# Patient Record
Sex: FEMALE | Race: OTHER | HISPANIC OR LATINO
[De-identification: names, ages, dates, MRNs, and addresses within clinical notes are randomized per-mention and may not be internally consistent; named-entity substitution may affect disease eponyms.]

---

## 2021-01-01 ENCOUNTER — APPOINTMENT (OUTPATIENT)
Dept: VASCULAR SURGERY | Facility: CLINIC | Age: 46
End: 2021-01-01

## 2021-01-01 ENCOUNTER — TRANSCRIPTION ENCOUNTER (OUTPATIENT)
Age: 46
End: 2021-01-01

## 2021-01-01 ENCOUNTER — FORM ENCOUNTER (OUTPATIENT)
Age: 46
End: 2021-01-01

## 2021-01-01 ENCOUNTER — APPOINTMENT (OUTPATIENT)
Dept: ULTRASOUND IMAGING | Facility: HOSPITAL | Age: 46
End: 2021-01-01

## 2021-01-01 ENCOUNTER — APPOINTMENT (OUTPATIENT)
Dept: NEUROSURGERY | Facility: CLINIC | Age: 46
End: 2021-01-01

## 2021-01-01 ENCOUNTER — NON-APPOINTMENT (OUTPATIENT)
Age: 46
End: 2021-01-01

## 2021-01-01 ENCOUNTER — INPATIENT (INPATIENT)
Facility: HOSPITAL | Age: 46
LOS: 43 days | Discharge: NURSING, REHAB OR LONG-TERM CARE | DRG: 20 | End: 2021-09-20
Attending: NEUROLOGICAL SURGERY | Admitting: NEUROLOGICAL SURGERY
Payer: COMMERCIAL

## 2021-01-01 VITALS
HEART RATE: 96 BPM | TEMPERATURE: 100 F | OXYGEN SATURATION: 100 % | RESPIRATION RATE: 20 BRPM | SYSTOLIC BLOOD PRESSURE: 166 MMHG | DIASTOLIC BLOOD PRESSURE: 103 MMHG

## 2021-01-01 VITALS
DIASTOLIC BLOOD PRESSURE: 70 MMHG | HEART RATE: 103 BPM | SYSTOLIC BLOOD PRESSURE: 129 MMHG | RESPIRATION RATE: 18 BRPM | OXYGEN SATURATION: 96 %

## 2021-01-01 DIAGNOSIS — R29.726 NIHSS SCORE 26: ICD-10-CM

## 2021-01-01 DIAGNOSIS — D68.52 PROTHROMBIN GENE MUTATION: ICD-10-CM

## 2021-01-01 DIAGNOSIS — R41.0 DISORIENTATION, UNSPECIFIED: ICD-10-CM

## 2021-01-01 DIAGNOSIS — I82.453 ACUTE EMBOLISM AND THROMBOSIS OF PERONEAL VEIN, BILATERAL: ICD-10-CM

## 2021-01-01 DIAGNOSIS — I60.51: ICD-10-CM

## 2021-01-01 DIAGNOSIS — I82.409 ACUTE EMBOLISM AND THROMBOSIS OF UNSPECIFIED DEEP VEINS OF UNSPECIFIED LOWER EXTREMITY: ICD-10-CM

## 2021-01-01 DIAGNOSIS — E16.2 HYPOGLYCEMIA, UNSPECIFIED: ICD-10-CM

## 2021-01-01 DIAGNOSIS — M79.7 FIBROMYALGIA: ICD-10-CM

## 2021-01-01 DIAGNOSIS — R53.2 FUNCTIONAL QUADRIPLEGIA: ICD-10-CM

## 2021-01-01 DIAGNOSIS — I82.462 ACUTE EMBOLISM AND THROMBOSIS OF LEFT CALF MUSCULAR VEIN: ICD-10-CM

## 2021-01-01 DIAGNOSIS — G93.6 CEREBRAL EDEMA: ICD-10-CM

## 2021-01-01 DIAGNOSIS — D62 ACUTE POSTHEMORRHAGIC ANEMIA: ICD-10-CM

## 2021-01-01 DIAGNOSIS — G91.1 OBSTRUCTIVE HYDROCEPHALUS: ICD-10-CM

## 2021-01-01 DIAGNOSIS — I60.9 NONTRAUMATIC SUBARACHNOID HEMORRHAGE, UNSPECIFIED: ICD-10-CM

## 2021-01-01 DIAGNOSIS — J98.11 ATELECTASIS: ICD-10-CM

## 2021-01-01 DIAGNOSIS — E87.1 HYPO-OSMOLALITY AND HYPONATREMIA: ICD-10-CM

## 2021-01-01 DIAGNOSIS — R73.03 PREDIABETES: ICD-10-CM

## 2021-01-01 DIAGNOSIS — D69.6 THROMBOCYTOPENIA, UNSPECIFIED: ICD-10-CM

## 2021-01-01 DIAGNOSIS — I10 ESSENTIAL (PRIMARY) HYPERTENSION: ICD-10-CM

## 2021-01-01 DIAGNOSIS — I26.94 MULTIPLE SUBSEGMENTAL PULMONARY EMBOLI WITHOUT ACUTE COR PULMONALE: ICD-10-CM

## 2021-01-01 DIAGNOSIS — K56.7 ILEUS, UNSPECIFIED: ICD-10-CM

## 2021-01-01 DIAGNOSIS — J04.10 ACUTE TRACHEITIS WITHOUT OBSTRUCTION: ICD-10-CM

## 2021-01-01 DIAGNOSIS — I82.461 ACUTE EMBOLISM AND THROMBOSIS OF RIGHT CALF MUSCULAR VEIN: ICD-10-CM

## 2021-01-01 DIAGNOSIS — R56.9 UNSPECIFIED CONVULSIONS: ICD-10-CM

## 2021-01-01 DIAGNOSIS — F05 DELIRIUM DUE TO KNOWN PHYSIOLOGICAL CONDITION: ICD-10-CM

## 2021-01-01 DIAGNOSIS — F32.9 MAJOR DEPRESSIVE DISORDER, SINGLE EPISODE, UNSPECIFIED: ICD-10-CM

## 2021-01-01 DIAGNOSIS — I72.9 ANEURYSM OF UNSPECIFIED SITE: ICD-10-CM

## 2021-01-01 DIAGNOSIS — F43.10 POST-TRAUMATIC STRESS DISORDER, UNSPECIFIED: ICD-10-CM

## 2021-01-01 DIAGNOSIS — J69.0 PNEUMONITIS DUE TO INHALATION OF FOOD AND VOMIT: ICD-10-CM

## 2021-01-01 DIAGNOSIS — E87.6 HYPOKALEMIA: ICD-10-CM

## 2021-01-01 DIAGNOSIS — I67.848 OTHER CEREBROVASCULAR VASOSPASM AND VASOCONSTRICTION: ICD-10-CM

## 2021-01-01 DIAGNOSIS — G93.5 COMPRESSION OF BRAIN: ICD-10-CM

## 2021-01-01 DIAGNOSIS — E87.0 HYPEROSMOLALITY AND HYPERNATREMIA: ICD-10-CM

## 2021-01-01 DIAGNOSIS — B37.9 CANDIDIASIS, UNSPECIFIED: ICD-10-CM

## 2021-01-01 DIAGNOSIS — R91.8 OTHER NONSPECIFIC ABNORMAL FINDING OF LUNG FIELD: ICD-10-CM

## 2021-01-01 LAB
-  CEFAZOLIN: SIGNIFICANT CHANGE UP
-  CLINDAMYCIN: SIGNIFICANT CHANGE UP
-  ERYTHROMYCIN: SIGNIFICANT CHANGE UP
-  LINEZOLID: SIGNIFICANT CHANGE UP
-  OXACILLIN: SIGNIFICANT CHANGE UP
-  RIFAMPIN: SIGNIFICANT CHANGE UP
-  TRIMETHOPRIM/SULFAMETHOXAZOLE: SIGNIFICANT CHANGE UP
-  VANCOMYCIN: SIGNIFICANT CHANGE UP
24R-OH-CALCIDIOL SERPL-MCNC: 18.3 NG/ML — LOW (ref 30–80)
24R-OH-CALCIDIOL SERPL-MCNC: 22.3 NG/ML — LOW (ref 30–80)
A1C WITH ESTIMATED AVERAGE GLUCOSE RESULT: 5.9 % — HIGH (ref 4–5.6)
A1C WITH ESTIMATED AVERAGE GLUCOSE RESULT: 5.9 % — HIGH (ref 4–5.6)
ALBUMIN SERPL ELPH-MCNC: 3.5 G/DL — SIGNIFICANT CHANGE UP (ref 3.3–5)
ALBUMIN SERPL ELPH-MCNC: 3.7 G/DL — SIGNIFICANT CHANGE UP (ref 3.3–5)
ALBUMIN SERPL ELPH-MCNC: 3.9 G/DL — SIGNIFICANT CHANGE UP (ref 3.3–5)
ALBUMIN SERPL ELPH-MCNC: 4 G/DL — SIGNIFICANT CHANGE UP (ref 3.3–5)
ALBUMIN SERPL ELPH-MCNC: 4.4 G/DL — SIGNIFICANT CHANGE UP (ref 3.3–5)
ALBUMIN SERPL ELPH-MCNC: 4.5 G/DL — SIGNIFICANT CHANGE UP (ref 3.3–5)
ALP SERPL-CCNC: 54 U/L — SIGNIFICANT CHANGE UP (ref 40–120)
ALP SERPL-CCNC: 56 U/L — SIGNIFICANT CHANGE UP (ref 40–120)
ALP SERPL-CCNC: 57 U/L — SIGNIFICANT CHANGE UP (ref 40–120)
ALP SERPL-CCNC: 64 U/L — SIGNIFICANT CHANGE UP (ref 40–120)
ALP SERPL-CCNC: 79 U/L — SIGNIFICANT CHANGE UP (ref 40–120)
ALP SERPL-CCNC: 80 U/L — SIGNIFICANT CHANGE UP (ref 40–120)
ALT FLD-CCNC: 16 U/L — SIGNIFICANT CHANGE UP (ref 10–45)
ALT FLD-CCNC: 18 U/L — SIGNIFICANT CHANGE UP (ref 10–45)
ALT FLD-CCNC: 24 U/L — SIGNIFICANT CHANGE UP (ref 10–45)
ALT FLD-CCNC: 25 U/L — SIGNIFICANT CHANGE UP (ref 10–45)
ALT FLD-CCNC: 25 U/L — SIGNIFICANT CHANGE UP (ref 10–45)
ALT FLD-CCNC: 63 U/L — HIGH (ref 10–45)
ANA TITR SER: NEGATIVE — SIGNIFICANT CHANGE UP
ANION GAP SERPL CALC-SCNC: 10 MMOL/L — SIGNIFICANT CHANGE UP (ref 5–17)
ANION GAP SERPL CALC-SCNC: 11 MMOL/L — SIGNIFICANT CHANGE UP (ref 5–17)
ANION GAP SERPL CALC-SCNC: 12 MMOL/L — SIGNIFICANT CHANGE UP (ref 5–17)
ANION GAP SERPL CALC-SCNC: 13 MMOL/L — SIGNIFICANT CHANGE UP (ref 5–17)
ANION GAP SERPL CALC-SCNC: 14 MMOL/L — SIGNIFICANT CHANGE UP (ref 5–17)
ANION GAP SERPL CALC-SCNC: 15 MMOL/L — SIGNIFICANT CHANGE UP (ref 5–17)
ANION GAP SERPL CALC-SCNC: 7 MMOL/L — SIGNIFICANT CHANGE UP (ref 5–17)
ANION GAP SERPL CALC-SCNC: 8 MMOL/L — SIGNIFICANT CHANGE UP (ref 5–17)
ANION GAP SERPL CALC-SCNC: 9 MMOL/L — SIGNIFICANT CHANGE UP (ref 5–17)
APPEARANCE CSF: CLEAR — SIGNIFICANT CHANGE UP
APPEARANCE CSF: SIGNIFICANT CHANGE UP
APPEARANCE SPUN FLD: ABNORMAL
APPEARANCE SPUN FLD: SIGNIFICANT CHANGE UP
APPEARANCE SPUN FLD: SIGNIFICANT CHANGE UP
APPEARANCE UR: ABNORMAL
APPEARANCE UR: CLEAR — SIGNIFICANT CHANGE UP
APTT BLD: 23.8 SEC — LOW (ref 27.5–35.5)
APTT BLD: 27.2 SEC — LOW (ref 27.5–35.5)
APTT BLD: 28.3 SEC — SIGNIFICANT CHANGE UP (ref 27.5–35.5)
APTT BLD: 30.6 SEC — SIGNIFICANT CHANGE UP (ref 27.5–35.5)
APTT BLD: 32.3 SEC — SIGNIFICANT CHANGE UP (ref 27.5–35.5)
APTT BLD: 33 SEC — SIGNIFICANT CHANGE UP (ref 27.5–35.5)
APTT BLD: 33.9 SEC — SIGNIFICANT CHANGE UP (ref 27.5–35.5)
AST SERPL-CCNC: 11 U/L — SIGNIFICANT CHANGE UP (ref 10–40)
AST SERPL-CCNC: 12 U/L — SIGNIFICANT CHANGE UP (ref 10–40)
AST SERPL-CCNC: 13 U/L — SIGNIFICANT CHANGE UP (ref 10–40)
AST SERPL-CCNC: 28 U/L — SIGNIFICANT CHANGE UP (ref 10–40)
AST SERPL-CCNC: 29 U/L — SIGNIFICANT CHANGE UP (ref 10–40)
AST SERPL-CCNC: 44 U/L — HIGH (ref 10–40)
AT III ACT/NOR PPP CHRO: 111 % — SIGNIFICANT CHANGE UP (ref 85–135)
AUTO DIFF PNL BLD: NEGATIVE — SIGNIFICANT CHANGE UP
BACTERIA # UR AUTO: PRESENT /HPF
BASE EXCESS BLDA CALC-SCNC: -1.5 MMOL/L — SIGNIFICANT CHANGE UP (ref -2–3)
BASE EXCESS BLDA CALC-SCNC: 0.7 MMOL/L — SIGNIFICANT CHANGE UP (ref -2–3)
BASE EXCESS BLDA CALC-SCNC: 1.6 MMOL/L — SIGNIFICANT CHANGE UP (ref -2–3)
BASE EXCESS BLDA CALC-SCNC: 3.8 MMOL/L — HIGH (ref -2–3)
BASE EXCESS BLDA CALC-SCNC: 3.9 MMOL/L — HIGH (ref -2–3)
BASE EXCESS BLDA CALC-SCNC: 4.4 MMOL/L — HIGH (ref -2–3)
BASOPHILS # BLD AUTO: 0.02 K/UL — SIGNIFICANT CHANGE UP (ref 0–0.2)
BASOPHILS # BLD AUTO: 0.03 K/UL — SIGNIFICANT CHANGE UP (ref 0–0.2)
BASOPHILS # BLD AUTO: 0.04 K/UL — SIGNIFICANT CHANGE UP (ref 0–0.2)
BASOPHILS # BLD AUTO: 0.06 K/UL — SIGNIFICANT CHANGE UP (ref 0–0.2)
BASOPHILS NFR BLD AUTO: 0.1 % — SIGNIFICANT CHANGE UP (ref 0–2)
BASOPHILS NFR BLD AUTO: 0.1 % — SIGNIFICANT CHANGE UP (ref 0–2)
BASOPHILS NFR BLD AUTO: 0.2 % — SIGNIFICANT CHANGE UP (ref 0–2)
BASOPHILS NFR BLD AUTO: 0.3 % — SIGNIFICANT CHANGE UP (ref 0–2)
BASOPHILS NFR BLD AUTO: 0.3 % — SIGNIFICANT CHANGE UP (ref 0–2)
BILIRUB DIRECT SERPL-MCNC: 0.2 MG/DL — SIGNIFICANT CHANGE UP (ref 0–0.2)
BILIRUB DIRECT SERPL-MCNC: <0.2 MG/DL — SIGNIFICANT CHANGE UP (ref 0–0.2)
BILIRUB INDIRECT FLD-MCNC: 0.1 MG/DL — LOW (ref 0.2–1)
BILIRUB INDIRECT FLD-MCNC: SIGNIFICANT CHANGE UP MG/DL (ref 0.2–1)
BILIRUB SERPL-MCNC: 0.3 MG/DL — SIGNIFICANT CHANGE UP (ref 0.2–1.2)
BILIRUB SERPL-MCNC: 0.4 MG/DL — SIGNIFICANT CHANGE UP (ref 0.2–1.2)
BILIRUB SERPL-MCNC: 0.5 MG/DL — SIGNIFICANT CHANGE UP (ref 0.2–1.2)
BILIRUB SERPL-MCNC: 0.6 MG/DL — SIGNIFICANT CHANGE UP (ref 0.2–1.2)
BILIRUB UR-MCNC: NEGATIVE — SIGNIFICANT CHANGE UP
BLD GP AB SCN SERPL QL: NEGATIVE — SIGNIFICANT CHANGE UP
BUN SERPL-MCNC: 10 MG/DL — SIGNIFICANT CHANGE UP (ref 7–23)
BUN SERPL-MCNC: 11 MG/DL — SIGNIFICANT CHANGE UP (ref 7–23)
BUN SERPL-MCNC: 12 MG/DL — SIGNIFICANT CHANGE UP (ref 7–23)
BUN SERPL-MCNC: 13 MG/DL — SIGNIFICANT CHANGE UP (ref 7–23)
BUN SERPL-MCNC: 14 MG/DL — SIGNIFICANT CHANGE UP (ref 7–23)
BUN SERPL-MCNC: 15 MG/DL — SIGNIFICANT CHANGE UP (ref 7–23)
BUN SERPL-MCNC: 15 MG/DL — SIGNIFICANT CHANGE UP (ref 7–23)
BUN SERPL-MCNC: 16 MG/DL — SIGNIFICANT CHANGE UP (ref 7–23)
BUN SERPL-MCNC: 16 MG/DL — SIGNIFICANT CHANGE UP (ref 7–23)
BUN SERPL-MCNC: 18 MG/DL — SIGNIFICANT CHANGE UP (ref 7–23)
BUN SERPL-MCNC: 18 MG/DL — SIGNIFICANT CHANGE UP (ref 7–23)
BUN SERPL-MCNC: 20 MG/DL — SIGNIFICANT CHANGE UP (ref 7–23)
BUN SERPL-MCNC: 21 MG/DL — SIGNIFICANT CHANGE UP (ref 7–23)
BUN SERPL-MCNC: 21 MG/DL — SIGNIFICANT CHANGE UP (ref 7–23)
BUN SERPL-MCNC: 25 MG/DL — HIGH (ref 7–23)
BUN SERPL-MCNC: 28 MG/DL — HIGH (ref 7–23)
BUN SERPL-MCNC: 28 MG/DL — HIGH (ref 7–23)
BUN SERPL-MCNC: 5 MG/DL — LOW (ref 7–23)
BUN SERPL-MCNC: 5 MG/DL — LOW (ref 7–23)
BUN SERPL-MCNC: 6 MG/DL — LOW (ref 7–23)
BUN SERPL-MCNC: 6 MG/DL — LOW (ref 7–23)
BUN SERPL-MCNC: 7 MG/DL — SIGNIFICANT CHANGE UP (ref 7–23)
BUN SERPL-MCNC: 7 MG/DL — SIGNIFICANT CHANGE UP (ref 7–23)
BUN SERPL-MCNC: 8 MG/DL — SIGNIFICANT CHANGE UP (ref 7–23)
BUN SERPL-MCNC: 9 MG/DL — SIGNIFICANT CHANGE UP (ref 7–23)
C-ANCA SER-ACNC: NEGATIVE — SIGNIFICANT CHANGE UP
CALCIUM SERPL-MCNC: 10 MG/DL — SIGNIFICANT CHANGE UP (ref 8.4–10.5)
CALCIUM SERPL-MCNC: 10 MG/DL — SIGNIFICANT CHANGE UP (ref 8.4–10.5)
CALCIUM SERPL-MCNC: 10.1 MG/DL — SIGNIFICANT CHANGE UP (ref 8.4–10.5)
CALCIUM SERPL-MCNC: 10.3 MG/DL — SIGNIFICANT CHANGE UP (ref 8.4–10.5)
CALCIUM SERPL-MCNC: 10.4 MG/DL — SIGNIFICANT CHANGE UP (ref 8.4–10.5)
CALCIUM SERPL-MCNC: 8.4 MG/DL — SIGNIFICANT CHANGE UP (ref 8.4–10.5)
CALCIUM SERPL-MCNC: 8.5 MG/DL — SIGNIFICANT CHANGE UP (ref 8.4–10.5)
CALCIUM SERPL-MCNC: 8.6 MG/DL — SIGNIFICANT CHANGE UP (ref 8.4–10.5)
CALCIUM SERPL-MCNC: 8.6 MG/DL — SIGNIFICANT CHANGE UP (ref 8.4–10.5)
CALCIUM SERPL-MCNC: 8.7 MG/DL — SIGNIFICANT CHANGE UP (ref 8.4–10.5)
CALCIUM SERPL-MCNC: 8.8 MG/DL — SIGNIFICANT CHANGE UP (ref 8.4–10.5)
CALCIUM SERPL-MCNC: 8.9 MG/DL — SIGNIFICANT CHANGE UP (ref 8.4–10.5)
CALCIUM SERPL-MCNC: 9 MG/DL — SIGNIFICANT CHANGE UP (ref 8.4–10.5)
CALCIUM SERPL-MCNC: 9.1 MG/DL — SIGNIFICANT CHANGE UP (ref 8.4–10.5)
CALCIUM SERPL-MCNC: 9.2 MG/DL — SIGNIFICANT CHANGE UP (ref 8.4–10.5)
CALCIUM SERPL-MCNC: 9.3 MG/DL — SIGNIFICANT CHANGE UP (ref 8.4–10.5)
CALCIUM SERPL-MCNC: 9.4 MG/DL — SIGNIFICANT CHANGE UP (ref 8.4–10.5)
CALCIUM SERPL-MCNC: 9.5 MG/DL — SIGNIFICANT CHANGE UP (ref 8.4–10.5)
CALCIUM SERPL-MCNC: 9.6 MG/DL — SIGNIFICANT CHANGE UP (ref 8.4–10.5)
CALCIUM SERPL-MCNC: 9.7 MG/DL — SIGNIFICANT CHANGE UP (ref 8.4–10.5)
CALCIUM SERPL-MCNC: 9.8 MG/DL — SIGNIFICANT CHANGE UP (ref 8.4–10.5)
CARDIOLIPIN AB SER-ACNC: NEGATIVE — SIGNIFICANT CHANGE UP
CHLORIDE SERPL-SCNC: 103 MMOL/L — SIGNIFICANT CHANGE UP (ref 96–108)
CHLORIDE SERPL-SCNC: 104 MMOL/L — SIGNIFICANT CHANGE UP (ref 96–108)
CHLORIDE SERPL-SCNC: 105 MMOL/L — SIGNIFICANT CHANGE UP (ref 96–108)
CHLORIDE SERPL-SCNC: 106 MMOL/L — SIGNIFICANT CHANGE UP (ref 96–108)
CHLORIDE SERPL-SCNC: 107 MMOL/L — SIGNIFICANT CHANGE UP (ref 96–108)
CHLORIDE SERPL-SCNC: 108 MMOL/L — SIGNIFICANT CHANGE UP (ref 96–108)
CHLORIDE SERPL-SCNC: 109 MMOL/L — HIGH (ref 96–108)
CHLORIDE SERPL-SCNC: 109 MMOL/L — HIGH (ref 96–108)
CHLORIDE SERPL-SCNC: 111 MMOL/L — HIGH (ref 96–108)
CHLORIDE SERPL-SCNC: 111 MMOL/L — HIGH (ref 96–108)
CHLORIDE SERPL-SCNC: 112 MMOL/L — HIGH (ref 96–108)
CHLORIDE SERPL-SCNC: 113 MMOL/L — HIGH (ref 96–108)
CHLORIDE SERPL-SCNC: 113 MMOL/L — HIGH (ref 96–108)
CHLORIDE SERPL-SCNC: 114 MMOL/L — HIGH (ref 96–108)
CHLORIDE SERPL-SCNC: 114 MMOL/L — HIGH (ref 96–108)
CHLORIDE SERPL-SCNC: 115 MMOL/L — HIGH (ref 96–108)
CHLORIDE SERPL-SCNC: 116 MMOL/L — HIGH (ref 96–108)
CHLORIDE SERPL-SCNC: 117 MMOL/L — HIGH (ref 96–108)
CHLORIDE SERPL-SCNC: 118 MMOL/L — HIGH (ref 96–108)
CHLORIDE SERPL-SCNC: 119 MMOL/L — HIGH (ref 96–108)
CHLORIDE SERPL-SCNC: 120 MMOL/L — HIGH (ref 96–108)
CHLORIDE SERPL-SCNC: 97 MMOL/L — SIGNIFICANT CHANGE UP (ref 96–108)
CHOLEST SERPL-MCNC: 149 MG/DL — SIGNIFICANT CHANGE UP
CK MB CFR SERPL CALC: 3.9 NG/ML — SIGNIFICANT CHANGE UP (ref 0–6.7)
CK MB CFR SERPL CALC: 8.9 NG/ML — HIGH (ref 0–6.7)
CK SERPL-CCNC: 61 U/L — SIGNIFICANT CHANGE UP (ref 25–170)
CK SERPL-CCNC: 96 U/L — SIGNIFICANT CHANGE UP (ref 25–170)
CK SERPL-CCNC: 96 U/L — SIGNIFICANT CHANGE UP (ref 25–170)
CK SERPL-CCNC: 98 U/L — SIGNIFICANT CHANGE UP (ref 25–170)
CO2 BLDA-SCNC: 24 MMOL/L — SIGNIFICANT CHANGE UP (ref 19–24)
CO2 BLDA-SCNC: 26 MMOL/L — HIGH (ref 19–24)
CO2 BLDA-SCNC: 26 MMOL/L — HIGH (ref 19–24)
CO2 BLDA-SCNC: 27 MMOL/L — HIGH (ref 19–24)
CO2 BLDA-SCNC: 29 MMOL/L — HIGH (ref 19–24)
CO2 BLDA-SCNC: 30 MMOL/L — HIGH (ref 19–24)
CO2 SERPL-SCNC: 18 MMOL/L — LOW (ref 22–31)
CO2 SERPL-SCNC: 19 MMOL/L — LOW (ref 22–31)
CO2 SERPL-SCNC: 20 MMOL/L — LOW (ref 22–31)
CO2 SERPL-SCNC: 20 MMOL/L — LOW (ref 22–31)
CO2 SERPL-SCNC: 21 MMOL/L — LOW (ref 22–31)
CO2 SERPL-SCNC: 23 MMOL/L — SIGNIFICANT CHANGE UP (ref 22–31)
CO2 SERPL-SCNC: 24 MMOL/L — SIGNIFICANT CHANGE UP (ref 22–31)
CO2 SERPL-SCNC: 24 MMOL/L — SIGNIFICANT CHANGE UP (ref 22–31)
CO2 SERPL-SCNC: 25 MMOL/L — SIGNIFICANT CHANGE UP (ref 22–31)
CO2 SERPL-SCNC: 26 MMOL/L — SIGNIFICANT CHANGE UP (ref 22–31)
CO2 SERPL-SCNC: 27 MMOL/L — SIGNIFICANT CHANGE UP (ref 22–31)
CO2 SERPL-SCNC: 28 MMOL/L — SIGNIFICANT CHANGE UP (ref 22–31)
CO2 SERPL-SCNC: 29 MMOL/L — SIGNIFICANT CHANGE UP (ref 22–31)
CO2 SERPL-SCNC: 30 MMOL/L — SIGNIFICANT CHANGE UP (ref 22–31)
CO2 SERPL-SCNC: 30 MMOL/L — SIGNIFICANT CHANGE UP (ref 22–31)
CO2 SERPL-SCNC: 31 MMOL/L — SIGNIFICANT CHANGE UP (ref 22–31)
CO2 SERPL-SCNC: 31 MMOL/L — SIGNIFICANT CHANGE UP (ref 22–31)
CO2 SERPL-SCNC: 33 MMOL/L — HIGH (ref 22–31)
COLOR CSF: ABNORMAL
COLOR CSF: YELLOW
COLOR CSF: YELLOW
COLOR SPEC: YELLOW — SIGNIFICANT CHANGE UP
COVID-19 SPIKE DOMAIN AB INTERP: POSITIVE
COVID-19 SPIKE DOMAIN ANTIBODY RESULT: >250 U/ML — HIGH
CREAT SERPL-MCNC: 0.41 MG/DL — LOW (ref 0.5–1.3)
CREAT SERPL-MCNC: 0.42 MG/DL — LOW (ref 0.5–1.3)
CREAT SERPL-MCNC: 0.44 MG/DL — LOW (ref 0.5–1.3)
CREAT SERPL-MCNC: 0.45 MG/DL — LOW (ref 0.5–1.3)
CREAT SERPL-MCNC: 0.46 MG/DL — LOW (ref 0.5–1.3)
CREAT SERPL-MCNC: 0.48 MG/DL — LOW (ref 0.5–1.3)
CREAT SERPL-MCNC: 0.49 MG/DL — LOW (ref 0.5–1.3)
CREAT SERPL-MCNC: 0.5 MG/DL — SIGNIFICANT CHANGE UP (ref 0.5–1.3)
CREAT SERPL-MCNC: 0.51 MG/DL — SIGNIFICANT CHANGE UP (ref 0.5–1.3)
CREAT SERPL-MCNC: 0.52 MG/DL — SIGNIFICANT CHANGE UP (ref 0.5–1.3)
CREAT SERPL-MCNC: 0.53 MG/DL — SIGNIFICANT CHANGE UP (ref 0.5–1.3)
CREAT SERPL-MCNC: 0.54 MG/DL — SIGNIFICANT CHANGE UP (ref 0.5–1.3)
CREAT SERPL-MCNC: 0.55 MG/DL — SIGNIFICANT CHANGE UP (ref 0.5–1.3)
CREAT SERPL-MCNC: 0.56 MG/DL — SIGNIFICANT CHANGE UP (ref 0.5–1.3)
CREAT SERPL-MCNC: 0.57 MG/DL — SIGNIFICANT CHANGE UP (ref 0.5–1.3)
CREAT SERPL-MCNC: 0.58 MG/DL — SIGNIFICANT CHANGE UP (ref 0.5–1.3)
CREAT SERPL-MCNC: 0.59 MG/DL — SIGNIFICANT CHANGE UP (ref 0.5–1.3)
CREAT SERPL-MCNC: 0.6 MG/DL — SIGNIFICANT CHANGE UP (ref 0.5–1.3)
CREAT SERPL-MCNC: 0.6 MG/DL — SIGNIFICANT CHANGE UP (ref 0.5–1.3)
CREAT SERPL-MCNC: 0.61 MG/DL — SIGNIFICANT CHANGE UP (ref 0.5–1.3)
CREAT SERPL-MCNC: 0.62 MG/DL — SIGNIFICANT CHANGE UP (ref 0.5–1.3)
CREAT SERPL-MCNC: 0.63 MG/DL — SIGNIFICANT CHANGE UP (ref 0.5–1.3)
CREAT SERPL-MCNC: 0.64 MG/DL — SIGNIFICANT CHANGE UP (ref 0.5–1.3)
CREAT SERPL-MCNC: 0.64 MG/DL — SIGNIFICANT CHANGE UP (ref 0.5–1.3)
CREAT SERPL-MCNC: 0.65 MG/DL — SIGNIFICANT CHANGE UP (ref 0.5–1.3)
CREAT SERPL-MCNC: 0.67 MG/DL — SIGNIFICANT CHANGE UP (ref 0.5–1.3)
CREAT SERPL-MCNC: 0.67 MG/DL — SIGNIFICANT CHANGE UP (ref 0.5–1.3)
CREAT SERPL-MCNC: 0.69 MG/DL — SIGNIFICANT CHANGE UP (ref 0.5–1.3)
CREAT SERPL-MCNC: 0.72 MG/DL — SIGNIFICANT CHANGE UP (ref 0.5–1.3)
CREAT SERPL-MCNC: 0.74 MG/DL — SIGNIFICANT CHANGE UP (ref 0.5–1.3)
CREAT SERPL-MCNC: 0.74 MG/DL — SIGNIFICANT CHANGE UP (ref 0.5–1.3)
CRP SERPL-MCNC: 89.1 MG/L — HIGH (ref 0–4)
CSF COMMENTS: SIGNIFICANT CHANGE UP
CULTURE RESULTS: NO GROWTH — SIGNIFICANT CHANGE UP
CULTURE RESULTS: SIGNIFICANT CHANGE UP
DIFF PNL FLD: ABNORMAL
DIFF PNL FLD: NEGATIVE — SIGNIFICANT CHANGE UP
DNA PLOIDY SPEC FC-IMP: SIGNIFICANT CHANGE UP
EOSINOPHIL # BLD AUTO: 0 K/UL — SIGNIFICANT CHANGE UP (ref 0–0.5)
EOSINOPHIL # BLD AUTO: 0.01 K/UL — SIGNIFICANT CHANGE UP (ref 0–0.5)
EOSINOPHIL # BLD AUTO: 0.11 K/UL — SIGNIFICANT CHANGE UP (ref 0–0.5)
EOSINOPHIL NFR BLD AUTO: 0 % — SIGNIFICANT CHANGE UP (ref 0–6)
EOSINOPHIL NFR BLD AUTO: 0.1 % — SIGNIFICANT CHANGE UP (ref 0–6)
EOSINOPHIL NFR BLD AUTO: 0.6 % — SIGNIFICANT CHANGE UP (ref 0–6)
EPI CELLS # UR: ABNORMAL /HPF (ref 0–5)
EPI CELLS # UR: ABNORMAL /HPF (ref 0–5)
EPI CELLS # UR: SIGNIFICANT CHANGE UP /HPF (ref 0–5)
EPI CELLS # UR: SIGNIFICANT CHANGE UP /HPF (ref 0–5)
ERYTHROCYTE [SEDIMENTATION RATE] IN BLOOD: 25 MM/HR — HIGH
ESTIMATED AVERAGE GLUCOSE: 123 MG/DL — HIGH (ref 68–114)
ESTIMATED AVERAGE GLUCOSE: 123 MG/DL — HIGH (ref 68–114)
FERRITIN SERPL-MCNC: 58 NG/ML — SIGNIFICANT CHANGE UP (ref 15–150)
GAS PNL BLDA: SIGNIFICANT CHANGE UP
GAS PNL BLDA: SIGNIFICANT CHANGE UP
GLUCOSE CSF-MCNC: 62 MG/DL — SIGNIFICANT CHANGE UP (ref 40–70)
GLUCOSE CSF-MCNC: 63 MG/DL — SIGNIFICANT CHANGE UP (ref 40–70)
GLUCOSE CSF-MCNC: 72 MG/DL — HIGH (ref 40–70)
GLUCOSE CSF-MCNC: 75 MG/DL — HIGH (ref 40–70)
GLUCOSE CSF-MCNC: 76 MG/DL — HIGH (ref 40–70)
GLUCOSE CSF-MCNC: 82 MG/DL — HIGH (ref 40–70)
GLUCOSE SERPL-MCNC: 100 MG/DL — HIGH (ref 70–99)
GLUCOSE SERPL-MCNC: 101 MG/DL — HIGH (ref 70–99)
GLUCOSE SERPL-MCNC: 104 MG/DL — HIGH (ref 70–99)
GLUCOSE SERPL-MCNC: 105 MG/DL — HIGH (ref 70–99)
GLUCOSE SERPL-MCNC: 105 MG/DL — HIGH (ref 70–99)
GLUCOSE SERPL-MCNC: 107 MG/DL — HIGH (ref 70–99)
GLUCOSE SERPL-MCNC: 109 MG/DL — HIGH (ref 70–99)
GLUCOSE SERPL-MCNC: 109 MG/DL — HIGH (ref 70–99)
GLUCOSE SERPL-MCNC: 111 MG/DL — HIGH (ref 70–99)
GLUCOSE SERPL-MCNC: 111 MG/DL — HIGH (ref 70–99)
GLUCOSE SERPL-MCNC: 113 MG/DL — HIGH (ref 70–99)
GLUCOSE SERPL-MCNC: 114 MG/DL — HIGH (ref 70–99)
GLUCOSE SERPL-MCNC: 114 MG/DL — HIGH (ref 70–99)
GLUCOSE SERPL-MCNC: 115 MG/DL — HIGH (ref 70–99)
GLUCOSE SERPL-MCNC: 115 MG/DL — HIGH (ref 70–99)
GLUCOSE SERPL-MCNC: 118 MG/DL — HIGH (ref 70–99)
GLUCOSE SERPL-MCNC: 118 MG/DL — HIGH (ref 70–99)
GLUCOSE SERPL-MCNC: 121 MG/DL — HIGH (ref 70–99)
GLUCOSE SERPL-MCNC: 121 MG/DL — HIGH (ref 70–99)
GLUCOSE SERPL-MCNC: 123 MG/DL — HIGH (ref 70–99)
GLUCOSE SERPL-MCNC: 123 MG/DL — HIGH (ref 70–99)
GLUCOSE SERPL-MCNC: 125 MG/DL — HIGH (ref 70–99)
GLUCOSE SERPL-MCNC: 127 MG/DL — HIGH (ref 70–99)
GLUCOSE SERPL-MCNC: 128 MG/DL — HIGH (ref 70–99)
GLUCOSE SERPL-MCNC: 128 MG/DL — HIGH (ref 70–99)
GLUCOSE SERPL-MCNC: 129 MG/DL — HIGH (ref 70–99)
GLUCOSE SERPL-MCNC: 130 MG/DL — HIGH (ref 70–99)
GLUCOSE SERPL-MCNC: 136 MG/DL — HIGH (ref 70–99)
GLUCOSE SERPL-MCNC: 141 MG/DL — HIGH (ref 70–99)
GLUCOSE SERPL-MCNC: 141 MG/DL — HIGH (ref 70–99)
GLUCOSE SERPL-MCNC: 142 MG/DL — HIGH (ref 70–99)
GLUCOSE SERPL-MCNC: 143 MG/DL — HIGH (ref 70–99)
GLUCOSE SERPL-MCNC: 145 MG/DL — HIGH (ref 70–99)
GLUCOSE SERPL-MCNC: 146 MG/DL — HIGH (ref 70–99)
GLUCOSE SERPL-MCNC: 146 MG/DL — HIGH (ref 70–99)
GLUCOSE SERPL-MCNC: 147 MG/DL — HIGH (ref 70–99)
GLUCOSE SERPL-MCNC: 151 MG/DL — HIGH (ref 70–99)
GLUCOSE SERPL-MCNC: 154 MG/DL — HIGH (ref 70–99)
GLUCOSE SERPL-MCNC: 155 MG/DL — HIGH (ref 70–99)
GLUCOSE SERPL-MCNC: 161 MG/DL — HIGH (ref 70–99)
GLUCOSE SERPL-MCNC: 165 MG/DL — HIGH (ref 70–99)
GLUCOSE SERPL-MCNC: 167 MG/DL — HIGH (ref 70–99)
GLUCOSE SERPL-MCNC: 172 MG/DL — HIGH (ref 70–99)
GLUCOSE SERPL-MCNC: 179 MG/DL — HIGH (ref 70–99)
GLUCOSE SERPL-MCNC: 180 MG/DL — HIGH (ref 70–99)
GLUCOSE SERPL-MCNC: 180 MG/DL — HIGH (ref 70–99)
GLUCOSE SERPL-MCNC: 185 MG/DL — HIGH (ref 70–99)
GLUCOSE SERPL-MCNC: 226 MG/DL — HIGH (ref 70–99)
GLUCOSE SERPL-MCNC: 274 MG/DL — HIGH (ref 70–99)
GLUCOSE SERPL-MCNC: 86 MG/DL — SIGNIFICANT CHANGE UP (ref 70–99)
GLUCOSE SERPL-MCNC: 87 MG/DL — SIGNIFICANT CHANGE UP (ref 70–99)
GLUCOSE SERPL-MCNC: 91 MG/DL — SIGNIFICANT CHANGE UP (ref 70–99)
GLUCOSE SERPL-MCNC: 95 MG/DL — SIGNIFICANT CHANGE UP (ref 70–99)
GLUCOSE SERPL-MCNC: 95 MG/DL — SIGNIFICANT CHANGE UP (ref 70–99)
GLUCOSE UR QL: 100
GLUCOSE UR QL: NEGATIVE — SIGNIFICANT CHANGE UP
GRAM STN FLD: SIGNIFICANT CHANGE UP
HCG SERPL-ACNC: <0 MIU/ML — SIGNIFICANT CHANGE UP
HCG UR QL: NEGATIVE — SIGNIFICANT CHANGE UP
HCO3 BLDA-SCNC: 23 MMOL/L — SIGNIFICANT CHANGE UP (ref 21–28)
HCO3 BLDA-SCNC: 25 MMOL/L — SIGNIFICANT CHANGE UP (ref 21–28)
HCO3 BLDA-SCNC: 28 MMOL/L — SIGNIFICANT CHANGE UP (ref 21–28)
HCO3 BLDA-SCNC: 29 MMOL/L — HIGH (ref 21–28)
HCT VFR BLD CALC: 25.7 % — LOW (ref 34.5–45)
HCT VFR BLD CALC: 26 % — LOW (ref 34.5–45)
HCT VFR BLD CALC: 28.3 % — LOW (ref 34.5–45)
HCT VFR BLD CALC: 28.4 % — LOW (ref 34.5–45)
HCT VFR BLD CALC: 28.8 % — LOW (ref 34.5–45)
HCT VFR BLD CALC: 29 % — LOW (ref 34.5–45)
HCT VFR BLD CALC: 29.1 % — LOW (ref 34.5–45)
HCT VFR BLD CALC: 29.4 % — LOW (ref 34.5–45)
HCT VFR BLD CALC: 30 % — LOW (ref 34.5–45)
HCT VFR BLD CALC: 30.3 % — LOW (ref 34.5–45)
HCT VFR BLD CALC: 30.5 % — LOW (ref 34.5–45)
HCT VFR BLD CALC: 31.4 % — LOW (ref 34.5–45)
HCT VFR BLD CALC: 32.1 % — LOW (ref 34.5–45)
HCT VFR BLD CALC: 32.2 % — LOW (ref 34.5–45)
HCT VFR BLD CALC: 32.3 % — LOW (ref 34.5–45)
HCT VFR BLD CALC: 32.3 % — LOW (ref 34.5–45)
HCT VFR BLD CALC: 32.8 % — LOW (ref 34.5–45)
HCT VFR BLD CALC: 33.1 % — LOW (ref 34.5–45)
HCT VFR BLD CALC: 33.9 % — LOW (ref 34.5–45)
HCT VFR BLD CALC: 34.3 % — LOW (ref 34.5–45)
HCT VFR BLD CALC: 35 % — SIGNIFICANT CHANGE UP (ref 34.5–45)
HCT VFR BLD CALC: 35.1 % — SIGNIFICANT CHANGE UP (ref 34.5–45)
HCT VFR BLD CALC: 35.4 % — SIGNIFICANT CHANGE UP (ref 34.5–45)
HCT VFR BLD CALC: 35.7 % — SIGNIFICANT CHANGE UP (ref 34.5–45)
HCT VFR BLD CALC: 35.9 % — SIGNIFICANT CHANGE UP (ref 34.5–45)
HCT VFR BLD CALC: 35.9 % — SIGNIFICANT CHANGE UP (ref 34.5–45)
HCT VFR BLD CALC: 36 % — SIGNIFICANT CHANGE UP (ref 34.5–45)
HCT VFR BLD CALC: 36.3 % — SIGNIFICANT CHANGE UP (ref 34.5–45)
HCT VFR BLD CALC: 36.4 % — SIGNIFICANT CHANGE UP (ref 34.5–45)
HCT VFR BLD CALC: 36.6 % — SIGNIFICANT CHANGE UP (ref 34.5–45)
HCT VFR BLD CALC: 36.7 % — SIGNIFICANT CHANGE UP (ref 34.5–45)
HCT VFR BLD CALC: 37.2 % — SIGNIFICANT CHANGE UP (ref 34.5–45)
HCT VFR BLD CALC: 37.7 % — SIGNIFICANT CHANGE UP (ref 34.5–45)
HCT VFR BLD CALC: 38 % — SIGNIFICANT CHANGE UP (ref 34.5–45)
HCT VFR BLD CALC: 38.2 % — SIGNIFICANT CHANGE UP (ref 34.5–45)
HCT VFR BLD CALC: 38.5 % — SIGNIFICANT CHANGE UP (ref 34.5–45)
HCT VFR BLD CALC: 38.7 % — SIGNIFICANT CHANGE UP (ref 34.5–45)
HCT VFR BLD CALC: 38.9 % — SIGNIFICANT CHANGE UP (ref 34.5–45)
HCT VFR BLD CALC: 39.4 % — SIGNIFICANT CHANGE UP (ref 34.5–45)
HCT VFR BLD CALC: 39.4 % — SIGNIFICANT CHANGE UP (ref 34.5–45)
HCT VFR BLD CALC: 39.8 % — SIGNIFICANT CHANGE UP (ref 34.5–45)
HCT VFR BLD CALC: 40.1 % — SIGNIFICANT CHANGE UP (ref 34.5–45)
HCT VFR BLD CALC: 40.2 % — SIGNIFICANT CHANGE UP (ref 34.5–45)
HCT VFR BLD CALC: 40.3 % — SIGNIFICANT CHANGE UP (ref 34.5–45)
HCT VFR BLD CALC: 41.2 % — SIGNIFICANT CHANGE UP (ref 34.5–45)
HCT VFR BLD CALC: 41.5 % — SIGNIFICANT CHANGE UP (ref 34.5–45)
HCT VFR BLD CALC: 41.6 % — SIGNIFICANT CHANGE UP (ref 34.5–45)
HDLC SERPL-MCNC: 50 MG/DL — LOW
HGB BLD-MCNC: 10 G/DL — LOW (ref 11.5–15.5)
HGB BLD-MCNC: 10.3 G/DL — LOW (ref 11.5–15.5)
HGB BLD-MCNC: 10.5 G/DL — LOW (ref 11.5–15.5)
HGB BLD-MCNC: 10.5 G/DL — LOW (ref 11.5–15.5)
HGB BLD-MCNC: 10.6 G/DL — LOW (ref 11.5–15.5)
HGB BLD-MCNC: 10.7 G/DL — LOW (ref 11.5–15.5)
HGB BLD-MCNC: 10.8 G/DL — LOW (ref 11.5–15.5)
HGB BLD-MCNC: 11 G/DL — LOW (ref 11.5–15.5)
HGB BLD-MCNC: 11 G/DL — LOW (ref 11.5–15.5)
HGB BLD-MCNC: 11.3 G/DL — LOW (ref 11.5–15.5)
HGB BLD-MCNC: 11.4 G/DL — LOW (ref 11.5–15.5)
HGB BLD-MCNC: 11.4 G/DL — LOW (ref 11.5–15.5)
HGB BLD-MCNC: 11.5 G/DL — SIGNIFICANT CHANGE UP (ref 11.5–15.5)
HGB BLD-MCNC: 11.6 G/DL — SIGNIFICANT CHANGE UP (ref 11.5–15.5)
HGB BLD-MCNC: 11.6 G/DL — SIGNIFICANT CHANGE UP (ref 11.5–15.5)
HGB BLD-MCNC: 11.7 G/DL — SIGNIFICANT CHANGE UP (ref 11.5–15.5)
HGB BLD-MCNC: 11.7 G/DL — SIGNIFICANT CHANGE UP (ref 11.5–15.5)
HGB BLD-MCNC: 11.8 G/DL — SIGNIFICANT CHANGE UP (ref 11.5–15.5)
HGB BLD-MCNC: 11.9 G/DL — SIGNIFICANT CHANGE UP (ref 11.5–15.5)
HGB BLD-MCNC: 12 G/DL — SIGNIFICANT CHANGE UP (ref 11.5–15.5)
HGB BLD-MCNC: 12 G/DL — SIGNIFICANT CHANGE UP (ref 11.5–15.5)
HGB BLD-MCNC: 12.5 G/DL — SIGNIFICANT CHANGE UP (ref 11.5–15.5)
HGB BLD-MCNC: 12.7 G/DL — SIGNIFICANT CHANGE UP (ref 11.5–15.5)
HGB BLD-MCNC: 12.8 G/DL — SIGNIFICANT CHANGE UP (ref 11.5–15.5)
HGB BLD-MCNC: 7.5 G/DL — LOW (ref 11.5–15.5)
HGB BLD-MCNC: 7.7 G/DL — LOW (ref 11.5–15.5)
HGB BLD-MCNC: 8.3 G/DL — LOW (ref 11.5–15.5)
HGB BLD-MCNC: 8.3 G/DL — LOW (ref 11.5–15.5)
HGB BLD-MCNC: 8.4 G/DL — LOW (ref 11.5–15.5)
HGB BLD-MCNC: 8.4 G/DL — LOW (ref 11.5–15.5)
HGB BLD-MCNC: 8.5 G/DL — LOW (ref 11.5–15.5)
HGB BLD-MCNC: 8.7 G/DL — LOW (ref 11.5–15.5)
HGB BLD-MCNC: 8.9 G/DL — LOW (ref 11.5–15.5)
HGB BLD-MCNC: 9.1 G/DL — LOW (ref 11.5–15.5)
HGB BLD-MCNC: 9.1 G/DL — LOW (ref 11.5–15.5)
HGB BLD-MCNC: 9.2 G/DL — LOW (ref 11.5–15.5)
HGB BLD-MCNC: 9.3 G/DL — LOW (ref 11.5–15.5)
HGB BLD-MCNC: 9.3 G/DL — LOW (ref 11.5–15.5)
HGB BLD-MCNC: 9.6 G/DL — LOW (ref 11.5–15.5)
HGB BLD-MCNC: 9.7 G/DL — LOW (ref 11.5–15.5)
HGB BLD-MCNC: 9.8 G/DL — LOW (ref 11.5–15.5)
IMM GRANULOCYTES NFR BLD AUTO: 0.5 % — SIGNIFICANT CHANGE UP (ref 0–1.5)
IMM GRANULOCYTES NFR BLD AUTO: 0.6 % — SIGNIFICANT CHANGE UP (ref 0–1.5)
IMM GRANULOCYTES NFR BLD AUTO: 0.7 % — SIGNIFICANT CHANGE UP (ref 0–1.5)
IMM GRANULOCYTES NFR BLD AUTO: 1.2 % — SIGNIFICANT CHANGE UP (ref 0–1.5)
IMM GRANULOCYTES NFR BLD AUTO: 1.3 % — SIGNIFICANT CHANGE UP (ref 0–1.5)
IMM GRANULOCYTES NFR BLD AUTO: 2.3 % — HIGH (ref 0–1.5)
IMM GRANULOCYTES NFR BLD AUTO: 2.7 % — HIGH (ref 0–1.5)
INR BLD: 1.26 — HIGH (ref 0.88–1.16)
INR BLD: 1.28 — HIGH (ref 0.88–1.16)
INR BLD: 1.39 — HIGH (ref 0.88–1.16)
INR BLD: 1.39 — HIGH (ref 0.88–1.16)
INR BLD: 1.47 — HIGH (ref 0.88–1.16)
INR BLD: 1.62 — HIGH (ref 0.88–1.16)
INR BLD: 1.64 — HIGH (ref 0.88–1.16)
IRON SATN MFR SERPL: 14 % — SIGNIFICANT CHANGE UP (ref 14–50)
IRON SATN MFR SERPL: 161 UG/DL — HIGH (ref 30–160)
IRON SATN MFR SERPL: 39 UG/DL — SIGNIFICANT CHANGE UP (ref 30–160)
KETONES UR-MCNC: 15 MG/DL
KETONES UR-MCNC: 40 MG/DL
KETONES UR-MCNC: NEGATIVE — SIGNIFICANT CHANGE UP
LACTATE CSF-MCNC: 4.1 MMOL/L — HIGH (ref 1.1–2.4)
LACTATE CSF-MCNC: 4.5 MMOL/L — HIGH (ref 1.1–2.4)
LACTATE SERPL-SCNC: 1.5 MMOL/L — SIGNIFICANT CHANGE UP (ref 0.5–2)
LACTATE SERPL-SCNC: 2 MMOL/L — SIGNIFICANT CHANGE UP (ref 0.5–2)
LEUKOCYTE ESTERASE UR-ACNC: NEGATIVE — SIGNIFICANT CHANGE UP
LIPID PNL WITH DIRECT LDL SERPL: 84 MG/DL — SIGNIFICANT CHANGE UP
LMWH PPP CHRO-ACNC: 0.21 IU/ML — LOW (ref 0.5–1.1)
LYMPHOCYTES # BLD AUTO: 0.83 K/UL — LOW (ref 1–3.3)
LYMPHOCYTES # BLD AUTO: 1.02 K/UL — SIGNIFICANT CHANGE UP (ref 1–3.3)
LYMPHOCYTES # BLD AUTO: 1.12 K/UL — SIGNIFICANT CHANGE UP (ref 1–3.3)
LYMPHOCYTES # BLD AUTO: 1.22 K/UL — SIGNIFICANT CHANGE UP (ref 1–3.3)
LYMPHOCYTES # BLD AUTO: 1.33 K/UL — SIGNIFICANT CHANGE UP (ref 1–3.3)
LYMPHOCYTES # BLD AUTO: 1.45 K/UL — SIGNIFICANT CHANGE UP (ref 1–3.3)
LYMPHOCYTES # BLD AUTO: 1.81 K/UL — SIGNIFICANT CHANGE UP (ref 1–3.3)
LYMPHOCYTES # BLD AUTO: 10.2 % — LOW (ref 13–44)
LYMPHOCYTES # BLD AUTO: 4.9 % — LOW (ref 13–44)
LYMPHOCYTES # BLD AUTO: 6.7 % — LOW (ref 13–44)
LYMPHOCYTES # BLD AUTO: 8.3 % — LOW (ref 13–44)
LYMPHOCYTES # BLD AUTO: 8.3 % — LOW (ref 13–44)
LYMPHOCYTES # BLD AUTO: 8.8 % — LOW (ref 13–44)
LYMPHOCYTES # BLD AUTO: 9 % — LOW (ref 13–44)
LYMPHOCYTES # CSF: 1 % — LOW (ref 40–80)
LYMPHOCYTES # CSF: 10 % — LOW (ref 40–80)
LYMPHOCYTES # CSF: 2 % — LOW (ref 40–80)
LYMPHOCYTES # CSF: 37 % — LOW (ref 40–80)
LYMPHOCYTES # CSF: 47 % — SIGNIFICANT CHANGE UP (ref 40–80)
LYMPHOCYTES # CSF: 5 % — LOW (ref 40–80)
MAGNESIUM SERPL-MCNC: 1.6 MG/DL — SIGNIFICANT CHANGE UP (ref 1.6–2.6)
MAGNESIUM SERPL-MCNC: 1.7 MG/DL — SIGNIFICANT CHANGE UP (ref 1.6–2.6)
MAGNESIUM SERPL-MCNC: 1.8 MG/DL — SIGNIFICANT CHANGE UP (ref 1.6–2.6)
MAGNESIUM SERPL-MCNC: 1.9 MG/DL — SIGNIFICANT CHANGE UP (ref 1.6–2.6)
MAGNESIUM SERPL-MCNC: 2 MG/DL — SIGNIFICANT CHANGE UP (ref 1.6–2.6)
MAGNESIUM SERPL-MCNC: 2.1 MG/DL — SIGNIFICANT CHANGE UP (ref 1.6–2.6)
MAGNESIUM SERPL-MCNC: 2.2 MG/DL — SIGNIFICANT CHANGE UP (ref 1.6–2.6)
MAGNESIUM SERPL-MCNC: 2.3 MG/DL — SIGNIFICANT CHANGE UP (ref 1.6–2.6)
MAGNESIUM SERPL-MCNC: 2.4 MG/DL — SIGNIFICANT CHANGE UP (ref 1.6–2.6)
MAGNESIUM SERPL-MCNC: 2.5 MG/DL — SIGNIFICANT CHANGE UP (ref 1.6–2.6)
MAGNESIUM SERPL-MCNC: 2.5 MG/DL — SIGNIFICANT CHANGE UP (ref 1.6–2.6)
MCHC RBC-ENTMCNC: 23.9 PG — LOW (ref 27–34)
MCHC RBC-ENTMCNC: 24 PG — LOW (ref 27–34)
MCHC RBC-ENTMCNC: 24 PG — LOW (ref 27–34)
MCHC RBC-ENTMCNC: 24.2 PG — LOW (ref 27–34)
MCHC RBC-ENTMCNC: 24.3 PG — LOW (ref 27–34)
MCHC RBC-ENTMCNC: 24.4 PG — LOW (ref 27–34)
MCHC RBC-ENTMCNC: 24.4 PG — LOW (ref 27–34)
MCHC RBC-ENTMCNC: 24.5 PG — LOW (ref 27–34)
MCHC RBC-ENTMCNC: 24.5 PG — LOW (ref 27–34)
MCHC RBC-ENTMCNC: 24.6 PG — LOW (ref 27–34)
MCHC RBC-ENTMCNC: 24.7 PG — LOW (ref 27–34)
MCHC RBC-ENTMCNC: 25 PG — LOW (ref 27–34)
MCHC RBC-ENTMCNC: 25.1 PG — LOW (ref 27–34)
MCHC RBC-ENTMCNC: 25.2 PG — LOW (ref 27–34)
MCHC RBC-ENTMCNC: 25.3 PG — LOW (ref 27–34)
MCHC RBC-ENTMCNC: 25.4 PG — LOW (ref 27–34)
MCHC RBC-ENTMCNC: 25.4 PG — LOW (ref 27–34)
MCHC RBC-ENTMCNC: 25.5 PG — LOW (ref 27–34)
MCHC RBC-ENTMCNC: 25.6 PG — LOW (ref 27–34)
MCHC RBC-ENTMCNC: 25.8 PG — LOW (ref 27–34)
MCHC RBC-ENTMCNC: 25.9 PG — LOW (ref 27–34)
MCHC RBC-ENTMCNC: 26 PG — LOW (ref 27–34)
MCHC RBC-ENTMCNC: 26.4 PG — LOW (ref 27–34)
MCHC RBC-ENTMCNC: 26.6 PG — LOW (ref 27–34)
MCHC RBC-ENTMCNC: 26.7 PG — LOW (ref 27–34)
MCHC RBC-ENTMCNC: 27.1 PG — SIGNIFICANT CHANGE UP (ref 27–34)
MCHC RBC-ENTMCNC: 27.3 PG — SIGNIFICANT CHANGE UP (ref 27–34)
MCHC RBC-ENTMCNC: 27.4 PG — SIGNIFICANT CHANGE UP (ref 27–34)
MCHC RBC-ENTMCNC: 27.6 PG — SIGNIFICANT CHANGE UP (ref 27–34)
MCHC RBC-ENTMCNC: 27.7 PG — SIGNIFICANT CHANGE UP (ref 27–34)
MCHC RBC-ENTMCNC: 27.8 PG — SIGNIFICANT CHANGE UP (ref 27–34)
MCHC RBC-ENTMCNC: 27.8 PG — SIGNIFICANT CHANGE UP (ref 27–34)
MCHC RBC-ENTMCNC: 27.9 PG — SIGNIFICANT CHANGE UP (ref 27–34)
MCHC RBC-ENTMCNC: 28.1 PG — SIGNIFICANT CHANGE UP (ref 27–34)
MCHC RBC-ENTMCNC: 28.2 PG — SIGNIFICANT CHANGE UP (ref 27–34)
MCHC RBC-ENTMCNC: 28.2 PG — SIGNIFICANT CHANGE UP (ref 27–34)
MCHC RBC-ENTMCNC: 28.3 PG — SIGNIFICANT CHANGE UP (ref 27–34)
MCHC RBC-ENTMCNC: 28.5 PG — SIGNIFICANT CHANGE UP (ref 27–34)
MCHC RBC-ENTMCNC: 28.6 PG — SIGNIFICANT CHANGE UP (ref 27–34)
MCHC RBC-ENTMCNC: 28.7 GM/DL — LOW (ref 32–36)
MCHC RBC-ENTMCNC: 28.8 GM/DL — LOW (ref 32–36)
MCHC RBC-ENTMCNC: 29 GM/DL — LOW (ref 32–36)
MCHC RBC-ENTMCNC: 29.2 GM/DL — LOW (ref 32–36)
MCHC RBC-ENTMCNC: 29.3 GM/DL — LOW (ref 32–36)
MCHC RBC-ENTMCNC: 29.4 GM/DL — LOW (ref 32–36)
MCHC RBC-ENTMCNC: 29.5 GM/DL — LOW (ref 32–36)
MCHC RBC-ENTMCNC: 29.6 GM/DL — LOW (ref 32–36)
MCHC RBC-ENTMCNC: 29.7 GM/DL — LOW (ref 32–36)
MCHC RBC-ENTMCNC: 29.8 GM/DL — LOW (ref 32–36)
MCHC RBC-ENTMCNC: 29.9 GM/DL — LOW (ref 32–36)
MCHC RBC-ENTMCNC: 29.9 GM/DL — LOW (ref 32–36)
MCHC RBC-ENTMCNC: 30 GM/DL — LOW (ref 32–36)
MCHC RBC-ENTMCNC: 30.2 GM/DL — LOW (ref 32–36)
MCHC RBC-ENTMCNC: 30.3 GM/DL — LOW (ref 32–36)
MCHC RBC-ENTMCNC: 30.3 GM/DL — LOW (ref 32–36)
MCHC RBC-ENTMCNC: 30.4 GM/DL — LOW (ref 32–36)
MCHC RBC-ENTMCNC: 30.5 GM/DL — LOW (ref 32–36)
MCHC RBC-ENTMCNC: 30.6 GM/DL — LOW (ref 32–36)
MCHC RBC-ENTMCNC: 30.6 GM/DL — LOW (ref 32–36)
MCHC RBC-ENTMCNC: 30.7 GM/DL — LOW (ref 32–36)
MCHC RBC-ENTMCNC: 30.8 GM/DL — LOW (ref 32–36)
MCHC RBC-ENTMCNC: 30.9 GM/DL — LOW (ref 32–36)
MCHC RBC-ENTMCNC: 31.1 GM/DL — LOW (ref 32–36)
MCHC RBC-ENTMCNC: 31.4 GM/DL — LOW (ref 32–36)
MCV RBC AUTO: 77.4 FL — LOW (ref 80–100)
MCV RBC AUTO: 77.9 FL — LOW (ref 80–100)
MCV RBC AUTO: 78 FL — LOW (ref 80–100)
MCV RBC AUTO: 78.4 FL — LOW (ref 80–100)
MCV RBC AUTO: 78.5 FL — LOW (ref 80–100)
MCV RBC AUTO: 79.5 FL — LOW (ref 80–100)
MCV RBC AUTO: 79.8 FL — LOW (ref 80–100)
MCV RBC AUTO: 80.1 FL — SIGNIFICANT CHANGE UP (ref 80–100)
MCV RBC AUTO: 80.5 FL — SIGNIFICANT CHANGE UP (ref 80–100)
MCV RBC AUTO: 80.6 FL — SIGNIFICANT CHANGE UP (ref 80–100)
MCV RBC AUTO: 82.2 FL — SIGNIFICANT CHANGE UP (ref 80–100)
MCV RBC AUTO: 82.6 FL — SIGNIFICANT CHANGE UP (ref 80–100)
MCV RBC AUTO: 82.6 FL — SIGNIFICANT CHANGE UP (ref 80–100)
MCV RBC AUTO: 83.1 FL — SIGNIFICANT CHANGE UP (ref 80–100)
MCV RBC AUTO: 83.1 FL — SIGNIFICANT CHANGE UP (ref 80–100)
MCV RBC AUTO: 83.4 FL — SIGNIFICANT CHANGE UP (ref 80–100)
MCV RBC AUTO: 83.7 FL — SIGNIFICANT CHANGE UP (ref 80–100)
MCV RBC AUTO: 84.1 FL — SIGNIFICANT CHANGE UP (ref 80–100)
MCV RBC AUTO: 84.1 FL — SIGNIFICANT CHANGE UP (ref 80–100)
MCV RBC AUTO: 84.5 FL — SIGNIFICANT CHANGE UP (ref 80–100)
MCV RBC AUTO: 84.9 FL — SIGNIFICANT CHANGE UP (ref 80–100)
MCV RBC AUTO: 85 FL — SIGNIFICANT CHANGE UP (ref 80–100)
MCV RBC AUTO: 85.2 FL — SIGNIFICANT CHANGE UP (ref 80–100)
MCV RBC AUTO: 86 FL — SIGNIFICANT CHANGE UP (ref 80–100)
MCV RBC AUTO: 86.3 FL — SIGNIFICANT CHANGE UP (ref 80–100)
MCV RBC AUTO: 86.3 FL — SIGNIFICANT CHANGE UP (ref 80–100)
MCV RBC AUTO: 87.1 FL — SIGNIFICANT CHANGE UP (ref 80–100)
MCV RBC AUTO: 87.2 FL — SIGNIFICANT CHANGE UP (ref 80–100)
MCV RBC AUTO: 87.4 FL — SIGNIFICANT CHANGE UP (ref 80–100)
MCV RBC AUTO: 87.8 FL — SIGNIFICANT CHANGE UP (ref 80–100)
MCV RBC AUTO: 87.8 FL — SIGNIFICANT CHANGE UP (ref 80–100)
MCV RBC AUTO: 88.2 FL — SIGNIFICANT CHANGE UP (ref 80–100)
MCV RBC AUTO: 88.6 FL — SIGNIFICANT CHANGE UP (ref 80–100)
MCV RBC AUTO: 88.6 FL — SIGNIFICANT CHANGE UP (ref 80–100)
MCV RBC AUTO: 90.6 FL — SIGNIFICANT CHANGE UP (ref 80–100)
MCV RBC AUTO: 90.6 FL — SIGNIFICANT CHANGE UP (ref 80–100)
MCV RBC AUTO: 92.2 FL — SIGNIFICANT CHANGE UP (ref 80–100)
MCV RBC AUTO: 92.8 FL — SIGNIFICANT CHANGE UP (ref 80–100)
MCV RBC AUTO: 92.8 FL — SIGNIFICANT CHANGE UP (ref 80–100)
MCV RBC AUTO: 93.1 FL — SIGNIFICANT CHANGE UP (ref 80–100)
MCV RBC AUTO: 93.5 FL — SIGNIFICANT CHANGE UP (ref 80–100)
MCV RBC AUTO: 93.6 FL — SIGNIFICANT CHANGE UP (ref 80–100)
MCV RBC AUTO: 93.9 FL — SIGNIFICANT CHANGE UP (ref 80–100)
MCV RBC AUTO: 93.9 FL — SIGNIFICANT CHANGE UP (ref 80–100)
MCV RBC AUTO: 94.2 FL — SIGNIFICANT CHANGE UP (ref 80–100)
MCV RBC AUTO: 94.8 FL — SIGNIFICANT CHANGE UP (ref 80–100)
MCV RBC AUTO: 95.7 FL — SIGNIFICANT CHANGE UP (ref 80–100)
METHOD TYPE: SIGNIFICANT CHANGE UP
MONOCYTES # BLD AUTO: 0.72 K/UL — SIGNIFICANT CHANGE UP (ref 0–0.9)
MONOCYTES # BLD AUTO: 0.99 K/UL — HIGH (ref 0–0.9)
MONOCYTES # BLD AUTO: 1.04 K/UL — HIGH (ref 0–0.9)
MONOCYTES # BLD AUTO: 1.09 K/UL — HIGH (ref 0–0.9)
MONOCYTES # BLD AUTO: 1.18 K/UL — HIGH (ref 0–0.9)
MONOCYTES # BLD AUTO: 1.2 K/UL — HIGH (ref 0–0.9)
MONOCYTES # BLD AUTO: 1.44 K/UL — HIGH (ref 0–0.9)
MONOCYTES NFR BLD AUTO: 4.3 % — SIGNIFICANT CHANGE UP (ref 2–14)
MONOCYTES NFR BLD AUTO: 5.8 % — SIGNIFICANT CHANGE UP (ref 2–14)
MONOCYTES NFR BLD AUTO: 6 % — SIGNIFICANT CHANGE UP (ref 2–14)
MONOCYTES NFR BLD AUTO: 8 % — SIGNIFICANT CHANGE UP (ref 2–14)
MONOCYTES NFR BLD AUTO: 8.3 % — SIGNIFICANT CHANGE UP (ref 2–14)
MONOCYTES NFR BLD AUTO: 8.3 % — SIGNIFICANT CHANGE UP (ref 2–14)
MONOCYTES NFR BLD AUTO: 9 % — SIGNIFICANT CHANGE UP (ref 2–14)
MONOS+MACROS NFR CSF: 1 % — LOW (ref 15–45)
MONOS+MACROS NFR CSF: 19 % — SIGNIFICANT CHANGE UP (ref 15–45)
MONOS+MACROS NFR CSF: 7 % — LOW (ref 15–45)
MONOS+MACROS NFR CSF: 8 % — LOW (ref 15–45)
MONOS+MACROS NFR CSF: 9 % — LOW (ref 15–45)
MRSA PCR RESULT.: NEGATIVE — SIGNIFICANT CHANGE UP
NEUTROPHILS # BLD AUTO: 10.16 K/UL — HIGH (ref 1.8–7.4)
NEUTROPHILS # BLD AUTO: 10.21 K/UL — HIGH (ref 1.8–7.4)
NEUTROPHILS # BLD AUTO: 11.21 K/UL — HIGH (ref 1.8–7.4)
NEUTROPHILS # BLD AUTO: 13.01 K/UL — HIGH (ref 1.8–7.4)
NEUTROPHILS # BLD AUTO: 15.13 K/UL — HIGH (ref 1.8–7.4)
NEUTROPHILS # BLD AUTO: 15.56 K/UL — HIGH (ref 1.8–7.4)
NEUTROPHILS # BLD AUTO: 17.09 K/UL — HIGH (ref 1.8–7.4)
NEUTROPHILS # CSF: 0 % — SIGNIFICANT CHANGE UP (ref 0–6)
NEUTROPHILS # CSF: 1 % — SIGNIFICANT CHANGE UP (ref 0–6)
NEUTROPHILS # CSF: 1 % — SIGNIFICANT CHANGE UP (ref 0–6)
NEUTROPHILS # CSF: 14 % — HIGH (ref 0–6)
NEUTROPHILS # CSF: 2 % — SIGNIFICANT CHANGE UP (ref 0–6)
NEUTROPHILS # CSF: 44 % — HIGH (ref 0–6)
NEUTROPHILS NFR BLD AUTO: 78.5 % — HIGH (ref 43–77)
NEUTROPHILS NFR BLD AUTO: 81.3 % — HIGH (ref 43–77)
NEUTROPHILS NFR BLD AUTO: 81.3 % — HIGH (ref 43–77)
NEUTROPHILS NFR BLD AUTO: 82.8 % — HIGH (ref 43–77)
NEUTROPHILS NFR BLD AUTO: 83 % — HIGH (ref 43–77)
NEUTROPHILS NFR BLD AUTO: 85.9 % — HIGH (ref 43–77)
NEUTROPHILS NFR BLD AUTO: 89.9 % — HIGH (ref 43–77)
NITRITE UR-MCNC: NEGATIVE — SIGNIFICANT CHANGE UP
NON HDL CHOLESTEROL: 99 MG/DL — SIGNIFICANT CHANGE UP
NRBC # BLD: 0 /100 WBCS — SIGNIFICANT CHANGE UP (ref 0–0)
NRBC # BLD: 1 /100 WBCS — HIGH (ref 0–0)
NRBC NFR CSF: 1 /UL — SIGNIFICANT CHANGE UP (ref 0–5)
NRBC NFR CSF: 14 /UL — HIGH (ref 0–5)
NRBC NFR CSF: 178 /UL — HIGH (ref 0–5)
NRBC NFR CSF: 32 /UL — HIGH (ref 0–5)
NRBC NFR CSF: 4 /UL — SIGNIFICANT CHANGE UP (ref 0–5)
NRBC NFR CSF: 56 /UL — HIGH (ref 0–5)
OB PNL STL: NEGATIVE — SIGNIFICANT CHANGE UP
ORGANISM # SPEC MICROSCOPIC CNT: SIGNIFICANT CHANGE UP
ORGANISM # SPEC MICROSCOPIC CNT: SIGNIFICANT CHANGE UP
P-ANCA SER-ACNC: NEGATIVE — SIGNIFICANT CHANGE UP
PCO2 BLDA: 29 MMHG — LOW (ref 32–35)
PCO2 BLDA: 33 MMHG — SIGNIFICANT CHANGE UP (ref 32–35)
PCO2 BLDA: 37 MMHG — HIGH (ref 32–35)
PCO2 BLDA: 38 MMHG — HIGH (ref 32–35)
PCO2 BLDA: 40 MMHG — HIGH (ref 32–35)
PCO2 BLDA: 43 MMHG — HIGH (ref 32–35)
PH BLDA: 7.4 — SIGNIFICANT CHANGE UP (ref 7.35–7.45)
PH BLDA: 7.41 — SIGNIFICANT CHANGE UP (ref 7.35–7.45)
PH BLDA: 7.44 — SIGNIFICANT CHANGE UP (ref 7.35–7.45)
PH BLDA: 7.47 — HIGH (ref 7.35–7.45)
PH BLDA: 7.48 — HIGH (ref 7.35–7.45)
PH BLDA: 7.55 — HIGH (ref 7.35–7.45)
PH UR: 5.5 — SIGNIFICANT CHANGE UP (ref 5–8)
PH UR: 6.5 — SIGNIFICANT CHANGE UP (ref 5–8)
PH UR: 7 — SIGNIFICANT CHANGE UP (ref 5–8)
PH UR: 7.5 — SIGNIFICANT CHANGE UP (ref 5–8)
PHOSPHATE SERPL-MCNC: 1.9 MG/DL — LOW (ref 2.5–4.5)
PHOSPHATE SERPL-MCNC: 1.9 MG/DL — LOW (ref 2.5–4.5)
PHOSPHATE SERPL-MCNC: 2.1 MG/DL — LOW (ref 2.5–4.5)
PHOSPHATE SERPL-MCNC: 2.2 MG/DL — LOW (ref 2.5–4.5)
PHOSPHATE SERPL-MCNC: 2.3 MG/DL — LOW (ref 2.5–4.5)
PHOSPHATE SERPL-MCNC: 2.3 MG/DL — LOW (ref 2.5–4.5)
PHOSPHATE SERPL-MCNC: 2.4 MG/DL — LOW (ref 2.5–4.5)
PHOSPHATE SERPL-MCNC: 2.5 MG/DL — SIGNIFICANT CHANGE UP (ref 2.5–4.5)
PHOSPHATE SERPL-MCNC: 2.6 MG/DL — SIGNIFICANT CHANGE UP (ref 2.5–4.5)
PHOSPHATE SERPL-MCNC: 2.7 MG/DL — SIGNIFICANT CHANGE UP (ref 2.5–4.5)
PHOSPHATE SERPL-MCNC: 2.7 MG/DL — SIGNIFICANT CHANGE UP (ref 2.5–4.5)
PHOSPHATE SERPL-MCNC: 2.8 MG/DL — SIGNIFICANT CHANGE UP (ref 2.5–4.5)
PHOSPHATE SERPL-MCNC: 2.9 MG/DL — SIGNIFICANT CHANGE UP (ref 2.5–4.5)
PHOSPHATE SERPL-MCNC: 3 MG/DL — SIGNIFICANT CHANGE UP (ref 2.5–4.5)
PHOSPHATE SERPL-MCNC: 3.1 MG/DL — SIGNIFICANT CHANGE UP (ref 2.5–4.5)
PHOSPHATE SERPL-MCNC: 3.2 MG/DL — SIGNIFICANT CHANGE UP (ref 2.5–4.5)
PHOSPHATE SERPL-MCNC: 3.2 MG/DL — SIGNIFICANT CHANGE UP (ref 2.5–4.5)
PHOSPHATE SERPL-MCNC: 3.5 MG/DL — SIGNIFICANT CHANGE UP (ref 2.5–4.5)
PHOSPHATE SERPL-MCNC: 3.5 MG/DL — SIGNIFICANT CHANGE UP (ref 2.5–4.5)
PHOSPHATE SERPL-MCNC: 3.8 MG/DL — SIGNIFICANT CHANGE UP (ref 2.5–4.5)
PHOSPHATE SERPL-MCNC: 3.9 MG/DL — SIGNIFICANT CHANGE UP (ref 2.5–4.5)
PHOSPHATE SERPL-MCNC: 4 MG/DL — SIGNIFICANT CHANGE UP (ref 2.5–4.5)
PHOSPHATE SERPL-MCNC: 4 MG/DL — SIGNIFICANT CHANGE UP (ref 2.5–4.5)
PHOSPHATE SERPL-MCNC: 4.1 MG/DL — SIGNIFICANT CHANGE UP (ref 2.5–4.5)
PHOSPHATE SERPL-MCNC: 4.3 MG/DL — SIGNIFICANT CHANGE UP (ref 2.5–4.5)
PHOSPHATE SERPL-MCNC: 4.4 MG/DL — SIGNIFICANT CHANGE UP (ref 2.5–4.5)
PHOSPHATE SERPL-MCNC: 4.4 MG/DL — SIGNIFICANT CHANGE UP (ref 2.5–4.5)
PHOSPHATE SERPL-MCNC: 4.5 MG/DL — SIGNIFICANT CHANGE UP (ref 2.5–4.5)
PHOSPHATE SERPL-MCNC: 4.5 MG/DL — SIGNIFICANT CHANGE UP (ref 2.5–4.5)
PHOSPHATE SERPL-MCNC: 4.9 MG/DL — HIGH (ref 2.5–4.5)
PLATELET # BLD AUTO: 146 K/UL — LOW (ref 150–400)
PLATELET # BLD AUTO: 149 K/UL — LOW (ref 150–400)
PLATELET # BLD AUTO: 157 K/UL — SIGNIFICANT CHANGE UP (ref 150–400)
PLATELET # BLD AUTO: 160 K/UL — SIGNIFICANT CHANGE UP (ref 150–400)
PLATELET # BLD AUTO: 188 K/UL — SIGNIFICANT CHANGE UP (ref 150–400)
PLATELET # BLD AUTO: 193 K/UL — SIGNIFICANT CHANGE UP (ref 150–400)
PLATELET # BLD AUTO: 196 K/UL — SIGNIFICANT CHANGE UP (ref 150–400)
PLATELET # BLD AUTO: 196 K/UL — SIGNIFICANT CHANGE UP (ref 150–400)
PLATELET # BLD AUTO: 198 K/UL — SIGNIFICANT CHANGE UP (ref 150–400)
PLATELET # BLD AUTO: 200 K/UL — SIGNIFICANT CHANGE UP (ref 150–400)
PLATELET # BLD AUTO: 200 K/UL — SIGNIFICANT CHANGE UP (ref 150–400)
PLATELET # BLD AUTO: 207 K/UL — SIGNIFICANT CHANGE UP (ref 150–400)
PLATELET # BLD AUTO: 214 K/UL — SIGNIFICANT CHANGE UP (ref 150–400)
PLATELET # BLD AUTO: 227 K/UL — SIGNIFICANT CHANGE UP (ref 150–400)
PLATELET # BLD AUTO: 228 K/UL — SIGNIFICANT CHANGE UP (ref 150–400)
PLATELET # BLD AUTO: 231 K/UL — SIGNIFICANT CHANGE UP (ref 150–400)
PLATELET # BLD AUTO: 241 K/UL — SIGNIFICANT CHANGE UP (ref 150–400)
PLATELET # BLD AUTO: 242 K/UL — SIGNIFICANT CHANGE UP (ref 150–400)
PLATELET # BLD AUTO: 242 K/UL — SIGNIFICANT CHANGE UP (ref 150–400)
PLATELET # BLD AUTO: 245 K/UL — SIGNIFICANT CHANGE UP (ref 150–400)
PLATELET # BLD AUTO: 248 K/UL — SIGNIFICANT CHANGE UP (ref 150–400)
PLATELET # BLD AUTO: 252 K/UL — SIGNIFICANT CHANGE UP (ref 150–400)
PLATELET # BLD AUTO: 253 K/UL — SIGNIFICANT CHANGE UP (ref 150–400)
PLATELET # BLD AUTO: 269 K/UL — SIGNIFICANT CHANGE UP (ref 150–400)
PLATELET # BLD AUTO: 269 K/UL — SIGNIFICANT CHANGE UP (ref 150–400)
PLATELET # BLD AUTO: 270 K/UL — SIGNIFICANT CHANGE UP (ref 150–400)
PLATELET # BLD AUTO: 271 K/UL — SIGNIFICANT CHANGE UP (ref 150–400)
PLATELET # BLD AUTO: 281 K/UL — SIGNIFICANT CHANGE UP (ref 150–400)
PLATELET # BLD AUTO: 284 K/UL — SIGNIFICANT CHANGE UP (ref 150–400)
PLATELET # BLD AUTO: 301 K/UL — SIGNIFICANT CHANGE UP (ref 150–400)
PLATELET # BLD AUTO: 302 K/UL — SIGNIFICANT CHANGE UP (ref 150–400)
PLATELET # BLD AUTO: 307 K/UL — SIGNIFICANT CHANGE UP (ref 150–400)
PLATELET # BLD AUTO: 308 K/UL — SIGNIFICANT CHANGE UP (ref 150–400)
PLATELET # BLD AUTO: 310 K/UL — SIGNIFICANT CHANGE UP (ref 150–400)
PLATELET # BLD AUTO: 314 K/UL — SIGNIFICANT CHANGE UP (ref 150–400)
PLATELET # BLD AUTO: 333 K/UL — SIGNIFICANT CHANGE UP (ref 150–400)
PLATELET # BLD AUTO: 334 K/UL — SIGNIFICANT CHANGE UP (ref 150–400)
PLATELET # BLD AUTO: 335 K/UL — SIGNIFICANT CHANGE UP (ref 150–400)
PLATELET # BLD AUTO: 336 K/UL — SIGNIFICANT CHANGE UP (ref 150–400)
PLATELET # BLD AUTO: 347 K/UL — SIGNIFICANT CHANGE UP (ref 150–400)
PLATELET # BLD AUTO: 350 K/UL — SIGNIFICANT CHANGE UP (ref 150–400)
PLATELET # BLD AUTO: 353 K/UL — SIGNIFICANT CHANGE UP (ref 150–400)
PLATELET # BLD AUTO: 354 K/UL — SIGNIFICANT CHANGE UP (ref 150–400)
PLATELET # BLD AUTO: 358 K/UL — SIGNIFICANT CHANGE UP (ref 150–400)
PLATELET # BLD AUTO: 367 K/UL — SIGNIFICANT CHANGE UP (ref 150–400)
PLATELET # BLD AUTO: 374 K/UL — SIGNIFICANT CHANGE UP (ref 150–400)
PLATELET # BLD AUTO: 378 K/UL — SIGNIFICANT CHANGE UP (ref 150–400)
PLATELET # BLD AUTO: 381 K/UL — SIGNIFICANT CHANGE UP (ref 150–400)
PLATELET # BLD AUTO: 381 K/UL — SIGNIFICANT CHANGE UP (ref 150–400)
PO2 BLDA: 102 MMHG — SIGNIFICANT CHANGE UP (ref 83–108)
PO2 BLDA: 104 MMHG — SIGNIFICANT CHANGE UP (ref 83–108)
PO2 BLDA: 131 MMHG — HIGH (ref 83–108)
PO2 BLDA: 82 MMHG — LOW (ref 83–108)
PO2 BLDA: 94 MMHG — SIGNIFICANT CHANGE UP (ref 83–108)
PO2 BLDA: 98 MMHG — SIGNIFICANT CHANGE UP (ref 83–108)
POTASSIUM SERPL-MCNC: 2.8 MMOL/L — CRITICAL LOW (ref 3.5–5.3)
POTASSIUM SERPL-MCNC: 2.9 MMOL/L — CRITICAL LOW (ref 3.5–5.3)
POTASSIUM SERPL-MCNC: 3 MMOL/L — LOW (ref 3.5–5.3)
POTASSIUM SERPL-MCNC: 3 MMOL/L — LOW (ref 3.5–5.3)
POTASSIUM SERPL-MCNC: 3.1 MMOL/L — LOW (ref 3.5–5.3)
POTASSIUM SERPL-MCNC: 3.2 MMOL/L — LOW (ref 3.5–5.3)
POTASSIUM SERPL-MCNC: 3.2 MMOL/L — LOW (ref 3.5–5.3)
POTASSIUM SERPL-MCNC: 3.3 MMOL/L — LOW (ref 3.5–5.3)
POTASSIUM SERPL-MCNC: 3.4 MMOL/L — LOW (ref 3.5–5.3)
POTASSIUM SERPL-MCNC: 3.5 MMOL/L — SIGNIFICANT CHANGE UP (ref 3.5–5.3)
POTASSIUM SERPL-MCNC: 3.6 MMOL/L — SIGNIFICANT CHANGE UP (ref 3.5–5.3)
POTASSIUM SERPL-MCNC: 3.7 MMOL/L — SIGNIFICANT CHANGE UP (ref 3.5–5.3)
POTASSIUM SERPL-MCNC: 3.8 MMOL/L — SIGNIFICANT CHANGE UP (ref 3.5–5.3)
POTASSIUM SERPL-MCNC: 3.9 MMOL/L — SIGNIFICANT CHANGE UP (ref 3.5–5.3)
POTASSIUM SERPL-MCNC: 4 MMOL/L — SIGNIFICANT CHANGE UP (ref 3.5–5.3)
POTASSIUM SERPL-MCNC: 4.1 MMOL/L — SIGNIFICANT CHANGE UP (ref 3.5–5.3)
POTASSIUM SERPL-MCNC: 4.2 MMOL/L — SIGNIFICANT CHANGE UP (ref 3.5–5.3)
POTASSIUM SERPL-MCNC: 4.5 MMOL/L — SIGNIFICANT CHANGE UP (ref 3.5–5.3)
POTASSIUM SERPL-SCNC: 2.8 MMOL/L — CRITICAL LOW (ref 3.5–5.3)
POTASSIUM SERPL-SCNC: 2.9 MMOL/L — CRITICAL LOW (ref 3.5–5.3)
POTASSIUM SERPL-SCNC: 3 MMOL/L — LOW (ref 3.5–5.3)
POTASSIUM SERPL-SCNC: 3 MMOL/L — LOW (ref 3.5–5.3)
POTASSIUM SERPL-SCNC: 3.1 MMOL/L — LOW (ref 3.5–5.3)
POTASSIUM SERPL-SCNC: 3.2 MMOL/L — LOW (ref 3.5–5.3)
POTASSIUM SERPL-SCNC: 3.2 MMOL/L — LOW (ref 3.5–5.3)
POTASSIUM SERPL-SCNC: 3.3 MMOL/L — LOW (ref 3.5–5.3)
POTASSIUM SERPL-SCNC: 3.4 MMOL/L — LOW (ref 3.5–5.3)
POTASSIUM SERPL-SCNC: 3.5 MMOL/L — SIGNIFICANT CHANGE UP (ref 3.5–5.3)
POTASSIUM SERPL-SCNC: 3.6 MMOL/L — SIGNIFICANT CHANGE UP (ref 3.5–5.3)
POTASSIUM SERPL-SCNC: 3.7 MMOL/L — SIGNIFICANT CHANGE UP (ref 3.5–5.3)
POTASSIUM SERPL-SCNC: 3.8 MMOL/L — SIGNIFICANT CHANGE UP (ref 3.5–5.3)
POTASSIUM SERPL-SCNC: 3.9 MMOL/L — SIGNIFICANT CHANGE UP (ref 3.5–5.3)
POTASSIUM SERPL-SCNC: 4 MMOL/L — SIGNIFICANT CHANGE UP (ref 3.5–5.3)
POTASSIUM SERPL-SCNC: 4.1 MMOL/L — SIGNIFICANT CHANGE UP (ref 3.5–5.3)
POTASSIUM SERPL-SCNC: 4.2 MMOL/L — SIGNIFICANT CHANGE UP (ref 3.5–5.3)
POTASSIUM SERPL-SCNC: 4.5 MMOL/L — SIGNIFICANT CHANGE UP (ref 3.5–5.3)
PROCALCITONIN SERPL-MCNC: 0.11 NG/ML — HIGH (ref 0.02–0.1)
PROCALCITONIN SERPL-MCNC: 0.11 NG/ML — HIGH (ref 0.02–0.1)
PROCALCITONIN SERPL-MCNC: 0.12 NG/ML — HIGH (ref 0.02–0.1)
PROCALCITONIN SERPL-MCNC: 0.12 NG/ML — HIGH (ref 0.02–0.1)
PROCALCITONIN SERPL-MCNC: 0.13 NG/ML — HIGH (ref 0.02–0.1)
PROCALCITONIN SERPL-MCNC: 0.14 NG/ML — HIGH (ref 0.02–0.1)
PROT C ACT/NOR PPP: 140 % — SIGNIFICANT CHANGE UP (ref 74–150)
PROT CSF-MCNC: 100 MG/DL — HIGH (ref 15–45)
PROT CSF-MCNC: 101 MG/DL — HIGH (ref 15–45)
PROT CSF-MCNC: 116 MG/DL — HIGH (ref 15–45)
PROT CSF-MCNC: 195 MG/DL — HIGH (ref 15–45)
PROT CSF-MCNC: 55 MG/DL — HIGH (ref 15–45)
PROT CSF-MCNC: 75 MG/DL — HIGH (ref 15–45)
PROT S FREE PPP-ACNC: 79 % — SIGNIFICANT CHANGE UP (ref 63–140)
PROT S FREE PPP-ACNC: 81 % — SIGNIFICANT CHANGE UP (ref 63–140)
PROT SERPL-MCNC: 6 G/DL — SIGNIFICANT CHANGE UP (ref 6–8.3)
PROT SERPL-MCNC: 6.3 G/DL — SIGNIFICANT CHANGE UP (ref 6–8.3)
PROT SERPL-MCNC: 6.4 G/DL — SIGNIFICANT CHANGE UP (ref 6–8.3)
PROT SERPL-MCNC: 6.8 G/DL — SIGNIFICANT CHANGE UP (ref 6–8.3)
PROT SERPL-MCNC: 6.8 G/DL — SIGNIFICANT CHANGE UP (ref 6–8.3)
PROT SERPL-MCNC: 7.5 G/DL — SIGNIFICANT CHANGE UP (ref 6–8.3)
PROT UR-MCNC: ABNORMAL MG/DL
PROT UR-MCNC: ABNORMAL MG/DL
PROT UR-MCNC: NEGATIVE MG/DL — SIGNIFICANT CHANGE UP
PROTHROM AB SERPL-ACNC: 14.9 SEC — HIGH (ref 10.6–13.6)
PROTHROM AB SERPL-ACNC: 15.2 SEC — HIGH (ref 10.6–13.6)
PROTHROM AB SERPL-ACNC: 16.4 SEC — HIGH (ref 10.6–13.6)
PROTHROM AB SERPL-ACNC: 16.4 SEC — HIGH (ref 10.6–13.6)
PROTHROM AB SERPL-ACNC: 17.3 SEC — HIGH (ref 10.6–13.6)
PROTHROM AB SERPL-ACNC: 19 SEC — HIGH (ref 10.6–13.6)
PROTHROM AB SERPL-ACNC: 19.2 SEC — HIGH (ref 10.6–13.6)
PTR INTERPRETATION: SIGNIFICANT CHANGE UP
RBC # BLD: 2.9 M/UL — LOW (ref 3.8–5.2)
RBC # BLD: 2.98 M/UL — LOW (ref 3.8–5.2)
RBC # BLD: 3.22 M/UL — LOW (ref 3.8–5.2)
RBC # BLD: 3.25 M/UL — LOW (ref 3.8–5.2)
RBC # BLD: 3.28 M/UL — LOW (ref 3.8–5.2)
RBC # BLD: 3.33 M/UL — LOW (ref 3.8–5.2)
RBC # BLD: 3.34 M/UL — LOW (ref 3.8–5.2)
RBC # BLD: 3.35 M/UL — LOW (ref 3.8–5.2)
RBC # BLD: 3.35 M/UL — LOW (ref 3.8–5.2)
RBC # BLD: 3.45 M/UL — LOW (ref 3.8–5.2)
RBC # BLD: 3.45 M/UL — LOW (ref 3.8–5.2)
RBC # BLD: 3.49 M/UL — LOW (ref 3.8–5.2)
RBC # BLD: 3.53 M/UL — LOW (ref 3.8–5.2)
RBC # BLD: 3.6 M/UL — LOW (ref 3.8–5.2)
RBC # BLD: 3.68 M/UL — LOW (ref 3.8–5.2)
RBC # BLD: 3.7 M/UL — LOW (ref 3.8–5.2)
RBC # BLD: 3.72 M/UL — LOW (ref 3.8–5.2)
RBC # BLD: 3.84 M/UL — SIGNIFICANT CHANGE UP (ref 3.8–5.2)
RBC # BLD: 3.87 M/UL — SIGNIFICANT CHANGE UP (ref 3.8–5.2)
RBC # BLD: 3.88 M/UL — SIGNIFICANT CHANGE UP (ref 3.8–5.2)
RBC # BLD: 3.92 M/UL — SIGNIFICANT CHANGE UP (ref 3.8–5.2)
RBC # BLD: 3.94 M/UL — SIGNIFICANT CHANGE UP (ref 3.8–5.2)
RBC # BLD: 3.95 M/UL — SIGNIFICANT CHANGE UP (ref 3.8–5.2)
RBC # BLD: 3.96 M/UL — SIGNIFICANT CHANGE UP (ref 3.8–5.2)
RBC # BLD: 4.05 M/UL — SIGNIFICANT CHANGE UP (ref 3.8–5.2)
RBC # BLD: 4.13 M/UL — SIGNIFICANT CHANGE UP (ref 3.8–5.2)
RBC # BLD: 4.14 M/UL — SIGNIFICANT CHANGE UP (ref 3.8–5.2)
RBC # BLD: 4.16 M/UL — SIGNIFICANT CHANGE UP (ref 3.8–5.2)
RBC # BLD: 4.19 M/UL — SIGNIFICANT CHANGE UP (ref 3.8–5.2)
RBC # BLD: 4.21 M/UL — SIGNIFICANT CHANGE UP (ref 3.8–5.2)
RBC # BLD: 4.21 M/UL — SIGNIFICANT CHANGE UP (ref 3.8–5.2)
RBC # BLD: 4.24 M/UL — SIGNIFICANT CHANGE UP (ref 3.8–5.2)
RBC # BLD: 4.24 M/UL — SIGNIFICANT CHANGE UP (ref 3.8–5.2)
RBC # BLD: 4.27 M/UL — SIGNIFICANT CHANGE UP (ref 3.8–5.2)
RBC # BLD: 4.3 M/UL — SIGNIFICANT CHANGE UP (ref 3.8–5.2)
RBC # BLD: 4.37 M/UL — SIGNIFICANT CHANGE UP (ref 3.8–5.2)
RBC # BLD: 4.37 M/UL — SIGNIFICANT CHANGE UP (ref 3.8–5.2)
RBC # BLD: 4.38 M/UL — SIGNIFICANT CHANGE UP (ref 3.8–5.2)
RBC # BLD: 4.44 M/UL — SIGNIFICANT CHANGE UP (ref 3.8–5.2)
RBC # BLD: 4.54 M/UL — SIGNIFICANT CHANGE UP (ref 3.8–5.2)
RBC # BLD: 4.59 M/UL — SIGNIFICANT CHANGE UP (ref 3.8–5.2)
RBC # BLD: 4.66 M/UL — SIGNIFICANT CHANGE UP (ref 3.8–5.2)
RBC # BLD: 4.71 M/UL — SIGNIFICANT CHANGE UP (ref 3.8–5.2)
RBC # BLD: 4.72 M/UL — SIGNIFICANT CHANGE UP (ref 3.8–5.2)
RBC # BLD: 4.73 M/UL — SIGNIFICANT CHANGE UP (ref 3.8–5.2)
RBC # BLD: 4.87 M/UL — SIGNIFICANT CHANGE UP (ref 3.8–5.2)
RBC # BLD: 5 M/UL — SIGNIFICANT CHANGE UP (ref 3.8–5.2)
RBC # BLD: 5.02 M/UL — SIGNIFICANT CHANGE UP (ref 3.8–5.2)
RBC # BLD: 5.33 M/UL — HIGH (ref 3.8–5.2)
RBC # CSF: 2000 /UL — HIGH (ref 0–0)
RBC # CSF: 7700 /UL — HIGH (ref 0–0)
RBC # CSF: 823 /UL — HIGH (ref 0–0)
RBC # CSF: HIGH /UL (ref 0–0)
RBC # FLD: 14.3 % — SIGNIFICANT CHANGE UP (ref 10.3–14.5)
RBC # FLD: 14.5 % — SIGNIFICANT CHANGE UP (ref 10.3–14.5)
RBC # FLD: 14.6 % — HIGH (ref 10.3–14.5)
RBC # FLD: 14.7 % — HIGH (ref 10.3–14.5)
RBC # FLD: 14.8 % — HIGH (ref 10.3–14.5)
RBC # FLD: 14.9 % — HIGH (ref 10.3–14.5)
RBC # FLD: 15 % — HIGH (ref 10.3–14.5)
RBC # FLD: 15.1 % — HIGH (ref 10.3–14.5)
RBC # FLD: 15.3 % — HIGH (ref 10.3–14.5)
RBC # FLD: 16 % — HIGH (ref 10.3–14.5)
RBC # FLD: 16.3 % — HIGH (ref 10.3–14.5)
RBC # FLD: 16.4 % — HIGH (ref 10.3–14.5)
RBC # FLD: 16.5 % — HIGH (ref 10.3–14.5)
RBC # FLD: 16.5 % — HIGH (ref 10.3–14.5)
RBC # FLD: 16.8 % — HIGH (ref 10.3–14.5)
RBC # FLD: 16.8 % — HIGH (ref 10.3–14.5)
RBC # FLD: 17.2 % — HIGH (ref 10.3–14.5)
RBC # FLD: 17.6 % — HIGH (ref 10.3–14.5)
RBC # FLD: 17.6 % — HIGH (ref 10.3–14.5)
RBC # FLD: 17.7 % — HIGH (ref 10.3–14.5)
RBC # FLD: 17.8 % — HIGH (ref 10.3–14.5)
RBC # FLD: 17.9 % — HIGH (ref 10.3–14.5)
RBC # FLD: 18.2 % — HIGH (ref 10.3–14.5)
RBC # FLD: 18.7 % — HIGH (ref 10.3–14.5)
RBC # FLD: 18.8 % — HIGH (ref 10.3–14.5)
RBC # FLD: 19 % — HIGH (ref 10.3–14.5)
RBC # FLD: 19.1 % — HIGH (ref 10.3–14.5)
RBC # FLD: 19.2 % — HIGH (ref 10.3–14.5)
RBC # FLD: 19.4 % — HIGH (ref 10.3–14.5)
RBC # FLD: 19.5 % — HIGH (ref 10.3–14.5)
RBC # FLD: 19.5 % — HIGH (ref 10.3–14.5)
RBC # FLD: 19.8 % — HIGH (ref 10.3–14.5)
RBC # FLD: 19.9 % — HIGH (ref 10.3–14.5)
RBC # FLD: 20 % — HIGH (ref 10.3–14.5)
RBC # FLD: 20 % — HIGH (ref 10.3–14.5)
RBC # FLD: 20.1 % — HIGH (ref 10.3–14.5)
RBC # FLD: 20.3 % — HIGH (ref 10.3–14.5)
RBC # FLD: 20.4 % — HIGH (ref 10.3–14.5)
RBC # FLD: 20.5 % — HIGH (ref 10.3–14.5)
RBC # FLD: 20.7 % — HIGH (ref 10.3–14.5)
RBC # FLD: 20.8 % — HIGH (ref 10.3–14.5)
RBC CASTS # UR COMP ASSIST: < 5 /HPF — SIGNIFICANT CHANGE UP
RBC CASTS # UR COMP ASSIST: < 5 /HPF — SIGNIFICANT CHANGE UP
RBC CASTS # UR COMP ASSIST: > 10 /HPF
RBC CASTS # UR COMP ASSIST: > 10 /HPF
RH IG SCN BLD-IMP: POSITIVE — SIGNIFICANT CHANGE UP
RHEUMATOID FACT SERPL-ACNC: 17 IU/ML — HIGH (ref 0–13)
S AUREUS DNA NOSE QL NAA+PROBE: POSITIVE
SAO2 % BLDA: 97.8 % — SIGNIFICANT CHANGE UP (ref 94–98)
SAO2 % BLDA: 98.7 % — HIGH (ref 94–98)
SAO2 % BLDA: 98.9 % — HIGH (ref 94–98)
SAO2 % BLDA: 99.1 % — HIGH (ref 94–98)
SAO2 % BLDA: 99.2 % — HIGH (ref 94–98)
SAO2 % BLDA: 99.2 % — HIGH (ref 94–98)
SARS-COV-2 IGG+IGM SERPL QL IA: >250 U/ML — HIGH
SARS-COV-2 IGG+IGM SERPL QL IA: POSITIVE
SARS-COV-2 RNA SPEC QL NAA+PROBE: NEGATIVE — SIGNIFICANT CHANGE UP
SARS-COV-2 RNA SPEC QL NAA+PROBE: SIGNIFICANT CHANGE UP
SODIUM SERPL-SCNC: 133 MMOL/L — LOW (ref 135–145)
SODIUM SERPL-SCNC: 136 MMOL/L — SIGNIFICANT CHANGE UP (ref 135–145)
SODIUM SERPL-SCNC: 138 MMOL/L — SIGNIFICANT CHANGE UP (ref 135–145)
SODIUM SERPL-SCNC: 140 MMOL/L — SIGNIFICANT CHANGE UP (ref 135–145)
SODIUM SERPL-SCNC: 141 MMOL/L — SIGNIFICANT CHANGE UP (ref 135–145)
SODIUM SERPL-SCNC: 142 MMOL/L — SIGNIFICANT CHANGE UP (ref 135–145)
SODIUM SERPL-SCNC: 143 MMOL/L — SIGNIFICANT CHANGE UP (ref 135–145)
SODIUM SERPL-SCNC: 144 MMOL/L — SIGNIFICANT CHANGE UP (ref 135–145)
SODIUM SERPL-SCNC: 145 MMOL/L — SIGNIFICANT CHANGE UP (ref 135–145)
SODIUM SERPL-SCNC: 145 MMOL/L — SIGNIFICANT CHANGE UP (ref 135–145)
SODIUM SERPL-SCNC: 146 MMOL/L — HIGH (ref 135–145)
SODIUM SERPL-SCNC: 147 MMOL/L — HIGH (ref 135–145)
SODIUM SERPL-SCNC: 148 MMOL/L — HIGH (ref 135–145)
SODIUM SERPL-SCNC: 149 MMOL/L — HIGH (ref 135–145)
SODIUM SERPL-SCNC: 149 MMOL/L — HIGH (ref 135–145)
SODIUM SERPL-SCNC: 150 MMOL/L — HIGH (ref 135–145)
SODIUM SERPL-SCNC: 151 MMOL/L — HIGH (ref 135–145)
SODIUM SERPL-SCNC: 152 MMOL/L — HIGH (ref 135–145)
SODIUM SERPL-SCNC: 154 MMOL/L — HIGH (ref 135–145)
SODIUM SERPL-SCNC: 155 MMOL/L — HIGH (ref 135–145)
SODIUM SERPL-SCNC: 155 MMOL/L — HIGH (ref 135–145)
SODIUM SERPL-SCNC: 156 MMOL/L — HIGH (ref 135–145)
SODIUM SERPL-SCNC: 156 MMOL/L — HIGH (ref 135–145)
SODIUM SERPL-SCNC: 157 MMOL/L — HIGH (ref 135–145)
SODIUM SERPL-SCNC: 157 MMOL/L — HIGH (ref 135–145)
SODIUM SERPL-SCNC: 158 MMOL/L — HIGH (ref 135–145)
SODIUM SERPL-SCNC: 159 MMOL/L — HIGH (ref 135–145)
SODIUM SERPL-SCNC: 159 MMOL/L — HIGH (ref 135–145)
SP GR SPEC: 1.01 — SIGNIFICANT CHANGE UP (ref 1–1.03)
SP GR SPEC: 1.01 — SIGNIFICANT CHANGE UP (ref 1–1.03)
SP GR SPEC: 1.02 — SIGNIFICANT CHANGE UP (ref 1–1.03)
SPECIMEN SOURCE: SIGNIFICANT CHANGE UP
TIBC SERPL-MCNC: 281 UG/DL — SIGNIFICANT CHANGE UP (ref 220–430)
TRIGL SERPL-MCNC: 74 MG/DL — SIGNIFICANT CHANGE UP
TROPONIN T SERPL-MCNC: 0.01 NG/ML — SIGNIFICANT CHANGE UP (ref 0–0.01)
TROPONIN T SERPL-MCNC: 0.01 NG/ML — SIGNIFICANT CHANGE UP (ref 0–0.01)
TROPONIN T SERPL-MCNC: 0.09 NG/ML — CRITICAL HIGH (ref 0–0.01)
TROPONIN T SERPL-MCNC: 0.1 NG/ML — CRITICAL HIGH (ref 0–0.01)
TROPONIN T SERPL-MCNC: 0.12 NG/ML — CRITICAL HIGH (ref 0–0.01)
TSH SERPL-MCNC: 0.08 UIU/ML — LOW (ref 0.27–4.2)
TUBE TYPE: SIGNIFICANT CHANGE UP
UIBC SERPL-MCNC: 242 UG/DL — SIGNIFICANT CHANGE UP (ref 110–370)
UROBILINOGEN FLD QL: 0.2 E.U./DL — SIGNIFICANT CHANGE UP
UROBILINOGEN FLD QL: 1 E.U./DL — SIGNIFICANT CHANGE UP
UROBILINOGEN FLD QL: 1 E.U./DL — SIGNIFICANT CHANGE UP
VALPROATE FREE SERPL-MCNC: 25.8 UG/ML — HIGH (ref 6–22)
VALPROATE FREE SERPL-MCNC: 60.8 UG/ML — HIGH (ref 6–22)
VALPROATE SERPL-MCNC: 39.5 UG/ML — LOW (ref 50–100)
VALPROATE SERPL-MCNC: 55.1 UG/ML — SIGNIFICANT CHANGE UP (ref 50–100)
VALPROATE SERPL-MCNC: 62 UG/ML — SIGNIFICANT CHANGE UP (ref 50–100)
VALPROATE SERPL-MCNC: 68.7 UG/ML — SIGNIFICANT CHANGE UP (ref 50–100)
VANCOMYCIN TROUGH SERPL-MCNC: 5.7 UG/ML — LOW (ref 10–20)
VANCOMYCIN TROUGH SERPL-MCNC: 6.4 UG/ML — LOW (ref 10–20)
VIT B12 SERPL-MCNC: 943 PG/ML — SIGNIFICANT CHANGE UP (ref 232–1245)
WBC # BLD: 10.12 K/UL — SIGNIFICANT CHANGE UP (ref 3.8–10.5)
WBC # BLD: 10.68 K/UL — HIGH (ref 3.8–10.5)
WBC # BLD: 11.92 K/UL — HIGH (ref 3.8–10.5)
WBC # BLD: 11.94 K/UL — HIGH (ref 3.8–10.5)
WBC # BLD: 12.02 K/UL — HIGH (ref 3.8–10.5)
WBC # BLD: 12.17 K/UL — HIGH (ref 3.8–10.5)
WBC # BLD: 12.3 K/UL — HIGH (ref 3.8–10.5)
WBC # BLD: 12.49 K/UL — HIGH (ref 3.8–10.5)
WBC # BLD: 12.92 K/UL — HIGH (ref 3.8–10.5)
WBC # BLD: 13.15 K/UL — HIGH (ref 3.8–10.5)
WBC # BLD: 13.26 K/UL — HIGH (ref 3.8–10.5)
WBC # BLD: 13.51 K/UL — HIGH (ref 3.8–10.5)
WBC # BLD: 14.03 K/UL — HIGH (ref 3.8–10.5)
WBC # BLD: 14.26 K/UL — HIGH (ref 3.8–10.5)
WBC # BLD: 15.14 K/UL — HIGH (ref 3.8–10.5)
WBC # BLD: 15.45 K/UL — HIGH (ref 3.8–10.5)
WBC # BLD: 16.01 K/UL — HIGH (ref 3.8–10.5)
WBC # BLD: 16.69 K/UL — HIGH (ref 3.8–10.5)
WBC # BLD: 16.78 K/UL — HIGH (ref 3.8–10.5)
WBC # BLD: 16.82 K/UL — HIGH (ref 3.8–10.5)
WBC # BLD: 16.95 K/UL — HIGH (ref 3.8–10.5)
WBC # BLD: 18.13 K/UL — HIGH (ref 3.8–10.5)
WBC # BLD: 19.67 K/UL — HIGH (ref 3.8–10.5)
WBC # BLD: 20.64 K/UL — HIGH (ref 3.8–10.5)
WBC # BLD: 20.9 K/UL — HIGH (ref 3.8–10.5)
WBC # BLD: 21.8 K/UL — HIGH (ref 3.8–10.5)
WBC # BLD: 25.16 K/UL — HIGH (ref 3.8–10.5)
WBC # BLD: 4.27 K/UL — SIGNIFICANT CHANGE UP (ref 3.8–10.5)
WBC # BLD: 4.5 K/UL — SIGNIFICANT CHANGE UP (ref 3.8–10.5)
WBC # BLD: 4.56 K/UL — SIGNIFICANT CHANGE UP (ref 3.8–10.5)
WBC # BLD: 4.59 K/UL — SIGNIFICANT CHANGE UP (ref 3.8–10.5)
WBC # BLD: 4.92 K/UL — SIGNIFICANT CHANGE UP (ref 3.8–10.5)
WBC # BLD: 5.03 K/UL — SIGNIFICANT CHANGE UP (ref 3.8–10.5)
WBC # BLD: 5.04 K/UL — SIGNIFICANT CHANGE UP (ref 3.8–10.5)
WBC # BLD: 5.21 K/UL — SIGNIFICANT CHANGE UP (ref 3.8–10.5)
WBC # BLD: 5.63 K/UL — SIGNIFICANT CHANGE UP (ref 3.8–10.5)
WBC # BLD: 5.66 K/UL — SIGNIFICANT CHANGE UP (ref 3.8–10.5)
WBC # BLD: 6.78 K/UL — SIGNIFICANT CHANGE UP (ref 3.8–10.5)
WBC # BLD: 7.04 K/UL — SIGNIFICANT CHANGE UP (ref 3.8–10.5)
WBC # BLD: 7.07 K/UL — SIGNIFICANT CHANGE UP (ref 3.8–10.5)
WBC # BLD: 7.3 K/UL — SIGNIFICANT CHANGE UP (ref 3.8–10.5)
WBC # BLD: 7.6 K/UL — SIGNIFICANT CHANGE UP (ref 3.8–10.5)
WBC # BLD: 7.65 K/UL — SIGNIFICANT CHANGE UP (ref 3.8–10.5)
WBC # BLD: 8.59 K/UL — SIGNIFICANT CHANGE UP (ref 3.8–10.5)
WBC # BLD: 8.89 K/UL — SIGNIFICANT CHANGE UP (ref 3.8–10.5)
WBC # BLD: 9 K/UL — SIGNIFICANT CHANGE UP (ref 3.8–10.5)
WBC # BLD: 9.03 K/UL — SIGNIFICANT CHANGE UP (ref 3.8–10.5)
WBC # BLD: 9.42 K/UL — SIGNIFICANT CHANGE UP (ref 3.8–10.5)
WBC # BLD: 9.88 K/UL — SIGNIFICANT CHANGE UP (ref 3.8–10.5)
WBC # FLD AUTO: 10.12 K/UL — SIGNIFICANT CHANGE UP (ref 3.8–10.5)
WBC # FLD AUTO: 10.68 K/UL — HIGH (ref 3.8–10.5)
WBC # FLD AUTO: 11.92 K/UL — HIGH (ref 3.8–10.5)
WBC # FLD AUTO: 11.94 K/UL — HIGH (ref 3.8–10.5)
WBC # FLD AUTO: 12.02 K/UL — HIGH (ref 3.8–10.5)
WBC # FLD AUTO: 12.17 K/UL — HIGH (ref 3.8–10.5)
WBC # FLD AUTO: 12.3 K/UL — HIGH (ref 3.8–10.5)
WBC # FLD AUTO: 12.49 K/UL — HIGH (ref 3.8–10.5)
WBC # FLD AUTO: 12.92 K/UL — HIGH (ref 3.8–10.5)
WBC # FLD AUTO: 13.15 K/UL — HIGH (ref 3.8–10.5)
WBC # FLD AUTO: 13.26 K/UL — HIGH (ref 3.8–10.5)
WBC # FLD AUTO: 13.51 K/UL — HIGH (ref 3.8–10.5)
WBC # FLD AUTO: 14.03 K/UL — HIGH (ref 3.8–10.5)
WBC # FLD AUTO: 14.26 K/UL — HIGH (ref 3.8–10.5)
WBC # FLD AUTO: 15.14 K/UL — HIGH (ref 3.8–10.5)
WBC # FLD AUTO: 15.45 K/UL — HIGH (ref 3.8–10.5)
WBC # FLD AUTO: 16.01 K/UL — HIGH (ref 3.8–10.5)
WBC # FLD AUTO: 16.69 K/UL — HIGH (ref 3.8–10.5)
WBC # FLD AUTO: 16.78 K/UL — HIGH (ref 3.8–10.5)
WBC # FLD AUTO: 16.82 K/UL — HIGH (ref 3.8–10.5)
WBC # FLD AUTO: 16.95 K/UL — HIGH (ref 3.8–10.5)
WBC # FLD AUTO: 18.13 K/UL — HIGH (ref 3.8–10.5)
WBC # FLD AUTO: 19.67 K/UL — HIGH (ref 3.8–10.5)
WBC # FLD AUTO: 20.64 K/UL — HIGH (ref 3.8–10.5)
WBC # FLD AUTO: 20.9 K/UL — HIGH (ref 3.8–10.5)
WBC # FLD AUTO: 21.8 K/UL — HIGH (ref 3.8–10.5)
WBC # FLD AUTO: 25.16 K/UL — HIGH (ref 3.8–10.5)
WBC # FLD AUTO: 4.27 K/UL — SIGNIFICANT CHANGE UP (ref 3.8–10.5)
WBC # FLD AUTO: 4.5 K/UL — SIGNIFICANT CHANGE UP (ref 3.8–10.5)
WBC # FLD AUTO: 4.56 K/UL — SIGNIFICANT CHANGE UP (ref 3.8–10.5)
WBC # FLD AUTO: 4.59 K/UL — SIGNIFICANT CHANGE UP (ref 3.8–10.5)
WBC # FLD AUTO: 4.92 K/UL — SIGNIFICANT CHANGE UP (ref 3.8–10.5)
WBC # FLD AUTO: 5.03 K/UL — SIGNIFICANT CHANGE UP (ref 3.8–10.5)
WBC # FLD AUTO: 5.04 K/UL — SIGNIFICANT CHANGE UP (ref 3.8–10.5)
WBC # FLD AUTO: 5.21 K/UL — SIGNIFICANT CHANGE UP (ref 3.8–10.5)
WBC # FLD AUTO: 5.63 K/UL — SIGNIFICANT CHANGE UP (ref 3.8–10.5)
WBC # FLD AUTO: 5.66 K/UL — SIGNIFICANT CHANGE UP (ref 3.8–10.5)
WBC # FLD AUTO: 6.78 K/UL — SIGNIFICANT CHANGE UP (ref 3.8–10.5)
WBC # FLD AUTO: 7.04 K/UL — SIGNIFICANT CHANGE UP (ref 3.8–10.5)
WBC # FLD AUTO: 7.07 K/UL — SIGNIFICANT CHANGE UP (ref 3.8–10.5)
WBC # FLD AUTO: 7.3 K/UL — SIGNIFICANT CHANGE UP (ref 3.8–10.5)
WBC # FLD AUTO: 7.6 K/UL — SIGNIFICANT CHANGE UP (ref 3.8–10.5)
WBC # FLD AUTO: 7.65 K/UL — SIGNIFICANT CHANGE UP (ref 3.8–10.5)
WBC # FLD AUTO: 8.59 K/UL — SIGNIFICANT CHANGE UP (ref 3.8–10.5)
WBC # FLD AUTO: 8.89 K/UL — SIGNIFICANT CHANGE UP (ref 3.8–10.5)
WBC # FLD AUTO: 9 K/UL — SIGNIFICANT CHANGE UP (ref 3.8–10.5)
WBC # FLD AUTO: 9.03 K/UL — SIGNIFICANT CHANGE UP (ref 3.8–10.5)
WBC # FLD AUTO: 9.42 K/UL — SIGNIFICANT CHANGE UP (ref 3.8–10.5)
WBC # FLD AUTO: 9.88 K/UL — SIGNIFICANT CHANGE UP (ref 3.8–10.5)
WBC UR QL: < 5 /HPF — SIGNIFICANT CHANGE UP

## 2021-01-01 PROCEDURE — 99291 CRITICAL CARE FIRST HOUR: CPT

## 2021-01-01 PROCEDURE — 36227 PLACE CATH XTRNL CAROTID: CPT

## 2021-01-01 PROCEDURE — 99254 IP/OBS CNSLTJ NEW/EST MOD 60: CPT

## 2021-01-01 PROCEDURE — 70496 CT ANGIOGRAPHY HEAD: CPT | Mod: 26

## 2021-01-01 PROCEDURE — 99232 SBSQ HOSP IP/OBS MODERATE 35: CPT | Mod: GC

## 2021-01-01 PROCEDURE — 93882 EXTRACRANIAL UNI/LTD STUDY: CPT

## 2021-01-01 PROCEDURE — 70498 CT ANGIOGRAPHY NECK: CPT | Mod: 26

## 2021-01-01 PROCEDURE — 95708 EEG WO VID EA 12-26HR UNMNTR: CPT

## 2021-01-01 PROCEDURE — 74018 RADEX ABDOMEN 1 VIEW: CPT | Mod: 26

## 2021-01-01 PROCEDURE — 70450 CT HEAD/BRAIN W/O DYE: CPT | Mod: 26

## 2021-01-01 PROCEDURE — 81240 F2 GENE: CPT

## 2021-01-01 PROCEDURE — 99291 CRITICAL CARE FIRST HOUR: CPT | Mod: 25

## 2021-01-01 PROCEDURE — 85306 CLOT INHIBIT PROT S FREE: CPT

## 2021-01-01 PROCEDURE — 74230 X-RAY XM SWLNG FUNCJ C+: CPT

## 2021-01-01 PROCEDURE — 70450 CT HEAD/BRAIN W/O DYE: CPT | Mod: 26,59

## 2021-01-01 PROCEDURE — 85652 RBC SED RATE AUTOMATED: CPT

## 2021-01-01 PROCEDURE — 99024 POSTOP FOLLOW-UP VISIT: CPT

## 2021-01-01 PROCEDURE — 99233 SBSQ HOSP IP/OBS HIGH 50: CPT | Mod: GC

## 2021-01-01 PROCEDURE — 80164 ASSAY DIPROPYLACETIC ACD TOT: CPT

## 2021-01-01 PROCEDURE — 95720 EEG PHY/QHP EA INCR W/VEEG: CPT

## 2021-01-01 PROCEDURE — 76377 3D RENDER W/INTRP POSTPROCES: CPT | Mod: 26

## 2021-01-01 PROCEDURE — 71045 X-RAY EXAM CHEST 1 VIEW: CPT | Mod: 26

## 2021-01-01 PROCEDURE — 95717 EEG PHYS/QHP 2-12 HR W/O VID: CPT

## 2021-01-01 PROCEDURE — C1889: CPT

## 2021-01-01 PROCEDURE — 70496 CT ANGIOGRAPHY HEAD: CPT

## 2021-01-01 PROCEDURE — 36620 INSERTION CATHETER ARTERY: CPT

## 2021-01-01 PROCEDURE — 36415 COLL VENOUS BLD VENIPUNCTURE: CPT

## 2021-01-01 PROCEDURE — 36573 INSJ PICC RS&I 5 YR+: CPT

## 2021-01-01 PROCEDURE — 89051 BODY FLUID CELL COUNT: CPT

## 2021-01-01 PROCEDURE — 85025 COMPLETE CBC W/AUTO DIFF WBC: CPT

## 2021-01-01 PROCEDURE — 61651 EVASC PRLNG ADMN RX AGNT ADD: CPT

## 2021-01-01 PROCEDURE — 99233 SBSQ HOSP IP/OBS HIGH 50: CPT

## 2021-01-01 PROCEDURE — 93321 DOPPLER ECHO F-UP/LMTD STD: CPT

## 2021-01-01 PROCEDURE — 71045 X-RAY EXAM CHEST 1 VIEW: CPT | Mod: 26,76

## 2021-01-01 PROCEDURE — P9016: CPT

## 2021-01-01 PROCEDURE — 71045 X-RAY EXAM CHEST 1 VIEW: CPT | Mod: 26,77

## 2021-01-01 PROCEDURE — 93970 EXTREMITY STUDY: CPT | Mod: 26

## 2021-01-01 PROCEDURE — 97161 PT EVAL LOW COMPLEX 20 MIN: CPT

## 2021-01-01 PROCEDURE — 82272 OCCULT BLD FECES 1-3 TESTS: CPT

## 2021-01-01 PROCEDURE — C1760: CPT

## 2021-01-01 PROCEDURE — 99232 SBSQ HOSP IP/OBS MODERATE 35: CPT

## 2021-01-01 PROCEDURE — 97530 THERAPEUTIC ACTIVITIES: CPT

## 2021-01-01 PROCEDURE — 84145 PROCALCITONIN (PCT): CPT

## 2021-01-01 PROCEDURE — P9045: CPT

## 2021-01-01 PROCEDURE — 99292 CRITICAL CARE ADDL 30 MIN: CPT | Mod: 25

## 2021-01-01 PROCEDURE — 81003 URINALYSIS AUTO W/O SCOPE: CPT

## 2021-01-01 PROCEDURE — 82550 ASSAY OF CK (CPK): CPT

## 2021-01-01 PROCEDURE — 83036 HEMOGLOBIN GLYCOSYLATED A1C: CPT

## 2021-01-01 PROCEDURE — 85027 COMPLETE CBC AUTOMATED: CPT

## 2021-01-01 PROCEDURE — 81001 URINALYSIS AUTO W/SCOPE: CPT

## 2021-01-01 PROCEDURE — 36600 WITHDRAWAL OF ARTERIAL BLOOD: CPT

## 2021-01-01 PROCEDURE — 84484 ASSAY OF TROPONIN QUANT: CPT

## 2021-01-01 PROCEDURE — 84443 ASSAY THYROID STIM HORMONE: CPT

## 2021-01-01 PROCEDURE — 86036 ANCA SCREEN EACH ANTIBODY: CPT

## 2021-01-01 PROCEDURE — 87070 CULTURE OTHR SPECIMN AEROBIC: CPT

## 2021-01-01 PROCEDURE — 99255 IP/OBS CONSLTJ NEW/EST HI 80: CPT | Mod: GC

## 2021-01-01 PROCEDURE — 93931 UPPER EXTREMITY STUDY: CPT | Mod: 26,LT

## 2021-01-01 PROCEDURE — 83735 ASSAY OF MAGNESIUM: CPT

## 2021-01-01 PROCEDURE — 71250 CT THORAX DX C-: CPT

## 2021-01-01 PROCEDURE — 0042T: CPT

## 2021-01-01 PROCEDURE — 99253 IP/OBS CNSLTJ NEW/EST LOW 45: CPT | Mod: GC,57,25

## 2021-01-01 PROCEDURE — 75898 FOLLOW-UP ANGIOGRAPHY: CPT | Mod: 26

## 2021-01-01 PROCEDURE — 71250 CT THORAX DX C-: CPT | Mod: 26

## 2021-01-01 PROCEDURE — 31500 INSERT EMERGENCY AIRWAY: CPT

## 2021-01-01 PROCEDURE — 71045 X-RAY EXAM CHEST 1 VIEW: CPT

## 2021-01-01 PROCEDURE — 86901 BLOOD TYPING SEROLOGIC RH(D): CPT

## 2021-01-01 PROCEDURE — 61650 EVASC PRLNG ADMN RX AGNT 1ST: CPT

## 2021-01-01 PROCEDURE — 85300 ANTITHROMBIN III ACTIVITY: CPT

## 2021-01-01 PROCEDURE — 99223 1ST HOSP IP/OBS HIGH 75: CPT | Mod: 25,57

## 2021-01-01 PROCEDURE — 84157 ASSAY OF PROTEIN OTHER: CPT

## 2021-01-01 PROCEDURE — 36430 TRANSFUSION BLD/BLD COMPNT: CPT

## 2021-01-01 PROCEDURE — C1713: CPT

## 2021-01-01 PROCEDURE — 97116 GAIT TRAINING THERAPY: CPT

## 2021-01-01 PROCEDURE — C1769: CPT

## 2021-01-01 PROCEDURE — 71275 CT ANGIOGRAPHY CHEST: CPT | Mod: 26

## 2021-01-01 PROCEDURE — 37191 INS ENDOVAS VENA CAVA FILTR: CPT | Mod: GC

## 2021-01-01 PROCEDURE — 36556 INSERT NON-TUNNEL CV CATH: CPT

## 2021-01-01 PROCEDURE — 94002 VENT MGMT INPAT INIT DAY: CPT

## 2021-01-01 PROCEDURE — 71275 CT ANGIOGRAPHY CHEST: CPT

## 2021-01-01 PROCEDURE — 87205 SMEAR GRAM STAIN: CPT

## 2021-01-01 PROCEDURE — 76000 FLUOROSCOPY <1 HR PHYS/QHP: CPT

## 2021-01-01 PROCEDURE — 80048 BASIC METABOLIC PNL TOTAL CA: CPT

## 2021-01-01 PROCEDURE — 74176 CT ABD & PELVIS W/O CONTRAST: CPT | Mod: 26

## 2021-01-01 PROCEDURE — 84100 ASSAY OF PHOSPHORUS: CPT

## 2021-01-01 PROCEDURE — 99252 IP/OBS CONSLTJ NEW/EST SF 35: CPT | Mod: GC

## 2021-01-01 PROCEDURE — 93010 ELECTROCARDIOGRAM REPORT: CPT

## 2021-01-01 PROCEDURE — 70498 CT ANGIOGRAPHY NECK: CPT

## 2021-01-01 PROCEDURE — C1880: CPT

## 2021-01-01 PROCEDURE — 86900 BLOOD TYPING SEROLOGIC ABO: CPT

## 2021-01-01 PROCEDURE — 86147 CARDIOLIPIN ANTIBODY EA IG: CPT

## 2021-01-01 PROCEDURE — 93931 UPPER EXTREMITY STUDY: CPT

## 2021-01-01 PROCEDURE — 95718 EEG PHYS/QHP 2-12 HR W/VEEG: CPT

## 2021-01-01 PROCEDURE — 82553 CREATINE MB FRACTION: CPT

## 2021-01-01 PROCEDURE — 87635 SARS-COV-2 COVID-19 AMP PRB: CPT

## 2021-01-01 PROCEDURE — 85610 PROTHROMBIN TIME: CPT

## 2021-01-01 PROCEDURE — 61624 TCAT PERM OCCLS/EMBOLJ CNS: CPT

## 2021-01-01 PROCEDURE — 74018 RADEX ABDOMEN 1 VIEW: CPT

## 2021-01-01 PROCEDURE — 92611 MOTION FLUOROSCOPY/SWALLOW: CPT

## 2021-01-01 PROCEDURE — 80202 ASSAY OF VANCOMYCIN: CPT

## 2021-01-01 PROCEDURE — 87641 MR-STAPH DNA AMP PROBE: CPT

## 2021-01-01 PROCEDURE — C1894: CPT

## 2021-01-01 PROCEDURE — 74230 X-RAY XM SWLNG FUNCJ C+: CPT | Mod: 26

## 2021-01-01 PROCEDURE — 85303 CLOT INHIBIT PROT C ACTIVITY: CPT

## 2021-01-01 PROCEDURE — 85730 THROMBOPLASTIN TIME PARTIAL: CPT

## 2021-01-01 PROCEDURE — L8699: CPT

## 2021-01-01 PROCEDURE — 86140 C-REACTIVE PROTEIN: CPT

## 2021-01-01 PROCEDURE — 87186 SC STD MICRODIL/AGAR DIL: CPT

## 2021-01-01 PROCEDURE — 70450 CT HEAD/BRAIN W/O DYE: CPT

## 2021-01-01 PROCEDURE — 97110 THERAPEUTIC EXERCISES: CPT

## 2021-01-01 PROCEDURE — 93005 ELECTROCARDIOGRAM TRACING: CPT

## 2021-01-01 PROCEDURE — 81241 F5 GENE: CPT

## 2021-01-01 PROCEDURE — 82803 BLOOD GASES ANY COMBINATION: CPT

## 2021-01-01 PROCEDURE — 36224 PLACE CATH CAROTD ART: CPT | Mod: 59,RT

## 2021-01-01 PROCEDURE — 93306 TTE W/DOPPLER COMPLETE: CPT | Mod: 26

## 2021-01-01 PROCEDURE — 36224 PLACE CATH CAROTD ART: CPT | Mod: 50

## 2021-01-01 PROCEDURE — 99292 CRITICAL CARE ADDL 30 MIN: CPT

## 2021-01-01 PROCEDURE — 93970 EXTREMITY STUDY: CPT

## 2021-01-01 PROCEDURE — 43235 EGD DIAGNOSTIC BRUSH WASH: CPT | Mod: GC

## 2021-01-01 PROCEDURE — 84702 CHORIONIC GONADOTROPIN TEST: CPT

## 2021-01-01 PROCEDURE — 75894 X-RAYS TRANSCATH THERAPY: CPT | Mod: 26

## 2021-01-01 PROCEDURE — 83550 IRON BINDING TEST: CPT

## 2021-01-01 PROCEDURE — 61781 SCAN PROC CRANIAL INTRA: CPT

## 2021-01-01 PROCEDURE — 94640 AIRWAY INHALATION TREATMENT: CPT

## 2021-01-01 PROCEDURE — 95700 EEG CONT REC W/VID EEG TECH: CPT

## 2021-01-01 PROCEDURE — 93971 EXTREMITY STUDY: CPT

## 2021-01-01 PROCEDURE — 87640 STAPH A DNA AMP PROBE: CPT

## 2021-01-01 PROCEDURE — 74176 CT ABD & PELVIS W/O CONTRAST: CPT

## 2021-01-01 PROCEDURE — 93306 TTE W/DOPPLER COMPLETE: CPT

## 2021-01-01 PROCEDURE — 97112 NEUROMUSCULAR REEDUCATION: CPT

## 2021-01-01 PROCEDURE — 80053 COMPREHEN METABOLIC PANEL: CPT

## 2021-01-01 PROCEDURE — 80076 HEPATIC FUNCTION PANEL: CPT

## 2021-01-01 PROCEDURE — 99253 IP/OBS CNSLTJ NEW/EST LOW 45: CPT

## 2021-01-01 PROCEDURE — 99233 SBSQ HOSP IP/OBS HIGH 50: CPT | Mod: 25

## 2021-01-01 PROCEDURE — 36569 INSJ PICC 5 YR+ W/O IMAGING: CPT

## 2021-01-01 PROCEDURE — 82945 GLUCOSE OTHER FLUID: CPT

## 2021-01-01 PROCEDURE — 44186 LAP JEJUNOSTOMY: CPT

## 2021-01-01 PROCEDURE — 36226 PLACE CATH VERTEBRAL ART: CPT | Mod: 50

## 2021-01-01 PROCEDURE — 82306 VITAMIN D 25 HYDROXY: CPT

## 2021-01-01 PROCEDURE — 36224 PLACE CATH CAROTD ART: CPT | Mod: 50,59

## 2021-01-01 PROCEDURE — 99232 SBSQ HOSP IP/OBS MODERATE 35: CPT | Mod: GC,25

## 2021-01-01 PROCEDURE — 80165 DIPROPYLACETIC ACID FREE: CPT

## 2021-01-01 PROCEDURE — 86038 ANTINUCLEAR ANTIBODIES: CPT

## 2021-01-01 PROCEDURE — C1887: CPT

## 2021-01-01 PROCEDURE — 83540 ASSAY OF IRON: CPT

## 2021-01-01 PROCEDURE — U0003: CPT

## 2021-01-01 PROCEDURE — 62223 ESTABLISH BRAIN CAVITY SHUNT: CPT

## 2021-01-01 PROCEDURE — 86850 RBC ANTIBODY SCREEN: CPT

## 2021-01-01 PROCEDURE — 93308 TTE F-UP OR LMTD: CPT | Mod: 26

## 2021-01-01 PROCEDURE — 92610 EVALUATE SWALLOWING FUNCTION: CPT

## 2021-01-01 PROCEDURE — 82962 GLUCOSE BLOOD TEST: CPT

## 2021-01-01 PROCEDURE — 86769 SARS-COV-2 COVID-19 ANTIBODY: CPT

## 2021-01-01 PROCEDURE — 95705 EEG W/O VID 2-12 HR UNMNTR: CPT

## 2021-01-01 PROCEDURE — 82607 VITAMIN B-12: CPT

## 2021-01-01 PROCEDURE — 99223 1ST HOSP IP/OBS HIGH 75: CPT | Mod: 25

## 2021-01-01 PROCEDURE — 87040 BLOOD CULTURE FOR BACTERIA: CPT

## 2021-01-01 PROCEDURE — 86431 RHEUMATOID FACTOR QUANT: CPT

## 2021-01-01 PROCEDURE — 93971 EXTREMITY STUDY: CPT | Mod: 26,LT

## 2021-01-01 PROCEDURE — 83605 ASSAY OF LACTIC ACID: CPT

## 2021-01-01 PROCEDURE — 86923 COMPATIBILITY TEST ELECTRIC: CPT

## 2021-01-01 PROCEDURE — 80061 LIPID PANEL: CPT

## 2021-01-01 PROCEDURE — 81025 URINE PREGNANCY TEST: CPT

## 2021-01-01 PROCEDURE — 82728 ASSAY OF FERRITIN: CPT

## 2021-01-01 PROCEDURE — 95714 VEEG EA 12-26 HR UNMNTR: CPT

## 2021-01-01 PROCEDURE — 85520 HEPARIN ASSAY: CPT

## 2021-01-01 PROCEDURE — U0005: CPT

## 2021-01-01 PROCEDURE — 93882 EXTRACRANIAL UNI/LTD STUDY: CPT | Mod: 26,LT

## 2021-01-01 RX ORDER — NICARDIPINE HYDROCHLORIDE 30 MG/1
5 CAPSULE, EXTENDED RELEASE ORAL
Qty: 40 | Refills: 0 | Status: DISCONTINUED | OUTPATIENT
Start: 2021-01-01 | End: 2021-01-01

## 2021-01-01 RX ORDER — POTASSIUM CHLORIDE 20 MEQ
20 PACKET (EA) ORAL
Refills: 0 | Status: COMPLETED | OUTPATIENT
Start: 2021-01-01 | End: 2021-01-01

## 2021-01-01 RX ORDER — CHLORHEXIDINE GLUCONATE 213 G/1000ML
15 SOLUTION TOPICAL EVERY 12 HOURS
Refills: 0 | Status: DISCONTINUED | OUTPATIENT
Start: 2021-01-01 | End: 2021-01-01

## 2021-01-01 RX ORDER — QUETIAPINE FUMARATE 200 MG/1
50 TABLET, FILM COATED ORAL
Refills: 0 | Status: DISCONTINUED | OUTPATIENT
Start: 2021-01-01 | End: 2021-01-01

## 2021-01-01 RX ORDER — VANCOMYCIN HCL 1 G
1500 VIAL (EA) INTRAVENOUS EVERY 12 HOURS
Refills: 0 | Status: DISCONTINUED | OUTPATIENT
Start: 2021-01-01 | End: 2021-01-01

## 2021-01-01 RX ORDER — POTASSIUM PHOSPHATE, MONOBASIC POTASSIUM PHOSPHATE, DIBASIC 236; 224 MG/ML; MG/ML
15 INJECTION, SOLUTION INTRAVENOUS ONCE
Refills: 0 | Status: COMPLETED | OUTPATIENT
Start: 2021-01-01 | End: 2021-01-01

## 2021-01-01 RX ORDER — NOREPINEPHRINE BITARTRATE/D5W 8 MG/250ML
0.05 PLASTIC BAG, INJECTION (ML) INTRAVENOUS
Qty: 16 | Refills: 0 | Status: DISCONTINUED | OUTPATIENT
Start: 2021-01-01 | End: 2021-01-01

## 2021-01-01 RX ORDER — SODIUM CHLORIDE 5 G/100ML
1000 INJECTION, SOLUTION INTRAVENOUS
Refills: 0 | Status: DISCONTINUED | OUTPATIENT
Start: 2021-01-01 | End: 2021-01-01

## 2021-01-01 RX ORDER — HYDRALAZINE HCL 50 MG
10 TABLET ORAL
Refills: 0 | Status: DISCONTINUED | OUTPATIENT
Start: 2021-01-01 | End: 2021-01-01

## 2021-01-01 RX ORDER — MIDODRINE HYDROCHLORIDE 2.5 MG/1
5 TABLET ORAL EVERY 8 HOURS
Refills: 0 | Status: DISCONTINUED | OUTPATIENT
Start: 2021-01-01 | End: 2021-01-01

## 2021-01-01 RX ORDER — POTASSIUM CHLORIDE 20 MEQ
40 PACKET (EA) ORAL
Refills: 0 | Status: DISCONTINUED | OUTPATIENT
Start: 2021-01-01 | End: 2021-01-01

## 2021-01-01 RX ORDER — SODIUM CHLORIDE 9 MG/ML
1000 INJECTION, SOLUTION INTRAVENOUS
Refills: 0 | Status: DISCONTINUED | OUTPATIENT
Start: 2021-01-01 | End: 2021-01-01

## 2021-01-01 RX ORDER — ACETYLCYSTEINE 200 MG/ML
4 VIAL (ML) MISCELLANEOUS THREE TIMES A DAY
Refills: 0 | Status: DISCONTINUED | OUTPATIENT
Start: 2021-01-01 | End: 2021-01-01

## 2021-01-01 RX ORDER — SODIUM CHLORIDE 9 MG/ML
500 INJECTION INTRAMUSCULAR; INTRAVENOUS; SUBCUTANEOUS ONCE
Refills: 0 | Status: COMPLETED | OUTPATIENT
Start: 2021-01-01 | End: 2021-01-01

## 2021-01-01 RX ORDER — POTASSIUM CHLORIDE 20 MEQ
40 PACKET (EA) ORAL ONCE
Refills: 0 | Status: COMPLETED | OUTPATIENT
Start: 2021-01-01 | End: 2021-01-01

## 2021-01-01 RX ORDER — QUETIAPINE FUMARATE 200 MG/1
1 TABLET, FILM COATED ORAL
Qty: 0 | Refills: 0 | DISCHARGE
Start: 2021-01-01

## 2021-01-01 RX ORDER — SODIUM CHLORIDE 9 MG/ML
1000 INJECTION INTRAMUSCULAR; INTRAVENOUS; SUBCUTANEOUS
Refills: 0 | Status: DISCONTINUED | OUTPATIENT
Start: 2021-01-01 | End: 2021-01-01

## 2021-01-01 RX ORDER — LACOSAMIDE 50 MG/1
50 TABLET ORAL
Refills: 0 | Status: DISCONTINUED | OUTPATIENT
Start: 2021-01-01 | End: 2021-01-01

## 2021-01-01 RX ORDER — CHLORHEXIDINE GLUCONATE 213 G/1000ML
1 SOLUTION TOPICAL
Refills: 0 | Status: COMPLETED | OUTPATIENT
Start: 2021-01-01 | End: 2021-01-01

## 2021-01-01 RX ORDER — SODIUM CHLORIDE 9 MG/ML
3 INJECTION INTRAMUSCULAR; INTRAVENOUS; SUBCUTANEOUS EVERY 6 HOURS
Refills: 0 | Status: DISCONTINUED | OUTPATIENT
Start: 2021-01-01 | End: 2021-01-01

## 2021-01-01 RX ORDER — MIDAZOLAM HYDROCHLORIDE 1 MG/ML
2 INJECTION, SOLUTION INTRAMUSCULAR; INTRAVENOUS ONCE
Refills: 0 | Status: DISCONTINUED | OUTPATIENT
Start: 2021-01-01 | End: 2021-01-01

## 2021-01-01 RX ORDER — BROMOCRIPTINE MESYLATE 5 MG/1
5 CAPSULE ORAL EVERY 8 HOURS
Refills: 0 | Status: DISCONTINUED | OUTPATIENT
Start: 2021-01-01 | End: 2021-01-01

## 2021-01-01 RX ORDER — POTASSIUM CHLORIDE 20 MEQ
20 PACKET (EA) ORAL ONCE
Refills: 0 | Status: COMPLETED | OUTPATIENT
Start: 2021-01-01 | End: 2021-01-01

## 2021-01-01 RX ORDER — DEXTROSE 50 % IN WATER 50 %
25 SYRINGE (ML) INTRAVENOUS ONCE
Refills: 0 | Status: DISCONTINUED | OUTPATIENT
Start: 2021-01-01 | End: 2021-01-01

## 2021-01-01 RX ORDER — POLYETHYLENE GLYCOL 3350 17 G/17G
17 POWDER, FOR SOLUTION ORAL ONCE
Refills: 0 | Status: COMPLETED | OUTPATIENT
Start: 2021-01-01 | End: 2021-01-01

## 2021-01-01 RX ORDER — FENTANYL CITRATE 50 UG/ML
50 INJECTION INTRAVENOUS ONCE
Refills: 0 | Status: DISCONTINUED | OUTPATIENT
Start: 2021-01-01 | End: 2021-01-01

## 2021-01-01 RX ORDER — ACETAMINOPHEN 500 MG
325 TABLET ORAL ONCE
Refills: 0 | Status: COMPLETED | OUTPATIENT
Start: 2021-01-01 | End: 2021-01-01

## 2021-01-01 RX ORDER — CHLORHEXIDINE GLUCONATE 213 G/1000ML
1 SOLUTION TOPICAL EVERY 12 HOURS
Refills: 0 | Status: COMPLETED | OUTPATIENT
Start: 2021-01-01 | End: 2021-01-01

## 2021-01-01 RX ORDER — POTASSIUM CHLORIDE 20 MEQ
40 PACKET (EA) ORAL ONCE
Refills: 0 | Status: DISCONTINUED | OUTPATIENT
Start: 2021-01-01 | End: 2021-01-01

## 2021-01-01 RX ORDER — LANOLIN ALCOHOL/MO/W.PET/CERES
3 CREAM (GRAM) TOPICAL AT BEDTIME
Refills: 0 | Status: DISCONTINUED | OUTPATIENT
Start: 2021-01-01 | End: 2021-01-01

## 2021-01-01 RX ORDER — DEXAMETHASONE 0.5 MG/5ML
4 ELIXIR ORAL EVERY 6 HOURS
Refills: 0 | Status: COMPLETED | OUTPATIENT
Start: 2021-01-01 | End: 2021-01-01

## 2021-01-01 RX ORDER — PANTOPRAZOLE SODIUM 20 MG/1
40 TABLET, DELAYED RELEASE ORAL
Refills: 0 | Status: DISCONTINUED | OUTPATIENT
Start: 2021-01-01 | End: 2021-01-01

## 2021-01-01 RX ORDER — POTASSIUM CHLORIDE 20 MEQ
40 PACKET (EA) ORAL EVERY 4 HOURS
Refills: 0 | Status: COMPLETED | OUTPATIENT
Start: 2021-01-01 | End: 2021-01-01

## 2021-01-01 RX ORDER — SODIUM,POTASSIUM PHOSPHATES 278-250MG
3 POWDER IN PACKET (EA) ORAL ONCE
Refills: 0 | Status: DISCONTINUED | OUTPATIENT
Start: 2021-01-01 | End: 2021-01-01

## 2021-01-01 RX ORDER — METOCLOPRAMIDE HCL 10 MG
5 TABLET ORAL EVERY 6 HOURS
Refills: 0 | Status: DISCONTINUED | OUTPATIENT
Start: 2021-01-01 | End: 2021-01-01

## 2021-01-01 RX ORDER — FLUDROCORTISONE ACETATE 0.1 MG/1
0.1 TABLET ORAL
Refills: 0 | Status: DISCONTINUED | OUTPATIENT
Start: 2021-01-01 | End: 2021-01-01

## 2021-01-01 RX ORDER — POTASSIUM CHLORIDE 20 MEQ
40 PACKET (EA) ORAL
Refills: 0 | Status: COMPLETED | OUTPATIENT
Start: 2021-01-01 | End: 2021-01-01

## 2021-01-01 RX ORDER — ACETAMINOPHEN 500 MG
1000 TABLET ORAL EVERY 6 HOURS
Refills: 0 | Status: DISCONTINUED | OUTPATIENT
Start: 2021-01-01 | End: 2021-01-01

## 2021-01-01 RX ORDER — BUPIVACAINE 13.3 MG/ML
20 INJECTION, SUSPENSION, LIPOSOMAL INFILTRATION ONCE
Refills: 0 | Status: DISCONTINUED | OUTPATIENT
Start: 2021-01-01 | End: 2021-01-01

## 2021-01-01 RX ORDER — MIDODRINE HYDROCHLORIDE 2.5 MG/1
2.5 TABLET ORAL
Refills: 0 | Status: DISCONTINUED | OUTPATIENT
Start: 2021-01-01 | End: 2021-01-01

## 2021-01-01 RX ORDER — MULTIVIT-MIN/FERROUS GLUCONATE 9 MG/15 ML
0 LIQUID (ML) ORAL
Qty: 0 | Refills: 0 | DISCHARGE
Start: 2021-01-01

## 2021-01-01 RX ORDER — MIDODRINE HYDROCHLORIDE 2.5 MG/1
10 TABLET ORAL EVERY 8 HOURS
Refills: 0 | Status: DISCONTINUED | OUTPATIENT
Start: 2021-01-01 | End: 2021-01-01

## 2021-01-01 RX ORDER — PANTOPRAZOLE SODIUM 20 MG/1
40 TABLET, DELAYED RELEASE ORAL DAILY
Refills: 0 | Status: DISCONTINUED | OUTPATIENT
Start: 2021-01-01 | End: 2021-01-01

## 2021-01-01 RX ORDER — ALBUMIN HUMAN 25 %
250 VIAL (ML) INTRAVENOUS ONCE
Refills: 0 | Status: COMPLETED | OUTPATIENT
Start: 2021-01-01 | End: 2021-01-01

## 2021-01-01 RX ORDER — CEFAZOLIN SODIUM 1 G
2000 VIAL (EA) INJECTION EVERY 8 HOURS
Refills: 0 | Status: DISCONTINUED | OUTPATIENT
Start: 2021-01-01 | End: 2021-01-01

## 2021-01-01 RX ORDER — DABIGATRAN ETEXILATE MESYLATE 150 MG/1
150 CAPSULE ORAL EVERY 12 HOURS
Refills: 0 | Status: DISCONTINUED | OUTPATIENT
Start: 2021-01-01 | End: 2021-01-01

## 2021-01-01 RX ORDER — SODIUM CHLORIDE 9 MG/ML
1000 INJECTION INTRAMUSCULAR; INTRAVENOUS; SUBCUTANEOUS ONCE
Refills: 0 | Status: DISCONTINUED | OUTPATIENT
Start: 2021-01-01 | End: 2021-01-01

## 2021-01-01 RX ORDER — LACOSAMIDE 50 MG/1
100 TABLET ORAL
Refills: 0 | Status: DISCONTINUED | OUTPATIENT
Start: 2021-01-01 | End: 2021-01-01

## 2021-01-01 RX ORDER — MAGNESIUM SULFATE 500 MG/ML
2 VIAL (ML) INJECTION ONCE
Refills: 0 | Status: COMPLETED | OUTPATIENT
Start: 2021-01-01 | End: 2021-01-01

## 2021-01-01 RX ORDER — ENOXAPARIN SODIUM 100 MG/ML
40 INJECTION SUBCUTANEOUS ONCE
Refills: 0 | Status: COMPLETED | OUTPATIENT
Start: 2021-01-01 | End: 2021-01-01

## 2021-01-01 RX ORDER — VALPROIC ACID (AS SODIUM SALT) 250 MG/5ML
500 SOLUTION, ORAL ORAL EVERY 6 HOURS
Refills: 0 | Status: DISCONTINUED | OUTPATIENT
Start: 2021-01-01 | End: 2021-01-01

## 2021-01-01 RX ORDER — SODIUM CHLORIDE 9 MG/ML
1000 INJECTION INTRAMUSCULAR; INTRAVENOUS; SUBCUTANEOUS ONCE
Refills: 0 | Status: COMPLETED | OUTPATIENT
Start: 2021-01-01 | End: 2021-01-01

## 2021-01-01 RX ORDER — FENTANYL CITRATE 50 UG/ML
25 INJECTION INTRAVENOUS ONCE
Refills: 0 | Status: DISCONTINUED | OUTPATIENT
Start: 2021-01-01 | End: 2021-01-01

## 2021-01-01 RX ORDER — ACETAMINOPHEN 500 MG
650 TABLET ORAL ONCE
Refills: 0 | Status: COMPLETED | OUTPATIENT
Start: 2021-01-01 | End: 2021-01-01

## 2021-01-01 RX ORDER — MAGNESIUM SULFATE 500 MG/ML
2 VIAL (ML) INJECTION ONCE
Refills: 0 | Status: DISCONTINUED | OUTPATIENT
Start: 2021-01-01 | End: 2021-01-01

## 2021-01-01 RX ORDER — BROMOCRIPTINE MESYLATE 5 MG/1
1 CAPSULE ORAL
Qty: 0 | Refills: 0 | DISCHARGE
Start: 2021-01-01

## 2021-01-01 RX ORDER — DIAZEPAM 5 MG
5 TABLET ORAL ONCE
Refills: 0 | Status: DISCONTINUED | OUTPATIENT
Start: 2021-01-01 | End: 2021-01-01

## 2021-01-01 RX ORDER — SODIUM CHLORIDE 5 G/100ML
500 INJECTION, SOLUTION INTRAVENOUS
Refills: 0 | Status: DISCONTINUED | OUTPATIENT
Start: 2021-01-01 | End: 2021-01-01

## 2021-01-01 RX ORDER — ASPIRIN/CALCIUM CARB/MAGNESIUM 324 MG
1 TABLET ORAL
Qty: 0 | Refills: 0 | DISCHARGE
Start: 2021-01-01

## 2021-01-01 RX ORDER — POTASSIUM PHOSPHATE, MONOBASIC POTASSIUM PHOSPHATE, DIBASIC 236; 224 MG/ML; MG/ML
30 INJECTION, SOLUTION INTRAVENOUS ONCE
Refills: 0 | Status: COMPLETED | OUTPATIENT
Start: 2021-01-01 | End: 2021-01-01

## 2021-01-01 RX ORDER — HALOPERIDOL DECANOATE 100 MG/ML
2 INJECTION INTRAMUSCULAR ONCE
Refills: 0 | Status: COMPLETED | OUTPATIENT
Start: 2021-01-01 | End: 2021-01-01

## 2021-01-01 RX ORDER — FENTANYL CITRATE 50 UG/ML
25 INJECTION INTRAVENOUS
Refills: 0 | Status: DISCONTINUED | OUTPATIENT
Start: 2021-01-01 | End: 2021-01-01

## 2021-01-01 RX ORDER — QUETIAPINE FUMARATE 200 MG/1
25 TABLET, FILM COATED ORAL ONCE
Refills: 0 | Status: COMPLETED | OUTPATIENT
Start: 2021-01-01 | End: 2021-01-01

## 2021-01-01 RX ORDER — IPRATROPIUM/ALBUTEROL SULFATE 18-103MCG
3 AEROSOL WITH ADAPTER (GRAM) INHALATION EVERY 6 HOURS
Refills: 0 | Status: DISCONTINUED | OUTPATIENT
Start: 2021-01-01 | End: 2021-01-01

## 2021-01-01 RX ORDER — FENTANYL CITRATE 50 UG/ML
12.5 INJECTION INTRAVENOUS ONCE
Refills: 0 | Status: DISCONTINUED | OUTPATIENT
Start: 2021-01-01 | End: 2021-01-01

## 2021-01-01 RX ORDER — SODIUM,POTASSIUM PHOSPHATES 278-250MG
1 POWDER IN PACKET (EA) ORAL EVERY 4 HOURS
Refills: 0 | Status: COMPLETED | OUTPATIENT
Start: 2021-01-01 | End: 2021-01-01

## 2021-01-01 RX ORDER — VASOPRESSIN 20 [USP'U]/ML
0.02 INJECTION INTRAVENOUS
Qty: 50 | Refills: 0 | Status: DISCONTINUED | OUTPATIENT
Start: 2021-01-01 | End: 2021-01-01

## 2021-01-01 RX ORDER — FLUDROCORTISONE ACETATE 0.1 MG/1
0.1 TABLET ORAL DAILY
Refills: 0 | Status: DISCONTINUED | OUTPATIENT
Start: 2021-01-01 | End: 2021-01-01

## 2021-01-01 RX ORDER — QUETIAPINE FUMARATE 200 MG/1
87.5 TABLET, FILM COATED ORAL
Qty: 0 | Refills: 0 | DISCHARGE
Start: 2021-01-01

## 2021-01-01 RX ORDER — PHYTONADIONE (VIT K1) 5 MG
5 TABLET ORAL ONCE
Refills: 0 | Status: COMPLETED | OUTPATIENT
Start: 2021-01-01 | End: 2021-01-01

## 2021-01-01 RX ORDER — CEFAZOLIN SODIUM 1 G
1000 VIAL (EA) INJECTION ONCE
Refills: 0 | Status: COMPLETED | OUTPATIENT
Start: 2021-01-01 | End: 2021-01-01

## 2021-01-01 RX ORDER — CISATRACURIUM BESYLATE 2 MG/ML
10 INJECTION INTRAVENOUS ONCE
Refills: 0 | Status: COMPLETED | OUTPATIENT
Start: 2021-01-01 | End: 2021-01-01

## 2021-01-01 RX ORDER — POVIDONE-IODINE 5 %
1 AEROSOL (ML) TOPICAL ONCE
Refills: 0 | Status: DISCONTINUED | OUTPATIENT
Start: 2021-01-01 | End: 2021-01-01

## 2021-01-01 RX ORDER — QUETIAPINE FUMARATE 200 MG/1
3 TABLET, FILM COATED ORAL
Qty: 0 | Refills: 0 | DISCHARGE
Start: 2021-01-01

## 2021-01-01 RX ORDER — CHLORHEXIDINE GLUCONATE 213 G/1000ML
1 SOLUTION TOPICAL
Refills: 0 | Status: DISCONTINUED | OUTPATIENT
Start: 2021-01-01 | End: 2021-01-01

## 2021-01-01 RX ORDER — DEXTROSE 50 % IN WATER 50 %
15 SYRINGE (ML) INTRAVENOUS ONCE
Refills: 0 | Status: DISCONTINUED | OUTPATIENT
Start: 2021-01-01 | End: 2021-01-01

## 2021-01-01 RX ORDER — METHYLPHENIDATE HCL 5 MG
10 TABLET ORAL DAILY
Refills: 0 | Status: DISCONTINUED | OUTPATIENT
Start: 2021-01-01 | End: 2021-01-01

## 2021-01-01 RX ORDER — DEXAMETHASONE 0.5 MG/5ML
4 ELIXIR ORAL EVERY 12 HOURS
Refills: 0 | Status: DISCONTINUED | OUTPATIENT
Start: 2021-01-01 | End: 2021-01-01

## 2021-01-01 RX ORDER — MIDODRINE HYDROCHLORIDE 2.5 MG/1
5 TABLET ORAL
Refills: 0 | Status: DISCONTINUED | OUTPATIENT
Start: 2021-01-01 | End: 2021-01-01

## 2021-01-01 RX ORDER — SODIUM CHLORIDE 9 MG/ML
1000 INJECTION, SOLUTION INTRAVENOUS ONCE
Refills: 0 | Status: COMPLETED | OUTPATIENT
Start: 2021-01-01 | End: 2021-01-01

## 2021-01-01 RX ORDER — BROMOCRIPTINE MESYLATE 5 MG/1
10 CAPSULE ORAL EVERY 8 HOURS
Refills: 0 | Status: DISCONTINUED | OUTPATIENT
Start: 2021-01-01 | End: 2021-01-01

## 2021-01-01 RX ORDER — HYDROCORTISONE 1 %
1 OINTMENT (GRAM) TOPICAL EVERY 12 HOURS
Refills: 0 | Status: DISCONTINUED | OUTPATIENT
Start: 2021-01-01 | End: 2021-01-01

## 2021-01-01 RX ORDER — APIXABAN 2.5 MG/1
5 TABLET, FILM COATED ORAL EVERY 12 HOURS
Refills: 0 | Status: DISCONTINUED | OUTPATIENT
Start: 2021-01-01 | End: 2021-01-01

## 2021-01-01 RX ORDER — SODIUM CHLORIDE 9 MG/ML
500 INJECTION, SOLUTION INTRAVENOUS ONCE
Refills: 0 | Status: COMPLETED | OUTPATIENT
Start: 2021-01-01 | End: 2021-01-01

## 2021-01-01 RX ORDER — ACETAMINOPHEN 500 MG
1000 TABLET ORAL EVERY 6 HOURS
Refills: 0 | Status: COMPLETED | OUTPATIENT
Start: 2021-01-01 | End: 2021-01-01

## 2021-01-01 RX ORDER — HYDROCORTISONE 1 %
1 OINTMENT (GRAM) TOPICAL
Qty: 0 | Refills: 0 | DISCHARGE
Start: 2021-01-01

## 2021-01-01 RX ORDER — IPRATROPIUM/ALBUTEROL SULFATE 18-103MCG
3 AEROSOL WITH ADAPTER (GRAM) INHALATION
Qty: 0 | Refills: 0 | DISCHARGE
Start: 2021-01-01

## 2021-01-01 RX ORDER — BACITRACIN ZINC 500 UNIT/G
1 OINTMENT IN PACKET (EA) TOPICAL
Refills: 0 | Status: DISCONTINUED | OUTPATIENT
Start: 2021-01-01 | End: 2021-01-01

## 2021-01-01 RX ORDER — ENOXAPARIN SODIUM 100 MG/ML
60 INJECTION SUBCUTANEOUS EVERY 12 HOURS
Refills: 0 | Status: DISCONTINUED | OUTPATIENT
Start: 2021-01-01 | End: 2021-01-01

## 2021-01-01 RX ORDER — QUETIAPINE FUMARATE 200 MG/1
75 TABLET, FILM COATED ORAL EVERY 24 HOURS
Refills: 0 | Status: DISCONTINUED | OUTPATIENT
Start: 2021-01-01 | End: 2021-01-01

## 2021-01-01 RX ORDER — DIAZEPAM 5 MG
3 TABLET ORAL ONCE
Refills: 0 | Status: DISCONTINUED | OUTPATIENT
Start: 2021-01-01 | End: 2021-01-01

## 2021-01-01 RX ORDER — BACITRACIN ZINC 500 UNIT/G
1 OINTMENT IN PACKET (EA) TOPICAL
Qty: 0 | Refills: 0 | DISCHARGE
Start: 2021-01-01

## 2021-01-01 RX ORDER — ONDANSETRON 8 MG/1
4 TABLET, FILM COATED ORAL ONCE
Refills: 0 | Status: COMPLETED | OUTPATIENT
Start: 2021-01-01 | End: 2021-01-01

## 2021-01-01 RX ORDER — PROPOFOL 10 MG/ML
10 INJECTION, EMULSION INTRAVENOUS
Qty: 1000 | Refills: 0 | Status: DISCONTINUED | OUTPATIENT
Start: 2021-01-01 | End: 2021-01-01

## 2021-01-01 RX ORDER — SODIUM CHLORIDE 9 MG/ML
1000 INJECTION, SOLUTION INTRAVENOUS
Refills: 0 | Status: COMPLETED | OUTPATIENT
Start: 2021-01-01 | End: 2021-01-01

## 2021-01-01 RX ORDER — CEFAZOLIN SODIUM 1 G
1000 VIAL (EA) INJECTION ONCE
Refills: 0 | Status: DISCONTINUED | OUTPATIENT
Start: 2021-01-01 | End: 2021-01-01

## 2021-01-01 RX ORDER — METHOCARBAMOL 500 MG/1
1 TABLET, FILM COATED ORAL
Qty: 0 | Refills: 0 | DISCHARGE
Start: 2021-01-01

## 2021-01-01 RX ORDER — ACETAMINOPHEN 500 MG
650 TABLET ORAL EVERY 6 HOURS
Refills: 0 | Status: DISCONTINUED | OUTPATIENT
Start: 2021-01-01 | End: 2021-01-01

## 2021-01-01 RX ORDER — MULTIVIT-MIN/FERROUS GLUCONATE 9 MG/15 ML
15 LIQUID (ML) ORAL DAILY
Refills: 0 | Status: DISCONTINUED | OUTPATIENT
Start: 2021-01-01 | End: 2021-01-01

## 2021-01-01 RX ORDER — IRON SUCROSE 20 MG/ML
300 INJECTION, SOLUTION INTRAVENOUS EVERY 24 HOURS
Refills: 0 | Status: COMPLETED | OUTPATIENT
Start: 2021-01-01 | End: 2021-01-01

## 2021-01-01 RX ORDER — FENTANYL CITRATE 50 UG/ML
12.5 INJECTION INTRAVENOUS
Refills: 0 | Status: DISCONTINUED | OUTPATIENT
Start: 2021-01-01 | End: 2021-01-01

## 2021-01-01 RX ORDER — DEXTROSE 50 % IN WATER 50 %
12.5 SYRINGE (ML) INTRAVENOUS ONCE
Refills: 0 | Status: DISCONTINUED | OUTPATIENT
Start: 2021-01-01 | End: 2021-01-01

## 2021-01-01 RX ORDER — ENOXAPARIN SODIUM 100 MG/ML
40 INJECTION SUBCUTANEOUS
Refills: 0 | Status: DISCONTINUED | OUTPATIENT
Start: 2021-01-01 | End: 2021-01-01

## 2021-01-01 RX ORDER — POTASSIUM CHLORIDE 20 MEQ
10 PACKET (EA) ORAL
Refills: 0 | Status: COMPLETED | OUTPATIENT
Start: 2021-01-01 | End: 2021-01-01

## 2021-01-01 RX ORDER — CEFAZOLIN SODIUM 1 G
2000 VIAL (EA) INJECTION EVERY 8 HOURS
Refills: 0 | Status: COMPLETED | OUTPATIENT
Start: 2021-01-01 | End: 2021-01-01

## 2021-01-01 RX ORDER — ENOXAPARIN SODIUM 100 MG/ML
40 INJECTION SUBCUTANEOUS ONCE
Refills: 0 | Status: DISCONTINUED | OUTPATIENT
Start: 2021-01-01 | End: 2021-01-01

## 2021-01-01 RX ORDER — SODIUM CHLORIDE 9 MG/ML
4 INJECTION INTRAMUSCULAR; INTRAVENOUS; SUBCUTANEOUS EVERY 6 HOURS
Refills: 0 | Status: DISCONTINUED | OUTPATIENT
Start: 2021-01-01 | End: 2021-01-01

## 2021-01-01 RX ORDER — SODIUM,POTASSIUM PHOSPHATES 278-250MG
1 POWDER IN PACKET (EA) ORAL EVERY 6 HOURS
Refills: 0 | Status: DISCONTINUED | OUTPATIENT
Start: 2021-01-01 | End: 2021-01-01

## 2021-01-01 RX ORDER — SODIUM CHLORIDE 9 MG/ML
1500 INJECTION INTRAMUSCULAR; INTRAVENOUS; SUBCUTANEOUS ONCE
Refills: 0 | Status: COMPLETED | OUTPATIENT
Start: 2021-01-01 | End: 2021-01-01

## 2021-01-01 RX ORDER — VALPROIC ACID (AS SODIUM SALT) 250 MG/5ML
500 SOLUTION, ORAL ORAL EVERY 8 HOURS
Refills: 0 | Status: DISCONTINUED | OUTPATIENT
Start: 2021-01-01 | End: 2021-01-01

## 2021-01-01 RX ORDER — MIDODRINE HYDROCHLORIDE 2.5 MG/1
2.5 TABLET ORAL ONCE
Refills: 0 | Status: COMPLETED | OUTPATIENT
Start: 2021-01-01 | End: 2021-01-01

## 2021-01-01 RX ORDER — LACOSAMIDE 50 MG/1
100 TABLET ORAL
Qty: 0 | Refills: 0 | DISCHARGE
Start: 2021-01-01

## 2021-01-01 RX ORDER — ACETYLCYSTEINE 200 MG/ML
4 VIAL (ML) MISCELLANEOUS
Qty: 0 | Refills: 0 | DISCHARGE
Start: 2021-01-01

## 2021-01-01 RX ORDER — SODIUM,POTASSIUM PHOSPHATES 278-250MG
1 POWDER IN PACKET (EA) ORAL ONCE
Refills: 0 | Status: COMPLETED | OUTPATIENT
Start: 2021-01-01 | End: 2021-01-01

## 2021-01-01 RX ORDER — DEXTROSE 50 % IN WATER 50 %
12.5 SYRINGE (ML) INTRAVENOUS ONCE
Refills: 0 | Status: COMPLETED | OUTPATIENT
Start: 2021-01-01 | End: 2021-01-01

## 2021-01-01 RX ORDER — HALOPERIDOL DECANOATE 100 MG/ML
1 INJECTION INTRAMUSCULAR ONCE
Refills: 0 | Status: COMPLETED | OUTPATIENT
Start: 2021-01-01 | End: 2021-01-01

## 2021-01-01 RX ORDER — POTASSIUM CHLORIDE 20 MEQ
40 PACKET (EA) ORAL EVERY 12 HOURS
Refills: 0 | Status: COMPLETED | OUTPATIENT
Start: 2021-01-01 | End: 2021-01-01

## 2021-01-01 RX ORDER — CEFTRIAXONE 500 MG/1
2000 INJECTION, POWDER, FOR SOLUTION INTRAMUSCULAR; INTRAVENOUS EVERY 24 HOURS
Refills: 0 | Status: DISCONTINUED | OUTPATIENT
Start: 2021-01-01 | End: 2021-01-01

## 2021-01-01 RX ORDER — POTASSIUM CHLORIDE 20 MEQ
20 PACKET (EA) ORAL
Refills: 0 | Status: DISCONTINUED | OUTPATIENT
Start: 2021-01-01 | End: 2021-01-01

## 2021-01-01 RX ORDER — BROMOCRIPTINE MESYLATE 5 MG/1
15 CAPSULE ORAL EVERY 8 HOURS
Refills: 0 | Status: DISCONTINUED | OUTPATIENT
Start: 2021-01-01 | End: 2021-01-01

## 2021-01-01 RX ORDER — SENNA PLUS 8.6 MG/1
2 TABLET ORAL AT BEDTIME
Refills: 0 | Status: DISCONTINUED | OUTPATIENT
Start: 2021-01-01 | End: 2021-01-01

## 2021-01-01 RX ORDER — SODIUM CHLORIDE 9 MG/ML
500 INJECTION INTRAMUSCULAR; INTRAVENOUS; SUBCUTANEOUS ONCE
Refills: 0 | Status: DISCONTINUED | OUTPATIENT
Start: 2021-01-01 | End: 2021-01-01

## 2021-01-01 RX ORDER — METOCLOPRAMIDE HCL 10 MG
10 TABLET ORAL EVERY 6 HOURS
Refills: 0 | Status: COMPLETED | OUTPATIENT
Start: 2021-01-01 | End: 2021-01-01

## 2021-01-01 RX ORDER — POLYETHYLENE GLYCOL 3350 17 G/17G
17 POWDER, FOR SOLUTION ORAL
Qty: 0 | Refills: 0 | DISCHARGE
Start: 2021-01-01

## 2021-01-01 RX ORDER — QUETIAPINE FUMARATE 200 MG/1
87.5 TABLET, FILM COATED ORAL EVERY 24 HOURS
Refills: 0 | Status: DISCONTINUED | OUTPATIENT
Start: 2021-01-01 | End: 2021-01-01

## 2021-01-01 RX ORDER — PETROLATUM,WHITE
1 JELLY (GRAM) TOPICAL
Qty: 0 | Refills: 0 | DISCHARGE
Start: 2021-01-01

## 2021-01-01 RX ORDER — ONDANSETRON 8 MG/1
4 TABLET, FILM COATED ORAL EVERY 6 HOURS
Refills: 0 | Status: DISCONTINUED | OUTPATIENT
Start: 2021-01-01 | End: 2021-01-01

## 2021-01-01 RX ORDER — SODIUM CHLORIDE 9 MG/ML
2000 INJECTION INTRAMUSCULAR; INTRAVENOUS; SUBCUTANEOUS ONCE
Refills: 0 | Status: DISCONTINUED | OUTPATIENT
Start: 2021-01-01 | End: 2021-01-01

## 2021-01-01 RX ORDER — DEXAMETHASONE 0.5 MG/5ML
6 ELIXIR ORAL EVERY 6 HOURS
Refills: 0 | Status: DISCONTINUED | OUTPATIENT
Start: 2021-01-01 | End: 2021-01-01

## 2021-01-01 RX ORDER — MANNITOL
50 POWDER (GRAM) MISCELLANEOUS ONCE
Refills: 0 | Status: COMPLETED | OUTPATIENT
Start: 2021-01-01 | End: 2021-01-01

## 2021-01-01 RX ORDER — DEXMEDETOMIDINE HYDROCHLORIDE IN 0.9% SODIUM CHLORIDE 4 UG/ML
0.2 INJECTION INTRAVENOUS
Qty: 400 | Refills: 0 | Status: DISCONTINUED | OUTPATIENT
Start: 2021-01-01 | End: 2021-01-01

## 2021-01-01 RX ORDER — SODIUM CHLORIDE 9 MG/ML
30 INJECTION INTRAMUSCULAR; INTRAVENOUS; SUBCUTANEOUS ONCE
Refills: 0 | Status: COMPLETED | OUTPATIENT
Start: 2021-01-01 | End: 2021-01-01

## 2021-01-01 RX ORDER — METOCLOPRAMIDE HCL 10 MG
5 TABLET ORAL EVERY 6 HOURS
Refills: 0 | Status: COMPLETED | OUTPATIENT
Start: 2021-01-01 | End: 2021-01-01

## 2021-01-01 RX ORDER — SODIUM CHLORIDE 9 MG/ML
1000 INJECTION INTRAMUSCULAR; INTRAVENOUS; SUBCUTANEOUS
Refills: 0 | Status: COMPLETED | OUTPATIENT
Start: 2021-01-01 | End: 2021-01-01

## 2021-01-01 RX ORDER — ACETYLCYSTEINE 200 MG/ML
4 VIAL (ML) MISCELLANEOUS EVERY 6 HOURS
Refills: 0 | Status: DISCONTINUED | OUTPATIENT
Start: 2021-01-01 | End: 2021-01-01

## 2021-01-01 RX ORDER — ACETAMINOPHEN 500 MG
2 TABLET ORAL
Qty: 0 | Refills: 0 | DISCHARGE
Start: 2021-01-01

## 2021-01-01 RX ORDER — MAGNESIUM SULFATE 500 MG/ML
2 VIAL (ML) INJECTION
Refills: 0 | Status: COMPLETED | OUTPATIENT
Start: 2021-01-01 | End: 2021-01-01

## 2021-01-01 RX ORDER — LACOSAMIDE 50 MG/1
100 TABLET ORAL EVERY 12 HOURS
Refills: 0 | Status: DISCONTINUED | OUTPATIENT
Start: 2021-01-01 | End: 2021-01-01

## 2021-01-01 RX ORDER — FUROSEMIDE 40 MG
20 TABLET ORAL ONCE
Refills: 0 | Status: COMPLETED | OUTPATIENT
Start: 2021-01-01 | End: 2021-01-01

## 2021-01-01 RX ORDER — ENOXAPARIN SODIUM 100 MG/ML
60 INJECTION SUBCUTANEOUS ONCE
Refills: 0 | Status: COMPLETED | OUTPATIENT
Start: 2021-01-01 | End: 2021-01-01

## 2021-01-01 RX ORDER — ASPIRIN/CALCIUM CARB/MAGNESIUM 324 MG
81 TABLET ORAL DAILY
Refills: 0 | Status: DISCONTINUED | OUTPATIENT
Start: 2021-01-01 | End: 2021-01-01

## 2021-01-01 RX ORDER — ERGOCALCIFEROL 1.25 MG/1
50000 CAPSULE ORAL
Qty: 0 | Refills: 0 | DISCHARGE
Start: 2021-01-01

## 2021-01-01 RX ORDER — MIDODRINE HYDROCHLORIDE 2.5 MG/1
5 TABLET ORAL ONCE
Refills: 0 | Status: COMPLETED | OUTPATIENT
Start: 2021-01-01 | End: 2021-01-01

## 2021-01-01 RX ORDER — CEFAZOLIN SODIUM 1 G
VIAL (EA) INJECTION
Refills: 0 | Status: DISCONTINUED | OUTPATIENT
Start: 2021-01-01 | End: 2021-01-01

## 2021-01-01 RX ORDER — LEVETIRACETAM 250 MG/1
1000 TABLET, FILM COATED ORAL EVERY 12 HOURS
Refills: 0 | Status: DISCONTINUED | OUTPATIENT
Start: 2021-01-01 | End: 2021-01-01

## 2021-01-01 RX ORDER — POTASSIUM CHLORIDE 20 MEQ
20 PACKET (EA) ORAL ONCE
Refills: 0 | Status: DISCONTINUED | OUTPATIENT
Start: 2021-01-01 | End: 2021-01-01

## 2021-01-01 RX ORDER — PROPOFOL 10 MG/ML
100 INJECTION, EMULSION INTRAVENOUS ONCE
Refills: 0 | Status: COMPLETED | OUTPATIENT
Start: 2021-01-01 | End: 2021-01-01

## 2021-01-01 RX ORDER — SODIUM CHLORIDE 9 MG/ML
30 INJECTION INTRAMUSCULAR; INTRAVENOUS; SUBCUTANEOUS ONCE
Refills: 0 | Status: DISCONTINUED | OUTPATIENT
Start: 2021-01-01 | End: 2021-01-01

## 2021-01-01 RX ORDER — QUETIAPINE FUMARATE 200 MG/1
50 TABLET, FILM COATED ORAL DAILY
Refills: 0 | Status: DISCONTINUED | OUTPATIENT
Start: 2021-01-01 | End: 2021-01-01

## 2021-01-01 RX ORDER — NIMODIPINE 60 MG/10ML
60 SOLUTION ORAL EVERY 4 HOURS
Refills: 0 | Status: DISCONTINUED | OUTPATIENT
Start: 2021-01-01 | End: 2021-01-01

## 2021-01-01 RX ORDER — ENOXAPARIN SODIUM 100 MG/ML
100 INJECTION SUBCUTANEOUS EVERY 12 HOURS
Refills: 0 | Status: DISCONTINUED | OUTPATIENT
Start: 2021-01-01 | End: 2021-01-01

## 2021-01-01 RX ORDER — POTASSIUM CHLORIDE 20 MEQ
1 PACKET (EA) ORAL EVERY 12 HOURS
Refills: 0 | Status: DISCONTINUED | OUTPATIENT
Start: 2021-01-01 | End: 2021-01-01

## 2021-01-01 RX ORDER — INSULIN LISPRO 100/ML
VIAL (ML) SUBCUTANEOUS
Refills: 0 | Status: DISCONTINUED | OUTPATIENT
Start: 2021-01-01 | End: 2021-01-01

## 2021-01-01 RX ORDER — VALPROIC ACID (AS SODIUM SALT) 250 MG/5ML
750 SOLUTION, ORAL ORAL EVERY 8 HOURS
Refills: 0 | Status: DISCONTINUED | OUTPATIENT
Start: 2021-01-01 | End: 2021-01-01

## 2021-01-01 RX ORDER — NOREPINEPHRINE BITARTRATE/D5W 8 MG/250ML
0.05 PLASTIC BAG, INJECTION (ML) INTRAVENOUS
Qty: 8 | Refills: 0 | Status: DISCONTINUED | OUTPATIENT
Start: 2021-01-01 | End: 2021-01-01

## 2021-01-01 RX ORDER — HYDROMORPHONE HYDROCHLORIDE 2 MG/ML
0.25 INJECTION INTRAMUSCULAR; INTRAVENOUS; SUBCUTANEOUS ONCE
Refills: 0 | Status: DISCONTINUED | OUTPATIENT
Start: 2021-01-01 | End: 2021-01-01

## 2021-01-01 RX ORDER — AMANTADINE HCL 100 MG
100 CAPSULE ORAL EVERY 12 HOURS
Refills: 0 | Status: DISCONTINUED | OUTPATIENT
Start: 2021-01-01 | End: 2021-01-01

## 2021-01-01 RX ORDER — ACETAMINOPHEN 500 MG
1000 TABLET ORAL ONCE
Refills: 0 | Status: COMPLETED | OUTPATIENT
Start: 2021-01-01 | End: 2021-01-01

## 2021-01-01 RX ORDER — CEFTRIAXONE 500 MG/1
1000 INJECTION, POWDER, FOR SOLUTION INTRAMUSCULAR; INTRAVENOUS EVERY 12 HOURS
Refills: 0 | Status: DISCONTINUED | OUTPATIENT
Start: 2021-01-01 | End: 2021-01-01

## 2021-01-01 RX ORDER — QUETIAPINE FUMARATE 200 MG/1
50 TABLET, FILM COATED ORAL AT BEDTIME
Refills: 0 | Status: DISCONTINUED | OUTPATIENT
Start: 2021-01-01 | End: 2021-01-01

## 2021-01-01 RX ORDER — SODIUM CHLORIDE 9 MG/ML
2 INJECTION INTRAMUSCULAR; INTRAVENOUS; SUBCUTANEOUS EVERY 8 HOURS
Refills: 0 | Status: DISCONTINUED | OUTPATIENT
Start: 2021-01-01 | End: 2021-01-01

## 2021-01-01 RX ORDER — ACETYLCYSTEINE 200 MG/ML
4 VIAL (ML) MISCELLANEOUS EVERY 12 HOURS
Refills: 0 | Status: DISCONTINUED | OUTPATIENT
Start: 2021-01-01 | End: 2021-01-01

## 2021-01-01 RX ORDER — LANOLIN ALCOHOL/MO/W.PET/CERES
1 CREAM (GRAM) TOPICAL
Qty: 0 | Refills: 0 | DISCHARGE
Start: 2021-01-01

## 2021-01-01 RX ORDER — ENOXAPARIN SODIUM 100 MG/ML
40 INJECTION SUBCUTANEOUS EVERY 12 HOURS
Refills: 0 | Status: DISCONTINUED | OUTPATIENT
Start: 2021-01-01 | End: 2021-01-01

## 2021-01-01 RX ORDER — QUETIAPINE FUMARATE 200 MG/1
50 TABLET, FILM COATED ORAL EVERY 24 HOURS
Refills: 0 | Status: DISCONTINUED | OUTPATIENT
Start: 2021-01-01 | End: 2021-01-01

## 2021-01-01 RX ORDER — GLUCAGON INJECTION, SOLUTION 0.5 MG/.1ML
1 INJECTION, SOLUTION SUBCUTANEOUS ONCE
Refills: 0 | Status: DISCONTINUED | OUTPATIENT
Start: 2021-01-01 | End: 2021-01-01

## 2021-01-01 RX ORDER — SODIUM CHLORIDE 9 MG/ML
500 INJECTION, SOLUTION INTRAVENOUS
Refills: 0 | Status: COMPLETED | OUTPATIENT
Start: 2021-01-01 | End: 2021-01-01

## 2021-01-01 RX ORDER — POLYETHYLENE GLYCOL 3350 17 G/17G
17 POWDER, FOR SOLUTION ORAL DAILY
Refills: 0 | Status: DISCONTINUED | OUTPATIENT
Start: 2021-01-01 | End: 2021-01-01

## 2021-01-01 RX ORDER — DIAZEPAM 5 MG
2 TABLET ORAL ONCE
Refills: 0 | Status: DISCONTINUED | OUTPATIENT
Start: 2021-01-01 | End: 2021-01-01

## 2021-01-01 RX ORDER — PIPERACILLIN AND TAZOBACTAM 4; .5 G/20ML; G/20ML
3.38 INJECTION, POWDER, LYOPHILIZED, FOR SOLUTION INTRAVENOUS EVERY 6 HOURS
Refills: 0 | Status: DISCONTINUED | OUTPATIENT
Start: 2021-01-01 | End: 2021-01-01

## 2021-01-01 RX ORDER — SODIUM,POTASSIUM PHOSPHATES 278-250MG
1 POWDER IN PACKET (EA) ORAL ONCE
Refills: 0 | Status: DISCONTINUED | OUTPATIENT
Start: 2021-01-01 | End: 2021-01-01

## 2021-01-01 RX ORDER — EPINEPHRINE 11.25MG/ML
0.5 SOLUTION, NON-ORAL INHALATION ONCE
Refills: 0 | Status: COMPLETED | OUTPATIENT
Start: 2021-01-01 | End: 2021-01-01

## 2021-01-01 RX ORDER — LACOSAMIDE 50 MG/1
10 TABLET ORAL
Qty: 0 | Refills: 0 | DISCHARGE
Start: 2021-01-01

## 2021-01-01 RX ORDER — ENOXAPARIN SODIUM 100 MG/ML
40 INJECTION SUBCUTANEOUS AT BEDTIME
Refills: 0 | Status: DISCONTINUED | OUTPATIENT
Start: 2021-01-01 | End: 2021-01-01

## 2021-01-01 RX ORDER — ENOXAPARIN SODIUM 100 MG/ML
90 INJECTION SUBCUTANEOUS EVERY 12 HOURS
Refills: 0 | Status: DISCONTINUED | OUTPATIENT
Start: 2021-01-01 | End: 2021-01-01

## 2021-01-01 RX ORDER — PETROLATUM,WHITE
1 JELLY (GRAM) TOPICAL
Refills: 0 | Status: DISCONTINUED | OUTPATIENT
Start: 2021-01-01 | End: 2021-01-01

## 2021-01-01 RX ORDER — PANTOPRAZOLE SODIUM 20 MG/1
40 TABLET, DELAYED RELEASE ORAL
Qty: 0 | Refills: 0 | DISCHARGE
Start: 2021-01-01

## 2021-01-01 RX ORDER — PHYTONADIONE (VIT K1) 5 MG
5 TABLET ORAL ONCE
Refills: 0 | Status: DISCONTINUED | OUTPATIENT
Start: 2021-01-01 | End: 2021-01-01

## 2021-01-01 RX ORDER — AMANTADINE HCL 100 MG
200 CAPSULE ORAL EVERY 12 HOURS
Refills: 0 | Status: DISCONTINUED | OUTPATIENT
Start: 2021-01-01 | End: 2021-01-01

## 2021-01-01 RX ORDER — SODIUM CHLORIDE 9 MG/ML
3 INJECTION INTRAMUSCULAR; INTRAVENOUS; SUBCUTANEOUS EVERY 6 HOURS
Refills: 0 | Status: COMPLETED | OUTPATIENT
Start: 2021-01-01 | End: 2021-01-01

## 2021-01-01 RX ORDER — MANNITOL
50 POWDER (GRAM) MISCELLANEOUS ONCE
Refills: 0 | Status: DISCONTINUED | OUTPATIENT
Start: 2021-01-01 | End: 2021-01-01

## 2021-01-01 RX ORDER — METHOCARBAMOL 500 MG/1
500 TABLET, FILM COATED ORAL THREE TIMES A DAY
Refills: 0 | Status: DISCONTINUED | OUTPATIENT
Start: 2021-01-01 | End: 2021-01-01

## 2021-01-01 RX ORDER — QUETIAPINE FUMARATE 200 MG/1
25 TABLET, FILM COATED ORAL ONCE
Refills: 0 | Status: DISCONTINUED | OUTPATIENT
Start: 2021-01-01 | End: 2021-01-01

## 2021-01-01 RX ORDER — QUETIAPINE FUMARATE 200 MG/1
25 TABLET, FILM COATED ORAL AT BEDTIME
Refills: 0 | Status: COMPLETED | OUTPATIENT
Start: 2021-01-01 | End: 2021-01-01

## 2021-01-01 RX ORDER — METOCLOPRAMIDE HCL 10 MG
10 TABLET ORAL ONCE
Refills: 0 | Status: COMPLETED | OUTPATIENT
Start: 2021-01-01 | End: 2021-01-01

## 2021-01-01 RX ORDER — QUETIAPINE FUMARATE 200 MG/1
75 TABLET, FILM COATED ORAL
Refills: 0 | Status: DISCONTINUED | OUTPATIENT
Start: 2021-01-01 | End: 2021-01-01

## 2021-01-01 RX ORDER — VALPROIC ACID (AS SODIUM SALT) 250 MG/5ML
1000 SOLUTION, ORAL ORAL EVERY 8 HOURS
Refills: 0 | Status: DISCONTINUED | OUTPATIENT
Start: 2021-01-01 | End: 2021-01-01

## 2021-01-01 RX ORDER — MAGNESIUM SULFATE 500 MG/ML
1 VIAL (ML) INJECTION ONCE
Refills: 0 | Status: COMPLETED | OUTPATIENT
Start: 2021-01-01 | End: 2021-01-01

## 2021-01-01 RX ORDER — DEXAMETHASONE 0.5 MG/5ML
10 ELIXIR ORAL ONCE
Refills: 0 | Status: DISCONTINUED | OUTPATIENT
Start: 2021-01-01 | End: 2021-01-01

## 2021-01-01 RX ORDER — PROPOFOL 10 MG/ML
200 INJECTION, EMULSION INTRAVENOUS ONCE
Refills: 0 | Status: DISCONTINUED | OUTPATIENT
Start: 2021-01-01 | End: 2021-01-01

## 2021-01-01 RX ORDER — CEFAZOLIN SODIUM 1 G
VIAL (EA) INJECTION
Refills: 0 | Status: COMPLETED | OUTPATIENT
Start: 2021-01-01 | End: 2021-01-01

## 2021-01-01 RX ORDER — ERGOCALCIFEROL 1.25 MG/1
50000 CAPSULE ORAL
Refills: 0 | Status: DISCONTINUED | OUTPATIENT
Start: 2021-01-01 | End: 2021-01-01

## 2021-01-01 RX ORDER — NYSTATIN 500MM UNIT
500000 POWDER (EA) MISCELLANEOUS
Refills: 0 | Status: DISCONTINUED | OUTPATIENT
Start: 2021-01-01 | End: 2021-01-01

## 2021-01-01 RX ORDER — FLUDROCORTISONE ACETATE 0.1 MG/1
0.2 TABLET ORAL EVERY 12 HOURS
Refills: 0 | Status: DISCONTINUED | OUTPATIENT
Start: 2021-01-01 | End: 2021-01-01

## 2021-01-01 RX ORDER — SODIUM CHLORIDE 9 MG/ML
10 INJECTION INTRAMUSCULAR; INTRAVENOUS; SUBCUTANEOUS
Refills: 0 | Status: DISCONTINUED | OUTPATIENT
Start: 2021-01-01 | End: 2021-01-01

## 2021-01-01 RX ORDER — MAGNESIUM OXIDE 400 MG ORAL TABLET 241.3 MG
400 TABLET ORAL ONCE
Refills: 0 | Status: COMPLETED | OUTPATIENT
Start: 2021-01-01 | End: 2021-01-01

## 2021-01-01 RX ORDER — LABETALOL HCL 100 MG
10 TABLET ORAL
Refills: 0 | Status: DISCONTINUED | OUTPATIENT
Start: 2021-01-01 | End: 2021-01-01

## 2021-01-01 RX ORDER — PIPERACILLIN AND TAZOBACTAM 4; .5 G/20ML; G/20ML
3.38 INJECTION, POWDER, LYOPHILIZED, FOR SOLUTION INTRAVENOUS ONCE
Refills: 0 | Status: COMPLETED | OUTPATIENT
Start: 2021-01-01 | End: 2021-01-01

## 2021-01-01 RX ORDER — DEXAMETHASONE 0.5 MG/5ML
10 ELIXIR ORAL ONCE
Refills: 0 | Status: COMPLETED | OUTPATIENT
Start: 2021-01-01 | End: 2021-01-01

## 2021-01-01 RX ORDER — ACETYLCYSTEINE 200 MG/ML
4 VIAL (ML) MISCELLANEOUS THREE TIMES A DAY
Refills: 0 | Status: COMPLETED | OUTPATIENT
Start: 2021-01-01 | End: 2021-01-01

## 2021-01-01 RX ORDER — POTASSIUM CHLORIDE 20 MEQ
40 PACKET (EA) ORAL EVERY 4 HOURS
Refills: 0 | Status: DISCONTINUED | OUTPATIENT
Start: 2021-01-01 | End: 2021-01-01

## 2021-01-01 RX ORDER — MULTIVIT WITH MIN/MFOLATE/K2 340-15/3 G
1 POWDER (GRAM) ORAL ONCE
Refills: 0 | Status: DISCONTINUED | OUTPATIENT
Start: 2021-01-01 | End: 2021-01-01

## 2021-01-01 RX ORDER — CEFAZOLIN SODIUM 1 G
2000 VIAL (EA) INJECTION ONCE
Refills: 0 | Status: COMPLETED | OUTPATIENT
Start: 2021-01-01 | End: 2021-01-01

## 2021-01-01 RX ORDER — VALPROIC ACID (AS SODIUM SALT) 250 MG/5ML
2000 SOLUTION, ORAL ORAL ONCE
Refills: 0 | Status: COMPLETED | OUTPATIENT
Start: 2021-01-01 | End: 2021-01-01

## 2021-01-01 RX ORDER — FENTANYL CITRATE 50 UG/ML
100 INJECTION INTRAVENOUS ONCE
Refills: 0 | Status: DISCONTINUED | OUTPATIENT
Start: 2021-01-01 | End: 2021-01-01

## 2021-01-01 RX ORDER — FUROSEMIDE 40 MG
10 TABLET ORAL ONCE
Refills: 0 | Status: COMPLETED | OUTPATIENT
Start: 2021-01-01 | End: 2021-01-01

## 2021-01-01 RX ORDER — POVIDONE-IODINE 5 %
1 AEROSOL (ML) TOPICAL ONCE
Refills: 0 | Status: COMPLETED | OUTPATIENT
Start: 2021-01-01 | End: 2021-01-01

## 2021-01-01 RX ORDER — SENNA PLUS 8.6 MG/1
2 TABLET ORAL
Qty: 0 | Refills: 0 | DISCHARGE
Start: 2021-01-01

## 2021-01-01 RX ORDER — SODIUM CHLORIDE 5 G/100ML
250 INJECTION, SOLUTION INTRAVENOUS
Refills: 0 | Status: COMPLETED | OUTPATIENT
Start: 2021-01-01 | End: 2021-01-01

## 2021-01-01 RX ORDER — TRAMADOL HYDROCHLORIDE 50 MG/1
25 TABLET ORAL EVERY 6 HOURS
Refills: 0 | Status: DISCONTINUED | OUTPATIENT
Start: 2021-01-01 | End: 2021-01-01

## 2021-01-01 RX ORDER — ENOXAPARIN SODIUM 100 MG/ML
100 INJECTION SUBCUTANEOUS
Qty: 0 | Refills: 0 | DISCHARGE
Start: 2021-01-01

## 2021-01-01 RX ADMIN — Medication 5 MILLIGRAM(S): at 06:04

## 2021-01-01 RX ADMIN — ENOXAPARIN SODIUM 40 MILLIGRAM(S): 100 INJECTION SUBCUTANEOUS at 22:26

## 2021-01-01 RX ADMIN — Medication 1 APPLICATION(S): at 05:21

## 2021-01-01 RX ADMIN — Medication 4 MILLILITER(S): at 18:58

## 2021-01-01 RX ADMIN — Medication 3 MILLILITER(S): at 17:40

## 2021-01-01 RX ADMIN — Medication 1 APPLICATION(S): at 17:24

## 2021-01-01 RX ADMIN — Medication 4.79 MICROGRAM(S)/KG/MIN: at 14:00

## 2021-01-01 RX ADMIN — Medication 3 MILLILITER(S): at 06:06

## 2021-01-01 RX ADMIN — Medication 27.5 MILLIGRAM(S): at 19:01

## 2021-01-01 RX ADMIN — QUETIAPINE FUMARATE 50 MILLIGRAM(S): 200 TABLET, FILM COATED ORAL at 06:05

## 2021-01-01 RX ADMIN — FENTANYL CITRATE 12.5 MICROGRAM(S): 50 INJECTION INTRAVENOUS at 03:00

## 2021-01-01 RX ADMIN — PIPERACILLIN AND TAZOBACTAM 200 GRAM(S): 4; .5 INJECTION, POWDER, LYOPHILIZED, FOR SOLUTION INTRAVENOUS at 17:27

## 2021-01-01 RX ADMIN — Medication 100 MILLIEQUIVALENT(S): at 09:03

## 2021-01-01 RX ADMIN — POLYETHYLENE GLYCOL 3350 17 GRAM(S): 17 POWDER, FOR SOLUTION ORAL at 17:37

## 2021-01-01 RX ADMIN — ENOXAPARIN SODIUM 40 MILLIGRAM(S): 100 INJECTION SUBCUTANEOUS at 21:12

## 2021-01-01 RX ADMIN — CHLORHEXIDINE GLUCONATE 1 APPLICATION(S): 213 SOLUTION TOPICAL at 05:37

## 2021-01-01 RX ADMIN — HYDROMORPHONE HYDROCHLORIDE 0.25 MILLIGRAM(S): 2 INJECTION INTRAMUSCULAR; INTRAVENOUS; SUBCUTANEOUS at 09:38

## 2021-01-01 RX ADMIN — Medication 40 MILLIEQUIVALENT(S): at 17:08

## 2021-01-01 RX ADMIN — Medication 1000 MILLIGRAM(S): at 11:41

## 2021-01-01 RX ADMIN — SODIUM CHLORIDE 3 MILLILITER(S): 9 INJECTION INTRAMUSCULAR; INTRAVENOUS; SUBCUTANEOUS at 00:51

## 2021-01-01 RX ADMIN — Medication 2: at 23:18

## 2021-01-01 RX ADMIN — POTASSIUM PHOSPHATE, MONOBASIC POTASSIUM PHOSPHATE, DIBASIC 62.5 MILLIMOLE(S): 236; 224 INJECTION, SOLUTION INTRAVENOUS at 16:57

## 2021-01-01 RX ADMIN — DEXMEDETOMIDINE HYDROCHLORIDE IN 0.9% SODIUM CHLORIDE 4.91 MICROGRAM(S)/KG/HR: 4 INJECTION INTRAVENOUS at 02:53

## 2021-01-01 RX ADMIN — FENTANYL CITRATE 12.5 MICROGRAM(S): 50 INJECTION INTRAVENOUS at 09:35

## 2021-01-01 RX ADMIN — LACOSAMIDE 100 MILLIGRAM(S): 50 TABLET ORAL at 06:39

## 2021-01-01 RX ADMIN — BROMOCRIPTINE MESYLATE 15 MILLIGRAM(S): 5 CAPSULE ORAL at 05:46

## 2021-01-01 RX ADMIN — Medication 4 MILLILITER(S): at 09:14

## 2021-01-01 RX ADMIN — Medication 1000 MILLIGRAM(S): at 17:30

## 2021-01-01 RX ADMIN — Medication 650 MILLIGRAM(S): at 12:44

## 2021-01-01 RX ADMIN — Medication 27.5 MILLIGRAM(S): at 22:59

## 2021-01-01 RX ADMIN — SODIUM CHLORIDE 4 MILLILITER(S): 9 INJECTION INTRAMUSCULAR; INTRAVENOUS; SUBCUTANEOUS at 12:42

## 2021-01-01 RX ADMIN — FENTANYL CITRATE 25 MICROGRAM(S): 50 INJECTION INTRAVENOUS at 10:30

## 2021-01-01 RX ADMIN — Medication 4 MILLILITER(S): at 06:18

## 2021-01-01 RX ADMIN — Medication 3 MILLILITER(S): at 21:25

## 2021-01-01 RX ADMIN — MIDODRINE HYDROCHLORIDE 5 MILLIGRAM(S): 2.5 TABLET ORAL at 06:05

## 2021-01-01 RX ADMIN — APIXABAN 5 MILLIGRAM(S): 2.5 TABLET, FILM COATED ORAL at 21:19

## 2021-01-01 RX ADMIN — Medication 650 MILLIGRAM(S): at 13:37

## 2021-01-01 RX ADMIN — LACOSAMIDE 100 MILLIGRAM(S): 50 TABLET ORAL at 05:32

## 2021-01-01 RX ADMIN — PROPOFOL 6.13 MICROGRAM(S)/KG/MIN: 10 INJECTION, EMULSION INTRAVENOUS at 08:01

## 2021-01-01 RX ADMIN — PANTOPRAZOLE SODIUM 40 MILLIGRAM(S): 20 TABLET, DELAYED RELEASE ORAL at 13:10

## 2021-01-01 RX ADMIN — ENOXAPARIN SODIUM 100 MILLIGRAM(S): 100 INJECTION SUBCUTANEOUS at 17:16

## 2021-01-01 RX ADMIN — ENOXAPARIN SODIUM 60 MILLIGRAM(S): 100 INJECTION SUBCUTANEOUS at 06:17

## 2021-01-01 RX ADMIN — Medication 3 MILLILITER(S): at 09:12

## 2021-01-01 RX ADMIN — LACOSAMIDE 100 MILLIGRAM(S): 50 TABLET ORAL at 19:35

## 2021-01-01 RX ADMIN — Medication 1 APPLICATION(S): at 17:17

## 2021-01-01 RX ADMIN — LACOSAMIDE 100 MILLIGRAM(S): 50 TABLET ORAL at 05:37

## 2021-01-01 RX ADMIN — Medication 27.5 MILLIGRAM(S): at 13:39

## 2021-01-01 RX ADMIN — Medication 4 MILLILITER(S): at 11:26

## 2021-01-01 RX ADMIN — LACOSAMIDE 100 MILLIGRAM(S): 50 TABLET ORAL at 17:44

## 2021-01-01 RX ADMIN — SODIUM CHLORIDE 4 MILLILITER(S): 9 INJECTION INTRAMUSCULAR; INTRAVENOUS; SUBCUTANEOUS at 17:03

## 2021-01-01 RX ADMIN — Medication 4 MILLILITER(S): at 06:05

## 2021-01-01 RX ADMIN — FENTANYL CITRATE 25 MICROGRAM(S): 50 INJECTION INTRAVENOUS at 14:18

## 2021-01-01 RX ADMIN — FENTANYL CITRATE 12.5 MICROGRAM(S): 50 INJECTION INTRAVENOUS at 11:55

## 2021-01-01 RX ADMIN — BROMOCRIPTINE MESYLATE 5 MILLIGRAM(S): 5 CAPSULE ORAL at 11:25

## 2021-01-01 RX ADMIN — Medication 4 MILLILITER(S): at 05:32

## 2021-01-01 RX ADMIN — NIMODIPINE 60 MILLIGRAM(S): 60 SOLUTION ORAL at 00:18

## 2021-01-01 RX ADMIN — Medication 10 MILLIGRAM(S): at 12:09

## 2021-01-01 RX ADMIN — Medication 1 CAPSULE(S): at 13:22

## 2021-01-01 RX ADMIN — PANTOPRAZOLE SODIUM 40 MILLIGRAM(S): 20 TABLET, DELAYED RELEASE ORAL at 12:40

## 2021-01-01 RX ADMIN — Medication 125 MILLILITER(S): at 13:29

## 2021-01-01 RX ADMIN — Medication 100 MILLIEQUIVALENT(S): at 13:10

## 2021-01-01 RX ADMIN — DEXMEDETOMIDINE HYDROCHLORIDE IN 0.9% SODIUM CHLORIDE 5.1 MICROGRAM(S)/KG/HR: 4 INJECTION INTRAVENOUS at 13:08

## 2021-01-01 RX ADMIN — Medication 3 MILLILITER(S): at 17:20

## 2021-01-01 RX ADMIN — CHLORHEXIDINE GLUCONATE 1 APPLICATION(S): 213 SOLUTION TOPICAL at 06:42

## 2021-01-01 RX ADMIN — PROPOFOL 6.13 MICROGRAM(S)/KG/MIN: 10 INJECTION, EMULSION INTRAVENOUS at 06:53

## 2021-01-01 RX ADMIN — POLYETHYLENE GLYCOL 3350 17 GRAM(S): 17 POWDER, FOR SOLUTION ORAL at 12:14

## 2021-01-01 RX ADMIN — ENOXAPARIN SODIUM 90 MILLIGRAM(S): 100 INJECTION SUBCUTANEOUS at 06:21

## 2021-01-01 RX ADMIN — SODIUM CHLORIDE 2 GRAM(S): 9 INJECTION INTRAMUSCULAR; INTRAVENOUS; SUBCUTANEOUS at 06:42

## 2021-01-01 RX ADMIN — Medication 650 MILLIGRAM(S): at 08:57

## 2021-01-01 RX ADMIN — PIPERACILLIN AND TAZOBACTAM 200 GRAM(S): 4; .5 INJECTION, POWDER, LYOPHILIZED, FOR SOLUTION INTRAVENOUS at 23:12

## 2021-01-01 RX ADMIN — Medication 20 MILLIEQUIVALENT(S): at 13:39

## 2021-01-01 RX ADMIN — Medication 650 MILLIGRAM(S): at 16:30

## 2021-01-01 RX ADMIN — Medication 40 MILLIEQUIVALENT(S): at 09:39

## 2021-01-01 RX ADMIN — Medication 200 MILLIGRAM(S): at 06:18

## 2021-01-01 RX ADMIN — Medication 3 MILLILITER(S): at 02:50

## 2021-01-01 RX ADMIN — BROMOCRIPTINE MESYLATE 15 MILLIGRAM(S): 5 CAPSULE ORAL at 13:33

## 2021-01-01 RX ADMIN — Medication 20 MILLIGRAM(S): at 09:39

## 2021-01-01 RX ADMIN — Medication 300 MILLIGRAM(S): at 19:46

## 2021-01-01 RX ADMIN — SENNA PLUS 2 TABLET(S): 8.6 TABLET ORAL at 21:33

## 2021-01-01 RX ADMIN — LACOSAMIDE 100 MILLIGRAM(S): 50 TABLET ORAL at 06:06

## 2021-01-01 RX ADMIN — LACOSAMIDE 100 MILLIGRAM(S): 50 TABLET ORAL at 18:18

## 2021-01-01 RX ADMIN — SODIUM CHLORIDE 1000 MILLILITER(S): 9 INJECTION INTRAMUSCULAR; INTRAVENOUS; SUBCUTANEOUS at 16:45

## 2021-01-01 RX ADMIN — Medication 100 MILLIGRAM(S): at 17:23

## 2021-01-01 RX ADMIN — FENTANYL CITRATE 25 MICROGRAM(S): 50 INJECTION INTRAVENOUS at 11:30

## 2021-01-01 RX ADMIN — Medication 500000 UNIT(S): at 18:33

## 2021-01-01 RX ADMIN — BROMOCRIPTINE MESYLATE 5 MILLIGRAM(S): 5 CAPSULE ORAL at 06:02

## 2021-01-01 RX ADMIN — POLYETHYLENE GLYCOL 3350 17 GRAM(S): 17 POWDER, FOR SOLUTION ORAL at 14:55

## 2021-01-01 RX ADMIN — NIMODIPINE 60 MILLIGRAM(S): 60 SOLUTION ORAL at 20:02

## 2021-01-01 RX ADMIN — Medication 10 MILLIGRAM(S): at 11:33

## 2021-01-01 RX ADMIN — Medication 3 MILLILITER(S): at 10:40

## 2021-01-01 RX ADMIN — Medication 500000 UNIT(S): at 21:45

## 2021-01-01 RX ADMIN — QUETIAPINE FUMARATE 50 MILLIGRAM(S): 200 TABLET, FILM COATED ORAL at 06:41

## 2021-01-01 RX ADMIN — Medication 3 MILLILITER(S): at 11:42

## 2021-01-01 RX ADMIN — LACOSAMIDE 100 MILLIGRAM(S): 50 TABLET ORAL at 05:21

## 2021-01-01 RX ADMIN — CHLORHEXIDINE GLUCONATE 15 MILLILITER(S): 213 SOLUTION TOPICAL at 05:19

## 2021-01-01 RX ADMIN — MIDODRINE HYDROCHLORIDE 5 MILLIGRAM(S): 2.5 TABLET ORAL at 17:03

## 2021-01-01 RX ADMIN — MIDODRINE HYDROCHLORIDE 10 MILLIGRAM(S): 2.5 TABLET ORAL at 05:25

## 2021-01-01 RX ADMIN — POLYETHYLENE GLYCOL 3350 17 GRAM(S): 17 POWDER, FOR SOLUTION ORAL at 11:04

## 2021-01-01 RX ADMIN — Medication 650 MILLIGRAM(S): at 00:42

## 2021-01-01 RX ADMIN — Medication 27.5 MILLIGRAM(S): at 06:33

## 2021-01-01 RX ADMIN — NIMODIPINE 60 MILLIGRAM(S): 60 SOLUTION ORAL at 01:02

## 2021-01-01 RX ADMIN — Medication 20 MILLIEQUIVALENT(S): at 08:21

## 2021-01-01 RX ADMIN — FENTANYL CITRATE 12.5 MICROGRAM(S): 50 INJECTION INTRAVENOUS at 02:17

## 2021-01-01 RX ADMIN — Medication 100 MILLIGRAM(S): at 02:13

## 2021-01-01 RX ADMIN — Medication 40 MILLIEQUIVALENT(S): at 18:36

## 2021-01-01 RX ADMIN — ENOXAPARIN SODIUM 40 MILLIGRAM(S): 100 INJECTION SUBCUTANEOUS at 23:00

## 2021-01-01 RX ADMIN — Medication 1 APPLICATION(S): at 05:00

## 2021-01-01 RX ADMIN — Medication 4 MILLILITER(S): at 00:07

## 2021-01-01 RX ADMIN — POTASSIUM PHOSPHATE, MONOBASIC POTASSIUM PHOSPHATE, DIBASIC 83.33 MILLIMOLE(S): 236; 224 INJECTION, SOLUTION INTRAVENOUS at 08:50

## 2021-01-01 RX ADMIN — Medication 27.5 MILLIGRAM(S): at 14:06

## 2021-01-01 RX ADMIN — MIDODRINE HYDROCHLORIDE 10 MILLIGRAM(S): 2.5 TABLET ORAL at 13:10

## 2021-01-01 RX ADMIN — QUETIAPINE FUMARATE 50 MILLIGRAM(S): 200 TABLET, FILM COATED ORAL at 06:00

## 2021-01-01 RX ADMIN — BROMOCRIPTINE MESYLATE 15 MILLIGRAM(S): 5 CAPSULE ORAL at 22:06

## 2021-01-01 RX ADMIN — PANTOPRAZOLE SODIUM 40 MILLIGRAM(S): 20 TABLET, DELAYED RELEASE ORAL at 07:17

## 2021-01-01 RX ADMIN — Medication 100 MILLIEQUIVALENT(S): at 09:57

## 2021-01-01 RX ADMIN — BROMOCRIPTINE MESYLATE 5 MILLIGRAM(S): 5 CAPSULE ORAL at 21:47

## 2021-01-01 RX ADMIN — Medication 4 MILLILITER(S): at 09:12

## 2021-01-01 RX ADMIN — MIDODRINE HYDROCHLORIDE 5 MILLIGRAM(S): 2.5 TABLET ORAL at 17:58

## 2021-01-01 RX ADMIN — Medication 125 MILLILITER(S): at 11:20

## 2021-01-01 RX ADMIN — CHLORHEXIDINE GLUCONATE 15 MILLILITER(S): 213 SOLUTION TOPICAL at 06:25

## 2021-01-01 RX ADMIN — FENTANYL CITRATE 12.5 MICROGRAM(S): 50 INJECTION INTRAVENOUS at 12:15

## 2021-01-01 RX ADMIN — Medication 4 MILLILITER(S): at 14:30

## 2021-01-01 RX ADMIN — ONDANSETRON 4 MILLIGRAM(S): 8 TABLET, FILM COATED ORAL at 10:50

## 2021-01-01 RX ADMIN — Medication 15 MILLILITER(S): at 12:13

## 2021-01-01 RX ADMIN — Medication 4 MILLILITER(S): at 08:04

## 2021-01-01 RX ADMIN — FLUDROCORTISONE ACETATE 0.1 MILLIGRAM(S): 0.1 TABLET ORAL at 05:04

## 2021-01-01 RX ADMIN — SODIUM CHLORIDE 1000 MILLILITER(S): 9 INJECTION INTRAMUSCULAR; INTRAVENOUS; SUBCUTANEOUS at 22:36

## 2021-01-01 RX ADMIN — Medication 81 MILLIGRAM(S): at 12:34

## 2021-01-01 RX ADMIN — NIMODIPINE 60 MILLIGRAM(S): 60 SOLUTION ORAL at 06:34

## 2021-01-01 RX ADMIN — SODIUM CHLORIDE 75 MILLILITER(S): 9 INJECTION INTRAMUSCULAR; INTRAVENOUS; SUBCUTANEOUS at 00:14

## 2021-01-01 RX ADMIN — CEFTRIAXONE 100 MILLIGRAM(S): 500 INJECTION, POWDER, FOR SOLUTION INTRAMUSCULAR; INTRAVENOUS at 22:55

## 2021-01-01 RX ADMIN — Medication 500000 UNIT(S): at 06:57

## 2021-01-01 RX ADMIN — ENOXAPARIN SODIUM 100 MILLIGRAM(S): 100 INJECTION SUBCUTANEOUS at 05:00

## 2021-01-01 RX ADMIN — LACOSAMIDE 100 MILLIGRAM(S): 50 TABLET ORAL at 19:17

## 2021-01-01 RX ADMIN — SODIUM CHLORIDE 75 MILLILITER(S): 9 INJECTION INTRAMUSCULAR; INTRAVENOUS; SUBCUTANEOUS at 00:05

## 2021-01-01 RX ADMIN — CHLORHEXIDINE GLUCONATE 1 APPLICATION(S): 213 SOLUTION TOPICAL at 05:18

## 2021-01-01 RX ADMIN — Medication 3 MILLIGRAM(S): at 00:11

## 2021-01-01 RX ADMIN — Medication 100 MILLIGRAM(S): at 05:37

## 2021-01-01 RX ADMIN — ENOXAPARIN SODIUM 40 MILLIGRAM(S): 100 INJECTION SUBCUTANEOUS at 11:08

## 2021-01-01 RX ADMIN — FENTANYL CITRATE 25 MICROGRAM(S): 50 INJECTION INTRAVENOUS at 10:33

## 2021-01-01 RX ADMIN — Medication 1000 MILLIGRAM(S): at 00:33

## 2021-01-01 RX ADMIN — POLYETHYLENE GLYCOL 3350 17 GRAM(S): 17 POWDER, FOR SOLUTION ORAL at 12:35

## 2021-01-01 RX ADMIN — CHLORHEXIDINE GLUCONATE 1 APPLICATION(S): 213 SOLUTION TOPICAL at 06:19

## 2021-01-01 RX ADMIN — Medication 1000 MILLIGRAM(S): at 18:15

## 2021-01-01 RX ADMIN — POTASSIUM PHOSPHATE, MONOBASIC POTASSIUM PHOSPHATE, DIBASIC 83.33 MILLIMOLE(S): 236; 224 INJECTION, SOLUTION INTRAVENOUS at 09:35

## 2021-01-01 RX ADMIN — Medication 3 MILLILITER(S): at 14:40

## 2021-01-01 RX ADMIN — Medication 81 MILLIGRAM(S): at 11:29

## 2021-01-01 RX ADMIN — Medication 0.5 MILLIGRAM(S): at 20:15

## 2021-01-01 RX ADMIN — Medication 5 MILLIGRAM(S): at 02:14

## 2021-01-01 RX ADMIN — SENNA PLUS 2 TABLET(S): 8.6 TABLET ORAL at 21:43

## 2021-01-01 RX ADMIN — Medication 3 MILLILITER(S): at 21:41

## 2021-01-01 RX ADMIN — PANTOPRAZOLE SODIUM 40 MILLIGRAM(S): 20 TABLET, DELAYED RELEASE ORAL at 11:26

## 2021-01-01 RX ADMIN — Medication 1000 MILLIGRAM(S): at 22:08

## 2021-01-01 RX ADMIN — Medication 40 MILLIEQUIVALENT(S): at 11:53

## 2021-01-01 RX ADMIN — Medication 100 GRAM(S): at 09:29

## 2021-01-01 RX ADMIN — Medication 1 APPLICATION(S): at 17:48

## 2021-01-01 RX ADMIN — Medication 20 MILLIEQUIVALENT(S): at 11:03

## 2021-01-01 RX ADMIN — Medication 50 GRAM(S): at 05:26

## 2021-01-01 RX ADMIN — QUETIAPINE FUMARATE 75 MILLIGRAM(S): 200 TABLET, FILM COATED ORAL at 17:17

## 2021-01-01 RX ADMIN — LACOSAMIDE 100 MILLIGRAM(S): 50 TABLET ORAL at 17:22

## 2021-01-01 RX ADMIN — Medication 3 MILLILITER(S): at 05:18

## 2021-01-01 RX ADMIN — Medication 40 MILLIEQUIVALENT(S): at 07:37

## 2021-01-01 RX ADMIN — Medication 100 GRAM(S): at 09:18

## 2021-01-01 RX ADMIN — BROMOCRIPTINE MESYLATE 5 MILLIGRAM(S): 5 CAPSULE ORAL at 13:44

## 2021-01-01 RX ADMIN — BROMOCRIPTINE MESYLATE 5 MILLIGRAM(S): 5 CAPSULE ORAL at 14:07

## 2021-01-01 RX ADMIN — CHLORHEXIDINE GLUCONATE 1 APPLICATION(S): 213 SOLUTION TOPICAL at 06:27

## 2021-01-01 RX ADMIN — Medication 650 MILLIGRAM(S): at 22:30

## 2021-01-01 RX ADMIN — BROMOCRIPTINE MESYLATE 5 MILLIGRAM(S): 5 CAPSULE ORAL at 14:39

## 2021-01-01 RX ADMIN — Medication 4 MILLILITER(S): at 18:19

## 2021-01-01 RX ADMIN — Medication 1 APPLICATION(S): at 19:43

## 2021-01-01 RX ADMIN — Medication 81 MILLIGRAM(S): at 13:09

## 2021-01-01 RX ADMIN — Medication 3 MILLILITER(S): at 10:36

## 2021-01-01 RX ADMIN — Medication 40 MILLIEQUIVALENT(S): at 06:05

## 2021-01-01 RX ADMIN — Medication 5 MILLIGRAM(S): at 09:28

## 2021-01-01 RX ADMIN — Medication 60 MILLIGRAM(S): at 15:18

## 2021-01-01 RX ADMIN — Medication 3 MILLILITER(S): at 10:19

## 2021-01-01 RX ADMIN — Medication 1 APPLICATION(S): at 05:09

## 2021-01-01 RX ADMIN — SENNA PLUS 2 TABLET(S): 8.6 TABLET ORAL at 21:19

## 2021-01-01 RX ADMIN — CHLORHEXIDINE GLUCONATE 15 MILLILITER(S): 213 SOLUTION TOPICAL at 06:17

## 2021-01-01 RX ADMIN — Medication 4 MILLILITER(S): at 04:26

## 2021-01-01 RX ADMIN — BROMOCRIPTINE MESYLATE 15 MILLIGRAM(S): 5 CAPSULE ORAL at 13:28

## 2021-01-01 RX ADMIN — Medication 100 MILLIGRAM(S): at 06:10

## 2021-01-01 RX ADMIN — Medication 200 MILLIGRAM(S): at 17:10

## 2021-01-01 RX ADMIN — Medication 15 MILLILITER(S): at 11:29

## 2021-01-01 RX ADMIN — CHLORHEXIDINE GLUCONATE 15 MILLILITER(S): 213 SOLUTION TOPICAL at 18:54

## 2021-01-01 RX ADMIN — BROMOCRIPTINE MESYLATE 15 MILLIGRAM(S): 5 CAPSULE ORAL at 17:00

## 2021-01-01 RX ADMIN — Medication 400 MILLIGRAM(S): at 01:45

## 2021-01-01 RX ADMIN — SODIUM CHLORIDE 4 MILLILITER(S): 9 INJECTION INTRAMUSCULAR; INTRAVENOUS; SUBCUTANEOUS at 16:19

## 2021-01-01 RX ADMIN — PANTOPRAZOLE SODIUM 40 MILLIGRAM(S): 20 TABLET, DELAYED RELEASE ORAL at 05:50

## 2021-01-01 RX ADMIN — LACOSAMIDE 100 MILLIGRAM(S): 50 TABLET ORAL at 17:01

## 2021-01-01 RX ADMIN — LACOSAMIDE 100 MILLIGRAM(S): 50 TABLET ORAL at 17:40

## 2021-01-01 RX ADMIN — SODIUM CHLORIDE 4 MILLILITER(S): 9 INJECTION INTRAMUSCULAR; INTRAVENOUS; SUBCUTANEOUS at 11:05

## 2021-01-01 RX ADMIN — Medication 40 MILLIEQUIVALENT(S): at 09:55

## 2021-01-01 RX ADMIN — PANTOPRAZOLE SODIUM 40 MILLIGRAM(S): 20 TABLET, DELAYED RELEASE ORAL at 07:44

## 2021-01-01 RX ADMIN — ENOXAPARIN SODIUM 100 MILLIGRAM(S): 100 INJECTION SUBCUTANEOUS at 17:44

## 2021-01-01 RX ADMIN — ENOXAPARIN SODIUM 100 MILLIGRAM(S): 100 INJECTION SUBCUTANEOUS at 06:37

## 2021-01-01 RX ADMIN — Medication 27.5 MILLIGRAM(S): at 06:15

## 2021-01-01 RX ADMIN — SODIUM CHLORIDE 4 MILLILITER(S): 9 INJECTION INTRAMUSCULAR; INTRAVENOUS; SUBCUTANEOUS at 22:27

## 2021-01-01 RX ADMIN — SODIUM CHLORIDE 3 GRAM(S): 9 INJECTION INTRAMUSCULAR; INTRAVENOUS; SUBCUTANEOUS at 05:51

## 2021-01-01 RX ADMIN — Medication 3 MILLILITER(S): at 19:49

## 2021-01-01 RX ADMIN — Medication 100 GRAM(S): at 10:18

## 2021-01-01 RX ADMIN — Medication 3 MILLILITER(S): at 09:13

## 2021-01-01 RX ADMIN — Medication 50 MILLIEQUIVALENT(S): at 11:32

## 2021-01-01 RX ADMIN — Medication 4 MILLILITER(S): at 21:25

## 2021-01-01 RX ADMIN — Medication 81 MILLIGRAM(S): at 12:56

## 2021-01-01 RX ADMIN — Medication 3 MILLILITER(S): at 21:43

## 2021-01-01 RX ADMIN — Medication 1 APPLICATION(S): at 05:03

## 2021-01-01 RX ADMIN — Medication 3 MILLILITER(S): at 00:10

## 2021-01-01 RX ADMIN — Medication 81 MILLIGRAM(S): at 11:28

## 2021-01-01 RX ADMIN — PANTOPRAZOLE SODIUM 40 MILLIGRAM(S): 20 TABLET, DELAYED RELEASE ORAL at 11:12

## 2021-01-01 RX ADMIN — Medication 27.5 MILLIGRAM(S): at 23:12

## 2021-01-01 RX ADMIN — SENNA PLUS 2 TABLET(S): 8.6 TABLET ORAL at 21:12

## 2021-01-01 RX ADMIN — BROMOCRIPTINE MESYLATE 15 MILLIGRAM(S): 5 CAPSULE ORAL at 05:28

## 2021-01-01 RX ADMIN — LACOSAMIDE 100 MILLIGRAM(S): 50 TABLET ORAL at 05:34

## 2021-01-01 RX ADMIN — Medication 3 MILLILITER(S): at 18:45

## 2021-01-01 RX ADMIN — Medication 81 MILLIGRAM(S): at 11:32

## 2021-01-01 RX ADMIN — DEXMEDETOMIDINE HYDROCHLORIDE IN 0.9% SODIUM CHLORIDE 5.1 MICROGRAM(S)/KG/HR: 4 INJECTION INTRAVENOUS at 07:48

## 2021-01-01 RX ADMIN — Medication 27.5 MILLIGRAM(S): at 21:45

## 2021-01-01 RX ADMIN — PANTOPRAZOLE SODIUM 40 MILLIGRAM(S): 20 TABLET, DELAYED RELEASE ORAL at 11:28

## 2021-01-01 RX ADMIN — Medication 40 MILLIEQUIVALENT(S): at 21:29

## 2021-01-01 RX ADMIN — SODIUM CHLORIDE 100 MILLILITER(S): 9 INJECTION, SOLUTION INTRAVENOUS at 11:35

## 2021-01-01 RX ADMIN — Medication 500000 UNIT(S): at 17:31

## 2021-01-01 RX ADMIN — SODIUM CHLORIDE 4 MILLILITER(S): 9 INJECTION INTRAMUSCULAR; INTRAVENOUS; SUBCUTANEOUS at 06:30

## 2021-01-01 RX ADMIN — POLYETHYLENE GLYCOL 3350 17 GRAM(S): 17 POWDER, FOR SOLUTION ORAL at 12:02

## 2021-01-01 RX ADMIN — Medication 4.6 MICROGRAM(S)/KG/MIN: at 16:44

## 2021-01-01 RX ADMIN — Medication 3 MILLILITER(S): at 22:17

## 2021-01-01 RX ADMIN — QUETIAPINE FUMARATE 87.5 MILLIGRAM(S): 200 TABLET, FILM COATED ORAL at 16:46

## 2021-01-01 RX ADMIN — FLUDROCORTISONE ACETATE 0.2 MILLIGRAM(S): 0.1 TABLET ORAL at 19:45

## 2021-01-01 RX ADMIN — SODIUM CHLORIDE 1000 MILLILITER(S): 9 INJECTION INTRAMUSCULAR; INTRAVENOUS; SUBCUTANEOUS at 13:04

## 2021-01-01 RX ADMIN — Medication 27.5 MILLIGRAM(S): at 05:34

## 2021-01-01 RX ADMIN — Medication 650 MILLIGRAM(S): at 08:11

## 2021-01-01 RX ADMIN — PANTOPRAZOLE SODIUM 40 MILLIGRAM(S): 20 TABLET, DELAYED RELEASE ORAL at 11:32

## 2021-01-01 RX ADMIN — Medication 1 APPLICATION(S): at 06:06

## 2021-01-01 RX ADMIN — SODIUM CHLORIDE 4 MILLILITER(S): 9 INJECTION INTRAMUSCULAR; INTRAVENOUS; SUBCUTANEOUS at 16:09

## 2021-01-01 RX ADMIN — Medication 1 APPLICATION(S): at 17:46

## 2021-01-01 RX ADMIN — Medication 500000 UNIT(S): at 18:54

## 2021-01-01 RX ADMIN — Medication 10 MILLIGRAM(S): at 11:27

## 2021-01-01 RX ADMIN — Medication 3 MILLILITER(S): at 09:14

## 2021-01-01 RX ADMIN — QUETIAPINE FUMARATE 50 MILLIGRAM(S): 200 TABLET, FILM COATED ORAL at 06:07

## 2021-01-01 RX ADMIN — Medication 500000 UNIT(S): at 17:10

## 2021-01-01 RX ADMIN — SODIUM CHLORIDE 1000 MILLILITER(S): 9 INJECTION INTRAMUSCULAR; INTRAVENOUS; SUBCUTANEOUS at 07:26

## 2021-01-01 RX ADMIN — Medication 500000 UNIT(S): at 18:18

## 2021-01-01 RX ADMIN — CHLORHEXIDINE GLUCONATE 15 MILLILITER(S): 213 SOLUTION TOPICAL at 05:07

## 2021-01-01 RX ADMIN — BROMOCRIPTINE MESYLATE 15 MILLIGRAM(S): 5 CAPSULE ORAL at 21:03

## 2021-01-01 RX ADMIN — Medication 4 MILLILITER(S): at 06:29

## 2021-01-01 RX ADMIN — Medication 1 APPLICATION(S): at 15:05

## 2021-01-01 RX ADMIN — Medication 4 MILLIGRAM(S): at 10:42

## 2021-01-01 RX ADMIN — SODIUM CHLORIDE 4 MILLILITER(S): 9 INJECTION INTRAMUSCULAR; INTRAVENOUS; SUBCUTANEOUS at 23:28

## 2021-01-01 RX ADMIN — MIDODRINE HYDROCHLORIDE 2.5 MILLIGRAM(S): 2.5 TABLET ORAL at 05:15

## 2021-01-01 RX ADMIN — MIDODRINE HYDROCHLORIDE 5 MILLIGRAM(S): 2.5 TABLET ORAL at 17:17

## 2021-01-01 RX ADMIN — POLYETHYLENE GLYCOL 3350 17 GRAM(S): 17 POWDER, FOR SOLUTION ORAL at 17:50

## 2021-01-01 RX ADMIN — Medication 100 MILLIGRAM(S): at 09:39

## 2021-01-01 RX ADMIN — ENOXAPARIN SODIUM 40 MILLIGRAM(S): 100 INJECTION SUBCUTANEOUS at 10:42

## 2021-01-01 RX ADMIN — PIPERACILLIN AND TAZOBACTAM 200 GRAM(S): 4; .5 INJECTION, POWDER, LYOPHILIZED, FOR SOLUTION INTRAVENOUS at 11:29

## 2021-01-01 RX ADMIN — POLYETHYLENE GLYCOL 3350 17 GRAM(S): 17 POWDER, FOR SOLUTION ORAL at 12:23

## 2021-01-01 RX ADMIN — FENTANYL CITRATE 25 MICROGRAM(S): 50 INJECTION INTRAVENOUS at 13:17

## 2021-01-01 RX ADMIN — Medication 20 MILLIGRAM(S): at 13:09

## 2021-01-01 RX ADMIN — Medication 4 MILLILITER(S): at 13:41

## 2021-01-01 RX ADMIN — ENOXAPARIN SODIUM 40 MILLIGRAM(S): 100 INJECTION SUBCUTANEOUS at 09:45

## 2021-01-01 RX ADMIN — BROMOCRIPTINE MESYLATE 15 MILLIGRAM(S): 5 CAPSULE ORAL at 05:26

## 2021-01-01 RX ADMIN — Medication 15 MILLILITER(S): at 13:55

## 2021-01-01 RX ADMIN — Medication 30 MILLIGRAM(S): at 05:01

## 2021-01-01 RX ADMIN — QUETIAPINE FUMARATE 87.5 MILLIGRAM(S): 200 TABLET, FILM COATED ORAL at 17:42

## 2021-01-01 RX ADMIN — MIDODRINE HYDROCHLORIDE 5 MILLIGRAM(S): 2.5 TABLET ORAL at 05:46

## 2021-01-01 RX ADMIN — BROMOCRIPTINE MESYLATE 15 MILLIGRAM(S): 5 CAPSULE ORAL at 21:42

## 2021-01-01 RX ADMIN — QUETIAPINE FUMARATE 50 MILLIGRAM(S): 200 TABLET, FILM COATED ORAL at 18:11

## 2021-01-01 RX ADMIN — POTASSIUM PHOSPHATE, MONOBASIC POTASSIUM PHOSPHATE, DIBASIC 62.5 MILLIMOLE(S): 236; 224 INJECTION, SOLUTION INTRAVENOUS at 06:34

## 2021-01-01 RX ADMIN — Medication 50 MILLIEQUIVALENT(S): at 13:27

## 2021-01-01 RX ADMIN — APIXABAN 5 MILLIGRAM(S): 2.5 TABLET, FILM COATED ORAL at 22:17

## 2021-01-01 RX ADMIN — PROPOFOL 100 MILLIGRAM(S): 10 INJECTION, EMULSION INTRAVENOUS at 12:15

## 2021-01-01 RX ADMIN — Medication 81 MILLIGRAM(S): at 11:25

## 2021-01-01 RX ADMIN — POTASSIUM PHOSPHATE, MONOBASIC POTASSIUM PHOSPHATE, DIBASIC 62.5 MILLIMOLE(S): 236; 224 INJECTION, SOLUTION INTRAVENOUS at 11:34

## 2021-01-01 RX ADMIN — HALOPERIDOL DECANOATE 1 MILLIGRAM(S): 100 INJECTION INTRAMUSCULAR at 02:09

## 2021-01-01 RX ADMIN — Medication 1000 MILLIGRAM(S): at 18:02

## 2021-01-01 RX ADMIN — SODIUM CHLORIDE 4 MILLILITER(S): 9 INJECTION INTRAMUSCULAR; INTRAVENOUS; SUBCUTANEOUS at 15:45

## 2021-01-01 RX ADMIN — Medication 100 MILLIGRAM(S): at 21:55

## 2021-01-01 RX ADMIN — SODIUM CHLORIDE 1000 MILLILITER(S): 9 INJECTION, SOLUTION INTRAVENOUS at 07:18

## 2021-01-01 RX ADMIN — Medication 4 MILLILITER(S): at 16:09

## 2021-01-01 RX ADMIN — PIPERACILLIN AND TAZOBACTAM 200 GRAM(S): 4; .5 INJECTION, POWDER, LYOPHILIZED, FOR SOLUTION INTRAVENOUS at 06:42

## 2021-01-01 RX ADMIN — SODIUM CHLORIDE 30 MILLILITER(S): 9 INJECTION INTRAMUSCULAR; INTRAVENOUS; SUBCUTANEOUS at 11:00

## 2021-01-01 RX ADMIN — MIDODRINE HYDROCHLORIDE 5 MILLIGRAM(S): 2.5 TABLET ORAL at 17:01

## 2021-01-01 RX ADMIN — BROMOCRIPTINE MESYLATE 15 MILLIGRAM(S): 5 CAPSULE ORAL at 22:59

## 2021-01-01 RX ADMIN — FENTANYL CITRATE 12.5 MICROGRAM(S): 50 INJECTION INTRAVENOUS at 10:58

## 2021-01-01 RX ADMIN — Medication 1000 MILLIGRAM(S): at 11:00

## 2021-01-01 RX ADMIN — Medication 81 MILLIGRAM(S): at 11:03

## 2021-01-01 RX ADMIN — PANTOPRAZOLE SODIUM 40 MILLIGRAM(S): 20 TABLET, DELAYED RELEASE ORAL at 12:23

## 2021-01-01 RX ADMIN — IRON SUCROSE 176.67 MILLIGRAM(S): 20 INJECTION, SOLUTION INTRAVENOUS at 21:38

## 2021-01-01 RX ADMIN — Medication 40 MILLIEQUIVALENT(S): at 17:36

## 2021-01-01 RX ADMIN — Medication 15 MILLILITER(S): at 12:24

## 2021-01-01 RX ADMIN — NIMODIPINE 60 MILLIGRAM(S): 60 SOLUTION ORAL at 12:19

## 2021-01-01 RX ADMIN — Medication 4 MILLILITER(S): at 09:08

## 2021-01-01 RX ADMIN — ENOXAPARIN SODIUM 40 MILLIGRAM(S): 100 INJECTION SUBCUTANEOUS at 22:06

## 2021-01-01 RX ADMIN — CHLORHEXIDINE GLUCONATE 1 APPLICATION(S): 213 SOLUTION TOPICAL at 06:11

## 2021-01-01 RX ADMIN — BROMOCRIPTINE MESYLATE 5 MILLIGRAM(S): 5 CAPSULE ORAL at 05:22

## 2021-01-01 RX ADMIN — Medication 40 MILLIEQUIVALENT(S): at 09:23

## 2021-01-01 RX ADMIN — CHLORHEXIDINE GLUCONATE 1 APPLICATION(S): 213 SOLUTION TOPICAL at 05:20

## 2021-01-01 RX ADMIN — CHLORHEXIDINE GLUCONATE 15 MILLILITER(S): 213 SOLUTION TOPICAL at 17:23

## 2021-01-01 RX ADMIN — SODIUM CHLORIDE 1000 MILLILITER(S): 9 INJECTION, SOLUTION INTRAVENOUS at 13:31

## 2021-01-01 RX ADMIN — CHLORHEXIDINE GLUCONATE 1 APPLICATION(S): 213 SOLUTION TOPICAL at 07:42

## 2021-01-01 RX ADMIN — BROMOCRIPTINE MESYLATE 15 MILLIGRAM(S): 5 CAPSULE ORAL at 14:55

## 2021-01-01 RX ADMIN — Medication 62.5 MILLIMOLE(S): at 09:51

## 2021-01-01 RX ADMIN — Medication 81 MILLIGRAM(S): at 11:23

## 2021-01-01 RX ADMIN — PANTOPRAZOLE SODIUM 40 MILLIGRAM(S): 20 TABLET, DELAYED RELEASE ORAL at 12:35

## 2021-01-01 RX ADMIN — Medication 500000 UNIT(S): at 05:21

## 2021-01-01 RX ADMIN — Medication 5 MILLIGRAM(S): at 18:58

## 2021-01-01 RX ADMIN — Medication 4 MILLILITER(S): at 15:08

## 2021-01-01 RX ADMIN — Medication 81 MILLIGRAM(S): at 11:27

## 2021-01-01 RX ADMIN — Medication 1 APPLICATION(S): at 19:17

## 2021-01-01 RX ADMIN — Medication 3 MILLILITER(S): at 23:11

## 2021-01-01 RX ADMIN — BROMOCRIPTINE MESYLATE 5 MILLIGRAM(S): 5 CAPSULE ORAL at 22:06

## 2021-01-01 RX ADMIN — BROMOCRIPTINE MESYLATE 15 MILLIGRAM(S): 5 CAPSULE ORAL at 15:27

## 2021-01-01 RX ADMIN — Medication 20 MILLIEQUIVALENT(S): at 07:19

## 2021-01-01 RX ADMIN — NIMODIPINE 60 MILLIGRAM(S): 60 SOLUTION ORAL at 18:54

## 2021-01-01 RX ADMIN — Medication 27.5 MILLIGRAM(S): at 05:02

## 2021-01-01 RX ADMIN — DEXMEDETOMIDINE HYDROCHLORIDE IN 0.9% SODIUM CHLORIDE 5.1 MICROGRAM(S)/KG/HR: 4 INJECTION INTRAVENOUS at 22:59

## 2021-01-01 RX ADMIN — Medication 3 MILLILITER(S): at 11:12

## 2021-01-01 RX ADMIN — Medication 100 MILLIEQUIVALENT(S): at 11:02

## 2021-01-01 RX ADMIN — QUETIAPINE FUMARATE 87.5 MILLIGRAM(S): 200 TABLET, FILM COATED ORAL at 17:43

## 2021-01-01 RX ADMIN — Medication 1 APPLICATION(S): at 18:32

## 2021-01-01 RX ADMIN — PIPERACILLIN AND TAZOBACTAM 200 GRAM(S): 4; .5 INJECTION, POWDER, LYOPHILIZED, FOR SOLUTION INTRAVENOUS at 01:05

## 2021-01-01 RX ADMIN — Medication 100 MILLIGRAM(S): at 01:39

## 2021-01-01 RX ADMIN — Medication 3 MILLILITER(S): at 18:58

## 2021-01-01 RX ADMIN — Medication 27.5 MILLIGRAM(S): at 00:18

## 2021-01-01 RX ADMIN — FENTANYL CITRATE 12.5 MICROGRAM(S): 50 INJECTION INTRAVENOUS at 10:00

## 2021-01-01 RX ADMIN — PIPERACILLIN AND TAZOBACTAM 200 GRAM(S): 4; .5 INJECTION, POWDER, LYOPHILIZED, FOR SOLUTION INTRAVENOUS at 06:34

## 2021-01-01 RX ADMIN — Medication 1000 MILLIGRAM(S): at 02:24

## 2021-01-01 RX ADMIN — Medication 100 MILLIGRAM(S): at 17:49

## 2021-01-01 RX ADMIN — CHLORHEXIDINE GLUCONATE 1 APPLICATION(S): 213 SOLUTION TOPICAL at 05:59

## 2021-01-01 RX ADMIN — MIDODRINE HYDROCHLORIDE 10 MILLIGRAM(S): 2.5 TABLET ORAL at 22:03

## 2021-01-01 RX ADMIN — Medication 40 MILLIEQUIVALENT(S): at 01:03

## 2021-01-01 RX ADMIN — MIDODRINE HYDROCHLORIDE 5 MILLIGRAM(S): 2.5 TABLET ORAL at 05:30

## 2021-01-01 RX ADMIN — PANTOPRAZOLE SODIUM 40 MILLIGRAM(S): 20 TABLET, DELAYED RELEASE ORAL at 12:46

## 2021-01-01 RX ADMIN — Medication 1000 MILLIGRAM(S): at 07:10

## 2021-01-01 RX ADMIN — FENTANYL CITRATE 12.5 MICROGRAM(S): 50 INJECTION INTRAVENOUS at 02:15

## 2021-01-01 RX ADMIN — SODIUM CHLORIDE 4 MILLILITER(S): 9 INJECTION INTRAMUSCULAR; INTRAVENOUS; SUBCUTANEOUS at 17:51

## 2021-01-01 RX ADMIN — Medication 4.79 MICROGRAM(S)/KG/MIN: at 01:29

## 2021-01-01 RX ADMIN — LACOSAMIDE 100 MILLIGRAM(S): 50 TABLET ORAL at 06:17

## 2021-01-01 RX ADMIN — SODIUM CHLORIDE 4 MILLILITER(S): 9 INJECTION INTRAMUSCULAR; INTRAVENOUS; SUBCUTANEOUS at 06:18

## 2021-01-01 RX ADMIN — BROMOCRIPTINE MESYLATE 15 MILLIGRAM(S): 5 CAPSULE ORAL at 22:09

## 2021-01-01 RX ADMIN — Medication 4 MILLILITER(S): at 07:30

## 2021-01-01 RX ADMIN — Medication 4 MILLILITER(S): at 17:44

## 2021-01-01 RX ADMIN — Medication 500000 UNIT(S): at 17:56

## 2021-01-01 RX ADMIN — ENOXAPARIN SODIUM 40 MILLIGRAM(S): 100 INJECTION SUBCUTANEOUS at 11:26

## 2021-01-01 RX ADMIN — Medication 4.79 MICROGRAM(S)/KG/MIN: at 18:36

## 2021-01-01 RX ADMIN — POLYETHYLENE GLYCOL 3350 17 GRAM(S): 17 POWDER, FOR SOLUTION ORAL at 13:10

## 2021-01-01 RX ADMIN — Medication 1 CAPSULE(S): at 05:33

## 2021-01-01 RX ADMIN — BROMOCRIPTINE MESYLATE 15 MILLIGRAM(S): 5 CAPSULE ORAL at 21:28

## 2021-01-01 RX ADMIN — Medication 5 MILLIGRAM(S): at 10:50

## 2021-01-01 RX ADMIN — BROMOCRIPTINE MESYLATE 5 MILLIGRAM(S): 5 CAPSULE ORAL at 21:17

## 2021-01-01 RX ADMIN — Medication 40 MILLIEQUIVALENT(S): at 14:35

## 2021-01-01 RX ADMIN — Medication 3 MILLILITER(S): at 21:46

## 2021-01-01 RX ADMIN — CEFTRIAXONE 100 MILLIGRAM(S): 500 INJECTION, POWDER, FOR SOLUTION INTRAMUSCULAR; INTRAVENOUS at 22:50

## 2021-01-01 RX ADMIN — Medication 1 APPLICATION(S): at 05:15

## 2021-01-01 RX ADMIN — Medication 15 MILLILITER(S): at 11:46

## 2021-01-01 RX ADMIN — LACOSAMIDE 100 MILLIGRAM(S): 50 TABLET ORAL at 17:20

## 2021-01-01 RX ADMIN — MIDODRINE HYDROCHLORIDE 5 MILLIGRAM(S): 2.5 TABLET ORAL at 05:21

## 2021-01-01 RX ADMIN — Medication 10 MILLIGRAM(S): at 11:05

## 2021-01-01 RX ADMIN — SENNA PLUS 2 TABLET(S): 8.6 TABLET ORAL at 22:06

## 2021-01-01 RX ADMIN — Medication 100 MILLIGRAM(S): at 17:12

## 2021-01-01 RX ADMIN — SENNA PLUS 2 TABLET(S): 8.6 TABLET ORAL at 22:10

## 2021-01-01 RX ADMIN — Medication 650 MILLIGRAM(S): at 12:54

## 2021-01-01 RX ADMIN — CHLORHEXIDINE GLUCONATE 1 APPLICATION(S): 213 SOLUTION TOPICAL at 17:06

## 2021-01-01 RX ADMIN — Medication 40 MILLIEQUIVALENT(S): at 03:01

## 2021-01-01 RX ADMIN — ENOXAPARIN SODIUM 40 MILLIGRAM(S): 100 INJECTION SUBCUTANEOUS at 21:11

## 2021-01-01 RX ADMIN — Medication 81 MILLIGRAM(S): at 11:47

## 2021-01-01 RX ADMIN — SODIUM CHLORIDE 1000 MILLILITER(S): 9 INJECTION INTRAMUSCULAR; INTRAVENOUS; SUBCUTANEOUS at 04:55

## 2021-01-01 RX ADMIN — Medication 1 PACKET(S): at 11:09

## 2021-01-01 RX ADMIN — Medication 3 MILLILITER(S): at 00:00

## 2021-01-01 RX ADMIN — Medication 1000 MILLIGRAM(S): at 22:43

## 2021-01-01 RX ADMIN — LACOSAMIDE 100 MILLIGRAM(S): 50 TABLET ORAL at 17:41

## 2021-01-01 RX ADMIN — Medication 300 MILLIGRAM(S): at 21:44

## 2021-01-01 RX ADMIN — ENOXAPARIN SODIUM 60 MILLIGRAM(S): 100 INJECTION SUBCUTANEOUS at 09:56

## 2021-01-01 RX ADMIN — Medication 650 MILLIGRAM(S): at 15:30

## 2021-01-01 RX ADMIN — Medication 40 MILLIEQUIVALENT(S): at 01:22

## 2021-01-01 RX ADMIN — CHLORHEXIDINE GLUCONATE 1 APPLICATION(S): 213 SOLUTION TOPICAL at 08:28

## 2021-01-01 RX ADMIN — LACOSAMIDE 100 MILLIGRAM(S): 50 TABLET ORAL at 11:28

## 2021-01-01 RX ADMIN — Medication 125 MILLILITER(S): at 14:53

## 2021-01-01 RX ADMIN — ENOXAPARIN SODIUM 40 MILLIGRAM(S): 100 INJECTION SUBCUTANEOUS at 21:28

## 2021-01-01 RX ADMIN — CHLORHEXIDINE GLUCONATE 1 APPLICATION(S): 213 SOLUTION TOPICAL at 07:19

## 2021-01-01 RX ADMIN — Medication 650 MILLIGRAM(S): at 04:38

## 2021-01-01 RX ADMIN — POTASSIUM PHOSPHATE, MONOBASIC POTASSIUM PHOSPHATE, DIBASIC 62.5 MILLIMOLE(S): 236; 224 INJECTION, SOLUTION INTRAVENOUS at 12:14

## 2021-01-01 RX ADMIN — Medication 650 MILLIGRAM(S): at 14:14

## 2021-01-01 RX ADMIN — Medication 20 MILLIEQUIVALENT(S): at 13:58

## 2021-01-01 RX ADMIN — Medication 650 MILLIGRAM(S): at 11:56

## 2021-01-01 RX ADMIN — Medication 100 MILLIGRAM(S): at 06:06

## 2021-01-01 RX ADMIN — Medication 4.79 MICROGRAM(S)/KG/MIN: at 13:39

## 2021-01-01 RX ADMIN — NIMODIPINE 60 MILLIGRAM(S): 60 SOLUTION ORAL at 02:24

## 2021-01-01 RX ADMIN — Medication 81 MILLIGRAM(S): at 11:46

## 2021-01-01 RX ADMIN — PANTOPRAZOLE SODIUM 40 MILLIGRAM(S): 20 TABLET, DELAYED RELEASE ORAL at 11:29

## 2021-01-01 RX ADMIN — FENTANYL CITRATE 50 MICROGRAM(S): 50 INJECTION INTRAVENOUS at 18:04

## 2021-01-01 RX ADMIN — SODIUM CHLORIDE 4 MILLILITER(S): 9 INJECTION INTRAMUSCULAR; INTRAVENOUS; SUBCUTANEOUS at 04:27

## 2021-01-01 RX ADMIN — Medication 1 APPLICATION(S): at 18:40

## 2021-01-01 RX ADMIN — Medication 50 MILLIEQUIVALENT(S): at 14:52

## 2021-01-01 RX ADMIN — FENTANYL CITRATE 12.5 MICROGRAM(S): 50 INJECTION INTRAVENOUS at 22:00

## 2021-01-01 RX ADMIN — CHLORHEXIDINE GLUCONATE 1 APPLICATION(S): 213 SOLUTION TOPICAL at 05:52

## 2021-01-01 RX ADMIN — Medication 200 MILLIGRAM(S): at 18:46

## 2021-01-01 RX ADMIN — Medication 27.5 MILLIGRAM(S): at 22:52

## 2021-01-01 RX ADMIN — TRAMADOL HYDROCHLORIDE 25 MILLIGRAM(S): 50 TABLET ORAL at 05:00

## 2021-01-01 RX ADMIN — Medication 100 GRAM(S): at 11:04

## 2021-01-01 RX ADMIN — ENOXAPARIN SODIUM 40 MILLIGRAM(S): 100 INJECTION SUBCUTANEOUS at 16:51

## 2021-01-01 RX ADMIN — SODIUM CHLORIDE 4 MILLILITER(S): 9 INJECTION INTRAMUSCULAR; INTRAVENOUS; SUBCUTANEOUS at 15:08

## 2021-01-01 RX ADMIN — SODIUM CHLORIDE 75 MILLILITER(S): 9 INJECTION INTRAMUSCULAR; INTRAVENOUS; SUBCUTANEOUS at 05:21

## 2021-01-01 RX ADMIN — Medication 15 MILLILITER(S): at 11:05

## 2021-01-01 RX ADMIN — PANTOPRAZOLE SODIUM 40 MILLIGRAM(S): 20 TABLET, DELAYED RELEASE ORAL at 07:25

## 2021-01-01 RX ADMIN — FENTANYL CITRATE 25 MICROGRAM(S): 50 INJECTION INTRAVENOUS at 11:13

## 2021-01-01 RX ADMIN — Medication 125 MILLILITER(S): at 13:50

## 2021-01-01 RX ADMIN — NIMODIPINE 60 MILLIGRAM(S): 60 SOLUTION ORAL at 23:10

## 2021-01-01 RX ADMIN — NIMODIPINE 60 MILLIGRAM(S): 60 SOLUTION ORAL at 13:35

## 2021-01-01 RX ADMIN — Medication 1 APPLICATION(S): at 06:21

## 2021-01-01 RX ADMIN — Medication 200 MILLIGRAM(S): at 17:24

## 2021-01-01 RX ADMIN — BROMOCRIPTINE MESYLATE 15 MILLIGRAM(S): 5 CAPSULE ORAL at 21:49

## 2021-01-01 RX ADMIN — Medication 10 MILLIGRAM(S): at 14:15

## 2021-01-01 RX ADMIN — Medication 3 MILLILITER(S): at 11:48

## 2021-01-01 RX ADMIN — NIMODIPINE 60 MILLIGRAM(S): 60 SOLUTION ORAL at 11:10

## 2021-01-01 RX ADMIN — FENTANYL CITRATE 12.5 MICROGRAM(S): 50 INJECTION INTRAVENOUS at 12:00

## 2021-01-01 RX ADMIN — Medication 650 MILLIGRAM(S): at 09:38

## 2021-01-01 RX ADMIN — Medication 100 MILLIGRAM(S): at 00:04

## 2021-01-01 RX ADMIN — Medication 3 MILLILITER(S): at 02:25

## 2021-01-01 RX ADMIN — BROMOCRIPTINE MESYLATE 15 MILLIGRAM(S): 5 CAPSULE ORAL at 22:27

## 2021-01-01 RX ADMIN — Medication 500000 UNIT(S): at 06:17

## 2021-01-01 RX ADMIN — Medication 1 APPLICATION(S): at 06:38

## 2021-01-01 RX ADMIN — CHLORHEXIDINE GLUCONATE 15 MILLILITER(S): 213 SOLUTION TOPICAL at 17:29

## 2021-01-01 RX ADMIN — Medication 3 MILLILITER(S): at 16:33

## 2021-01-01 RX ADMIN — Medication 40 MILLIEQUIVALENT(S): at 06:54

## 2021-01-01 RX ADMIN — Medication 3 MILLILITER(S): at 12:34

## 2021-01-01 RX ADMIN — SODIUM CHLORIDE 4 MILLILITER(S): 9 INJECTION INTRAMUSCULAR; INTRAVENOUS; SUBCUTANEOUS at 22:06

## 2021-01-01 RX ADMIN — CHLORHEXIDINE GLUCONATE 15 MILLILITER(S): 213 SOLUTION TOPICAL at 17:36

## 2021-01-01 RX ADMIN — Medication 3 MILLILITER(S): at 16:28

## 2021-01-01 RX ADMIN — Medication 650 MILLIGRAM(S): at 15:00

## 2021-01-01 RX ADMIN — Medication 40 MILLIEQUIVALENT(S): at 17:44

## 2021-01-01 RX ADMIN — Medication 81 MILLIGRAM(S): at 11:08

## 2021-01-01 RX ADMIN — FENTANYL CITRATE 25 MICROGRAM(S): 50 INJECTION INTRAVENOUS at 04:00

## 2021-01-01 RX ADMIN — PANTOPRAZOLE SODIUM 40 MILLIGRAM(S): 20 TABLET, DELAYED RELEASE ORAL at 12:15

## 2021-01-01 RX ADMIN — BROMOCRIPTINE MESYLATE 5 MILLIGRAM(S): 5 CAPSULE ORAL at 21:58

## 2021-01-01 RX ADMIN — BROMOCRIPTINE MESYLATE 15 MILLIGRAM(S): 5 CAPSULE ORAL at 05:04

## 2021-01-01 RX ADMIN — Medication 4.79 MICROGRAM(S)/KG/MIN: at 02:29

## 2021-01-01 RX ADMIN — Medication 27.5 MILLIGRAM(S): at 07:21

## 2021-01-01 RX ADMIN — Medication 30 MILLIGRAM(S): at 14:52

## 2021-01-01 RX ADMIN — Medication 27.5 MILLIGRAM(S): at 11:29

## 2021-01-01 RX ADMIN — Medication 4.79 MICROGRAM(S)/KG/MIN: at 23:03

## 2021-01-01 RX ADMIN — Medication 500000 UNIT(S): at 11:03

## 2021-01-01 RX ADMIN — FENTANYL CITRATE 25 MICROGRAM(S): 50 INJECTION INTRAVENOUS at 10:43

## 2021-01-01 RX ADMIN — Medication 27.5 MILLIGRAM(S): at 23:13

## 2021-01-01 RX ADMIN — Medication 30 MILLIGRAM(S): at 07:17

## 2021-01-01 RX ADMIN — CHLORHEXIDINE GLUCONATE 1 APPLICATION(S): 213 SOLUTION TOPICAL at 06:03

## 2021-01-01 RX ADMIN — Medication 100 MILLIEQUIVALENT(S): at 02:32

## 2021-01-01 RX ADMIN — LACOSAMIDE 100 MILLIGRAM(S): 50 TABLET ORAL at 06:54

## 2021-01-01 RX ADMIN — Medication 650 MILLIGRAM(S): at 22:50

## 2021-01-01 RX ADMIN — MIDODRINE HYDROCHLORIDE 5 MILLIGRAM(S): 2.5 TABLET ORAL at 05:23

## 2021-01-01 RX ADMIN — LACOSAMIDE 100 MILLIGRAM(S): 50 TABLET ORAL at 17:56

## 2021-01-01 RX ADMIN — Medication 500000 UNIT(S): at 23:11

## 2021-01-01 RX ADMIN — Medication 500000 UNIT(S): at 00:08

## 2021-01-01 RX ADMIN — Medication 650 MILLIGRAM(S): at 23:58

## 2021-01-01 RX ADMIN — Medication 81 MILLIGRAM(S): at 11:04

## 2021-01-01 RX ADMIN — PROPOFOL 5.89 MICROGRAM(S)/KG/MIN: 10 INJECTION, EMULSION INTRAVENOUS at 10:15

## 2021-01-01 RX ADMIN — Medication 10 MILLIGRAM(S): at 13:07

## 2021-01-01 RX ADMIN — MIDODRINE HYDROCHLORIDE 5 MILLIGRAM(S): 2.5 TABLET ORAL at 19:22

## 2021-01-01 RX ADMIN — Medication 1 CAPSULE(S): at 05:31

## 2021-01-01 RX ADMIN — SENNA PLUS 2 TABLET(S): 8.6 TABLET ORAL at 23:22

## 2021-01-01 RX ADMIN — LACOSAMIDE 100 MILLIGRAM(S): 50 TABLET ORAL at 05:01

## 2021-01-01 RX ADMIN — SENNA PLUS 2 TABLET(S): 8.6 TABLET ORAL at 21:58

## 2021-01-01 RX ADMIN — Medication 100 MILLIGRAM(S): at 17:56

## 2021-01-01 RX ADMIN — Medication 40 MILLIEQUIVALENT(S): at 19:48

## 2021-01-01 RX ADMIN — Medication 500000 UNIT(S): at 13:54

## 2021-01-01 RX ADMIN — Medication 40 MILLIEQUIVALENT(S): at 01:58

## 2021-01-01 RX ADMIN — BROMOCRIPTINE MESYLATE 5 MILLIGRAM(S): 5 CAPSULE ORAL at 05:42

## 2021-01-01 RX ADMIN — Medication 4 MILLIGRAM(S): at 21:12

## 2021-01-01 RX ADMIN — PANTOPRAZOLE SODIUM 40 MILLIGRAM(S): 20 TABLET, DELAYED RELEASE ORAL at 11:45

## 2021-01-01 RX ADMIN — Medication 50 GRAM(S): at 07:36

## 2021-01-01 RX ADMIN — SODIUM CHLORIDE 50 MILLILITER(S): 9 INJECTION, SOLUTION INTRAVENOUS at 13:03

## 2021-01-01 RX ADMIN — Medication 500000 UNIT(S): at 11:27

## 2021-01-01 RX ADMIN — SODIUM CHLORIDE 1000 MILLILITER(S): 9 INJECTION, SOLUTION INTRAVENOUS at 16:06

## 2021-01-01 RX ADMIN — Medication 3 MILLILITER(S): at 15:08

## 2021-01-01 RX ADMIN — CHLORHEXIDINE GLUCONATE 15 MILLILITER(S): 213 SOLUTION TOPICAL at 17:10

## 2021-01-01 RX ADMIN — Medication 3 MILLILITER(S): at 23:42

## 2021-01-01 RX ADMIN — Medication 650 MILLIGRAM(S): at 17:17

## 2021-01-01 RX ADMIN — Medication 2: at 12:00

## 2021-01-01 RX ADMIN — Medication 100 MILLIGRAM(S): at 09:12

## 2021-01-01 RX ADMIN — SODIUM CHLORIDE 4 MILLILITER(S): 9 INJECTION INTRAMUSCULAR; INTRAVENOUS; SUBCUTANEOUS at 15:55

## 2021-01-01 RX ADMIN — Medication 3 MILLILITER(S): at 14:27

## 2021-01-01 RX ADMIN — MIDODRINE HYDROCHLORIDE 5 MILLIGRAM(S): 2.5 TABLET ORAL at 21:27

## 2021-01-01 RX ADMIN — BROMOCRIPTINE MESYLATE 15 MILLIGRAM(S): 5 CAPSULE ORAL at 05:41

## 2021-01-01 RX ADMIN — Medication 3 MILLILITER(S): at 21:57

## 2021-01-01 RX ADMIN — Medication 500000 UNIT(S): at 00:01

## 2021-01-01 RX ADMIN — Medication 100 MILLIGRAM(S): at 09:58

## 2021-01-01 RX ADMIN — Medication 1 APPLICATION(S): at 18:58

## 2021-01-01 RX ADMIN — Medication 81 MILLIGRAM(S): at 11:01

## 2021-01-01 RX ADMIN — BROMOCRIPTINE MESYLATE 15 MILLIGRAM(S): 5 CAPSULE ORAL at 22:03

## 2021-01-01 RX ADMIN — BROMOCRIPTINE MESYLATE 15 MILLIGRAM(S): 5 CAPSULE ORAL at 06:11

## 2021-01-01 RX ADMIN — Medication 400 MILLIGRAM(S): at 16:18

## 2021-01-01 RX ADMIN — Medication 27.5 MILLIGRAM(S): at 14:57

## 2021-01-01 RX ADMIN — CHLORHEXIDINE GLUCONATE 1 APPLICATION(S): 213 SOLUTION TOPICAL at 06:35

## 2021-01-01 RX ADMIN — BROMOCRIPTINE MESYLATE 5 MILLIGRAM(S): 5 CAPSULE ORAL at 18:58

## 2021-01-01 RX ADMIN — Medication 3 MILLILITER(S): at 03:35

## 2021-01-01 RX ADMIN — Medication 500000 UNIT(S): at 06:20

## 2021-01-01 RX ADMIN — Medication 81 MILLIGRAM(S): at 12:13

## 2021-01-01 RX ADMIN — FENTANYL CITRATE 12.5 MICROGRAM(S): 50 INJECTION INTRAVENOUS at 23:46

## 2021-01-01 RX ADMIN — SODIUM CHLORIDE 1000 MILLILITER(S): 9 INJECTION INTRAMUSCULAR; INTRAVENOUS; SUBCUTANEOUS at 16:20

## 2021-01-01 RX ADMIN — Medication 200 MILLIGRAM(S): at 05:36

## 2021-01-01 RX ADMIN — Medication 100 MILLIGRAM(S): at 02:09

## 2021-01-01 RX ADMIN — SODIUM CHLORIDE 4 MILLILITER(S): 9 INJECTION INTRAMUSCULAR; INTRAVENOUS; SUBCUTANEOUS at 17:40

## 2021-01-01 RX ADMIN — Medication 3 MILLILITER(S): at 22:05

## 2021-01-01 RX ADMIN — Medication 4 MILLILITER(S): at 19:00

## 2021-01-01 RX ADMIN — Medication 500000 UNIT(S): at 05:41

## 2021-01-01 RX ADMIN — BROMOCRIPTINE MESYLATE 5 MILLIGRAM(S): 5 CAPSULE ORAL at 21:33

## 2021-01-01 RX ADMIN — FENTANYL CITRATE 25 MICROGRAM(S): 50 INJECTION INTRAVENOUS at 18:00

## 2021-01-01 RX ADMIN — Medication 27.5 MILLIGRAM(S): at 13:28

## 2021-01-01 RX ADMIN — Medication 650 MILLIGRAM(S): at 03:18

## 2021-01-01 RX ADMIN — Medication 0.5 MILLIGRAM(S): at 01:59

## 2021-01-01 RX ADMIN — Medication 100 MILLIGRAM(S): at 19:22

## 2021-01-01 RX ADMIN — NIMODIPINE 60 MILLIGRAM(S): 60 SOLUTION ORAL at 09:05

## 2021-01-01 RX ADMIN — FENTANYL CITRATE 12.5 MICROGRAM(S): 50 INJECTION INTRAVENOUS at 18:00

## 2021-01-01 RX ADMIN — FENTANYL CITRATE 12.5 MICROGRAM(S): 50 INJECTION INTRAVENOUS at 05:39

## 2021-01-01 RX ADMIN — NIMODIPINE 60 MILLIGRAM(S): 60 SOLUTION ORAL at 17:29

## 2021-01-01 RX ADMIN — BROMOCRIPTINE MESYLATE 15 MILLIGRAM(S): 5 CAPSULE ORAL at 14:52

## 2021-01-01 RX ADMIN — Medication 100 MILLIEQUIVALENT(S): at 09:29

## 2021-01-01 RX ADMIN — LACOSAMIDE 50 MILLIGRAM(S): 50 TABLET ORAL at 18:03

## 2021-01-01 RX ADMIN — Medication 3 MILLILITER(S): at 09:49

## 2021-01-01 RX ADMIN — LACOSAMIDE 100 MILLIGRAM(S): 50 TABLET ORAL at 05:14

## 2021-01-01 RX ADMIN — SODIUM CHLORIDE 4 MILLILITER(S): 9 INJECTION INTRAMUSCULAR; INTRAVENOUS; SUBCUTANEOUS at 21:30

## 2021-01-01 RX ADMIN — Medication 10 MILLIGRAM(S): at 17:05

## 2021-01-01 RX ADMIN — Medication 81 MILLIGRAM(S): at 11:12

## 2021-01-01 RX ADMIN — Medication 4 MILLILITER(S): at 21:01

## 2021-01-01 RX ADMIN — Medication 10 MILLIGRAM(S): at 21:28

## 2021-01-01 RX ADMIN — Medication 3 MILLILITER(S): at 17:25

## 2021-01-01 RX ADMIN — BROMOCRIPTINE MESYLATE 15 MILLIGRAM(S): 5 CAPSULE ORAL at 05:06

## 2021-01-01 RX ADMIN — Medication 3 MILLILITER(S): at 08:44

## 2021-01-01 RX ADMIN — LACOSAMIDE 100 MILLIGRAM(S): 50 TABLET ORAL at 06:10

## 2021-01-01 RX ADMIN — CHLORHEXIDINE GLUCONATE 1 APPLICATION(S): 213 SOLUTION TOPICAL at 06:38

## 2021-01-01 RX ADMIN — Medication 500000 UNIT(S): at 23:24

## 2021-01-01 RX ADMIN — FLUDROCORTISONE ACETATE 0.2 MILLIGRAM(S): 0.1 TABLET ORAL at 05:49

## 2021-01-01 RX ADMIN — BROMOCRIPTINE MESYLATE 15 MILLIGRAM(S): 5 CAPSULE ORAL at 05:00

## 2021-01-01 RX ADMIN — Medication 81 MILLIGRAM(S): at 12:02

## 2021-01-01 RX ADMIN — CHLORHEXIDINE GLUCONATE 1 APPLICATION(S): 213 SOLUTION TOPICAL at 06:09

## 2021-01-01 RX ADMIN — HALOPERIDOL DECANOATE 2 MILLIGRAM(S): 100 INJECTION INTRAMUSCULAR at 22:07

## 2021-01-01 RX ADMIN — Medication 30 MILLIGRAM(S): at 05:46

## 2021-01-01 RX ADMIN — POLYETHYLENE GLYCOL 3350 17 GRAM(S): 17 POWDER, FOR SOLUTION ORAL at 11:09

## 2021-01-01 RX ADMIN — Medication 650 MILLIGRAM(S): at 18:03

## 2021-01-01 RX ADMIN — ENOXAPARIN SODIUM 100 MILLIGRAM(S): 100 INJECTION SUBCUTANEOUS at 06:00

## 2021-01-01 RX ADMIN — FENTANYL CITRATE 25 MICROGRAM(S): 50 INJECTION INTRAVENOUS at 11:00

## 2021-01-01 RX ADMIN — CHLORHEXIDINE GLUCONATE 1 APPLICATION(S): 213 SOLUTION TOPICAL at 05:30

## 2021-01-01 RX ADMIN — Medication 3 MILLILITER(S): at 16:37

## 2021-01-01 RX ADMIN — Medication 20 MILLIEQUIVALENT(S): at 11:32

## 2021-01-01 RX ADMIN — Medication 4 MILLIGRAM(S): at 23:55

## 2021-01-01 RX ADMIN — Medication 1 APPLICATION(S): at 06:39

## 2021-01-01 RX ADMIN — LACOSAMIDE 100 MILLIGRAM(S): 50 TABLET ORAL at 06:22

## 2021-01-01 RX ADMIN — PANTOPRAZOLE SODIUM 40 MILLIGRAM(S): 20 TABLET, DELAYED RELEASE ORAL at 11:40

## 2021-01-01 RX ADMIN — Medication 3 MILLILITER(S): at 16:09

## 2021-01-01 RX ADMIN — BROMOCRIPTINE MESYLATE 5 MILLIGRAM(S): 5 CAPSULE ORAL at 21:19

## 2021-01-01 RX ADMIN — Medication 500000 UNIT(S): at 05:29

## 2021-01-01 RX ADMIN — Medication 27.5 MILLIGRAM(S): at 17:50

## 2021-01-01 RX ADMIN — Medication 3 MILLILITER(S): at 17:46

## 2021-01-01 RX ADMIN — LACOSAMIDE 100 MILLIGRAM(S): 50 TABLET ORAL at 05:25

## 2021-01-01 RX ADMIN — ENOXAPARIN SODIUM 100 MILLIGRAM(S): 100 INJECTION SUBCUTANEOUS at 17:20

## 2021-01-01 RX ADMIN — Medication 3 MILLIGRAM(S): at 02:05

## 2021-01-01 RX ADMIN — POTASSIUM PHOSPHATE, MONOBASIC POTASSIUM PHOSPHATE, DIBASIC 62.5 MILLIMOLE(S): 236; 224 INJECTION, SOLUTION INTRAVENOUS at 08:20

## 2021-01-01 RX ADMIN — Medication 1 PACKET(S): at 06:51

## 2021-01-01 RX ADMIN — Medication 3 MILLILITER(S): at 04:25

## 2021-01-01 RX ADMIN — BROMOCRIPTINE MESYLATE 15 MILLIGRAM(S): 5 CAPSULE ORAL at 05:34

## 2021-01-01 RX ADMIN — Medication 650 MILLIGRAM(S): at 02:28

## 2021-01-01 RX ADMIN — QUETIAPINE FUMARATE 50 MILLIGRAM(S): 200 TABLET, FILM COATED ORAL at 21:43

## 2021-01-01 RX ADMIN — Medication 500000 UNIT(S): at 17:57

## 2021-01-01 RX ADMIN — Medication 500000 UNIT(S): at 12:13

## 2021-01-01 RX ADMIN — Medication 3 MILLILITER(S): at 00:04

## 2021-01-01 RX ADMIN — ENOXAPARIN SODIUM 100 MILLIGRAM(S): 100 INJECTION SUBCUTANEOUS at 05:14

## 2021-01-01 RX ADMIN — Medication 1 CAPSULE(S): at 01:08

## 2021-01-01 RX ADMIN — PANTOPRAZOLE SODIUM 40 MILLIGRAM(S): 20 TABLET, DELAYED RELEASE ORAL at 11:01

## 2021-01-01 RX ADMIN — PANTOPRAZOLE SODIUM 40 MILLIGRAM(S): 20 TABLET, DELAYED RELEASE ORAL at 12:22

## 2021-01-01 RX ADMIN — Medication 3 MILLILITER(S): at 21:12

## 2021-01-01 RX ADMIN — LACOSAMIDE 100 MILLIGRAM(S): 50 TABLET ORAL at 05:51

## 2021-01-01 RX ADMIN — Medication 100 MILLIGRAM(S): at 17:22

## 2021-01-01 RX ADMIN — LACOSAMIDE 100 MILLIGRAM(S): 50 TABLET ORAL at 18:36

## 2021-01-01 RX ADMIN — Medication 3 MILLILITER(S): at 23:01

## 2021-01-01 RX ADMIN — Medication 100 MILLIEQUIVALENT(S): at 06:05

## 2021-01-01 RX ADMIN — BROMOCRIPTINE MESYLATE 5 MILLIGRAM(S): 5 CAPSULE ORAL at 06:04

## 2021-01-01 RX ADMIN — Medication 3 MILLILITER(S): at 22:48

## 2021-01-01 RX ADMIN — Medication 1000 MILLIGRAM(S): at 18:32

## 2021-01-01 RX ADMIN — BROMOCRIPTINE MESYLATE 5 MILLIGRAM(S): 5 CAPSULE ORAL at 05:32

## 2021-01-01 RX ADMIN — BROMOCRIPTINE MESYLATE 15 MILLIGRAM(S): 5 CAPSULE ORAL at 23:00

## 2021-01-01 RX ADMIN — Medication 3 MILLILITER(S): at 16:26

## 2021-01-01 RX ADMIN — Medication 650 MILLIGRAM(S): at 22:12

## 2021-01-01 RX ADMIN — Medication 3 MILLILITER(S): at 01:56

## 2021-01-01 RX ADMIN — PANTOPRAZOLE SODIUM 40 MILLIGRAM(S): 20 TABLET, DELAYED RELEASE ORAL at 06:27

## 2021-01-01 RX ADMIN — Medication 1000 MILLIGRAM(S): at 05:05

## 2021-01-01 RX ADMIN — LACOSAMIDE 100 MILLIGRAM(S): 50 TABLET ORAL at 06:05

## 2021-01-01 RX ADMIN — FENTANYL CITRATE 12.5 MICROGRAM(S): 50 INJECTION INTRAVENOUS at 00:05

## 2021-01-01 RX ADMIN — Medication 1000 MILLIGRAM(S): at 10:43

## 2021-01-01 RX ADMIN — NIMODIPINE 60 MILLIGRAM(S): 60 SOLUTION ORAL at 08:20

## 2021-01-01 RX ADMIN — Medication 650 MILLIGRAM(S): at 18:24

## 2021-01-01 RX ADMIN — Medication 15 MILLILITER(S): at 11:55

## 2021-01-01 RX ADMIN — NIMODIPINE 60 MILLIGRAM(S): 60 SOLUTION ORAL at 01:05

## 2021-01-01 RX ADMIN — Medication 1 APPLICATION(S): at 17:55

## 2021-01-01 RX ADMIN — Medication 4 MILLILITER(S): at 05:25

## 2021-01-01 RX ADMIN — Medication 3 MILLILITER(S): at 15:56

## 2021-01-01 RX ADMIN — PANTOPRAZOLE SODIUM 40 MILLIGRAM(S): 20 TABLET, DELAYED RELEASE ORAL at 12:56

## 2021-01-01 RX ADMIN — Medication 650 MILLIGRAM(S): at 17:18

## 2021-01-01 RX ADMIN — Medication 4 MILLILITER(S): at 15:46

## 2021-01-01 RX ADMIN — MIDAZOLAM HYDROCHLORIDE 2 MILLIGRAM(S): 1 INJECTION, SOLUTION INTRAMUSCULAR; INTRAVENOUS at 14:51

## 2021-01-01 RX ADMIN — Medication 3 MILLILITER(S): at 00:01

## 2021-01-01 RX ADMIN — Medication 500000 UNIT(S): at 05:37

## 2021-01-01 RX ADMIN — Medication 500000 UNIT(S): at 11:33

## 2021-01-01 RX ADMIN — SODIUM CHLORIDE 1000 MILLILITER(S): 9 INJECTION INTRAMUSCULAR; INTRAVENOUS; SUBCUTANEOUS at 07:45

## 2021-01-01 RX ADMIN — NIMODIPINE 60 MILLIGRAM(S): 60 SOLUTION ORAL at 06:42

## 2021-01-01 RX ADMIN — MIDODRINE HYDROCHLORIDE 5 MILLIGRAM(S): 2.5 TABLET ORAL at 06:39

## 2021-01-01 RX ADMIN — Medication 0.5 MILLIGRAM(S): at 00:21

## 2021-01-01 RX ADMIN — QUETIAPINE FUMARATE 75 MILLIGRAM(S): 200 TABLET, FILM COATED ORAL at 05:32

## 2021-01-01 RX ADMIN — PIPERACILLIN AND TAZOBACTAM 200 GRAM(S): 4; .5 INJECTION, POWDER, LYOPHILIZED, FOR SOLUTION INTRAVENOUS at 05:35

## 2021-01-01 RX ADMIN — NIMODIPINE 60 MILLIGRAM(S): 60 SOLUTION ORAL at 13:28

## 2021-01-01 RX ADMIN — LACOSAMIDE 100 MILLIGRAM(S): 50 TABLET ORAL at 18:44

## 2021-01-01 RX ADMIN — PANTOPRAZOLE SODIUM 40 MILLIGRAM(S): 20 TABLET, DELAYED RELEASE ORAL at 12:34

## 2021-01-01 RX ADMIN — FENTANYL CITRATE 12.5 MICROGRAM(S): 50 INJECTION INTRAVENOUS at 17:15

## 2021-01-01 RX ADMIN — FENTANYL CITRATE 12.5 MICROGRAM(S): 50 INJECTION INTRAVENOUS at 21:40

## 2021-01-01 RX ADMIN — Medication 3 MILLILITER(S): at 06:20

## 2021-01-01 RX ADMIN — Medication 3 MILLILITER(S): at 22:01

## 2021-01-01 RX ADMIN — SODIUM CHLORIDE 1000 MILLILITER(S): 9 INJECTION INTRAMUSCULAR; INTRAVENOUS; SUBCUTANEOUS at 16:25

## 2021-01-01 RX ADMIN — FENTANYL CITRATE 50 MICROGRAM(S): 50 INJECTION INTRAVENOUS at 12:15

## 2021-01-01 RX ADMIN — Medication 100 MILLIGRAM(S): at 05:41

## 2021-01-01 RX ADMIN — NIMODIPINE 60 MILLIGRAM(S): 60 SOLUTION ORAL at 17:49

## 2021-01-01 RX ADMIN — CHLORHEXIDINE GLUCONATE 15 MILLILITER(S): 213 SOLUTION TOPICAL at 18:46

## 2021-01-01 RX ADMIN — BROMOCRIPTINE MESYLATE 5 MILLIGRAM(S): 5 CAPSULE ORAL at 13:35

## 2021-01-01 RX ADMIN — Medication 3 MILLILITER(S): at 11:46

## 2021-01-01 RX ADMIN — Medication 1 APPLICATION(S): at 06:11

## 2021-01-01 RX ADMIN — BROMOCRIPTINE MESYLATE 5 MILLIGRAM(S): 5 CAPSULE ORAL at 14:40

## 2021-01-01 RX ADMIN — Medication 4 MILLILITER(S): at 05:17

## 2021-01-01 RX ADMIN — LACOSAMIDE 100 MILLIGRAM(S): 50 TABLET ORAL at 05:05

## 2021-01-01 RX ADMIN — SODIUM CHLORIDE 4 MILLILITER(S): 9 INJECTION INTRAMUSCULAR; INTRAVENOUS; SUBCUTANEOUS at 11:26

## 2021-01-01 RX ADMIN — Medication 1000 MILLIGRAM(S): at 19:42

## 2021-01-01 RX ADMIN — Medication 200 MILLIGRAM(S): at 17:28

## 2021-01-01 RX ADMIN — Medication 40 MILLIEQUIVALENT(S): at 19:14

## 2021-01-01 RX ADMIN — Medication 27.5 MILLIGRAM(S): at 22:10

## 2021-01-01 RX ADMIN — FENTANYL CITRATE 12.5 MICROGRAM(S): 50 INJECTION INTRAVENOUS at 11:40

## 2021-01-01 RX ADMIN — Medication 81 MILLIGRAM(S): at 13:11

## 2021-01-01 RX ADMIN — SODIUM CHLORIDE 4 MILLILITER(S): 9 INJECTION INTRAMUSCULAR; INTRAVENOUS; SUBCUTANEOUS at 00:07

## 2021-01-01 RX ADMIN — Medication 20 MILLIEQUIVALENT(S): at 11:08

## 2021-01-01 RX ADMIN — Medication 4 MILLIGRAM(S): at 05:49

## 2021-01-01 RX ADMIN — Medication 3 MILLILITER(S): at 21:32

## 2021-01-01 RX ADMIN — NIMODIPINE 60 MILLIGRAM(S): 60 SOLUTION ORAL at 21:12

## 2021-01-01 RX ADMIN — Medication 10 MILLIGRAM(S): at 16:04

## 2021-01-01 RX ADMIN — BROMOCRIPTINE MESYLATE 5 MILLIGRAM(S): 5 CAPSULE ORAL at 13:39

## 2021-01-01 RX ADMIN — Medication 1 APPLICATION(S): at 06:07

## 2021-01-01 RX ADMIN — BROMOCRIPTINE MESYLATE 15 MILLIGRAM(S): 5 CAPSULE ORAL at 05:31

## 2021-01-01 RX ADMIN — BROMOCRIPTINE MESYLATE 5 MILLIGRAM(S): 5 CAPSULE ORAL at 21:27

## 2021-01-01 RX ADMIN — MIDODRINE HYDROCHLORIDE 2.5 MILLIGRAM(S): 2.5 TABLET ORAL at 18:43

## 2021-01-01 RX ADMIN — Medication 81 MILLIGRAM(S): at 11:33

## 2021-01-01 RX ADMIN — FENTANYL CITRATE 12.5 MICROGRAM(S): 50 INJECTION INTRAVENOUS at 09:03

## 2021-01-01 RX ADMIN — BROMOCRIPTINE MESYLATE 15 MILLIGRAM(S): 5 CAPSULE ORAL at 13:32

## 2021-01-01 RX ADMIN — DEXMEDETOMIDINE HYDROCHLORIDE IN 0.9% SODIUM CHLORIDE 5.1 MICROGRAM(S)/KG/HR: 4 INJECTION INTRAVENOUS at 19:46

## 2021-01-01 RX ADMIN — PANTOPRAZOLE SODIUM 40 MILLIGRAM(S): 20 TABLET, DELAYED RELEASE ORAL at 11:33

## 2021-01-01 RX ADMIN — Medication 650 MILLIGRAM(S): at 06:50

## 2021-01-01 RX ADMIN — Medication 4 MILLILITER(S): at 05:23

## 2021-01-01 RX ADMIN — MIDODRINE HYDROCHLORIDE 5 MILLIGRAM(S): 2.5 TABLET ORAL at 06:56

## 2021-01-01 RX ADMIN — Medication 200 MILLIGRAM(S): at 18:45

## 2021-01-01 RX ADMIN — FENTANYL CITRATE 12.5 MICROGRAM(S): 50 INJECTION INTRAVENOUS at 22:30

## 2021-01-01 RX ADMIN — Medication 4.79 MICROGRAM(S)/KG/MIN: at 18:43

## 2021-01-01 RX ADMIN — Medication 500000 UNIT(S): at 05:24

## 2021-01-01 RX ADMIN — Medication 1000 MILLIGRAM(S): at 17:40

## 2021-01-01 RX ADMIN — Medication 3 MILLILITER(S): at 12:00

## 2021-01-01 RX ADMIN — Medication 1 APPLICATION(S): at 06:00

## 2021-01-01 RX ADMIN — NIMODIPINE 60 MILLIGRAM(S): 60 SOLUTION ORAL at 13:11

## 2021-01-01 RX ADMIN — FENTANYL CITRATE 25 MICROGRAM(S): 50 INJECTION INTRAVENOUS at 10:00

## 2021-01-01 RX ADMIN — Medication 300 MILLIGRAM(S): at 05:34

## 2021-01-01 RX ADMIN — Medication 15 MILLILITER(S): at 12:35

## 2021-01-01 RX ADMIN — ENOXAPARIN SODIUM 100 MILLIGRAM(S): 100 INJECTION SUBCUTANEOUS at 05:29

## 2021-01-01 RX ADMIN — LACOSAMIDE 100 MILLIGRAM(S): 50 TABLET ORAL at 05:09

## 2021-01-01 RX ADMIN — Medication 4 MILLILITER(S): at 21:11

## 2021-01-01 RX ADMIN — ERGOCALCIFEROL 50000 UNIT(S): 1.25 CAPSULE ORAL at 11:40

## 2021-01-01 RX ADMIN — NIMODIPINE 60 MILLIGRAM(S): 60 SOLUTION ORAL at 01:03

## 2021-01-01 RX ADMIN — Medication 100 MILLIGRAM(S): at 08:26

## 2021-01-01 RX ADMIN — FENTANYL CITRATE 12.5 MICROGRAM(S): 50 INJECTION INTRAVENOUS at 05:09

## 2021-01-01 RX ADMIN — ENOXAPARIN SODIUM 100 MILLIGRAM(S): 100 INJECTION SUBCUTANEOUS at 05:34

## 2021-01-01 RX ADMIN — Medication 3 MILLILITER(S): at 15:55

## 2021-01-01 RX ADMIN — BROMOCRIPTINE MESYLATE 5 MILLIGRAM(S): 5 CAPSULE ORAL at 13:07

## 2021-01-01 RX ADMIN — Medication 4 MILLILITER(S): at 18:45

## 2021-01-01 RX ADMIN — NIMODIPINE 60 MILLIGRAM(S): 60 SOLUTION ORAL at 22:51

## 2021-01-01 RX ADMIN — TRAMADOL HYDROCHLORIDE 25 MILLIGRAM(S): 50 TABLET ORAL at 09:38

## 2021-01-01 RX ADMIN — Medication 27.5 MILLIGRAM(S): at 06:59

## 2021-01-01 RX ADMIN — Medication 650 MILLIGRAM(S): at 15:27

## 2021-01-01 RX ADMIN — CHLORHEXIDINE GLUCONATE 15 MILLILITER(S): 213 SOLUTION TOPICAL at 05:01

## 2021-01-01 RX ADMIN — SODIUM CHLORIDE 4 MILLILITER(S): 9 INJECTION INTRAMUSCULAR; INTRAVENOUS; SUBCUTANEOUS at 04:40

## 2021-01-01 RX ADMIN — CHLORHEXIDINE GLUCONATE 1 APPLICATION(S): 213 SOLUTION TOPICAL at 05:25

## 2021-01-01 RX ADMIN — Medication 40 MILLIEQUIVALENT(S): at 11:54

## 2021-01-01 RX ADMIN — Medication 500000 UNIT(S): at 05:57

## 2021-01-01 RX ADMIN — Medication 1 APPLICATION(S): at 05:23

## 2021-01-01 RX ADMIN — Medication 10 MILLIGRAM(S): at 13:09

## 2021-01-01 RX ADMIN — Medication 125 MILLILITER(S): at 11:29

## 2021-01-01 RX ADMIN — ENOXAPARIN SODIUM 40 MILLIGRAM(S): 100 INJECTION SUBCUTANEOUS at 17:10

## 2021-01-01 RX ADMIN — SENNA PLUS 2 TABLET(S): 8.6 TABLET ORAL at 22:20

## 2021-01-01 RX ADMIN — MAGNESIUM OXIDE 400 MG ORAL TABLET 400 MILLIGRAM(S): 241.3 TABLET ORAL at 21:55

## 2021-01-01 RX ADMIN — Medication 4 MILLILITER(S): at 17:25

## 2021-01-01 RX ADMIN — PROPOFOL 6.13 MICROGRAM(S)/KG/MIN: 10 INJECTION, EMULSION INTRAVENOUS at 02:18

## 2021-01-01 RX ADMIN — ENOXAPARIN SODIUM 60 MILLIGRAM(S): 100 INJECTION SUBCUTANEOUS at 18:28

## 2021-01-01 RX ADMIN — Medication 500000 UNIT(S): at 23:25

## 2021-01-01 RX ADMIN — FENTANYL CITRATE 12.5 MICROGRAM(S): 50 INJECTION INTRAVENOUS at 09:01

## 2021-01-01 RX ADMIN — BROMOCRIPTINE MESYLATE 15 MILLIGRAM(S): 5 CAPSULE ORAL at 22:29

## 2021-01-01 RX ADMIN — SODIUM CHLORIDE 3 GRAM(S): 9 INJECTION INTRAMUSCULAR; INTRAVENOUS; SUBCUTANEOUS at 23:54

## 2021-01-01 RX ADMIN — BROMOCRIPTINE MESYLATE 15 MILLIGRAM(S): 5 CAPSULE ORAL at 14:16

## 2021-01-01 RX ADMIN — SODIUM CHLORIDE 2 GRAM(S): 9 INJECTION INTRAMUSCULAR; INTRAVENOUS; SUBCUTANEOUS at 21:30

## 2021-01-01 RX ADMIN — SODIUM CHLORIDE 1000 MILLILITER(S): 9 INJECTION INTRAMUSCULAR; INTRAVENOUS; SUBCUTANEOUS at 03:00

## 2021-01-01 RX ADMIN — Medication 15 MILLILITER(S): at 11:23

## 2021-01-01 RX ADMIN — Medication 4.79 MICROGRAM(S)/KG/MIN: at 13:31

## 2021-01-01 RX ADMIN — Medication 100 MILLIEQUIVALENT(S): at 13:55

## 2021-01-01 RX ADMIN — ENOXAPARIN SODIUM 40 MILLIGRAM(S): 100 INJECTION SUBCUTANEOUS at 21:23

## 2021-01-01 RX ADMIN — Medication 3 MILLILITER(S): at 05:00

## 2021-01-01 RX ADMIN — Medication 3 MILLILITER(S): at 05:29

## 2021-01-01 RX ADMIN — Medication 15 MILLILITER(S): at 13:21

## 2021-01-01 RX ADMIN — Medication 15 MILLILITER(S): at 13:09

## 2021-01-01 RX ADMIN — LACOSAMIDE 100 MILLIGRAM(S): 50 TABLET ORAL at 06:07

## 2021-01-01 RX ADMIN — Medication 3 MILLILITER(S): at 23:27

## 2021-01-01 RX ADMIN — Medication 30 MILLIGRAM(S): at 14:03

## 2021-01-01 RX ADMIN — Medication 1 APPLICATION(S): at 17:47

## 2021-01-01 RX ADMIN — Medication 4 MILLILITER(S): at 09:18

## 2021-01-01 RX ADMIN — Medication 4 MILLILITER(S): at 23:29

## 2021-01-01 RX ADMIN — Medication 500000 UNIT(S): at 11:32

## 2021-01-01 RX ADMIN — Medication 650 MILLIGRAM(S): at 13:55

## 2021-01-01 RX ADMIN — MIDODRINE HYDROCHLORIDE 10 MILLIGRAM(S): 2.5 TABLET ORAL at 05:20

## 2021-01-01 RX ADMIN — Medication 40 MILLIEQUIVALENT(S): at 13:10

## 2021-01-01 RX ADMIN — Medication 1 APPLICATION(S): at 05:33

## 2021-01-01 RX ADMIN — CHLORHEXIDINE GLUCONATE 1 APPLICATION(S): 213 SOLUTION TOPICAL at 06:26

## 2021-01-01 RX ADMIN — BROMOCRIPTINE MESYLATE 5 MILLIGRAM(S): 5 CAPSULE ORAL at 14:17

## 2021-01-01 RX ADMIN — Medication 100 MILLIGRAM(S): at 14:52

## 2021-01-01 RX ADMIN — SENNA PLUS 2 TABLET(S): 8.6 TABLET ORAL at 22:45

## 2021-01-01 RX ADMIN — FENTANYL CITRATE 12.5 MICROGRAM(S): 50 INJECTION INTRAVENOUS at 13:11

## 2021-01-01 RX ADMIN — Medication 650 MILLIGRAM(S): at 02:05

## 2021-01-01 RX ADMIN — Medication 102 MILLIGRAM(S): at 11:41

## 2021-01-01 RX ADMIN — SODIUM CHLORIDE 4 MILLILITER(S): 9 INJECTION INTRAMUSCULAR; INTRAVENOUS; SUBCUTANEOUS at 06:05

## 2021-01-01 RX ADMIN — Medication 3 MILLILITER(S): at 02:09

## 2021-01-01 RX ADMIN — SODIUM CHLORIDE 50 MILLILITER(S): 9 INJECTION INTRAMUSCULAR; INTRAVENOUS; SUBCUTANEOUS at 21:32

## 2021-01-01 RX ADMIN — Medication 1 MILLIGRAM(S): at 21:47

## 2021-01-01 RX ADMIN — PIPERACILLIN AND TAZOBACTAM 200 GRAM(S): 4; .5 INJECTION, POWDER, LYOPHILIZED, FOR SOLUTION INTRAVENOUS at 23:20

## 2021-01-01 RX ADMIN — Medication 4 MILLILITER(S): at 22:51

## 2021-01-01 RX ADMIN — PIPERACILLIN AND TAZOBACTAM 200 GRAM(S): 4; .5 INJECTION, POWDER, LYOPHILIZED, FOR SOLUTION INTRAVENOUS at 11:26

## 2021-01-01 RX ADMIN — Medication 650 MILLIGRAM(S): at 20:49

## 2021-01-01 RX ADMIN — Medication 1000 MILLIGRAM(S): at 06:10

## 2021-01-01 RX ADMIN — Medication 650 MILLIGRAM(S): at 23:22

## 2021-01-01 RX ADMIN — LACOSAMIDE 100 MILLIGRAM(S): 50 TABLET ORAL at 05:28

## 2021-01-01 RX ADMIN — Medication 3 MILLILITER(S): at 17:02

## 2021-01-01 RX ADMIN — BROMOCRIPTINE MESYLATE 5 MILLIGRAM(S): 5 CAPSULE ORAL at 22:49

## 2021-01-01 RX ADMIN — CHLORHEXIDINE GLUCONATE 15 MILLILITER(S): 213 SOLUTION TOPICAL at 18:19

## 2021-01-01 RX ADMIN — Medication 27.5 MILLIGRAM(S): at 06:24

## 2021-01-01 RX ADMIN — POLYETHYLENE GLYCOL 3350 17 GRAM(S): 17 POWDER, FOR SOLUTION ORAL at 11:01

## 2021-01-01 RX ADMIN — Medication 3 MILLILITER(S): at 02:07

## 2021-01-01 RX ADMIN — Medication 4 MILLIGRAM(S): at 06:25

## 2021-01-01 RX ADMIN — FENTANYL CITRATE 12.5 MICROGRAM(S): 50 INJECTION INTRAVENOUS at 23:00

## 2021-01-01 RX ADMIN — QUETIAPINE FUMARATE 50 MILLIGRAM(S): 200 TABLET, FILM COATED ORAL at 06:21

## 2021-01-01 RX ADMIN — FENTANYL CITRATE 50 MICROGRAM(S): 50 INJECTION INTRAVENOUS at 21:24

## 2021-01-01 RX ADMIN — Medication 3 MILLILITER(S): at 09:00

## 2021-01-01 RX ADMIN — SODIUM CHLORIDE 1000 MILLILITER(S): 9 INJECTION INTRAMUSCULAR; INTRAVENOUS; SUBCUTANEOUS at 18:27

## 2021-01-01 RX ADMIN — DEXMEDETOMIDINE HYDROCHLORIDE IN 0.9% SODIUM CHLORIDE 5.1 MICROGRAM(S)/KG/HR: 4 INJECTION INTRAVENOUS at 16:40

## 2021-01-01 RX ADMIN — LEVETIRACETAM 400 MILLIGRAM(S): 250 TABLET, FILM COATED ORAL at 06:07

## 2021-01-01 RX ADMIN — Medication 200 MILLIGRAM(S): at 17:48

## 2021-01-01 RX ADMIN — Medication 81 MILLIGRAM(S): at 11:31

## 2021-01-01 RX ADMIN — SODIUM CHLORIDE 3 MILLILITER(S): 9 INJECTION INTRAMUSCULAR; INTRAVENOUS; SUBCUTANEOUS at 16:35

## 2021-01-01 RX ADMIN — FENTANYL CITRATE 12.5 MICROGRAM(S): 50 INJECTION INTRAVENOUS at 17:29

## 2021-01-01 RX ADMIN — Medication 4 MILLIGRAM(S): at 21:29

## 2021-01-01 RX ADMIN — Medication 650 MILLIGRAM(S): at 08:40

## 2021-01-01 RX ADMIN — FENTANYL CITRATE 12.5 MICROGRAM(S): 50 INJECTION INTRAVENOUS at 04:00

## 2021-01-01 RX ADMIN — Medication 3 MILLILITER(S): at 22:25

## 2021-01-01 RX ADMIN — ENOXAPARIN SODIUM 100 MILLIGRAM(S): 100 INJECTION SUBCUTANEOUS at 06:07

## 2021-01-01 RX ADMIN — Medication 1 APPLICATION(S): at 05:43

## 2021-01-01 RX ADMIN — PIPERACILLIN AND TAZOBACTAM 200 GRAM(S): 4; .5 INJECTION, POWDER, LYOPHILIZED, FOR SOLUTION INTRAVENOUS at 23:05

## 2021-01-01 RX ADMIN — Medication 30 MILLIGRAM(S): at 22:06

## 2021-01-01 RX ADMIN — Medication 650 MILLIGRAM(S): at 21:35

## 2021-01-01 RX ADMIN — Medication 4 MILLILITER(S): at 06:38

## 2021-01-01 RX ADMIN — TRAMADOL HYDROCHLORIDE 25 MILLIGRAM(S): 50 TABLET ORAL at 03:00

## 2021-01-01 RX ADMIN — Medication 200 MILLIGRAM(S): at 05:19

## 2021-01-01 RX ADMIN — Medication 40 MILLIEQUIVALENT(S): at 09:37

## 2021-01-01 RX ADMIN — Medication 4 MILLILITER(S): at 17:50

## 2021-01-01 RX ADMIN — LACOSAMIDE 100 MILLIGRAM(S): 50 TABLET ORAL at 17:08

## 2021-01-01 RX ADMIN — Medication 1000 MILLIGRAM(S): at 09:05

## 2021-01-01 RX ADMIN — TRAMADOL HYDROCHLORIDE 25 MILLIGRAM(S): 50 TABLET ORAL at 21:31

## 2021-01-01 RX ADMIN — SODIUM CHLORIDE 1000 MILLILITER(S): 9 INJECTION, SOLUTION INTRAVENOUS at 14:13

## 2021-01-01 RX ADMIN — Medication 1 APPLICATION(S): at 05:32

## 2021-01-01 RX ADMIN — Medication 650 MILLIGRAM(S): at 05:44

## 2021-01-01 RX ADMIN — Medication 3 MILLILITER(S): at 11:32

## 2021-01-01 RX ADMIN — Medication 1000 MILLIGRAM(S): at 13:09

## 2021-01-01 RX ADMIN — Medication 1000 MILLIGRAM(S): at 23:30

## 2021-01-01 RX ADMIN — Medication 325 MILLIGRAM(S): at 22:00

## 2021-01-01 RX ADMIN — Medication 500000 UNIT(S): at 12:47

## 2021-01-01 RX ADMIN — NIMODIPINE 60 MILLIGRAM(S): 60 SOLUTION ORAL at 17:16

## 2021-01-01 RX ADMIN — Medication 40 MILLIEQUIVALENT(S): at 10:51

## 2021-01-01 RX ADMIN — SODIUM CHLORIDE 3 GRAM(S): 9 INJECTION INTRAMUSCULAR; INTRAVENOUS; SUBCUTANEOUS at 05:48

## 2021-01-01 RX ADMIN — QUETIAPINE FUMARATE 75 MILLIGRAM(S): 200 TABLET, FILM COATED ORAL at 17:03

## 2021-01-01 RX ADMIN — Medication 1 APPLICATION(S): at 17:40

## 2021-01-01 RX ADMIN — SENNA PLUS 2 TABLET(S): 8.6 TABLET ORAL at 22:09

## 2021-01-01 RX ADMIN — Medication 1 APPLICATION(S): at 19:35

## 2021-01-01 RX ADMIN — BROMOCRIPTINE MESYLATE 15 MILLIGRAM(S): 5 CAPSULE ORAL at 13:38

## 2021-01-01 RX ADMIN — CHLORHEXIDINE GLUCONATE 15 MILLILITER(S): 213 SOLUTION TOPICAL at 17:40

## 2021-01-01 RX ADMIN — BROMOCRIPTINE MESYLATE 5 MILLIGRAM(S): 5 CAPSULE ORAL at 16:42

## 2021-01-01 RX ADMIN — Medication 500000 UNIT(S): at 18:49

## 2021-01-01 RX ADMIN — Medication 4 MILLILITER(S): at 17:58

## 2021-01-01 RX ADMIN — BROMOCRIPTINE MESYLATE 15 MILLIGRAM(S): 5 CAPSULE ORAL at 22:01

## 2021-01-01 RX ADMIN — CHLORHEXIDINE GLUCONATE 15 MILLILITER(S): 213 SOLUTION TOPICAL at 06:07

## 2021-01-01 RX ADMIN — CHLORHEXIDINE GLUCONATE 1 APPLICATION(S): 213 SOLUTION TOPICAL at 06:21

## 2021-01-01 RX ADMIN — NIMODIPINE 60 MILLIGRAM(S): 60 SOLUTION ORAL at 19:37

## 2021-01-01 RX ADMIN — MIDODRINE HYDROCHLORIDE 5 MILLIGRAM(S): 2.5 TABLET ORAL at 05:32

## 2021-01-01 RX ADMIN — Medication 1 CAPSULE(S): at 09:38

## 2021-01-01 RX ADMIN — Medication 30 MILLIGRAM(S): at 21:03

## 2021-01-01 RX ADMIN — SODIUM CHLORIDE 4 MILLILITER(S): 9 INJECTION INTRAMUSCULAR; INTRAVENOUS; SUBCUTANEOUS at 00:10

## 2021-01-01 RX ADMIN — CHLORHEXIDINE GLUCONATE 15 MILLILITER(S): 213 SOLUTION TOPICAL at 17:06

## 2021-01-01 RX ADMIN — BROMOCRIPTINE MESYLATE 15 MILLIGRAM(S): 5 CAPSULE ORAL at 06:05

## 2021-01-01 RX ADMIN — MIDODRINE HYDROCHLORIDE 5 MILLIGRAM(S): 2.5 TABLET ORAL at 19:17

## 2021-01-01 RX ADMIN — Medication 40 MILLIEQUIVALENT(S): at 22:03

## 2021-01-01 RX ADMIN — Medication 10 MILLIGRAM(S): at 11:22

## 2021-01-01 RX ADMIN — FENTANYL CITRATE 25 MICROGRAM(S): 50 INJECTION INTRAVENOUS at 13:30

## 2021-01-01 RX ADMIN — Medication 30 MILLIGRAM(S): at 05:50

## 2021-01-01 RX ADMIN — BROMOCRIPTINE MESYLATE 5 MILLIGRAM(S): 5 CAPSULE ORAL at 22:25

## 2021-01-01 RX ADMIN — PIPERACILLIN AND TAZOBACTAM 200 GRAM(S): 4; .5 INJECTION, POWDER, LYOPHILIZED, FOR SOLUTION INTRAVENOUS at 11:04

## 2021-01-01 RX ADMIN — NIMODIPINE 60 MILLIGRAM(S): 60 SOLUTION ORAL at 14:14

## 2021-01-01 RX ADMIN — BROMOCRIPTINE MESYLATE 5 MILLIGRAM(S): 5 CAPSULE ORAL at 06:15

## 2021-01-01 RX ADMIN — Medication 4 MILLILITER(S): at 06:25

## 2021-01-01 RX ADMIN — Medication 200 MILLIGRAM(S): at 05:25

## 2021-01-01 RX ADMIN — Medication 2 MILLIGRAM(S): at 11:44

## 2021-01-01 RX ADMIN — QUETIAPINE FUMARATE 87.5 MILLIGRAM(S): 200 TABLET, FILM COATED ORAL at 17:21

## 2021-01-01 RX ADMIN — ENOXAPARIN SODIUM 40 MILLIGRAM(S): 100 INJECTION SUBCUTANEOUS at 23:20

## 2021-01-01 RX ADMIN — Medication 5 MILLIGRAM(S): at 23:15

## 2021-01-01 RX ADMIN — APIXABAN 5 MILLIGRAM(S): 2.5 TABLET, FILM COATED ORAL at 09:36

## 2021-01-01 RX ADMIN — Medication 3 MILLILITER(S): at 14:17

## 2021-01-01 RX ADMIN — BROMOCRIPTINE MESYLATE 5 MILLIGRAM(S): 5 CAPSULE ORAL at 21:43

## 2021-01-01 RX ADMIN — SODIUM CHLORIDE 1000 MILLILITER(S): 9 INJECTION, SOLUTION INTRAVENOUS at 05:04

## 2021-01-01 RX ADMIN — FENTANYL CITRATE 25 MICROGRAM(S): 50 INJECTION INTRAVENOUS at 17:34

## 2021-01-01 RX ADMIN — HALOPERIDOL DECANOATE 1 MILLIGRAM(S): 100 INJECTION INTRAMUSCULAR at 12:41

## 2021-01-01 RX ADMIN — Medication 50 GRAM(S): at 07:23

## 2021-01-01 RX ADMIN — SODIUM CHLORIDE 3 GRAM(S): 9 INJECTION INTRAMUSCULAR; INTRAVENOUS; SUBCUTANEOUS at 12:21

## 2021-01-01 RX ADMIN — SODIUM CHLORIDE 4 MILLILITER(S): 9 INJECTION INTRAMUSCULAR; INTRAVENOUS; SUBCUTANEOUS at 22:28

## 2021-01-01 RX ADMIN — QUETIAPINE FUMARATE 75 MILLIGRAM(S): 200 TABLET, FILM COATED ORAL at 17:01

## 2021-01-01 RX ADMIN — Medication 40 MILLIEQUIVALENT(S): at 17:20

## 2021-01-01 RX ADMIN — FENTANYL CITRATE 12.5 MICROGRAM(S): 50 INJECTION INTRAVENOUS at 09:38

## 2021-01-01 RX ADMIN — Medication 20 MILLIEQUIVALENT(S): at 09:13

## 2021-01-01 RX ADMIN — Medication 200 MILLIGRAM(S): at 06:11

## 2021-01-01 RX ADMIN — Medication 100 MILLIEQUIVALENT(S): at 11:27

## 2021-01-01 RX ADMIN — Medication 4.79 MICROGRAM(S)/KG/MIN: at 13:03

## 2021-01-01 RX ADMIN — ENOXAPARIN SODIUM 40 MILLIGRAM(S): 100 INJECTION SUBCUTANEOUS at 21:41

## 2021-01-01 RX ADMIN — FENTANYL CITRATE 12.5 MICROGRAM(S): 50 INJECTION INTRAVENOUS at 00:12

## 2021-01-01 RX ADMIN — Medication 40 MILLIEQUIVALENT(S): at 10:19

## 2021-01-01 RX ADMIN — QUETIAPINE FUMARATE 75 MILLIGRAM(S): 200 TABLET, FILM COATED ORAL at 19:17

## 2021-01-01 RX ADMIN — SODIUM CHLORIDE 4 MILLILITER(S): 9 INJECTION INTRAMUSCULAR; INTRAVENOUS; SUBCUTANEOUS at 11:23

## 2021-01-01 RX ADMIN — ENOXAPARIN SODIUM 40 MILLIGRAM(S): 100 INJECTION SUBCUTANEOUS at 09:55

## 2021-01-01 RX ADMIN — SODIUM CHLORIDE 1000 MILLILITER(S): 9 INJECTION, SOLUTION INTRAVENOUS at 09:51

## 2021-01-01 RX ADMIN — PIPERACILLIN AND TAZOBACTAM 200 GRAM(S): 4; .5 INJECTION, POWDER, LYOPHILIZED, FOR SOLUTION INTRAVENOUS at 11:11

## 2021-01-01 RX ADMIN — BROMOCRIPTINE MESYLATE 5 MILLIGRAM(S): 5 CAPSULE ORAL at 21:53

## 2021-01-01 RX ADMIN — ENOXAPARIN SODIUM 90 MILLIGRAM(S): 100 INJECTION SUBCUTANEOUS at 21:58

## 2021-01-01 RX ADMIN — SODIUM CHLORIDE 2 GRAM(S): 9 INJECTION INTRAMUSCULAR; INTRAVENOUS; SUBCUTANEOUS at 14:25

## 2021-01-01 RX ADMIN — Medication 10 MILLIGRAM(S): at 12:35

## 2021-01-01 RX ADMIN — Medication 650 MILLIGRAM(S): at 05:14

## 2021-01-01 RX ADMIN — BROMOCRIPTINE MESYLATE 15 MILLIGRAM(S): 5 CAPSULE ORAL at 07:35

## 2021-01-01 RX ADMIN — POLYETHYLENE GLYCOL 3350 17 GRAM(S): 17 POWDER, FOR SOLUTION ORAL at 11:27

## 2021-01-01 RX ADMIN — Medication 200 MILLIGRAM(S): at 18:18

## 2021-01-01 RX ADMIN — Medication 500000 UNIT(S): at 17:24

## 2021-01-01 RX ADMIN — NIMODIPINE 60 MILLIGRAM(S): 60 SOLUTION ORAL at 09:53

## 2021-01-01 RX ADMIN — SODIUM CHLORIDE 4 MILLILITER(S): 9 INJECTION INTRAMUSCULAR; INTRAVENOUS; SUBCUTANEOUS at 04:26

## 2021-01-01 RX ADMIN — SENNA PLUS 2 TABLET(S): 8.6 TABLET ORAL at 21:17

## 2021-01-01 RX ADMIN — BROMOCRIPTINE MESYLATE 15 MILLIGRAM(S): 5 CAPSULE ORAL at 15:16

## 2021-01-01 RX ADMIN — FENTANYL CITRATE 12.5 MICROGRAM(S): 50 INJECTION INTRAVENOUS at 03:48

## 2021-01-01 RX ADMIN — Medication 1 APPLICATION(S): at 17:58

## 2021-01-01 RX ADMIN — Medication 4 MILLILITER(S): at 21:28

## 2021-01-01 RX ADMIN — ENOXAPARIN SODIUM 100 MILLIGRAM(S): 100 INJECTION SUBCUTANEOUS at 16:46

## 2021-01-01 RX ADMIN — Medication 4 MILLILITER(S): at 17:16

## 2021-01-01 RX ADMIN — BROMOCRIPTINE MESYLATE 5 MILLIGRAM(S): 5 CAPSULE ORAL at 16:45

## 2021-01-01 RX ADMIN — LACOSAMIDE 100 MILLIGRAM(S): 50 TABLET ORAL at 06:01

## 2021-01-01 RX ADMIN — CHLORHEXIDINE GLUCONATE 1 APPLICATION(S): 213 SOLUTION TOPICAL at 05:28

## 2021-01-01 RX ADMIN — CHLORHEXIDINE GLUCONATE 1 APPLICATION(S): 213 SOLUTION TOPICAL at 05:47

## 2021-01-01 RX ADMIN — Medication 50 MILLIEQUIVALENT(S): at 09:17

## 2021-01-01 RX ADMIN — Medication 4 MILLILITER(S): at 18:53

## 2021-01-01 RX ADMIN — NIMODIPINE 60 MILLIGRAM(S): 60 SOLUTION ORAL at 10:42

## 2021-01-01 RX ADMIN — SODIUM CHLORIDE 4 MILLILITER(S): 9 INJECTION INTRAMUSCULAR; INTRAVENOUS; SUBCUTANEOUS at 21:04

## 2021-01-01 RX ADMIN — SODIUM CHLORIDE 4 MILLILITER(S): 9 INJECTION INTRAMUSCULAR; INTRAVENOUS; SUBCUTANEOUS at 18:53

## 2021-01-01 RX ADMIN — Medication 30 MILLIGRAM(S): at 22:14

## 2021-01-01 RX ADMIN — Medication 3 MILLILITER(S): at 17:44

## 2021-01-01 RX ADMIN — Medication 4 MILLIGRAM(S): at 23:05

## 2021-01-01 RX ADMIN — NIMODIPINE 60 MILLIGRAM(S): 60 SOLUTION ORAL at 23:21

## 2021-01-01 RX ADMIN — SODIUM CHLORIDE 50 MILLILITER(S): 5 INJECTION, SOLUTION INTRAVENOUS at 02:18

## 2021-01-01 RX ADMIN — Medication 27.5 MILLIGRAM(S): at 06:02

## 2021-01-01 RX ADMIN — Medication 250 GRAM(S): at 03:15

## 2021-01-01 RX ADMIN — Medication 20 MILLIEQUIVALENT(S): at 09:58

## 2021-01-01 RX ADMIN — ENOXAPARIN SODIUM 100 MILLIGRAM(S): 100 INJECTION SUBCUTANEOUS at 05:32

## 2021-01-01 RX ADMIN — NIMODIPINE 60 MILLIGRAM(S): 60 SOLUTION ORAL at 08:57

## 2021-01-01 RX ADMIN — Medication 100 MILLIGRAM(S): at 00:13

## 2021-01-01 RX ADMIN — LACOSAMIDE 100 MILLIGRAM(S): 50 TABLET ORAL at 05:29

## 2021-01-01 RX ADMIN — MIDODRINE HYDROCHLORIDE 2.5 MILLIGRAM(S): 2.5 TABLET ORAL at 17:03

## 2021-01-01 RX ADMIN — Medication 1 CAPSULE(S): at 17:49

## 2021-01-01 RX ADMIN — NIMODIPINE 60 MILLIGRAM(S): 60 SOLUTION ORAL at 06:15

## 2021-01-01 RX ADMIN — Medication 4 MILLIGRAM(S): at 11:26

## 2021-01-01 RX ADMIN — Medication 10 MILLIGRAM(S): at 03:34

## 2021-01-01 RX ADMIN — Medication 1 CAPSULE(S): at 19:00

## 2021-01-01 RX ADMIN — Medication 1000 MILLIGRAM(S): at 23:45

## 2021-01-01 RX ADMIN — POTASSIUM PHOSPHATE, MONOBASIC POTASSIUM PHOSPHATE, DIBASIC 62.5 MILLIMOLE(S): 236; 224 INJECTION, SOLUTION INTRAVENOUS at 12:20

## 2021-01-01 RX ADMIN — TRAMADOL HYDROCHLORIDE 25 MILLIGRAM(S): 50 TABLET ORAL at 04:07

## 2021-01-01 RX ADMIN — HALOPERIDOL DECANOATE 2 MILLIGRAM(S): 100 INJECTION INTRAMUSCULAR at 23:15

## 2021-01-01 RX ADMIN — PIPERACILLIN AND TAZOBACTAM 200 GRAM(S): 4; .5 INJECTION, POWDER, LYOPHILIZED, FOR SOLUTION INTRAVENOUS at 17:05

## 2021-01-01 RX ADMIN — CHLORHEXIDINE GLUCONATE 1 APPLICATION(S): 213 SOLUTION TOPICAL at 07:08

## 2021-01-01 RX ADMIN — Medication 30 MILLIGRAM(S): at 14:55

## 2021-01-01 RX ADMIN — Medication 1000 MILLIGRAM(S): at 06:08

## 2021-01-01 RX ADMIN — BROMOCRIPTINE MESYLATE 15 MILLIGRAM(S): 5 CAPSULE ORAL at 05:36

## 2021-01-01 RX ADMIN — Medication 40 MILLIEQUIVALENT(S): at 02:32

## 2021-01-01 RX ADMIN — Medication 1000 MILLIGRAM(S): at 14:15

## 2021-01-01 RX ADMIN — Medication 4 MILLIGRAM(S): at 11:33

## 2021-01-01 RX ADMIN — PIPERACILLIN AND TAZOBACTAM 200 GRAM(S): 4; .5 INJECTION, POWDER, LYOPHILIZED, FOR SOLUTION INTRAVENOUS at 05:34

## 2021-01-01 RX ADMIN — Medication 10 MILLIGRAM(S): at 11:31

## 2021-01-01 RX ADMIN — Medication 500000 UNIT(S): at 12:22

## 2021-01-01 RX ADMIN — Medication 100 MILLIGRAM(S): at 21:28

## 2021-01-01 RX ADMIN — BROMOCRIPTINE MESYLATE 15 MILLIGRAM(S): 5 CAPSULE ORAL at 21:14

## 2021-01-01 RX ADMIN — NIMODIPINE 60 MILLIGRAM(S): 60 SOLUTION ORAL at 05:00

## 2021-01-01 RX ADMIN — Medication 27.5 MILLIGRAM(S): at 13:40

## 2021-01-01 RX ADMIN — SENNA PLUS 2 TABLET(S): 8.6 TABLET ORAL at 22:02

## 2021-01-01 RX ADMIN — Medication 40 MILLIEQUIVALENT(S): at 05:25

## 2021-01-01 RX ADMIN — Medication 15 MILLILITER(S): at 17:58

## 2021-01-01 RX ADMIN — Medication 4 MILLILITER(S): at 20:58

## 2021-01-01 RX ADMIN — ONDANSETRON 4 MILLIGRAM(S): 8 TABLET, FILM COATED ORAL at 14:00

## 2021-01-01 RX ADMIN — Medication 3 MILLILITER(S): at 21:19

## 2021-01-01 RX ADMIN — PIPERACILLIN AND TAZOBACTAM 200 GRAM(S): 4; .5 INJECTION, POWDER, LYOPHILIZED, FOR SOLUTION INTRAVENOUS at 05:33

## 2021-01-01 RX ADMIN — Medication 500000 UNIT(S): at 00:06

## 2021-01-01 RX ADMIN — SODIUM CHLORIDE 4 MILLILITER(S): 9 INJECTION INTRAMUSCULAR; INTRAVENOUS; SUBCUTANEOUS at 00:05

## 2021-01-01 RX ADMIN — Medication 4 MILLILITER(S): at 19:16

## 2021-01-01 RX ADMIN — SODIUM CHLORIDE 4 MILLILITER(S): 9 INJECTION INTRAMUSCULAR; INTRAVENOUS; SUBCUTANEOUS at 23:43

## 2021-01-01 RX ADMIN — Medication 500000 UNIT(S): at 00:21

## 2021-01-01 RX ADMIN — LACOSAMIDE 100 MILLIGRAM(S): 50 TABLET ORAL at 06:23

## 2021-01-01 RX ADMIN — Medication 0.5 MILLIGRAM(S): at 22:55

## 2021-01-01 RX ADMIN — Medication 4 MILLILITER(S): at 17:03

## 2021-01-01 RX ADMIN — Medication 40 MILLIEQUIVALENT(S): at 06:25

## 2021-01-01 RX ADMIN — QUETIAPINE FUMARATE 50 MILLIGRAM(S): 200 TABLET, FILM COATED ORAL at 05:42

## 2021-01-01 RX ADMIN — BROMOCRIPTINE MESYLATE 5 MILLIGRAM(S): 5 CAPSULE ORAL at 05:00

## 2021-01-01 RX ADMIN — Medication 40 MILLIEQUIVALENT(S): at 05:28

## 2021-01-01 RX ADMIN — MIDODRINE HYDROCHLORIDE 10 MILLIGRAM(S): 2.5 TABLET ORAL at 13:50

## 2021-01-01 RX ADMIN — Medication 81 MILLIGRAM(S): at 14:20

## 2021-01-01 RX ADMIN — Medication 4 MILLILITER(S): at 19:18

## 2021-01-01 RX ADMIN — BROMOCRIPTINE MESYLATE 5 MILLIGRAM(S): 5 CAPSULE ORAL at 05:33

## 2021-01-01 RX ADMIN — Medication 1000 MILLIGRAM(S): at 06:42

## 2021-01-01 RX ADMIN — Medication 4 MILLILITER(S): at 19:35

## 2021-01-01 RX ADMIN — Medication 40 MILLIEQUIVALENT(S): at 00:07

## 2021-01-01 RX ADMIN — Medication 100 MILLIGRAM(S): at 08:32

## 2021-01-01 RX ADMIN — LACOSAMIDE 100 MILLIGRAM(S): 50 TABLET ORAL at 17:57

## 2021-01-01 RX ADMIN — SENNA PLUS 2 TABLET(S): 8.6 TABLET ORAL at 21:56

## 2021-01-01 RX ADMIN — Medication 650 MILLIGRAM(S): at 06:11

## 2021-01-01 RX ADMIN — Medication 500000 UNIT(S): at 06:06

## 2021-01-01 RX ADMIN — Medication 100 MILLIGRAM(S): at 15:18

## 2021-01-01 RX ADMIN — Medication 3 MILLILITER(S): at 12:55

## 2021-01-01 RX ADMIN — CHLORHEXIDINE GLUCONATE 1 APPLICATION(S): 213 SOLUTION TOPICAL at 17:41

## 2021-01-01 RX ADMIN — Medication 4 MILLILITER(S): at 05:19

## 2021-01-01 RX ADMIN — CHLORHEXIDINE GLUCONATE 1 APPLICATION(S): 213 SOLUTION TOPICAL at 06:07

## 2021-01-01 RX ADMIN — QUETIAPINE FUMARATE 50 MILLIGRAM(S): 200 TABLET, FILM COATED ORAL at 05:34

## 2021-01-01 RX ADMIN — Medication 3 MILLILITER(S): at 09:19

## 2021-01-01 RX ADMIN — PIPERACILLIN AND TAZOBACTAM 200 GRAM(S): 4; .5 INJECTION, POWDER, LYOPHILIZED, FOR SOLUTION INTRAVENOUS at 11:46

## 2021-01-01 RX ADMIN — Medication 3 MILLIGRAM(S): at 00:26

## 2021-01-01 RX ADMIN — BROMOCRIPTINE MESYLATE 5 MILLIGRAM(S): 5 CAPSULE ORAL at 06:55

## 2021-01-01 RX ADMIN — BROMOCRIPTINE MESYLATE 5 MILLIGRAM(S): 5 CAPSULE ORAL at 07:18

## 2021-01-01 RX ADMIN — Medication 650 MILLIGRAM(S): at 17:29

## 2021-01-01 RX ADMIN — QUETIAPINE FUMARATE 75 MILLIGRAM(S): 200 TABLET, FILM COATED ORAL at 18:41

## 2021-01-01 RX ADMIN — Medication 4 MILLILITER(S): at 22:00

## 2021-01-01 RX ADMIN — LACOSAMIDE 100 MILLIGRAM(S): 50 TABLET ORAL at 17:31

## 2021-01-01 RX ADMIN — CHLORHEXIDINE GLUCONATE 15 MILLILITER(S): 213 SOLUTION TOPICAL at 17:49

## 2021-01-01 RX ADMIN — CEFTRIAXONE 100 MILLIGRAM(S): 500 INJECTION, POWDER, FOR SOLUTION INTRAMUSCULAR; INTRAVENOUS at 10:16

## 2021-01-01 RX ADMIN — Medication 3 MILLILITER(S): at 06:29

## 2021-01-01 RX ADMIN — SODIUM CHLORIDE 4 MILLILITER(S): 9 INJECTION INTRAMUSCULAR; INTRAVENOUS; SUBCUTANEOUS at 22:31

## 2021-01-01 RX ADMIN — CHLORHEXIDINE GLUCONATE 1 APPLICATION(S): 213 SOLUTION TOPICAL at 05:42

## 2021-01-01 RX ADMIN — Medication 15 MILLILITER(S): at 12:03

## 2021-01-01 RX ADMIN — Medication 12.5 GRAM(S): at 16:49

## 2021-01-01 RX ADMIN — MIDODRINE HYDROCHLORIDE 5 MILLIGRAM(S): 2.5 TABLET ORAL at 09:52

## 2021-01-01 RX ADMIN — DEXMEDETOMIDINE HYDROCHLORIDE IN 0.9% SODIUM CHLORIDE 5.1 MICROGRAM(S)/KG/HR: 4 INJECTION INTRAVENOUS at 06:52

## 2021-01-01 RX ADMIN — FENTANYL CITRATE 12.5 MICROGRAM(S): 50 INJECTION INTRAVENOUS at 02:30

## 2021-01-01 RX ADMIN — Medication 9.57 MICROGRAM(S)/KG/MIN: at 13:40

## 2021-01-01 RX ADMIN — BROMOCRIPTINE MESYLATE 5 MILLIGRAM(S): 5 CAPSULE ORAL at 05:36

## 2021-01-01 RX ADMIN — BROMOCRIPTINE MESYLATE 15 MILLIGRAM(S): 5 CAPSULE ORAL at 22:07

## 2021-01-01 RX ADMIN — NIMODIPINE 60 MILLIGRAM(S): 60 SOLUTION ORAL at 11:32

## 2021-01-01 RX ADMIN — QUETIAPINE FUMARATE 50 MILLIGRAM(S): 200 TABLET, FILM COATED ORAL at 05:19

## 2021-01-01 RX ADMIN — Medication 100 MILLIGRAM(S): at 17:41

## 2021-01-01 RX ADMIN — Medication 3 MILLILITER(S): at 21:01

## 2021-01-01 RX ADMIN — Medication 100 MILLIEQUIVALENT(S): at 11:50

## 2021-01-01 RX ADMIN — ENOXAPARIN SODIUM 40 MILLIGRAM(S): 100 INJECTION SUBCUTANEOUS at 09:35

## 2021-01-01 RX ADMIN — Medication 3 MILLILITER(S): at 12:28

## 2021-01-01 RX ADMIN — Medication 3 MILLILITER(S): at 11:23

## 2021-01-01 RX ADMIN — ENOXAPARIN SODIUM 60 MILLIGRAM(S): 100 INJECTION SUBCUTANEOUS at 18:36

## 2021-01-01 RX ADMIN — Medication 500000 UNIT(S): at 22:48

## 2021-01-01 RX ADMIN — PIPERACILLIN AND TAZOBACTAM 200 GRAM(S): 4; .5 INJECTION, POWDER, LYOPHILIZED, FOR SOLUTION INTRAVENOUS at 17:45

## 2021-01-01 RX ADMIN — Medication 40 MILLIEQUIVALENT(S): at 11:25

## 2021-01-01 RX ADMIN — Medication 300 MILLIGRAM(S): at 17:26

## 2021-01-01 RX ADMIN — Medication 50 MILLIEQUIVALENT(S): at 10:51

## 2021-01-01 RX ADMIN — QUETIAPINE FUMARATE 50 MILLIGRAM(S): 200 TABLET, FILM COATED ORAL at 05:00

## 2021-01-01 RX ADMIN — FENTANYL CITRATE 12.5 MICROGRAM(S): 50 INJECTION INTRAVENOUS at 17:23

## 2021-01-01 RX ADMIN — SODIUM CHLORIDE 35 MILLILITER(S): 9 INJECTION INTRAMUSCULAR; INTRAVENOUS; SUBCUTANEOUS at 21:22

## 2021-01-01 RX ADMIN — Medication 30 MILLIGRAM(S): at 22:45

## 2021-01-01 RX ADMIN — Medication 100 MILLIGRAM(S): at 09:29

## 2021-01-01 RX ADMIN — MIDODRINE HYDROCHLORIDE 5 MILLIGRAM(S): 2.5 TABLET ORAL at 05:41

## 2021-01-01 RX ADMIN — Medication 3 MILLILITER(S): at 09:23

## 2021-01-01 RX ADMIN — SODIUM CHLORIDE 75 MILLILITER(S): 9 INJECTION INTRAMUSCULAR; INTRAVENOUS; SUBCUTANEOUS at 22:18

## 2021-01-01 RX ADMIN — ENOXAPARIN SODIUM 40 MILLIGRAM(S): 100 INJECTION SUBCUTANEOUS at 22:10

## 2021-01-01 RX ADMIN — SODIUM CHLORIDE 4 MILLILITER(S): 9 INJECTION INTRAMUSCULAR; INTRAVENOUS; SUBCUTANEOUS at 18:21

## 2021-01-01 RX ADMIN — Medication 650 MILLIGRAM(S): at 19:00

## 2021-01-01 RX ADMIN — Medication 3 MILLILITER(S): at 09:09

## 2021-01-01 RX ADMIN — Medication 3 MILLILITER(S): at 18:53

## 2021-01-01 RX ADMIN — APIXABAN 5 MILLIGRAM(S): 2.5 TABLET, FILM COATED ORAL at 09:56

## 2021-01-01 RX ADMIN — Medication 4 MILLILITER(S): at 17:37

## 2021-01-01 RX ADMIN — Medication 3 MILLILITER(S): at 21:58

## 2021-01-01 RX ADMIN — ENOXAPARIN SODIUM 40 MILLIGRAM(S): 100 INJECTION SUBCUTANEOUS at 09:31

## 2021-01-01 RX ADMIN — QUETIAPINE FUMARATE 87.5 MILLIGRAM(S): 200 TABLET, FILM COATED ORAL at 17:45

## 2021-01-01 RX ADMIN — PIPERACILLIN AND TAZOBACTAM 200 GRAM(S): 4; .5 INJECTION, POWDER, LYOPHILIZED, FOR SOLUTION INTRAVENOUS at 19:00

## 2021-01-01 RX ADMIN — ERGOCALCIFEROL 50000 UNIT(S): 1.25 CAPSULE ORAL at 06:06

## 2021-01-01 RX ADMIN — BROMOCRIPTINE MESYLATE 5 MILLIGRAM(S): 5 CAPSULE ORAL at 06:05

## 2021-01-01 RX ADMIN — Medication 81 MILLIGRAM(S): at 11:05

## 2021-01-01 RX ADMIN — SENNA PLUS 2 TABLET(S): 8.6 TABLET ORAL at 23:11

## 2021-01-01 RX ADMIN — METHOCARBAMOL 500 MILLIGRAM(S): 500 TABLET, FILM COATED ORAL at 14:07

## 2021-01-01 RX ADMIN — APIXABAN 5 MILLIGRAM(S): 2.5 TABLET, FILM COATED ORAL at 09:29

## 2021-01-01 RX ADMIN — SODIUM CHLORIDE 4 MILLILITER(S): 9 INJECTION INTRAMUSCULAR; INTRAVENOUS; SUBCUTANEOUS at 05:18

## 2021-01-01 RX ADMIN — SODIUM CHLORIDE 1000 MILLILITER(S): 9 INJECTION INTRAMUSCULAR; INTRAVENOUS; SUBCUTANEOUS at 18:53

## 2021-01-01 RX ADMIN — PIPERACILLIN AND TAZOBACTAM 200 GRAM(S): 4; .5 INJECTION, POWDER, LYOPHILIZED, FOR SOLUTION INTRAVENOUS at 00:18

## 2021-01-01 RX ADMIN — Medication 27.5 MILLIGRAM(S): at 22:02

## 2021-01-01 RX ADMIN — Medication 4 MILLIGRAM(S): at 17:36

## 2021-01-01 RX ADMIN — Medication 3 MILLILITER(S): at 15:07

## 2021-01-01 RX ADMIN — Medication 81 MILLIGRAM(S): at 12:35

## 2021-01-01 RX ADMIN — ENOXAPARIN SODIUM 100 MILLIGRAM(S): 100 INJECTION SUBCUTANEOUS at 18:42

## 2021-01-01 RX ADMIN — Medication 200 MILLIGRAM(S): at 18:29

## 2021-01-01 RX ADMIN — BROMOCRIPTINE MESYLATE 15 MILLIGRAM(S): 5 CAPSULE ORAL at 21:48

## 2021-01-01 RX ADMIN — Medication 1000 MILLIGRAM(S): at 12:24

## 2021-01-01 RX ADMIN — Medication 4 MILLILITER(S): at 11:33

## 2021-01-01 RX ADMIN — NIMODIPINE 60 MILLIGRAM(S): 60 SOLUTION ORAL at 01:12

## 2021-01-01 RX ADMIN — IRON SUCROSE 176.67 MILLIGRAM(S): 20 INJECTION, SOLUTION INTRAVENOUS at 21:03

## 2021-01-01 RX ADMIN — Medication 4 MILLILITER(S): at 08:51

## 2021-01-01 RX ADMIN — Medication 1 APPLICATION(S): at 18:45

## 2021-01-01 RX ADMIN — LACOSAMIDE 100 MILLIGRAM(S): 50 TABLET ORAL at 06:11

## 2021-01-01 RX ADMIN — SODIUM CHLORIDE 1000 MILLILITER(S): 9 INJECTION INTRAMUSCULAR; INTRAVENOUS; SUBCUTANEOUS at 05:35

## 2021-01-01 RX ADMIN — BROMOCRIPTINE MESYLATE 5 MILLIGRAM(S): 5 CAPSULE ORAL at 22:26

## 2021-01-01 RX ADMIN — FENTANYL CITRATE 12.5 MICROGRAM(S): 50 INJECTION INTRAVENOUS at 06:05

## 2021-01-01 RX ADMIN — Medication 500000 UNIT(S): at 23:19

## 2021-01-01 RX ADMIN — Medication 3 MILLILITER(S): at 17:55

## 2021-01-01 RX ADMIN — Medication 3 MILLILITER(S): at 04:22

## 2021-01-01 RX ADMIN — FLUDROCORTISONE ACETATE 0.1 MILLIGRAM(S): 0.1 TABLET ORAL at 07:18

## 2021-01-01 RX ADMIN — PANTOPRAZOLE SODIUM 40 MILLIGRAM(S): 20 TABLET, DELAYED RELEASE ORAL at 12:36

## 2021-01-01 RX ADMIN — SENNA PLUS 2 TABLET(S): 8.6 TABLET ORAL at 21:29

## 2021-01-01 RX ADMIN — BROMOCRIPTINE MESYLATE 15 MILLIGRAM(S): 5 CAPSULE ORAL at 13:10

## 2021-01-01 RX ADMIN — Medication 300 MILLIGRAM(S): at 17:45

## 2021-01-01 RX ADMIN — QUETIAPINE FUMARATE 75 MILLIGRAM(S): 200 TABLET, FILM COATED ORAL at 18:43

## 2021-01-01 RX ADMIN — Medication 1 CAPSULE(S): at 06:29

## 2021-01-01 RX ADMIN — Medication 50 MILLIEQUIVALENT(S): at 13:14

## 2021-01-01 RX ADMIN — Medication 100 MILLIEQUIVALENT(S): at 09:39

## 2021-01-01 RX ADMIN — POTASSIUM PHOSPHATE, MONOBASIC POTASSIUM PHOSPHATE, DIBASIC 83.33 MILLIMOLE(S): 236; 224 INJECTION, SOLUTION INTRAVENOUS at 07:48

## 2021-01-01 RX ADMIN — LACOSAMIDE 100 MILLIGRAM(S): 50 TABLET ORAL at 18:47

## 2021-01-01 RX ADMIN — Medication 4 MILLIGRAM(S): at 22:51

## 2021-01-01 RX ADMIN — Medication 500000 UNIT(S): at 05:19

## 2021-01-01 RX ADMIN — Medication 27.5 MILLIGRAM(S): at 15:17

## 2021-01-01 RX ADMIN — Medication 3 MILLILITER(S): at 06:30

## 2021-01-01 RX ADMIN — Medication 27.5 MILLIGRAM(S): at 17:19

## 2021-01-01 RX ADMIN — Medication 1 CAPSULE(S): at 12:19

## 2021-01-01 RX ADMIN — CHLORHEXIDINE GLUCONATE 1 APPLICATION(S): 213 SOLUTION TOPICAL at 07:25

## 2021-01-01 RX ADMIN — NIMODIPINE 60 MILLIGRAM(S): 60 SOLUTION ORAL at 05:31

## 2021-01-01 RX ADMIN — Medication 4 MILLILITER(S): at 11:24

## 2021-01-01 RX ADMIN — QUETIAPINE FUMARATE 50 MILLIGRAM(S): 200 TABLET, FILM COATED ORAL at 05:23

## 2021-01-01 RX ADMIN — Medication 3 MILLILITER(S): at 06:00

## 2021-01-01 RX ADMIN — CHLORHEXIDINE GLUCONATE 15 MILLILITER(S): 213 SOLUTION TOPICAL at 18:29

## 2021-01-01 RX ADMIN — SODIUM CHLORIDE 4 MILLILITER(S): 9 INJECTION INTRAMUSCULAR; INTRAVENOUS; SUBCUTANEOUS at 09:13

## 2021-01-01 RX ADMIN — QUETIAPINE FUMARATE 25 MILLIGRAM(S): 200 TABLET, FILM COATED ORAL at 12:23

## 2021-01-01 RX ADMIN — Medication 40 MILLIEQUIVALENT(S): at 22:26

## 2021-01-01 RX ADMIN — SODIUM CHLORIDE 1000 MILLILITER(S): 9 INJECTION INTRAMUSCULAR; INTRAVENOUS; SUBCUTANEOUS at 12:22

## 2021-01-01 RX ADMIN — Medication 1 APPLICATION(S): at 06:05

## 2021-01-01 RX ADMIN — FENTANYL CITRATE 12.5 MICROGRAM(S): 50 INJECTION INTRAVENOUS at 00:13

## 2021-01-01 RX ADMIN — Medication 3 MILLILITER(S): at 18:19

## 2021-01-01 RX ADMIN — PIPERACILLIN AND TAZOBACTAM 200 GRAM(S): 4; .5 INJECTION, POWDER, LYOPHILIZED, FOR SOLUTION INTRAVENOUS at 11:32

## 2021-01-01 RX ADMIN — Medication 2: at 23:17

## 2021-01-01 RX ADMIN — Medication 81 MILLIGRAM(S): at 11:10

## 2021-01-01 RX ADMIN — Medication 200 MILLIGRAM(S): at 05:47

## 2021-01-01 RX ADMIN — BROMOCRIPTINE MESYLATE 15 MILLIGRAM(S): 5 CAPSULE ORAL at 05:51

## 2021-01-01 RX ADMIN — PIPERACILLIN AND TAZOBACTAM 200 GRAM(S): 4; .5 INJECTION, POWDER, LYOPHILIZED, FOR SOLUTION INTRAVENOUS at 05:48

## 2021-01-01 RX ADMIN — Medication 4 MILLILITER(S): at 07:31

## 2021-01-01 RX ADMIN — NIMODIPINE 60 MILLIGRAM(S): 60 SOLUTION ORAL at 14:06

## 2021-01-01 RX ADMIN — Medication 3 MILLILITER(S): at 04:40

## 2021-01-01 RX ADMIN — SODIUM CHLORIDE 4 MILLILITER(S): 9 INJECTION INTRAMUSCULAR; INTRAVENOUS; SUBCUTANEOUS at 12:54

## 2021-01-01 RX ADMIN — SODIUM CHLORIDE 4 MILLILITER(S): 9 INJECTION INTRAMUSCULAR; INTRAVENOUS; SUBCUTANEOUS at 05:10

## 2021-01-01 RX ADMIN — SODIUM CHLORIDE 3 GRAM(S): 9 INJECTION INTRAMUSCULAR; INTRAVENOUS; SUBCUTANEOUS at 17:09

## 2021-01-01 RX ADMIN — ENOXAPARIN SODIUM 40 MILLIGRAM(S): 100 INJECTION SUBCUTANEOUS at 14:55

## 2021-01-01 RX ADMIN — MIDODRINE HYDROCHLORIDE 5 MILLIGRAM(S): 2.5 TABLET ORAL at 17:23

## 2021-01-01 RX ADMIN — Medication 500000 UNIT(S): at 13:09

## 2021-01-01 RX ADMIN — MIDODRINE HYDROCHLORIDE 10 MILLIGRAM(S): 2.5 TABLET ORAL at 06:05

## 2021-01-01 RX ADMIN — Medication 15 MILLILITER(S): at 12:56

## 2021-01-01 RX ADMIN — SODIUM CHLORIDE 4 MILLILITER(S): 9 INJECTION INTRAMUSCULAR; INTRAVENOUS; SUBCUTANEOUS at 22:34

## 2021-01-01 RX ADMIN — POLYETHYLENE GLYCOL 3350 17 GRAM(S): 17 POWDER, FOR SOLUTION ORAL at 13:54

## 2021-01-01 RX ADMIN — Medication 125 MILLILITER(S): at 03:01

## 2021-01-01 RX ADMIN — NIMODIPINE 60 MILLIGRAM(S): 60 SOLUTION ORAL at 05:39

## 2021-01-01 RX ADMIN — BROMOCRIPTINE MESYLATE 5 MILLIGRAM(S): 5 CAPSULE ORAL at 06:41

## 2021-01-01 RX ADMIN — Medication 200 MILLIGRAM(S): at 05:01

## 2021-01-01 RX ADMIN — BROMOCRIPTINE MESYLATE 5 MILLIGRAM(S): 5 CAPSULE ORAL at 15:29

## 2021-01-01 RX ADMIN — Medication 1000 MILLIGRAM(S): at 09:40

## 2021-01-01 RX ADMIN — FENTANYL CITRATE 12.5 MICROGRAM(S): 50 INJECTION INTRAVENOUS at 07:00

## 2021-01-01 RX ADMIN — BROMOCRIPTINE MESYLATE 15 MILLIGRAM(S): 5 CAPSULE ORAL at 13:49

## 2021-01-01 RX ADMIN — FENTANYL CITRATE 12.5 MICROGRAM(S): 50 INJECTION INTRAVENOUS at 09:34

## 2021-01-01 RX ADMIN — Medication 3 MILLILITER(S): at 16:07

## 2021-01-01 RX ADMIN — Medication 6 MILLIGRAM(S): at 17:49

## 2021-01-01 RX ADMIN — BROMOCRIPTINE MESYLATE 15 MILLIGRAM(S): 5 CAPSULE ORAL at 05:49

## 2021-01-01 RX ADMIN — NIMODIPINE 60 MILLIGRAM(S): 60 SOLUTION ORAL at 06:03

## 2021-01-01 RX ADMIN — Medication 3 MILLILITER(S): at 05:24

## 2021-01-01 RX ADMIN — Medication 3 MILLILITER(S): at 06:14

## 2021-01-01 RX ADMIN — SODIUM CHLORIDE 4 MILLILITER(S): 9 INJECTION INTRAMUSCULAR; INTRAVENOUS; SUBCUTANEOUS at 11:34

## 2021-01-01 RX ADMIN — Medication 81 MILLIGRAM(S): at 11:42

## 2021-01-01 RX ADMIN — Medication 81 MILLIGRAM(S): at 15:28

## 2021-01-01 RX ADMIN — Medication 1 APPLICATION(S): at 17:43

## 2021-01-01 RX ADMIN — ENOXAPARIN SODIUM 40 MILLIGRAM(S): 100 INJECTION SUBCUTANEOUS at 22:20

## 2021-01-01 RX ADMIN — SODIUM CHLORIDE 35 MILLILITER(S): 9 INJECTION INTRAMUSCULAR; INTRAVENOUS; SUBCUTANEOUS at 02:16

## 2021-01-01 RX ADMIN — SODIUM CHLORIDE 1000 MILLILITER(S): 9 INJECTION INTRAMUSCULAR; INTRAVENOUS; SUBCUTANEOUS at 08:37

## 2021-01-01 RX ADMIN — ENOXAPARIN SODIUM 40 MILLIGRAM(S): 100 INJECTION SUBCUTANEOUS at 09:39

## 2021-01-01 RX ADMIN — Medication 4 MILLIGRAM(S): at 11:47

## 2021-01-01 RX ADMIN — Medication 650 MILLIGRAM(S): at 01:58

## 2021-01-01 RX ADMIN — FENTANYL CITRATE 25 MICROGRAM(S): 50 INJECTION INTRAVENOUS at 15:18

## 2021-01-01 RX ADMIN — Medication 4 MILLILITER(S): at 12:28

## 2021-01-01 RX ADMIN — SODIUM CHLORIDE 500 MILLILITER(S): 9 INJECTION INTRAMUSCULAR; INTRAVENOUS; SUBCUTANEOUS at 07:09

## 2021-01-01 RX ADMIN — SODIUM CHLORIDE 3 MILLILITER(S): 9 INJECTION INTRAMUSCULAR; INTRAVENOUS; SUBCUTANEOUS at 21:26

## 2021-01-01 RX ADMIN — Medication 4.79 MICROGRAM(S)/KG/MIN: at 11:02

## 2021-01-01 RX ADMIN — Medication 3 MILLILITER(S): at 01:09

## 2021-01-01 RX ADMIN — Medication 3 MILLILITER(S): at 21:53

## 2021-01-01 RX ADMIN — Medication 500000 UNIT(S): at 18:58

## 2021-01-01 RX ADMIN — PIPERACILLIN AND TAZOBACTAM 200 GRAM(S): 4; .5 INJECTION, POWDER, LYOPHILIZED, FOR SOLUTION INTRAVENOUS at 06:16

## 2021-01-01 RX ADMIN — Medication 1 APPLICATION(S): at 17:02

## 2021-01-01 RX ADMIN — Medication 500000 UNIT(S): at 13:07

## 2021-01-01 RX ADMIN — ONDANSETRON 4 MILLIGRAM(S): 8 TABLET, FILM COATED ORAL at 17:33

## 2021-01-01 RX ADMIN — QUETIAPINE FUMARATE 25 MILLIGRAM(S): 200 TABLET, FILM COATED ORAL at 23:42

## 2021-01-01 RX ADMIN — ENOXAPARIN SODIUM 40 MILLIGRAM(S): 100 INJECTION SUBCUTANEOUS at 21:31

## 2021-01-01 RX ADMIN — NIMODIPINE 60 MILLIGRAM(S): 60 SOLUTION ORAL at 14:26

## 2021-01-01 RX ADMIN — Medication 5 MILLIGRAM(S): at 22:17

## 2021-01-01 RX ADMIN — Medication 4 MILLILITER(S): at 17:40

## 2021-01-01 RX ADMIN — TRAMADOL HYDROCHLORIDE 25 MILLIGRAM(S): 50 TABLET ORAL at 02:46

## 2021-01-01 RX ADMIN — LACOSAMIDE 100 MILLIGRAM(S): 50 TABLET ORAL at 16:43

## 2021-01-01 RX ADMIN — MIDODRINE HYDROCHLORIDE 5 MILLIGRAM(S): 2.5 TABLET ORAL at 13:41

## 2021-01-01 RX ADMIN — BROMOCRIPTINE MESYLATE 15 MILLIGRAM(S): 5 CAPSULE ORAL at 13:40

## 2021-01-01 RX ADMIN — FENTANYL CITRATE 12.5 MICROGRAM(S): 50 INJECTION INTRAVENOUS at 00:30

## 2021-01-01 RX ADMIN — CHLORHEXIDINE GLUCONATE 1 APPLICATION(S): 213 SOLUTION TOPICAL at 06:13

## 2021-01-01 RX ADMIN — SENNA PLUS 2 TABLET(S): 8.6 TABLET ORAL at 21:46

## 2021-01-01 RX ADMIN — Medication 100 MILLIGRAM(S): at 18:28

## 2021-01-01 RX ADMIN — CHLORHEXIDINE GLUCONATE 1 APPLICATION(S): 213 SOLUTION TOPICAL at 05:02

## 2021-01-01 RX ADMIN — Medication 300 MILLIGRAM(S): at 07:21

## 2021-01-01 RX ADMIN — Medication 200 MILLIGRAM(S): at 18:54

## 2021-01-01 RX ADMIN — SODIUM CHLORIDE 4 MILLILITER(S): 9 INJECTION INTRAMUSCULAR; INTRAVENOUS; SUBCUTANEOUS at 22:51

## 2021-01-01 RX ADMIN — NIMODIPINE 60 MILLIGRAM(S): 60 SOLUTION ORAL at 22:20

## 2021-01-01 RX ADMIN — Medication 125 MILLILITER(S): at 16:40

## 2021-01-01 RX ADMIN — MIDODRINE HYDROCHLORIDE 5 MILLIGRAM(S): 2.5 TABLET ORAL at 06:21

## 2021-01-01 RX ADMIN — Medication 3 MILLILITER(S): at 17:50

## 2021-01-01 RX ADMIN — Medication 50 MILLIEQUIVALENT(S): at 07:15

## 2021-01-01 RX ADMIN — SODIUM CHLORIDE 3 MILLILITER(S): 9 INJECTION INTRAMUSCULAR; INTRAVENOUS; SUBCUTANEOUS at 04:25

## 2021-01-01 RX ADMIN — Medication 3 MILLILITER(S): at 11:27

## 2021-01-01 RX ADMIN — Medication 650 MILLIGRAM(S): at 19:38

## 2021-01-01 RX ADMIN — SODIUM CHLORIDE 3 GRAM(S): 9 INJECTION INTRAMUSCULAR; INTRAVENOUS; SUBCUTANEOUS at 19:45

## 2021-01-01 RX ADMIN — Medication 10 MILLIGRAM(S): at 11:32

## 2021-01-01 RX ADMIN — QUETIAPINE FUMARATE 25 MILLIGRAM(S): 200 TABLET, FILM COATED ORAL at 03:38

## 2021-01-01 RX ADMIN — MIDAZOLAM HYDROCHLORIDE 2 MILLIGRAM(S): 1 INJECTION, SOLUTION INTRAMUSCULAR; INTRAVENOUS at 13:30

## 2021-01-01 RX ADMIN — Medication 6: at 11:23

## 2021-01-01 RX ADMIN — MIDODRINE HYDROCHLORIDE 10 MILLIGRAM(S): 2.5 TABLET ORAL at 21:50

## 2021-01-01 RX ADMIN — Medication 4.79 MICROGRAM(S)/KG/MIN: at 22:58

## 2021-01-01 RX ADMIN — CHLORHEXIDINE GLUCONATE 1 APPLICATION(S): 213 SOLUTION TOPICAL at 05:19

## 2021-01-01 RX ADMIN — Medication 500000 UNIT(S): at 12:34

## 2021-01-01 RX ADMIN — SODIUM CHLORIDE 4 MILLILITER(S): 9 INJECTION INTRAMUSCULAR; INTRAVENOUS; SUBCUTANEOUS at 17:26

## 2021-01-01 RX ADMIN — LACOSAMIDE 100 MILLIGRAM(S): 50 TABLET ORAL at 18:29

## 2021-01-01 RX ADMIN — Medication 1000 MILLIGRAM(S): at 23:23

## 2021-01-01 RX ADMIN — Medication 1 MILLIGRAM(S): at 03:47

## 2021-01-01 RX ADMIN — Medication 1000 MILLIGRAM(S): at 20:00

## 2021-01-01 RX ADMIN — ENOXAPARIN SODIUM 40 MILLIGRAM(S): 100 INJECTION SUBCUTANEOUS at 10:02

## 2021-01-01 RX ADMIN — Medication 4 MILLILITER(S): at 01:15

## 2021-01-01 RX ADMIN — CHLORHEXIDINE GLUCONATE 15 MILLILITER(S): 213 SOLUTION TOPICAL at 05:51

## 2021-01-01 RX ADMIN — SODIUM CHLORIDE 1000 MILLILITER(S): 9 INJECTION INTRAMUSCULAR; INTRAVENOUS; SUBCUTANEOUS at 01:03

## 2021-01-01 RX ADMIN — Medication 500000 UNIT(S): at 18:28

## 2021-01-01 RX ADMIN — NIMODIPINE 60 MILLIGRAM(S): 60 SOLUTION ORAL at 02:00

## 2021-01-01 RX ADMIN — PANTOPRAZOLE SODIUM 40 MILLIGRAM(S): 20 TABLET, DELAYED RELEASE ORAL at 12:02

## 2021-01-01 RX ADMIN — BROMOCRIPTINE MESYLATE 15 MILLIGRAM(S): 5 CAPSULE ORAL at 05:18

## 2021-01-01 RX ADMIN — PANTOPRAZOLE SODIUM 40 MILLIGRAM(S): 20 TABLET, DELAYED RELEASE ORAL at 11:48

## 2021-01-01 RX ADMIN — CHLORHEXIDINE GLUCONATE 15 MILLILITER(S): 213 SOLUTION TOPICAL at 18:44

## 2021-01-01 RX ADMIN — Medication 81 MILLIGRAM(S): at 11:53

## 2021-01-01 RX ADMIN — MIDODRINE HYDROCHLORIDE 10 MILLIGRAM(S): 2.5 TABLET ORAL at 22:07

## 2021-01-01 RX ADMIN — TRAMADOL HYDROCHLORIDE 25 MILLIGRAM(S): 50 TABLET ORAL at 20:03

## 2021-01-01 RX ADMIN — Medication 30 MILLIGRAM(S): at 15:26

## 2021-01-01 RX ADMIN — LACOSAMIDE 100 MILLIGRAM(S): 50 TABLET ORAL at 18:41

## 2021-01-01 RX ADMIN — Medication 3 MILLILITER(S): at 17:37

## 2021-01-01 RX ADMIN — Medication 650 MILLIGRAM(S): at 23:54

## 2021-01-01 RX ADMIN — Medication 325 MILLIGRAM(S): at 21:06

## 2021-01-01 RX ADMIN — Medication 100 MILLIGRAM(S): at 09:36

## 2021-01-01 RX ADMIN — PANTOPRAZOLE SODIUM 40 MILLIGRAM(S): 20 TABLET, DELAYED RELEASE ORAL at 11:03

## 2021-01-01 RX ADMIN — NIMODIPINE 60 MILLIGRAM(S): 60 SOLUTION ORAL at 17:43

## 2021-01-01 RX ADMIN — CHLORHEXIDINE GLUCONATE 1 APPLICATION(S): 213 SOLUTION TOPICAL at 07:29

## 2021-01-01 RX ADMIN — Medication 125 MILLILITER(S): at 12:20

## 2021-01-01 RX ADMIN — LACOSAMIDE 100 MILLIGRAM(S): 50 TABLET ORAL at 17:17

## 2021-01-01 RX ADMIN — SODIUM CHLORIDE 4 MILLILITER(S): 9 INJECTION INTRAMUSCULAR; INTRAVENOUS; SUBCUTANEOUS at 05:22

## 2021-01-01 RX ADMIN — Medication 50 GRAM(S): at 08:06

## 2021-01-01 RX ADMIN — Medication 3 MILLILITER(S): at 21:36

## 2021-01-01 RX ADMIN — SODIUM CHLORIDE 4 MILLILITER(S): 9 INJECTION INTRAMUSCULAR; INTRAVENOUS; SUBCUTANEOUS at 12:00

## 2021-01-01 RX ADMIN — Medication 500000 UNIT(S): at 11:22

## 2021-01-01 RX ADMIN — FENTANYL CITRATE 12.5 MICROGRAM(S): 50 INJECTION INTRAVENOUS at 01:00

## 2021-01-01 RX ADMIN — SODIUM CHLORIDE 1000 MILLILITER(S): 9 INJECTION, SOLUTION INTRAVENOUS at 22:27

## 2021-01-01 RX ADMIN — BROMOCRIPTINE MESYLATE 10 MILLIGRAM(S): 5 CAPSULE ORAL at 13:41

## 2021-01-01 RX ADMIN — SENNA PLUS 2 TABLET(S): 8.6 TABLET ORAL at 22:26

## 2021-01-01 RX ADMIN — Medication 27.5 MILLIGRAM(S): at 12:40

## 2021-01-01 RX ADMIN — Medication 4 MILLILITER(S): at 23:43

## 2021-01-01 RX ADMIN — Medication 3 MILLILITER(S): at 12:02

## 2021-01-01 RX ADMIN — SODIUM CHLORIDE 4 MILLILITER(S): 9 INJECTION INTRAMUSCULAR; INTRAVENOUS; SUBCUTANEOUS at 05:27

## 2021-01-01 RX ADMIN — SODIUM CHLORIDE 50 MILLILITER(S): 5 INJECTION, SOLUTION INTRAVENOUS at 16:07

## 2021-01-01 RX ADMIN — CHLORHEXIDINE GLUCONATE 1 APPLICATION(S): 213 SOLUTION TOPICAL at 05:34

## 2021-01-01 RX ADMIN — SODIUM CHLORIDE 500 MILLILITER(S): 9 INJECTION INTRAMUSCULAR; INTRAVENOUS; SUBCUTANEOUS at 22:45

## 2021-01-01 RX ADMIN — Medication 1 CAPSULE(S): at 17:05

## 2021-01-01 RX ADMIN — Medication 1000 MILLIGRAM(S): at 18:22

## 2021-01-01 RX ADMIN — DEXMEDETOMIDINE HYDROCHLORIDE IN 0.9% SODIUM CHLORIDE 5.1 MICROGRAM(S)/KG/HR: 4 INJECTION INTRAVENOUS at 21:32

## 2021-01-01 RX ADMIN — ENOXAPARIN SODIUM 100 MILLIGRAM(S): 100 INJECTION SUBCUTANEOUS at 06:05

## 2021-01-01 RX ADMIN — DEXMEDETOMIDINE HYDROCHLORIDE IN 0.9% SODIUM CHLORIDE 5.1 MICROGRAM(S)/KG/HR: 4 INJECTION INTRAVENOUS at 00:31

## 2021-01-01 RX ADMIN — LACOSAMIDE 100 MILLIGRAM(S): 50 TABLET ORAL at 17:03

## 2021-01-01 RX ADMIN — Medication 1000 MILLIGRAM(S): at 07:02

## 2021-01-01 RX ADMIN — Medication 1 PACKET(S): at 09:53

## 2021-01-01 RX ADMIN — ENOXAPARIN SODIUM 100 MILLIGRAM(S): 100 INJECTION SUBCUTANEOUS at 06:30

## 2021-01-01 RX ADMIN — Medication 81 MILLIGRAM(S): at 14:55

## 2021-01-01 RX ADMIN — Medication 300 MILLIGRAM(S): at 06:35

## 2021-01-01 RX ADMIN — SODIUM CHLORIDE 2 GRAM(S): 9 INJECTION INTRAMUSCULAR; INTRAVENOUS; SUBCUTANEOUS at 06:15

## 2021-01-01 RX ADMIN — Medication 1 PACKET(S): at 11:28

## 2021-01-01 RX ADMIN — LEVETIRACETAM 400 MILLIGRAM(S): 250 TABLET, FILM COATED ORAL at 17:42

## 2021-01-01 RX ADMIN — Medication 3 MILLILITER(S): at 11:33

## 2021-01-01 RX ADMIN — PANTOPRAZOLE SODIUM 40 MILLIGRAM(S): 20 TABLET, DELAYED RELEASE ORAL at 17:37

## 2021-01-01 RX ADMIN — Medication 40 MILLIEQUIVALENT(S): at 07:47

## 2021-01-01 RX ADMIN — CHLORHEXIDINE GLUCONATE 15 MILLILITER(S): 213 SOLUTION TOPICAL at 07:16

## 2021-01-01 RX ADMIN — LACOSAMIDE 100 MILLIGRAM(S): 50 TABLET ORAL at 17:10

## 2021-01-01 RX ADMIN — DEXMEDETOMIDINE HYDROCHLORIDE IN 0.9% SODIUM CHLORIDE 5.1 MICROGRAM(S)/KG/HR: 4 INJECTION INTRAVENOUS at 11:38

## 2021-01-01 RX ADMIN — ENOXAPARIN SODIUM 100 MILLIGRAM(S): 100 INJECTION SUBCUTANEOUS at 18:41

## 2021-01-01 RX ADMIN — Medication 200 MILLIGRAM(S): at 05:18

## 2021-01-01 RX ADMIN — Medication 15 MILLILITER(S): at 14:05

## 2021-01-01 RX ADMIN — Medication 3 MILLILITER(S): at 05:20

## 2021-01-01 RX ADMIN — Medication 500000 UNIT(S): at 05:23

## 2021-01-01 RX ADMIN — QUETIAPINE FUMARATE 50 MILLIGRAM(S): 200 TABLET, FILM COATED ORAL at 17:58

## 2021-01-01 RX ADMIN — ENOXAPARIN SODIUM 100 MILLIGRAM(S): 100 INJECTION SUBCUTANEOUS at 17:40

## 2021-01-01 RX ADMIN — NIMODIPINE 60 MILLIGRAM(S): 60 SOLUTION ORAL at 22:01

## 2021-01-01 RX ADMIN — Medication 100 MILLIEQUIVALENT(S): at 06:27

## 2021-01-01 RX ADMIN — Medication 3 MILLILITER(S): at 00:25

## 2021-01-01 RX ADMIN — HYDROMORPHONE HYDROCHLORIDE 0.25 MILLIGRAM(S): 2 INJECTION INTRAMUSCULAR; INTRAVENOUS; SUBCUTANEOUS at 05:47

## 2021-01-01 RX ADMIN — SENNA PLUS 2 TABLET(S): 8.6 TABLET ORAL at 22:48

## 2021-01-01 RX ADMIN — BROMOCRIPTINE MESYLATE 5 MILLIGRAM(S): 5 CAPSULE ORAL at 15:23

## 2021-01-01 RX ADMIN — SODIUM CHLORIDE 4 MILLILITER(S): 9 INJECTION INTRAMUSCULAR; INTRAVENOUS; SUBCUTANEOUS at 10:46

## 2021-01-01 RX ADMIN — ENOXAPARIN SODIUM 40 MILLIGRAM(S): 100 INJECTION SUBCUTANEOUS at 21:55

## 2021-01-01 RX ADMIN — Medication 3 MILLILITER(S): at 03:15

## 2021-01-01 RX ADMIN — Medication 4 MILLILITER(S): at 19:33

## 2021-01-01 RX ADMIN — LACOSAMIDE 100 MILLIGRAM(S): 50 TABLET ORAL at 19:23

## 2021-01-01 RX ADMIN — Medication 500000 UNIT(S): at 06:05

## 2021-01-01 RX ADMIN — Medication 20 MILLIGRAM(S): at 01:24

## 2021-01-01 RX ADMIN — SENNA PLUS 2 TABLET(S): 8.6 TABLET ORAL at 21:36

## 2021-01-01 RX ADMIN — Medication 10 MILLIGRAM(S): at 14:58

## 2021-01-01 RX ADMIN — CHLORHEXIDINE GLUCONATE 1 APPLICATION(S): 213 SOLUTION TOPICAL at 06:16

## 2021-01-01 RX ADMIN — BROMOCRIPTINE MESYLATE 5 MILLIGRAM(S): 5 CAPSULE ORAL at 13:53

## 2021-01-01 RX ADMIN — CHLORHEXIDINE GLUCONATE 1 APPLICATION(S): 213 SOLUTION TOPICAL at 05:03

## 2021-01-01 RX ADMIN — CHLORHEXIDINE GLUCONATE 1 APPLICATION(S): 213 SOLUTION TOPICAL at 04:40

## 2021-01-01 RX ADMIN — Medication 3 MILLILITER(S): at 05:14

## 2021-01-01 RX ADMIN — CHLORHEXIDINE GLUCONATE 1 APPLICATION(S): 213 SOLUTION TOPICAL at 02:30

## 2021-01-01 RX ADMIN — Medication 27.5 MILLIGRAM(S): at 22:33

## 2021-01-01 RX ADMIN — FLUDROCORTISONE ACETATE 0.2 MILLIGRAM(S): 0.1 TABLET ORAL at 17:10

## 2021-01-01 RX ADMIN — Medication 500000 UNIT(S): at 17:03

## 2021-01-01 RX ADMIN — LACOSAMIDE 100 MILLIGRAM(S): 50 TABLET ORAL at 19:33

## 2021-01-01 RX ADMIN — SODIUM CHLORIDE 1000 MILLILITER(S): 9 INJECTION INTRAMUSCULAR; INTRAVENOUS; SUBCUTANEOUS at 10:36

## 2021-01-01 RX ADMIN — Medication 10 MILLIGRAM(S): at 14:52

## 2021-01-01 RX ADMIN — APIXABAN 5 MILLIGRAM(S): 2.5 TABLET, FILM COATED ORAL at 21:43

## 2021-01-01 RX ADMIN — SODIUM CHLORIDE 1000 MILLILITER(S): 9 INJECTION INTRAMUSCULAR; INTRAVENOUS; SUBCUTANEOUS at 16:30

## 2021-01-01 RX ADMIN — Medication 4 MILLILITER(S): at 17:23

## 2021-01-01 RX ADMIN — FENTANYL CITRATE 12.5 MICROGRAM(S): 50 INJECTION INTRAVENOUS at 09:55

## 2021-01-01 RX ADMIN — FENTANYL CITRATE 25 MICROGRAM(S): 50 INJECTION INTRAVENOUS at 14:03

## 2021-01-01 RX ADMIN — PANTOPRAZOLE SODIUM 40 MILLIGRAM(S): 20 TABLET, DELAYED RELEASE ORAL at 13:54

## 2021-01-01 RX ADMIN — SODIUM CHLORIDE 250 MILLILITER(S): 5 INJECTION, SOLUTION INTRAVENOUS at 10:37

## 2021-01-01 RX ADMIN — POLYETHYLENE GLYCOL 3350 17 GRAM(S): 17 POWDER, FOR SOLUTION ORAL at 11:46

## 2021-01-01 RX ADMIN — Medication 6 MILLIGRAM(S): at 05:34

## 2021-01-01 RX ADMIN — Medication 300 MILLIGRAM(S): at 19:01

## 2021-01-01 RX ADMIN — Medication 1 APPLICATION(S): at 06:01

## 2021-01-01 RX ADMIN — Medication 3 MILLILITER(S): at 05:22

## 2021-01-01 RX ADMIN — FENTANYL CITRATE 12.5 MICROGRAM(S): 50 INJECTION INTRAVENOUS at 02:28

## 2021-01-01 RX ADMIN — Medication 40 MILLIEQUIVALENT(S): at 17:19

## 2021-01-01 RX ADMIN — Medication 200 MILLIGRAM(S): at 06:09

## 2021-01-01 RX ADMIN — Medication 100 MILLIGRAM(S): at 10:53

## 2021-01-01 RX ADMIN — Medication 1 PACKET(S): at 14:56

## 2021-01-01 RX ADMIN — Medication 4 MILLILITER(S): at 00:04

## 2021-01-01 RX ADMIN — Medication 1 APPLICATION(S): at 17:03

## 2021-01-01 RX ADMIN — POLYETHYLENE GLYCOL 3350 17 GRAM(S): 17 POWDER, FOR SOLUTION ORAL at 11:47

## 2021-01-01 RX ADMIN — Medication 650 MILLIGRAM(S): at 02:52

## 2021-01-01 RX ADMIN — ENOXAPARIN SODIUM 40 MILLIGRAM(S): 100 INJECTION SUBCUTANEOUS at 11:32

## 2021-01-01 RX ADMIN — SODIUM CHLORIDE 1000 MILLILITER(S): 9 INJECTION INTRAMUSCULAR; INTRAVENOUS; SUBCUTANEOUS at 18:50

## 2021-01-01 RX ADMIN — Medication 650 MILLIGRAM(S): at 18:00

## 2021-01-01 RX ADMIN — Medication 10 MILLIGRAM(S): at 12:40

## 2021-01-01 RX ADMIN — BROMOCRIPTINE MESYLATE 15 MILLIGRAM(S): 5 CAPSULE ORAL at 07:46

## 2021-01-01 RX ADMIN — ERGOCALCIFEROL 50000 UNIT(S): 1.25 CAPSULE ORAL at 06:54

## 2021-01-01 RX ADMIN — Medication 0.5 MILLILITER(S): at 10:40

## 2021-01-01 RX ADMIN — Medication 4 MILLILITER(S): at 21:19

## 2021-01-01 RX ADMIN — BROMOCRIPTINE MESYLATE 5 MILLIGRAM(S): 5 CAPSULE ORAL at 21:36

## 2021-01-01 RX ADMIN — ENOXAPARIN SODIUM 40 MILLIGRAM(S): 100 INJECTION SUBCUTANEOUS at 22:44

## 2021-01-01 RX ADMIN — PIPERACILLIN AND TAZOBACTAM 200 GRAM(S): 4; .5 INJECTION, POWDER, LYOPHILIZED, FOR SOLUTION INTRAVENOUS at 19:44

## 2021-01-01 RX ADMIN — Medication 81 MILLIGRAM(S): at 12:22

## 2021-01-01 RX ADMIN — SODIUM CHLORIDE 4 MILLILITER(S): 9 INJECTION INTRAMUSCULAR; INTRAVENOUS; SUBCUTANEOUS at 18:45

## 2021-01-01 RX ADMIN — Medication 3 MILLILITER(S): at 15:53

## 2021-01-01 RX ADMIN — QUETIAPINE FUMARATE 50 MILLIGRAM(S): 200 TABLET, FILM COATED ORAL at 05:29

## 2021-01-01 RX ADMIN — Medication 10 MILLIGRAM(S): at 02:47

## 2021-01-01 RX ADMIN — CHLORHEXIDINE GLUCONATE 15 MILLILITER(S): 213 SOLUTION TOPICAL at 18:02

## 2021-01-01 RX ADMIN — Medication 650 MILLIGRAM(S): at 19:46

## 2021-01-01 RX ADMIN — BROMOCRIPTINE MESYLATE 15 MILLIGRAM(S): 5 CAPSULE ORAL at 14:51

## 2021-01-01 RX ADMIN — Medication 3 MILLILITER(S): at 15:46

## 2021-01-01 RX ADMIN — SODIUM CHLORIDE 4 MILLILITER(S): 9 INJECTION INTRAMUSCULAR; INTRAVENOUS; SUBCUTANEOUS at 19:49

## 2021-01-01 RX ADMIN — Medication 1000 MILLIGRAM(S): at 16:48

## 2021-01-01 RX ADMIN — Medication 100 MILLIEQUIVALENT(S): at 12:53

## 2021-01-01 RX ADMIN — Medication 500000 UNIT(S): at 06:12

## 2021-01-01 RX ADMIN — Medication 4 MILLILITER(S): at 00:01

## 2021-01-01 RX ADMIN — APIXABAN 5 MILLIGRAM(S): 2.5 TABLET, FILM COATED ORAL at 09:12

## 2021-01-01 RX ADMIN — SODIUM CHLORIDE 3 MILLILITER(S): 9 INJECTION INTRAMUSCULAR; INTRAVENOUS; SUBCUTANEOUS at 11:48

## 2021-01-01 RX ADMIN — Medication 1000 MILLIGRAM(S): at 07:21

## 2021-01-01 RX ADMIN — LACOSAMIDE 100 MILLIGRAM(S): 50 TABLET ORAL at 05:20

## 2021-01-01 RX ADMIN — Medication 5 MILLIGRAM(S): at 18:17

## 2021-01-01 RX ADMIN — DEXMEDETOMIDINE HYDROCHLORIDE IN 0.9% SODIUM CHLORIDE 5.1 MICROGRAM(S)/KG/HR: 4 INJECTION INTRAVENOUS at 02:20

## 2021-01-01 RX ADMIN — FENTANYL CITRATE 12.5 MICROGRAM(S): 50 INJECTION INTRAVENOUS at 09:06

## 2021-01-01 RX ADMIN — Medication 4 MILLILITER(S): at 21:41

## 2021-01-01 RX ADMIN — SODIUM CHLORIDE 3 GRAM(S): 9 INJECTION INTRAMUSCULAR; INTRAVENOUS; SUBCUTANEOUS at 00:58

## 2021-01-01 RX ADMIN — SODIUM CHLORIDE 1000 MILLILITER(S): 9 INJECTION INTRAMUSCULAR; INTRAVENOUS; SUBCUTANEOUS at 07:28

## 2021-01-01 RX ADMIN — Medication 4 MILLIGRAM(S): at 09:31

## 2021-01-01 RX ADMIN — SODIUM CHLORIDE 4 MILLILITER(S): 9 INJECTION INTRAMUSCULAR; INTRAVENOUS; SUBCUTANEOUS at 09:45

## 2021-01-01 RX ADMIN — SODIUM CHLORIDE 4 MILLILITER(S): 9 INJECTION INTRAMUSCULAR; INTRAVENOUS; SUBCUTANEOUS at 21:15

## 2021-01-01 RX ADMIN — Medication 500000 UNIT(S): at 00:14

## 2021-01-01 RX ADMIN — BROMOCRIPTINE MESYLATE 15 MILLIGRAM(S): 5 CAPSULE ORAL at 22:44

## 2021-01-01 RX ADMIN — LACOSAMIDE 100 MILLIGRAM(S): 50 TABLET ORAL at 05:00

## 2021-01-01 RX ADMIN — Medication 20 MILLIEQUIVALENT(S): at 15:50

## 2021-01-01 RX ADMIN — NIMODIPINE 60 MILLIGRAM(S): 60 SOLUTION ORAL at 09:45

## 2021-01-01 RX ADMIN — Medication 3 MILLILITER(S): at 00:07

## 2021-01-01 RX ADMIN — Medication 30 MILLIGRAM(S): at 21:41

## 2021-01-01 RX ADMIN — Medication 300 MILLIGRAM(S): at 07:46

## 2021-01-01 RX ADMIN — LACOSAMIDE 100 MILLIGRAM(S): 50 TABLET ORAL at 17:23

## 2021-01-01 RX ADMIN — Medication 3 MILLILITER(S): at 05:17

## 2021-01-01 RX ADMIN — Medication 4 MILLIGRAM(S): at 19:45

## 2021-01-01 RX ADMIN — Medication 1 APPLICATION(S): at 18:26

## 2021-01-01 RX ADMIN — Medication 500000 UNIT(S): at 00:29

## 2021-01-01 RX ADMIN — SODIUM CHLORIDE 4 MILLILITER(S): 9 INJECTION INTRAMUSCULAR; INTRAVENOUS; SUBCUTANEOUS at 08:09

## 2021-01-01 RX ADMIN — Medication 3 MILLILITER(S): at 12:46

## 2021-01-01 RX ADMIN — BROMOCRIPTINE MESYLATE 5 MILLIGRAM(S): 5 CAPSULE ORAL at 05:30

## 2021-01-01 RX ADMIN — SENNA PLUS 2 TABLET(S): 8.6 TABLET ORAL at 23:01

## 2021-01-01 RX ADMIN — BROMOCRIPTINE MESYLATE 15 MILLIGRAM(S): 5 CAPSULE ORAL at 21:56

## 2021-01-01 RX ADMIN — Medication 27.5 MILLIGRAM(S): at 15:21

## 2021-01-01 RX ADMIN — Medication 81 MILLIGRAM(S): at 11:39

## 2021-01-01 RX ADMIN — CHLORHEXIDINE GLUCONATE 15 MILLILITER(S): 213 SOLUTION TOPICAL at 05:40

## 2021-01-01 RX ADMIN — BROMOCRIPTINE MESYLATE 5 MILLIGRAM(S): 5 CAPSULE ORAL at 21:20

## 2021-01-01 RX ADMIN — SODIUM CHLORIDE 1000 MILLILITER(S): 9 INJECTION INTRAMUSCULAR; INTRAVENOUS; SUBCUTANEOUS at 08:50

## 2021-01-01 RX ADMIN — Medication 10 MILLIGRAM(S): at 11:28

## 2021-01-01 RX ADMIN — Medication 1 CAPSULE(S): at 11:47

## 2021-01-01 RX ADMIN — CHLORHEXIDINE GLUCONATE 15 MILLILITER(S): 213 SOLUTION TOPICAL at 05:46

## 2021-01-01 RX ADMIN — Medication 500000 UNIT(S): at 17:43

## 2021-01-01 RX ADMIN — Medication 100 MILLIGRAM(S): at 01:14

## 2021-01-01 RX ADMIN — NIMODIPINE 60 MILLIGRAM(S): 60 SOLUTION ORAL at 02:16

## 2021-01-01 RX ADMIN — Medication 500000 UNIT(S): at 23:15

## 2021-01-01 RX ADMIN — ENOXAPARIN SODIUM 40 MILLIGRAM(S): 100 INJECTION SUBCUTANEOUS at 22:51

## 2021-01-01 RX ADMIN — FENTANYL CITRATE 25 MICROGRAM(S): 50 INJECTION INTRAVENOUS at 02:57

## 2021-01-01 RX ADMIN — FENTANYL CITRATE 12.5 MICROGRAM(S): 50 INJECTION INTRAVENOUS at 16:30

## 2021-01-01 RX ADMIN — Medication 3 MILLILITER(S): at 17:43

## 2021-01-01 RX ADMIN — Medication 6 MILLIGRAM(S): at 00:29

## 2021-01-01 RX ADMIN — Medication 3 MILLILITER(S): at 16:29

## 2021-01-01 RX ADMIN — ENOXAPARIN SODIUM 100 MILLIGRAM(S): 100 INJECTION SUBCUTANEOUS at 17:03

## 2021-01-01 RX ADMIN — Medication 1 APPLICATION(S): at 06:56

## 2021-01-01 RX ADMIN — BROMOCRIPTINE MESYLATE 10 MILLIGRAM(S): 5 CAPSULE ORAL at 05:23

## 2021-01-01 RX ADMIN — DEXMEDETOMIDINE HYDROCHLORIDE IN 0.9% SODIUM CHLORIDE 5.1 MICROGRAM(S)/KG/HR: 4 INJECTION INTRAVENOUS at 08:15

## 2021-01-01 RX ADMIN — Medication 5 MILLIGRAM(S): at 15:41

## 2021-01-01 RX ADMIN — Medication 200 MILLIGRAM(S): at 05:41

## 2021-01-01 RX ADMIN — Medication 650 MILLIGRAM(S): at 11:12

## 2021-01-01 RX ADMIN — Medication 30 MILLIGRAM(S): at 21:15

## 2021-01-01 RX ADMIN — LACOSAMIDE 100 MILLIGRAM(S): 50 TABLET ORAL at 18:49

## 2021-01-01 RX ADMIN — Medication 4.79 MICROGRAM(S)/KG/MIN: at 22:43

## 2021-01-01 RX ADMIN — ENOXAPARIN SODIUM 100 MILLIGRAM(S): 100 INJECTION SUBCUTANEOUS at 19:36

## 2021-01-01 RX ADMIN — Medication 4 MILLILITER(S): at 07:02

## 2021-01-01 RX ADMIN — ENOXAPARIN SODIUM 40 MILLIGRAM(S): 100 INJECTION SUBCUTANEOUS at 21:36

## 2021-01-01 RX ADMIN — FENTANYL CITRATE 12.5 MICROGRAM(S): 50 INJECTION INTRAVENOUS at 06:48

## 2021-01-01 RX ADMIN — TRAMADOL HYDROCHLORIDE 25 MILLIGRAM(S): 50 TABLET ORAL at 22:00

## 2021-01-01 RX ADMIN — POLYETHYLENE GLYCOL 3350 17 GRAM(S): 17 POWDER, FOR SOLUTION ORAL at 11:39

## 2021-01-01 RX ADMIN — SODIUM CHLORIDE 4 MILLILITER(S): 9 INJECTION INTRAMUSCULAR; INTRAVENOUS; SUBCUTANEOUS at 00:01

## 2021-01-01 RX ADMIN — ENOXAPARIN SODIUM 100 MILLIGRAM(S): 100 INJECTION SUBCUTANEOUS at 17:06

## 2021-01-01 RX ADMIN — BROMOCRIPTINE MESYLATE 5 MILLIGRAM(S): 5 CAPSULE ORAL at 13:34

## 2021-01-01 RX ADMIN — Medication 650 MILLIGRAM(S): at 00:19

## 2021-01-01 RX ADMIN — BROMOCRIPTINE MESYLATE 15 MILLIGRAM(S): 5 CAPSULE ORAL at 07:17

## 2021-01-01 RX ADMIN — ENOXAPARIN SODIUM 40 MILLIGRAM(S): 100 INJECTION SUBCUTANEOUS at 09:53

## 2021-01-01 RX ADMIN — SODIUM CHLORIDE 4 MILLILITER(S): 9 INJECTION INTRAMUSCULAR; INTRAVENOUS; SUBCUTANEOUS at 11:09

## 2021-01-01 RX ADMIN — Medication 100 MILLIEQUIVALENT(S): at 07:35

## 2021-01-01 RX ADMIN — Medication 650 MILLIGRAM(S): at 14:16

## 2021-01-01 RX ADMIN — PIPERACILLIN AND TAZOBACTAM 200 GRAM(S): 4; .5 INJECTION, POWDER, LYOPHILIZED, FOR SOLUTION INTRAVENOUS at 00:44

## 2021-01-01 RX ADMIN — ONDANSETRON 4 MILLIGRAM(S): 8 TABLET, FILM COATED ORAL at 13:49

## 2021-01-01 RX ADMIN — BROMOCRIPTINE MESYLATE 15 MILLIGRAM(S): 5 CAPSULE ORAL at 23:21

## 2021-01-01 RX ADMIN — ENOXAPARIN SODIUM 40 MILLIGRAM(S): 100 INJECTION SUBCUTANEOUS at 10:16

## 2021-01-01 RX ADMIN — ENOXAPARIN SODIUM 100 MILLIGRAM(S): 100 INJECTION SUBCUTANEOUS at 06:04

## 2021-01-01 RX ADMIN — LACOSAMIDE 100 MILLIGRAM(S): 50 TABLET ORAL at 18:54

## 2021-01-01 RX ADMIN — Medication 40 MILLIEQUIVALENT(S): at 10:37

## 2021-01-01 RX ADMIN — Medication 650 MILLIGRAM(S): at 01:00

## 2021-01-01 RX ADMIN — QUETIAPINE FUMARATE 50 MILLIGRAM(S): 200 TABLET, FILM COATED ORAL at 06:06

## 2021-01-01 RX ADMIN — Medication 4 MILLILITER(S): at 04:40

## 2021-01-01 RX ADMIN — ENOXAPARIN SODIUM 100 MILLIGRAM(S): 100 INJECTION SUBCUTANEOUS at 19:17

## 2021-01-01 RX ADMIN — ENOXAPARIN SODIUM 40 MILLIGRAM(S): 100 INJECTION SUBCUTANEOUS at 10:20

## 2021-01-01 RX ADMIN — Medication 650 MILLIGRAM(S): at 16:14

## 2021-01-01 RX ADMIN — Medication 4 MILLILITER(S): at 06:28

## 2021-01-01 RX ADMIN — Medication 1000 MILLIGRAM(S): at 07:16

## 2021-01-01 RX ADMIN — Medication 101 MILLIGRAM(S): at 22:06

## 2021-01-01 RX ADMIN — Medication 15 MILLILITER(S): at 12:15

## 2021-01-01 RX ADMIN — HALOPERIDOL DECANOATE 2 MILLIGRAM(S): 100 INJECTION INTRAMUSCULAR at 01:35

## 2021-01-01 RX ADMIN — Medication 1000 MILLIGRAM(S): at 01:33

## 2021-01-01 RX ADMIN — Medication 15 MILLILITER(S): at 11:41

## 2021-01-01 RX ADMIN — Medication 3 MILLILITER(S): at 06:18

## 2021-01-01 RX ADMIN — SODIUM CHLORIDE 4 MILLILITER(S): 9 INJECTION INTRAMUSCULAR; INTRAVENOUS; SUBCUTANEOUS at 05:32

## 2021-01-01 RX ADMIN — Medication 81 MILLIGRAM(S): at 17:06

## 2021-01-01 RX ADMIN — Medication 1 PACKET(S): at 16:08

## 2021-01-01 RX ADMIN — LACOSAMIDE 100 MILLIGRAM(S): 50 TABLET ORAL at 17:46

## 2021-01-01 RX ADMIN — QUETIAPINE FUMARATE 25 MILLIGRAM(S): 200 TABLET, FILM COATED ORAL at 21:33

## 2021-01-01 RX ADMIN — Medication 650 MILLIGRAM(S): at 04:00

## 2021-01-01 RX ADMIN — Medication 4 MILLILITER(S): at 04:21

## 2021-01-01 RX ADMIN — Medication 30 MILLIGRAM(S): at 22:05

## 2021-01-01 RX ADMIN — ENOXAPARIN SODIUM 40 MILLIGRAM(S): 100 INJECTION SUBCUTANEOUS at 22:01

## 2021-01-01 RX ADMIN — Medication 40 MILLIEQUIVALENT(S): at 23:12

## 2021-01-01 RX ADMIN — FENTANYL CITRATE 12.5 MICROGRAM(S): 50 INJECTION INTRAVENOUS at 21:59

## 2021-01-01 RX ADMIN — Medication 4 MILLILITER(S): at 11:31

## 2021-01-01 RX ADMIN — Medication 200 MILLIGRAM(S): at 05:07

## 2021-01-01 RX ADMIN — Medication 4 MILLILITER(S): at 19:48

## 2021-01-01 RX ADMIN — BROMOCRIPTINE MESYLATE 5 MILLIGRAM(S): 5 CAPSULE ORAL at 23:11

## 2021-01-01 RX ADMIN — Medication 500000 UNIT(S): at 17:40

## 2021-01-01 RX ADMIN — QUETIAPINE FUMARATE 50 MILLIGRAM(S): 200 TABLET, FILM COATED ORAL at 19:22

## 2021-01-01 RX ADMIN — CHLORHEXIDINE GLUCONATE 1 APPLICATION(S): 213 SOLUTION TOPICAL at 07:18

## 2021-01-01 RX ADMIN — Medication 30 MILLIGRAM(S): at 05:21

## 2021-01-01 RX ADMIN — Medication 30 MILLIGRAM(S): at 05:08

## 2021-01-01 RX ADMIN — Medication 40 MILLIEQUIVALENT(S): at 09:45

## 2021-01-01 RX ADMIN — SODIUM CHLORIDE 2 GRAM(S): 9 INJECTION INTRAMUSCULAR; INTRAVENOUS; SUBCUTANEOUS at 22:20

## 2021-01-01 RX ADMIN — PANTOPRAZOLE SODIUM 40 MILLIGRAM(S): 20 TABLET, DELAYED RELEASE ORAL at 21:13

## 2021-01-01 RX ADMIN — Medication 1 APPLICATION(S): at 18:43

## 2021-01-01 RX ADMIN — FLUDROCORTISONE ACETATE 0.2 MILLIGRAM(S): 0.1 TABLET ORAL at 06:25

## 2021-01-01 RX ADMIN — CHLORHEXIDINE GLUCONATE 1 APPLICATION(S): 213 SOLUTION TOPICAL at 05:22

## 2021-01-01 RX ADMIN — Medication 500000 UNIT(S): at 19:18

## 2021-01-01 RX ADMIN — Medication 4 MILLILITER(S): at 21:33

## 2021-01-01 RX ADMIN — SODIUM CHLORIDE 3 GRAM(S): 9 INJECTION INTRAMUSCULAR; INTRAVENOUS; SUBCUTANEOUS at 11:47

## 2021-01-01 RX ADMIN — Medication 10 MILLIGRAM(S): at 21:55

## 2021-01-01 RX ADMIN — Medication 500000 UNIT(S): at 05:31

## 2021-01-01 RX ADMIN — LACOSAMIDE 100 MILLIGRAM(S): 50 TABLET ORAL at 05:41

## 2021-01-01 RX ADMIN — Medication 4 MILLILITER(S): at 17:06

## 2021-01-01 RX ADMIN — Medication 27.5 MILLIGRAM(S): at 06:42

## 2021-01-01 RX ADMIN — Medication 500000 UNIT(S): at 11:48

## 2021-01-01 RX ADMIN — Medication 3 MILLILITER(S): at 05:32

## 2021-01-01 RX ADMIN — Medication 4 MILLILITER(S): at 00:09

## 2021-01-01 RX ADMIN — BROMOCRIPTINE MESYLATE 5 MILLIGRAM(S): 5 CAPSULE ORAL at 13:55

## 2021-01-01 RX ADMIN — Medication 4 MILLILITER(S): at 13:04

## 2021-01-01 RX ADMIN — Medication 1 APPLICATION(S): at 06:15

## 2021-01-01 RX ADMIN — Medication 1 APPLICATION(S): at 05:30

## 2021-01-01 RX ADMIN — QUETIAPINE FUMARATE 50 MILLIGRAM(S): 200 TABLET, FILM COATED ORAL at 05:16

## 2021-01-01 RX ADMIN — BROMOCRIPTINE MESYLATE 5 MILLIGRAM(S): 5 CAPSULE ORAL at 06:08

## 2021-01-01 RX ADMIN — Medication 40 MILLIEQUIVALENT(S): at 10:44

## 2021-01-01 RX ADMIN — PIPERACILLIN AND TAZOBACTAM 200 GRAM(S): 4; .5 INJECTION, POWDER, LYOPHILIZED, FOR SOLUTION INTRAVENOUS at 18:43

## 2021-01-01 RX ADMIN — BROMOCRIPTINE MESYLATE 5 MILLIGRAM(S): 5 CAPSULE ORAL at 21:01

## 2021-01-01 RX ADMIN — MIDODRINE HYDROCHLORIDE 5 MILLIGRAM(S): 2.5 TABLET ORAL at 17:44

## 2021-01-01 RX ADMIN — PIPERACILLIN AND TAZOBACTAM 200 GRAM(S): 4; .5 INJECTION, POWDER, LYOPHILIZED, FOR SOLUTION INTRAVENOUS at 17:50

## 2021-01-01 RX ADMIN — PIPERACILLIN AND TAZOBACTAM 200 GRAM(S): 4; .5 INJECTION, POWDER, LYOPHILIZED, FOR SOLUTION INTRAVENOUS at 11:33

## 2021-01-01 RX ADMIN — SODIUM CHLORIDE 4 MILLILITER(S): 9 INJECTION INTRAMUSCULAR; INTRAVENOUS; SUBCUTANEOUS at 05:28

## 2021-01-01 RX ADMIN — BROMOCRIPTINE MESYLATE 15 MILLIGRAM(S): 5 CAPSULE ORAL at 22:05

## 2021-01-01 RX ADMIN — Medication 4 MILLILITER(S): at 01:22

## 2021-01-01 RX ADMIN — NIMODIPINE 60 MILLIGRAM(S): 60 SOLUTION ORAL at 17:10

## 2021-01-01 RX ADMIN — IRON SUCROSE 176.67 MILLIGRAM(S): 20 INJECTION, SOLUTION INTRAVENOUS at 21:50

## 2021-01-01 RX ADMIN — FENTANYL CITRATE 12.5 MICROGRAM(S): 50 INJECTION INTRAVENOUS at 05:30

## 2021-01-01 RX ADMIN — NIMODIPINE 60 MILLIGRAM(S): 60 SOLUTION ORAL at 05:27

## 2021-01-01 RX ADMIN — ENOXAPARIN SODIUM 100 MILLIGRAM(S): 100 INJECTION SUBCUTANEOUS at 05:43

## 2021-01-01 RX ADMIN — SENNA PLUS 2 TABLET(S): 8.6 TABLET ORAL at 21:01

## 2021-01-01 RX ADMIN — Medication 100 MILLIGRAM(S): at 22:09

## 2021-01-01 RX ADMIN — Medication 650 MILLIGRAM(S): at 09:57

## 2021-01-01 RX ADMIN — Medication 4 MILLILITER(S): at 06:21

## 2021-01-01 RX ADMIN — Medication 20 MILLIEQUIVALENT(S): at 16:08

## 2021-01-01 RX ADMIN — APIXABAN 5 MILLIGRAM(S): 2.5 TABLET, FILM COATED ORAL at 22:26

## 2021-01-01 RX ADMIN — NIMODIPINE 60 MILLIGRAM(S): 60 SOLUTION ORAL at 05:02

## 2021-01-01 RX ADMIN — Medication 30 MILLIGRAM(S): at 05:06

## 2021-01-01 RX ADMIN — CHLORHEXIDINE GLUCONATE 1 APPLICATION(S): 213 SOLUTION TOPICAL at 06:25

## 2021-01-01 RX ADMIN — BROMOCRIPTINE MESYLATE 10 MILLIGRAM(S): 5 CAPSULE ORAL at 22:22

## 2021-01-01 RX ADMIN — CHLORHEXIDINE GLUCONATE 15 MILLILITER(S): 213 SOLUTION TOPICAL at 05:36

## 2021-01-01 RX ADMIN — Medication 4 MILLILITER(S): at 05:30

## 2021-01-01 RX ADMIN — FENTANYL CITRATE 12.5 MICROGRAM(S): 50 INJECTION INTRAVENOUS at 00:09

## 2021-01-01 RX ADMIN — NIMODIPINE 60 MILLIGRAM(S): 60 SOLUTION ORAL at 01:00

## 2021-01-01 RX ADMIN — Medication 1000 MILLIGRAM(S): at 22:25

## 2021-01-01 RX ADMIN — Medication 30 MILLIGRAM(S): at 14:36

## 2021-01-01 RX ADMIN — ENOXAPARIN SODIUM 100 MILLIGRAM(S): 100 INJECTION SUBCUTANEOUS at 17:02

## 2021-01-01 RX ADMIN — Medication 40 MILLIEQUIVALENT(S): at 07:31

## 2021-01-01 RX ADMIN — FENTANYL CITRATE 12.5 MICROGRAM(S): 50 INJECTION INTRAVENOUS at 22:08

## 2021-01-01 RX ADMIN — Medication 1 PACKET(S): at 06:25

## 2021-01-01 RX ADMIN — Medication 20 MILLIEQUIVALENT(S): at 10:19

## 2021-01-01 RX ADMIN — CHLORHEXIDINE GLUCONATE 15 MILLILITER(S): 213 SOLUTION TOPICAL at 06:09

## 2021-01-01 RX ADMIN — Medication 50 MILLIEQUIVALENT(S): at 10:16

## 2021-01-01 RX ADMIN — METHOCARBAMOL 500 MILLIGRAM(S): 500 TABLET, FILM COATED ORAL at 18:02

## 2021-01-01 RX ADMIN — SODIUM CHLORIDE 500 MILLILITER(S): 9 INJECTION INTRAMUSCULAR; INTRAVENOUS; SUBCUTANEOUS at 12:30

## 2021-01-01 RX ADMIN — Medication 30 MILLIGRAM(S): at 14:33

## 2021-01-01 RX ADMIN — SODIUM CHLORIDE 4 MILLILITER(S): 9 INJECTION INTRAMUSCULAR; INTRAVENOUS; SUBCUTANEOUS at 17:11

## 2021-01-01 RX ADMIN — Medication 650 MILLIGRAM(S): at 13:24

## 2021-01-01 RX ADMIN — FENTANYL CITRATE 12.5 MICROGRAM(S): 50 INJECTION INTRAVENOUS at 10:36

## 2021-01-01 RX ADMIN — Medication 1000 MILLIGRAM(S): at 11:22

## 2021-01-01 RX ADMIN — Medication 27.5 MILLIGRAM(S): at 21:13

## 2021-01-01 RX ADMIN — Medication 4 MILLILITER(S): at 06:56

## 2021-01-01 RX ADMIN — SODIUM CHLORIDE 4 MILLILITER(S): 9 INJECTION INTRAMUSCULAR; INTRAVENOUS; SUBCUTANEOUS at 09:18

## 2021-01-01 RX ADMIN — Medication 10 MILLIGRAM(S): at 15:44

## 2021-01-01 RX ADMIN — Medication 650 MILLIGRAM(S): at 07:24

## 2021-01-01 RX ADMIN — FENTANYL CITRATE 50 MICROGRAM(S): 50 INJECTION INTRAVENOUS at 13:22

## 2021-01-01 RX ADMIN — Medication 50 MILLIEQUIVALENT(S): at 11:03

## 2021-01-01 RX ADMIN — QUETIAPINE FUMARATE 87.5 MILLIGRAM(S): 200 TABLET, FILM COATED ORAL at 19:36

## 2021-01-01 RX ADMIN — Medication 10 MILLIGRAM(S): at 09:23

## 2021-01-01 RX ADMIN — Medication 500000 UNIT(S): at 12:40

## 2021-01-01 RX ADMIN — CEFTRIAXONE 100 MILLIGRAM(S): 500 INJECTION, POWDER, FOR SOLUTION INTRAMUSCULAR; INTRAVENOUS at 11:28

## 2021-08-07 NOTE — PROGRESS NOTE ADULT - SUBJECTIVE AND OBJECTIVE BOX
=================================  NEUROCRITICAL CARE ATTENDING NOTE  =================================    LUDMILA PRIETO   MRN-[id number]  Summary:  45y/F    HPI:      COURSE IN THE HOSPITAL:    Past Medical History:   Allergies:  Allergy Status Unknown    Home meds:       PHYSICAL EXAMINATION  T(C): --  HR: --  BP: --  RR: --  SpO2: --  CVP(mm Hg): --  NEUROLOGIC EXAMINATION:  Patient is awake, alert, fully oriented, pupils 2-3mm equal and briskly reactive to light, EOMs intact, muscle strength 5/5 on all 4s.  GENERAL: not intubated, not in cardiorespiratory distress  EENT:  anicteric  CARDIOVASCULAR: (+) S1 S2, normal rate and regular rhythm  PULMONARY: clear to auscultation bilaterally  ABDOMEN: soft, nontender with normoactive bowel sounds  EXTREMITIES: no edema  SKIN: no rash    LABS:  CAPILLARY BLOOD GLUCOSE                    Bacteriology:  CSF studies:  EEG:  Neuroimaging:  Other imaging:    MEDICATIONS:      levETIRAcetam  IVPB 1000 milliGRAM(s) IV Intermittent every 12 hours    niCARdipine Infusion 5 mG/Hr IV Continuous <Continuous>  niMODipine 60 milliGRAM(s) Oral every 4 hours      pantoprazole  Injectable 40 milliGRAM(s) IV Push daily      dextrose 40% Gel 15 Gram(s) Oral once  dextrose 50% Injectable 25 Gram(s) IV Push once  dextrose 50% Injectable 12.5 Gram(s) IV Push once  dextrose 50% Injectable 25 Gram(s) IV Push once  glucagon  Injectable 1 milliGRAM(s) IntraMuscular once  insulin lispro (ADMELOG) corrective regimen sliding scale   SubCutaneous Before meals and at bedtime    dextrose 5%. 1000 milliLiter(s) IV Continuous <Continuous>  dextrose 5%. 1000 milliLiter(s) IV Continuous <Continuous>  sodium chloride 0.9%. 1000 milliLiter(s) IV Continuous <Continuous>      IV FLUIDS:  DRIPS:  DIET:  Lines:  Drains:    Wounds:    CODE STATUS:  Full Code                       GOALS OF CARE:  aggressive                      DISPOSITION:  ICU    Grade I = Asymptomatic, mild headache, slight nuchal rigidity  Grade II = Moderate to severe headache, nuchal rigidity, no neurological deficit other than cranial nerve palsy  Grade III = Drowiness, confusion, mild focal neurological deficit  Grade IV = Stupor, moderate to severe hemiparesis  Grade V = Coma, decerebrate posturing    modified Torres 4 =================================  NEUROCRITICAL CARE ATTENDING NOTE  =================================    LUDMILA PRIETO   MRN-[id number]  Summary:  45y/F with h/o recent gastric sleeve (08/04 [facility], Dr. Crisostomo ), fibromyalgia, thyroid dysfunction, PTSD, depression, anxiety.  Presented with seizures at home - brought to [place], with 1 more episode of seizure en route.  Intubated, CT showed SAH with IV extension, casted IV, hydrocephalus, given mannitol levetiracetam 6mg ativan, transferred to [facility].    COURSE IN THE HOSPITAL:  08/07 admitted to [facility], EVD inserted, high pressure, central line anita inserted    Past Medical History:   Allergies:  Allergy Status Unknown  Home meds:     PHYSICAL EXAMINATION  T(C): 37.6 (08-07 @ 07:51), Max: 37.6 (08-07 @ 07:51) HR: 114 (08-07 @ 09:01) (87 - 114) BP: 130/75 (08-07 @ 09:01) (130/75 - 168/95) RR: 20 (08-07 @ 09:01) (20 - 20) SpO2: 100% (08-07 @ 09:01) (100% - 100%)  NEUROLOGIC EXAMINATION:  Patient is sedated  GENERAL: AC 12 50% 450 +5  EENT:  anicteric  CARDIOVASCULAR: (+) S1 S2, tachycardic rate and regular rhythm  PULMONARY: clear to auscultation bilaterally  ABDOMEN: soft, nontender with normoactive bowel sounds  EXTREMITIES: no edema  SKIN: no rash    LABS: 08-07             12.8   16.82 )-----------( 228      ( 07 Aug 2021 08:21 )             41.5     133<L>  |  97  |  6<L>  ----------------------------<  185<H>  3.2<L>   |  21<L>  |  0.55    Ca    8.6      07 Aug 2021 08:21  Phos  2.3     08-07  Mg     1.8     08-07    TPro  7.5  /  Alb  4.5  /  TBili  0.6  /  DBili  x   /  AST  44<H>  /  ALT  63<H>  /  AlkPhos  79  08-07    PT/INR - ( 07 Aug 2021 08:21 )   PT: 14.9 sec;   INR: 1.26    PTT - ( 07 Aug 2021 08:21 )  PTT:23.8 sec    Bacteriology:  CSF studies:  EEG:  Neuroimaging:  Other imaging:    MEDICATIONS:  ·	levETIRAcetam  IVPB 1000 milliGRAM(s) IV Intermittent every 12 hours  ·	niMODipine 60 milliGRAM(s) Oral every 4 hours  ·	pantoprazole  Injectable 40 milliGRAM(s) IV Push daily  ·	insulin lispro (ADMELOG) corrective regimen sliding scale   SubCutaneous Before meals and at bedtime    IV FLUIDS: NS@75 cc/hr  DRIPS:  ·	niCARdipine Infusion 5 mG/Hr IV Continuous <Continuous>  DIET: NPO   Lines: Central line Anita  Drains:    Wounds:    CODE STATUS:  Full Code                       GOALS OF CARE:  aggressive                      DISPOSITION:  ICU   5 MF 4

## 2021-08-07 NOTE — H&P ADULT - ATTENDING COMMENTS
Patient seen and examined by me 8/7/2021 via video telehealth. s/p catheter angiogram with finding of intracranial vertebral artery dissection, now s/p segmental deconstructive coiling. EVD placed now at 10, -180, keppra 1g bid, EEG, Na goal>140, NICU care.    Eran Lomax M.D.

## 2021-08-07 NOTE — PROGRESS NOTE ADULT - SUBJECTIVE AND OBJECTIVE BOX
=================================  NEUROCRITICAL CARE ATTENDING NOTE  =================================    LUDMILA PRIETO   MRN-[id number]  Summary:  45y/F with h/o recent gastric sleeve (08/04 [facility], Dr. Crisostomo ), fibromyalgia, thyroid dysfunction, PTSD, depression, anxiety.  Presented with seizures at home - brought to [place], with 1 more episode of seizure en route.  Intubated, CT showed SAH with IV extension, casted IV, hydrocephalus, given mannitol levetiracetam 6mg ativan, transferred to [facility].    COURSE IN THE HOSPITAL:  08/07 admitted to [facility], EVD inserted, high pressure, central line anita inserted    Past Medical History:   Allergies:  Allergy Status Unknown  Home meds:     PHYSICAL EXAMINATION  T(C): 37.6 (08-07 @ 07:51), Max: 37.6 (08-07 @ 07:51) HR: 114 (08-07 @ 09:01) (87 - 114) BP: 130/75 (08-07 @ 09:01) (130/75 - 168/95) RR: 20 (08-07 @ 09:01) (20 - 20) SpO2: 100% (08-07 @ 09:01) (100% - 100%)  NEUROLOGIC EXAMINATION:  Patient is sedated  GENERAL: AC 12 50% 450 +5  EENT:  anicteric  CARDIOVASCULAR: (+) S1 S2, tachycardic rate and regular rhythm  PULMONARY: clear to auscultation bilaterally  ABDOMEN: soft, nontender with normoactive bowel sounds  EXTREMITIES: no edema  SKIN: no rash    LABS: 08-07             12.8   16.82 )-----------( 228      ( 07 Aug 2021 08:21 )             41.5     133<L>  |  97  |  6<L>  ----------------------------<  185<H>  3.2<L>   |  21<L>  |  0.55    Ca    8.6      07 Aug 2021 08:21  Phos  2.3     08-07  Mg     1.8     08-07    TPro  7.5  /  Alb  4.5  /  TBili  0.6  /  DBili  x   /  AST  44<H>  /  ALT  63<H>  /  AlkPhos  79  08-07    PT/INR - ( 07 Aug 2021 08:21 )   PT: 14.9 sec;   INR: 1.26    PTT - ( 07 Aug 2021 08:21 )  PTT:23.8 sec    Bacteriology:  CSF studies:  EEG:  Neuroimaging:  Other imaging:    MEDICATIONS:  ·	levETIRAcetam  IVPB 1000 milliGRAM(s) IV Intermittent every 12 hours  ·	niMODipine 60 milliGRAM(s) Oral every 4 hours  ·	pantoprazole  Injectable 40 milliGRAM(s) IV Push daily  ·	insulin lispro (ADMELOG) corrective regimen sliding scale   SubCutaneous Before meals and at bedtime    IV FLUIDS: NS@75 cc/hr  DRIPS:  ·	niCARdipine Infusion 5 mG/Hr IV Continuous <Continuous>  DIET: NPO   Lines: Central line Anita  Drains:    Wounds:    CODE STATUS:  Full Code                       GOALS OF CARE:  aggressive                      DISPOSITION:  ICU   5 MF 4 NEUROCRITICAL CARE NIGHT ATTENDING NOTE    HH5 MF4 SAH, s/p conventional angiogram-->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 R vertebral takedown with complete occlusion - no anterograde flow beyond aneurysm identified    PHYSICAL EXAMINATION  Vitals reviewed  NEUROLOGIC EXAMINATION:  on low dose precedex, alert, agitated, oriented to self and year given choice, follows commands, symmetrically strong on all four extremities, able to cough, take a deep breath, and hold up head to command  CARDIOVASCULAR: (+) S1 S2 rrr  PULMONARY: b/l ronchi  ABDOMEN: soft, nontender with normoactive bowel sounds  EXTREMITIES: no edema  SKIN: no rash    LABS: reviewed  IMAGING: reviewed  MEDICATIONS: reviewed  IV FLUIDS: NS@75 cc/hr  DIET: NPO   Lines: Central line [place]    CODE STATUS:  Full Code                       GOALS OF CARE:  aggressive                      DISPOSITION:  ICU

## 2021-08-07 NOTE — PROGRESS NOTE ADULT - ASSESSMENT
45y/F with  subarachnoid hemorrhage, intraventricular hemorrhage, brain compression, cerebral edema  obstructive hydrocephalus      Scores - make sure H&P documents HH and MF scores    PLAN: Day 1 = 08-07 ; Day 4 = 08/10; Day 21 = 08/27  NEURO: neurochecks q1h, PRN pain meds with Tylenol / Percocet  SAH:  TCD starting Day for vasospasm surveillance, CTA on 08/13, conventional angio, start nimodipine 60 mg po q4h to be given for 21 days; definitive aneurysm repair per Neurosurgery or Neurointerventional  Hydrocephalus:  EVD insertion, set at 20cm H20 open to drain  Seizure prophylaxis:  levetiracetam 1000mg IV x1 load now then BID until aneurysm is secured  REHAB:  physical therapy evaluation and management    EARLY MOB:  HOB elevated    PULM:  full vent support for now, stat CXR  CARDIO:  SBP goal 100-140mm Hg, nicardipine 5 mg/hour, titrate by 2.5 mg/hour every 15 min to a max of 15 mg/hour; [place]?; INSERT CENTRAL LINE WITHIN 12 HOURS; baseline Echo  ENDO:  Blood sugar goals 140-180 mg/dL, start insulin sliding scale; check A1C, lipid profile, TSH  GI:  PPI for GI prophylaxis while intubated  DIET: NPO  RENAL: maintain euvolemia, accurate Is and Os, NS@75cc/hr  HEM/ONC: no coagulopathy (INR= ), no ASA / Plavix use  VTE Prophylaxis: SCDs, no DVT chemoprophylaxis for now as patient is high risk for bleed (fresh bleed), baseline LE Doppler for DVT suspected on admission (h/o recent surgery, transfer from OSH)  ID: afebrile, no leukocytosis  Social: Daughter Maggie and Son Kenya and sister (on the phone) updated    CORE MEASURES  1.  Anderson and Mckeon Score =  2.  VTE prophylaxis:  [ ] administered within 24 hours of admission OR [X] reason not done: fresh bleed, unsecured aneurysm.  3.  Dysphagia screening:   [X] performed before any oral meds / liquids / food  4.  Nimodipine treatment:  [X] administered within 24 hours of admission OR [ ] reason not done:    ATTENDING ATTESTATION:  I was physically present for the key portions of the evaluation and management (E/M) service provided.  I agree with the above history, physical and plan, which I have reviewed and edited where appropriate.    Patient at high risk for neurological deterioration or death due to:  ICU delirium, aspiration PNA, DVT / PE.  Critical care time:  I have personally provided 45 minutes of critical care time, excluding time spent on separate procedures.      Plan discussed with RN, house staff.    Additional critical care time:  I have personally provided 30 minutes of critical care time, excluding time spent on separate procedures.    Total critical care time: 75 minutes   45y/F with  subarachnoid hemorrhage, intraventricular hemorrhage, brain compression, cerebral edema  obstructive hydrocephalus  prediabetic    PLAN: Day 1 = 08-07 ; Day 4 = 08/10; Day 21 = 08/27  NEURO: neurochecks q1h, PRN pain meds with acetaminophen, propofol to RASS of 0 to -1  SAH:  TCD starting Day for vasospasm surveillance, CTA on 08/13, conventional angio, start nimodipine 60 mg po q4h to be given for 21 days; definitive aneurysm repair per Neurosurgery or Neurointerventional  Hydrocephalus:  EVD insertion, set at 15cm H20 open to drain  Seizure prophylaxis:  levetiracetam 1000mg IV x1 load now then BID until aneurysm is secured; Ceribell   REHAB:  physical therapy evaluation and management  - defer  EARLY MOB:  HOB elevated    PULM:  full vent support for now, stat CXR  CARDIO:  SBP goal 100-140mm Hg, nicardipine 5 mg/hour, titrate by 2.5 mg/hour every 15 min to a max of 15 mg/hour; baseline Echo  ENDO:  Blood sugar goals 140-180 mg/dL, start insulin sliding scale; check lipid profile, TSH  GI:  PPI for GI prophylaxis while intubated  DIET: NPO  RENAL: maintain euvolemia, accurate Is and Os, NS@75cc/hr, hyponatremia 23.4% x1, recheck BMP after angio  HEM/ONC: no coagulopathy (INR= 1.2), no ASA / Plavix use  VTE Prophylaxis: SCDs, no DVT chemoprophylaxis for now as patient is high risk for bleed (fresh bleed), baseline LE Doppler for DVT suspected on admission (h/o recent surgery, transfer from OSH)  ID: afebrile, no leukocytosis  Social: Daughter Maggie and Son Kenya and sister (on the phone) updated    CORE MEASURES  1.  Hunt and Mckeon Score = 5  2.  VTE prophylaxis:  [ ] administered within 24 hours of admission OR [X] reason not done: fresh bleed, unsecured aneurysm.  3.  Dysphagia screening:   [X] performed before any oral meds / liquids / food  4.  Nimodipine treatment:  [X] administered within 24 hours of admission OR [ ] reason not done:    ATTENDING ATTESTATION:  I was physically present for the key portions of the evaluation and management (E/M) service provided.  I agree with the above history, physical and plan, which I have reviewed and edited where appropriate.    Patient at high risk for neurological deterioration or death due to:  ICU delirium, aspiration PNA, DVT / PE.  Critical care time:  I have personally provided 45 minutes of critical care time, excluding time spent on separate procedures.      Plan discussed with RN, house staff.    Additional critical care time:  I have personally provided 30 minutes of critical care time, excluding time spent on separate procedures.    Total critical care time: 75 minutes

## 2021-08-07 NOTE — PROGRESS NOTE ADULT - SUBJECTIVE AND OBJECTIVE BOX
NEUROSURGERY POST OP NOTE:    POD# 0 S/P cerebral angiogram for R vertebral artery takedown for dissecting aneurysm    S: Pt seen and examined in NSICU post op. Intubated, weaning sedation.      T(C): 37.6 (08-07-21 @ 15:15), Max: 37.6 (08-07-21 @ 07:51)  HR: 96 (08-07-21 @ 16:30) (87 - 114)  BP: 120/71 (08-07-21 @ 16:30) (111/63 - 168/95)  RR: 12 (08-07-21 @ 16:30) (12 - 20)  SpO2: 99% (08-07-21 @ 16:30) (98% - 100%)      08-07-21 @ 07:01  -  08-07-21 @ 17:34  --------------------------------------------------------  IN: 1050 mL / OUT: 1730 mL / NET: -680 mL        aspirin  chewable 81 milliGRAM(s) Oral daily  chlorhexidine 0.12% Liquid 15 milliLiter(s) Oral Mucosa every 12 hours  chlorhexidine 2% Cloths 1 Application(s) Topical <User Schedule>  dexMEDEtomidine Infusion 0.2 MICROgram(s)/kG/Hr IV Continuous <Continuous>  dextrose 5%. 1000 milliLiter(s) IV Continuous <Continuous>  dextrose 5%. 1000 milliLiter(s) IV Continuous <Continuous>  dextrose 50% Injectable 25 Gram(s) IV Push once  dextrose 50% Injectable 12.5 Gram(s) IV Push once  dextrose 50% Injectable 25 Gram(s) IV Push once  fentaNYL    Injectable 25 MICROGram(s) IV Push every 2 hours PRN  glucagon  Injectable 1 milliGRAM(s) IntraMuscular once  insulin lispro (ADMELOG) corrective regimen sliding scale   SubCutaneous Before meals and at bedtime  levETIRAcetam  IVPB 1000 milliGRAM(s) IV Intermittent every 12 hours  niMODipine 60 milliGRAM(s) Oral every 4 hours  pantoprazole  Injectable 40 milliGRAM(s) IV Push daily  potassium chloride   Solution 40 milliEquivalent(s) Oral every 2 hours  sodium chloride 0.9%. 1000 milliLiter(s) IV Continuous <Continuous>  sodium chloride 3%. 500 milliLiter(s) IV Continuous <Continuous>      RADIOLOGY:     Exam:  GEN: laying in bed, intubated, NAD  NEURO: FC, OE spont, PERRL, EOMI. MAEx4 spontaeously, showing 2 fingers b/l.  CV: RRR +S1/S2  PULM: CTAB; on full vent support  GI: Abd soft, NT/ND  EXT: ext warm, dry, nontender    WOUND/DRAINS: R groin dressing C/D/I, no hematoma    DEVICES: R frontal EVD @ 10      Assessment: 45yFemale      Plan:      Assessment:  Present when checked    []  GCS  E   V  M     Heart Failure: []Acute, [] acute on chronic , []chronic  Heart Failure:  [] Diastolic (HFpEF), [] Systolic (HFrEF), []Combined (HFpEF and HFrEF), [] RHF, [] Pulm HTN, [] Other    [] TRUID, [] ATN, [] AIN, [] other  [] CKD1, [] CKD2, [] CKD 3, [] CKD 4, [] CKD 5, []ESRD    Encephalopathy: [] Metabolic, [] Hepatic, [] toxic, [] Neurological, [] Other    Abnormal Nurtitional Status: [] malnurtition (see nutrition note), [ ]underweight: BMI < 19, [] morbid obesity: BMI >40, [] Cachexia    [] Sepsis  [] hypovolemic shock,[] cardiogenic shock, [] hemorrhagic shock, [] neuogenic shock  [] Acute Respiratory Failure  []Cerebral edema, [] Brain compression/ herniation,   [] Functional quadriplegia  [] Acute blood loss anemia       NEUROSURGERY POST OP NOTE:    POD# 0 S/P cerebral angiogram for R vertebral artery takedown for dissecting aneurysm    S: Pt seen and examined in NSICU post op. Intubated, weaning sedation.      T(C): 37.6 (08-07-21 @ 15:15), Max: 37.6 (08-07-21 @ 07:51)  HR: 96 (08-07-21 @ 16:30) (87 - 114)  BP: 120/71 (08-07-21 @ 16:30) (111/63 - 168/95)  RR: 12 (08-07-21 @ 16:30) (12 - 20)  SpO2: 99% (08-07-21 @ 16:30) (98% - 100%)      08-07-21 @ 07:01  -  08-07-21 @ 17:34  --------------------------------------------------------  IN: 1050 mL / OUT: 1730 mL / NET: -680 mL        aspirin  chewable 81 milliGRAM(s) Oral daily  chlorhexidine 0.12% Liquid 15 milliLiter(s) Oral Mucosa every 12 hours  chlorhexidine 2% Cloths 1 Application(s) Topical <User Schedule>  dexMEDEtomidine Infusion 0.2 MICROgram(s)/kG/Hr IV Continuous <Continuous>  dextrose 5%. 1000 milliLiter(s) IV Continuous <Continuous>  dextrose 5%. 1000 milliLiter(s) IV Continuous <Continuous>  dextrose 50% Injectable 25 Gram(s) IV Push once  dextrose 50% Injectable 12.5 Gram(s) IV Push once  dextrose 50% Injectable 25 Gram(s) IV Push once  fentaNYL    Injectable 25 MICROGram(s) IV Push every 2 hours PRN  glucagon  Injectable 1 milliGRAM(s) IntraMuscular once  insulin lispro (ADMELOG) corrective regimen sliding scale   SubCutaneous Before meals and at bedtime  levETIRAcetam  IVPB 1000 milliGRAM(s) IV Intermittent every 12 hours  niMODipine 60 milliGRAM(s) Oral every 4 hours  pantoprazole  Injectable 40 milliGRAM(s) IV Push daily  potassium chloride   Solution 40 milliEquivalent(s) Oral every 2 hours  sodium chloride 0.9%. 1000 milliLiter(s) IV Continuous <Continuous>  sodium chloride 3%. 500 milliLiter(s) IV Continuous <Continuous>      RADIOLOGY:     Exam:  GEN: laying in bed, intubated, NAD  NEURO: FC, OE spont, PERRL, EOMI. MAEx4 spontaneously showing 2 fingers, wiggling toes b/l.  CV: RRR +S1/S2  PULM: CTAB; on full vent support  GI: Abd soft, NT/ND  EXT: ext warm, dry, nontender    WOUND/DRAINS: R groin dressing C/D/I, no hematoma    DEVICES: R frontal EVD @ 10      Assessment:   44y/o F with h/o recent gastric sleeve (08/04/21 [facility], Dr. Crisostomo ), fibromyalgia, thyroid dysfunction, PTSD, depression, anxiety.  Presented with seizures at home - brought to [place], with 1 more episode of seizure en route.  Intubated, CT showed SAH with IV extension, casted IV, hydrocephalus, given mannitol, levetiracetam 1g,  6mg ativan. Started on Cardene drip for BP goal < 140 and transferred to [facility] NICU for further management. HH5 MF4, BD 1 = 8/7. Now s/p cerebral angiogram for R vertebral artery takedown for R vertebral dissecting aneurysm (8/7), and R frontal EVD placement.    NEURO:   -neurochecks q1h  -PRN pain meds with acetaminophen  -CTA on 08/13  -nimodipine 60 mg po q4h   -EVD open at 10  -levetiracetam 1000mg BID  -Ceribell EEG  -ASA 81 mg started 8/7 s/p angio    PULM:    -full vent support for now    CARDIO:    -SBP goal 120-180mmHg,   -TTE pending    ENDO:    -Blood sugar goals 140-180 mg/dL  - FS/ISS  - lipid panel, TSH    GI:    -NPO  -OGT in place  -PPI for GI prophylaxis while intubated    RENAL:   -maintain euvolemia, accurate Is and Os,   -NS@25cc/hr, 3% @50cc/hr, s/p hyponatremia 23.4% x1, recheck BMP   -Na goal 140-145  -jarvis    HEM/ONC:   -no coagulopathy (INR= 1.2), no ASA / Plavix use  -f/u lung nodule on CXR  -VTE Prophylaxis: SCDs, no DVT chemoprophylaxis for now as patient is high risk for bleed (fresh bleed)  -baseline LE Doppler for DVT suspected on admission     ID:   -afebrile, no leukocytosis    Social: family updated  PT/OT: on hold due to critical status  Full code    D/w Dr. Lomax, Dr. Yan, Dr. Sánchez            Assessment:  Present when checked    []  GCS  E   V  M     Heart Failure: []Acute, [] acute on chronic , []chronic  Heart Failure:  [] Diastolic (HFpEF), [] Systolic (HFrEF), []Combined (HFpEF and HFrEF), [] RHF, [] Pulm HTN, [] Other    [] TRUDI, [] ATN, [] AIN, [] other  [] CKD1, [] CKD2, [] CKD 3, [] CKD 4, [] CKD 5, []ESRD    Encephalopathy: [] Metabolic, [] Hepatic, [] toxic, [] Neurological, [] Other    Abnormal Nurtitional Status: [] malnurtition (see nutrition note), [ ]underweight: BMI < 19, [] morbid obesity: BMI >40, [] Cachexia    [] Sepsis  [] hypovolemic shock,[] cardiogenic shock, [] hemorrhagic shock, [] neuogenic shock  [] Acute Respiratory Failure  []Cerebral edema, [] Brain compression/ herniation,   [] Functional quadriplegia  [] Acute blood loss anemia

## 2021-08-07 NOTE — PROGRESS NOTE ADULT - NSPROGADDITIONALINFOA_GEN_ALL_CORE
Patient extubated, able to say her name, but poor upper airway secretion control, no gag reflex, R facial  complaining of headache, agitated  low dose precedex   standing hypertonics nebs and chest PT, frequent suctioning  delirium precaution, frequent verbal reorientation   CTH in am given large EVD output

## 2021-08-07 NOTE — H&P ADULT - NSHPPHYSICALEXAM_GEN_ALL_CORE
Constitutional: 46 y/o female intubated, sedated   Eyes:  Sclera anicteric, conjunctiva noninjected.   ENMT: Oropharyngeal mucosa moist, pink. Tongue midline.    Respiratory:   Cardiovascular: Regular rate and rhythm.  S1, S2 heard.  Gastrointestinal:  Soft, nontender, nondistended.   Vascular: Extremities warm, no ulcers, no discoloration of skin.   Neurological:  Skin: Warm, dry, no erythema. Constitutional: 46 y/o female intubated, sedated   Eyes:  Sclera anicteric, conjunctiva noninjected.   ENMT: Oropharyngeal mucosa moist, pink. Tongue midline.    Respiratory: intubated, on full vent support  Cardiovascular: Regular rate and rhythm.  S1, S2 heard.  Gastrointestinal:  Soft, nontender, nondistended.   Vascular: Extremities warm, no ulcers, no discoloration of skin.   Neurological:  Off sedation: does not OE, follow commands. PERRL. Localizes to noxious stimuli in b/l UE, w/d to noxious stimuli in b/l LEs.  Skin: Warm, dry, no erythema.

## 2021-08-07 NOTE — H&P ADULT - NSHPLABSRESULTS_GEN_ALL_CORE
LABS:                        12.8   16.82 )-----------( 228      ( 07 Aug 2021 08:21 )             41.5     08-07    133<L>  |  97  |  6<L>  ----------------------------<  185<H>  3.2<L>   |  21<L>  |  0.55    Ca    8.6      07 Aug 2021 08:21  Phos  2.3     08-07  Mg     1.8     08-07    TPro  7.5  /  Alb  4.5  /  TBili  0.6  /  DBili  x   /  AST  44<H>  /  ALT  63<H>  /  AlkPhos  79  08-07    PT/INR - ( 07 Aug 2021 08:21 )   PT: 14.9 sec;   INR: 1.26          PTT - ( 07 Aug 2021 08:21 )  PTT:23.8 sec    CAPILLARY BLOOD GLUCOSE      < from: CT Angio Head w/ IV Cont (08.07.21 @ 11:36) >    IMPRESSION:    Irregular stenosis and dilatation of the intradural right vertebral artery is most consistent with dissecting aneurysm.    Large volume basilar subarachnoid hemorrhage. IVHand hydrocephalus with EVD in place. Follow-up head CT is advised after angiography; findings above discussed with covering neurosurgical PA at 11:49 AM 8/7/2021.    < end of copied text >

## 2021-08-07 NOTE — BRIEF OPERATIVE NOTE - OPERATION/FINDINGS
Under MAC, using a 6 fr sheath right CFA, cerebral angiogram was performed.  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 R vertebral takedown with complete occlusion - no anterograde flow beyond aneurysm identified.  Full report to follow.  Patient tolerated procedure well, no new neurological deficit, hemodynamically stable.  Right groin/vasc access site: Direct manual compression applied, hemostasis achieved, no hematoma.  Patient was transferred to Cath lab recovery area and will go home later today. Under GA, using a 6 fr sheath right CFA, cerebral angiogram was performed.  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 R vertebral takedown with complete occlusion - no anterograde flow beyond aneurysm identified. Access site closed with 6 Fr angioseal. Groin manually compressed, hemostasis achieved.   Full report to follow.  Patient tolerated procedure well, no new neurological deficit, hemodynamically stable.  Right groin/vasc access site: Direct manual compression applied, hemostasis achieved, no hematoma.  Patient was transferred to Cath lab recovery area and will go home later today.

## 2021-08-07 NOTE — CHART NOTE - NSCHARTNOTEFT_GEN_A_CORE
Neurosurgical Indications for Screening Dopplers on Admission:     1) Known hypercoagulation disorder (h/o VTE, thrombophilia, HIT, etc.)   2) Admitted from prolonged stay from another institution (straight forward ER transfers not included)   3) Presenting with significant leg immobility   4) Presenting with signs and symptoms of VTE?    5) With significant critical illness, Including "found down" for unknown period of time in HPI   6) With significant neurotrauma (TBI, SCI / TLS spine fractures)   7) Who are comatose YES  8) With known malignancy (e.g. glioblastoma multiforme, meningioma, etc.). Excludes IA chemo 23hr observation stays  9) On hemodialysis   10) Who have received platelet transfusion or antithrombotic reversal agents recently   11) Who have had recent major orthopedic surgery      Screening dopplers indicated?   YES   N _    DVT Prophylaxis:  X SCD's   _ chemoprophylaxis

## 2021-08-07 NOTE — H&P ADULT - ASSESSMENT
This is a 44 y/o female with h/o DM and HTN who presented to with intractable seizure to [place]. Given versed and intubated for airway protection. CT demonstrated diffuse SAH involving the basilar cisterns. Transferred to [facility] NICU for further management.     PLAN:   - Admit to neurosurgerical ICU, Dr. Lomax   - EVD placement   - CT Angio   - Arterial line   - Central line   - Angiography   - SBP < 140   - Cardene gtt   - FVS   - Sedation with propofol   - T & S x 2  - Coags     D/W Dr. Lomax and Dr. Sánchez

## 2021-08-07 NOTE — PROGRESS NOTE ADULT - ASSESSMENT
45y/F with  subarachnoid hemorrhage, intraventricular hemorrhage, brain compression, cerebral edema  obstructive hydrocephalus  prediabetic    PLAN: Day 1 = 08-07 ; Day 4 = 08/10; Day 21 = 08/27  NEURO: neurochecks q1h, PRN pain meds with acetaminophen, propofol to RASS of 0 to -1  SAH:  TCD starting Day for vasospasm surveillance, CTA on 08/13, conventional angio, start nimodipine 60 mg po q4h to be given for 21 days; definitive aneurysm repair per Neurosurgery or Neurointerventional  Hydrocephalus:  EVD insertion, set at 15cm H20 open to drain  Seizure prophylaxis:  levetiracetam 1000mg IV x1 load now then BID until aneurysm is secured; Ceribell   REHAB:  physical therapy evaluation and management  - defer  EARLY MOB:  HOB elevated    PULM:  full vent support for now, stat CXR  CARDIO:  SBP goal 100-140mm Hg, nicardipine 5 mg/hour, titrate by 2.5 mg/hour every 15 min to a max of 15 mg/hour; baseline Echo  ENDO:  Blood sugar goals 140-180 mg/dL, start insulin sliding scale; check lipid profile, TSH  GI:  PPI for GI prophylaxis while intubated  DIET: NPO  RENAL: maintain euvolemia, accurate Is and Os, NS@75cc/hr, hyponatremia 23.4% x1, recheck BMP after angio  HEM/ONC: no coagulopathy (INR= 1.2), no ASA / Plavix use  VTE Prophylaxis: SCDs, no DVT chemoprophylaxis for now as patient is high risk for bleed (fresh bleed), baseline LE Doppler for DVT suspected on admission (h/o recent surgery, transfer from OSH)  ID: afebrile, no leukocytosis  Social: Daughter Maggie and Son Kenya and sister (on the phone) updated    CORE MEASURES  1.  Hunt and Mckeon Score = 5  2.  VTE prophylaxis:  [ ] administered within 24 hours of admission OR [X] reason not done: fresh bleed, unsecured aneurysm.  3.  Dysphagia screening:   [X] performed before any oral meds / liquids / food  4.  Nimodipine treatment:  [X] administered within 24 hours of admission OR [ ] reason not done:    ATTENDING ATTESTATION:  I was physically present for the key portions of the evaluation and management (E/M) service provided.  I agree with the above history, physical and plan, which I have reviewed and edited where appropriate.    Patient at high risk for neurological deterioration or death due to:  ICU delirium, aspiration PNA, DVT / PE.  Critical care time:  I have personally provided 45 minutes of critical care time, excluding time spent on separate procedures.      Plan discussed with RN, house staff.    Additional critical care time:  I have personally provided 30 minutes of critical care time, excluding time spent on separate procedures.    Total critical care time: 75 minutes   PBD 0 HH5 MF4 SAH with dissecting fusiform aneurysm of R vertebral artery at level of PICA on conventional angiogram s/p R vert occlude    Q1hr neurochecks  minimize sedation, pain control, limit narcotics  VSP/DCI ppx nimodipine with BP/HR hold parameters, TCDs, euvolemia, eunatremia  EVD at 18bqN3O post op with large hourly output, per neurosurgery, OK to drain up to 30cc/hr, monitor ICPs  continue keppra 1g bid for possible seizures preadmission, Ceribell in place, no seizures thus far  respiratory failure requiring intubation  pressure support as tolerated, wean to extubate  -180, TTE; bedside POCUS limited due to body habitus, grossly normal LV function and otherwise unremarkable   keep NPO overnight  euvolemia, eunatremia  SCDs, hold chemoppx fresh post op  monitor for fevers    Patient at high risk for neurological deterioration or death due to: aneurysmal subarachnoid hemorrhage, cerebral vasospasm, delayed cerebral ischemia, seizures, respiratory failure

## 2021-08-08 NOTE — PROGRESS NOTE ADULT - SUBJECTIVE AND OBJECTIVE BOX
HPI:  44y/o F with h/o recent gastric sleeve (08/04/21 [facility], Dr. Crisostomo ), fibromyalgia, thyroid dysfunction, PTSD, depression, anxiety.  Presented with seizures at home - brought to [place], with 1 more episode of seizure en route.  Intubated, CT showed SAH with IV extension, casted IV, hydrocephalus, given mannitol, levetiracetam 1g,  6mg ativan. Started on Cardene drip for BP goal < 140 and transferred to [facility] NICU for further management.     HH5 MF4   NIHSS 26 on arrival  BD 1 = 8/7 (07 Aug 2021 08:08)    OVERNIGHT EVENTS: Extubated overnight, desatting to low 90s, extensive suctioning, chest PT, standing hypertonic nebs, improved now on 6L NC. Agitated and restless, started on low dose precedex drip    HOSPITAL COURSE:  8/7: Tx from [place] for SAH. Intubated. R frontal EVD placed, central line and a line placed. 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   8/8: POD1 s/p angio with R vert takedown, extubated, desatting on aerosol mask, required extensive suctioning, 3% nebs, chest PT, started on low dose precedex drip for restlessness/agitation, ABG stable. NGT placed.     Vital Signs Last 24 Hrs  T(C): 37.8 (07 Aug 2021 22:15), Max: 37.8 (07 Aug 2021 22:15)  T(F): 100.1 (07 Aug 2021 22:15), Max: 100.1 (07 Aug 2021 22:15)  HR: 99 (08 Aug 2021 03:00) (87 - 114)  BP: 137/75 (07 Aug 2021 19:00) (111/63 - 168/95)  BP(mean): 99 (07 Aug 2021 19:00) (82 - 130)  RR: 15 (08 Aug 2021 03:00) (12 - 38)  SpO2: 95% (08 Aug 2021 03:00) (92% - 100%)    I&O's Summary    07 Aug 2021 07:01  -  08 Aug 2021 03:26  --------------------------------------------------------  IN: 2852 mL / OUT: 4319 mL / NET: -1467 mL      PHYSICAL EXAM: Sedation held for exam   General: NAD, pt is restless in bed, on 6LNC  HEENT: CN II-XII grossly intact, PERRL 3mm briskly reactive, EOMI b/l, +mild Right facial, tongue midline, neck FROM, +NGT   Cardiovascular: RRR, normal S1 and S2   Respiratory: Tachypneic +Rhonchi bilateral with diminished breath sounds at bases.   GI: normoactive BS to auscultation, abd soft, NTND   Neuro: A&Ox2 (not orientated to year), speech clear, no dysmetria Follows commands.  CALLE x4 spontaneously, 5/5 strength in all extremities throughout. SILT throughout   Extremities: distal pulses 2+ x4  Drain: +EVD open at 10.      TUBES/LINES:  [] Jarvis  [] Lumbar Drain  [] Wound Drains  [X] Others - EVD, right radial a-line, R IJ CVC, NGT       DIET:  [X] NPO  [] Mechanical  [] Tube feeds    LABS:                        11.5   12.30 )-----------( 269      ( 07 Aug 2021 15:18 )             36.6     08-07    138  |  108  |  5<L>  ----------------------------<  128<H>  3.8   |  20<L>  |  0.51    Ca    8.6      07 Aug 2021 21:32  Phos  2.3     08-07  Mg     1.8     08-07    TPro  7.5  /  Alb  4.5  /  TBili  0.6  /  DBili  x   /  AST  44<H>  /  ALT  63<H>  /  AlkPhos  79  08-07    PT/INR - ( 07 Aug 2021 08:21 )   PT: 14.9 sec;   INR: 1.26          PTT - ( 07 Aug 2021 08:21 )  PTT:23.8 sec    CARDIAC MARKERS ( 08 Aug 2021 01:52 )  x     / 0.12 ng/mL / x     / x     / x      CARDIAC MARKERS ( 07 Aug 2021 21:32 )  x     / 0.09 ng/mL / 96 U/L / x     / 8.9 ng/mL      CAPILLARY BLOOD GLUCOSE      POCT Blood Glucose.: 134 mg/dL (07 Aug 2021 22:34)      Drug Levels: [] N/A    CSF Analysis: [] N/A      Allergies    Allergy Status Unknown    Intolerances      MEDICATIONS:  Antibiotics:    Neuro:  dexMEDEtomidine Infusion 0.2 MICROgram(s)/kG/Hr IV Continuous <Continuous>  fentaNYL    Injectable 12.5 MICROGram(s) IV Push every 2 hours PRN  levETIRAcetam  IVPB 1000 milliGRAM(s) IV Intermittent every 12 hours    Anticoagulation:  aspirin  chewable 81 milliGRAM(s) Oral daily    OTHER:  chlorhexidine 0.12% Liquid 15 milliLiter(s) Oral Mucosa every 12 hours  chlorhexidine 2% Cloths 1 Application(s) Topical <User Schedule>  dextrose 50% Injectable 25 Gram(s) IV Push once  dextrose 50% Injectable 12.5 Gram(s) IV Push once  dextrose 50% Injectable 25 Gram(s) IV Push once  glucagon  Injectable 1 milliGRAM(s) IntraMuscular once  insulin lispro (ADMELOG) corrective regimen sliding scale   SubCutaneous Before meals and at bedtime  niMODipine 60 milliGRAM(s) Oral every 4 hours  pantoprazole  Injectable 40 milliGRAM(s) IV Push daily  sodium chloride 3%  Inhalation 3 milliLiter(s) Inhalation every 6 hours    IVF:  dextrose 5%. 1000 milliLiter(s) IV Continuous <Continuous>  dextrose 5%. 1000 milliLiter(s) IV Continuous <Continuous>  sodium chloride 0.9%. 1000 milliLiter(s) IV Continuous <Continuous>  sodium chloride 3%. 500 milliLiter(s) IV Continuous <Continuous>    CULTURES:    RADIOLOGY & ADDITIONAL TESTS:  CTH 8/7/2021: IMPRESSION:  1. Interval vascular clip placement.  2. Subarachnoid and intraventricular blood.  3. Mild hydrocephalus.    CTA 8/7/2021: IMPRESSION:  Irregular stenosis and dilatation of the intradural right vertebral artery is most consistent with dissecting aneurysm.  Large volume basilar subarachnoid hemorrhage. IVH and hydrocephalus with EVD in place. Follow-up head CT is advised after angiography; findings above discussed with covering neurosurgical PA at 11:49 AM 8/7/2021.    ASSESSMENT:  44y/o F with h/o recent gastric sleeve (08/04/21 [facility], Dr. Crisostomo ), fibromyalgia, thyroid dysfunction, PTSD, depression, anxiety.  Presented with seizures at home - brought to [place], with 1 more episode of seizure en route.  Intubated, CT showed SAH with IV extension, casted IV, hydrocephalus, given mannitol, levetiracetam 1g,  6mg ativan. Started on Cardene drip for BP goal < 140 and transferred to [facility] NICU for further management. HH5 MF4, BD 1 = 8/7. Now s/p cerebral angiogram for R vertebral artery takedown for R vertebral dissecting aneurysm (8/7), and R frontal EVD placement.      HEAD BLEED    No pertinent family history in first degree relatives    Handoff    Cerebral aneurysm    Cerebral aneurysm    Angiogram, carotid and cerebral arteries    [email or web address]    PLAN:  NEURO:   -neurochecks q1h  -PRN pain meds with acetaminophen  - Precedex for agitation   -CTA on 08/13, conventional angio  -nimodipine 60 mg po q4h   -EVD open at 10, allow up to 30cc/hr  -levetiracetam 1000mg BID  -Ceribell EEG   -ASA 81 mg started 8/7 s/p angio    PULM:    -Extubated ON, tolerating aerosol mask   - Maintain O2 sats >92    CARDIO:    -SBP goal 120-180mmHg,   -TTE pending  - Troponin elevated 0.09 - > 0.12     ENDO:    -Blood sugar goals 140-180 mg/dL  - FS/ISS  - lipid panel, TSH    GI:    -NPO  -NGT in place   -PPI for GI prophylaxis while intubated    RENAL:   -maintain euvolemia, accurate Is and Os,   -NS@25cc/hr, 3% @50cc/hr, s/p hyponatremia 23.4% x1, recheck BMP   -Na goal 140-145  -jarvis    HEM/ONC:   -no coagulopathy (INR= 1.2), no ASA / Plavix use  -f/u lung nodule on CXR  -VTE Prophylaxis: SCDs, no DVT chemoprophylaxis for now as patient is high risk for bleed (fresh bleed)  -baseline LE Doppler for DVT suspected on admission     ID:   -afebrile, no leukocytosis    Social: family updated  PT/OT: on hold due to critical status  Full code    D/w Dr. Lomax, Dr. Yan, Dr. Carmona    Assessment:  Present when checked    []  GCS  E   V  M     Heart Failure: []Acute, [] acute on chronic , []chronic  Heart Failure:  [] Diastolic (HFpEF), [] Systolic (HFrEF), []Combined (HFpEF and HFrEF), [] RHF, [] Pulm HTN, [] Other    [] TRUDI, [] ATN, [] AIN, [] other  [] CKD1, [] CKD2, [] CKD 3, [] CKD 4, [] CKD 5, []ESRD    Encephalopathy: [] Metabolic, [] Hepatic, [] toxic, [] Neurological, [] Other    Abnormal Nurtitional Status: [] malnurtition (see nutrition note), [ ]underweight: BMI < 19, [] morbid obesity: BMI >40, [] Cachexia    [] Sepsis  [] hypovolemic shock,[] cardiogenic shock, [] hemorrhagic shock, [] neuogenic shock  [] Acute Respiratory Failure  []Cerebral edema, [] Brain compression/ herniation,   [] Functional quadriplegia  [] Acute blood loss anemia

## 2021-08-08 NOTE — PROVIDER CONTACT NOTE (OTHER) - SITUATION
Pt was extubated around 10pm. Pt unable to cough up secretions.   Pt is extremely agitated, thrashing and trying to get out of bed.

## 2021-08-08 NOTE — EEG REPORT - NS EEG TEXT BOX
[facility] DEPARTMENT OF NEUROLOGY  RAPID RESPONSE CERIBELL ELECTROENCEPHALOGRAM    Patient Name:	 Annie Castillo    :	1975    MRN:	[id number]      Study Start Date/Time:  2021  04:05:27 PM    Study End Date/Time:  2021  08:46:13 AM      Reason for study:  screening for seizures or markers of epileptic potential.  Referring Physician: Dr. Earl Sánchez    Diagnosis: 	R56.9 other convulsions  Study CPT:  	65301 2-12 Hrs    Medications list if available:  not available  	  Acquisition Details:  This EEG was obtained using a 10 lead EEG system positioned circumferentially without any parasagittal coverage utilizing the [employer] Rapid Response EEG system. Data was reviewed using the [employer] Portal.  Computer selected EEG was reviewed as well as background features and all clinically significant events.      Description:  Background: Predominantly theta/delta frequencies  Organization: rudimentary anterior-posterior organization of the background.  Posterior Dominant Rhythm: No clear PDR  N2 sleep: absent.  Focal abnormalities No persistent asymmetries in voltage or frequencies.  Spontaneous Activity:  No epileptiform discharges present.  Events:  No electrographic seizures or significant clinical events.  Artifact- intermittent artifact during the recording    Summary:  This was an abnormal rapid response Ceribell EEG.    Clinical Correlation:  1)	There were non-specific indicators of generalized cerebral dysfunction.   2)	If there is persistent suspicion for continued seizures or seizure-like activity, a continuous video-electroencephalogram with the 10-20 international system is advised.      Nelida Hill MD  Attending [facility] Epilepsy Program

## 2021-08-08 NOTE — PROGRESS NOTE ADULT - ASSESSMENT
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  Hydrocephalus:  EVD insertion, set at 10cm H20 open to drain  Seizure prophylaxis:  switch levetiracetam to  q8h, f/u EEG read  REHAB:  physical therapy evaluation and management  - defer  EARLY MOB:  HOB elevated    PULM:  extbuated, CXR   CARDIO:  SBP goal 120-180mm Hg, baseline echo  ENDO:  Blood sugar goals 140-180 mg/dL, cont insulin sliding scale  GI:  d/c PPI  DIET: start tube feeds  RENAL: maintain euvolemia, accurate Is and Os, d/c IVF once TF at goal 3% @ 50 recheck BMP this afternoon, Na goal 135-145  HEM/ONC: no coagulopathy (INR= 1.2), no ASA / Plavix use  VTE Prophylaxis: SCDs, no DVT chemoprophylaxis for now as patient is high risk for bleed (fresh bleed), f/u baseline LE Doppler for DVT suspected on admission (h/o recent surgery, transfer from OSH)  ID: afebrile, leukocytosis, aspiration risk  Social: Daughter Maggie and Son Kenya and sister (on the phone) updated yesterday      CORE MEASURES  1.  Hunt and Mckeon Score = 5  2.  VTE prophylaxis:  [ ] administered within 24 hours of admission OR [X] reason not done: fresh bleed, unsecured aneurysm.  3.  Dysphagia screening:   [X] performed before any oral meds / liquids / food  4.  Nimodipine treatment:  [X] administered within 24 hours of admission OR [ ] reason not done:    ATTENDING ATTESTATION:  I was physically present for the key portions of the evaluation and management (E/M) service provided.  I agree with the above history, physical and plan, which I have reviewed and edited where appropriate.    Patient at high risk for neurological deterioration or death due to:  ICU delirium, aspiration PNA, DVT / PE.  Critical care time:  I have personally provided 45 minutes of critical care time, excluding time spent on separate procedures.      Plan discussed with RN, house staff.    Additional critical care time:  I have personally provided 30 minutes of critical care time, excluding time spent on separate procedures.    Total critical care time: 75 minutes   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  Hydrocephalus:  EVD insertion, set at 10cm H20 open to drain  Seizure prophylaxis:  switch levetiracetam to  q8h, f/u EEG read  REHAB:  physical therapy evaluation and management  - defer  EARLY MOB:  HOB elevated    PULM:  extbuated, CXR   CARDIO:  SBP goal 120-180mm Hg, baseline echo  ENDO:  Blood sugar goals 140-180 mg/dL, cont insulin sliding scale  GI:  d/c PPI  DIET: start tube feeds  RENAL: maintain euvolemia, accurate Is and Os, d/c IVF once TF at goal 3% @ 50 recheck BMP this afternoon, Na goal 135-145  HEM/ONC: no coagulopathy (INR= 1.2), no ASA / Plavix use  VTE Prophylaxis: SCDs, no DVT chemoprophylaxis for now as patient is high risk for bleed (fresh bleed), f/u baseline LE Doppler for DVT suspected on admission (h/o recent surgery, transfer from OSH)  ID: afebrile, leukocytosis, aspiration risk  Social: Daughter Maggie and Son Kenya and sister (on the phone) updated yesterday      CORE MEASURES  1.  Hunt and Mckeon Score = 5  2.  VTE prophylaxis:  [ ] administered within 24 hours of admission OR [X] reason not done: fresh bleed, unsecured aneurysm.  3.  Dysphagia screening:   [X] performed before any oral meds / liquids / food  4.  Nimodipine treatment:  [X] administered within 24 hours of admission OR [ ] reason not done:    ATTENDING ATTESTATION:  I was physically present for the key portions of the evaluation and management (E/M) service provided.  I agree with the above history, physical and plan, which I have reviewed and edited where appropriate.    Patient at high risk for neurological deterioration or death due to:  ICU delirium, aspiration PNA, DVT / PE.  Critical care time:  I have personally provided 45 minutes of critical care time, excluding time spent on separate procedures.      Plan discussed with RN, house staff.

## 2021-08-09 NOTE — DIETITIAN INITIAL EVALUATION ADULT. - DIET TYPE
Once mental status improves, recommend the following diet advancement; BARICLLLIQ >> Phase 1 bariatric full liquid diet

## 2021-08-09 NOTE — DIETITIAN INITIAL EVALUATION ADULT. - ENTERAL
Recommend EN regimen; Vital HP @ 46 ml/hr x24hrs via NGT providing 1104 ml TV, 1104 kcal, 96g pro., 923 ml free water. Recommend start at 10 ml/hr increase 10 ml q4hrs until goal rate is met. FWF per team.

## 2021-08-09 NOTE — PROVIDER CONTACT NOTE (OTHER) - BACKGROUND
Pt admitted on 8/7/21 for SAH. s/p right vertebral cutdown and dissecting aneurysm. and EVD remains 10cm H2O above tragus. ICP WDL.

## 2021-08-09 NOTE — DIETITIAN INITIAL EVALUATION ADULT. - OTHER CALCULATIONS
IBW used for calculations as pt >120% of IBW (188%). Needs adjusted for recent bariatric sx, critical care, obesity.

## 2021-08-09 NOTE — DIETITIAN INITIAL EVALUATION ADULT. - ADD RECOMMEND
1. Cont with strict aspiration precautions at all times 2. Pain and bowel regimen per team 3. Cont to monitor lytes and replete prn 4. RD diet edu prn

## 2021-08-09 NOTE — DIETITIAN INITIAL EVALUATION ADULT. - OTHER INFO
45F with h/o recent gastric sleeve (08/04/21 [facility], Dr. Crisostomo ), fibromyalgia, thyroid dysfunction, PTSD, depression, anxiety.  Presented with seizures at home - brought to [place], with 1 more episode of seizure en route.  Intubated, CT showed SAH with IV extension, casted IV, hydrocephalus, given mannitol, levetiracetam 1g,  6mg ativan. Started on Cardene drip for BP goal < 140 and transferred to [facility] NICU for further management. S/p angio with R vert takedown 8/7, pt extubated later that day. Pt remains on fentanyl and Precedex restlessness and agitation. NGT placed for initiation of EN- consult received for EN recommendations.     On assessment, pt resting in bed, unable to participate in exam at this time 2/2 AMS (AOx1). Currently NPO with EN running through NGT- recommendations disc with team. Please see recs below. Pt was started on Jevity 1.2 @ 20 ml/hr for trickle feeds and tolerated well per disc with team. No noted n/v/d/c. No abd distention or discomfort. Skin Surgical incision, david score 12. No pain noted. NKFA. Unable to assess UBW nor appetite PTA. Education deferred- RD to follow to reinforce bariatric diet advancement once mental status improves. Pertinent Labs: Phos 1.9L, Na 151H, Glu 129H, A1C 5.9%- pt started on insulin regimen. Cont to monitor lytes and replete prn. Please see nutr recs below. RD to follow.

## 2021-08-09 NOTE — PROGRESS NOTE ADULT - SUBJECTIVE AND OBJECTIVE BOX
HPI:  46y/o F with h/o recent gastric sleeve (08/04/21 [facility], Dr. Crisostomo ), fibromyalgia, thyroid dysfunction, PTSD, depression, anxiety.  Presented with seizures at home - brought to [place], with 1 more episode of seizure en route.  Intubated, CT showed SAH with IV extension, casted IV, hydrocephalus, given mannitol, levetiracetam 1g,  6mg ativan. Started on Cardene drip for BP goal < 140 and transferred to [facility] NICU for further management.     HH5 MF4   NIHSS 26 on arrival  BD 1 = 8/7 (07 Aug 2021 08:08)    OVERNIGHT EVENTS: ASHA overnight, neuro stable     HOSPITAL COURSE:  8/7: Tx from [place] for SAH. Intubated. R frontal EVD placed, central line and a line placed. 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   8/8: POD1 s/p angio with R vert takedown, extubated, desatting on aerosol mask, required extensive suctioning, 3% nebs, chest PT, started on low dose precedex drip for restlessness/agitation, ABG stable. NGT placed. TF started with goal 20 pending nutrition recs. 3% stopped for Na 150. Ceribell EEG negative and d/c'ed.   8/9 POD2 R vert takedown, Overnight, new Right pronator drift and right gaze preference that can't cross midline, Repeat CTH demonstrated stable vents, CTA head and neck unremarkable for spasm, hypoattenuation of right cerebellum edema vs. infarct. Requiring precedex drip and fentanyl pushes PRN for agitation       Vital Signs Last 24 Hrs  T(C): 37.4 (09 Aug 2021 01:00), Max: 37.4 (09 Aug 2021 01:00)  T(F): 99.4 (09 Aug 2021 01:00), Max: 99.4 (09 Aug 2021 01:00)  HR: 81 (09 Aug 2021 02:00) (72 - 84)  BP: 127/78 (09 Aug 2021 02:00) (110/70 - 151/95)  BP(mean): 97 (09 Aug 2021 02:00) (82 - 116)  RR: 13 (09 Aug 2021 02:00) (13 - 27)  SpO2: 95% (09 Aug 2021 02:00) (91% - 99%)    I&O's Summary    07 Aug 2021 07:01  -  08 Aug 2021 07:00  --------------------------------------------------------  IN: 4289.5 mL / OUT: 4687 mL / NET: -397.5 mL    08 Aug 2021 07:01  -  09 Aug 2021 03:09  --------------------------------------------------------  IN: 1795.2 mL / OUT: 1075 mL / NET: 720.2 mL        PHYSICAL EXAM:  General: Agitated, pt is restless in bed, on 4LNC, with skin pallor.   HEENT: CN II-XII grossly intact, PERRL 3mm briskly reactive, EOMI b/l, +mild Right facial, +right gaze preference, can't cross midline, tongue midline, neck FROM, +NGT   Cardiovascular: RRR, normal S1 and S2   Respiratory: Tachypneic +Rhonchi bilateral with diminished breath sounds at bases.   GI: normoactive BS to auscultation, abd soft, NTND   Neuro: A&Ox1 (only to self), slurred speech, + Right dysmetria, +Right pronator drift, intermittently following commands.  CALLE x4 spontaneously, 4/5 strength in all extremities throughout. SILT throughout   Extremities: distal pulses 2+ x4  Drain: +EVD open at 10.    TUBES/LINES:  [] Jarvis  [] Lumbar Drain  [] Wound Drains  [X] Others - EVD, R IJ CVC, R radial a-line, NGT       DIET:  [] NPO  [] Mechanical  [X] Tube feeds    LABS:                        11.0   15.45 )-----------( 252      ( 08 Aug 2021 05:22 )             35.4     08-08    150<H>  |  118<H>  |  13  ----------------------------<  127<H>  3.9   |  24  |  0.59    Ca    9.0      08 Aug 2021 23:12  Phos  2.1     08-08  Mg     2.0     08-08    TPro  7.5  /  Alb  4.5  /  TBili  0.6  /  DBili  x   /  AST  44<H>  /  ALT  63<H>  /  AlkPhos  79  08-07    PT/INR - ( 07 Aug 2021 08:21 )   PT: 14.9 sec;   INR: 1.26          PTT - ( 07 Aug 2021 08:21 )  PTT:23.8 sec    CARDIAC MARKERS ( 08 Aug 2021 15:58 )  x     / 0.07 ng/mL / x     / x     / x      CARDIAC MARKERS ( 08 Aug 2021 05:22 )  x     / 0.10 ng/mL / x     / x     / x      CARDIAC MARKERS ( 08 Aug 2021 01:52 )  x     / 0.12 ng/mL / x     / x     / x      CARDIAC MARKERS ( 07 Aug 2021 21:32 )  x     / 0.09 ng/mL / 96 U/L / x     / 8.9 ng/mL      CAPILLARY BLOOD GLUCOSE      POCT Blood Glucose.: 124 mg/dL (08 Aug 2021 22:13)  POCT Blood Glucose.: 121 mg/dL (08 Aug 2021 16:18)  POCT Blood Glucose.: 123 mg/dL (08 Aug 2021 10:53)  POCT Blood Glucose.: 119 mg/dL (08 Aug 2021 06:55)      Drug Levels: [] N/A    CSF Analysis: [] N/A      Allergies    Allergy Status Unknown    Intolerances      MEDICATIONS:  Antibiotics:    Neuro:  acetaminophen    Suspension .. 650 milliGRAM(s) Enteral Tube once PRN  dexMEDEtomidine Infusion 0.2 MICROgram(s)/kG/Hr IV Continuous <Continuous>  fentaNYL    Injectable 12.5 MICROGram(s) IV Push every 2 hours PRN  valproate sodium IVPB 500 milliGRAM(s) IV Intermittent every 8 hours    Anticoagulation:  aspirin  chewable 81 milliGRAM(s) Oral daily    OTHER:  chlorhexidine 2% Cloths 1 Application(s) Topical <User Schedule>  dextrose 50% Injectable 25 Gram(s) IV Push once  dextrose 50% Injectable 12.5 Gram(s) IV Push once  dextrose 50% Injectable 25 Gram(s) IV Push once  glucagon  Injectable 1 milliGRAM(s) IntraMuscular once  insulin lispro (ADMELOG) corrective regimen sliding scale   SubCutaneous Before meals and at bedtime  niMODipine 60 milliGRAM(s) Oral every 4 hours  pantoprazole  Injectable 40 milliGRAM(s) IV Push daily    IVF:  dextrose 5%. 1000 milliLiter(s) IV Continuous <Continuous>  dextrose 5%. 1000 milliLiter(s) IV Continuous <Continuous>  sodium chloride 0.9%. 1000 milliLiter(s) IV Continuous <Continuous>    CULTURES:    RADIOLOGY & ADDITIONAL TESTS:  Pending final read on CTH/CTA Head/neck 8/9/2021: Wet read - hypoattentuation of right cerebellum/occipital lobs infarct vs. edema vs. artifact.     ASSESSMENT:  46y/o F with h/o recent gastric sleeve (08/04/21 [facility], Dr. Crisostomo ), fibromyalgia, thyroid dysfunction, PTSD, depression, anxiety.  Presented with seizures at home - brought to [place], with 1 more episode of seizure en route.  Intubated, CT showed SAH with IV extension, casted IV, hydrocephalus, given mannitol, levetiracetam 1g,  6mg ativan. Started on Cardene drip for BP goal < 140 and transferred to [facility] NICU for further management. HH5 MF4, BD 1 = 8/7. Now s/p cerebral angiogram for R vertebral artery takedown for R vertebral dissecting aneurysm (8/7), and R frontal EVD placement.      HEAD BLEED    No pertinent family history in first degree relatives    Handoff    Cerebral aneurysm    Cerebral aneurysm    Angiogram, carotid and cerebral arteries    [email or web address]      PLAN:  NEURO:   -neurochecks q1h  -PRN pain meds with acetaminophen  - Precedex for agitation, prn fentanyl pushes   -CTA on 08/13, conventional angio  -nimodipine 60 mg po q4h   -EVD open at 10, allow up to 30cc/hr  -levetiracetam switched to depakote 500q8 for agitation  -Ceribell EEG negative 8/8, d/c'ed   -ASA 81 mg started 8/7 s/p angio  - CTH 8/9 w/ hypoattenuation of R occipital/cerbellum edema vs. evolving infarct vs. artifact.     PULM:    - Satting well on 4L NC   - Secretions q1 hour  - Hypertonic nebs/duonebs   - Maintain O2 sats >92    CARDIO:    -SBP goal 120-180mmHg,   -TTE pending  - Troponin stable   - EKG normal     ENDO:    -Blood sugar goals 140-180 mg/dL  - FS/ISS  - lipids normal    GI:    -start TF via NGT (trickle)  -PPI per bariatric surgeon  -f/u nutrition consult for diet post bariatric surgery    RENAL:   -maintain euvolemia, accurate Is and Os,   -3% d/c'ed continue to trend   -Na goal 140-145  -jarvis    HEM/ONC:   -no coagulopathy (INR= 1.2), no ASA / Plavix use  -f/u lung nodule on CXR  -VTE Prophylaxis: SCDs, no DVT chemoprophylaxis for now as patient is high risk for bleed (fresh bleed)  -baseline LE Doppler for DVT suspected on admission     ID:   -afebrile, trend leukocytosis     Dispol ICU status: family updated  PT/OT: on hold due to critical status  Full code    D/w Dr. Lomax, Dr. Yan     Assessment:  Present when checked    []  GCS  E   V  M     Heart Failure: []Acute, [] acute on chronic , []chronic  Heart Failure:  [] Diastolic (HFpEF), [] Systolic (HFrEF), []Combined (HFpEF and HFrEF), [] RHF, [] Pulm HTN, [] Other    [] TRUDI, [] ATN, [] AIN, [] other  [] CKD1, [] CKD2, [] CKD 3, [] CKD 4, [] CKD 5, []ESRD    Encephalopathy: [] Metabolic, [] Hepatic, [] toxic, [] Neurological, [] Other    Abnormal Nurtitional Status: [] malnurtition (see nutrition note), [ ]underweight: BMI < 19, [] morbid obesity: BMI >40, [] Cachexia    [] Sepsis  [] hypovolemic shock,[] cardiogenic shock, [] hemorrhagic shock, [] neuogenic shock  [] Acute Respiratory Failure  []Cerebral edema, [] Brain compression/ herniation,   [] Functional quadriplegia  [] Acute blood loss anemia

## 2021-08-09 NOTE — PROGRESS NOTE ADULT - SUBJECTIVE AND OBJECTIVE BOX
=============================================   NEUROCRITICAL CARE ATTENDING NOTE (Monday, August 9, 2021)  =============================================    LUDMILA PRIETO   MRN-[id number]  Summary:  45y/F with h/o recent gastric sleeve (08/04 [facility], Dr. Crisostomo ), fibromyalgia, thyroid dysfunction, PTSD, depression, anxiety.  Presented with seizures at home - brought to [place], with 1 more episode of seizure en route.  Intubated, CT showed SAH with IV extension, casted IV, hydrocephalus, given mannitol levetiracetam 6mg ativan, transferred to [facility].    HOSPITAL COURSE:  8/7: Tx from [place] for SAH. Intubated. R frontal EVD placed, central line and a line placed. 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   8/8: POD1 s/p angio with R vert takedown, extubated, desatting on aerosol mask, required extensive suctioning, 3% nebs, chest PT, started on low dose precedex drip for restlessness/agitation, ABG stable. NGT placed. TF started with goal 20 pending nutrition recs. 3% stopped for Na 150. Ceribell EEG negative and d/c'ed.   8/9 POD2 R vert takedown, Overnight, new Right pronator drift and right gaze preference that can't cross midline, Repeat CTH demonstrated stable vents, CTA head and neck unremarkable for spasm, hypoattenuation of right cerebellum edema vs. infarct. Requiring precedex drip and fentanyl pushes PRN for agitation     Past Medical History:   Allergies:  Allergy Status Unknown  Home meds:     Current Meds:  MEDICATIONS  (STANDING):  aspirin  chewable 81 milliGRAM(s) Oral daily  dexMEDEtomidine Infusion 0.2 MICROgram(s)/kG/Hr (5.1 mL/Hr) IV Continuous <Continuous>  insulin lispro (ADMELOG) corrective regimen sliding scale   SubCutaneous Before meals and at bedtime  niMODipine 60 milliGRAM(s) Oral every 4 hours  pantoprazole  Injectable 40 milliGRAM(s) IV Push daily  potassium phosphate IVPB 30 milliMole(s) IV Intermittent once  sodium chloride 0.9%. 1000 milliLiter(s) (50 mL/Hr) IV Continuous <Continuous>  valproate sodium IVPB 500 milliGRAM(s) IV Intermittent every 8 hours    MEDICATIONS  (PRN):  acetaminophen    Suspension .. 650 milliGRAM(s) Enteral Tube once PRN Mild Pain (1 - 3)  fentaNYL    Injectable 12.5 MICROGram(s) IV Push every 2 hours PRN agitation    PHYSICAL EXAMINATION    ICU Vital Signs Last 24 Hrs  T(C): 37.1 (09 Aug 2021 05:00), Max: 37.4 (09 Aug 2021 01:00)  T(F): 98.8 (09 Aug 2021 05:00), Max: 99.4 (09 Aug 2021 01:00)  HR: 68 (09 Aug 2021 06:00) (67 - 83)  BP: 140/84 (09 Aug 2021 06:00) (110/70 - 151/95)  BP(mean): 106 (09 Aug 2021 06:00) (82 - 116)  ABP: 108/95 (08 Aug 2021 14:55) (92/77 - 123/73)  ABP(mean): 100 (08 Aug 2021 14:55) (84 - 104)  RR: 16 (09 Aug 2021 06:00) (13 - 27)  SpO2: 97% (09 Aug 2021 06:00) (91% - 99%)    NEUROLOGIC EXAMINATION:  Patient is lethargic, but easily rousable, agitated at times, oriented x2, some confused speech, pupils equal and reactive, EOMs intact, moving all 4s with good strength  GENERAL: NC   EENT:  anicteric  CARDIOVASCULAR: (+) S1 S2, normal rate and regular rhythm  PULMONARY: clear to auscultation bilaterally  ABDOMEN: soft, nontender with normoactive bowel sounds  EXTREMITIES: no edema  SKIN: no rash    I/O's    08-07-21 @ 07:01  -  08-08-21 @ 07:00  --------------------------------------------------------  IN: 4289.5 mL / OUT: 4687 mL / NET: -397.5 mL    08-08-21 @ 07:01  -  08-09-21 @ 06:40  --------------------------------------------------------  IN: 2266.8 mL / OUT: 1131 mL / NET: 1135.8 mL    LABS:              (15.45)    9.8    9.42  )-----------( 227      ( 09 Aug 2021 05:20 )             32.2     08-09    (140)  151<H>  |  118<H>  |  13  ----------------------------<  129<H>  3.7   |  25  |  0.57    Ca    9.0      09 Aug 2021 05:20  Phos  1.9     08-09  Mg     2.3     08-09    TPro  6.0  /  Alb  3.5  /  TBili  0.3  /  DBili  x   /  AST  12  /  ALT  25  /  AlkPhos  56  08-09    PT/INR - ( 07 Aug 2021 08:21 )   PT: 14.9 sec;   INR: 1.26     PTT - ( 07 Aug 2021 08:21 )  PTT:23.8 sec    CARDIAC MARKERS ( 08 Aug 2021 15:58 )  x     / 0.07 ng/mL / x     / x     / x      CARDIAC MARKERS ( 08 Aug 2021 05:22 )  x     / 0.10 ng/mL / x     / x     / x      CARDIAC MARKERS ( 08 Aug 2021 01:52 )  x     / 0.12 ng/mL / x     / x     / x      CARDIAC MARKERS ( 07 Aug 2021 21:32 )  x     / 0.09 ng/mL / 96 U/L / x     / 8.9 ng/mL    CAPILLARY BLOOD GLUCOSE    POCT Blood Glucose.: 124 mg/dL (08 Aug 2021 22:13)  POCT Blood Glucose.: 121 mg/dL (08 Aug 2021 16:18)  POCT Blood Glucose.: 123 mg/dL (08 Aug 2021 10:53)  POCT Blood Glucose.: 119 mg/dL (08 Aug 2021 06:55)    HbA1C = 5.9 (08-08) 5.9 (08-07)  LDL = 84 (08-08)   HDL = 50 (08-08)  TG = 74 (08-08)  TSH = 0.078 (08-08)    Bacteriology:  CSF studies:  EEG:  Neuroimaging:  CT/CTA (08.09) - Status post aneurysm clip distal right vertebral artery due to dissection/aneurysm since prior CTA 08/07/2021. There is no evidence intracranial vascular occlusion, vasospasm or new aneurysm.  Subtle hypoattenuation within the right greater than left occipital lobes and right cerebellum with sulcal effacement and mild loss of gray-white matter differentiation may be suggestive of cerebral edema or developing infarction.    Other imaging:    IV FLUIDS: NS  DRIPS:  DIET: NPO   Lines: Central line Anita  Drains:  Richey   Wounds:    CODE STATUS:  Full Code                       GOALS OF CARE:  aggressive                      DISPOSITION:  ICU   5 MF 4 =============================================   NEUROCRITICAL CARE ATTENDING NOTE (Monday, August 9, 2021)  =============================================    LUDMILA PRIETO   MRN-[id number]  Summary:  45y/F with h/o recent gastric sleeve (08/04 [facility], Dr. Crisostomo ), fibromyalgia, thyroid dysfunction, PTSD, depression, anxiety.  Presented with seizures at home - brought to [place], with 1 more episode of seizure en route.  Intubated, CT showed SAH with IV extension, casted IV, hydrocephalus, given mannitol levetiracetam 6mg ativan, transferred to [facility].    HOSPITAL COURSE:  8/7: Tx from [place] for SAH. Intubated. R frontal EVD placed, central line and a line placed. 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   8/8: POD1 s/p angio with R vert takedown, extubated, desatting on aerosol mask, required extensive suctioning, 3% nebs, chest PT, started on low dose precedex drip for restlessness/agitation, ABG stable. NGT placed. TF started with goal 20 pending nutrition recs. 3% stopped for Na 150. Ceribell EEG negative and d/c'ed.   8/9 POD2 R vert takedown, Overnight, new Right pronator drift and right gaze preference that can't cross midline, Repeat CTH demonstrated stable vents, CTA head and neck unremarkable for spasm, hypoattenuation of right PCA/PCA territories. Requiring precedex drip and fentanyl pushes PRN for agitation     Past Medical History:   Allergies:  Allergy Status Unknown  Home meds:     Current Meds:  MEDICATIONS  (STANDING):  aspirin  chewable 81 milliGRAM(s) Oral daily  dexMEDEtomidine Infusion 0.2 MICROgram(s)/kG/Hr (5.1 mL/Hr) IV Continuous <Continuous>  enoxaparin Injectable 40 milliGRAM(s) SubCutaneous <User Schedule>  insulin lispro (ADMELOG) corrective regimen sliding scale   SubCutaneous every 6 hours  niMODipine 60 milliGRAM(s) Oral every 4 hours  pantoprazole  Injectable 40 milliGRAM(s) IV Push daily  sodium chloride 2% . 1000 milliLiter(s) (50 mL/Hr) IV Continuous <Continuous>  valproate sodium IVPB 500 milliGRAM(s) IV Intermittent every 8 hours    MEDICATIONS  (PRN):  acetaminophen    Suspension .. 650 milliGRAM(s) Enteral Tube once PRN Mild Pain (1 - 3)  fentaNYL    Injectable 12.5 MICROGram(s) IV Push every 2 hours PRN agitation  traMADol 25 milliGRAM(s) Oral every 6 hours PRN Moderate Pain (4 - 6)    PHYSICAL EXAMINATION    ICU Vital Signs Last 24 Hrs  T(C): 37.1 (09 Aug 2021 05:00), Max: 37.4 (09 Aug 2021 01:00)  T(F): 98.8 (09 Aug 2021 05:00), Max: 99.4 (09 Aug 2021 01:00)  HR: 68 (09 Aug 2021 06:00) (67 - 83)  BP: 140/84 (09 Aug 2021 06:00) (110/70 - 151/95)  BP(mean): 106 (09 Aug 2021 06:00) (82 - 116)  ABP: 108/95 (08 Aug 2021 14:55) (92/77 - 123/73)  ABP(mean): 100 (08 Aug 2021 14:55) (84 - 104)  RR: 16 (09 Aug 2021 06:00) (13 - 27)  SpO2: 97% (09 Aug 2021 06:00) (91% - 99%)    NEUROLOGIC EXAMINATION:  Patient is rousable, restless at times, oriented x2, dysarthric speech, CON, no vertical/rotatory nystagmus, R gaze preference, R slight LMN asymmetry, R cerebellar drift, follows commands 4+/5 throughout  GENERAL: NC   EENT:  anicteric  CARDIOVASCULAR: (+) S1 S2, normal rate and regular rhythm  PULMONARY: clear to auscultation bilaterally  ABDOMEN: soft, nontender with normoactive bowel sounds  EXTREMITIES: no edema  SKIN: no rash    I/O's    08-07-21 @ 07:01  -  08-08-21 @ 07:00  --------------------------------------------------------  IN: 4289.5 mL / OUT: 4687 mL / NET: -397.5 mL    08-08-21 @ 07:01  -  08-09-21 @ 06:40  --------------------------------------------------------  IN: 2266.8 mL / OUT: 1131 mL / NET: 1135.8 mL    LABS:              (15.45)    9.8    9.42  )-----------( 227      ( 09 Aug 2021 05:20 )             32.2     08-09    (140)  151<H>  |  118<H>  |  13  ----------------------------<  129<H>  3.7   |  25  |  0.57    Ca    9.0      09 Aug 2021 05:20  Phos  1.9     08-09  Mg     2.3     08-09    TPro  6.0  /  Alb  3.5  /  TBili  0.3  /  DBili  x   /  AST  12  /  ALT  25  /  AlkPhos  56  08-09    PT/INR - ( 07 Aug 2021 08:21 )   PT: 14.9 sec;   INR: 1.26     PTT - ( 07 Aug 2021 08:21 )  PTT:23.8 sec    CARDIAC MARKERS ( 08 Aug 2021 15:58 )  x     / 0.07 ng/mL / x     / x     / x      CARDIAC MARKERS ( 08 Aug 2021 05:22 )  x     / 0.10 ng/mL / x     / x     / x      CARDIAC MARKERS ( 08 Aug 2021 01:52 )  x     / 0.12 ng/mL / x     / x     / x      CARDIAC MARKERS ( 07 Aug 2021 21:32 )  x     / 0.09 ng/mL / 96 U/L / x     / 8.9 ng/mL    CAPILLARY BLOOD GLUCOSE    POCT Blood Glucose.: 124 mg/dL (08 Aug 2021 22:13)  POCT Blood Glucose.: 121 mg/dL (08 Aug 2021 16:18)  POCT Blood Glucose.: 123 mg/dL (08 Aug 2021 10:53)  POCT Blood Glucose.: 119 mg/dL (08 Aug 2021 06:55)    HbA1C = 5.9 (08-08) 5.9 (08-07)  LDL = 84 (08-08)   HDL = 50 (08-08)  TG = 74 (08-08)  TSH = 0.078 (08-08)    Bacteriology:  CSF studies:  EEG:  Neuroimaging:  CT/CTA (08.09) - Status post aneurysm clip distal right vertebral artery due to dissection/aneurysm since prior CTA 08/07/2021. There is no evidence intracranial vascular occlusion, vasospasm or new aneurysm.  Subtle hypoattenuation within the right greater than left occipital lobes and right cerebellum with sulcal effacement and mild loss of gray-white matter differentiation may be suggestive of cerebral edema or developing infarction.  Other imaging:  Duplex - R IM calf DVT    IV FLUIDS: NS  DRIPS:  DIET: NPO   Lines: Central line Colome  Drains:  Richey   EVD 10 cm, ICP 4-10, CSF 8-15 mL/h  Wounds:    CODE STATUS:  Full Code                       GOALS OF CARE:  aggressive                      DISPOSITION:  ICU  HH 5 MF 4

## 2021-08-09 NOTE — PROGRESS NOTE ADULT - ASSESSMENT
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  Hydrocephalus:  EVD insertion, set at 10cm H20 open to drain  Seizure prophylaxis:  switch levetiracetam to  q8h, f/u EEG read  REHAB:  physical therapy evaluation and management  - defer  EARLY MOB:  HOB elevated    PULM:  extbuated, CXR   CARDIO:  SBP goal 120-180mm Hg, baseline echo  ENDO:  Blood sugar goals 140-180 mg/dL, cont insulin sliding scale  GI:  d/c PPI  DIET: start tube feeds  RENAL: maintain euvolemia, accurate Is and Os, d/c IVF once TF at goal 3% @ 50 recheck BMP this afternoon, Na goal 135-145  HEM/ONC: no coagulopathy (INR= 1.2), no ASA / Plavix use  VTE Prophylaxis: SCDs, no DVT chemoprophylaxis for now as patient is high risk for bleed (fresh bleed), f/u baseline LE Doppler for DVT suspected on admission (h/o recent surgery, transfer from OSH)  ID: afebrile, leukocytosis, aspiration risk  Social: Daughter Maggie and Son Kenya and sister (on the phone) updated yesterday      CORE MEASURES  1.  Hunt and Mckeon Score = 5  2.  VTE prophylaxis:  [ ] administered within 24 hours of admission OR [X] reason not done: fresh bleed, unsecured aneurysm.  3.  Dysphagia screening:   [X] performed before any oral meds / liquids / food  4.  Nimodipine treatment:  [X] administered within 24 hours of admission OR [ ] reason not done:    ATTENDING ATTESTATION:  I was physically present for the key portions of the evaluation and management (E/M) service provided.  I agree with the above history, physical and plan, which I have reviewed and edited where appropriate.    Patient at high risk for neurological deterioration or death due to:  ICU delirium, aspiration PNA, DVT / PE.  Critical care time:  I have personally provided 45 minutes of critical care time, excluding time spent on separate procedures.      Plan discussed with RN, house staff.       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 (with no PICA visualization prior to sacrifice) - CTA no VSP; CT PCA/PICA territory hypodensities  Hydrocephalus:  EVD insertion, set at 10cm H20 open to drain  Seizure prophylaxis:  switch levetiracetam to  q8h; ceribell EEG - no epileptiform changes  REHAB:  physical therapy evaluation and management  - defer  EARLY MOB:  HOB elevated    PULM:  extubated, aspiration precautions   CARDIO:  SBP goal 120-180mm Hg, baseline echo pending  ENDO:  Blood sugar goals 140-180 mg/dL, cont insulin sliding scale  GI:  d/c PPI  DIET: start tube feeds (s&s evln)  RENAL: maintain euvolemia, accurate Is and Os, d/c IVF once TF at goal 2% @ 50, Na 151  HEM/ONC: no coagulopathy (INR= 1.2), no ASA / Plavix use  VTE Prophylaxis: SCDs, SQL (Dopplers R calf DVT)  ID: afebrile, leukocytosis, aspiration risk ++  Social: Daughter Maggie and Son Kenya and sister (on the phone) updated    *****    CORE MEASURES  1.  Hunt and Mckeon Score = 5  2.  VTE prophylaxis:  [ ] administered within 24 hours of admission OR [X] reason not done: fresh bleed, unsecured aneurysm.  3.  Dysphagia screening:   [X] performed before any oral meds / liquids / food  4.  Nimodipine treatment:  [X] administered within 24 hours of admission OR [ ] reason not done:    *****    ATTENDING ATTESTATION:  I was physically present for the key portions of the evaluation and management (E/M) service provided.  I agree with the above history, physical and plan, which I have reviewed and edited where appropriate.    Patient at high risk for neurological deterioration or death due to:  ICU delirium, aspiration PNA, DVT / PE.  Critical care time:  I have personally provided 45 minutes of critical care time, excluding time spent on separate procedures.      Plan discussed with RN, house staff.

## 2021-08-10 NOTE — PROGRESS NOTE ADULT - SUBJECTIVE AND OBJECTIVE BOX
HPI:  46y/o F with h/o recent gastric sleeve (08/04/21 [facility], Dr. Crisostomo ), fibromyalgia, thyroid dysfunction, PTSD, depression, anxiety.  Presented with seizures at home - brought to [place] [facility], with 1 more episode of seizure en route.  Intubated, CT showed SAH with IV extension, casted IV, hydrocephalus, given mannitol, levetiracetam 1g,  6mg ativan. Started on Cardene drip for BP goal < 140 and transferred to [facility] NICU for further management.     HH5 MF4   NIHSS 26 on arrival  BD 1 = 8/7 (07 Aug 2021 08:08)    S/Overnight events:  ASHA overnight. Neuro stable.     Hospital Course:   8/7: Tx from [facility] for SAH. Intubated. R frontal EVD placed, central line and a line placed. 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   8/8: POD1 s/p angio with R vert takedown, extubated, desatting on aerosol mask, required extensive suctioning, 3% nebs, chest PT, started on low dose precedex drip for restlessness/agitation, ABG stable. NGT placed. TF started with goal 20 pending nutrition recs. 3% stopped for Na 150. Ceribell EEG negative and d/c'ed.   8/9 Overnight, new Right pronator drift and right gaze preference that can't cross midline, Repeat CTH demonstrated stable vents, CTA head and neck unremarkable for spasm, hypoattenuation of right cerebellum edema vs. infarct.  8/10: POD3 R vert takedown, EVD open at 19mtV8C. ASHA overnight, neuro stable.    Vital Signs Last 24 Hrs  T(C): 37.7 (10 Aug 2021 00:56), Max: 37.7 (09 Aug 2021 17:48)  T(F): 99.9 (10 Aug 2021 00:56), Max: 99.9 (10 Aug 2021 00:56)  HR: 68 (10 Aug 2021 00:00) (66 - 81)  BP: 156/90 (10 Aug 2021 00:00) (116/66 - 156/90)  BP(mean): 117 (10 Aug 2021 00:00) (77 - 117)  RR: 15 (10 Aug 2021 00:00) (13 - 25)  SpO2: 100% (10 Aug 2021 00:00) (94% - 100%)    I&O's Detail    08 Aug 2021 07:01  -  09 Aug 2021 07:00  --------------------------------------------------------  IN:    Dexmedetomidine: 389.7 mL    Enteral Tube Flush: 180 mL    IV PiggyBack: 250 mL    Jevity 1.2: 210 mL    sodium chloride 0.9%: 300 mL    sodium chloride 0.9%: 75 mL    Sodium Chloride 0.9% Bolus: 500 mL    sodium chloride 3%: 550 mL  Total IN: 2454.7 mL    OUT:    External Ventricular Device (mL): 320 mL    Indwelling Catheter - Urethral (mL): 430 mL    Voided (mL): 400 mL  Total OUT: 1150 mL    Total NET: 1304.7 mL      09 Aug 2021 07:01  -  10 Aug 2021 01:10  --------------------------------------------------------  IN:    Dexmedetomidine: 304.3 mL    Enteral Tube Flush: 300 mL    IV PiggyBack: 166.6 mL    Jevity 1.2: 160 mL    Lactated Ringers: 500 mL    sodium chloride 0.9%: 350 mL    Vital1.0: 324 mL  Total IN: 2104.9 mL    OUT:    External Ventricular Device (mL): 225 mL    Voided (mL): 1300 mL  Total OUT: 1525 mL    Total NET: 579.9 mL        I&O's Summary    08 Aug 2021 07:01  -  09 Aug 2021 07:00  --------------------------------------------------------  IN: 2454.7 mL / OUT: 1150 mL / NET: 1304.7 mL    09 Aug 2021 07:01  -  10 Aug 2021 01:10  --------------------------------------------------------  IN: 2104.9 mL / OUT: 1525 mL / NET: 579.9 mL        PHYSICAL EXAM:  General: On precedex, in NAD  HEENT: CN II-XII grossly intact, PERRL 3mm briskly reactive, EOMI b/l, +mild Right facial, +right gaze preference, will cross midline, tongue midline, neck FROM  Cardiovascular: RRR, normal S1 and S2   Respiratory: Tachypneic +Rhonchi bilateral with diminished breath sounds at bases.   GI: normoactive BS to auscultation, abd soft, NTND   Neuro: A&Ox2 (self, place), slurred speech, + Right dysmetria, +Right pronator drift, intermittently following commands.  CALLE x4 spontaneously, 4/5 strength in all extremities throughout. SILT throughout   Extremities: distal pulses 2+ x4  Drain: +EVD open at 10.    TUBES/LINES:  [] Jarvis  [] Lumbar Drain  [] Wound Drains  [X] Others - EVD, R IJ CVC, R radial a-line, NGT       DIET:  [] NPO  [] Mechanical  [X] Tube feeds    LABS:                        9.8    9.42  )-----------( 227      ( 09 Aug 2021 05:20 )             32.2     08-09    154<H>  |  119<H>  |  15  ----------------------------<  146<H>  3.9   |  27  |  0.60    Ca    9.2      09 Aug 2021 22:51  Phos  1.9     08-09  Mg     2.3     08-09    TPro  6.0  /  Alb  3.5  /  TBili  0.3  /  DBili  x   /  AST  12  /  ALT  25  /  AlkPhos  56  08-09        CARDIAC MARKERS ( 08 Aug 2021 15:58 )  x     / 0.07 ng/mL / x     / x     / x      CARDIAC MARKERS ( 08 Aug 2021 05:22 )  x     / 0.10 ng/mL / x     / x     / x      CARDIAC MARKERS ( 08 Aug 2021 01:52 )  x     / 0.12 ng/mL / x     / x     / x          CAPILLARY BLOOD GLUCOSE      POCT Blood Glucose.: 133 mg/dL (09 Aug 2021 22:15)  POCT Blood Glucose.: 120 mg/dL (09 Aug 2021 16:45)  POCT Blood Glucose.: 115 mg/dL (09 Aug 2021 12:27)  POCT Blood Glucose.: 110 mg/dL (09 Aug 2021 07:43)      Drug Levels: [] N/A    CSF Analysis: [] N/A      Allergies    Allergy Status Unknown    Intolerances      MEDICATIONS:  Antibiotics:    Neuro:  dexMEDEtomidine Infusion 0.2 MICROgram(s)/kG/Hr IV Continuous <Continuous>  fentaNYL    Injectable 12.5 MICROGram(s) IV Push every 2 hours PRN  traMADol 25 milliGRAM(s) Oral every 6 hours PRN  valproate sodium IVPB 500 milliGRAM(s) IV Intermittent every 8 hours    Anticoagulation:  aspirin  chewable 81 milliGRAM(s) Oral daily  enoxaparin Injectable 40 milliGRAM(s) SubCutaneous every 12 hours    OTHER:  chlorhexidine 2% Cloths 1 Application(s) Topical <User Schedule>  dexAMETHasone  Injectable 4 milliGRAM(s) IV Push every 12 hours  dextrose 50% Injectable 25 Gram(s) IV Push once  dextrose 50% Injectable 12.5 Gram(s) IV Push once  dextrose 50% Injectable 25 Gram(s) IV Push once  glucagon  Injectable 1 milliGRAM(s) IntraMuscular once  insulin lispro (ADMELOG) corrective regimen sliding scale   SubCutaneous every 6 hours  niMODipine 60 milliGRAM(s) Oral every 4 hours  pantoprazole  Injectable 40 milliGRAM(s) IV Push daily    IVF:  dextrose 5%. 1000 milliLiter(s) IV Continuous <Continuous>  dextrose 5%. 1000 milliLiter(s) IV Continuous <Continuous>  lactated ringers. 1000 milliLiter(s) IV Continuous <Continuous>    CULTURES:    RADIOLOGY & ADDITIONAL TESTS: < from: US Duplex Venous Lower Ext Complete, Bilateral (08.09.21 @ 10:52) >  Acute completely occlusive deep venous thrombosis of the right intramuscular calf vein.    < end of copied text >  < from: Xray Chest 1 View- PORTABLE-Routine (08.09.21 @ 04:52) >  Hypoinflation. No lung consolidation focal infiltrateor pleural effusion. No pneumothorax. Nasogastric tube courses off image in region of stomach. Right venous catheter tip in right atrium. Unremarkable cardiac silhouette. No acute bone abnormality.    < from: CT Angio Head w/ IV Cont (08.09.21 @ 01:54) >  1. Marked hypoplasia right vertebral artery. The vertebral and carotid arteries are patent in the neck    < end of copied text >  < from: CT Angio Neck w/ IV Cont (08.09.21 @ 01:54) >  Status post aneurysm clip distal right vertebral artery due to dissection/aneurysm since prior CTA  08/07/2021. There is no evidence intracranial vascular occlusion, vasospasm or new aneurysm    < end of copied text >        ASSESSMENT:  46y/o F with h/o recent gastric sleeve (08/04/21 [facility], Dr. Crisostomo ), fibromyalgia, thyroid dysfunction, PTSD, depression, anxiety.  Presented with seizures at home - brought to [place], with 1 more episode of seizure en route.  Intubated, CT showed SAH with IV extension, casted IV, hydrocephalus, given mannitol, levetiracetam 1g,  6mg ativan. Started on Cardene drip for BP goal < 140 and transferred to [facility] NICU for further management. HH5 MF4, BD 1 = 8/7. Now s/p cerebral angiogram for R vertebral artery takedown for R vertebral dissecting aneurysm (8/7), and R frontal EVD placement.      HEAD BLEED    No pertinent family history in first degree relatives    Handoff    Cerebral aneurysm    Cerebral aneurysm    Angiogram, carotid and cerebral arteries    SysAdmin_VstLnk        NEURO:   -neurochecks q1h  -PRN pain meds with acetaminophen  - Precedex for agitation, prn fentanyl pushes   -CTA on 08/16 (BD10)  -nimodipine 60 mg po q4h   -EVD open at 10, allow up to 30cc/hr  -levetiracetam switched to depakote 500q8 for agitation  -Ceribell EEG negative 8/8, d/c'ed   -ASA 81 mg started 8/7 s/p angio  - [facility] 8/9 w/ hypoattenuation of R occipital/cerbellum edema vs. evolving infarct vs. artifact.     PULM:    - Satting well on 4L NC   - Secretions q1 hour  - Hypertonic nebs/duonebs   - Maintain O2 sats >92    CARDIO:    -SBP goal 120-180mmHg,   -TTE WNL  - Troponin stable   - EKG normal     ENDO:    -Blood sugar goals 140-180 mg/dL  - FS/ISS  - lipids normal    GI:    -start TF via NGT (trickle)  -PPI per bariatric surgeon  -f/u nutrition consult for diet post bariatric surgery    RENAL:   -maintain euvolemia, accurate Is and Os,   -3% d/c'ed continue to trend   -Na goal 140-145  -jarvis    HEM/ONC:   -no coagulopathy (INR= 1.2), no ASA / Plavix use  -f/u lung nodule on CXR  -VTE Prophylaxis: SCDs, no DVT chemoprophylaxis for now as patient is high risk for bleed (fresh bleed)  -baseline LE Doppler for DVT suspected on admission     ID:   -afebrile, trend leukocytosis     Dispol ICU status: family updated  PT/OT: on hold due to critical status  Full code    D/w Dr. Lomax, Dr. Yan         Assessment:  Present when checked    []  GCS  E   V  M     Heart Failure: []Acute, [] acute on chronic , []chronic  Heart Failure:  [] Diastolic (HFpEF), [] Systolic (HFrEF), []Combined (HFpEF and HFrEF), [] RHF, [] Pulm HTN, [] Other    [] TRUDI, [] ATN, [] AIN, [] other  [] CKD1, [] CKD2, [] CKD 3, [] CKD 4, [] CKD 5, []ESRD    Encephalopathy: [] Metabolic, [] Hepatic, [] toxic, [] Neurological, [] Other    Abnormal Nurtitional Status: [] malnurtition (see nutrition note), [ ]underweight: BMI < 19, [] morbid obesity: BMI >40, [] Cachexia    [] Sepsis  [] hypovolemic shock,[] cardiogenic shock, [] hemorrhagic shock, [] neuogenic shock  [] Acute Respiratory Failure  []Cerebral edema, [] Brain compression/ herniation,   [] Functional quadriplegia  [] Acute blood loss anemia

## 2021-08-10 NOTE — PROVIDER CONTACT NOTE (OTHER) - BACKGROUND
pt admitted on 8/7/21 for SAH. s/p cerebral angio, right vertebral cutdown and dissecting aneurysm on 8/7/21. EVD at [id number] above tragus.

## 2021-08-10 NOTE — PROGRESS NOTE ADULT - ASSESSMENT
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 (with no PICA visualization prior to sacrifice) - CTA no VSP; CT PCA/PICA territory hypodensities  Hydrocephalus:  EVD insertion, set at 10cm H20 open to drain  Seizure prophylaxis:  switch levetiracetam to  q8h; ceribell EEG - no epileptiform changes  REHAB:  physical therapy evaluation and management  - defer  EARLY MOB:  HOB elevated    PULM:  extubated, aspiration precautions   CARDIO:  SBP goal 120-180mm Hg, baseline echo pending  ENDO:  Blood sugar goals 140-180 mg/dL, cont insulin sliding scale  GI:  d/c PPI  DIET: start tube feeds (s&s evln)  RENAL: maintain euvolemia, accurate Is and Os, d/c IVF once TF at goal 2% @ 50, Na 151  HEM/ONC: no coagulopathy (INR= 1.2), no ASA / Plavix use  VTE Prophylaxis: SCDs, SQL (Dopplers R calf DVT)  ID: afebrile, leukocytosis, aspiration risk ++  Social: Daughter Maggie and Son Kenya and sister (on the phone) updated    *****    CORE MEASURES  1.  Hunt and Mckeon Score = 5  2.  VTE prophylaxis:  [ ] administered within 24 hours of admission OR [X] reason not done: fresh bleed, unsecured aneurysm.  3.  Dysphagia screening:   [X] performed before any oral meds / liquids / food  4.  Nimodipine treatment:  [X] administered within 24 hours of admission OR [ ] reason not done:    *****    ATTENDING ATTESTATION:  I was physically present for the key portions of the evaluation and management (E/M) service provided.  I agree with the above history, physical and plan, which I have reviewed and edited where appropriate.    Patient at high risk for neurological deterioration or death due to:  ICU delirium, aspiration PNA, DVT / PE.  Critical care time:  I have personally provided 45 minutes of critical care time, excluding time spent on separate procedures.      Plan discussed with RN, house staff.       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; CT PCA/PICA territory hypodensities not demonstrated on repeat CT, CTP negative, repeat CTA showed L vert/mid basilar stenosis possible spasm  Hydrocephalus:  EVD insertion, set at 10cm H20 open to drain --> lowered to 5 cm H2O  Seizure prophylaxis:  switch levetiracetam to  q8h; ceribell EEG - no epileptiform changes  REHAB:  physical therapy evaluation and management  - defer  EARLY MOB:  HOB elevated    PULM:  extubated, aspiration precautions   CARDIO:  SBP goal 120-180mm Hg, baseline echo pending  ENDO:  Blood sugar goals 140-180 mg/dL, cont insulin sliding scale  GI:  d/c PPI  DIET: start tube feeds (s&s evln)  RENAL: maintain euvolemia, accurate Is and Os, d/c IVF once TF at goal 2% @ 50, Na 151  HEM/ONC: no coagulopathy (INR= 1.2), no ASA / Plavix use  VTE Prophylaxis: SCDs, SQL (Dopplers R calf DVT)  ID: Tmax 102.4, leukocytosis, aspiration risk ++, started empiric vancomycin, zosyn (to assess pending workup)  Social: Daughter Maggie and Son Kenya and sister (on the phone) updated    *****    CORE MEASURES  1.  Hunt and Mckeon Score = 5  2.  VTE prophylaxis:  [ ] administered within 24 hours of admission OR [X] reason not done: fresh bleed, unsecured aneurysm.  3.  Dysphagia screening:   [X] performed before any oral meds / liquids / food  4.  Nimodipine treatment:  [X] administered within 24 hours of admission OR [ ] reason not done:    *****    ATTENDING ATTESTATION:  I was physically present for the key portions of the evaluation and management (E/M) service provided.  I agree with the above history, physical and plan, which I have reviewed and edited where appropriate.    Patient at high risk for neurological deterioration or death due to:  ICU delirium, aspiration PNA, DVT / PE.  Critical care time:  I have personally provided 45 minutes of critical care time, excluding time spent on separate procedures.      Plan discussed with RN, house staff.

## 2021-08-10 NOTE — PROGRESS NOTE ADULT - SUBJECTIVE AND OBJECTIVE BOX
==============================================   NEUROCRITICAL CARE ATTENDING NOTE (Tuesday, August 10, 2021)  ==============================================    LUDMILA PRIETO   MRN-[id number]  Summary:  45y/F with h/o recent gastric sleeve (08/04 [facility], Dr. Crisostomo ), fibromyalgia, thyroid dysfunction, PTSD, depression, anxiety.  Presented with seizures at home - brought to [place], with 1 more episode of seizure en route.  Intubated, CT showed SAH with IV extension, casted IV, hydrocephalus, given mannitol levetiracetam 6mg ativan, transferred to [facility].    HOSPITAL COURSE:  8/7: Tx from [place] for SAH. Intubated. R frontal EVD placed, central line and a line placed. 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   8/8: POD1 s/p angio with R vert takedown, extubated, desatting on aerosol mask, required extensive suctioning, 3% nebs, chest PT, started on low dose precedex drip for restlessness/agitation, ABG stable. NGT placed. TF started with goal 20 pending nutrition recs. 3% stopped for Na 150. Ceribell EEG negative and d/c'ed.   8/9 POD2 R vert takedown, Overnight, new Right pronator drift and right gaze preference that can't cross midline, Repeat CTH demonstrated stable vents, CTA head and neck unremarkable for spasm, hypoattenuation of right PCA/PCA territories. Requiring precedex drip and fentanyl pushes PRN for agitation   8/10: POD3 R vert takedown, EVD open at [id number]. ASHA overnight, neuro stable.    Past Medical History:   Allergies:  Allergy Status Unknown  Home meds:     Current Meds:  MEDICATIONS  (STANDING):  aspirin  chewable 81 milliGRAM(s) Oral daily  dexAMETHasone  Injectable 4 milliGRAM(s) IV Push every 12 hours  dexMEDEtomidine Infusion 0.2 MICROgram(s)/kG/Hr (5.1 mL/Hr) IV Continuous <Continuous>  enoxaparin Injectable 40 milliGRAM(s) SubCutaneous every 12 hours  insulin lispro (ADMELOG) corrective regimen sliding scale   SubCutaneous every 6 hours  lactated ringers. 1000 milliLiter(s) (50 mL/Hr) IV Continuous <Continuous>  niMODipine 60 milliGRAM(s) Oral every 4 hours  pantoprazole  Injectable 40 milliGRAM(s) IV Push daily  valproate sodium IVPB 500 milliGRAM(s) IV Intermittent every 8 hours    MEDICATIONS  (PRN):  fentaNYL    Injectable 12.5 MICROGram(s) IV Push every 2 hours PRN agitation  traMADol 25 milliGRAM(s) Oral every 6 hours PRN Moderate Pain (4 - 6)    PHYSICAL EXAMINATION    ICU Vital Signs Last 24 Hrs  T(C): 37.7 (10 Aug 2021 00:56), Max: 37.7 (09 Aug 2021 17:48)  T(F): 99.9 (10 Aug 2021 00:56), Max: 99.9 (10 Aug 2021 00:56)  HR: 70 (10 Aug 2021 01:00) (66 - 81)  BP: 163/89 (10 Aug 2021 01:00) (116/66 - 163/89)  BP(mean): 120 (10 Aug 2021 01:00) (77 - 120)  ABP: 111/95 (09 Aug 2021 21:00) (111/95 - 150/81)  ABP(mean): 103 (09 Aug 2021 21:00) (99 - 108)  RR: 14 (10 Aug 2021 01:00) (13 - 25)  SpO2: 99% (10 Aug 2021 01:00) (94% - 100%)    NEUROLOGIC EXAMINATION:  Patient is rousable, restless at times, oriented x2, dysarthric speech, CON, no vertical/rotatory nystagmus, R gaze preference, R slight LMN asymmetry, R cerebellar drift, follows commands 4+/5 throughout  GENERAL: NC   EENT:  anicteric  CARDIOVASCULAR: (+) S1 S2, normal rate and regular rhythm  PULMONARY: clear to auscultation bilaterally  ABDOMEN: soft, nontender with normoactive bowel sounds  EXTREMITIES: no edema  SKIN: no rash    I/O's    08-07-21 @ 07:01  -  08-08-21 @ 07:00  --------------------------------------------------------  IN: 4289.5 mL / OUT: 4687 mL / NET: -397.5 mL    08-08-21 @ 07:01  -  08-09-21 @ 06:40  --------------------------------------------------------  IN: 2266.8 mL / OUT: 1131 mL / NET: 1135.8 mL    LABS:              (15.45)    9.8    9.42  )-----------( 227      ( 09 Aug 2021 05:20 )             32.2     08-09    (140)  151<H>  |  118<H>  |  13  ----------------------------<  129<H>  3.7   |  25  |  0.57    Ca    9.0      09 Aug 2021 05:20  Phos  1.9     08-09  Mg     2.3     08-09    TPro  6.0  /  Alb  3.5  /  TBili  0.3  /  DBili  x   /  AST  12  /  ALT  25  /  AlkPhos  56  08-09    PT/INR - ( 07 Aug 2021 08:21 )   PT: 14.9 sec;   INR: 1.26     PTT - ( 07 Aug 2021 08:21 )  PTT:23.8 sec    CARDIAC MARKERS ( 08 Aug 2021 15:58 )  x     / 0.07 ng/mL / x     / x     / x      CARDIAC MARKERS ( 08 Aug 2021 05:22 )  x     / 0.10 ng/mL / x     / x     / x      CARDIAC MARKERS ( 08 Aug 2021 01:52 )  x     / 0.12 ng/mL / x     / x     / x      CARDIAC MARKERS ( 07 Aug 2021 21:32 )  x     / 0.09 ng/mL / 96 U/L / x     / 8.9 ng/mL    CAPILLARY BLOOD GLUCOSE    POCT Blood Glucose.: 124 mg/dL (08 Aug 2021 22:13)  POCT Blood Glucose.: 121 mg/dL (08 Aug 2021 16:18)  POCT Blood Glucose.: 123 mg/dL (08 Aug 2021 10:53)  POCT Blood Glucose.: 119 mg/dL (08 Aug 2021 06:55)    HbA1C = 5.9 (08-08) 5.9 (08-07)  LDL = 84 (08-08)   HDL = 50 (08-08)  TG = 74 (08-08)  TSH = 0.078 (08-08)    Bacteriology:  CSF studies:  EEG:  Neuroimaging:  CT/CTA (08.09) - Status post aneurysm clip distal right vertebral artery due to dissection/aneurysm since prior CTA 08/07/2021. There is no evidence intracranial vascular occlusion, vasospasm or new aneurysm.  Subtle hypoattenuation within the right greater than left occipital lobes and right cerebellum with sulcal effacement and mild loss of gray-white matter differentiation may be suggestive of cerebral edema or developing infarction.  Other imaging:  Duplex - R IM calf DVT    IV FLUIDS: NS  DRIPS:  DIET: NPO   Lines: Central line Anita  Drains:  Richey   EVD 10 cm, ICP 4-10, CSF 8-15 mL/h  Wounds:    CODE STATUS:  Full Code                       GOALS OF CARE:  aggressive                      DISPOSITION:  ICU   5 MF 4 ==============================================   NEUROCRITICAL CARE ATTENDING NOTE (Tuesday, August 10, 2021)  ==============================================    LUDMILA PRIETO   MRN-[id number]  Summary:  45y/F with h/o recent gastric sleeve (08/04 [facility], Dr. Crisostomo ), fibromyalgia, thyroid dysfunction, PTSD, depression, anxiety.  Presented with seizures at home - brought to [place], with 1 more episode of seizure en route.  Intubated, CT showed SAH with IV extension, casted IV, hydrocephalus, given mannitol levetiracetam 6mg ativan, transferred to [facility].    HOSPITAL COURSE:  8/7: Tx from [place] for SAH. Intubated. R frontal EVD placed, central line and a line placed. 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   8/8: POD1 s/p angio with R vert takedown, extubated, desatting on aerosol mask, required extensive suctioning, 3% nebs, chest PT, started on low dose precedex drip for restlessness/agitation, ABG stable. NGT placed. TF started with goal 20 pending nutrition recs. 3% stopped for Na 150. Ceribell EEG negative and d/c'ed.   8/9 POD2 R vert takedown, Overnight, new Right pronator drift and right gaze preference that can't cross midline, Repeat CTH demonstrated stable vents, CTA head and neck unremarkable for spasm, hypoattenuation of right PCA/PCA territories. Requiring precedex drip and fentanyl pushes PRN for agitation   8/10: POD3 R vert takedown, EVD open at [id number]. ASHA overnight, neuro stable.    Past Medical History:   Allergies:  Allergy Status Unknown  Home meds:     Current Meds:  MEDICATIONS  (STANDING):  aspirin  chewable 81 milliGRAM(s) Oral daily  dexAMETHasone  Injectable 4 milliGRAM(s) IV Push every 12 hours  dexMEDEtomidine Infusion 0.2 MICROgram(s)/kG/Hr (5.1 mL/Hr) IV Continuous <Continuous>  enoxaparin Injectable 40 milliGRAM(s) SubCutaneous every 12 hours  insulin lispro (ADMELOG) corrective regimen sliding scale   SubCutaneous every 6 hours  lactated ringers. 1000 milliLiter(s) (50 mL/Hr) IV Continuous <Continuous>  niMODipine 60 milliGRAM(s) Oral every 4 hours  pantoprazole  Injectable 40 milliGRAM(s) IV Push daily  valproate sodium IVPB 500 milliGRAM(s) IV Intermittent every 8 hours    MEDICATIONS  (PRN):  fentaNYL    Injectable 12.5 MICROGram(s) IV Push every 2 hours PRN agitation  traMADol 25 milliGRAM(s) Oral every 6 hours PRN Moderate Pain (4 - 6)    PHYSICAL EXAMINATION    ICU Vital Signs Last 24 Hrs  T(C): 37.7 (10 Aug 2021 00:56), Max: 37.7 (09 Aug 2021 17:48)  T(F): 99.9 (10 Aug 2021 00:56), Max: 99.9 (10 Aug 2021 00:56)  HR: 70 (10 Aug 2021 01:00) (66 - 81)  BP: 163/89 (10 Aug 2021 01:00) (116/66 - 163/89)  BP(mean): 120 (10 Aug 2021 01:00) (77 - 120)  ABP: 111/95 (09 Aug 2021 21:00) (111/95 - 150/81)  ABP(mean): 103 (09 Aug 2021 21:00) (99 - 108)  RR: 14 (10 Aug 2021 01:00) (13 - 25)  SpO2: 99% (10 Aug 2021 01:00) (94% - 100%)    NEUROLOGIC EXAMINATION:  Patient is rousable, restless at times, oriented x2, dysarthric speech, CON, no vertical/rotatory nystagmus, R gaze preference, R slight LMN asymmetry, R cerebellar drift, follows commands 4+/5 throughout  GENERAL: NC   EENT:  anicteric  CARDIOVASCULAR: (+) S1 S2, normal rate and regular rhythm  PULMONARY: clear to auscultation bilaterally  ABDOMEN: soft, nontender with normoactive bowel sounds  EXTREMITIES: no edema  SKIN: no rash    I/O's    08-08-21 @ 07:01  -  08-09-21 @ 07:00  --------------------------------------------------------  IN: 2454.7 mL / OUT: 1150 mL / NET: 1304.7 mL    08-09-21 @ 07:01  -  08-10-21 @ 06:37  --------------------------------------------------------  IN: 2958.3 mL / OUT: 2433 mL / NET: 525.3 mL    LABS:                        10.0   8.89  )-----------( 207      ( 10 Aug 2021 04:50 )             33.1     08-10    151<H>  |  114<H>  |  14  ----------------------------<  121<H>  3.9   |  28  |  0.55    Ca    9.0      10 Aug 2021 04:50  Phos  2.9     08-10  Mg     2.3     08-10    TPro  6.0  /  Alb  3.5  /  TBili  0.3  /  DBili  x   /  AST  12  /  ALT  25  /  AlkPhos  56  08-09    CARDIAC MARKERS ( 08 Aug 2021 15:58 )  x     / 0.07 ng/mL / x     / x     / x        CAPILLARY BLOOD GLUCOSE    POCT Blood Glucose.: 133 mg/dL (09 Aug 2021 22:15)  POCT Blood Glucose.: 120 mg/dL (09 Aug 2021 16:45)  POCT Blood Glucose.: 115 mg/dL (09 Aug 2021 12:27)  POCT Blood Glucose.: 110 mg/dL (09 Aug 2021 07:43)    HbA1C = 5.9 (08-08) 5.9 (08-07)  LDL = 84 (08-08)   HDL = 50 (08-08)  TG = 74 (08-08)  TSH = 0.078 (08-08)    Bacteriology:  CSF studies:  EEG:  Neuroimaging:  CT/CTA (08.09) - Status post aneurysm clip distal right vertebral artery due to dissection/aneurysm since prior CTA 08/07/2021. There is no evidence intracranial vascular occlusion, vasospasm or new aneurysm.  Subtle hypoattenuation within the right greater than left occipital lobes and right cerebellum with sulcal effacement and mild loss of gray-white matter differentiation may be suggestive of cerebral edema or developing infarction.  Other imaging:  Duplex - R IM calf DVT    IV FLUIDS: NS  DRIPS:  DIET: NPO   Lines: Central line Anita  Drains:  Richey   EVD 10 cm, ICP 4-10, CSF 8-15 mL/h  Wounds:    CODE STATUS:  Full Code                       GOALS OF CARE:  aggressive                      DISPOSITION:  ICU   5 MF 4 ==============================================   NEUROCRITICAL CARE ATTENDING NOTE (Tuesday, August 10, 2021)  ==============================================    LUDMILA PRIETO   MRN-[id number]  Summary:  45y/F with h/o recent gastric sleeve (08/04 [facility], Dr. Crisostomo ), fibromyalgia, thyroid dysfunction, PTSD, depression, anxiety.  Presented with seizures at home - brought to [place], with 1 more episode of seizure en route.  Intubated, CT showed SAH with IV extension, casted IV, hydrocephalus, given mannitol levetiracetam 6mg ativan, transferred to [facility].    HOSPITAL COURSE:  8/7: Tx from [place] for SAH. Intubated. R frontal EVD placed, central line and a line placed. 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   8/8: POD1 s/p angio with R vert takedown, extubated, desatting on aerosol mask, required extensive suctioning, 3% nebs, chest PT, started on low dose precedex drip for restlessness/agitation, ABG stable. NGT placed. TF started with goal 20 pending nutrition recs. 3% stopped for Na 150. Ceribell EEG negative and d/c'ed.   8/9 POD2 R vert takedown, Overnight, new Right pronator drift and right gaze preference that can't cross midline, Repeat CTH demonstrated stable vents, CTA head and neck unremarkable for spasm, hypoattenuation of right PCA/PICA territories. Requiring precedex drip and fentanyl pushes PRN for agitation   8/10: POD3 R vert takedown, dayshift:  EVD decreased to 5 cmH20.  Febrile 102.4 - started empiric ABX (will assess).  Concern for aspirations.  Repeat CT - R PCA/PICA hypodensities not visualized, CTP - no mismatch, CTA - More pronounced moderate stenosis of the left vertebral artery and mild segmental stenosis of the basilar artery compared to CTA had 8/7/2021 and is suspicious for vasospasm.    Past Medical History:   Allergies:  Allergy Status Unknown  Home meds:     Current Meds:  MEDICATIONS  (STANDING):  aspirin  chewable 81 milliGRAM(s) Oral daily  enoxaparin Injectable 40 milliGRAM(s) SubCutaneous every 12 hours  fentaNYL    Injectable 50 MICROGram(s) IV Push once  insulin lispro (ADMELOG) corrective regimen sliding scale   SubCutaneous every 6 hours  niMODipine 60 milliGRAM(s) Oral every 4 hours  pantoprazole  Injectable 40 milliGRAM(s) IV Push daily  piperacillin/tazobactam IVPB.. 3.375 Gram(s) IV Intermittent every 6 hours  potassium chloride   Powder 40 milliEquivalent(s) Enteral Tube once  sodium chloride 0.9%. 1000 milliLiter(s) (100 mL/Hr) IV Continuous <Continuous>  valproate sodium IVPB 500 milliGRAM(s) IV Intermittent every 8 hours  vancomycin  IVPB 1500 milliGRAM(s) IV Intermittent every 12 hours    MEDICATIONS  (PRN):  acetaminophen    Suspension .. 650 milliGRAM(s) Oral every 6 hours PRN Temp greater or equal to 38C (100.4F), Mild Pain (1 - 3)  fentaNYL    Injectable 12.5 MICROGram(s) IV Push every 2 hours PRN agitation  hydrALAZINE Injectable 10 milliGRAM(s) IV Push every 3 hours PRN SBP>180  traMADol 25 milliGRAM(s) Oral every 6 hours PRN Moderate Pain (4 - 6)    PHYSICAL EXAMINATION    ICU Vital Signs Last 24 Hrs  T(C): 36.7 (11 Aug 2021 06:00), Max: 39.1 (10 Aug 2021 09:00)  T(F): 98 (11 Aug 2021 06:00), Max: 102.4 (10 Aug 2021 09:00)  HR: 75 (11 Aug 2021 07:00) (64 - 86)  BP: 172/84 (11 Aug 2021 07:00) (125/73 - 186/85)  BP(mean): 121 (11 Aug 2021 07:00) (94 - 133)  ABP: 139/105 (11 Aug 2021 07:00) (92/68 - 140/96)  ABP(mean): 121 (11 Aug 2021 07:00) (78 - 121)  RR: 19 (11 Aug 2021 07:00) (14 - 20)  SpO2: 95% (11 Aug 2021 07:00) (95% - 100%)    NEUROLOGIC EXAMINATION:  Patient is rousable, restless at times, oriented x2, dysarthric speech, CON, no vertical/rotatory nystagmus, R gaze preference, R slight LMN asymmetry, R cerebellar drift, follows commands 4+/5 throughout  GENERAL: NC   EENT:  anicteric  CARDIOVASCULAR: (+) S1 S2, normal rate and regular rhythm  PULMONARY: clear to auscultation bilaterally  ABDOMEN: soft, nontender with normoactive bowel sounds  EXTREMITIES: no edema  SKIN: no rash    I/O's    08-08-21 @ 07:01  -  08-09-21 @ 07:00  --------------------------------------------------------  IN: 2454.7 mL / OUT: 1150 mL / NET: 1304.7 mL    08-09-21 @ 07:01  -  08-10-21 @ 06:37  --------------------------------------------------------  IN: 2958.3 mL / OUT: 2433 mL / NET: 525.3 mL    LABS:                        10.0   8.89  )-----------( 207      ( 10 Aug 2021 04:50 )             33.1     08-10    151<H>  |  114<H>  |  14  ----------------------------<  121<H>  3.9   |  28  |  0.55    Ca    9.0      10 Aug 2021 04:50  Phos  2.9     08-10  Mg     2.3     08-10    TPro  6.0  /  Alb  3.5  /  TBili  0.3  /  DBili  x   /  AST  12  /  ALT  25  /  AlkPhos  56  08-09    CARDIAC MARKERS ( 08 Aug 2021 15:58 )  x     / 0.07 ng/mL / x     / x     / x        CAPILLARY BLOOD GLUCOSE    POCT Blood Glucose.: 133 mg/dL (09 Aug 2021 22:15)  POCT Blood Glucose.: 120 mg/dL (09 Aug 2021 16:45)  POCT Blood Glucose.: 115 mg/dL (09 Aug 2021 12:27)  POCT Blood Glucose.: 110 mg/dL (09 Aug 2021 07:43)    HbA1C = 5.9 (08-08) 5.9 (08-07)  LDL = 84 (08-08)   HDL = 50 (08-08)  TG = 74 (08-08)  TSH = 0.078 (08-08)    Bacteriology:  CSF studies:  EEG:  Neuroimaging:  CT/CTA (08.09) - Status post aneurysm clip distal right vertebral artery due to dissection/aneurysm since prior CTA 08/07/2021. There is no evidence intracranial vascular occlusion, vasospasm or new aneurysm.  Subtle hypoattenuation within the right greater than left occipital lobes and right cerebellum with sulcal effacement and mild loss of gray-white matter differentiation may be suggestive of cerebral edema or developing infarction.  Other imaging:  Duplex - R IM calf DVT    IV FLUIDS: NS  DRIPS:  DIET: NPO   Lines: Central line [place]  Drains:  Richey   EVD 10 cm, ICP 4-10, CSF 8-15 mL/h --> lowered to 5 cm, ICP 4-17, CSF 12-15 mL/h  Wounds:    CODE STATUS:  Full Code                       GOALS OF CARE:  aggressive                      DISPOSITION:  ICU   5 MF 4

## 2021-08-11 NOTE — PROGRESS NOTE ADULT - ASSESSMENT
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 (with no PICA visualization prior to sacrifice) - CTA no VSP; CT PCA/PICA territory hypodensities  Hydrocephalus:  EVD insertion, set at 10cm H20 open to drain  Seizure prophylaxis:  switch levetiracetam to  q8h; ceribell EEG - no epileptiform changes  REHAB:  physical therapy evaluation and management  - defer  EARLY MOB:  HOB elevated    PULM:  extubated, aspiration precautions   CARDIO:  SBP goal 120-180mm Hg, baseline echo pending  ENDO:  Blood sugar goals 140-180 mg/dL, cont insulin sliding scale  GI:  d/c PPI  DIET: start tube feeds (s&s evln)  RENAL: maintain euvolemia, accurate Is and Os, d/c IVF once TF at goal 2% @ 50, Na 151  HEM/ONC: no coagulopathy (INR= 1.2), no ASA / Plavix use  VTE Prophylaxis: SCDs, SQL (Dopplers R calf DVT)  ID: afebrile, leukocytosis, aspiration risk ++  Social: Daughter Maggie and Son Kenya and sister (on the phone) updated    *****    CORE MEASURES  1.  Hunt and Mckeon Score = 5  2.  VTE prophylaxis:  [ ] administered within 24 hours of admission OR [X] reason not done: fresh bleed, unsecured aneurysm.  3.  Dysphagia screening:   [X] performed before any oral meds / liquids / food  4.  Nimodipine treatment:  [X] administered within 24 hours of admission OR [ ] reason not done:    *****    ATTENDING ATTESTATION:  I was physically present for the key portions of the evaluation and management (E/M) service provided.  I agree with the above history, physical and plan, which I have reviewed and edited where appropriate.    Patient at high risk for neurological deterioration or death due to:  ICU delirium, aspiration PNA, DVT / PE.  Critical care time:  I have personally provided 45 minutes of critical care time, excluding time spent on separate procedures.      Plan discussed with RN, house staff.

## 2021-08-11 NOTE — PROGRESS NOTE ADULT - SUBJECTIVE AND OBJECTIVE BOX
==============================================   NEUROCRITICAL CARE ATTENDING NOTE (Tuesday, August 10, 2021)  ==============================================    LUDMILA PRIETO   MRN-[id number]  Summary:  45y/F with h/o recent gastric sleeve (08/04 [facility], Dr. Crisostomo ), fibromyalgia, thyroid dysfunction, PTSD, depression, anxiety.  Presented with seizures at home - brought to [place], with 1 more episode of seizure en route.  Intubated, CT showed SAH with IV extension, casted IV, hydrocephalus, given mannitol levetiracetam 6mg ativan, transferred to [facility].    HOSPITAL COURSE:  8/7: Tx from [place] for SAH. Intubated. R frontal EVD placed, central line and a line placed. 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   8/8: POD1 s/p angio with R vert takedown, extubated, desatting on aerosol mask, required extensive suctioning, 3% nebs, chest PT, started on low dose precedex drip for restlessness/agitation, ABG stable. NGT placed. TF started with goal 20 pending nutrition recs. 3% stopped for Na 150. Ceribell EEG negative and d/c'ed.   8/9 POD2 R vert takedown, Overnight, new Right pronator drift and right gaze preference that can't cross midline, Repeat CTH demonstrated stable vents, CTA head and neck unremarkable for spasm, hypoattenuation of right PCA/PCA territories. Requiring precedex drip and fentanyl pushes PRN for agitation   8/10: POD3 R vert takedown, EVD open at [id number]. ASHA overnight, neuro stable.      PHYSICAL EXAMINATION  T(C): 36.7 (08-11 @ 06:00), Max: 39.1 (08-10 @ 09:00)  HR: 75 (08-11 @ 07:00) (64 - 86)  BP: 172/84 (08-11 @ 07:00) (125/73 - 186/85)  BP(mean): 121 (08-11 @ 07:00)  ABP:  (92/68 - 140/96)  ABP(mean):  (78 - 121)  RR: 19 (08-11 @ 07:00) (14 - 20)  SpO2: 95% (08-11 @ 07:00) (95% - 100%)  CVP(mm Hg): --      08-10-21 @ 07:01  -  08-11-21 @ 07:00  --------------------------------------------------------  IN:    Dexmedetomidine: 564.9 mL    Enteral Tube Flush: 100 mL    Lactated Ringers: 200 mL    Lactated Ringers: 150 mL    sodium chloride 0.9%: 1300 mL    Sodium Chloride 0.9% Bolus: 1000 mL    Vital1.0: 149 mL  Total IN: 3463.9 mL    OUT:    External Ventricular Device (mL): 337 mL    Voided (mL): 2250 mL  Total OUT: 2587 mL    Total NET: 876.9 mL                  LABS:  CAPILLARY BLOOD GLUCOSE      POCT Blood Glucose.: 114 mg/dL (10 Aug 2021 17:27)  POCT Blood Glucose.: 79 mg/dL (10 Aug 2021 16:39)  POCT Blood Glucose.: 75 mg/dL (10 Aug 2021 16:38)  POCT Blood Glucose.: 107 mg/dL (10 Aug 2021 10:34)                          10.5   7.30  )-----------( 196      ( 11 Aug 2021 06:17 )             34.3     08-11    147<H>  |  114<H>  |  18  ----------------------------<  142<H>  3.6   |  23  |  0.67    Ca    9.0      11 Aug 2021 06:17  Phos  2.1     08-11  Mg     2.1     08-11            MEDICATIONS:  MEDICATIONS  (STANDING):  aspirin  chewable 81 milliGRAM(s) Oral daily  chlorhexidine 2% Cloths 1 Application(s) Topical <User Schedule>  dexAMETHasone  Injectable 4 milliGRAM(s) IV Push every 12 hours  dexMEDEtomidine Infusion 0.2 MICROgram(s)/kG/Hr (5.1 mL/Hr) IV Continuous <Continuous>  dextrose 5%. 1000 milliLiter(s) (50 mL/Hr) IV Continuous <Continuous>  dextrose 5%. 1000 milliLiter(s) (100 mL/Hr) IV Continuous <Continuous>  dextrose 50% Injectable 25 Gram(s) IV Push once  dextrose 50% Injectable 12.5 Gram(s) IV Push once  dextrose 50% Injectable 25 Gram(s) IV Push once  enoxaparin Injectable 40 milliGRAM(s) SubCutaneous every 12 hours  fentaNYL    Injectable 50 MICROGram(s) IV Push once  fentaNYL    Injectable 50 MICROGram(s) IV Push once  glucagon  Injectable 1 milliGRAM(s) IntraMuscular once  insulin lispro (ADMELOG) corrective regimen sliding scale   SubCutaneous every 6 hours  niMODipine 60 milliGRAM(s) Oral every 4 hours  pantoprazole  Injectable 40 milliGRAM(s) IV Push daily  piperacillin/tazobactam IVPB.. 3.375 Gram(s) IV Intermittent every 6 hours  potassium chloride   Powder 40 milliEquivalent(s) Enteral Tube once  sodium chloride 0.9%. 1000 milliLiter(s) (100 mL/Hr) IV Continuous <Continuous>  valproate sodium IVPB 500 milliGRAM(s) IV Intermittent every 8 hours  vancomycin  IVPB 1500 milliGRAM(s) IV Intermittent every 12 hours    MEDICATIONS  (PRN):  acetaminophen    Suspension .. 650 milliGRAM(s) Oral every 6 hours PRN Temp greater or equal to 38C (100.4F), Mild Pain (1 - 3)  fentaNYL    Injectable 12.5 MICROGram(s) IV Push every 2 hours PRN agitation  hydrALAZINE Injectable 10 milliGRAM(s) IV Push every 3 hours PRN SBP>180  traMADol 25 milliGRAM(s) Oral every 6 hours PRN Moderate Pain (4 - 6)          Past Medical History:   Allergies:  Allergy Status Unknown  Home meds:     NEUROLOGIC EXAMINATION:  Patient is rousable, restless at times, oriented x2, dysarthric speech, CON, no vertical/rotatory nystagmus, R gaze preference, R slight LMN asymmetry, R cerebellar drift, follows commands 4+/5 throughout  GENERAL: NC   EENT:  anicteric  CARDIOVASCULAR: (+) S1 S2, normal rate and regular rhythm  PULMONARY: clear to auscultation bilaterally  ABDOMEN: soft, nontender with normoactive bowel sounds  EXTREMITIES: no edema  SKIN: no rash        Bacteriology:  CSF studies:  EEG:  Neuroimaging:  CT/CTA (08.09) - Status post aneurysm clip distal right vertebral artery due to dissection/aneurysm since prior CTA 08/07/2021. There is no evidence intracranial vascular occlusion, vasospasm or new aneurysm.  Subtle hypoattenuation within the right greater than left occipital lobes and right cerebellum with sulcal effacement and mild loss of gray-white matter differentiation may be suggestive of cerebral edema or developing infarction.  Other imaging:  Duplex - R IM calf DVT    IV FLUIDS: NS  DRIPS:  DIET: NPO   Lines: Central line [place]  Drains:  [place]   EVD 10 cm, ICP 4-10, CSF 8-15 mL/h  Wounds:    CODE STATUS:  Full Code                       GOALS OF CARE:  aggressive                      DISPOSITION:  ICU   5 MF 4 ==============================================   NEUROCRITICAL CARE ATTENDING NOTE  ==============================================    LUDMILA PRIETO   MRN-[id number]  Summary:  45y/F with h/o recent gastric sleeve (08/04 [facility], Dr. Crisostomo ), fibromyalgia, thyroid dysfunction, PTSD, depression, anxiety.  Presented with seizures at home - brought to [place], with 1 more episode of seizure en route.  Intubated, CT showed SAH with IV extension, casted IV, hydrocephalus, given mannitol levetiracetam 6mg ativan, transferred to [facility].    HOSPITAL COURSE:  8/7: Tx from [place] for SAH. Intubated. R frontal EVD placed, central line and a line placed. 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   8/8: POD1 s/p angio with R vert takedown, extubated, desatting on aerosol mask, required extensive suctioning, 3% nebs, chest PT, started on low dose precedex drip for restlessness/agitation, ABG stable. NGT placed. TF started with goal 20 pending nutrition recs. 3% stopped for Na 150. Ceribell EEG negative and d/c'ed.   8/9 POD2 R vert takedown, Overnight, new Right pronator drift and right gaze preference that can't cross midline, Repeat CTH demonstrated stable vents, CTA head and neck unremarkable for spasm, hypoattenuation of right PCA/PCA territories. Requiring precedex drip and fentanyl pushes PRN for agitation   8/10: POD3 R vert takedown, EVD open at [id number]. ASHA overnight, neuro stable.      PHYSICAL EXAMINATION  T(C): 36.7 (08-11 @ 06:00), Max: 39.1 (08-10 @ 09:00)  HR: 75 (08-11 @ 07:00) (64 - 86)  BP: 172/84 (08-11 @ 07:00) (125/73 - 186/85)  BP(mean): 121 (08-11 @ 07:00)  ABP:  (92/68 - 140/96)  ABP(mean):  (78 - 121)  RR: 19 (08-11 @ 07:00) (14 - 20)  SpO2: 95% (08-11 @ 07:00) (95% - 100%)  CVP(mm Hg): --      08-10-21 @ 07:01  -  08-11-21 @ 07:00  --------------------------------------------------------  IN:    Dexmedetomidine: 564.9 mL    Enteral Tube Flush: 100 mL    Lactated Ringers: 200 mL    Lactated Ringers: 150 mL    sodium chloride 0.9%: 1300 mL    Sodium Chloride 0.9% Bolus: 1000 mL    Vital1.0: 149 mL  Total IN: 3463.9 mL    OUT:    External Ventricular Device (mL): 337 mL    Voided (mL): 2250 mL  Total OUT: 2587 mL    Total NET: 876.9 mL                  LABS:  CAPILLARY BLOOD GLUCOSE      POCT Blood Glucose.: 114 mg/dL (10 Aug 2021 17:27)  POCT Blood Glucose.: 79 mg/dL (10 Aug 2021 16:39)  POCT Blood Glucose.: 75 mg/dL (10 Aug 2021 16:38)  POCT Blood Glucose.: 107 mg/dL (10 Aug 2021 10:34)                          10.5   7.30  )-----------( 196      ( 11 Aug 2021 06:17 )             34.3     08-11    147<H>  |  114<H>  |  18  ----------------------------<  142<H>  3.6   |  23  |  0.67    Ca    9.0      11 Aug 2021 06:17  Phos  2.1     08-11  Mg     2.1     08-11            MEDICATIONS:  MEDICATIONS  (STANDING):  aspirin  chewable 81 milliGRAM(s) Oral daily  chlorhexidine 2% Cloths 1 Application(s) Topical <User Schedule>  dexAMETHasone  Injectable 4 milliGRAM(s) IV Push every 12 hours  dexMEDEtomidine Infusion 0.2 MICROgram(s)/kG/Hr (5.1 mL/Hr) IV Continuous <Continuous>  dextrose 5%. 1000 milliLiter(s) (50 mL/Hr) IV Continuous <Continuous>  dextrose 5%. 1000 milliLiter(s) (100 mL/Hr) IV Continuous <Continuous>  dextrose 50% Injectable 25 Gram(s) IV Push once  dextrose 50% Injectable 12.5 Gram(s) IV Push once  dextrose 50% Injectable 25 Gram(s) IV Push once  enoxaparin Injectable 40 milliGRAM(s) SubCutaneous every 12 hours  fentaNYL    Injectable 50 MICROGram(s) IV Push once  fentaNYL    Injectable 50 MICROGram(s) IV Push once  glucagon  Injectable 1 milliGRAM(s) IntraMuscular once  insulin lispro (ADMELOG) corrective regimen sliding scale   SubCutaneous every 6 hours  niMODipine 60 milliGRAM(s) Oral every 4 hours  pantoprazole  Injectable 40 milliGRAM(s) IV Push daily  piperacillin/tazobactam IVPB.. 3.375 Gram(s) IV Intermittent every 6 hours  potassium chloride   Powder 40 milliEquivalent(s) Enteral Tube once  sodium chloride 0.9%. 1000 milliLiter(s) (100 mL/Hr) IV Continuous <Continuous>  valproate sodium IVPB 500 milliGRAM(s) IV Intermittent every 8 hours  vancomycin  IVPB 1500 milliGRAM(s) IV Intermittent every 12 hours    MEDICATIONS  (PRN):  acetaminophen    Suspension .. 650 milliGRAM(s) Oral every 6 hours PRN Temp greater or equal to 38C (100.4F), Mild Pain (1 - 3)  fentaNYL    Injectable 12.5 MICROGram(s) IV Push every 2 hours PRN agitation  hydrALAZINE Injectable 10 milliGRAM(s) IV Push every 3 hours PRN SBP>180  traMADol 25 milliGRAM(s) Oral every 6 hours PRN Moderate Pain (4 - 6)          Past Medical History:   Allergies:  Allergy Status Unknown  Home meds:     NEUROLOGIC EXAMINATION:  Patient is rousable, restless at times, oriented x2, dysarthric speech, CON, no vertical/rotatory nystagmus, R gaze preference, R slight LMN asymmetry, R cerebellar drift, follows commands 4+/5 throughout  GENERAL: NC   EENT:  anicteric  CARDIOVASCULAR: (+) S1 S2, normal rate and regular rhythm  PULMONARY: clear to auscultation bilaterally  ABDOMEN: soft, nontender with normoactive bowel sounds  EXTREMITIES: no edema  SKIN: no rash        Bacteriology:  CSF studies:  EEG:  Neuroimaging:  CT/CTA (08.09) - Status post aneurysm clip distal right vertebral artery due to dissection/aneurysm since prior CTA 08/07/2021. There is no evidence intracranial vascular occlusion, vasospasm or new aneurysm.  Subtle hypoattenuation within the right greater than left occipital lobes and right cerebellum with sulcal effacement and mild loss of gray-white matter differentiation may be suggestive of cerebral edema or developing infarction.  Other imaging:  Duplex - R IM calf DVT    IV FLUIDS: NS  DRIPS:  DIET: NPO   Lines: Central line [place]  Drains:  [place]ichey   EVD 10 cm, ICP 4-10, CSF 8-15 mL/h  Wounds:    CODE STATUS:  Full Code                       GOALS OF CARE:  aggressive                      DISPOSITION:  ICU   5 MF 4

## 2021-08-11 NOTE — CONSULT NOTE ADULT - SUBJECTIVE AND OBJECTIVE BOX
Vascular Attending:  Keanu    HPI:  44y/o F with h/o recent gastric sleeve (21 [facility], Dr. Crisostomo ), fibromyalgia, thyroid dysfunction, PTSD, depression, anxiety.  Pt admitted with SAH found to have PE and acute RLE DVT.  Pt unable to be anticoagulated 2/2 to Vascular consulted for IVCF.     HH5 MF4   NIHSS 26 on arrival  BD 1 = 8/7 (07 Aug 2021 08:08)    PAST MEDICAL & SURGICAL HISTORY:    REVIEW OF SYSTEMS  Allergies  Allergy Status Unknown    SOCIAL HISTORY:    FAMILY HISTORY:  No pertinent family history in first degree relatives    Vital Signs Last 24 Hrs  T(C): 36.8 (12 Aug 2021 00:21), Max: 40.1 (11 Aug 2021 08:00)  T(F): 98.3 (12 Aug 2021 00:21), Max: 104.2 (11 Aug 2021 08:00)  HR: 71 (12 Aug 2021 02:00) (67 - 94)  BP: 136/59 (12 Aug 2021 02:00) (90/49 - 186/88)  BP(mean): 85 (12 Aug 2021 02:00) (65 - 126)  RR: 24 (12 Aug 2021 02:00) (13 - 28)  SpO2: 97% (12 Aug 2021 02:00) (95% - 100%)    PHYSICAL EXAM:    Constitutional: Pt alert  Eyes: opens spontaneously  Respiratory: non labored  Cardiovascular: s1S2  Extremities: moving all extremities  Vascular:2+  Neurological: intact    LABS:                        10.5   7.30  )-----------( 196      ( 11 Aug 2021 06:17 )             34.3     08-11    143  |  107  |  16  ----------------------------<  123<H>  3.4<L>   |  25  |  0.48<L>    Ca    9.0      11 Aug 2021 17:59  Phos  2.1     08-11  Mg     2.1     08-11    Urinalysis Basic - ( 10 Aug 2021 09:52 )    Color: Yellow / Appearance: Clear / S.020 / pH: x  Gluc: x / Ketone: 15 mg/dL  / Bili: Negative / Urobili: 0.2 E.U./dL   Blood: x / Protein: Trace mg/dL / Nitrite: NEGATIVE   Leuk Esterase: NEGATIVE / RBC: < 5 /HPF / WBC < 5 /HPF   Sq Epi: x / Non Sq Epi: 0-5 /HPF / Bacteria: Present /HPF        RADIOLOGY & ADDITIONAL STUDIES

## 2021-08-11 NOTE — CONSULT NOTE ADULT - SUBJECTIVE AND OBJECTIVE BOX
Patient is a 45y old  Female who presents with a chief complaint of SAH (11 Aug 2021 18:44)      HPI:  44y/o F with h/o recent gastric sleeve (21 [facility], Dr. Crisostomo ), fibromyalgia, thyroid dysfunction, PTSD, depression, anxiety.  Presented with seizures at home - brought to [place], with 1 more episode of seizure en route.  Intubated, CT showed SAH with IV extension, casted IV, hydrocephalus, given mannitol, levetiracetam 1g,  6mg ativan. Started on Cardene drip for BP goal < 140 and transferred to [facility] NICU for further management.     HH5 MF4   NIHSS 26 on arrival  BD 1 =  (07 Aug 2021 08:08)      PAST MEDICAL & SURGICAL HISTORY:      FAMILY HISTORY:  No pertinent family history in first degree relatives        SOCIAL HISTORY:  Smoking Status: [ ] Current, [ ] Former, [ ] Never  Pack Years:    MEDICATIONS:  Pulmonary:    Antimicrobials:  piperacillin/tazobactam IVPB.. 3.375 Gram(s) IV Intermittent every 6 hours  vancomycin  IVPB 1500 milliGRAM(s) IV Intermittent every 12 hours    Anticoagulants:  aspirin  chewable 81 milliGRAM(s) Oral daily  enoxaparin Injectable 40 milliGRAM(s) SubCutaneous every 12 hours    Onc:    GI/:  pantoprazole  Injectable 40 milliGRAM(s) IV Push daily  senna 2 Tablet(s) Oral at bedtime    Endocrine:  dexAMETHasone  Injectable 4 milliGRAM(s) IV Push every 12 hours  dextrose 50% Injectable 25 Gram(s) IV Push once  dextrose 50% Injectable 12.5 Gram(s) IV Push once  dextrose 50% Injectable 25 Gram(s) IV Push once  glucagon  Injectable 1 milliGRAM(s) IntraMuscular once  insulin lispro (ADMELOG) corrective regimen sliding scale   SubCutaneous every 6 hours    Cardiac:  hydrALAZINE Injectable 10 milliGRAM(s) IV Push every 3 hours PRN  niMODipine 60 milliGRAM(s) Oral every 4 hours  norepinephrine Infusion 0.05 MICROgram(s)/kG/Min IV Continuous <Continuous>    Other Medications:  acetaminophen    Suspension .. 650 milliGRAM(s) Oral every 6 hours PRN  chlorhexidine 2% Cloths 1 Application(s) Topical <User Schedule>  dexMEDEtomidine Infusion 0.2 MICROgram(s)/kG/Hr IV Continuous <Continuous>  fentaNYL    Injectable 12.5 MICROGram(s) IV Push every 2 hours PRN  fentaNYL    Injectable 50 MICROGram(s) IV Push once  fentaNYL    Injectable 50 MICROGram(s) IV Push once  potassium chloride   Powder 40 milliEquivalent(s) Enteral Tube every 4 hours  sodium chloride 2 Gram(s) Oral every 8 hours  sodium chloride 0.9%. 1000 milliLiter(s) IV Continuous <Continuous>  traMADol 25 milliGRAM(s) Oral every 6 hours PRN  valproate sodium IVPB 500 milliGRAM(s) IV Intermittent every 6 hours      Allergies    Allergy Status Unknown    Intolerances        Review of Systems:   •	General: negative  •	Skin/Breast: negative  •	Ophthalmologic: negative  •	ENMT: negative  •	Respiratory and Thorax: negative  •	Cardiovascular: negative  •	Gastrointestinal: negative  •	Genitourinary: negative  •	Musculoskeletal: negative  •	Neurological: negative  •	Psychiatric: negative  •	Hematology/Lymphatics: negative  •	Endocrine: negative  •	Allergic/Immunologic: negative    Physical Exam:   • Constitutional:	refer to dietitian/nutritionist note  • Eyes:	EOMI; PERRL; no drainage or redness  • ENMT:	No oral lesions; no gross abnormalities  • Neck	No bruits; no thyromegaly or nodules  • Breasts:	not examined  • Back:	No deformity or limitation of movement  • Respiratory:	Breath Sounds equal & clear to percussion & auscultation, no accessory muscle use  • Cardiovascular:	Regular rate & rhythm, normal S1, S2; no murmurs, gallops or rubs; no S3, S4  • Gastrointestinal:	Soft, non-tender, no hepatosplenomegaly, normal bowel sounds  • Genitourinary:	not examined  • Rectal: not examined  • Extremities:	No cyanosis, clubbing or edema  • Vascular:	Equal and normal pulses (carotid, femoral, dorsalis pedis)  • Neurologica:l	not examined  • Skin:	No lesions; no rash  • Lymph Nodes:	No lymphadedenopathy  • Musculoskeletal:	No joint pain, swelling or deformity; no limitation of movement      Vital Signs Last 24 Hrs  T(C): 38 (11 Aug 2021 18:00), Max: 40.1 (11 Aug 2021 08:00)  T(F): 100.4 (11 Aug 2021 18:00), Max: 104.2 (11 Aug 2021 08:00)  HR: 72 (11 Aug 2021 20:00) (65 - 94)  BP: 148/79 (11 Aug 2021 19:00) (90/49 - 186/88)  BP(mean): 108 (11 Aug 2021 19:00) (65 - 126)  RR: 26 (11 Aug 2021 20:00) (13 - 28)  SpO2: 95% (11 Aug 2021 20:00) (95% - 100%)    08-10 @ 07: @ 07:00  --------------------------------------------------------  IN: 3463.9 mL / OUT: 2587 mL / NET: 876.9 mL     @ 07: @ 21:08  --------------------------------------------------------  IN: 1822.9 mL / OUT: 3918 mL / NET: -2095.1 mL          LABS:  ABG - ( 11 Aug 2021 10:09 )  pH, Arterial: 7.55  pH, Blood: x     /  pCO2: 29    /  pO2: 94    / HCO3: 25    / Base Excess: 3.8   /  SaO2: 98.7                CBC Full  -  ( 11 Aug 2021 06:17 )  WBC Count : 7.30 K/uL  RBC Count : 4.30 M/uL  Hemoglobin : 10.5 g/dL  Hematocrit : 34.3 %  Platelet Count - Automated : 196 K/uL  Mean Cell Volume : 79.8 fl  Mean Cell Hemoglobin : 24.4 pg  Mean Cell Hemoglobin Concentration : 30.6 gm/dL  Auto Neutrophil # : x  Auto Lymphocyte # : x  Auto Monocyte # : x  Auto Eosinophil # : x  Auto Basophil # : x  Auto Neutrophil % : x  Auto Lymphocyte % : x  Auto Monocyte % : x  Auto Eosinophil % : x  Auto Basophil % : x        143  |  107  |  16  ----------------------------<  123<H>  3.4<L>   |  25  |  0.48<L>    Ca    9.0      11 Aug 2021 17:59  Phos  2.1     -  Mg     2.1     -11            Urinalysis Basic - ( 10 Aug 2021 09:52 )    Color: Yellow / Appearance: Clear / S.020 / pH: x  Gluc: x / Ketone: 15 mg/dL  / Bili: Negative / Urobili: 0.2 E.U./dL   Blood: x / Protein: Trace mg/dL / Nitrite: NEGATIVE   Leuk Esterase: NEGATIVE / RBC: < 5 /HPF / WBC < 5 /HPF   Sq Epi: x / Non Sq Epi: 0-5 /HPF / Bacteria: Present /HPF      < from: CT Angio Chest PE Protocol w/ IV Cont (08.10.21 @ 17:56) >    EXAM:  CT ANGIO CHEST PULM ART [facility]                          PROCEDURE DATE:  08/10/2021          INTERPRETATION:  CTA (CT angiography) of the CHEST    INDICATION: Suspected pulmonary embolism on neck CT. Assess for pulmonary embolism.    TECHNIQUE: CT angiography of the chest was performed during bolus injection of intravenous contrast.  Post-processing including the production of axial, coronal and sagittal multiplanar reformatted images and axial and coronal maximum intensity projections (MIPs) was performed.    PRIOR STUDY: Correlation with CTA neck from 8/10/2021.    FINDINGS:    Suboptimal study due to streak and motion artifact.    Pulmonary arteries: Limited evaluation due to streak and motion artifact. Previously identified filling defect within the left main pulmonary artery is again demonstrated..The stability of this finding favors pulmonary embolism over image artifact. Suspected additional subsegmental filling defects bilaterally. No evidence of right heart strain.    Lungs and large airways: Bibasilar subsegmental atelectasis.    Pleura:  Mild right pleural effusion.    Mediastinum and hilar regions: No thoracic lymphadenopathy.    Cardiovascular:  Heart size is normal. No thoracic aortic aneurysm.    Pericardial effusion: No pericardial effusion.    Chest wall and lower neck:  Normal.    Upper abdomen: Enteric tube is present. Hyperdensity in the gallbladder, excreted prior intravenous contrast.    Bones: Mild degenerative changes of the thoracic spine.      IMPRESSION:  Significantly limited evaluation of lobar/segmental/subsegmental pulmonary arteries due to artifact. Probable pulmonary embolism in the left distal main and proximal lower lobar pulmonary artery, which was also seen on prior same day neck CT. No saddle embolism. No evidence of right heart strain.    Communication:  Dr. Little discussed the impression of this report with Dr. Atwood at 9:50 PM on 08/10/2021. This verbal communication supplements the text report of this document.    < end of copied text >  < from: US Duplex Venous Lower Ext Complete, Bilateral (21 @ 10:52) >    EXAM:  US DPLX LWR EXT VEINS COMPL BI                          PROCEDURE DATE:  2021          INTERPRETATION:  VENOUS DUPLEX DOPPLER OF BOTH LOWER EXTREMITIES dated 2021 10:52 AM    INDICATION: Subarachnoid hemorrhage.    TECHNIQUE: Duplex Doppler evaluation including gray-scale ultrasound imaging, color flow Doppler imaging, and Doppler spectral analysis of the veins of both lower extremities was performed.    COMPARISON: None    FINDINGS:    Thigh veins: The common femoral, femoral,popliteal, proximal greater saphenous, and proximal deep femoral veins are patent and free of thrombus bilaterally. The veins are normally compressible and have normal phasic flow and augmentation response.    Calf veins: The paired peroneal and posterior tibial calf veins are patent bilaterally.    The right intramuscular calf vein is noncompressible and expanded with hypoechoic intraluminal material and completely absent color flow, consistent with acute deep venous thrombosis.    IMPRESSION:  Acute completely occlusive deep venous thrombosis of the right intramuscular calf vein.    < end of copied text >            RADIOLOGY & ADDITIONAL STUDIES (The following images were personally reviewed):

## 2021-08-11 NOTE — CONSULT NOTE ADULT - PROBLEM SELECTOR RECOMMENDATION 9
Reviewed the CT scan of the chest.  There are filling defects in the subsegmental lobe.  The patient is high risk for thromboembolic disease due to limited activity and related to his neurosurgical intervention.  Also the patient has intramuscular DVT.  At this point the patient has surgery for subdural hematoma..  There is contraindication for anticoagulation at this point as the patient has EVD.  My recommendation is to have a retrievable filter until the patient can be anticoagulated safely then will evaluate for removal of the filter.  The DVT and PE are provoked.  Echocardiogram to rule out biventricular strain and pulmonary hypertension.  The oxygen saturation adequate on nasal cannula.  No evidence of cor pulmonale.

## 2021-08-11 NOTE — PROGRESS NOTE ADULT - SUBJECTIVE AND OBJECTIVE BOX
S/Overnight events:  Patient febrile overnight and this morning. Requiring Precedex to avoid agitation.      Hospital Course:   : Tx from [place] for SAH. Intubated. R frontal EVD placed, central line and a line placed. 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   : POD1 s/p angio with R vert takedown, extubated, desatting on aerosol mask, required extensive suctioning, 3% nebs, chest PT, started on low dose precedex drip for restlessness/agitation, ABG stable. NGT placed. TF started with goal 20 pending nutrition recs. 3% stopped for Na 150. Ceribell EEG negative and d/c'ed.    Overnight, new Right pronator drift and right gaze preference that can't cross midline, Repeat CTH demonstrated stable vents, CTA head and neck unremarkable for spasm, hypoattenuation of right cerebellum edema vs. infarct.  8/10: POD3 R vert takedown, EVD open at [id number]. ASHA overnight, neuro stable. CTH with increased hydro, CTA head/neck with mild basilar spasm, but concern for PE. 1/2 am D50 for hypoglycemia. 1L bolus then 100 cc/hr, PM rounds for net negative fluid balance and mild vasospasm on CTA.   : POD4 R vert takedown. EVD at 5cm H2O, ASHA overnight. neuro stable.   Febrile 104. depakote increased to q6. NCHCT stable. EVD lowered to 0. Lasix 20 given for diuresis UOP over 2 liters. Sedation given for a-line placement, BP drop needing levo.        Vital Signs Last 24 Hrs  T(C): 38 (11 Aug 2021 18:00), Max: 40.1 (11 Aug 2021 08:00)  T(F): 100.4 (11 Aug 2021 18:00), Max: 104.2 (11 Aug 2021 08:00)  HR: 69 (11 Aug 2021 18:00) (64 - 94)  BP: 141/86 (11 Aug 2021 18:00) (90/49 - 186/88)  BP(mean): 108 (11 Aug 2021 18:00) (65 - 126)  RR: 24 (11 Aug 2021 18:00) (13 - 27)  SpO2: 97% (11 Aug 2021 18:00) (95% - 100%)    I&O's Detail    10 Aug 2021 07:  -  11 Aug 2021 07:00  --------------------------------------------------------  IN:    Dexmedetomidine: 564.9 mL    Enteral Tube Flush: 100 mL    Lactated Ringers: 150 mL    Lactated Ringers: 200 mL    sodium chloride 0.9%: 1300 mL    Sodium Chloride 0.9% Bolus: 1000 mL    Vital1.0: 149 mL  Total IN: 3463.9 mL    OUT:    External Ventricular Device (mL): 337 mL    Voided (mL): 2250 mL  Total OUT: 2587 mL    Total NET: 876.9 mL      11 Aug 2021 07:01  -  11 Aug 2021 18:45  --------------------------------------------------------  IN:    Dexmedetomidine: 211.9 mL    Enteral Tube Flush: 40 mL    IV PiggyBack: 252 mL    Vital1.0: 414 mL  Total IN: 917.9 mL    OUT:    External Ventricular Device (mL): 186 mL    Norepinephrine: 0 mL    Voided (mL): 3700 mL  Total OUT: 3886 mL    Total NET: -2968.1 mL        I&O's Summary    10 Aug 2021 07:01  -  11 Aug 2021 07:00  --------------------------------------------------------  IN: 3463.9 mL / OUT: 2587 mL / NET: 876.9 mL    11 Aug 2021 07:  -  11 Aug 2021 18:45  --------------------------------------------------------  IN: 917.9 mL / OUT: 3886 mL / NET: -2968.1 mL        PHYSICAL EXAM:  Gen: Restless, AAOx2  HEENT: Right gaze preference able to cross to midline, PERRL. Right frontal EVD C/D/I. +NGT  Neck: Right CVC C/D/I.   Lungs: CTA b/l  Heart: S1, S2. NSR.  Abd: Soft, obese, NT/ND. +BS  Exts: Pulses 2+ throughout, bruising to b/l radial artery access sites. Right groin site C/D/I. Left brachial arterial line  Neuro: Mild right facial asymmetry and right gaze, CNs otherwise intact. CALLE x4 with good strength throughout but inconsistently following commands. Dysarthric speech. Sensation to light touch grossly intact.      TUBES/LINES:  [x] CVC  [x] A-line  [] Lumbar Drain  [x] Ventriculostomy  [] Richey  [] Other    DIET:  [] NPO  [] Mechanical  [x] Tube feeds    LABS:                        10.5   7.30  )-----------( 196      ( 11 Aug 2021 06:17 )             34.3     08-11    143  |  107  |  16  ----------------------------<  123<H>  3.4<L>   |  25  |  0.48<L>    Ca    9.0      11 Aug 2021 17:59  Phos  2.1     08-11  Mg     2.1     08-11        Urinalysis Basic - ( 10 Aug 2021 09:52 )    Color: Yellow / Appearance: Clear / S.020 / pH: x  Gluc: x / Ketone: 15 mg/dL  / Bili: Negative / Urobili: 0.2 E.U./dL   Blood: x / Protein: Trace mg/dL / Nitrite: NEGATIVE   Leuk Esterase: NEGATIVE / RBC: < 5 /HPF / WBC < 5 /HPF   Sq Epi: x / Non Sq Epi: 0-5 /HPF / Bacteria: Present /HPF          CAPILLARY BLOOD GLUCOSE      POCT Blood Glucose.: 104 mg/dL (11 Aug 2021 17:09)      Drug Levels: [] N/A    CSF Analysis: [] N/A      Allergies    Allergy Status Unknown    Intolerances      MEDICATIONS:  Antibiotics:  piperacillin/tazobactam IVPB.. 3.375 Gram(s) IV Intermittent every 6 hours  vancomycin  IVPB 1500 milliGRAM(s) IV Intermittent every 12 hours    Neuro:  acetaminophen    Suspension .. 650 milliGRAM(s) Oral every 6 hours PRN  dexMEDEtomidine Infusion 0.2 MICROgram(s)/kG/Hr IV Continuous <Continuous>  fentaNYL    Injectable 50 MICROGram(s) IV Push once  fentaNYL    Injectable 12.5 MICROGram(s) IV Push every 2 hours PRN  fentaNYL    Injectable 50 MICROGram(s) IV Push once  traMADol 25 milliGRAM(s) Oral every 6 hours PRN  valproate sodium IVPB 500 milliGRAM(s) IV Intermittent every 6 hours    Anticoagulation:  aspirin  chewable 81 milliGRAM(s) Oral daily  enoxaparin Injectable 40 milliGRAM(s) SubCutaneous every 12 hours    OTHER:  chlorhexidine 2% Cloths 1 Application(s) Topical <User Schedule>  dexAMETHasone  Injectable 4 milliGRAM(s) IV Push every 12 hours  dextrose 50% Injectable 25 Gram(s) IV Push once  dextrose 50% Injectable 12.5 Gram(s) IV Push once  dextrose 50% Injectable 25 Gram(s) IV Push once  glucagon  Injectable 1 milliGRAM(s) IntraMuscular once  hydrALAZINE Injectable 10 milliGRAM(s) IV Push every 3 hours PRN  insulin lispro (ADMELOG) corrective regimen sliding scale   SubCutaneous every 6 hours  niMODipine 60 milliGRAM(s) Oral every 4 hours  norepinephrine Infusion 0.05 MICROgram(s)/kG/Min IV Continuous <Continuous>  pantoprazole  Injectable 40 milliGRAM(s) IV Push daily  senna 2 Tablet(s) Oral at bedtime    IVF:  dextrose 5%. 1000 milliLiter(s) IV Continuous <Continuous>  dextrose 5%. 1000 milliLiter(s) IV Continuous <Continuous>  potassium chloride   Powder 40 milliEquivalent(s) Enteral Tube every 4 hours    CULTURES:  Culture Results:   No growth at 1 day. (08-10 @ 12:08)  Culture Results:   No growth at 1 day. (08-10 @ 12:08)    RADIOLOGY & ADDITIONAL TESTS:      ASSESSMENT:  46y/o F with h/o recent gastric sleeve (21 [facility], Dr. Crisostomo ), fibromyalgia, thyroid dysfunction, PTSD, depression, anxiety.  Presented with seizures at home - brought to [place], with 1 more episode of seizure en route.  Intubated, CT showed SAH with IV extension, casted IV, hydrocephalus, given mannitol, levetiracetam 1g,  6mg ativan. Started on Cardene drip for BP goal < 140 and transferred to [facility] NICU for further management. HH5 MF4, BD 1 = . Now s/p cerebral angiogram for R vertebral artery takedown for R vertebral dissecting aneurysm (), and R frontal EVD placement ().    HEAD BLEED    No pertinent family history in first degree relatives    Handoff    Cerebral aneurysm    Cerebral aneurysm    Angiogram, carotid and cerebral arteries    SysAdmin_VstLnk      NEURO:   - neurochecks q1h  - EVD open at 0 , allow up to 30cc/hr  - Precedex for agitation, prn fentanyl pushes   - Niimodipine 60 mg po q4h   - Dex 4q12  - Depakote 500q8 ( No keppra due to agitation)  - Ceribell EEG negative , d/c'ed   - ASA 81 mg started  s/p angio  - CTH  w/ hypoattenuation of R occipital/cerbellum edema vs. evolving infarct vs. artifact; CTH 8/10 with no cerebellar stroke - presume artifact  - CTA  8/10 mild basilar spasm, CTH  stable      PULM:    - 3L NC   - Secretions q1 hour  - Hypertonic nebs/duonebs   - CT PE : Probable pulmonary embolism in the left distal main and proximal lower lobar pulmonary artery, Dr. Glass consulted for recommendations  -Sputum culture when able    CARDIO:    - SBP goal 120-180mmHg  - Levophed PRN hypotension  - TTE WNL    RENAL:   - Maintain euvolemia   - Na goal 140-145  - Voiding, on primafit  - s/p lasix 20  with good diuresis, euvolemic goals.    GI:    - TF via NGT (trickle), needs thin liquid diet x3 weeks as per bariatric when tolerated  - PPI per bariatric surgeon    ENDO:    - FS/ISS  - lipids normal    HEM/ONC:   - ASA 81  -VTE Prophylaxis: SCDs, SQL  - LE Duplex : Acute completely occlusive deep venous thrombosis of the right intramuscular calf vein.    ID:   -febrile, trend leukocytosis, on cooling blanket  -empiric Vanc/Zosyn for fever  -CSF cultures sent 8/10, no growth to date    Dispo: ICU status: family updated  PT/OT: on hold due to critical status  Full code  D/w Dr. Lomax, Dr. Bernard      Assessment:  Present when checked    []  GCS  E   V  M     Heart Failure: []Acute, [] acute on chronic , []chronic  Heart Failure:  [] Diastolic (HFpEF), [] Systolic (HFrEF), []Combined (HFpEF and HFrEF), [] RHF, [] Pulm HTN, [] Other    [] TRUDI, [] ATN, [] AIN, [] other  [] CKD1, [] CKD2, [] CKD 3, [] CKD 4, [] CKD 5, []ESRD    Encephalopathy: [] Metabolic, [] Hepatic, [] toxic, [] Neurological, [] Other    Abnormal Nurtitional Status: [] malnurtition (see nutrition note), [ ]underweight: BMI < 19, [] morbid obesity: BMI >40, [] Cachexia    [] Sepsis  [] hypovolemic shock,[] cardiogenic shock, [] hemorrhagic shock, [] neuogenic shock  [] Acute Respiratory Failure  []Cerebral edema, [] Brain compression/ herniation,   [] Functional quadriplegia  [] Acute blood loss anemia

## 2021-08-11 NOTE — CONSULT NOTE ADULT - ASSESSMENT
45y.o female with SAH now with PE and acute RLE DVT unable to be anticoagulated.    -Keep NPO p MN  -Will obtain consent from family  -Added on to OR

## 2021-08-12 NOTE — PROGRESS NOTE ADULT - ASSESSMENT
46 yo female with SAH and right LE DVT found on screening LE US, and incidentally found asymptomatic PE. Today, she seem to be restless. No respiratory compromise however. Last night, she spiked a fever to 104F, today her white count is 12.49 from 7.3 yesterday. After discussion with primary team, it seems that they don't have suspicion for infection. Her blood , CSF and sputum (was contaminated with oropharyngeal secretions) cultures from two days ago were negative. Her CXR was not indicative of infection. With that being said, she is being treated empirically with vanco and zosyn for over 24 hours now.   Plan:   - IVC filter today (OR time TBD)  -will continue to follow

## 2021-08-12 NOTE — CHART NOTE - NSCHARTNOTEFT_GEN_A_CORE
Admitting Diagnosis:   Patient is a 45y old  Female who presents with a chief complaint of SAH (12 Aug 2021 08:39)    PAST MEDICAL & SURGICAL HISTORY:  gastric sleeve (8/4/21), fibromyalgia, thyroid dysfunction, PTSD, depression, anxiety.     Current Nutrition Order:  NPO      PO Intake: Good (%) [   ]  Fair (50-75%) [   ] Poor (<25%) [   ]- NA    GI Issues:   WDL, last BM 8/9.   No n/v/d/c per disc with team  No reported abd distention or discomfort    Pain:  No pain noted at this time    Skin Integrity:  Surgical incision, david score 15  +2 pitting edema BL arms  No pressure ulcers noted    Labs:   08-12    143  |  108  |  14  ----------------------------<  121<H>  3.9   |  24  |  0.50    Ca    9.2      12 Aug 2021 05:25  Phos  2.9     08-12  Mg     2.3     08-12    CAPILLARY BLOOD GLUCOSE    POCT Blood Glucose.: 110 mg/dL (12 Aug 2021 06:13)  POCT Blood Glucose.: 96 mg/dL (11 Aug 2021 23:20)  POCT Blood Glucose.: 104 mg/dL (11 Aug 2021 17:09)    Medications:  MEDICATIONS  (STANDING):  aspirin  chewable 81 milliGRAM(s) Oral daily  chlorhexidine 2% Cloths 1 Application(s) Topical <User Schedule>  dexAMETHasone  Injectable 4 milliGRAM(s) IV Push every 12 hours  enoxaparin Injectable 40 milliGRAM(s) SubCutaneous every 12 hours  fentaNYL    Injectable 50 MICROGram(s) IV Push once  fentaNYL    Injectable 50 MICROGram(s) IV Push once  niMODipine 60 milliGRAM(s) Oral every 4 hours  pantoprazole  Injectable 40 milliGRAM(s) IV Push daily  piperacillin/tazobactam IVPB.. 3.375 Gram(s) IV Intermittent every 6 hours  senna 2 Tablet(s) Oral at bedtime  sodium chloride 2 Gram(s) Oral every 8 hours  sodium chloride 0.9%. 1000 milliLiter(s) (50 mL/Hr) IV Continuous <Continuous>  valproate sodium IVPB 500 milliGRAM(s) IV Intermittent every 6 hours  vancomycin  IVPB 1500 milliGRAM(s) IV Intermittent every 12 hours    MEDICATIONS  (PRN):  acetaminophen    Suspension .. 650 milliGRAM(s) Oral every 6 hours PRN Temp greater or equal to 38C (100.4F), Mild Pain (1 - 3)  fentaNYL    Injectable 25 MICROGram(s) IV Push every 2 hours PRN agitation  hydrALAZINE Injectable 10 milliGRAM(s) IV Push every 3 hours PRN SBP>180 (1st line)  labetalol Injectable 10 milliGRAM(s) IV Push every 2 hours PRN SBP > 180 (2nd line)  traMADol 25 milliGRAM(s) Oral every 6 hours PRN Moderate Pain (4 - 6)    Admitting Anthropometrics:  · Height for BMI (FEET)	5 Feet  · Height for BMI (INCHES)	4 Inch(s)  · Height for BMI (CENTIMETERS)	162.56 Centimeter(s)  · Weight for BMI (lbs)	225.1 lb  · Weight for BMI (kg)	102.1 kg  · Body Mass Index	              38.6 kg/m2  · Ideal Body Weight (lbs)	120  · Ideal Body Weight (kg)	54.4    Weight:  8/7 225lbs    Weight Change:   No new weights obtained this admission.     Nutrition Focused Physical Exam: Completed [   ]  Not Pertinent [ x  ]    Estimated energy needs:   IBW used for calculations as pt >120% of IBW (188%). Needs adjusted for recent bariatric sx, critical care, obesity. **Fluids per team (aim for >64oz per day).  Kcal (25-30 kcal/kg): 2678-8238 kcal  Protein (1.0-1.2 g/kg pro): 81-108g pro    Subjective:   45F with h/o recent gastric sleeve (08/04/21 [facility], Dr. Crisostomo ), fibromyalgia, thyroid dysfunction, PTSD, depression, anxiety.  Presented with seizures at home - brought to [place], with 1 more episode of seizure en route.  Intubated, CT showed SAH with IV extension, casted IV, hydrocephalus, given mannitol, levetiracetam 1g,  6mg ativan. Started on Cardene drip for BP goal < 140 and transferred to [facility] NICU for further management. S/p angio with R vert takedown 8/7, pt extubated later that day. Pt remains on fentanyl and Precedex restlessness and agitation. NGT placed for initiation of EN. CTH with increased hydro, CTA head/neck with mild basilar spasm. Pt now weened off fentanyl and precedex 8/12 per disc with RN. Now plan for IVC placement today 8/12.     On assessment, pt resting in bed without noted complaints per team. Pt remains agitated and restless unable to participate in exam at this time. Currently NPO for IVC placement today. Pt has been tolerating EN well per team. No s/sx of aspiration, no abd distention or discomfort. No noted n/v/d/c. Last BM 8/9. If team wishes to restart EN, please see recs below. FWF per team. Pertinent Labs: POCT 110H, Glu 121H. Cont to monitor lytes and replete prn. Please see recs below. RD to follow.      Previous Nutrition Diagnosis: Inadequate Oral Intake RT inability to meet needs via PO AEB NPO, reliant on EN to meet 100% of est needs.     Active [ x  ]  Resolved [   ]    If resolved, new PES:     Goal/Expected Outcome Consistently meet >75% est needs via appropriate route.    Recommendations:  1. NPO  2. When feasible recommend restart EN @ Vital HP @ 46 ml/hr x24hrs via NGT providing 1104 ml TV, 1104 kcal, 96g pro., 923 ml free water.   >Recommend start at 10 ml/hr increase 10 ml q4hrs until goal rate is met. FWF per team  >FWF per team  3. Cont to monitor lytes and replete prn  4. RD diet edu prn  5. Pain and bowel regimen per team    Education: Deferred    Risk Level: High [ x  ] Moderate [   ] Low [   ]

## 2021-08-12 NOTE — PROVIDER CONTACT NOTE (OTHER) - SITUATION
Patient's left thigh noted to be mottled.  Awaiting OR for IVC for clot to RLE.  Left leg warm to touch, pulses strong and palpable, no changes to sensation per patient, able to move extremity.

## 2021-08-12 NOTE — PROGRESS NOTE ADULT - SUBJECTIVE AND OBJECTIVE BOX
SUBJECTIVE: Patient seen and examined bedside.    aspirin  chewable 81 milliGRAM(s) Oral daily  enoxaparin Injectable 40 milliGRAM(s) SubCutaneous every 12 hours  hydrALAZINE Injectable 10 milliGRAM(s) IV Push every 3 hours PRN  labetalol Injectable 10 milliGRAM(s) IV Push every 2 hours PRN  niMODipine 60 milliGRAM(s) Oral every 4 hours  piperacillin/tazobactam IVPB.. 3.375 Gram(s) IV Intermittent every 6 hours  vancomycin  IVPB 1500 milliGRAM(s) IV Intermittent every 12 hours      Vital Signs Last 24 Hrs  T(C): 37.9 (12 Aug 2021 09:15), Max: 38.8 (11 Aug 2021 11:20)  T(F): 100.3 (12 Aug 2021 09:15), Max: 101.8 (11 Aug 2021 11:20)  HR: 81 (12 Aug 2021 09:00) (67 - 94)  BP: 177/97 (12 Aug 2021 09:00) (90/49 - 189/97)  BP(mean): 130 (12 Aug 2021 09:00) (65 - 136)  RR: 22 (12 Aug 2021 09:00) (13 - 28)  SpO2: 98% (12 Aug 2021 09:00) (95% - 100%)  I&O's Detail    11 Aug 2021 07:01  -  12 Aug 2021 07:00  --------------------------------------------------------  IN:    Dexmedetomidine: 290.9 mL    Enteral Tube Flush: 880 mL    IV PiggyBack: 1330 mL    IV PiggyBack: 154 mL    sodium chloride 0.9%: 40 mL    sodium chloride 0.9%: 550 mL    Vital1.0: 414 mL  Total IN: 3658.9 mL    OUT:    External Ventricular Device (mL): 374 mL    Norepinephrine: 0 mL    Voided (mL): 5400 mL  Total OUT: 5774 mL    Total NET: -2115.1 mL      12 Aug 2021 07:01  -  12 Aug 2021 10:30  --------------------------------------------------------  IN:    IV PiggyBack: 500 mL    sodium chloride 0.9%: 50 mL  Total IN: 550 mL    OUT:    Dexmedetomidine: 0 mL    External Ventricular Device (mL): 14 mL    Voided (mL): 200 mL  Total OUT: 214 mL    Total NET: 336 mL          General: NAD, restless, respond to questions however  C/V: NSR  Pulm: Nonlabored breathing, no respiratory distress  Abd: soft, NT/ND.  Extrem: WWP, SCDs on the left calf. Right sided calf swelling, erythema and tenderness.        LABS:                        12.0   12.49 )-----------( 242      ( 12 Aug 2021 05:25 )             39.4     08-12    143  |  108  |  14  ----------------------------<  121<H>  3.9   |  24  |  0.50    Ca    9.2      12 Aug 2021 05:25  Phos  2.9     08-12  Mg     2.3     08-12            RADIOLOGY & ADDITIONAL STUDIES:

## 2021-08-12 NOTE — BRIEF OPERATIVE NOTE - OPERATION/FINDINGS
Procedure: IVC filter placement via right groin access. Filter placed infrarenally.   Indication: PE and DVT with AC contraindicated due to SAH  Contrast 20cc

## 2021-08-12 NOTE — PROVIDER CONTACT NOTE (OTHER) - SITUATION
Pt back from IVC filter,Left thigh still mottled, foot cooler to touch, pt reports numbness to left leg and right fingertips

## 2021-08-12 NOTE — PROVIDER CONTACT NOTE (OTHER) - BACKGROUND
s/p cerebral angiogram for R vertebral artery takedown for R vertebral dissecting aneurysm from 8/7.

## 2021-08-12 NOTE — PROVIDER CONTACT NOTE (OTHER) - ACTION/TREATMENT ORDERED:
Labs drawn and sent.  Pt to remain flat postop.  Per Dr. Bernard, OK to have SCD on left leg.  Will continue to monitor.

## 2021-08-12 NOTE — PROGRESS NOTE ADULT - SUBJECTIVE AND OBJECTIVE BOX
HPI:  44y/o F with h/o recent gastric sleeve (21 [facility], Dr. Crisostomo ), fibromyalgia, thyroid dysfunction, PTSD, depression, anxiety.  Presented with seizures at home - brought to [place], with 1 more episode of seizure en route.  Intubated, CT showed SAH with IV extension, casted IV, hydrocephalus, given mannitol, levetiracetam 1g,  6mg ativan. Started on Cardene drip for BP goal < 140 and transferred to [facility] NICU for further management.     HH5 MF4   NIHSS 26 on arrival  BD 1 =  (07 Aug 2021 08:08)    S/Overnight events:  ASHA overnight. Neuro stable. weaning Precedex, low grade fever overnight.     Hospital Course:  : Tx from [place] for SAH. Intubated. R frontal EVD placed, central line and a line placed. 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   : POD1 s/p angio with R vert takedown, extubated, desatting on aerosol mask, required extensive suctioning, 3% nebs, chest PT, started on low dose precedex drip for restlessness/agitation, ABG stable. NGT placed. TF started with goal 20 pending nutrition recs. 3% stopped for Na 150. Ceribell EEG negative and d/c'ed.    Overnight, new Right pronator drift and right gaze preference that can't cross midline, Repeat CTH demonstrated stable vents, CTA head and neck unremarkable for spasm, hypoattenuation of right cerebellum edema vs. infarct.  8/10: POD3 R vert takedown, EVD open at [id number]. ASHA overnight, neuro stable. CTH with increased hydro, CTA head/neck with mild basilar spasm, but concern for PE. 1/2 am D50 for hypoglycemia. 1L bolus then 100 cc/hr, PM rounds for net negative fluid balance and mild vasospasm on CTA.   : POD4 R vert takedown. EVD at 5cm H2O, ASHA overnight. neuro stable.   Febrile 104. depakote increased to q6. NCHCT stable. EVD lowered to 0. Lasix 20 given for dieuresis, UOP over 2 liters. Sedation given for a-line placement, BP drop needing levo.  8/12: POD5 R vert takedown. EVD at 0cm H2O. ASHA overnight, neuro stable. Precedex being weaned off. Pending IVCF with vascular today.      Vital Signs Last 24 Hrs  T(C): 38.1 (12 Aug 2021 03:00), Max: 40.1 (11 Aug 2021 08:00)  T(F): 100.6 (12 Aug 2021 03:00), Max: 104.2 (11 Aug 2021 08:00)  HR: 84 (12 Aug 2021 03:00) (67 - 94)  BP: 176/95 (12 Aug 2021 03:00) (90/49 - 186/88)  BP(mean): 124 (12 Aug 2021 03:00) (65 - 126)  RR: 34 (12 Aug 2021 03:00) (13 - 34)  SpO2: 97% (12 Aug 2021 03:00) (95% - 100%)    I&O's Detail    10 Aug 2021 07:01  -  11 Aug 2021 07:00  --------------------------------------------------------  IN:    Dexmedetomidine: 564.9 mL    Enteral Tube Flush: 100 mL    Lactated Ringers: 150 mL    Lactated Ringers: 200 mL    sodium chloride 0.9%: 1300 mL    Sodium Chloride 0.9% Bolus: 1000 mL    Vital1.0: 149 mL  Total IN: 3463.9 mL    OUT:    External Ventricular Device (mL): 337 mL    Voided (mL): 2250 mL  Total OUT: 2587 mL    Total NET: 876.9 mL      11 Aug 2021 07:01  -  12 Aug 2021 03:37  --------------------------------------------------------  IN:    Dexmedetomidine: 285.8 mL    Enteral Tube Flush: 820 mL    IV PiggyBack: 1030 mL    IV PiggyBack: 154 mL    sodium chloride 0.9%: 40 mL    sodium chloride 0.9%: 400 mL    Vital1.0: 414 mL  Total IN: 3143.8 mL    OUT:    External Ventricular Device (mL): 317 mL    Norepinephrine: 0 mL    Voided (mL): 4900 mL  Total OUT: 5217 mL    Total NET: -2073.2 mL        I&O's Summary    10 Aug 2021 07:01  -  11 Aug 2021 07:00  --------------------------------------------------------  IN: 3463.9 mL / OUT: 2587 mL / NET: 876.9 mL    11 Aug 2021 07:01  -  12 Aug 2021 03:37  --------------------------------------------------------  IN: 3143.8 mL / OUT: 5217 mL / NET: -2073.2 mL        PHYSICAL EXAM:  Gen: Restless, AAOx3  HEENT: Right gaze preference able to cross to midline, PERRL. Right frontal EVD C/D/I. +NGT  Neck: Right CVC C/D/I.   Lungs: CTA b/l  Heart: S1, S2. NSR.  Abd: Soft, obese, NT/ND. +BS  Exts: Pulses 2+ throughout, bruising to b/l radial artery access sites. Right groin site C/D/I. Left brachial arterial line  Neuro: Mild right facial asymmetry and right gaze, CNs otherwise intact. CALLE x4 with good strength throughout but inconsistently following commands. Dysarthric speech. Sensation to light touch grossly intact.      TUBES/LINES:  [x] CVC  [x] A-line  [] Lumbar Drain  [x] Ventriculostomy  [] Richey  [] Other    DIET:  [] NPO  [] Mechanical  [x] Tube feeds    LABS:                        10.5   7.30  )-----------( 196      ( 11 Aug 2021 06:17 )             34.3     08-11    143  |  107  |  16  ----------------------------<  123<H>  3.4<L>   |  25  |  0.48<L>    Ca    9.0      11 Aug 2021 17:59  Phos  2.1     08-  Mg     2.1     08-        Urinalysis Basic - ( 10 Aug 2021 09:52 )    Color: Yellow / Appearance: Clear / S.020 / pH: x  Gluc: x / Ketone: 15 mg/dL  / Bili: Negative / Urobili: 0.2 E.U./dL   Blood: x / Protein: Trace mg/dL / Nitrite: NEGATIVE   Leuk Esterase: NEGATIVE / RBC: < 5 /HPF / WBC < 5 /HPF   Sq Epi: x / Non Sq Epi: 0-5 /HPF / Bacteria: Present /HPF          CAPILLARY BLOOD GLUCOSE      POCT Blood Glucose.: 96 mg/dL (11 Aug 2021 23:20)  POCT Blood Glucose.: 104 mg/dL (11 Aug 2021 17:09)      Drug Levels: [] N/A    CSF Analysis: [] N/A      Allergies    Allergy Status Unknown    Intolerances      MEDICATIONS:  Antibiotics:  piperacillin/tazobactam IVPB.. 3.375 Gram(s) IV Intermittent every 6 hours  vancomycin  IVPB 1500 milliGRAM(s) IV Intermittent every 12 hours    Neuro:  acetaminophen    Suspension .. 650 milliGRAM(s) Oral every 6 hours PRN  dexMEDEtomidine Infusion 0.2 MICROgram(s)/kG/Hr IV Continuous <Continuous>  fentaNYL    Injectable 50 MICROGram(s) IV Push once  fentaNYL    Injectable 12.5 MICROGram(s) IV Push every 2 hours PRN  fentaNYL    Injectable 50 MICROGram(s) IV Push once  traMADol 25 milliGRAM(s) Oral every 6 hours PRN  valproate sodium IVPB 500 milliGRAM(s) IV Intermittent every 6 hours    Anticoagulation:  aspirin  chewable 81 milliGRAM(s) Oral daily  enoxaparin Injectable 40 milliGRAM(s) SubCutaneous every 12 hours    OTHER:  chlorhexidine 2% Cloths 1 Application(s) Topical <User Schedule>  dexAMETHasone  Injectable 4 milliGRAM(s) IV Push every 12 hours  dextrose 50% Injectable 25 Gram(s) IV Push once  dextrose 50% Injectable 12.5 Gram(s) IV Push once  dextrose 50% Injectable 25 Gram(s) IV Push once  glucagon  Injectable 1 milliGRAM(s) IntraMuscular once  hydrALAZINE Injectable 10 milliGRAM(s) IV Push every 3 hours PRN  insulin lispro (ADMELOG) corrective regimen sliding scale   SubCutaneous every 6 hours  niMODipine 60 milliGRAM(s) Oral every 4 hours  pantoprazole  Injectable 40 milliGRAM(s) IV Push daily  senna 2 Tablet(s) Oral at bedtime    IVF:  sodium chloride 2 Gram(s) Oral every 8 hours  sodium chloride 0.9%. 1000 milliLiter(s) IV Continuous <Continuous>    CULTURES:  Culture Results:   No growth at 1 day. (08-10 @ 12:08)  Culture Results:   No growth at 1 day. (08-10 @ 12:08)    RADIOLOGY & ADDITIONAL TESTS: < from: Xray Chest 1 View-PORTABLE IMMEDIATE (Xray Chest 1 View-PORTABLE IMMEDIATE .) (21 @ 20:02) >    IMPRESSION: Mild CHF.    < end of copied text >        ASSESSMENT:  44y/o F with h/o recent gastric sleeve (21 [facility], Dr. Crisostomo ), fibromyalgia, thyroid dysfunction, PTSD, depression, anxiety.  Presented with seizures at home - brought to [place], with 1 more episode of seizure en route.  Intubated, CT showed SAH with IV extension, casted IV, hydrocephalus, given mannitol, levetiracetam 1g,  6mg ativan. Started on Cardene drip for BP goal < 140 and transferred to [facility] NICU for further management. HH5 MF4, BD 1 = . Now s/p cerebral angiogram for R vertebral artery takedown for R vertebral dissecting aneurysm (), and R frontal EVD placement ()    HEAD BLEED    No pertinent family history in first degree relatives    Handoff    Cerebral aneurysm    Cerebral aneurysm    Multiple subsegmental pulmonary emboli without acute cor pulmonale    DVT, lower extremity    Angiogram, carotid and cerebral arteries    SysAdmin_VstLnk      NEURO:   - neurochecks q1h  - EVD open at 0 , allow up to 30cc/hr  - Precedex for agitation, prn fentanyl pushes   - Niimodipine 60 mg po q4h   - Dex 4q12  - Depakote 500q8 ( No keppra due to agitation)  - Ceribell EEG negative , d/c'ed   - ASA 81 mg started 8/7 s/p angio  - CTH  w/ hypoattenuation of R occipital/cerbellum edema vs. evolving infarct vs. artifact; CTH 8/10 with no cerebellar stroke - presume artifact  - CTH 8/10 with worsening hydro, mild basilar spasm    PULM:    - 3L NC, mild CHF on CXR  s/p 20 Lasix  - Secretions q1 hour  - Hypertonic nebs/duonebs   - CT PE : Probable pulmonary embolism in the left distal main and proximal lower lobar pulmonary artery, which was also seen on prior same day neck CT. No saddle embolism. No evidence of right heart strain.    CARDIO:    - SBP goal 120-180mmHg  - TTE WNL, pending repeat per pulm reccs  - Troponin stable   - EKG normal     ENDO:    - FS/ISS  - lipids normal    GI:    - TF via NGT (trickle), needs thin liquid diet x3 weeks as per bariatric when tolerated  - PPI per bariatric surgeon    RENAL:   - Maintain euvolemia   - Na goal 140-145, on salt tabs  - premafit  - off IVF  - s/p lasix 20     HEM/ONC:   - ASA 81  -VTE Prophylaxis: SCDs, SQL  - LE Duplex : Acute completely occlusive deep venous thrombosis of the right intramuscular calf vein, pending IVCF with vascular     ID:   -febrile, trend leukocytosis   -empiric Vanc/Zosyn for fever  -CSF sent 8/10    Dispol ICU status: family updated  PT/OT: on hold due to critical status  Full code    D/w Dr. Lomax, Dr. Yan     Assessment:  Present when checked    []  GCS  E   V  M     Heart Failure: []Acute, [] acute on chronic , []chronic  Heart Failure:  [] Diastolic (HFpEF), [] Systolic (HFrEF), []Combined (HFpEF and HFrEF), [] RHF, [] Pulm HTN, [] Other    [] TRUDI, [] ATN, [] AIN, [] other  [] CKD1, [] CKD2, [] CKD 3, [] CKD 4, [] CKD 5, []ESRD    Encephalopathy: [] Metabolic, [] Hepatic, [] toxic, [] Neurological, [] Other    Abnormal Nurtitional Status: [] malnurtition (see nutrition note), [ ]underweight: BMI < 19, [] morbid obesity: BMI >40, [] Cachexia    [] Sepsis  [] hypovolemic shock,[] cardiogenic shock, [] hemorrhagic shock, [] neuogenic shock  [] Acute Respiratory Failure  []Cerebral edema, [] Brain compression/ herniation,   [] Functional quadriplegia  [] Acute blood loss anemia

## 2021-08-12 NOTE — PROVIDER CONTACT NOTE (OTHER) - SITUATION
Mottling ongoing to left leg as per previous provider contact note.  Pt reporting pain to left leg.  Left thigh cooler to touch.

## 2021-08-12 NOTE — PROGRESS NOTE ADULT - SUBJECTIVE AND OBJECTIVE BOX
==============================================   NEUROCRITICAL CARE ATTENDING NOTE  ==============================================    LUDMILA PRIETO   MRN-[id number]  Summary:  45y/F with h/o recent gastric sleeve (08/04 [facility], Dr. Crisostomo ), fibromyalgia, thyroid dysfunction, PTSD, depression, anxiety.  Presented with seizures at home - brought to [place], with 1 more episode of seizure en route.  Intubated, CT showed SAH with IV extension, casted IV, hydrocephalus, given mannitol levetiracetam 6mg ativan, transferred to [facility].    HOSPITAL COURSE:  8/7: Tx from [place] for SAH. Intubated. R frontal EVD placed, central line and a line placed. 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   8/8: POD1 s/p angio with R vert takedown, extubated, desatting on aerosol mask, required extensive suctioning, 3% nebs, chest PT, started on low dose precedex drip for restlessness/agitation, ABG stable. NGT placed. TF started with goal 20 pending nutrition recs. 3% stopped for Na 150. Ceribell EEG negative and d/c'ed.   8/9 POD2 R vert takedown, Overnight, new Right pronator drift and right gaze preference that can't cross midline, Repeat CTH demonstrated stable vents, CTA head and neck unremarkable for spasm, hypoattenuation of right PCA/PCA territories. Requiring precedex drip and fentanyl pushes PRN for agitation   8/10: POD3 R vert takedown, EVD open at [id number]. ASHA overnight, neuro stable.        PHYSICAL EXAMINATION  T(C): 38.3 (08-12 @ 05:00), Max: 40.1 (08-11 @ 08:00)  HR: 78 (08-12 @ 07:00) (67 - 94)  BP: 189/97 (08-12 @ 07:00) (90/49 - 189/97)  BP(mean): 136 (08-12 @ 07:00)  ABP:  (143/112 - 184/101)  ABP(mean):  (110 - 141)  RR: 20 (08-12 @ 07:00) (13 - 28)  SpO2: 98% (08-12 @ 07:00) (95% - 100%)  CVP(mm Hg): --      08-11-21 @ 07:01  -  08-12-21 @ 07:00  --------------------------------------------------------  IN:    Dexmedetomidine: 290.9 mL    Enteral Tube Flush: 880 mL    IV PiggyBack: 1330 mL    IV PiggyBack: 154 mL    sodium chloride 0.9%: 40 mL    sodium chloride 0.9%: 500 mL    Vital1.0: 414 mL  Total IN: 3608.9 mL    OUT:    External Ventricular Device (mL): 374 mL    Norepinephrine: 0 mL    Voided (mL): 5400 mL  Total OUT: 5774 mL    Total NET: -2165.1 mL                  LABS:  CAPILLARY BLOOD GLUCOSE      POCT Blood Glucose.: 110 mg/dL (12 Aug 2021 06:13)  POCT Blood Glucose.: 96 mg/dL (11 Aug 2021 23:20)  POCT Blood Glucose.: 104 mg/dL (11 Aug 2021 17:09)                          12.0   12.49 )-----------( 242      ( 12 Aug 2021 05:25 )             39.4     08-12    143  |  108  |  14  ----------------------------<  121<H>  3.9   |  24  |  0.50    Ca    9.2      12 Aug 2021 05:25  Phos  2.9     08-12  Mg     2.3     08-12 08-11-21 @ 10:09  pH7.55  pCO29  pO229  QIY652  FiO2--  Sat98.7      MEDICATIONS:  MEDICATIONS  (STANDING):  aspirin  chewable 81 milliGRAM(s) Oral daily  chlorhexidine 2% Cloths 1 Application(s) Topical <User Schedule>  dexAMETHasone  Injectable 4 milliGRAM(s) IV Push every 12 hours  dexMEDEtomidine Infusion 0.2 MICROgram(s)/kG/Hr (5.1 mL/Hr) IV Continuous <Continuous>  dextrose 50% Injectable 25 Gram(s) IV Push once  dextrose 50% Injectable 12.5 Gram(s) IV Push once  dextrose 50% Injectable 25 Gram(s) IV Push once  enoxaparin Injectable 40 milliGRAM(s) SubCutaneous every 12 hours  fentaNYL    Injectable 50 MICROGram(s) IV Push once  fentaNYL    Injectable 50 MICROGram(s) IV Push once  glucagon  Injectable 1 milliGRAM(s) IntraMuscular once  insulin lispro (ADMELOG) corrective regimen sliding scale   SubCutaneous every 6 hours  niMODipine 60 milliGRAM(s) Oral every 4 hours  pantoprazole  Injectable 40 milliGRAM(s) IV Push daily  piperacillin/tazobactam IVPB.. 3.375 Gram(s) IV Intermittent every 6 hours  senna 2 Tablet(s) Oral at bedtime  sodium chloride 2 Gram(s) Oral every 8 hours  sodium chloride 0.9%. 1000 milliLiter(s) (50 mL/Hr) IV Continuous <Continuous>  valproate sodium IVPB 500 milliGRAM(s) IV Intermittent every 6 hours  vancomycin  IVPB 1500 milliGRAM(s) IV Intermittent every 12 hours    MEDICATIONS  (PRN):  acetaminophen    Suspension .. 650 milliGRAM(s) Oral every 6 hours PRN Temp greater or equal to 38C (100.4F), Mild Pain (1 - 3)  fentaNYL    Injectable 12.5 MICROGram(s) IV Push every 2 hours PRN agitation  hydrALAZINE Injectable 10 milliGRAM(s) IV Push every 3 hours PRN SBP>180  traMADol 25 milliGRAM(s) Oral every 6 hours PRN Moderate Pain (4 - 6)      Past Medical History:   Allergies:  Allergy Status Unknown  Home meds:     NEUROLOGIC EXAMINATION:  Patient is rousable, restless at times, oriented x2, dysarthric speech, CON, no vertical/rotatory nystagmus, R gaze preference, R slight LMN asymmetry, R cerebellar drift, follows commands 4+/5 throughout  GENERAL: NC   EENT:  anicteric  CARDIOVASCULAR: (+) S1 S2, normal rate and regular rhythm  PULMONARY: clear to auscultation bilaterally  ABDOMEN: soft, nontender with normoactive bowel sounds  EXTREMITIES: no edema  SKIN: no rash        Bacteriology:  CSF studies:  EEG:  Neuroimaging:  CT/CTA (08.09) - Status post aneurysm clip distal right vertebral artery due to dissection/aneurysm since prior CTA 08/07/2021. There is no evidence intracranial vascular occlusion, vasospasm or new aneurysm.  Subtle hypoattenuation within the right greater than left occipital lobes and right cerebellum with sulcal effacement and mild loss of gray-white matter differentiation may be suggestive of cerebral edema or developing infarction.  Other imaging:  Duplex - R IM calf DVT    IV FLUIDS: NS  DRIPS:  DIET: NPO   Lines: Central line [place]  Drains:  Richey   EVD 10 cm, ICP 4-10, CSF 8-15 mL/h  Wounds:    CODE STATUS:  Full Code                       GOALS OF CARE:  aggressive                      DISPOSITION:  ICU   5 MF 4

## 2021-08-12 NOTE — PROGRESS NOTE ADULT - SUBJECTIVE AND OBJECTIVE BOX
Interval Events: Reviewed  Patient seen and examined at bedside.    Patient is a 45y old  Female who presents with a chief complaint of SAH (12 Aug 2021 07:36)    she is awake not is distress no cough no sob  PAST MEDICAL & SURGICAL HISTORY:      MEDICATIONS:  Pulmonary:    Antimicrobials:  piperacillin/tazobactam IVPB.. 3.375 Gram(s) IV Intermittent every 6 hours  vancomycin  IVPB 1500 milliGRAM(s) IV Intermittent every 12 hours    Anticoagulants:  aspirin  chewable 81 milliGRAM(s) Oral daily  enoxaparin Injectable 40 milliGRAM(s) SubCutaneous every 12 hours    Cardiac:  hydrALAZINE Injectable 10 milliGRAM(s) IV Push every 3 hours PRN  niMODipine 60 milliGRAM(s) Oral every 4 hours      Allergies    Allergy Status Unknown    Intolerances        Vital Signs Last 24 Hrs  T(C): 38.3 (12 Aug 2021 05:00), Max: 40.1 (11 Aug 2021 09:10)  T(F): 101 (12 Aug 2021 05:00), Max: 104.1 (11 Aug 2021 09:10)  HR: 78 (12 Aug 2021 07:00) (67 - 94)  BP: 189/97 (12 Aug 2021 07:00) (90/49 - 189/97)  BP(mean): 136 (12 Aug 2021 07:00) (65 - 136)  RR: 20 (12 Aug 2021 07:00) (13 - 28)  SpO2: 98% (12 Aug 2021 07:00) (95% - 100%)    08-11 @ 07:01  -  08-12 @ 07:00  --------------------------------------------------------  IN: 3608.9 mL / OUT: 5774 mL / NET: -2165.1 mL          Review of Systems:   •	General: negative  •	Skin/Breast: negative  •	Ophthalmologic: negative  •	ENMT: negative  •	Respiratory and Thorax: negative  •	Cardiovascular: negative  •	Gastrointestinal: negative  •	Genitourinary: negative  •	Musculoskeletal: negative  •	Neurological: negative  •	Psychiatric: negative  •	Hematology/Lymphatics: negative  •	Endocrine: negative  •	Allergic/Immunologic: negative    Physical Exam:   • Constitutional:	refer to the dietion /Nutritionist note  • Eyes:	EOMI; PERRL; no drainage or redness  • ENMT:	No oral lesions; no gross abnormalities  • Neck	No bruits; no thyromegaly or nodules  • Breasts:	not examined  • Back:	No deformity or limitation of movement  • Respiratory:	Breath Sounds equal & clear to percussion & auscultation, no accessory muscle use  • Cardiovascular:	Regular rate & rhythm, normal S1, S2; no murmurs, gallops or rubs; no S3, S4  • Gastrointestinal:	Soft, non-tender, no hepatosplenomegaly, normal bowel sounds  • Genitourinary:	not examined  • Rectal: not examined  • Extremities:	No cyanosis, clubbing or edema  • Vascular:	Equal and normal pulses (carotid, femoral, dorsalis pedis)  • Neurologica:l	not examined  • Skin:	No lesions; no rash  • Lymph Nodes:	No lymphadedenopathy  • Musculoskeletal:	No joint pain, swelling or deformity; no limitation of movement        LABS:  ABG - ( 11 Aug 2021 10:09 )  pH, Arterial: 7.55  pH, Blood: x     /  pCO2: 29    /  pO2: 94    / HCO3: 25    / Base Excess: 3.8   /  SaO2: 98.7                CBC Full  -  ( 12 Aug 2021 05:25 )  WBC Count : 12.49 K/uL  RBC Count : 5.02 M/uL  Hemoglobin : 12.0 g/dL  Hematocrit : 39.4 %  Platelet Count - Automated : 242 K/uL  Mean Cell Volume : 78.5 fl  Mean Cell Hemoglobin : 23.9 pg  Mean Cell Hemoglobin Concentration : 30.5 gm/dL  Auto Neutrophil # : 10.16 K/uL  Auto Lymphocyte # : 1.12 K/uL  Auto Monocyte # : 1.04 K/uL  Auto Eosinophil # : 0.00 K/uL  Auto Basophil # : 0.02 K/uL  Auto Neutrophil % : 81.3 %  Auto Lymphocyte % : 9.0 %  Auto Monocyte % : 8.3 %  Auto Eosinophil % : 0.0 %  Auto Basophil % : 0.2 %    08-12    143  |  108  |  14  ----------------------------<  121<H>  3.9   |  24  |  0.50    Ca    9.2      12 Aug 2021 05:25  Phos  2.9     08-12  Mg     2.3     08-12            Urinalysis Basic - ( 10 Aug 2021 09:52 )    Color: Yellow / Appearance: Clear / S.020 / pH: x  Gluc: x / Ketone: 15 mg/dL  / Bili: Negative / Urobili: 0.2 E.U./dL   Blood: x / Protein: Trace mg/dL / Nitrite: NEGATIVE   Leuk Esterase: NEGATIVE / RBC: < 5 /HPF / WBC < 5 /HPF   Sq Epi: x / Non Sq Epi: 0-5 /HPF / Bacteria: Present /HPF              Culture Results:   No growth at 1 day. (08-10 @ 12:08)  Culture Results:   No growth at 1 day. (08-10 @ 12:08)  Culture Results:   No growth to date (08-10 @ 12:01)  Culture Results:   Sputum specimen rejected.  Microscopic examination indicates  oropharyngeal contamination.  Please repeat. (08-10 @ 10:56)      RADIOLOGY & ADDITIONAL STUDIES (The following images were personally reviewed):

## 2021-08-13 NOTE — SWALLOW BEDSIDE ASSESSMENT ADULT - SLP PERTINENT HISTORY OF CURRENT PROBLEM
46 yo W s/p gastric sleeve on 8/4/21 at [facility], h/o fibromyalgia, thryoid dysfnx, PTSD, depression, anxiety, transferred from [place] after witnessed seizures at home now s/p R frontal EVD, R vertebral takedown, requiring precedex & fentanyl for agitation

## 2021-08-13 NOTE — PROVIDER CONTACT NOTE (OTHER) - BACKGROUND
Patient is day 7 s/p ICH w/ intraventricular involvement, s/p R-vertebral artery takedown via angiogram

## 2021-08-13 NOTE — PROVIDER CONTACT NOTE (OTHER) - BACKGROUND
Patient is bleed day 7, s/p R-vertebral artery takedown after hemorrhage. Pt had laproscopic gastric surgery on 8/4, currently has NG tube in place.

## 2021-08-13 NOTE — PROGRESS NOTE ADULT - ASSESSMENT
44 yo with ICH c/b DVT. POD1 for IVC filter placement. Her extermities are warm, well perfused, +2 pulses b/l. Her right groin is soft, no evidence of swelling or skin changes. We are pleased with the procedure results. Thank you for involving us in her care. We will sign off at this time.

## 2021-08-13 NOTE — SWALLOW BEDSIDE ASSESSMENT ADULT - ORAL PREPARATORY PHASE
Ant loss with 1st tsp sip; bolus containment improved w remaining trials given max verbal encouragement to close lips around spoon./Reduced oral grading/Anterior loss of bolus

## 2021-08-13 NOTE — PROGRESS NOTE ADULT - ASSESSMENT
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 (with no PICA visualization prior to sacrifice) - CTA no VSP; CT PCA/PICA territory hypodensities  Hydrocephalus:  EVD insertion, set at 10cm H20 open to drain  Seizure prophylaxis:  switch levetiracetam to  q8h; ceribell EEG - no epileptiform changes    REHAB:  physical therapy evaluation and management  - defer  EARLY MOB:  HOB elevated    PULM:  extubated, aspiration precautions   CARDIO:  SBP goal 120-180mm Hg, baseline echo pending  ENDO:  Blood sugar goals 140-180 mg/dL, cont insulin sliding scale  GI:  d/c PPI  DIET: start tube feeds (s&s evln)  RENAL: maintain euvolemia, accurate Is and Os, d/c IVF once TF at goal 2% @ 50, Na 151  HEM/ONC: no coagulopathy (INR= 1.2), no ASA / Plavix use  VTE Prophylaxis: SCDs, SQL (Dopplers R calf DVT)  ID: afebrile, stop vanco, cont zosyn x 5 days?   Social: Daughter Maggie and Son Kenya and sister (on the phone) updated      ATTENDING ATTESTATION:  I was physically present for the key portions of the evaluation and management (E/M) service provided.  I agree with the above history, physical and plan, which I have reviewed and edited where appropriate.    Patient at high risk for neurological deterioration or death due to:  ICU delirium, aspiration PNA, DVT / PE.  Critical care time:  I have personally provided 45 minutes of critical care time, excluding time spent on separate procedures.      Plan discussed with RN, house staff.

## 2021-08-13 NOTE — SWALLOW BEDSIDE ASSESSMENT ADULT - SWALLOW EVAL: PROGNOSIS
Unclear at this time. PO intake is premature and ability to return to an oral diet will depend on overall medical prognosis.

## 2021-08-13 NOTE — PROVIDER CONTACT NOTE (OTHER) - BACKGROUND
s/p cerebral angiogram for R vertebral artery takedown for R vertebral dissecting aneurysm from 8/7.  During day shift on 8/12, noted areas of localized mottling cool to touch. Pt on vascular check Q1

## 2021-08-13 NOTE — PROVIDER CONTACT NOTE (OTHER) - ACTION/TREATMENT ORDERED:
No immediate action ordered, DO Marco Antonio stated strong suspicion for central fever d/t location of brain bleed.

## 2021-08-13 NOTE — PROVIDER CONTACT NOTE (OTHER) - SITUATION
Notified NSICU intensivist re: patient febrile to ~102.0F sustained for entire afternoon until present, appears delirious

## 2021-08-13 NOTE — PROGRESS NOTE ADULT - SUBJECTIVE AND OBJECTIVE BOX
==============================================   NEUROCRITICAL CARE ATTENDING NOTE  ==============================================    LUDMILA PRIETO   MRN-[id number]  Summary:  45y/F with h/o recent gastric sleeve (08/04 [facility], Dr. Crisostomo ), fibromyalgia, thyroid dysfunction, PTSD, depression, anxiety.  Presented with seizures at home - brought to [place], with 1 more episode of seizure en route.  Intubated, CT showed SAH with IV extension, casted IV, hydrocephalus, given mannitol levetiracetam 6mg ativan, transferred to [facility].    HOSPITAL COURSE:  8/7: Tx from [place] for SAH. Intubated. R frontal EVD placed, central line and a line placed. 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   8/8: POD1 s/p angio with R vert takedown, extubated, desatting on aerosol mask, required extensive suctioning, 3% nebs, chest PT, started on low dose precedex drip for restlessness/agitation, ABG stable. NGT placed. TF started with goal 20 pending nutrition recs. 3% stopped for Na 150. Ceribell EEG negative and d/c'ed.   8/9 POD2 R vert takedown, Overnight, new Right pronator drift and right gaze preference that can't cross midline, Repeat CTH demonstrated stable vents, CTA head and neck unremarkable for spasm, hypoattenuation of right PCA/PCA territories. Requiring precedex drip and fentanyl pushes PRN for agitation   8/10: POD3 R vert takedown, EVD open at [id number]. ASHA overnight, neuro stable.  8/11:  EVD dropped to 0  8/12: POD 0 s/p IVCF, some  livedo reticularis likely idiopathic     PHYSICAL EXAMINATION  T(C): 37.7 (08-13 @ 05:08), Max: 38.4 (08-12 @ 21:00)  HR: 102 (08-13 @ 07:00) (81 - 122)  BP: 119/65 (08-13 @ 07:00) (119/65 - 177/97)  BP(mean): 84 (08-13 @ 07:00)  ABP:  (128/76 - 218/131)  ABP(mean):  (85 - 163)  RR: 20 (08-13 @ 07:00) (16 - 34)  SpO2: 99% (08-13 @ 07:00) (94% - 100%)  CVP(mm Hg): --      08-12-21 @ 07:01  -  08-13-21 @ 07:00  --------------------------------------------------------  IN:    Enteral Tube Flush: 720 mL    IV PiggyBack: 1500 mL    IV PiggyBack: 100 mL    sodium chloride 0.9%: 650 mL    sodium chloride 0.9%: 350 mL    Vital1.0: 444 mL  Total IN: 3764 mL    OUT:    Dexmedetomidine: 0 mL    External Ventricular Device (mL): 337 mL    Voided (mL): 1950 mL  Total OUT: 2287 mL    Total NET: 1477 mL      08-13-21 @ 07:01  -  08-13-21 @ 07:25  --------------------------------------------------------  IN:    sodium chloride 0.9%: 35 mL    Vital1.0: 46 mL  Total IN: 81 mL    Total OUT: 0 mL    Total NET: 81 mL              MRSA/MSSA PCR (08.12.21 @ 17:41)   MRSA PCR Result.: Negative:  OUT:    LABS:  CAPILLARY BLOOD GLUCOSE    08-13-21 @ 05:12   -  0.11<H>  08-12-21 @ 05:25   -  0.11<H>  08-11-21 @ 06:17   -  0.08                              11.6   13.26 )-----------( 270      ( 13 Aug 2021 05:12 )             38.0     08-13    149<H>  |  115<H>  |  21  ----------------------------<  154<H>  3.3<L>   |  23  |  0.60    Ca    8.9      13 Aug 2021 05:12  Phos  2.6     08-13  Mg     2.4     08-13    TPro  6.3  /  Alb  3.7  /  TBili  0.3  /  DBili  0.2  /  AST  11  /  ALT  16  /  AlkPhos  57  08-13 08-11-21 @ 10:09  pH7.55  pCO29  pO229  BAP330  FiO2--  Sat98.7      MEDICATIONS:  MEDICATIONS  (STANDING):  aspirin  chewable 81 milliGRAM(s) Oral daily  chlorhexidine 2% Cloths 1 Application(s) Topical <User Schedule>  enoxaparin Injectable 40 milliGRAM(s) SubCutaneous every 12 hours  fentaNYL    Injectable 50 MICROGram(s) IV Push once  fentaNYL    Injectable 50 MICROGram(s) IV Push once  niMODipine 60 milliGRAM(s) Oral every 4 hours  piperacillin/tazobactam IVPB.. 3.375 Gram(s) IV Intermittent every 6 hours  senna 2 Tablet(s) Oral at bedtime  sodium chloride 2 Gram(s) Oral every 8 hours  sodium chloride 0.9%. 1000 milliLiter(s) (35 mL/Hr) IV Continuous <Continuous>  valproate sodium IVPB 500 milliGRAM(s) IV Intermittent every 6 hours    MEDICATIONS  (PRN):  acetaminophen    Suspension .. 650 milliGRAM(s) Oral every 6 hours PRN Temp greater or equal to 38C (100.4F), Mild Pain (1 - 3)  fentaNYL    Injectable 25 MICROGram(s) IV Push every 2 hours PRN agitation  hydrALAZINE Injectable 10 milliGRAM(s) IV Push every 3 hours PRN SBP>180 (1st line)  labetalol Injectable 10 milliGRAM(s) IV Push every 2 hours PRN SBP > 180 (2nd line)  ondansetron Injectable 4 milliGRAM(s) IV Push every 6 hours PRN Nausea and/or Vomiting  traMADol 25 milliGRAM(s) Oral every 6 hours PRN Moderate Pain (4 - 6)      Past Medical History:   Allergies:  Allergy Status Unknown  Home meds:     NEUROLOGIC EXAMINATION:  Patient is rousable, restless at times, oriented x2, dysarthric speech, CON, no vertical/rotatory nystagmus, R gaze preference, R slight LMN asymmetry, R cerebellar drift, follows commands 4+/5 throughout  GENERAL: NC   EENT:  anicteric  CARDIOVASCULAR: (+) S1 S2, normal rate and regular rhythm  PULMONARY: clear to auscultation bilaterally  ABDOMEN: soft, nontender with normoactive bowel sounds  EXTREMITIES: no edema  SKIN: no rash        Bacteriology:  CSF studies:  EEG:  Neuroimaging:  CT/CTA (08.09) - Status post aneurysm clip distal right vertebral artery due to dissection/aneurysm since prior CTA 08/07/2021. There is no evidence intracranial vascular occlusion, vasospasm or new aneurysm.  Subtle hypoattenuation within the right greater than left occipital lobes and right cerebellum with sulcal effacement and mild loss of gray-white matter differentiation may be suggestive of cerebral edema or developing infarction.  Other imaging:  Duplex - R IM calf DVT

## 2021-08-13 NOTE — PROGRESS NOTE ADULT - SUBJECTIVE AND OBJECTIVE BOX
HPI:  44y/o F with h/o recent gastric sleeve (21 [facility], Dr. Crisostomo ), fibromyalgia, thyroid dysfunction, PTSD, depression, anxiety.  Presented with seizures at home - brought to [place], with 1 more episode of seizure en route.  Intubated, CT showed SAH with IV extension, casted IV, hydrocephalus, given mannitol, levetiracetam 1g,  6mg ativan. Started on Cardene drip for BP goal < 140 and transferred to [facility] NICU for further management.     HH5 MF4   NIHSS 26 on arrival  BD 1 =  (07 Aug 2021 08:08)    Hospital Course:  : Tx from [place] for SAH. Intubated. R frontal EVD placed, central line and a line placed. 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   : POD1 s/p angio with R vert takedown, extubated, desatting on aerosol mask, required extensive suctioning, 3% nebs, chest PT, started on low dose precedex drip for restlessness/agitation, ABG stable. NGT placed. TF started with goal 20 pending nutrition recs. 3% stopped for Na 150. Ceribell EEG negative and d/c'ed.    Overnight, new Right pronator drift and right gaze preference that can't cross midline, Repeat CTH demonstrated stable vents, CTA head and neck unremarkable for spasm, hypoattenuation of right cerebellum edema vs. infarct.  8/10: POD3 R vert takedown, EVD open at [id number]. ASHA overnight, neuro stable. CTH with increased hydro, CTA head/neck with mild basilar spasm, but concern for PE. 1/2 am D50 for hypoglycemia. 1L bolus then 100 cc/hr, PM rounds for net negative fluid balance and mild vasospasm on CTA.   : POD4 R vert takedown. EVD at 5cm H2O, ASHA overnight. neuro stable.   Febrile 104. depakote increased to q6. NCHCT stable. EVD lowered to 0. Lasix 20 given for dieuresis, UOP over 2 liters. Sedation given for a-line placement, BP drop needing levo.  : POD5 R vert takedown. EVD at 0cm H2O. ASHA overnight, neuro stable. Precedex being weaned off. Pending IVCF with vascular today.  : POD6 R vert takedown. EVD open at 0cmH2O. ASHA overnight. Neuro exam stable. Mottled skin on the left lower extremity improving.     Vital Signs Last 24 Hrs  T(C): 38 (12 Aug 2021 22:00), Max: 38.4 (12 Aug 2021 21:00)  T(F): 100.4 (12 Aug 2021 22:00), Max: 101.1 (12 Aug 2021 21:00)  HR: 110 (12 Aug 2021 22:00) (69 - 122)  BP: 140/78 (12 Aug 2021 22:00) (121/67 - 189/97)  BP(mean): 102 (12 Aug 2021 22:00) (85 - 136)  RR: 21 (12 Aug 2021 22:00) (17 - 34)  SpO2: 100% (12 Aug 2021 22:00) (94% - 100%)    I&O's Summary    11 Aug 2021 07:01  -  12 Aug 2021 07:00  --------------------------------------------------------  IN: 3658.9 mL / OUT: 5774 mL / NET: -2115.1 mL    12 Aug 2021 07:01  -  12 Aug 2021 23:12  --------------------------------------------------------  IN: 2290 mL / OUT: 1980 mL / NET: 310 mL      PHYSICAL EXAM:  Gen: Restless, AAOx3  HEENT: Right gaze preference able to cross midline, PERRL. Right frontal EVD C/D/I. +NGT  Neck: Right CVC C/D/I.   Lungs: CTA b/l  Heart: S1, S2. NSR.  Abd: Soft, obese, NT/ND. +BS  Exts: Pulses 2+ throughout, bruising to b/l radial artery access sites. Right groin site C/D/I with dressing in place. Left brachial arterial line  Neuro: Mild right facial asymmetry and right gaze preferance, CNs otherwise intact. CALLE x4 with good strength throughout but inconsistently following commands. Dysarthric speech. Sensation to light touch grossly intact.    TUBES/LINES:  [x] CVC  [x] A-line  [] Lumbar Drain  [x] Ventriculostomy  [] Richey  [] Other    DIET:  [] NPO  [] Mechanical  [x] Tube feeds    LABS:                        11.8   16.01 )-----------( 281      ( 12 Aug 2021 20:00 )             38.2     08-12    146<H>  |  111<H>  |  18  ----------------------------<  136<H>  3.4<L>   |  21<L>  |  0.62    Ca    9.3      12 Aug 2021 17:28  Phos  2.9     08-12  Mg     2.3     08-12    TPro  6.8  /  Alb  4.0  /  TBili  0.5  /  DBili  x   /  AST  13  /  ALT  18  /  AlkPhos  64  08-12    PT/INR - ( 12 Aug 2021 17:28 )   PT: 16.4 sec;   INR: 1.39          PTT - ( 12 Aug 2021 17:28 )  PTT:27.2 sec  Urinalysis Basic - ( 12 Aug 2021 22:16 )    Color: Yellow / Appearance: SL Cloudy / S.020 / pH: x  Gluc: x / Ketone: 40 mg/dL  / Bili: Negative / Urobili: 0.2 E.U./dL   Blood: x / Protein: NEGATIVE mg/dL / Nitrite: NEGATIVE   Leuk Esterase: NEGATIVE / RBC: > 10 /HPF / WBC < 5 /HPF   Sq Epi: x / Non Sq Epi: 5-10 /HPF / Bacteria: Present /HPF      CARDIAC MARKERS ( 12 Aug 2021 20:00 )  x     / x     / 61 U/L / x     / x          CAPILLARY BLOOD GLUCOSE      POCT Blood Glucose.: 110 mg/dL (12 Aug 2021 06:13)  POCT Blood Glucose.: 96 mg/dL (11 Aug 2021 23:20)      Drug Levels: [] N/A  Vancomycin Level, Trough: 5.7 ug/mL ( @ 05:25)    CSF Analysis: [] N/A      Allergies    Allergy Status Unknown    Intolerances      MEDICATIONS:  Antibiotics:  piperacillin/tazobactam IVPB.. 3.375 Gram(s) IV Intermittent every 6 hours    Neuro:  acetaminophen    Suspension .. 650 milliGRAM(s) Oral every 6 hours PRN  fentaNYL    Injectable 50 MICROGram(s) IV Push once  fentaNYL    Injectable 25 MICROGram(s) IV Push every 2 hours PRN  fentaNYL    Injectable 50 MICROGram(s) IV Push once  ondansetron Injectable 4 milliGRAM(s) IV Push every 6 hours PRN  traMADol 25 milliGRAM(s) Oral every 6 hours PRN  valproate sodium IVPB 500 milliGRAM(s) IV Intermittent every 6 hours    Anticoagulation:  aspirin  chewable 81 milliGRAM(s) Oral daily  enoxaparin Injectable 40 milliGRAM(s) SubCutaneous every 12 hours    OTHER:  chlorhexidine 2% Cloths 1 Application(s) Topical <User Schedule>  hydrALAZINE Injectable 10 milliGRAM(s) IV Push every 3 hours PRN  labetalol Injectable 10 milliGRAM(s) IV Push every 2 hours PRN  niMODipine 60 milliGRAM(s) Oral every 4 hours  senna 2 Tablet(s) Oral at bedtime    IVF:  potassium chloride   Powder 40 milliEquivalent(s) Enteral Tube every 4 hours  sodium chloride 2 Gram(s) Oral every 8 hours  sodium chloride 0.9%. 1000 milliLiter(s) IV Continuous <Continuous>    CULTURES:  Culture Results:   No growth at 2 days. (08-10 @ 12:08)  Culture Results:   No growth at 2 days. (08-10 @ 12:08)    RADIOLOGY & ADDITIONAL TESTS:      ASSESSMENT:  44y/o F with h/o recent gastric sleeve (21 [facility], Dr. Crisostomo ), fibromyalgia, thyroid dysfunction, PTSD, depression, anxiety.  Presented with seizures at home - brought to [place], with 1 more episode of seizure en route.  Intubated, CT showed SAH with IV extension, casted IV, hydrocephalus, given mannitol, levetiracetam 1g,  6mg ativan. Started on Cardene drip for BP goal < 140 and transferred to [facility] NICU for further management. HH5 MF4, BD 1 = . Now s/p cerebral angiogram for R vertebral artery takedown for R vertebral dissecting aneurysm (), and R frontal EVD placement (), now s/p IVC filter placement ()    HEAD BLEED    No pertinent family history in first degree relatives    Handoff    Cerebral aneurysm    Cerebral aneurysm    DVT, lower extremity    Pulmonary embolism    Multiple subsegmental pulmonary emboli without acute cor pulmonale    DVT, lower extremity    Angiogram, carotid and cerebral arteries    IVC filter placement    [id number]_[id number]      NEURO:   - neurochecks q1h  - EVD open at 0 , allow up to 30cc/hr  - Precedex dc'd, prn fentanyl pushes   - Niimodipine 60 mg po q4h   - Dex 4q12 dc'd  - Depakote 500q8 ( No keppra due to agitation), f/u VPA level in AM  - Ceribell EEG negative , d/c'ed   - ASA 81 mg started  s/p angio  - CTH  w/ hypoattenuation of R occipital/cerbellum edema vs. evolving infarct vs. artifact; CTH 8/10 with no cerebellar stroke - presume artifact  - CTH 8/10 with worsening hydro, mild basilar spasm    PULM:    - Secretions q1 hour  - Hypertonic nebs/duonebs   - CT PE : Probable pulmonary embolism in the left distal main and proximal lower lobar pulmonary artery, which was also seen on prior same day neck CT. No saddle embolism. No evidence of right heart strain.    CARDIO:    - SBP goal 120-180mmHg  - TTE WNL, pending repeat per pulm reccs  - Troponin stable   - EKG normal     ENDO:    - FS  - lipids normal    GI:    - TF via NGT, needs thin liquid diet x3 weeks as per bariatric when tolerated, f/u swallow eval in AM  - PPI per bariatric surgeon    RENAL:   - Maintain euvolemia   - Na goal 140-145, on salt tabs  - premafit  - off IVF  - s/p lasix 20mg     HEM/ONC:   - ASA 81  - VTE Prophylaxis: SCDs, SQL  - LE Duplex : Acute completely occlusive deep venous thrombosis of the right intramuscular calf vein, pending IVCF with vascular      ID:   -MRSA neg   -febrile, trend leukocytosis   -empiric zosyn for fever  -CSF sent 8/10    Dispol ICU status: family updated  PT/OT: on hold due to critical status  Full code    D/w Dr. Lomax, Dr. Yan

## 2021-08-13 NOTE — PROGRESS NOTE ADULT - SUBJECTIVE AND OBJECTIVE BOX
SUBJECTIVE: Patient seen and examined bedside.    aspirin  chewable 81 milliGRAM(s) Oral daily  enoxaparin Injectable 40 milliGRAM(s) SubCutaneous every 12 hours  hydrALAZINE Injectable 10 milliGRAM(s) IV Push every 3 hours PRN  labetalol Injectable 10 milliGRAM(s) IV Push every 2 hours PRN  niMODipine 60 milliGRAM(s) Oral every 4 hours  piperacillin/tazobactam IVPB.. 3.375 Gram(s) IV Intermittent every 6 hours      Vital Signs Last 24 Hrs  T(C): 38.4 (13 Aug 2021 09:00), Max: 38.4 (12 Aug 2021 21:00)  T(F): 101.2 (13 Aug 2021 09:00), Max: 101.2 (13 Aug 2021 09:00)  HR: 112 (13 Aug 2021 09:00) (83 - 122)  BP: 172/70 (13 Aug 2021 09:00) (119/65 - 176/97)  BP(mean): 101 (13 Aug 2021 09:00) (84 - 131)  RR: 21 (13 Aug 2021 09:00) (16 - 34)  SpO2: 98% (13 Aug 2021 09:00) (94% - 100%)  I&O's Detail    12 Aug 2021 07:01  -  13 Aug 2021 07:00  --------------------------------------------------------  IN:    Enteral Tube Flush: 720 mL    IV PiggyBack: 1500 mL    IV PiggyBack: 100 mL    sodium chloride 0.9%: 350 mL    sodium chloride 0.9%: 650 mL    Vital1.0: 444 mL  Total IN: 3764 mL    OUT:    Dexmedetomidine: 0 mL    External Ventricular Device (mL): 337 mL    Voided (mL): 1950 mL  Total OUT: 2287 mL    Total NET: 1477 mL      13 Aug 2021 07:01  -  13 Aug 2021 09:21  --------------------------------------------------------  IN:    Enteral Tube Flush: 60 mL    sodium chloride 0.9%: 70 mL    Vital1.0: 92 mL  Total IN: 222 mL    OUT:    External Ventricular Device (mL): 18 mL  Total OUT: 18 mL    Total NET: 204 mL          General: agitated  C/V: NSR  Pulm: Nonlabored breathing, no respiratory distress  Abd: soft.   Right groin: soft, no swelling,   Extrem: WWP, right calf tenderness/ pulses +2 b/l        LABS:                        11.6   13.26 )-----------( 270      ( 13 Aug 2021 05:12 )             38.0     08-13    149<H>  |  115<H>  |  21  ----------------------------<  154<H>  3.3<L>   |  23  |  0.60    Ca    8.9      13 Aug 2021 05:12  Phos  2.6     08-13  Mg     2.4     08-13    TPro  6.3  /  Alb  3.7  /  TBili  0.3  /  DBili  0.2  /  AST  11  /  ALT  16  /  AlkPhos  57  08-13    PT/INR - ( 12 Aug 2021 17:28 )   PT: 16.4 sec;   INR: 1.39          PTT - ( 12 Aug 2021 17:28 )  PTT:27.2 sec  Urinalysis Basic - ( 12 Aug 2021 22:16 )    Color: Yellow / Appearance: SL Cloudy / S.020 / pH: x  Gluc: x / Ketone: 40 mg/dL  / Bili: Negative / Urobili: 0.2 E.U./dL   Blood: x / Protein: NEGATIVE mg/dL / Nitrite: NEGATIVE   Leuk Esterase: NEGATIVE / RBC: > 10 /HPF / WBC < 5 /HPF   Sq Epi: x / Non Sq Epi: 5-10 /HPF / Bacteria: Present /HPF        RADIOLOGY & ADDITIONAL STUDIES:

## 2021-08-13 NOTE — SWALLOW BEDSIDE ASSESSMENT ADULT - COMMENTS
Received awake but sleepy/sedated from precedex. Responds to simple questions, intermittently requires repetition; speech is mod-sev dysarthric, only intelligible in single words/short phrases in known contexts. Wants & needs should be anticipated.

## 2021-08-14 NOTE — PROGRESS NOTE ADULT - SUBJECTIVE AND OBJECTIVE BOX
==============================================   NEUROCRITICAL CARE ATTENDING NOTE  ==============================================    LUDMILA PRIETO   MRN-[id number]  Summary:  45y/F with h/o recent gastric sleeve (08/04 [facility], Dr. Crisostomo ), fibromyalgia, thyroid dysfunction, PTSD, depression, anxiety.  Presented with seizures at home - brought to [place], with 1 more episode of seizure en route.  Intubated, CT showed SAH with IV extension, casted IV, hydrocephalus, given mannitol levetiracetam 6mg ativan, transferred to [facility].    HOSPITAL COURSE:  8/7: Tx from [place] for SAH. Intubated. R frontal EVD placed, central line and a line placed. 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   8/8: POD1 s/p angio with R vert takedown, extubated, desatting on aerosol mask, required extensive suctioning, 3% nebs, chest PT, started on low dose precedex drip for restlessness/agitation, ABG stable. NGT placed. TF started with goal 20 pending nutrition recs. 3% stopped for Na 150. Ceribell EEG negative and d/c'ed.   8/9 POD2 R vert takedown, Overnight, new Right pronator drift and right gaze preference that can't cross midline, Repeat CTH demonstrated stable vents, CTA head and neck unremarkable for spasm, hypoattenuation of right PCA/PCA territories. Requiring precedex drip and fentanyl pushes PRN for agitation   8/10: POD3 R vert takedown, EVD open at [id number]. ASHA overnight, neuro stable.  8/11:  EVD dropped to 0  8/12: POD 0 s/p IVCF, some  livedo reticularis likely idiopathic       PHYSICAL EXAMINATION  T(C): 37.1 (08-14 @ 10:00), Max: 38.6 (08-13 @ 14:00)  HR: 103 (08-14 @ 12:00) (94 - 117)  BP: 158/105 (08-14 @ 12:00) (129/63 - 179/89)  BP(mean): 127 (08-14 @ 12:00)  ABP:  (139/73 - 189/93)  ABP(mean):  (94 - 128)  RR: 17 (08-14 @ 12:00) (14 - 32)  SpO2: 100% (08-14 @ 12:00) (95% - 100%)  CVP(mm Hg): --      08-13-21 @ 07:01  -  08-14-21 @ 07:00  --------------------------------------------------------  IN:    Enteral Tube Flush: 110 mL    IV PiggyBack: 332 mL    IV PiggyBack: 100 mL    sodium chloride 0.9%: 175 mL    sodium chloride 0.9%: 900 mL    Sodium Chloride 0.9% Bolus: 1000 mL    Vital1.0: 1104 mL  Total IN: 3721 mL    OUT:    External Ventricular Device (mL): 345 mL    Voided (mL): 3200 mL  Total OUT: 3545 mL    Total NET: 176 mL      08-14-21 @ 07:01  -  08-14-21 @ 13:04  --------------------------------------------------------  IN:    Enteral Tube Flush: 200 mL    IV PiggyBack: 250 mL    sodium chloride 0.9%: 300 mL    Vital1.0: 276 mL  Total IN: 1026 mL    OUT:    External Ventricular Device (mL): 82 mL    Intermittent Catheterization - Urethral (mL): 300 mL    Voided (mL): 650 mL  Total OUT: 1032 mL    Total NET: -6 mL          08-14-21 @ 07:01  -  08-14-21 @ 13:04  --------------------------------------------------------  IN: 0 mL/kg/hr / OUT: 0.48 mL/kg/hr / NET: -0.48 mL/kg/hr            LABS:  CAPILLARY BLOOD GLUCOSE                              12.0   14.26 )-----------( 269      ( 14 Aug 2021 05:17 )             40.3     08-14    150<H>  |  116<H>  |  20  ----------------------------<  147<H>  3.3<L>   |  23  |  0.45<L>    Ca    9.0      14 Aug 2021 05:17  Phos  2.7     08-14  Mg     2.5     08-14    TPro  6.3  /  Alb  3.7  /  TBili  0.3  /  DBili  0.2  /  AST  11  /  ALT  16  /  AlkPhos  57  08-13          MEDICATIONS:  MEDICATIONS  (STANDING):  acetaminophen 300 mG/butalbital 50 mG/ caffeine 40 mG 1 Capsule(s) Oral every 6 hours  aspirin  chewable 81 milliGRAM(s) Oral daily  bromocriptine Capsule 15 milliGRAM(s) Oral every 8 hours  chlorhexidine 2% Cloths 1 Application(s) Topical <User Schedule>  enoxaparin Injectable 40 milliGRAM(s) SubCutaneous every 12 hours  midazolam Injectable 2 milliGRAM(s) IV Push once  niMODipine 60 milliGRAM(s) Oral every 4 hours  piperacillin/tazobactam IVPB.. 3.375 Gram(s) IV Intermittent every 6 hours  senna 2 Tablet(s) Oral at bedtime  sodium chloride 0.9%. 1000 milliLiter(s) (50 mL/Hr) IV Continuous <Continuous>  valproate sodium IVPB 500 milliGRAM(s) IV Intermittent every 8 hours    MEDICATIONS  (PRN):  acetaminophen    Suspension .. 650 milliGRAM(s) Oral every 6 hours PRN Temp greater or equal to 38C (100.4F), Mild Pain (1 - 3)  hydrALAZINE Injectable 10 milliGRAM(s) IV Push every 3 hours PRN SBP>180 (1st line)  labetalol Injectable 10 milliGRAM(s) IV Push every 2 hours PRN SBP > 180 (2nd line)  ondansetron Injectable 4 milliGRAM(s) IV Push every 6 hours PRN Nausea and/or Vomiting  traMADol 25 milliGRAM(s) Oral every 6 hours PRN Moderate Pain (4 - 6)      Past Medical History:   Allergies:  Allergy Status Unknown  Home meds:     NEUROLOGIC EXAMINATION:  Patient is rousable, restless at times, oriented x2, dysarthric speech, CON, no vertical/rotatory nystagmus, R gaze preference, R slight LMN asymmetry, R cerebellar drift, follows commands 4+/5 throughout  GENERAL: NC   EENT:  anicteric  CARDIOVASCULAR: (+) S1 S2, normal rate and regular rhythm  PULMONARY: clear to auscultation bilaterally  ABDOMEN: soft, nontender with normoactive bowel sounds  EXTREMITIES: no edema  SKIN: no rash        Bacteriology:  CSF studies:  EEG:  Neuroimaging:  CT/CTA (08.09) - Status post aneurysm clip distal right vertebral artery due to dissection/aneurysm since prior CTA 08/07/2021. There is no evidence intracranial vascular occlusion, vasospasm or new aneurysm.  Subtle hypoattenuation within the right greater than left occipital lobes and right cerebellum with sulcal effacement and mild loss of gray-white matter differentiation may be suggestive of cerebral edema or developing infarction.  Other imaging:  Duplex - R IM calf DVT

## 2021-08-14 NOTE — PROGRESS NOTE ADULT - ASSESSMENT
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 (with no PICA visualization prior to sacrifice) - CTA no VSP; CT PCA/PICA territory hypodensities  Hydrocephalus:  EVD insertion, set at 10cm H20 open to drain  Seizure prophylaxis:  lower  q 6 h (in case she is sedated)     REHAB:  physical therapy evaluation and management  - defer  EARLY MOB:  HOB elevated    PULM:  extubated, aspiration precautions     CARDIO:  SBP goal 120-180mm Hg, baseline echo pending    ENDO:  Blood sugar goals 140-180 mg/dL, cont insulin sliding scale    GI:  d/c PPI    DIET: start tube feeds (s&s evln)    RENAL: maintain euvolemia, accurate Is and Os      HEM/ONC: no coagulopathy (INR= 1.2), no ASA / Plavix use    VTE Prophylaxis: SCDs, SQL (Dopplers R calf DVT)    ID: UA, restart zosyn     Social: Daughter Maggie and Son Kenya and sister (on the phone) updated      ATTENDING ATTESTATION:  I was physically present for the key portions of the evaluation and management (E/M) service provided.  I agree with the above history, physical and plan, which I have reviewed and edited where appropriate.    Patient at high risk for neurological deterioration or death due to:  ICU delirium, aspiration PNA, DVT / PE.  Critical care time:  I have personally provided 45 minutes of critical care time, excluding time spent on separate procedures.      Plan discussed with RN, house staff.

## 2021-08-14 NOTE — CONSULT NOTE ADULT - SUBJECTIVE AND OBJECTIVE BOX
HPI: 45y Female h/o recent gastric sleeve (08/04/21 [facility], Dr. Crisostomo ), fibromyalgia, thyroid dysfunction, PTSD, depression, anxiety.  Presented with seizures at home - brought to [place], with 1 more episode of seizure en route.  Intubated, CT showed SAH with IV extension, casted IV, hydrocephalus. Transferred to 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 Since admission, patient noted to have soft, hoarse voice. Seen by SLP yesterday, bedside swallow showed gross aspiration. ENT consulted to evaluate for hoarse voice. Of note, patient is AAOx1-2 and overall has relatively poor mental status, physical exam with R gaze preference, R slight LMN asymmetry, and R cerebellar drift.       Allergies    Allergy Status Unknown    Intolerances        PAST MEDICAL & SURGICAL HISTORY:      MEDICATIONS:  Antiinfectives:   piperacillin/tazobactam IVPB.. 3.375 Gram(s) IV Intermittent every 6 hours    Hematologic/Anticoagulation:  aspirin  chewable 81 milliGRAM(s) Oral daily  enoxaparin Injectable 40 milliGRAM(s) SubCutaneous every 12 hours    Pain medications/Neuro:  acetaminophen    Suspension .. 650 milliGRAM(s) Oral every 6 hours PRN  acetaminophen 300 mG/butalbital 50 mG/ caffeine 40 mG 1 Capsule(s) Oral every 6 hours  bromocriptine Capsule 15 milliGRAM(s) Oral every 8 hours  ondansetron Injectable 4 milliGRAM(s) IV Push every 6 hours PRN  traMADol 25 milliGRAM(s) Oral every 6 hours PRN  valproate sodium IVPB 500 milliGRAM(s) IV Intermittent every 8 hours    IV fluids:  sodium chloride 0.9%. 1000 milliLiter(s) IV Continuous <Continuous>    Endocrine Medications:     All other standing medications:   chlorhexidine 2% Cloths 1 Application(s) Topical <User Schedule>  niMODipine 60 milliGRAM(s) Oral every 4 hours  senna 2 Tablet(s) Oral at bedtime    All other PRN medications:  hydrALAZINE Injectable 10 milliGRAM(s) IV Push every 3 hours PRN  labetalol Injectable 10 milliGRAM(s) IV Push every 2 hours PRN      SOCIAL HISTORY:  Tobacco History:  ETOH Use:   Drug Use:     FAMILY HISTORY:  No pertinent family history in first degree relatives        REVIEW OF SYSTEMS:     LABS:  CBC-                        12.0   14.26 )-----------( 269      ( 14 Aug 2021 05:17 )             40.3         08-14    150<H>  |  116<H>  |  20  ----------------------------<  147<H>  3.3<L>   |  23  |  0.45<L>    Ca    9.0      14 Aug 2021 05:17  Phos  2.7     08-14  Mg     2.5     08-14    TPro  6.3  /  Alb  3.7  /  TBili  0.3  /  DBili  0.2  /  AST  11  /  ALT  16  /  AlkPhos  57  08-13    Coagulation Studies-  PT/INR - ( 12 Aug 2021 17:28 )   PT: 16.4 sec;   INR: 1.39          PTT - ( 12 Aug 2021 17:28 )  PTT:27.2 sec  Endocrine Panel-  --  --  9.0 mg/dL  --  --  8.9 mg/dL  --  --  9.3 mg/dL      Vital Signs Last 24 Hrs  T(C): 37.1 (14 Aug 2021 10:00), Max: 38.6 (13 Aug 2021 14:00)  T(F): 98.8 (14 Aug 2021 10:00), Max: 101.5 (13 Aug 2021 14:00)  HR: 95 (14 Aug 2021 10:00) (94 - 117)  BP: 170/76 (14 Aug 2021 10:00) (129/63 - 179/89)  BP(mean): 109 (14 Aug 2021 10:00) (88 - 125)  RR: 18 (14 Aug 2021 10:00) (14 - 32)  SpO2: 98% (14 Aug 2021 10:00) (95% - 100%)  PHYSICAL EXAM:  ENT EXAM-   Constitutional: drowsy, difficult to arouse, able to follow few commands. voice soft  Head:  normocephalic, atraumatic.   OC/OP:  Floor of mouth, buccal mucosa, lips, hard palate, soft palate, uvula, posterior pharyngeal wall normal.  dry moist. equal palatal rise  Neck:  Trachea midline.    Lymph:  No cervical adenopathy.    MULTISYSTEM EXAM-  Neuro/Psych:  A&O x 1.    Cranial nerves: difficult to assess 2/2 patient participation but CN VII, IX, XII appear intact  Eyes:  EOMI, no nystagmus.  Pulm:  No dyspnea, non-labored breathing  Skin:  No rash or lesions on exposed skin of head/neck    LARYNGOSCOPY EXAM:     Verbal consent was obtained from patient prior to procedure.    Indication: dysphonia    Anesthesia: Afrin spray was applied to the nasal cavities.    Flexible laryngoscopy was performed and revealed the following:    Nasopharynx had no mass or exudate.    thick secretions noted throughout airway    Base of tongue was symmetric and not enlarged.    Vallecula was clear of lesion    Epiglottis, both aryepiglottic folds and both false vocal folds were normal    Arytenoids both without edema and erythema     L TVC fully mobile and without lesions, R TVC paretic and fixed in midline position. Difficult to assess 2/2 thick secretions, but appears to have adequate glottic closure, patent airway with L TVC abduction    Post cricoid filled with secretions, some edema    Interarytenoid edema was absent    The patient tolerated the procedure well.      RADIOLOGY & ADDITIONAL STUDIES:      A/P:  45y Female h/o recent gastric sleeve (08/04/21 [facility], Dr. Crisostomo ), fibromyalgia, thyroid dysfunction, PTSD, depression, anxiety.  Transferred 8/8 form [facility], found to have SAH and R vertebral arter dissection s/p embo. Since admission, patient noted to have soft, hoarse voice. Seen by SLP yesterday, bedside swallow showed gross aspiration. FOE shows paretic R TVC with glottic closure and patent airway with L TVC abduction, exam limited 2/2 thick secretions. Patient has poor mental status and has difficulty following commands.  -no ENT intervention at this time  -sx likely 2/2 to poor participation and effort which may improve as MS improves  -MBS per SLP  -continue NGT feeds  -oral care with green sponges  -humidified facetent to loosen secretions             Thank you for the consult, please page ENT at [phone] with any questions/concerns.  ----------------------------------------------------    Rosa Maria Perez MD  Department of Otolaryngology - Head and Neck Surgery  [facility]

## 2021-08-14 NOTE — PROGRESS NOTE ADULT - SUBJECTIVE AND OBJECTIVE BOX
HPI:  44y/o F with h/o recent gastric sleeve (21 [facility], Dr. Crisostomo ), fibromyalgia, thyroid dysfunction, PTSD, depression, anxiety.  Presented with seizures at home - brought to [place], with 1 more episode of seizure en route.  Intubated, CT showed SAH with IV extension, casted IV, hydrocephalus, given mannitol, levetiracetam 1g,  6mg ativan. Started on Cardene drip for BP goal < 140 and transferred to [facility] NICU for further management.     HH5 MF4   NIHSS 26 on arrival  BD 1 =  (07 Aug 2021 08:08)    OVERNIGHT EVENTS: 1L bolus for euvolemia. neuro stable    Hospital Course:   : Tx from [place] for SAH. Intubated. R frontal EVD placed, central line and a line placed. 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   : POD1 s/p angio with R vert takedown, extubated, desatting on aerosol mask, required extensive suctioning, 3% nebs, chest PT, started on low dose precedex drip for restlessness/agitation, ABG stable. NGT placed. TF started with goal 20 pending nutrition recs. 3% stopped for Na 150. Ceribell EEG negative and d/c'ed.    Overnight, new Right pronator drift and right gaze preference that can't cross midline, Repeat CTH demonstrated stable vents, CTA head and neck unremarkable for spasm, hypoattenuation of right cerebellum edema vs. infarct.  8/10: POD3 R vert takedown, EVD open at [id number]. ASHA overnight, neuro stable. CTH with increased hydro, CTA head/neck with mild basilar spasm, but concern for PE. 1/2 am D50 for hypoglycemia. 1L bolus then 100 cc/hr, PM rounds for net negative fluid balance and mild vasospasm on CTA.   : POD4 R vert takedown. EVD at 5cm H2O, ASHA overnight. neuro stable.   Febrile 104. depakote increased to q6. NCHCT stable. EVD lowered to 0. Lasix 20 given for dieuresis, UOP over 2 liters. Sedation given for a-line placement, BP drop needing levo.  8/12: POD5 R vert takedown. EVD at 0cm H2O. ASHA overnight, neuro stable. Precedex being weaned off. Pending IVCF with vascular today.  : POD6 R vert takedown. EVD open at 0cmH2O. ASHA overnight. Neuro exam stable. Mottled skin on the left lower extremity improving. Started on Bromocriptine 15mg Q8.   14: POD7. EVD open at 0. 1L bolus given for euvolemia o/n. neuro stable    Vital Signs Last 24 Hrs  T(C): 37.1 (14 Aug 2021 00:47), Max: 38.6 (13 Aug 2021 14:00)  T(F): 98.7 (14 Aug 2021 00:47), Max: 101.5 (13 Aug 2021 14:00)  HR: 114 (14 Aug 2021 04:00) (98 - 117)  BP: 165/80 (14 Aug 2021 03:00) (119/65 - 179/89)  BP(mean): 114 (14 Aug 2021 03:00) (84 - 125)  RR: 32 (14 Aug 2021 04:00) (14 - 32)  SpO2: 100% (14 Aug 2021 04:00) (96% - 100%)    I&O's Summary    12 Aug 2021 07:01  -  13 Aug 2021 07:00  --------------------------------------------------------  IN: 3764 mL / OUT: 2287 mL / NET: 1477 mL    13 Aug 2021 07:01  -  14 Aug 2021 04:06  --------------------------------------------------------  IN: 2483 mL / OUT: 3192 mL / NET: -709 mL        PHYSICAL EXAM:  GEN: laying in bed, appears well, NAD  NEURO: AOx1 to self, AOx2 to self/place w/ choices. FC, OE spont, speech intact, face symmetric. CNII-XII intact. PERRL, EOMI. MAEx4 antigravity  CV: RRR +S1/S2  PULM: CTAB  GI: Abd soft, NT/ND  EXT: ext warm, dry, nontender  WOUND: groin site c/d/i, evd site c/d/i    TUBES/LINES:  [] Richey  [] Lumbar Drain  [] Wound Drains  [x] ventriculostomy evd @ 0cm h2o      DIET:  [] NPO  [] Mechanical  [x] Tube feeds    LABS:                        11.6   13.26 )-----------( 270      ( 13 Aug 2021 05:12 )             38.0     08-    149<H>  |  115<H>  |  21  ----------------------------<  154<H>  3.3<L>   |  23  |  0.60    Ca    8.9      13 Aug 2021 05:12  Phos  2.6     08-  Mg     2.4         TPro  6.3  /  Alb  3.7  /  TBili  0.3  /  DBili  0.2  /  AST  11  /  ALT  16  /  AlkPhos  57  08-13    PT/INR - ( 12 Aug 2021 17:28 )   PT: 16.4 sec;   INR: 1.39          PTT - ( 12 Aug 2021 17:28 )  PTT:27.2 sec  Urinalysis Basic - ( 12 Aug 2021 22:16 )    Color: Yellow / Appearance: SL Cloudy / S.020 / pH: x  Gluc: x / Ketone: 40 mg/dL  / Bili: Negative / Urobili: 0.2 E.U./dL   Blood: x / Protein: NEGATIVE mg/dL / Nitrite: NEGATIVE   Leuk Esterase: NEGATIVE / RBC: > 10 /HPF / WBC < 5 /HPF   Sq Epi: x / Non Sq Epi: 5-10 /HPF / Bacteria: Present /HPF      CARDIAC MARKERS ( 12 Aug 2021 20:00 )  x     / x     / 61 U/L / x     / x          CAPILLARY BLOOD GLUCOSE          Drug Levels: [] N/A  Valproic Acid Level, Serum: 62.0 ug/mL ( @ 05:12)  Vancomycin Level, Trough: 5.7 ug/mL ( @ 05:25)    CSF Analysis: [] N/A  RBC Count - Spinal Fluid: 27175 /uL ( @ 07:24)  CSF Lymphocytes: 10 % ( @ 07:24)  Glucose, CSF: 72 mg/dL ( @ 07:24)  Protein, CSF: 55 mg/dL ( @ 07:24)      Allergies    Allergy Status Unknown    Intolerances      MEDICATIONS:  Antibiotics:    Neuro:  acetaminophen    Suspension .. 650 milliGRAM(s) Oral every 6 hours PRN  bromocriptine Capsule 15 milliGRAM(s) Oral every 8 hours  ondansetron Injectable 4 milliGRAM(s) IV Push every 6 hours PRN  traMADol 25 milliGRAM(s) Oral every 6 hours PRN  valproate sodium IVPB 500 milliGRAM(s) IV Intermittent every 6 hours    Anticoagulation:  aspirin  chewable 81 milliGRAM(s) Oral daily  enoxaparin Injectable 40 milliGRAM(s) SubCutaneous every 12 hours    OTHER:  chlorhexidine 2% Cloths 1 Application(s) Topical <User Schedule>  hydrALAZINE Injectable 10 milliGRAM(s) IV Push every 3 hours PRN  labetalol Injectable 10 milliGRAM(s) IV Push every 2 hours PRN  niMODipine 60 milliGRAM(s) Oral every 4 hours  senna 2 Tablet(s) Oral at bedtime    IVF:  potassium chloride   Powder 40 milliEquivalent(s) Enteral Tube every 12 hours  sodium chloride 0.9% Bolus 1000 milliLiter(s) IV Bolus once  sodium chloride 0.9%. 1000 milliLiter(s) IV Continuous <Continuous>    CULTURES:  Culture Results:   No growth at 3 days. (08-10 @ 12:08)  Culture Results:   No growth at 3 days. (08-10 @ 12:08)    RADIOLOGY & ADDITIONAL TESTS:      ASSESSMENT:  44y/o F with h/o recent gastric sleeve (21 [facility], Dr. Crisostomo ), fibromyalgia, thyroid dysfunction, PTSD, depression, anxiety.  Presented with seizures at home - brought to [place], with 1 more episode of seizure en route.  Intubated, CT showed SAH with IV extension, casted IV, hydrocephalus, given mannitol, levetiracetam 1g,  6mg ativan. Started on Cardene drip for BP goal < 140 and transferred to [facility] NICU for further management. HH5 MF4, BD 1 = . Now s/p cerebral angiogram for R vertebral artery takedown for R vertebral dissecting aneurysm (), and R frontal EVD placement (), now s/p IVC filter placement ()    HEAD BLEED    No pertinent family history in first degree relatives    Handoff    Cerebral aneurysm    Cerebral aneurysm    DVT, lower extremity    Pulmonary embolism    Multiple subsegmental pulmonary emboli without acute cor pulmonale    DVT, lower extremity    Angiogram, carotid and cerebral arteries    IVC filter placement    [email or web address]        PLAN:  NEURO:   - neurochecks q1h  - EVD open at 0 , allow up to 30cc/hr  - Precedex dc'd, prn fentanyl pushes   - Niimodipine 60 mg po q4h   - Depakote 500q8 ( No keppra due to agitation) next level   - Ceribell EEG negative , d/c'ed   - ASA 81 mg started  s/p angio  - CTH  w/ hypoattenuation of R occipital/cerbellum edema vs. evolving infarct vs. artifact; CTH 8/10 with no cerebellar stroke - presume artifact  - CTH 8/10 with worsening hydro, mild basilar spasm  - Bromocriptine 15mg Q8    PULM:    - Hypertonic nebs/duonebs   - CT PE : Probable pulmonary embolism in the left distal main and proximal lower lobar pulmonary artery, which was also seen on prior same day neck CT. No saddle embolism. No evidence of right heart strain.    CARDIO:    - SBP goal 120-180mmHg  - TTE WNL, pending repeat per pulm reccs  - Troponin stable   - EKG normal     ENDO:    - glucose goal 140-180    GI:    - TF via NGT, needs thin liquid diet x3 weeks as per bariatric when tolerated, f/u swallow eval in AM  - PPI per bariatric surgeon    RENAL:   - Maintain euvolemia   - Na goal 140-145  - premafit  - 50cc/hr NS total goal 100cc of fluids per hr   - s/p lasix 20mg     HEM/ONC:   - ASA 81  - VTE Prophylaxis: SCDs, SQL  - LE Duplex : Acute completely occlusive deep venous thrombosis of the right intramuscular calf vein, s/p IVCF with vascular     ID:   -MRSA neg   -febrile, trend leukocytosis   -empiric zosyn for fever d/c'd    -CSF sent , NGTD     Dispol ICU status: family updated  PT/OT: on hold due to critical status  Full code    D/w Dr. Lomax, Dr. Yan       Assessment:  Present when checked    []  GCS  E   V  M     Heart Failure: []Acute, [] acute on chronic , []chronic  Heart Failure:  [] Diastolic (HFpEF), [] Systolic (HFrEF), []Combined (HFpEF and HFrEF), [] RHF, [] Pulm HTN, [] Other    [] TRUDI, [] ATN, [] AIN, [] other  [] CKD1, [] CKD2, [] CKD 3, [] CKD 4, [] CKD 5, []ESRD    Encephalopathy: [] Metabolic, [] Hepatic, [] toxic, [] Neurological, [] Other    Abnormal Nurtitional Status: [] malnurtition (see nutrition note), [ ]underweight: BMI < 19, [] morbid obesity: BMI >40, [] Cachexia    [] Sepsis  [] hypovolemic shock,[] cardiogenic shock, [] hemorrhagic shock, [] neuogenic shock  [] Acute Respiratory Failure  []Cerebral edema, [] Brain compression/ herniation,   [] Functional quadriplegia  [] Acute blood loss anemia

## 2021-08-15 NOTE — PROVIDER CONTACT NOTE (OTHER) - SITUATION
pt received in bed sating 100% on room air but tachypneic to mid 20s and increased work of breathing noted. otherwise vss. pt had very productive cough and unable to clear secretions

## 2021-08-15 NOTE — PROGRESS NOTE ADULT - SUBJECTIVE AND OBJECTIVE BOX
==============================================   NEUROCRITICAL CARE ATTENDING NOTE  ==============================================    LUDMILA PRIETO   MRN-[id number]  Summary:  45y/F with h/o recent gastric sleeve (08/04 [facility], Dr. Crisostomo ), fibromyalgia, thyroid dysfunction, PTSD, depression, anxiety.  Presented with seizures at home - brought to [place], with 1 more episode of seizure en route.  Intubated, CT showed SAH with IV extension, casted IV, hydrocephalus, given mannitol levetiracetam 6mg ativan, transferred to [facility].    HOSPITAL COURSE:  8/7: Tx from [place] for SAH. Intubated. R frontal EVD placed, central line and a line placed. 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   8/8: POD1 s/p angio with R vert takedown, extubated, desatting on aerosol mask, required extensive suctioning, 3% nebs, chest PT, started on low dose precedex drip for restlessness/agitation, ABG stable. NGT placed. TF started with goal 20 pending nutrition recs. 3% stopped for Na 150. Ceribell EEG negative and d/c'ed.   8/9 POD2 R vert takedown, Overnight, new Right pronator drift and right gaze preference that can't cross midline, Repeat CTH demonstrated stable vents, CTA head and neck unremarkable for spasm, hypoattenuation of right PCA/PCA territories. Requiring precedex drip and fentanyl pushes PRN for agitation   8/10: POD3 R vert takedown, EVD open at [id number]. ASHA overnight, neuro stable.  8/11:  EVD dropped to 0  8/12: POD 0 s/p IVCF, some  livedo reticularis likely idiopathic         PHYSICAL EXAMINATION  T(C): 37.2 (08-15 @ 05:03), Max: 37.9 (08-14 @ 21:00)  HR: 101 (08-15 @ 08:00) (93 - 108)  BP: 129/71 (08-15 @ 08:00) (124/71 - 207/96)  BP(mean): 95 (08-15 @ 08:00)  ABP:  (106/57 - 179/89)  ABP(mean):  (73 - 133)  RR: 18 (08-15 @ 08:00) (15 - 20)  SpO2: 94% (08-15 @ 08:00) (94% - 100%)  CVP(mm Hg): --      08-14-21 @ 07:01  -  08-15-21 @ 07:00  --------------------------------------------------------  IN:    Enteral Tube Flush: 250 mL    IV PiggyBack: 405 mL    sodium chloride 0.9%: 1200 mL    Vital1.0: 1104 mL  Total IN: 2959 mL    OUT:    External Ventricular Device (mL): 309 mL    Intermittent Catheterization - Urethral (mL): 300 mL    Voided (mL): 2050 mL  Total OUT: 2659 mL    Total NET: 300 mL      08-15-21 @ 07:01  -  08-15-21 @ 08:58  --------------------------------------------------------  IN:    sodium chloride 0.9%: 50 mL    Vital1.0: 46 mL  Total IN: 96 mL    OUT:    External Ventricular Device (mL): 6 mL    Voided (mL): 100 mL  Total OUT: 106 mL    Total NET: -10 mL          08-14-21 @ 07:01  -  08-15-21 @ 07:00  --------------------------------------------------------  IN: 0 mL/kg/hr / OUT: 0.12 mL/kg/hr / NET: -0.12 mL/kg/hr            LABS:  CAPILLARY BLOOD GLUCOSE                              10.7   15.14 )-----------( 245      ( 15 Aug 2021 05:11 )             36.4     08-15    152<H>  |  115<H>  |  28<H>  ----------------------------<  129<H>  3.8   |  28  |  0.48<L>    Ca    9.0      15 Aug 2021 05:11  Phos  4.1     08-15  Mg     2.5     08-15            MEDICATIONS:  MEDICATIONS  (STANDING):  acetaminophen 300 mG/butalbital 50 mG/ caffeine 40 mG 1 Capsule(s) Oral every 6 hours  acetylcysteine 20%  Inhalation 4 milliLiter(s) Inhalation three times a day  aspirin  chewable 81 milliGRAM(s) Oral daily  bromocriptine Capsule 15 milliGRAM(s) Oral every 8 hours  chlorhexidine 2% Cloths 1 Application(s) Topical <User Schedule>  enoxaparin Injectable 40 milliGRAM(s) SubCutaneous every 12 hours  niMODipine 60 milliGRAM(s) Oral every 4 hours  piperacillin/tazobactam IVPB.. 3.375 Gram(s) IV Intermittent every 6 hours  potassium chloride   Solution 40 milliEquivalent(s) Enteral Tube once  senna 2 Tablet(s) Oral at bedtime  sodium chloride 0.9%. 1000 milliLiter(s) (50 mL/Hr) IV Continuous <Continuous>  sodium chloride 3%  Inhalation 3 milliLiter(s) Inhalation every 6 hours  valproate sodium IVPB 500 milliGRAM(s) IV Intermittent every 8 hours    MEDICATIONS  (PRN):  acetaminophen    Suspension .. 650 milliGRAM(s) Oral every 6 hours PRN Temp greater or equal to 38C (100.4F), Mild Pain (1 - 3)  hydrALAZINE Injectable 10 milliGRAM(s) IV Push every 3 hours PRN SBP>180 (1st line)  labetalol Injectable 10 milliGRAM(s) IV Push every 2 hours PRN SBP > 180 (2nd line)  ondansetron Injectable 4 milliGRAM(s) IV Push every 6 hours PRN Nausea and/or Vomiting  traMADol 25 milliGRAM(s) Oral every 6 hours PRN Moderate Pain (4 - 6)      Past Medical History:   Allergies:  Allergy Status Unknown  Home meds:     NEUROLOGIC EXAMINATION:  Patient is rousable, restless at times, oriented x2, dysarthric speech, CON, no vertical/rotatory nystagmus, R gaze preference, R slight LMN asymmetry, R cerebellar drift, follows commands 4+/5 throughout  GENERAL: NC   EENT:  anicteric  CARDIOVASCULAR: (+) S1 S2, normal rate and regular rhythm  PULMONARY: clear to auscultation bilaterally  ABDOMEN: soft, nontender with normoactive bowel sounds  EXTREMITIES: no edema  SKIN: no rash        Bacteriology:  CSF studies:  EEG:  Neuroimaging:  CT/CTA (08.09) - Status post aneurysm clip distal right vertebral artery due to dissection/aneurysm since prior CTA 08/07/2021. There is no evidence intracranial vascular occlusion, vasospasm or new aneurysm.  Subtle hypoattenuation within the right greater than left occipital lobes and right cerebellum with sulcal effacement and mild loss of gray-white matter differentiation may be suggestive of cerebral edema or developing infarction.  Other imaging:  Duplex - R IM calf DVT

## 2021-08-15 NOTE — PROGRESS NOTE ADULT - SUBJECTIVE AND OBJECTIVE BOX
HPI:  46y/o F with h/o recent gastric sleeve (21 [facility], Dr. Crisostomo ), fibromyalgia, thyroid dysfunction, PTSD, depression, anxiety.  Presented with seizures at home - brought to [place], with 1 more episode of seizure en route.  Intubated, CT showed SAH with IV extension, casted IV, hydrocephalus, given mannitol, levetiracetam 1g,  6mg ativan. Started on Cardene drip for BP goal < 140 and transferred to [facility] NICU for further management.     HH5 MF4   NIHSS 26 on arrival  BD 1 =  (07 Aug 2021 08:08)    OVERNIGHT EVENTS: ASHA o/n, neuro stable    Hospital Course:   : Tx from [place] for SAH. Intubated. R frontal EVD placed, central line and a line placed. 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   : POD1 s/p angio with R vert takedown, extubated, desatting on aerosol mask, required extensive suctioning, 3% nebs, chest PT, started on low dose precedex drip for restlessness/agitation, ABG stable. NGT placed. TF started with goal 20 pending nutrition recs. 3% stopped for Na 150. Ceribell EEG negative and d/c'ed.    Overnight, new Right pronator drift and right gaze preference that can't cross midline, Repeat CTH demonstrated stable vents, CTA head and neck unremarkable for spasm, hypoattenuation of right cerebellum edema vs. infarct.  8/10: POD3 R vert takedown, EVD open at [id number]. ASHA overnight, neuro stable. CTH with increased hydro, CTA head/neck with mild basilar spasm, but concern for PE. 1/2 am D50 for hypoglycemia. 1L bolus then 100 cc/hr, PM rounds for net negative fluid balance and mild vasospasm on CTA.   : POD4 R vert takedown. EVD at 5cm H2O, ASHA overnight. neuro stable.   Febrile 104. depakote increased to q6. NCHCT stable. EVD lowered to 0. Lasix 20 given for dieuresis, UOP over 2 liters. Sedation given for a-line placement, BP drop needing levo.  8/12: POD5 R vert takedown. EVD at 0cm H2O. ASHA overnight, neuro stable. Precedex being weaned off. Pending IVCF with vascular today.  : POD6 R vert takedown. EVD open at 0cmH2O. ASHA overnight. Neuro exam stable. Mottled skin on the left lower extremity improving. Started on Bromocriptine 15mg Q8.   14: POD7. EVD open at 0. 1L bolus given for euvolemia o/n. neuro stable. CTH stable. ENT scoped vocal cords, +R voacl cord paresis, NTD. started on zosyn empirically.   15: POD8. EVD open at 0. ASHA o/n, neuro stable.     Vital Signs Last 24 Hrs  T(C): 37.9 (14 Aug 2021 21:00), Max: 38.4 (14 Aug 2021 06:00)  T(F): 100.3 (14 Aug 2021 21:00), Max: 101.2 (14 Aug 2021 06:00)  HR: 106 (14 Aug 2021 22:00) (94 - 114)  BP: 164/105 (14 Aug 2021 22:00) (129/63 - 207/96)  BP(mean): 126 (14 Aug 2021 22:00) (88 - 138)  RR: 18 (14 Aug 2021 22:00) (15 - 32)  SpO2: 99% (14 Aug 2021 22:00) (95% - 100%)    I&O's Summary    13 Aug 2021 07:01  -  14 Aug 2021 07:00  --------------------------------------------------------  IN: 3721 mL / OUT: 3545 mL / NET: 176 mL    14 Aug 2021 07:01  -  15 Aug 2021 00:34  --------------------------------------------------------  IN: 2103 mL / OUT: 1824 mL / NET: 279 mL        PHYSICAL EXAM:  GEN: laying in bed, appears well, NAD  NEURO: AOx1 to self, AOx2 to self/place w/ choices. FC, OE spont, speech intact, face symmetric. CNII-XII intact. PERRL, EOMI. MAEx4 antigravity  CV: RRR +S1/S2  PULM: CTAB  GI: Abd soft, NT/ND  EXT: ext warm, dry, nontender  WOUND: groin site c/d/i, evd site c/d/i    TUBES/LINES:  [] Richey  [] Lumbar Drain  [] Wound Drains  [x] evd @0cm h2o      DIET:  [] NPO  [] Mechanical  [x] Tube feeds    LABS:                        12.0   14.26 )-----------( 269      ( 14 Aug 2021 05:17 )             40.3     08-14    150<H>  |  116<H>  |  20  ----------------------------<  147<H>  3.3<L>   |  23  |  0.45<L>    Ca    9.0      14 Aug 2021 05:17  Phos  2.7     08-14  Mg     2.5     -    TPro  6.3  /  Alb  3.7  /  TBili  0.3  /  DBili  0.2  /  AST  11  /  ALT  16  /  AlkPhos  57  08-13      Urinalysis Basic - ( 14 Aug 2021 12:01 )    Color: Yellow / Appearance: Clear / S.020 / pH: x  Gluc: x / Ketone: 15 mg/dL  / Bili: Negative / Urobili: 0.2 E.U./dL   Blood: x / Protein: NEGATIVE mg/dL / Nitrite: NEGATIVE   Leuk Esterase: NEGATIVE / RBC: x / WBC x   Sq Epi: x / Non Sq Epi: x / Bacteria: x          CAPILLARY BLOOD GLUCOSE          Drug Levels: [] N/A  Valproic Acid Level, Serum: 62.0 ug/mL ( @ 05:12)  Vancomycin Level, Trough: 5.7 ug/mL ( @ 05:25)    CSF Analysis: [] N/A      Allergies    Allergy Status Unknown    Intolerances      MEDICATIONS:  Antibiotics:  piperacillin/tazobactam IVPB.. 3.375 Gram(s) IV Intermittent every 6 hours    Neuro:  acetaminophen    Suspension .. 650 milliGRAM(s) Oral every 6 hours PRN  acetaminophen 300 mG/butalbital 50 mG/ caffeine 40 mG 1 Capsule(s) Oral every 6 hours  bromocriptine Capsule 15 milliGRAM(s) Oral every 8 hours  ondansetron Injectable 4 milliGRAM(s) IV Push every 6 hours PRN  traMADol 25 milliGRAM(s) Oral every 6 hours PRN  valproate sodium IVPB 500 milliGRAM(s) IV Intermittent every 8 hours    Anticoagulation:  aspirin  chewable 81 milliGRAM(s) Oral daily  enoxaparin Injectable 40 milliGRAM(s) SubCutaneous every 12 hours    OTHER:  chlorhexidine 2% Cloths 1 Application(s) Topical <User Schedule>  hydrALAZINE Injectable 10 milliGRAM(s) IV Push every 3 hours PRN  labetalol Injectable 10 milliGRAM(s) IV Push every 2 hours PRN  niMODipine 60 milliGRAM(s) Oral every 4 hours  senna 2 Tablet(s) Oral at bedtime    IVF:  sodium chloride 0.9%. 1000 milliLiter(s) IV Continuous <Continuous>    CULTURES:  Culture Results:   No growth to date ( @ 07:29)  Culture Results:   No growth at 4 days. (08-10 @ 12:08)    RADIOLOGY & ADDITIONAL TESTS:      ASSESSMENT:  46y/o F with h/o recent gastric sleeve (21 [facility], Dr. Crisostomo ), fibromyalgia, thyroid dysfunction, PTSD, depression, anxiety.  Presented with seizures at home - brought to [place], with 1 more episode of seizure en route.  Intubated, CT showed SAH with IV extension, casted IV, hydrocephalus, given mannitol, levetiracetam 1g,  6mg ativan. Started on Cardene drip for BP goal < 140 and transferred to [facility] NICU for further management. HH5 MF4, BD 1 = . Now s/p cerebral angiogram for R vertebral artery takedown for R vertebral dissecting aneurysm (), and R frontal EVD placement (), now s/p IVC filter placement ()    HEAD BLEED    No pertinent family history in first degree relatives    Handoff    Cerebral aneurysm    Cerebral aneurysm    DVT, lower extremity    Pulmonary embolism    Multiple subsegmental pulmonary emboli without acute cor pulmonale    DVT, lower extremity    Angiogram, carotid and cerebral arteries    IVC filter placement    [email or web address]        PLAN:  NEURO:   - neurochecks q1h  - EVD open at 0 , allow up to 30cc/hr  - Precedex dc'd, prn fentanyl pushes   - Niimodipine 60 mg po q4h   - Depakote 500q8 ( No keppra due to agitation) next level   - Ceribell EEG negative , d/c'ed   - ASA 81 mg started  s/p angio  - CTH  w/ hypoattenuation of R occipital/cerbellum edema vs. evolving infarct vs. artifact; CTH 8/10 with no cerebellar stroke - presume artifact  - CTH 8/10 with worsening hydro, mild basilar spasm  - Bromocriptine 15mg Q8  - Per ENT, no vocal cord issues    PULM:    - Hypertonic nebs/duonebs   - CT PE : Probable pulmonary embolism in the left distal main and proximal lower lobar pulmonary artery, which was also seen on prior same day neck CT. No saddle embolism. No evidence of right heart strain.    CARDIO:    - SBP goal 120-180mmHg  - TTE WNL, pending repeat per pulm reccs  - Troponin stable   - EKG normal     ENDO:    - glucose goal 140-180    GI:    - TF via NGT, needs thin liquid diet x3 weeks as per bariatric when tolerated, f/u swallow eval in AM  - PPI per bariatric surgeon    RENAL:   - Maintain euvolemia   - Na goal 140-145  - primafit  - 50cc/hr NS total goal 100cc of fluids per hr     HEM/ONC:   - ASA 81  - VTE Prophylaxis: SCDs, SQL  - LE Duplex : Acute completely occlusive deep venous thrombosis of the right intramuscular calf vein, s/p IVCF with vascular     ID:   -MRSA neg   -febrile, trend leukocytosis   -empiric zosyn for fever d/c'd (8/10-) and now restarted  for persistent fevers  -CSF sent , NGTD     Dispol ICU status: family updated  PT/OT: on hold due to critical status  Full code    D/w Dr. Lomax, Dr. Yan       Assessment:  Present when checked    []  GCS  E   V  M     Heart Failure: []Acute, [] acute on chronic , []chronic  Heart Failure:  [] Diastolic (HFpEF), [] Systolic (HFrEF), []Combined (HFpEF and HFrEF), [] RHF, [] Pulm HTN, [] Other    [] TRUDI, [] ATN, [] AIN, [] other  [] CKD1, [] CKD2, [] CKD 3, [] CKD 4, [] CKD 5, []ESRD    Encephalopathy: [] Metabolic, [] Hepatic, [] toxic, [] Neurological, [] Other    Abnormal Nurtitional Status: [] malnurtition (see nutrition note), [ ]underweight: BMI < 19, [] morbid obesity: BMI >40, [] Cachexia    [] Sepsis  [] hypovolemic shock,[] cardiogenic shock, [] hemorrhagic shock, [] neuogenic shock  [] Acute Respiratory Failure  []Cerebral edema, [] Brain compression/ herniation,   [] Functional quadriplegia  [] Acute blood loss anemia

## 2021-08-15 NOTE — PROVIDER CONTACT NOTE (OTHER) - BACKGROUND
with deep nasotracheal suctioning with red rubber catheter. ordered nebs given with little to no improvement. pt's neuro exam also worsened from handoff report. pt not following any commands.

## 2021-08-16 NOTE — PROGRESS NOTE ADULT - ASSESSMENT
44y/o F with h/o recent gastric sleeve (08/04/21 [facility], Dr. Crisostomo ), fibromyalgia, thyroid dysfunction, PTSD, depression, anxiety.  Presented with seizures at home - brought to [place], with 1 more episode of seizure en route.  Intubated, CT showed SAH with IV extension, casted IV, hydrocephalus, given mannitol, levetiracetam 1g,  6mg ativan. Started on Cardene drip for BP goal < 140 and transferred to [facility] NICU for further management. HH5 MF4, BD 1 = 8/7. Now s/p cerebral angiogram for R vertebral artery takedown for R vertebral dissecting aneurysm (8/7), and R frontal EVD placement (8/7), now s/p IVC filter placement (8/12)    Plan:  NEURO:   - neurochecks q1h  - EVD open at 0 , allow up to 30cc/hr  - Precedex dc'd, prn fentanyl pushes   - Niimodipine 60 mg po q4h   - Depakote 500q8 ( No keppra due to agitation) next level 8/18  - Ceribell EEG negative 8/8, d/c'ed   - cont ASA 81  - CTH 8/15: worsned uncal herniation, worsened hydro, vasospasm in b/l PCA, L vert, basilar arteries  - f/u CTH/CTA head/neck reads 8/15  - Bromocriptine 15mg Q8  - for cerebral angiogram    PULM:    - Hypertonic nebs/duonebs   - CT PE 8/11: Probable pulmonary embolism in the left distal main and proximal lower lobar pulmonary artery, which was also seen on prior same day neck CT. No saddle embolism. No evidence of right heart strain.    CARDIO:    - SBP goal 120-180mmHg  - TTE WNL, pending repeat per pulm reccs  - Troponin stable   - EKG normal     ENDO:    - glucose goal 140-180    GI:    - NPO for angio  - needs thin liquid diet x3 weeks as per bariatric when tolerated  - PPI per bariatric surgeon    RENAL:   - Maintain euvolemia   - Na goal 140-145  - primafit  - 50cc/hr NS total goal 100cc of fluids per hr     HEM/ONC:   - ASA 81  - VTE Prophylaxis: SCDs, SQL  - LE Duplex 8/9: Acute completely occlusive deep venous thrombosis of the right intramuscular calf vein, s/p IVCF with vascular 8/12    ID:   -MRSA neg 8/12  -febrile, trend leukocytosis   -empiric zosyn for fever d/c'd (8/10-13) and now restarted 8/14 for persistent fevers  -CSF sent 8/13, NGTD     Dispol ICU status: family updated  PT/OT: on hold due to critical status  Full code      Social: Daughter Maggie and Son Kenya and sister (on the phone) updated    *****    ATTENDING ATTESTATION:  I was physically present for the key portions of the evaluation and management (E/M) service provided.  I agree with the above history, physical and plan, which I have reviewed and edited where appropriate.    Patient at high risk for neurological deterioration or death due to:  ICU delirium, aspiration PNA, DVT / PE.  Critical care time:  I have personally provided 45 minutes of critical care time, excluding time spent on separate procedures.      Plan discussed with RN, house staff.         44y/o F with h/o recent gastric sleeve (08/04/21 [facility], Dr. Crisostomo ), fibromyalgia, thyroid dysfunction, PTSD, depression, anxiety.  Presented with seizures at home - brought to [place], with 1 more episode of seizure en route.  Intubated, CT showed SAH with IV extension, casted IV, hydrocephalus, given mannitol, levetiracetam 1g,  6mg ativan. Started on Cardene drip for BP goal < 140 and transferred to [facility] NICU for further management. HH5 MF4, BD 1 = 8/7. Now s/p cerebral angiogram for R vertebral artery takedown for R vertebral dissecting aneurysm (8/7), and R frontal EVD placement (8/7), now s/p IVC filter placement (8/12)    Plan:  NEURO:   - neurochecks q1h  - EVD open at -5 cm  - low dose propofol prn  - Niimodipine 60 mg po q4h   - Depakote 500q8 ( No keppra due to agitation) next level 8/18  - Ceribell EEG negative 8/8, d/c'ed   - cont ASA 81  - bromocriptine 15mg Q8  - s/p IA verapamil 15 mg for posterior circulation vasospasm    PULM:    - AC ventilation - intubated yesterday - thick secretions; R vocal cord paresis as per ENT  - CT PE 8/11: Probable pulmonary embolism in the left distal main and proximal lower lobar pulmonary artery, which was also seen on prior same day neck CT. No saddle embolism. No evidence of right heart strain.    CARDIO:    - SBP goal 180-200 mmHg -BP augmentation for VSP; on norepinephrine 0.35-0.6 mcg/kg/min; will maintain CVP ~6 with crystalloids and colloids  - TTE WNL, pending repeat per pulm reccs  - Troponin stable   - EKG normal     ENDO:    - glucose goal 140-180    GI:    - NPO for angio  - needs thin liquid diet x3 weeks as per bariatric when tolerated  - PPI per bariatric surgeon    RENAL:   - Maintain euvolemia   - Na goal 140-145  - primafit    HEM/ONC:   - ASA 81  - VTE Prophylaxis: SCDs, SQL  - LE Duplex 8/9: Acute completely occlusive deep venous thrombosis of the right intramuscular calf vein, s/p IVCF with vascular 8/12    ID:   -MRSA neg 8/12  -febrile, trend leukocytosis   -empiric zosyn for fever d/c'd (8/10-13) and now restarted 8/14 for persistent fevers  -CSF sent 8/13, NGTD     Dispol ICU status: family updated  PT/OT: on hold due to critical status  Full code    Social: Daughter Maggie and Son Kenya and sister (on the phone) updated    *****    ATTENDING ATTESTATION:  I was physically present for the key portions of the evaluation and management (E/M) service provided.  I agree with the above history, physical and plan, which I have reviewed and edited where appropriate.    Patient at high risk for neurological deterioration or death due to:  ICU delirium, aspiration PNA, DVT / PE.  Critical care time:  I have personally provided 45 minutes of critical care time, excluding time spent on separate procedures.      Plan discussed with RN, house staff.

## 2021-08-16 NOTE — PROGRESS NOTE ADULT - SUBJECTIVE AND OBJECTIVE BOX
==============================================   NEUROCRITICAL CARE ATTENDING NOTE (2021)  ==============================================    LUDMILA PRIETO   MRN-[id number]  Summary:  45y/F with h/o recent gastric sleeve ( [facility], Dr. Crisostomo ), fibromyalgia, thyroid dysfunction, PTSD, depression, anxiety.  Presented with seizures at home - brought to [place], with 1 more episode of seizure en route.  Intubated, CT showed SAH with IV extension, casted IV, hydrocephalus, given mannitol levetiracetam 6mg ativan, transferred to [facility].    HOSPITAL COURSE:  : Tx from [place] for SAH. Intubated. R frontal EVD placed, central line and a line placed. 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   : POD1 s/p angio with R vert takedown, extubated, desatting on aerosol mask, required extensive suctioning, 3% nebs, chest PT, started on low dose precedex drip for restlessness/agitation, ABG stable. NGT placed. TF started with goal 20 pending nutrition recs. 3% stopped for Na 150. Ceribell EEG negative and d/c'ed.    POD2 R vert takedown, Overnight, new Right pronator drift and right gaze preference that can't cross midline, Repeat CTH demonstrated stable vents, CTA head and neck unremarkable for spasm, hypoattenuation of right PCA/PCA territories. Requiring precedex drip and fentanyl pushes PRN for agitation   8/10: POD3 R vert takedown, EVD open at 33nqW8H. ASHA overnight, neuro stable.  :  EVD dropped to 0  : POD 0 s/p IVCF, some  livedo reticularis likely idiopathic     Past Medical History:  s/p gastric sleeve surgery  Allergies:  Allergy Status Unknown  Home meds:     Current Meds:  MEDICATIONS  (STANDING):  acetaminophen 300 mG/butalbital 50 mG/ caffeine 40 mG 1 Capsule(s) Oral every 6 hours  albuterol/ipratropium for Nebulization 3 milliLiter(s) Nebulizer every 6 hours  aspirin  chewable 81 milliGRAM(s) Oral daily  bromocriptine Capsule 15 milliGRAM(s) Oral every 8 hours  dexAMETHasone  Injectable 6 milliGRAM(s) IV Push every 6 hours  enoxaparin Injectable 40 milliGRAM(s) SubCutaneous every 12 hours  insulin lispro (ADMELOG) corrective regimen sliding scale   SubCutaneous Before meals and at bedtime  niMODipine 60 milliGRAM(s) Oral every 4 hours  norepinephrine Infusion 0.05 MICROgram(s)/kG/Min (4.79 mL/Hr) IV Continuous <Continuous>  piperacillin/tazobactam IVPB.. 3.375 Gram(s) IV Intermittent every 6 hours  senna 2 Tablet(s) Oral at bedtime  sodium chloride 0.9%. 1000 milliLiter(s) (100 mL/Hr) IV Continuous <Continuous>  valproate sodium IVPB 500 milliGRAM(s) IV Intermittent every 8 hours  vasopressin Infusion 0.02 Unit(s)/Min (1.2 mL/Hr) IV Continuous <Continuous>    MEDICATIONS  (PRN):  acetaminophen    Suspension .. 650 milliGRAM(s) Oral every 6 hours PRN Temp greater or equal to 38C (100.4F), Mild Pain (1 - 3)  hydrALAZINE Injectable 10 milliGRAM(s) IV Push every 3 hours PRN SBP>180 (1st line)  labetalol Injectable 10 milliGRAM(s) IV Push every 2 hours PRN SBP > 180 (2nd line)  ondansetron Injectable 4 milliGRAM(s) IV Push every 6 hours PRN Nausea and/or Vomiting  traMADol 25 milliGRAM(s) Oral every 6 hours PRN Moderate Pain (4 - 6)    Mode: AC, RR (machine): 14, TV (machine): 450, FiO2: 30, PEEP: 5, ITime: 1, MAP: 11, PIP: 18  NEUROLOGIC EXAMINATION:  Patient is rousable, restless at times, oriented x2, dysarthric speech, CON, no vertical/rotatory nystagmus, R gaze preference, R slight LMN asymmetry, R cerebellar drift, follows commands 4+/5 throughout  GENERAL: NC   EENT:  anicteric  CARDIOVASCULAR: (+) S1 S2, normal rate and regular rhythm  PULMONARY: clear to auscultation bilaterally  ABDOMEN: soft, nontender with normoactive bowel sounds  EXTREMITIES: no edema  SKIN: no rash    I/O's    21 @ 07:01  -  08-15-21 @ 07:00  --------------------------------------------------------  IN: 2959 mL / OUT: 2659 mL / NET: 300 mL    08-15-21 @ 07:01  -  21 @ 06:31  --------------------------------------------------------  IN: 1534 mL / OUT: 1249 mL / NET: 285 mL    LABS:                        10.7   15.14 )-----------( 245      ( 15 Aug 2021 05:11 )             36.4     08-15    152<H>  |  115<H>  |  28<H>  ----------------------------<  129<H>  3.8   |  28  |  0.48<L>    Ca    9.0      15 Aug 2021 05:11  Phos  4.1     08-15  Mg     2.5     08-15    Urinalysis Basic - ( 14 Aug 2021 12:01 )    Color: Yellow / Appearance: Clear / S.020 / pH: x  Gluc: x / Ketone: 15 mg/dL  / Bili: Negative / Urobili: 0.2 E.U./dL   Blood: x / Protein: NEGATIVE mg/dL / Nitrite: NEGATIVE   Leuk Esterase: NEGATIVE / RBC: x / WBC x   Sq Epi: x / Non Sq Epi: x / Bacteria: x    CAPILLARY BLOOD GLUCOSE    POCT Blood Glucose.: 122 mg/dL (15 Aug 2021 17:13)    Culture - Sputum (collected 08-15-21 @ 14:35)  Source: .Sputum Sputum  Gram Stain (21 @ 00:32):    Rare epithelial cells    Numerous white blood cells    Few Gram positive cocci in pairs    Culture - Blood (08.10.21 @ 12:08)    Specimen Source: .Blood Blood-Peripheral X 2    Culture Results:   No growth at 5 days.    Bacteriology:  CSF studies:  EEG:  Neuroimaging:  CT - 1. Worsening the uncal herniation, with effacement of the bilateral suprasellar and perimesencephalic cisterns.  2. Redistribution of intraventricular hemorrhage, as above. Redemonstration of right EVD, with distal tip in the right frontal horn, near the foramen of Monro. Worsening hydrocephalus.  CTA - 1. Multifocal punctate and short segment stenoses, as above, with several areas of narrowing new as compared to recent CTA dated 2021. In the posterior circulation, stenoses are identified within the V4 left vertebral artery, the basilar artery, and in the bilateral PCA, more significant on the left.  Within the anterior circulation, stenosis are identified in the right supraclinoid C6 ICA as well as the proximal M1 MCA. Findings are most compatible with vasospasm.  2. In particular, the distal V4 right vertebral artery, which was visualized, though diminutive, on prior CTA dated 2021, does not fill with contrast on the present examination. The basilar artery demonstrates multiple stenoses, and is significantly smaller in caliber when compared to the prior CTA.    Other imaging:  Duplex - R IM calf DVT    [Code] Full  [Goals] Aggressive  [Disposion] ICU     ==============================================   NEUROCRITICAL CARE ATTENDING NOTE (Monday, August 16, 2021)  ==============================================    LUDMILA PRIETO   MRN-[id number]  Summary:  45y/F with h/o recent gastric sleeve (08/04 [facility], Dr. Crisostomo ), fibromyalgia, thyroid dysfunction, PTSD, depression, anxiety.  Presented with seizures at home - brought to [place], with 1 more episode of seizure en route.  Intubated, CT showed SAH with IV extension, casted IV, hydrocephalus, given mannitol levetiracetam 6mg ativan, transferred to [facility].    Hospital Course:   8/7: Tx from [place] for SAH. Intubated. R frontal EVD placed, central line and a line placed. 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   8/8: POD1 s/p angio with R vert takedown, extubated, desatting on aerosol mask, required extensive suctioning, 3% nebs, chest PT, started on low dose precedex drip for restlessness/agitation, ABG stable. NGT placed. TF started with goal 20 pending nutrition recs. 3% stopped for Na 150. Ceribell EEG negative and d/c'ed.   8/9 Overnight, new Right pronator drift and right gaze preference that can't cross midline, Repeat CTH demonstrated stable vents, CTA head and neck unremarkable for spasm, hypoattenuation of right cerebellum edema vs. infarct.  8/10: POD3 R vert takedown, EVD open at 50prP8A. ASHA overnight, neuro stable. CTH with increased hydro, CTA head/neck with mild basilar spasm, but concern for PE. 1/2 am D50 for hypoglycemia. 1L bolus then 100 cc/hr, PM rounds for net negative fluid balance and mild vasospasm on CTA.   8/11: POD4 R vert takedown. EVD at 5cm H2O, ASHA overnight. neuro stable.   Febrile 104. depakote increased to q6. NCHCT stable. EVD lowered to 0. Lasix 20 given for dieuresis, UOP over 2 liters. Sedation given for a-line placement, BP drop needing levo.  8/12: POD5 R vert takedown. EVD at 0cm H2O. ASHA overnight, neuro stable. Precedex being weaned off. Pending IVCF with vascular today.  8/13: POD6 R vert takedown. EVD open at 0cmH2O. ASHA overnight. Neuro exam stable. Mottled skin on the left lower extremity improving. Started on Bromocriptine 15mg Q8.   8/14: POD7. EVD open at 0. 1L bolus given for euvolemia o/n. neuro stable. CTH stable. ENT scoped vocal cords, +R vocal cord paresis, NTD. started on zosyn empirically.   8/15: POD8. EVD open at 0. ASHA o/n, neuro stable. Pt developed increased work of breathing, unable to clear her secretions, received racemic epi and multiple nebulizer treatments, still with stridor. Patient re-intubated at bedside, on full vent support.   8/16: CTH/CTA head/neck/CT chest performed o/n for worsened exam, R gaze preference, sluggish pupils. CTA shows posterior circulation vasospasm.  Neurologically improved on norepinephrine and after 15 mg IA verapamil, sBP maintained ~180 mmHg, CVP ~ 6.    Past Medical History:  s/p gastric sleeve surgery  Allergies:  Allergy Status Unknown  Home meds:     Current Meds:  MEDICATIONS  (STANDING):  acetaminophen 300 mG/butalbital 50 mG/ caffeine 40 mG 1 Capsule(s) Oral every 6 hours  albumin human  5% IVPB 250 milliLiter(s) IV Intermittent once  albuterol/ipratropium for Nebulization 3 milliLiter(s) Nebulizer every 6 hours  aspirin  chewable 81 milliGRAM(s) Oral daily  bromocriptine Capsule 15 milliGRAM(s) Oral every 8 hours  dexAMETHasone  Injectable 4 milliGRAM(s) IV Push every 6 hours  enoxaparin Injectable 40 milliGRAM(s) SubCutaneous every 12 hours  insulin lispro (ADMELOG) corrective regimen sliding scale   SubCutaneous Before meals and at bedtime  norepinephrine Infusion 0.05 MICROgram(s)/kG/Min (4.79 mL/Hr) IV Continuous <Continuous>  piperacillin/tazobactam IVPB.. 3.375 Gram(s) IV Intermittent every 6 hours  propofol Infusion 10 MICROgram(s)/kG/Min (6.13 mL/Hr) IV Continuous <Continuous>  senna 2 Tablet(s) Oral at bedtime  sodium chloride 0.9%. 1000 milliLiter(s) (100 mL/Hr) IV Continuous <Continuous>  valproate sodium IVPB 500 milliGRAM(s) IV Intermittent every 8 hours    MEDICATIONS  (PRN):  acetaminophen    Suspension .. 650 milliGRAM(s) Oral every 6 hours PRN Temp greater or equal to 38C (100.4F), Mild Pain (1 - 3)  ondansetron Injectable 4 milliGRAM(s) IV Push every 6 hours PRN Nausea and/or Vomiting  traMADol 25 milliGRAM(s) Oral every 6 hours PRN Moderate Pain (4 - 6)    Mode: AC, RR (machine): 14, TV (machine): 450, FiO2: 30, PEEP: 5, ITime: 1, MAP: 11, PIP: 18  NEUROLOGIC EXAMINATION:  Patient is rousable, restless at times, CON, no vertical/rotatory nystagmus, R gaze preference, downgaze preference, R slight LMN asymmetry, R + cerebellar drift, L spontaneous 4+/5, R UE 3/5, R LE 4-/5  EENT:  anicteric  CARDIOVASCULAR: (+) S1 S2, normal rate and regular rhythm  PULMONARY: clear to auscultation bilaterally  ABDOMEN: soft, nontender with normoactive bowel sounds  EXTREMITIES: no edema  SKIN: no rash    I/O's    08-15-21 @ 07:01  -  08-16-21 @ 07:00  --------------------------------------------------------  IN: 2411.2 mL / OUT: 1979 mL / NET: 432.2 mL    08-16-21 @ 07:01  -  08-16-21 @ 12:40  --------------------------------------------------------  IN: 1694 mL / OUT: 189 mL / NET: 1505 mL    LABS:                        10.8   25.16 )-----------( 271      ( 16 Aug 2021 11:04 )             36.3     08-16    (146 <-- 152)  144  |  111<H>  |  28<H>  ----------------------------<  274<H>  4.0   |  19<L>  |  0.74    Ca    8.8      16 Aug 2021 11:04  Phos  4.1     08-16  Mg     2.4     08-16    CAPILLARY BLOOD GLUCOSE    POCT Blood Glucose.: 289 mg/dL (16 Aug 2021 11:05)  POCT Blood Glucose.: 122 mg/dL (15 Aug 2021 17:13)    Culture - Sputum (collected 08-15-21 @ 14:35)  Source: .Sputum Sputum  Gram Stain (08-16-21 @ 00:32):    Rare epithelial cells    Numerous white blood cells    Few Gram positive cocci in pairs  Preliminary Report (08-16-21 @ 09:27):    Normal Respiratory Mely present    Culture in progress    Culture - Blood (08.10.21 @ 12:08)    Specimen Source: .Blood Blood-Peripheral X 2    Culture Results:   No growth at 5 days.    Bacteriology:  CSF studies:  EEG:  Neuroimaging:  Cerebral Angiogram (08.16.2021) - Left vertebral injection demonstrates moderate diffuse vasospasm of left vertebral artery, basilar artery and posterior circulation including PCAs. 15mg Verapamil injected via left vert with mild radiographic improvement post treatment. Right ICA injection with mild supraclinoid spasm/narrowing, otherwise no vasospasm appreciated in anterior circulation.    CT (08.15.2021) - 1. Worsening the uncal herniation, with effacement of the bilateral suprasellar and perimesencephalic cisterns.  2. Redistribution of intraventricular hemorrhage, as above. Redemonstration of right EVD, with distal tip in the right frontal horn, near the foramen of Monro. Worsening hydrocephalus.  CTA - 1. Multifocal punctate and short segment stenoses, as above, with several areas of narrowing new as compared to recent CTA dated 08/09/2021. In the posterior circulation, stenoses are identified within the V4 left vertebral artery, the basilar artery, and in the bilateral PCA, more significant on the left.  Within the anterior circulation, stenosis are identified in the right supraclinoid C6 ICA as well as the proximal M1 MCA. Findings are most compatible with vasospasm.  2. In particular, the distal V4 right vertebral artery, which was visualized, though diminutive, on prior CTA dated 08/09/2021, does not fill with contrast on the present examination. The basilar artery demonstrates multiple stenoses, and is significantly smaller in caliber when compared to the prior CTA.    Other imaging:  Duplex - R IM calf DVT    EVD -5 cm, drained 50 mL O/N, then 5-15 mL/h, ICP 2-12    [Code] Full  [Goals] Aggressive  [Disposion] ICU

## 2021-08-16 NOTE — PROGRESS NOTE ADULT - ASSESSMENT
44y/o F with h/o recent gastric sleeve (08/04/21 [facility], Dr. Crisostomo ), fibromyalgia, thyroid dysfunction, PTSD, depression, anxiety.  Presented with seizures at home - brought to [place], with 1 more episode of seizure en route.  Intubated, CT showed SAH with IV extension, casted IV, hydrocephalus, given mannitol, levetiracetam 1g,  6mg ativan. Started on Cardene drip for BP goal < 140 and transferred to [facility] NICU for further management. HH5 MF4, BD 1 = 8/7. Now s/p cerebral angiogram for R vertebral artery takedown for R vertebral dissecting aneurysm (8/7), and R frontal EVD placement (8/7), now s/p IVC filter placement (8/12)    Plan:  NEURO:   - neurochecks q1h  - EVD open at -5 cm  - low dose propofol prn  - Niimodipine 60 mg po q4h   - Depakote 500q8 ( No keppra due to agitation) next level 8/18  - Ceribell EEG negative 8/8, d/c'ed   - cont ASA 81  - bromocriptine 15mg Q8  - s/p IA verapamil 15 mg for posterior circulation vasospasm    PULM:    - AC ventilation - intubated yesterday - thick secretions; R vocal cord paresis as per ENT  - CT PE 8/11: Probable pulmonary embolism in the left distal main and proximal lower lobar pulmonary artery, which was also seen on prior same day neck CT. No saddle embolism. No evidence of right heart strain.    CARDIO:    - SBP goal 180-200 mmHg -BP augmentation for VSP; on norepinephrine 0.35-0.6 mcg/kg/min; will maintain CVP ~6 with crystalloids and colloids  - TTE WNL, pending repeat per pulm reccs  - Troponin stable   - EKG normal     ENDO:    - glucose goal 140-180    GI:    - NPO for angio  - needs thin liquid diet x3 weeks as per bariatric when tolerated  - PPI per bariatric surgeon    RENAL:   - Maintain euvolemia   - Na goal 140-145  - primafit    HEM/ONC:   - ASA 81  - VTE Prophylaxis: SCDs, SQL  - LE Duplex 8/9: Acute completely occlusive deep venous thrombosis of the right intramuscular calf vein, s/p IVCF with vascular 8/12    ID:   -MRSA neg 8/12  -febrile, trend leukocytosis   -empiric zosyn for fever d/c'd (8/10-13) and now restarted 8/14 for persistent fevers  -CSF sent 8/13, NGTD     Dispol ICU status: family updated  PT/OT: on hold due to critical status  Full code    Social: Daughter Maggie and Son Kenya and sister (on the phone) updated    *****    ATTENDING ATTESTATION:  I was physically present for the key portions of the evaluation and management (E/M) service provided.  I agree with the above history, physical and plan, which I have reviewed and edited where appropriate.    Patient at high risk for neurological deterioration or death due to:  ICU delirium, aspiration PNA, DVT / PE.  Critical care time:  I have personally provided 45 minutes of critical care time, excluding time spent on separate procedures.      Plan discussed with RN, house staff.       46y/o F with h/o recent gastric sleeve (08/04/21 [facility], Dr. Crisostomo ), fibromyalgia, thyroid dysfunction, PTSD, depression, anxiety.  Presented with seizures at home - brought to [place], with 1 more episode of seizure en route.  Intubated, CT showed SAH with IV extension, casted IV, hydrocephalus, given mannitol, levetiracetam 1g,  6mg ativan. Started on Cardene drip for BP goal < 140 and transferred to [facility] NICU for further management. HH5 MF4, BD 1 = 8/7. Now s/p cerebral angiogram for R vertebral artery takedown for R vertebral dissecting aneurysm (8/7), and R frontal EVD placement (8/7), now s/p IVC filter placement (8/12)    Plan:  NEURO:   - neurochecks q1h  - EVD open at -5 cm  - low dose propofol prn  - Niimodipine 60 mg po q4h   - Depakote 500q8 ( No keppra due to agitation) next level 8/18  - Ceribell EEG negative 8/8, d/c'ed   - cont ASA 81  - bromocriptine 15mg Q8  - s/p IA verapamil 15 mg for posterior circulation vasospasm    PULM:    - AC ventilation - intubated yesterday - thick secretions; R vocal cord paresis as per ENT  - CT PE 8/11: Probable pulmonary embolism in the left distal main and proximal lower lobar pulmonary artery, which was also seen on prior same day neck CT. No saddle embolism. No evidence of right heart strain.    3 days of decadron for stridor     CARDIO:    - SBP goal 180-200 mmHg -BP augmentation for VSP; on norepinephrine 0.35-0.6 mcg/kg/min; will maintain CVP ~6 with crystalloids and colloids  - TTE WNL, pending repeat per pulm reccs  - Troponin stable   - EKG normal     ENDO:    - glucose goal 140-180    GI:    - NPO for angio  - needs thin liquid diet x3 weeks as per bariatric when tolerated  - PPI per bariatric surgeon    RENAL:   - Maintain euvolemia   - Na goal 140-145  - primafit    HEM/ONC:   - ASA 81  - VTE Prophylaxis: SCDs, SQL  - LE Duplex 8/9: Acute completely occlusive deep venous thrombosis of the right intramuscular calf vein, s/p IVCF with vascular 8/12    ID:   -MRSA neg 8/12  -febrile, trend leukocytosis   -empiric zosyn for fever d/c'd (8/10-13) and now restarted 8/14 for persistent fevers  -CSF sent 8/13, NGTD     Dispol ICU status: family updated  PT/OT: on hold due to critical status  Full code    Social: Daughter Maggie and Son Kenya and sister (on the phone) updated    *****    ATTENDING ATTESTATION:  I was physically present for the key portions of the evaluation and management (E/M) service provided.  I agree with the above history, physical and plan, which I have reviewed and edited where appropriate.    Patient at high risk for neurological deterioration or death due to:  ICU delirium, aspiration PNA, DVT / PE.  Critical care time:  I have personally provided 45 minutes of critical care time, excluding time spent on separate procedures.      Plan discussed with RN, house staff.

## 2021-08-16 NOTE — PROGRESS NOTE ADULT - SUBJECTIVE AND OBJECTIVE BOX
HPI:  44y/o F with h/o recent gastric sleeve ([facility], Dr. Crisostomo ), fibromyalgia, thyroid dysfunction, PTSD, depression, anxiety.  Presented with seizures at home - brought to [place], with 1 more episode of seizure en route.  Intubated, CT showed SAH with IV extension, casted IV, hydrocephalus, given mannitol, levetiracetam 1g,  6mg ativan. Started on Cardene drip for BP goal < 140 and transferred to [facility] NICU for further management.     HH5 MF4   NIHSS 26 on arrival  BD 1 =  (07 Aug 2021 08:08)    OVERNIGHT EVENTS: CTH /CTA head/neck and CT chest performed for lethargy, R gaze preference, to r/o pna.    Hospital Course:   : Tx from [place] for SAH. Intubated. R frontal EVD placed, central line and a line placed. 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   : POD1 s/p angio with R vert takedown, extubated, desatting on aerosol mask, required extensive suctioning, 3% nebs, chest PT, started on low dose precedex drip for restlessness/agitation, ABG stable. NGT placed. TF started with goal 20 pending nutrition recs. 3% stopped for Na 150. Ceribell EEG negative and d/c'ed.    Overnight, new Right pronator drift and right gaze preference that can't cross midline, Repeat CTH demonstrated stable vents, CTA head and neck unremarkable for spasm, hypoattenuation of right cerebellum edema vs. infarct.  8/10: POD3 R vert takedown, EVD open at [id number]. ASHA overnight, neuro stable. CTH with increased hydro, CTA head/neck with mild basilar spasm, but concern for PE. 1/2 am D50 for hypoglycemia. 1L bolus then 100 cc/hr, PM rounds for net negative fluid balance and mild vasospasm on CTA.   : POD4 R vert takedown. EVD at 5cm H2O, ASHA overnight. neuro stable.   Febrile 104. depakote increased to q6. NCHCT stable. EVD lowered to 0. Lasix 20 given for dieuresis, UOP over 2 liters. Sedation given for a-line placement, BP drop needing levo.  12: POD5 R vert takedown. EVD at 0cm H2O. ASHA overnight, neuro stable. Precedex being weaned off. Pending IVCF with vascular today.  : POD6 R vert takedown. EVD open at 0cmH2O. ASHA overnight. Neuro exam stable. Mottled skin on the left lower extremity improving. Started on Bromocriptine 15mg Q8.   : POD7. EVD open at 0. 1L bolus given for euvolemia o/n. neuro stable. CTH stable. ENT scoped vocal cords, +R voacl cord paresis, NTD. started on zosyn empirically.   8/15: POD8. EVD open at 0. ASHA o/n, neuro stable. Pt developed increased work of breathing, unable to clear her secretions, received racemic epi and multiple nebulizer treatments, still with stridor. Patient re-intubated at bedside, on full vent support.   : CTH/CTA head/neck/CT chest performed o/n for worsened exam, R gaze preference, sluggish pupils.     Vital Signs Last 24 Hrs  T(C): 37.2 (15 Aug 2021 21:00), Max: 38.4 (15 Aug 2021 16:00)  T(F): 98.9 (15 Aug 2021 21:00), Max: 101.1 (15 Aug 2021 16:00)  HR: 98 (15 Aug 2021 23:00) (91 - 108)  BP: 139/85 (15 Aug 2021 19:00) (125/73 - 183/79)  BP(mean): 106 (15 Aug 2021 19:00) (94 - 118)  RR: 18 (15 Aug 2021 23:00) (16 - 26)  SpO2: 99% (15 Aug 2021 23:00) (88% - 100%)    I&O's Summary    14 Aug 2021 07:01  -  15 Aug 2021 07:00  --------------------------------------------------------  IN: 2959 mL / OUT: 2659 mL / NET: 300 mL    15 Aug 2021 07:01  -  16 Aug 2021 00:40  --------------------------------------------------------  IN: 1534 mL / OUT: 1139 mL / NET: 395 mL        PHYSICAL EXAM:  GEN: NAD  NEURO: awake, not alert. does not FC, OE spont, face symmetric. +R gaze preference, unable to cross midline. b/l pupils 4mm sluggish. w/d to noxious throughout.  CV: RRR +S1/S2  PULM: CTAB  GI: Abd soft, NT/ND  EXT: ext warm, dry, nontender  WOUND: R evd site c/d/i    TUBES/LINES:  [] Richey  [] Lumbar Drain  [] Wound Drains  [x] CVC R IJ  [x] R frontal EVD @0cm h2o      DIET:  [x] NPO  [] Mechanical  [] Tube feeds    LABS:                        10.7   15.14 )-----------( 245      ( 15 Aug 2021 05:11 )             36.4     08-15    152<H>  |  115<H>  |  28<H>  ----------------------------<  129<H>  3.8   |  28  |  0.48<L>    Ca    9.0      15 Aug 2021 05:11  Phos  4.1     08-15  Mg     2.5     08-15        Urinalysis Basic - ( 14 Aug 2021 12:01 )    Color: Yellow / Appearance: Clear / S.020 / pH: x  Gluc: x / Ketone: 15 mg/dL  / Bili: Negative / Urobili: 0.2 E.U./dL   Blood: x / Protein: NEGATIVE mg/dL / Nitrite: NEGATIVE   Leuk Esterase: NEGATIVE / RBC: x / WBC x   Sq Epi: x / Non Sq Epi: x / Bacteria: x          CAPILLARY BLOOD GLUCOSE      POCT Blood Glucose.: 122 mg/dL (15 Aug 2021 17:13)      Drug Levels: [] N/A  Valproic Acid Level, Serum: 62.0 ug/mL (0813 @ 05:12)  Vancomycin Level, Trough: 5.7 ug/mL ( @ 05:25)    CSF Analysis: [] N/A      Allergies    Allergy Status Unknown    Intolerances      MEDICATIONS:  Antibiotics:  piperacillin/tazobactam IVPB.. 3.375 Gram(s) IV Intermittent every 6 hours    Neuro:  acetaminophen    Suspension .. 650 milliGRAM(s) Oral every 6 hours PRN  acetaminophen 300 mG/butalbital 50 mG/ caffeine 40 mG 1 Capsule(s) Oral every 6 hours  bromocriptine Capsule 15 milliGRAM(s) Oral every 8 hours  ondansetron Injectable 4 milliGRAM(s) IV Push every 6 hours PRN  traMADol 25 milliGRAM(s) Oral every 6 hours PRN  valproate sodium IVPB 500 milliGRAM(s) IV Intermittent every 8 hours    Anticoagulation:  aspirin  chewable 81 milliGRAM(s) Oral daily  enoxaparin Injectable 40 milliGRAM(s) SubCutaneous every 12 hours    OTHER:  albuterol/ipratropium for Nebulization 3 milliLiter(s) Nebulizer every 6 hours  chlorhexidine 2% Cloths 1 Application(s) Topical <User Schedule>  dexAMETHasone  Injectable 6 milliGRAM(s) IV Push every 6 hours  dextrose 40% Gel 15 Gram(s) Oral once  dextrose 50% Injectable 25 Gram(s) IV Push once  dextrose 50% Injectable 12.5 Gram(s) IV Push once  dextrose 50% Injectable 25 Gram(s) IV Push once  glucagon  Injectable 1 milliGRAM(s) IntraMuscular once  hydrALAZINE Injectable 10 milliGRAM(s) IV Push every 3 hours PRN  insulin lispro (ADMELOG) corrective regimen sliding scale   SubCutaneous Before meals and at bedtime  labetalol Injectable 10 milliGRAM(s) IV Push every 2 hours PRN  niMODipine 60 milliGRAM(s) Oral every 4 hours  senna 2 Tablet(s) Oral at bedtime  sodium chloride 3%  Inhalation 3 milliLiter(s) Inhalation every 6 hours    IVF:  dextrose 5%. 1000 milliLiter(s) IV Continuous <Continuous>  dextrose 5%. 1000 milliLiter(s) IV Continuous <Continuous>    CULTURES:  Culture Results:   No growth to date ( @ 07:29)  Culture Results:   No growth at 5 days. (08-10 @ 12:08)    RADIOLOGY & ADDITIONAL TESTS:      ASSESSMENT:  44y/o F with h/o recent gastric sleeve ([facility], Dr. Crisostomo ), fibromyalgia, thyroid dysfunction, PTSD, depression, anxiety.  Presented with seizures at home - brought to [place], with 1 more episode of seizure en route.  Intubated, CT showed SAH with IV extension, casted IV, hydrocephalus, given mannitol, levetiracetam 1g,  6mg ativan. Started on Cardene drip for BP goal < 140 and transferred to [facility] NICU for further management. HH5 MF4, BD 1 = . Now s/p cerebral angiogram for R vertebral artery takedown for R vertebral dissecting aneurysm (), and R frontal EVD placement (), now s/p IVC filter placement ()    HEAD BLEED    No pertinent family history in first degree relatives    Handoff    Cerebral aneurysm    Cerebral aneurysm    DVT, lower extremity    Pulmonary embolism    Multiple subsegmental pulmonary emboli without acute cor pulmonale    DVT, lower extremity    Angiogram, carotid and cerebral arteries    IVC filter placement    [email or web address]        PLAN:  NEURO:   - neurochecks q1h  - EVD open at 0 , allow up to 30cc/hr  - Precedex dc'd, prn fentanyl pushes   - Niimodipine 60 mg po q4h   - Depakote 500q8 ( No keppra due to agitation) next level   - Ceribell EEG negative , d/c'ed   - cont ASA 81  - CTH 8/10 with worsening hydro, mild basilar spasm  - f/u CTH/CTA head/neck reads 8/15  - Bromocriptine 15mg Q8  - Per ENT, no vocal cord issues    PULM:    - Hypertonic nebs/duonebs   - CT PE : Probable pulmonary embolism in the left distal main and proximal lower lobar pulmonary artery, which was also seen on prior same day neck CT. No saddle embolism. No evidence of right heart strain.    CARDIO:    - SBP goal 120-180mmHg  - TTE WNL, pending repeat per pulm reccs  - Troponin stable   - EKG normal     ENDO:    - glucose goal 140-180    GI:    - NPO for possible angio  - needs thin liquid diet x3 weeks as per bariatric when tolerated  - PPI per bariatric surgeon    RENAL:   - Maintain euvolemia   - Na goal 140-145  - primafit  - 50cc/hr NS total goal 100cc of fluids per hr     HEM/ONC:   - ASA 81  - VTE Prophylaxis: SCDs, SQL  - LE Duplex : Acute completely occlusive deep venous thrombosis of the right intramuscular calf vein, s/p IVCF with vascular     ID:   -MRSA neg   -febrile, trend leukocytosis   -empiric zosyn for fever d/c'd (8/10-) and now restarted  for persistent fevers  -CSF sent , NGTD     Dispol ICU status: family updated  PT/OT: on hold due to critical status  Full code    D/w Dr. Lomax, Dr. Yan       Assessment:  Present when checked    []  GCS  E   V  M     Heart Failure: []Acute, [] acute on chronic , []chronic  Heart Failure:  [] Diastolic (HFpEF), [] Systolic (HFrEF), []Combined (HFpEF and HFrEF), [] RHF, [] Pulm HTN, [] Other    [] TRUDI, [] ATN, [] AIN, [] other  [] CKD1, [] CKD2, [] CKD 3, [] CKD 4, [] CKD 5, []ESRD    Encephalopathy: [] Metabolic, [] Hepatic, [] toxic, [] Neurological, [] Other    Abnormal Nurtitional Status: [] malnurtition (see nutrition note), [ ]underweight: BMI < 19, [] morbid obesity: BMI >40, [] Cachexia    [] Sepsis  [] hypovolemic shock,[] cardiogenic shock, [] hemorrhagic shock, [] neuogenic shock  [] Acute Respiratory Failure  []Cerebral edema, [] Brain compression/ herniation,   [] Functional quadriplegia  [] Acute blood loss anemia

## 2021-08-16 NOTE — BRIEF OPERATIVE NOTE - OPERATION/FINDINGS
Femoral cerebral angiogram performed emergently for concern of cerebral vasospasm under GA via right CFA using a 5Fr short sheath. Left vertebral injection demonstrates moderate diffuse vasospasm of left vertebral artery, basilar artery and posterior circulation including PCAs. 15mg Verapamil injected via left vert with mild radiographic improvement post treatment. Right ICA injection with mild supraclinoid spasm/narrowing, otherwise no vasospasm appreciated in anterior circulation. RIght groin closed with exoseal and 5 minutes manual compression with hemostasis obtained. EVD maintained open with 20cc output. Otherwise hemodynamically stable on pressor therapy.    Full report to follow, d/w Dr. Mohr.

## 2021-08-16 NOTE — PROGRESS NOTE ADULT - SUBJECTIVE AND OBJECTIVE BOX
NEUROSURGERY POST OP NOTE:    POD# 0 S/P femoral cerebral angiogram performed where posterior cerebral, basilar, and vertebral artery spasms were appreciated.     S: Patient was seen in ICU post-op; family at was at bedside updated. On Propofol, intubated, following commands, no acute complaints at this time.     T(C): 36.8 (08-16-21 @ 10:45), Max: 38.4 (08-15-21 @ 16:00)  HR: 113 (08-16-21 @ 12:30) (91 - 133)  BP: 200/93 (08-16-21 @ 12:30) (125/73 - 200/93)  RR: 20 (08-16-21 @ 12:25) (16 - 22)  SpO2: 99% (08-16-21 @ 12:30) (96% - 100%)      08-15-21 @ 07:01  -  08-16-21 @ 07:00  --------------------------------------------------------  IN: 2411.2 mL / OUT: 1979 mL / NET: 432.2 mL    08-16-21 @ 07:01  -  08-16-21 @ 12:37  --------------------------------------------------------  IN: 1694 mL / OUT: 189 mL / NET: 1505 mL        acetaminophen    Suspension .. 650 milliGRAM(s) Oral every 6 hours PRN  acetaminophen 300 mG/butalbital 50 mG/ caffeine 40 mG 1 Capsule(s) Oral every 6 hours  albumin human  5% IVPB 250 milliLiter(s) IV Intermittent once  albuterol/ipratropium for Nebulization 3 milliLiter(s) Nebulizer every 6 hours  aspirin  chewable 81 milliGRAM(s) Oral daily  bromocriptine Capsule 15 milliGRAM(s) Oral every 8 hours  chlorhexidine 2% Cloths 1 Application(s) Topical <User Schedule>  dexAMETHasone  Injectable 4 milliGRAM(s) IV Push every 6 hours  dextrose 40% Gel 15 Gram(s) Oral once  dextrose 5%. 1000 milliLiter(s) IV Continuous <Continuous>  dextrose 5%. 1000 milliLiter(s) IV Continuous <Continuous>  dextrose 50% Injectable 25 Gram(s) IV Push once  dextrose 50% Injectable 12.5 Gram(s) IV Push once  dextrose 50% Injectable 25 Gram(s) IV Push once  enoxaparin Injectable 40 milliGRAM(s) SubCutaneous every 12 hours  glucagon  Injectable 1 milliGRAM(s) IntraMuscular once  insulin lispro (ADMELOG) corrective regimen sliding scale   SubCutaneous Before meals and at bedtime  norepinephrine Infusion 0.05 MICROgram(s)/kG/Min IV Continuous <Continuous>  ondansetron Injectable 4 milliGRAM(s) IV Push every 6 hours PRN  piperacillin/tazobactam IVPB.. 3.375 Gram(s) IV Intermittent every 6 hours  propofol Infusion 10 MICROgram(s)/kG/Min IV Continuous <Continuous>  senna 2 Tablet(s) Oral at bedtime  sodium chloride 0.9%. 1000 milliLiter(s) IV Continuous <Continuous>  traMADol 25 milliGRAM(s) Oral every 6 hours PRN  valproate sodium IVPB 500 milliGRAM(s) IV Intermittent every 8 hours      RADIOLOGY: X     Exam: Propofol held for exam   Constitutional: NAD, well groomed, obese, sedated on propofol   Respiratory: breathing non-labored, symmetrical chest wall movement, intubated   Cardiovascuar: RRR, no murmurs  Gastrointestinal: abdomen soft, non tender  Neurological:  open eyes to verbal stimuli,  Face symmetric, non-verbal   Cranial Nerves: II-XII intact, pupils 3mm and reactive,   Motor: follows commands { squeezes hand, wiggled toes on right foot  Sensation: intact to light touch in all extremities  Extremities: distal pulses 2+ x4  Wound/incision: right groin dressing dry and intact, no hematmoa present,   Drain: EVD at -5        Assessment:44y/o F with h/o recent gastric sleeve (08/04/21 [facility], Dr. Crisostomo ), fibromyalgia, thyroid dysfunction, PTSD, depression, anxiety.  Presented with seizures at home - brought to [place], with 1 more episode of seizure en route.  Intubated, CT showed SAH with IV extension, casted IV, hydrocephalus, given mannitol, levetiracetam 1g,  6mg ativan. Started on Cardene drip for BP goal < 140 and transferred to [facility] NICU for further management. HH5 MF4, BD 1 = 8/7. Now s/p cerebral angiogram for R vertebral artery takedown for R vertebral dissecting aneurysm (8/7), and R frontal EVD placement (8/7), now s/p IVC filter placement (8/12) Now s/p angiogram { 8/16} with verapamil       Plan:  NEURO:   - neurochecks q1h  - EVD open at -5 , allow up to 40cc/hr  - Propofol for sedation    - Niimodipine 60 mg po q4h - held on 8/16 for SBP goal 180-200   - Depakote 500q8 ( No keppra due to agitation) next level 8/18  - Ceribell EEG negative 8/8, d/c'ed   - cont ASA 81  - CTH 8/15: worsned uncal herniation, worsened hydro, vasospasm in b/l PCA, L vert, basilar arteries  - f/u CTH/CTA head/neck reads 8/15  - Bromocriptine 15mg Q8  - Per ENT, no vocal cord issues  - groin cheks qh   - supine at 6 hours s/p angiogram,     PULM:    - Hypertonic nebs/duonebs   - CT PE 8/11: Probable pulmonary embolism in the left distal main and proximal lower lobar pulmonary artery, which was also seen on prior same day neck CT. No saddle embolism. No evidence of right heart strain.    CARDIO:    - -200  - levo gtt weaning, on midodrine 10 q8, 5% albumin 500  - TTE WNL, pending repeat per pulm reccs  - Troponin stable   - EKG normal     ENDO:    - glucose goal 140-180    GI:    - restart tube feeds   - needs thin liquid diet x3 weeks as per bariatric when tolerated  - PPI per bariatric surgeon    RENAL:   - Maintain euvolemia   - Na goal 140-145  - primafit  - 50cc/hr NS total goal 100cc of fluids per hr     HEM/ONC:   - ASA 81  - VTE Prophylaxis: SCDs, SQL  - LE Duplex 8/9: Acute completely occlusive deep venous thrombosis of the right intramuscular calf vein, s/p IVCF with vascular 8/12    ID:   -MRSA neg 8/12  -febrile, trend leukocytosis   -empiric zosyn for fever d/c'd (8/10-13) and now restarted 8/14 for persistent fevers  -CSF sent 8/13, NGTD     Dispol ICU status: family updated  PT/OT: on hold due to critical status  Full code    D/w Dr. Lomax, Dr. Yan       Assessment:  Present when checked    []  GCS  E   V  M     Heart Failure: []Acute, [] acute on chronic , []chronic  Heart Failure:  [] Diastolic (HFpEF), [] Systolic (HFrEF), []Combined (HFpEF and HFrEF), [] RHF, [] Pulm HTN, [] Other    [] TRUDI, [] ATN, [] AIN, [] other  [] CKD1, [] CKD2, [] CKD 3, [] CKD 4, [] CKD 5, []ESRD    Encephalopathy: [] Metabolic, [] Hepatic, [] toxic, [] Neurological, [] Other    Abnormal Nurtitional Status: [] malnurtition (see nutrition note), [ ]underweight: BMI < 19, [] morbid obesity: BMI >40, [] Cachexia    [] Sepsis  [] hypovolemic shock,[] cardiogenic shock, [] hemorrhagic shock, [] neuogenic shock  [] Acute Respiratory Failure  []Cerebral edema, [] Brain compression/ herniation,   [] Functional quadriplegia  [] Acute blood loss anemia

## 2021-08-16 NOTE — PROGRESS NOTE ADULT - SUBJECTIVE AND OBJECTIVE BOX
Surgery: cerebral angiogram  Consent: Signed by patient vs HCP                   NAME/NUMBER of HCP:     Representative Consent: [  ] Signed by patient vs HCP                                                 [  ] N/A -> only for cerebral angiogram    Allergy Status Unknown      T(C): 37.8 (21 @ 01:22), Max: 38.4 (08-15-21 @ 16:00)  HR: 99 (21 @ 01:00) (91 - 108)  BP: 139/85 (08-15-21 @ 19:00) (125/73 - 183/79)  RR: 18 (21 @ 01:00) (16 - 26)  SpO2: 99% (21 @ 01:00) (88% - 100%)  Wt(kg): --    EXAM:  GEN: NAD  NEURO: awake, not alert. does not FC, OE spont, face symmetric. +R gaze preference, unable to cross midline. b/l pupils 4mm sluggish. w/d to noxious throughout.  CV: RRR +S1/S2  PULM: CTAB  GI: Abd soft, NT/ND  EXT: ext warm, dry, nontender  WOUND: R evd site c/d/i    08-15    152<H>  |  115<H>  |  28<H>  ----------------------------<  129<H>  3.8   |  28  |  0.48<L>    Ca    9.0      15 Aug 2021 05:11  Phos  4.1     08-15  Mg     2.5     08-15      CBC Full  -  ( 15 Aug 2021 05:11 )  WBC Count : 15.14 K/uL  RBC Count : 4.38 M/uL  Hemoglobin : 10.7 g/dL  Hematocrit : 36.4 %  Platelet Count - Automated : 245 K/uL  Mean Cell Volume : 83.1 fl  Mean Cell Hemoglobin : 24.4 pg  Mean Cell Hemoglobin Concentration : 29.4 gm/dL  Auto Neutrophil # : x  Auto Lymphocyte # : x  Auto Monocyte # : x  Auto Eosinophil # : x  Auto Basophil # : x  Auto Neutrophil % : x  Auto Lymphocyte % : x  Auto Monocyte % : x  Auto Eosinophil % : x  Auto Basophil % : x        Pregnancy test: pending  Type & Screen (in past 72hrs):      2 Type & Screen within 72 hours if anticipate blood need in OR: pending     Blood ordered and on hold for OR:   [ x] No need     [ ] 1u pRBC on hold      [ ] 2u pRBC on hold    COVID swab (in past 48hrs): x Y  _N    CXR: CT chest performed 8/15  EK/8  ECHO:   Medical Clearances: NSICU  Other Clearances:     Last dose of antiplatelet/anticoagulation drug: SQL     Implanted Devices (pacemaker, drug pump...etc):  []YES   [x] NO                  If yes --> EPS consulted to interrogate device: [ ] YES  [ ] NO                            If yes -->  EPS called to let them know patient going for surgery: [ ] device needs to be turned off                                                                                                                                                 [ ] magnet needs to be placed for surgery                                                                                                                                                [ ] nothing to do per EP, may proceed with bovie use in OR                                       3M nasal swab ordered?  _Y  x N    Cranial surgery: Order written for hair to be shampooed night before surgery and morning before surgery  [] yes   [x]no  Chlorhexidine Wipes ordered for Neck Down? x Y  _ N  (twice a day if 1 day before surgery, daily for 3 days if 3 days prior, daily if in ICU)                 Assessment: 46y/o F with h/o recent gastric sleeve (21 [facility], Dr. Crisostomo ), fibromyalgia, thyroid dysfunction, PTSD, depression, anxiety.  Presented with seizures at home - brought to [place], with 1 more episode of seizure en route.  Intubated, CT showed SAH with IV extension, casted IV, hydrocephalus, given mannitol, levetiracetam 1g,  6mg ativan. Started on Cardene drip for BP goal < 140 and transferred to [facility] NICU for further management. HH5 MF4, BD 1 = . Now s/p cerebral angiogram for R vertebral artery takedown for R vertebral dissecting aneurysm (), and R frontal EVD placement (), now s/p IVC filter placement ()    Plan:  NEURO:   - neurochecks q1h  - EVD open at 0 , allow up to 30cc/hr  - Precedex dc'd, prn fentanyl pushes   - Niimodipine 60 mg po q4h   - Depakote 500q8 ( No keppra due to agitation) next level   - Ceribell EEG negative , d/c'ed   - cont ASA 81  - CTH 8/15: worsned uncal herniation, worsened hydro, vasospasm in b/l PCA, L vert, basilar arteries  - f/u CTH/CTA head/neck reads 8/15  - Bromocriptine 15mg Q8  - Per ENT, no vocal cord issues    PULM:    - Hypertonic nebs/duonebs   - CT PE : Probable pulmonary embolism in the left distal main and proximal lower lobar pulmonary artery, which was also seen on prior same day neck CT. No saddle embolism. No evidence of right heart strain.    CARDIO:    - SBP goal 120-180mmHg  - TTE WNL, pending repeat per pulm reccs  - Troponin stable   - EKG normal     ENDO:    - glucose goal 140-180    GI:    - NPO for angio  - needs thin liquid diet x3 weeks as per bariatric when tolerated  - PPI per bariatric surgeon    RENAL:   - Maintain euvolemia   - Na goal 140-145  - primafit  - 50cc/hr NS total goal 100cc of fluids per hr     HEM/ONC:   - ASA 81  - VTE Prophylaxis: SCDs, SQL  - LE Duplex : Acute completely occlusive deep venous thrombosis of the right intramuscular calf vein, s/p IVCF with vascular     ID:   -MRSA neg   -febrile, trend leukocytosis   -empiric zosyn for fever d/c'd (8/10-) and now restarted  for persistent fevers  -CSF sent , NGTD     Dispol ICU status: family updated  PT/OT: on hold due to critical status  Full code    D/w Dr. Lomax, Dr. Yan     Assessment:  Present when checked    []  GCS  E   V  M     Heart Failure: []Acute, [] acute on chronic , []chronic  Heart Failure:  [] Diastolic (HFpEF), [] Systolic (HFrEF), []Combined (HFpEF and HFrEF), [] RHF, [] Pulm HTN, [] Other    [] TRUDI, [] ATN, [] AIN, [] other  [] CKD1, [] CKD2, [] CKD 3, [] CKD 4, [] CKD 5, []ESRD    Encephalopathy: [] Metabolic, [] Hepatic, [] toxic, [] Neurological, [] Other    Abnormal Nurtitional Status: [] malnurtition (see nutrition note), [ ]underweight: BMI < 19, [] morbid obesity: BMI >40, [] Cachexia    [] Sepsis  [] hypovolemic shock,[] cardiogenic shock, [] hemorrhagic shock, [] neuogenic shock  [] Acute Respiratory Failure  []Cerebral edema, [] Brain compression/ herniation,   [] Functional quadriplegia  [] Acute blood loss anemia   Surgery: cerebral angiogram  Consent: Signed by HCP                   NAME/NUMBER of HCP: daughter Maggie Blum [phone]    Representative Consent: [  ] Signed by patient vs HCP                                                 [  ] N/A -> only for cerebral angiogram    Allergy Status Unknown      T(C): 37.8 (21 @ 01:22), Max: 38.4 (08-15-21 @ 16:00)  HR: 99 (21 @ 01:00) (91 - 108)  BP: 139/85 (08-15-21 @ 19:00) (125/73 - 183/79)  RR: 18 (21 @ 01:00) (16 - 26)  SpO2: 99% (21 @ 01:00) (88% - 100%)  Wt(kg): --    EXAM:  GEN: NAD  NEURO: awake, not alert. does not FC, OE spont, face symmetric. +R gaze preference, unable to cross midline. b/l pupils 4mm sluggish. w/d to noxious throughout.  CV: RRR +S1/S2  PULM: CTAB  GI: Abd soft, NT/ND  EXT: ext warm, dry, nontender  WOUND: R evd site c/d/i    08-15    152<H>  |  115<H>  |  28<H>  ----------------------------<  129<H>  3.8   |  28  |  0.48<L>    Ca    9.0      15 Aug 2021 05:11  Phos  4.1     08-15  Mg     2.5     08-15      CBC Full  -  ( 15 Aug 2021 05:11 )  WBC Count : 15.14 K/uL  RBC Count : 4.38 M/uL  Hemoglobin : 10.7 g/dL  Hematocrit : 36.4 %  Platelet Count - Automated : 245 K/uL  Mean Cell Volume : 83.1 fl  Mean Cell Hemoglobin : 24.4 pg  Mean Cell Hemoglobin Concentration : 29.4 gm/dL  Auto Neutrophil # : x  Auto Lymphocyte # : x  Auto Monocyte # : x  Auto Eosinophil # : x  Auto Basophil # : x  Auto Neutrophil % : x  Auto Lymphocyte % : x  Auto Monocyte % : x  Auto Eosinophil % : x  Auto Basophil % : x        Pregnancy test: pending  Type & Screen (in past 72hrs):      2 Type & Screen within 72 hours if anticipate blood need in OR: pending     Blood ordered and on hold for OR:   [ x] No need     [ ] 1u pRBC on hold      [ ] 2u pRBC on hold    COVID swab (in past 48hrs): x Y  _N    CXR: CT chest performed 8/15  EK/8  ECHO:   Medical Clearances: NSICU  Other Clearances:     Last dose of antiplatelet/anticoagulation drug: SQL     Implanted Devices (pacemaker, drug pump...etc):  []YES   [x] NO                  If yes --> EPS consulted to interrogate device: [ ] YES  [ ] NO                            If yes -->  EPS called to let them know patient going for surgery: [ ] device needs to be turned off                                                                                                                                                 [ ] magnet needs to be placed for surgery                                                                                                                                                [ ] nothing to do per EP, may proceed with bovie use in OR                                       3M nasal swab ordered?  _Y  x N    Cranial surgery: Order written for hair to be shampooed night before surgery and morning before surgery  [] yes   [x]no  Chlorhexidine Wipes ordered for Neck Down? x Y  _ N  (twice a day if 1 day before surgery, daily for 3 days if 3 days prior, daily if in ICU)                 Assessment: 46y/o F with h/o recent gastric sleeve (21 [facility], Dr. Crisostomo ), fibromyalgia, thyroid dysfunction, PTSD, depression, anxiety.  Presented with seizures at home - brought to [place], with 1 more episode of seizure en route.  Intubated, CT showed SAH with IV extension, casted IV, hydrocephalus, given mannitol, levetiracetam 1g,  6mg ativan. Started on Cardene drip for BP goal < 140 and transferred to [facility] NICU for further management. HH5 MF4, BD 1 = . Now s/p cerebral angiogram for R vertebral artery takedown for R vertebral dissecting aneurysm (), and R frontal EVD placement (), now s/p IVC filter placement ()    Plan:  NEURO:   - neurochecks q1h  - EVD open at 0 , allow up to 30cc/hr  - Precedex dc'd, prn fentanyl pushes   - Niimodipine 60 mg po q4h   - Depakote 500q8 ( No keppra due to agitation) next level   - Ceribell EEG negative , d/c'ed   - cont ASA 81  - CTH 8/15: worsned uncal herniation, worsened hydro, vasospasm in b/l PCA, L vert, basilar arteries  - f/u CTH/CTA head/neck reads 8/15  - Bromocriptine 15mg Q8  - Per ENT, no vocal cord issues    PULM:    - Hypertonic nebs/duonebs   - CT PE : Probable pulmonary embolism in the left distal main and proximal lower lobar pulmonary artery, which was also seen on prior same day neck CT. No saddle embolism. No evidence of right heart strain.    CARDIO:    - SBP goal 120-180mmHg  - TTE WNL, pending repeat per pulm reccs  - Troponin stable   - EKG normal     ENDO:    - glucose goal 140-180    GI:    - NPO for angio  - needs thin liquid diet x3 weeks as per bariatric when tolerated  - PPI per bariatric surgeon    RENAL:   - Maintain euvolemia   - Na goal 140-145  - primafit  - 50cc/hr NS total goal 100cc of fluids per hr     HEM/ONC:   - ASA 81  - VTE Prophylaxis: SCDs, SQL  - LE Duplex : Acute completely occlusive deep venous thrombosis of the right intramuscular calf vein, s/p IVCF with vascular     ID:   -MRSA neg   -febrile, trend leukocytosis   -empiric zosyn for fever d/c'd (8/10-) and now restarted  for persistent fevers  -CSF sent , NGTD     Dispol ICU status: family updated  PT/OT: on hold due to critical status  Full code    D/w Dr. Lomax, Dr. Yan     Assessment:  Present when checked    []  GCS  E   V  M     Heart Failure: []Acute, [] acute on chronic , []chronic  Heart Failure:  [] Diastolic (HFpEF), [] Systolic (HFrEF), []Combined (HFpEF and HFrEF), [] RHF, [] Pulm HTN, [] Other    [] TRUDI, [] ATN, [] AIN, [] other  [] CKD1, [] CKD2, [] CKD 3, [] CKD 4, [] CKD 5, []ESRD    Encephalopathy: [] Metabolic, [] Hepatic, [] toxic, [] Neurological, [] Other    Abnormal Nurtitional Status: [] malnurtition (see nutrition note), [ ]underweight: BMI < 19, [] morbid obesity: BMI >40, [] Cachexia    [] Sepsis  [] hypovolemic shock,[] cardiogenic shock, [] hemorrhagic shock, [] neuogenic shock  [] Acute Respiratory Failure  []Cerebral edema, [] Brain compression/ herniation,   [] Functional quadriplegia  [] Acute blood loss anemia

## 2021-08-16 NOTE — PROGRESS NOTE ADULT - SUBJECTIVE AND OBJECTIVE BOX
==============================================   NEUROCRITICAL CARE ATTENDING NOTE (Monday, August 16, 2021)  ==============================================    LUDMILA PRIETO   MRN-[id number]  Summary:  45y/F with h/o recent gastric sleeve (08/04 [facility], Dr. Crisostomo ), fibromyalgia, thyroid dysfunction, PTSD, depression, anxiety.  Presented with seizures at home - brought to [place], with 1 more episode of seizure en route.  Intubated, CT showed SAH with IV extension, casted IV, hydrocephalus, given mannitol levetiracetam 6mg ativan, transferred to [facility].    Hospital Course:   8/7: Tx from [place] for SAH. Intubated. R frontal EVD placed, central line and a line placed. 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   8/8: POD1 s/p angio with R vert takedown, extubated, desatting on aerosol mask, required extensive suctioning, 3% nebs, chest PT, started on low dose precedex drip for restlessness/agitation, ABG stable. NGT placed. TF started with goal 20 pending nutrition recs. 3% stopped for Na 150. Ceribell EEG negative and d/c'ed.   8/9 Overnight, new Right pronator drift and right gaze preference that can't cross midline, Repeat CTH demonstrated stable vents, CTA head and neck unremarkable for spasm, hypoattenuation of right cerebellum edema vs. infarct.  8/10: POD3 R vert takedown, EVD open at 43sfA3F. ASHA overnight, neuro stable. CTH with increased hydro, CTA head/neck with mild basilar spasm, but concern for PE. 1/2 am D50 for hypoglycemia. 1L bolus then 100 cc/hr, PM rounds for net negative fluid balance and mild vasospasm on CTA.   8/11: POD4 R vert takedown. EVD at 5cm H2O, ASHA overnight. neuro stable.   Febrile 104. depakote increased to q6. NCHCT stable. EVD lowered to 0. Lasix 20 given for dieuresis, UOP over 2 liters. Sedation given for a-line placement, BP drop needing levo.  8/12: POD5 R vert takedown. EVD at 0cm H2O. ASHA overnight, neuro stable. Precedex being weaned off. Pending IVCF with vascular today.  8/13: POD6 R vert takedown. EVD open at 0cmH2O. ASHA overnight. Neuro exam stable. Mottled skin on the left lower extremity improving. Started on Bromocriptine 15mg Q8.   8/14: POD7. EVD open at 0. 1L bolus given for euvolemia o/n. neuro stable. CTH stable. ENT scoped vocal cords, +R vocal cord paresis, NTD. started on zosyn empirically.   8/15: POD8. EVD open at 0. ASHA o/n, neuro stable. Pt developed increased work of breathing, unable to clear her secretions, received racemic epi and multiple nebulizer treatments, still with stridor. Patient re-intubated at bedside, on full vent support.   8/16: CTH/CTA head/neck/CT chest performed o/n for worsened exam, R gaze preference, sluggish pupils. CTA shows posterior circulation vasospasm.  Neurologically improved on norepinephrine and after 15 mg IA verapamil, sBP maintained ~180 mmHg, CVP ~ 6.    Past Medical History:  s/p gastric sleeve surgery  Allergies:  Allergy Status Unknown  Home meds:     Current Meds:  MEDICATIONS  (STANDING):  acetaminophen 300 mG/butalbital 50 mG/ caffeine 40 mG 1 Capsule(s) Oral every 6 hours  albumin human  5% IVPB 250 milliLiter(s) IV Intermittent once  albuterol/ipratropium for Nebulization 3 milliLiter(s) Nebulizer every 6 hours  aspirin  chewable 81 milliGRAM(s) Oral daily  bromocriptine Capsule 15 milliGRAM(s) Oral every 8 hours  dexAMETHasone  Injectable 4 milliGRAM(s) IV Push every 6 hours  enoxaparin Injectable 40 milliGRAM(s) SubCutaneous every 12 hours  insulin lispro (ADMELOG) corrective regimen sliding scale   SubCutaneous Before meals and at bedtime  norepinephrine Infusion 0.05 MICROgram(s)/kG/Min (4.79 mL/Hr) IV Continuous <Continuous>  piperacillin/tazobactam IVPB.. 3.375 Gram(s) IV Intermittent every 6 hours  propofol Infusion 10 MICROgram(s)/kG/Min (6.13 mL/Hr) IV Continuous <Continuous>  senna 2 Tablet(s) Oral at bedtime  sodium chloride 0.9%. 1000 milliLiter(s) (100 mL/Hr) IV Continuous <Continuous>  valproate sodium IVPB 500 milliGRAM(s) IV Intermittent every 8 hours    MEDICATIONS  (PRN):  acetaminophen    Suspension .. 650 milliGRAM(s) Oral every 6 hours PRN Temp greater or equal to 38C (100.4F), Mild Pain (1 - 3)  ondansetron Injectable 4 milliGRAM(s) IV Push every 6 hours PRN Nausea and/or Vomiting  traMADol 25 milliGRAM(s) Oral every 6 hours PRN Moderate Pain (4 - 6)    Mode: AC, RR (machine): 14, TV (machine): 450, FiO2: 30, PEEP: 5, ITime: 1, MAP: 11, PIP: 18  NEUROLOGIC EXAMINATION:  Patient is rousable, restless at times, CON, no vertical/rotatory nystagmus, R gaze preference, downgaze preference, R slight LMN asymmetry, R + cerebellar drift, L spontaneous 4+/5, R UE 3/5, R LE 4-/5  EENT:  anicteric  CARDIOVASCULAR: (+) S1 S2, normal rate and regular rhythm  PULMONARY: clear to auscultation bilaterally  ABDOMEN: soft, nontender with normoactive bowel sounds  EXTREMITIES: no edema  SKIN: no rash    I/O's    08-15-21 @ 07:01  -  08-16-21 @ 07:00  --------------------------------------------------------  IN: 2411.2 mL / OUT: 1979 mL / NET: 432.2 mL    08-16-21 @ 07:01  -  08-16-21 @ 12:40  --------------------------------------------------------  IN: 1694 mL / OUT: 189 mL / NET: 1505 mL    LABS:                        10.8   25.16 )-----------( 271      ( 16 Aug 2021 11:04 )             36.3     08-16    (146 <-- 152)  144  |  111<H>  |  28<H>  ----------------------------<  274<H>  4.0   |  19<L>  |  0.74    Ca    8.8      16 Aug 2021 11:04  Phos  4.1     08-16  Mg     2.4     08-16    CAPILLARY BLOOD GLUCOSE    POCT Blood Glucose.: 289 mg/dL (16 Aug 2021 11:05)  POCT Blood Glucose.: 122 mg/dL (15 Aug 2021 17:13)    Culture - Sputum (collected 08-15-21 @ 14:35)  Source: .Sputum Sputum  Gram Stain (08-16-21 @ 00:32):    Rare epithelial cells    Numerous white blood cells    Few Gram positive cocci in pairs  Preliminary Report (08-16-21 @ 09:27):    Normal Respiratory Mely present    Culture in progress    Culture - Blood (08.10.21 @ 12:08)    Specimen Source: .Blood Blood-Peripheral X 2    Culture Results:   No growth at 5 days.    Bacteriology:  CSF studies:  EEG:  Neuroimaging:  Cerebral Angiogram (08.16.2021) - Left vertebral injection demonstrates moderate diffuse vasospasm of left vertebral artery, basilar artery and posterior circulation including PCAs. 15mg Verapamil injected via left vert with mild radiographic improvement post treatment. Right ICA injection with mild supraclinoid spasm/narrowing, otherwise no vasospasm appreciated in anterior circulation.    CT (08.15.2021) - 1. Worsening the uncal herniation, with effacement of the bilateral suprasellar and perimesencephalic cisterns.  2. Redistribution of intraventricular hemorrhage, as above. Redemonstration of right EVD, with distal tip in the right frontal horn, near the foramen of Monro. Worsening hydrocephalus.  CTA - 1. Multifocal punctate and short segment stenoses, as above, with several areas of narrowing new as compared to recent CTA dated 08/09/2021. In the posterior circulation, stenoses are identified within the V4 left vertebral artery, the basilar artery, and in the bilateral PCA, more significant on the left.  Within the anterior circulation, stenosis are identified in the right supraclinoid C6 ICA as well as the proximal M1 MCA. Findings are most compatible with vasospasm.  2. In particular, the distal V4 right vertebral artery, which was visualized, though diminutive, on prior CTA dated 08/09/2021, does not fill with contrast on the present examination. The basilar artery demonstrates multiple stenoses, and is significantly smaller in caliber when compared to the prior CTA.    Other imaging:  Duplex - R IM calf DVT    EVD -5 cm, drained 50 mL O/N, then 5-15 mL/h, ICP 2-12    [Code] Full  [Goals] Aggressive  [Disposion] ICU

## 2021-08-16 NOTE — PROVIDER CONTACT NOTE (OTHER) - SITUATION
Pt's ICP -3, EVD 0 output this hour. Pt has intermittent shaking. Pt feels cool to touch. Temp 99.8F.

## 2021-08-17 NOTE — PROGRESS NOTE ADULT - SUBJECTIVE AND OBJECTIVE BOX
HPI:  46y/o F with h/o recent gastric sleeve (08/04/21 [facility], Dr. Crisostomo ), fibromyalgia, thyroid dysfunction, PTSD, depression, anxiety.  Presented with seizures at home - brought to [place], with 1 more episode of seizure en route.  Intubated, CT showed SAH with IV extension, casted IV, hydrocephalus, given mannitol, levetiracetam 1g,  6mg ativan. Started on Cardene drip for BP goal < 140 and transferred to [facility] NICU for further management.     HH5 MF4   NIHSS 26 on arrival  BD 1 = 8/7 (07 Aug 2021 08:08)    Hospital Course:  8/7: Tx from [place] for SAH. Intubated. R frontal EVD placed, central line and a line placed. 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   8/8: POD1 s/p angio with R vert takedown, extubated, desatting on aerosol mask, required extensive suctioning, 3% nebs, chest PT, started on low dose precedex drip for restlessness/agitation, ABG stable. NGT placed. TF started with goal 20 pending nutrition recs. 3% stopped for Na 150. Ceribell EEG negative and d/c'ed.   8/9 Overnight, new Right pronator drift and right gaze preference that can't cross midline, Repeat CTH demonstrated stable vents, CTA head and neck unremarkable for spasm, hypoattenuation of right cerebellum edema vs. infarct.  8/10: POD3 R vert takedown, EVD open at [id number]. ASHA overnight, neuro stable. CTH with increased hydro, CTA head/neck with mild basilar spasm, but concern for PE. 1/2 am D50 for hypoglycemia. 1L bolus then 100 cc/hr, PM rounds for net negative fluid balance and mild vasospasm on CTA.   8/11: POD4 R vert takedown. EVD at 5cm H2O, ASHA overnight. neuro stable.   Febrile 104. depakote increased to q6. NCHCT stable. EVD lowered to 0. Lasix 20 given for dieuresis, UOP over 2 liters. Sedation given for a-line placement, BP drop needing levo.  8/12: POD5 R vert takedown. EVD at 0cm H2O. ASHA overnight, neuro stable. Precedex being weaned off. Pending IVCF with vascular today.  8/13: POD6 R vert takedown. EVD open at 0cmH2O. ASHA overnight. Neuro exam stable. Mottled skin on the left lower extremity improving. Started on Bromocriptine 15mg Q8.   8/14: POD7. EVD open at 0. 1L bolus given for euvolemia o/n. neuro stable. CTH stable. ENT scoped vocal cords, +R voacl cord paresis, NTD. started on zosyn empirically.   8/15: POD8. EVD open at 0. ASHA o/n, neuro stable. Pt developed increased work of breathing, unable to clear her secretions, received racemic epi and multiple nebulizer treatments, still with stridor. Patient re-intubated at bedside, on full vent support.   8/16: CTH/CTA head/neck/CT chest performed o/n for worsened exam, R gaze preference, sluggish pupils. +worsened uncal herniation, hydro, vasospasm in b/l PCAs, L vert, basilar arteries. preop angio today, restarted on 3% for Na goal 145-150  8/17: POD10. Overnight Pt remains on levophed drip for SBP goal 180-200. Also remains on propofol drip. EVD open at -5cmH2O. ICPs WNL. 8/17: POD10. Overnight Pt remains on levophed drip for SBP goal 180-220. Also remains on propofol drip. EVD open at -5cmH2O. ICPs WNL. Febrile 101F and pancultured      Vital Signs Last 24 Hrs  T(C): 37.5 (16 Aug 2021 17:20), Max: 37.8 (16 Aug 2021 01:22)  T(F): 99.5 (16 Aug 2021 17:20), Max: 100.1 (16 Aug 2021 01:22)  HR: 116 (16 Aug 2021 21:15) (93 - 133)  BP: 212/99 (16 Aug 2021 18:00) (171/90 - 212/99)  BP(mean): 142 (16 Aug 2021 18:00) (120 - 145)  RR: 19 (16 Aug 2021 21:15) (16 - 22)  SpO2: 98% (16 Aug 2021 21:15) (98% - 100%)    I&O's Summary    15 Aug 2021 07:01  -  16 Aug 2021 07:00  --------------------------------------------------------  IN: 2411.2 mL / OUT: 1979 mL / NET: 432.2 mL    16 Aug 2021 07:01  -  16 Aug 2021 21:40  --------------------------------------------------------  IN: 3583 mL / OUT: 1203 mL / NET: 2380 mL      PHYSICAL EXAM:  Constitutional: NAD, well groomed, obese, sedated on propofol   Respiratory: breathing non-labored, symmetrical chest wall movement, intubated   Cardiovascuar: RRR, no murmurs  Gastrointestinal: abdomen soft, non tender  Neurological: open eyes to verbal stimuli/spontaneously  Cranial Nerves: II-XII: PERRL, right gaze preference   Motor: follows commands (squeezes hands b/l, wiggles toes b/l.) Moving all extremities spontaneously L>R  Sensation: intact to light touch in all extremities  Extremities: distal pulses 2+ x4  Wound/incision: right groin dressing dry and intact, no hematoma present,   Drain: EVD open at -5cmH2O    TUBES/LINES:  [] Richey  [] Lumbar Drain  [] Wound Drains  [] Others    DIET:  [] NPO  [] Mechanical  [x] Tube feeds    LABS:                        10.8   25.16 )-----------( 271      ( 16 Aug 2021 11:04 )             36.3     08-16    144  |  111<H>  |  28<H>  ----------------------------<  274<H>  4.0   |  19<L>  |  0.74    Ca    8.8      16 Aug 2021 11:04  Phos  4.1     08-16  Mg     2.4     08-16              CAPILLARY BLOOD GLUCOSE      POCT Blood Glucose.: 136 mg/dL (16 Aug 2021 21:01)  POCT Blood Glucose.: 136 mg/dL (16 Aug 2021 17:11)  POCT Blood Glucose.: 289 mg/dL (16 Aug 2021 11:05)      Drug Levels: [] N/A  Valproic Acid Level, Serum: 62.0 ug/mL (08-13 @ 05:12)  Vancomycin Level, Trough: 5.7 ug/mL (08-12 @ 05:25)    CSF Analysis: [] N/A      Allergies    Allergy Status Unknown    Intolerances      MEDICATIONS:  Antibiotics:  piperacillin/tazobactam IVPB.. 3.375 Gram(s) IV Intermittent every 6 hours  vancomycin  IVPB 1500 milliGRAM(s) IV Intermittent every 12 hours    Neuro:  acetaminophen    Suspension .. 650 milliGRAM(s) Oral every 6 hours PRN  bromocriptine Capsule 15 milliGRAM(s) Oral every 8 hours  ondansetron Injectable 4 milliGRAM(s) IV Push every 6 hours PRN  propofol Infusion 10 MICROgram(s)/kG/Min IV Continuous <Continuous>  valproate sodium IVPB 500 milliGRAM(s) IV Intermittent every 8 hours    Anticoagulation:  aspirin  chewable 81 milliGRAM(s) Oral daily  enoxaparin Injectable 40 milliGRAM(s) SubCutaneous every 12 hours    OTHER:  albuterol/ipratropium for Nebulization 3 milliLiter(s) Nebulizer every 6 hours  chlorhexidine 2% Cloths 1 Application(s) Topical <User Schedule>  dexAMETHasone  Injectable 4 milliGRAM(s) IV Push every 6 hours  dextrose 40% Gel 15 Gram(s) Oral once  dextrose 50% Injectable 25 Gram(s) IV Push once  dextrose 50% Injectable 25 Gram(s) IV Push once  dextrose 50% Injectable 12.5 Gram(s) IV Push once  glucagon  Injectable 1 milliGRAM(s) IntraMuscular once  insulin lispro (ADMELOG) corrective regimen sliding scale   SubCutaneous every 6 hours  norepinephrine Infusion 0.05 MICROgram(s)/kG/Min IV Continuous <Continuous>  pantoprazole   Suspension 40 milliGRAM(s) Oral before breakfast  polyethylene glycol 3350 17 Gram(s) Oral daily  senna 2 Tablet(s) Oral at bedtime    IVF:  dextrose 5%. 1000 milliLiter(s) IV Continuous <Continuous>  dextrose 5%. 1000 milliLiter(s) IV Continuous <Continuous>  sodium chloride 3%. 500 milliLiter(s) IV Continuous <Continuous>    CULTURES:  Culture Results:   Normal Respiratory Mely present  Culture in progress (08-15 @ 14:35)  Culture Results:   No growth to date (08-13 @ 07:29)    RADIOLOGY & ADDITIONAL TESTS:      ASSESSMENT:  46y/o F with h/o recent gastric sleeve (08/04/21 [facility], Dr. Crisostomo ), fibromyalgia, thyroid dysfunction, PTSD, depression, anxiety.  Presented with seizures at home - brought to [place], with 1 more episode of seizure en route.  Intubated, CT showed SAH with IV extension, casted IV, hydrocephalus, given mannitol, levetiracetam 1g,  6mg ativan. Started on Cardene drip for BP goal < 140 and transferred to [facility] NICU for further management. HH5 MF4, BD 1 = 8/7. Now s/p cerebral angiogram for R vertebral artery takedown for R vertebral dissecting aneurysm (8/7), and R frontal EVD placement (8/7), now s/p IVC filter placement (8/12)    HEAD BLEED    No pertinent family history in first degree relatives    Handoff    Cerebral aneurysm    Cerebral artery vasospasm    Cerebral aneurysm    DVT, lower extremity    Pulmonary embolism    Cerebral artery vasospasm    Multiple subsegmental pulmonary emboli without acute cor pulmonale    DVT, lower extremity    Angiogram, carotid and cerebral arteries    IVC filter placement    Angiogram, carotid and cerebral, bilateral    SysAdmin_[email or web address]      NEURO:   - neurochecks q1h  - EVD open at -5cmH2O , allow up to 40cc/hr  - On propofol for sedation  - Niimodipine 60 mg po q4h d/c'ed for SBP goal 180-200   - Depakote 500q8 ( No keppra due to agitation) next level 8/18  - Ceribell EEG negative 8/8, d/c'ed   - cont ASA 81  - CTH 8/15: worsened uncal herniation, worsened hydro, vasospasm in b/l PCA, L vert, basilar arteries  - Bromocriptine 15mg Q8  - Per ENT, no vocal cord issues  8/16: angio with findings of moderate diffuse vasospasm of left vertebral artery, basilar artery and posterior circulation including PCAs IA verapamil given     PULM:    - Hypertonic nebs/duonebs   - CT PE 8/11: Probable pulmonary embolism in the left distal main and proximal lower lobar pulmonary artery, which was also seen on prior same day neck CT. No saddle embolism. No evidence of right heart strain.  -full vent support    CARDIO:    - -200  - levo gtt, on midodrine 10 q8, 5% albumin 500  - TTE WNL, repeat 8/12   - Troponin stable   - EKG normal     ENDO:    - glucose goal 140-180    GI:    - NGT feeds  - needs thin liquid diet x3 weeks as per bariatric when tolerated  - PPI per bariatric surgeon    RENAL:   - Maintain euvolemia   - Na goal 145-150, on 3% at 50  - 50cc/hr NS, 3% at 50 total goal 100cc of fluids per hr     HEM/ONC:   - ASA 81  - VTE Prophylaxis: SCDs, SQL  - LE Duplex 8/9: Acute completely occlusive deep venous thrombosis of the right intramuscular calf vein, s/p IVCF with vascular 8/12, repeat 8/16 shows new b/l peroneal DVTs, pending anti-xa level 8/17 1 AM    ID:   -MRSA neg 8/12  -febrile, trend leukocytosis   -empiric Vanc/zoysn for fever d/c'd (8/10-13) and now restarted 8/14 for persistent fevers, Vanc trough 8/18 5 AM   -CSF sent 8/13, NGTD   -f/u pancx 8/17    Dispol ICU status: family updated  PT/OT: on hold due to critical status  Full code    D/w Dr. Lomax, Dr. Yan

## 2021-08-17 NOTE — PATIENT PROFILE ADULT - IS THERE A SUSPICION OF ABUSE/NEGLIGENCE?
Progress Notes by Zohra Pate APN at 18 04:05 PM     Author:  Zohar Paet APN Service:  (none) Author Type:  Nurse Practitioner     Filed:  18 04:23 PM Encounter Date:  2018 Status:  Signed     :  Zohra Pate APN (Nurse Practitioner)            Roomed by[VD1.1C] Bandarjenniferjia Weston MA[VD1.1M]  Patient brought back  to exam room.   Name and Date of birth verified by patient   LV:[VD1.1C] 2018[VD1.1M]    Current outpatient prescriptions prior to encounter       Medication  Sig Dispense Refill   • isotretinoin (ACCUTANE) 20 MG Cap Take 1 Cap by mouth 2 (two) times daily. 60 Each 0   • isotretinoin (ACCUTANE) 40 MG Cap Take 1 Cap by mouth daily. iPledge # (required) [id number] 30 Cap 0       Patient Active Problem List    Diagnosis    • Routine infant or child health check   •  SCR - NORMAL   • Acne vulgaris     SUBJECTIVE:  Mario Serrano is an 14 year old male who presents for follow up of acne. Doing well on accutane therapy- patient has completed[VD1.1C] 4[MJ1.1M] months of 20 mg PO BID.[VD1.1C] He is at 67 mg/kg total cumulative dose.[MJ1.1M] No side effects other than dry lips. No concerns.     Problem # 1: ACNE to back, face, chest   SUBJECTIVE: The patient is in for a follow up on acne which is greatly improving. Previous treatments: oral antibiotics: doxycycline and Bactrim, OTC BP and Retin A.  Risk factors: nothing. Current treatment: has completed 3 months Accutane 20 mg BID.  Symptoms: unsightly appearance. Patient has tried and failed multiple topical treatments and oral antibiotics.      ROS: General: In good health. Feels well. No diabetes, TB, and no joint pain. No weight change or adenopathy. Reviewed[VD1.1C] 2018[MJ1.2T]     OBJECTIVE:  Grade I-II - Mild localized comedones, papules & pustules present on forehead, cheeks, nose and back[VD1.1C]-[MJ1.1M] improving   Face looks great!  No joint pain  No depression or mood changes   Alert and oriented x 3  Well  developed, well nourished. Mood and affect pleasant and appropriate.   Inspection of eyelids and conjunctiva: normal  The skin of the head, neck, scalp, chest, back, each arm was examined and found to be normal other than the noted abnormalities.     ASSESSMENT: ACNE - Stable    PLAN:  Continue Accutane 20 mg BID if labs ok. Labs have all been wnl to date, OK to stop labs going forward. Stop other meds. Warned of  head ache, stomach problems, eyes, ears, emotional problems including depression and suicide, muscle aches, hair loss, birth defects and other known accutane side effects. All side effects reviewed.  Stop current meds.   See me monthly. Info given. Permission slip signed. Booklet given.    F/u 1 month.  Call with questions or concerns.[VD1.1C]       Electronically Signed by:    NILAY Chan , 7/18/2018   Supervising Physician:  Dr. Dwayne Garcia[MJ1.1T]                          Revision History        User Key Date/Time User Provider Type Action    > MJ1.2 07/18/18 04:23 PM Zohra Pate APN Nurse Practitioner Sign     MJ1.1 07/18/18 04:18 PM Zohra Pate APN Nurse Practitioner      VD1.1 07/18/18 04:07 PM Alessandra Weston MA Medical Assistant Sign at close encounter    C - Copied, M - Manual, T - Template             no

## 2021-08-17 NOTE — PROGRESS NOTE ADULT - SUBJECTIVE AND OBJECTIVE BOX
==============================================   NEUROCRITICAL CARE ATTENDING NOTE (Tuesday, August 17, 2021)  ==============================================    LUDMILA PRIETO   MRN-[id number]  Summary:  45y/F with h/o recent gastric sleeve (08/04 [facility], Dr. Crisostomo ), fibromyalgia, thyroid dysfunction, PTSD, depression, anxiety.  Presented with seizures at home - brought to [place], with 1 more episode of seizure en route.  Intubated, CT showed SAH with IV extension, casted IV, hydrocephalus, given mannitol levetiracetam 6mg ativan, transferred to [facility].    Hospital Course:   8/7: Tx from [place] for SAH. Intubated. R frontal EVD placed, central line and a line placed. 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   8/8: POD1 s/p angio with R vert takedown, extubated, desatting on aerosol mask, required extensive suctioning, 3% nebs, chest PT, started on low dose precedex drip for restlessness/agitation, ABG stable. NGT placed. TF started with goal 20 pending nutrition recs. 3% stopped for Na 150. Ceribell EEG negative and d/c'ed.   8/9 Overnight, new Right pronator drift and right gaze preference that can't cross midline, Repeat CTH demonstrated stable vents, CTA head and neck unremarkable for spasm, hypoattenuation of right cerebellum edema vs. infarct.  8/10: POD3 R vert takedown, EVD open at [id number]. ASHA overnight, neuro stable. CTH with increased hydro, CTA head/neck with mild basilar spasm, but concern for PE. 1/2 am D50 for hypoglycemia. 1L bolus then 100 cc/hr, PM rounds for net negative fluid balance and mild vasospasm on CTA.   8/11: POD4 R vert takedown. EVD at 5cm H2O, ASHA overnight. neuro stable.   Febrile 104. depakote increased to q6. NCHCT stable. EVD lowered to 0. Lasix 20 given for dieuresis, UOP over 2 liters. Sedation given for a-line placement, BP drop needing levo.  8/12: POD5 R vert takedown. EVD at 0cm H2O. ASHA overnight, neuro stable. Precedex being weaned off. Pending IVCF with vascular today.  8/13: POD6 R vert takedown. EVD open at 0cmH2O. ASHA overnight. Neuro exam stable. Mottled skin on the left lower extremity improving. Started on Bromocriptine 15mg Q8.   8/14: POD7. EVD open at 0. 1L bolus given for euvolemia o/n. neuro stable. CTH stable. ENT scoped vocal cords, +R vocal cord paresis, NTD. started on zosyn empirically.   8/15: POD8. EVD open at 0. AHSA o/n, neuro stable. Pt developed increased work of breathing, unable to clear her secretions, received racemic epi and multiple nebulizer treatments, still with stridor. Patient re-intubated at bedside, on full vent support.   8/16: CTH/CTA head/neck/CT chest performed o/n for worsened exam, R gaze preference, sluggish pupils. CTA shows posterior circulation vasospasm.  Neurologically improved on norepinephrine and after 15 mg IA verapamil, sBP maintained ~180 mmHg, CVP ~ 6.  8/17: POD10. Overnight Pt remains on levophed drip for SBP goal 180-200. Also remains on propofol drip. EVD open at -5cmH2O. ICPs WNL. 8/17: POD10. Overnight Pt remains on levophed drip for SBP goal 180-220. Also remains on propofol drip. EVD open at -5cmH2O. ICPs WNL. Febrile 101F and pancultured    Past Medical History:  s/p gastric sleeve surgery  Allergies:  Allergy Status Unknown  Home meds:     Current Meds:  MEDICATIONS  (STANDING):  acetaminophen 300 mG/butalbital 50 mG/ caffeine 40 mG 1 Capsule(s) Oral every 6 hours  albumin human  5% IVPB 250 milliLiter(s) IV Intermittent once  albuterol/ipratropium for Nebulization 3 milliLiter(s) Nebulizer every 6 hours  aspirin  chewable 81 milliGRAM(s) Oral daily  bromocriptine Capsule 15 milliGRAM(s) Oral every 8 hours  dexAMETHasone  Injectable 4 milliGRAM(s) IV Push every 6 hours  enoxaparin Injectable 40 milliGRAM(s) SubCutaneous every 12 hours  insulin lispro (ADMELOG) corrective regimen sliding scale   SubCutaneous Before meals and at bedtime  norepinephrine Infusion 0.05 MICROgram(s)/kG/Min (4.79 mL/Hr) IV Continuous <Continuous>  piperacillin/tazobactam IVPB.. 3.375 Gram(s) IV Intermittent every 6 hours  propofol Infusion 10 MICROgram(s)/kG/Min (6.13 mL/Hr) IV Continuous <Continuous>  senna 2 Tablet(s) Oral at bedtime  sodium chloride 0.9%. 1000 milliLiter(s) (100 mL/Hr) IV Continuous <Continuous>  valproate sodium IVPB 500 milliGRAM(s) IV Intermittent every 8 hours    MEDICATIONS  (PRN):  acetaminophen    Suspension .. 650 milliGRAM(s) Oral every 6 hours PRN Temp greater or equal to 38C (100.4F), Mild Pain (1 - 3)  ondansetron Injectable 4 milliGRAM(s) IV Push every 6 hours PRN Nausea and/or Vomiting  traMADol 25 milliGRAM(s) Oral every 6 hours PRN Moderate Pain (4 - 6)    Mode: AC, RR (machine): 14, TV (machine): 450, FiO2: 30, PEEP: 5, ITime: 1, MAP: 11, PIP: 18  NEUROLOGIC EXAMINATION:  Patient is rousable, restless at times, CON, no vertical/rotatory nystagmus, R gaze preference, downgaze preference, R slight LMN asymmetry, R + cerebellar drift, L spontaneous 4+/5, R UE 3/5, R LE 4-/5  EENT:  anicteric  CARDIOVASCULAR: (+) S1 S2, normal rate and regular rhythm  PULMONARY: clear to auscultation bilaterally  ABDOMEN: soft, nontender with normoactive bowel sounds  EXTREMITIES: no edema  SKIN: no rash    I/O's    08-15-21 @ 07:01  -  08-16-21 @ 07:00  --------------------------------------------------------  IN: 2411.2 mL / OUT: 1979 mL / NET: 432.2 mL    08-16-21 @ 07:01  -  08-16-21 @ 12:40  --------------------------------------------------------  IN: 1694 mL / OUT: 189 mL / NET: 1505 mL    LABS:                        10.8   25.16 )-----------( 271      ( 16 Aug 2021 11:04 )             36.3     08-16    (146 <-- 152)  144  |  111<H>  |  28<H>  ----------------------------<  274<H>  4.0   |  19<L>  |  0.74    Ca    8.8      16 Aug 2021 11:04  Phos  4.1     08-16  Mg     2.4     08-16    CAPILLARY BLOOD GLUCOSE    POCT Blood Glucose.: 289 mg/dL (16 Aug 2021 11:05)  POCT Blood Glucose.: 122 mg/dL (15 Aug 2021 17:13)    Culture - Sputum (collected 08-15-21 @ 14:35)  Source: .Sputum Sputum  Gram Stain (08-16-21 @ 00:32):    Rare epithelial cells    Numerous white blood cells    Few Gram positive cocci in pairs  Preliminary Report (08-16-21 @ 09:27):    Normal Respiratory Mely present    Culture in progress    Culture - Blood (08.10.21 @ 12:08)    Specimen Source: .Blood Blood-Peripheral X 2    Culture Results:   No growth at 5 days.    Bacteriology:  CSF studies:  EEG:  Neuroimaging:  Cerebral Angiogram (08.16.2021) - Left vertebral injection demonstrates moderate diffuse vasospasm of left vertebral artery, basilar artery and posterior circulation including PCAs. 15mg Verapamil injected via left vert with mild radiographic improvement post treatment. Right ICA injection with mild supraclinoid spasm/narrowing, otherwise no vasospasm appreciated in anterior circulation.    CT (08.15.2021) - 1. Worsening the uncal herniation, with effacement of the bilateral suprasellar and perimesencephalic cisterns.  2. Redistribution of intraventricular hemorrhage, as above. Redemonstration of right EVD, with distal tip in the right frontal horn, near the foramen of Monro. Worsening hydrocephalus.  CTA - 1. Multifocal punctate and short segment stenoses, as above, with several areas of narrowing new as compared to recent CTA dated 08/09/2021. In the posterior circulation, stenoses are identified within the V4 left vertebral artery, the basilar artery, and in the bilateral PCA, more significant on the left.  Within the anterior circulation, stenosis are identified in the right supraclinoid C6 ICA as well as the proximal M1 MCA. Findings are most compatible with vasospasm.  2. In particular, the distal V4 right vertebral artery, which was visualized, though diminutive, on prior CTA dated 08/09/2021, does not fill with contrast on the present examination. The basilar artery demonstrates multiple stenoses, and is significantly smaller in caliber when compared to the prior CTA.    Other imaging:  Duplex - R IM calf DVT    EVD -5 cm, drained 50 mL O/N, then 5-15 mL/h, ICP 2-12    [Code] Full  [Goals] Aggressive  [Disposion] ICU     ==============================================   NEUROCRITICAL CARE ATTENDING NOTE (Tuesday, August 17, 2021)  ==============================================    LUDMILA PRIETO   MRN-[id number]  Summary:  45y/F with h/o recent gastric sleeve (08/04 [facility], Dr. Crisostomo ), fibromyalgia, thyroid dysfunction, PTSD, depression, anxiety.  Presented with seizures at home - brought to [place], with 1 more episode of seizure en route.  Intubated, CT showed SAH with IV extension, casted IV, hydrocephalus, given mannitol levetiracetam 6mg ativan, transferred to [facility].    Hospital Course:   8/7: Tx from [place] for SAH. Intubated. R frontal EVD placed, central line and a line placed. 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   8/8: POD1 s/p angio with R vert takedown, extubated, desatting on aerosol mask, required extensive suctioning, 3% nebs, chest PT, started on low dose precedex drip for restlessness/agitation, ABG stable. NGT placed. TF started with goal 20 pending nutrition recs. 3% stopped for Na 150. Ceribell EEG negative and d/c'ed.   8/9 Overnight, new Right pronator drift and right gaze preference that can't cross midline, Repeat CTH demonstrated stable vents, CTA head and neck unremarkable for spasm, hypoattenuation of right cerebellum edema vs. infarct.  8/10: POD3 R vert takedown, EVD open at [id number]. ASHA overnight, neuro stable. CTH with increased hydro, CTA head/neck with mild basilar spasm, but concern for PE. 1/2 am D50 for hypoglycemia. 1L bolus then 100 cc/hr, PM rounds for net negative fluid balance and mild vasospasm on CTA.   8/11: POD4 R vert takedown. EVD at 5cm H2O, ASHA overnight. neuro stable.   Febrile 104. depakote increased to q6. NCHCT stable. EVD lowered to 0. Lasix 20 given for dieuresis, UOP over 2 liters. Sedation given for a-line placement, BP drop needing levo.  8/12: POD5 R vert takedown. EVD at 0cm H2O. ASHA overnight, neuro stable. Precedex being weaned off. Pending IVCF with vascular today.  8/13: POD6 R vert takedown. EVD open at 0cmH2O. ASHA overnight. Neuro exam stable. Mottled skin on the left lower extremity improving. Started on Bromocriptine 15mg Q8.   8/14: POD7. EVD open at 0. 1L bolus given for euvolemia o/n. neuro stable. CTH stable. ENT scoped vocal cords, +R vocal cord paresis, NTD. started on zosyn empirically.   8/15: POD8. EVD open at 0. ASHA o/n, neuro stable. Pt developed increased work of breathing, unable to clear her secretions, received racemic epi and multiple nebulizer treatments, still with stridor. Patient re-intubated at bedside, on full vent support.   8/16: CTH/CTA head/neck/CT chest performed o/n for worsened exam, R gaze preference, sluggish pupils. CTA shows posterior circulation vasospasm.  Neurologically improved on norepinephrine and after 15 mg IA verapamil, sBP maintained ~180 mmHg, CVP ~ 6.  8/17: POD10. Overnight Pt remains on levophed drip for SBP goal 180-200. Also remains on propofol drip. EVD open at -5cmH2O. ICPs WNL. 8/17: POD10. Overnight Pt remains on levophed drip for SBP goal 180-220. Also remains on propofol drip. EVD open at -5cmH2O. ICPs WNL. Febrile 101F and pancultured    Past Medical History:  s/p gastric sleeve surgery  Allergies:  Allergy Status Unknown  Home meds:     Current Meds:  MEDICATIONS  (STANDING):  acetaminophen 300 mG/butalbital 50 mG/ caffeine 40 mG 1 Capsule(s) Oral every 6 hours  albumin human  5% IVPB 250 milliLiter(s) IV Intermittent once  albuterol/ipratropium for Nebulization 3 milliLiter(s) Nebulizer every 6 hours  aspirin  chewable 81 milliGRAM(s) Oral daily  bromocriptine Capsule 15 milliGRAM(s) Oral every 8 hours  dexAMETHasone  Injectable 4 milliGRAM(s) IV Push every 6 hours  enoxaparin Injectable 40 milliGRAM(s) SubCutaneous every 12 hours  insulin lispro (ADMELOG) corrective regimen sliding scale   SubCutaneous Before meals and at bedtime  norepinephrine Infusion 0.05 MICROgram(s)/kG/Min (4.79 mL/Hr) IV Continuous <Continuous>  piperacillin/tazobactam IVPB.. 3.375 Gram(s) IV Intermittent every 6 hours  propofol Infusion 10 MICROgram(s)/kG/Min (6.13 mL/Hr) IV Continuous <Continuous>  senna 2 Tablet(s) Oral at bedtime  sodium chloride 0.9%. 1000 milliLiter(s) (100 mL/Hr) IV Continuous <Continuous>  valproate sodium IVPB 500 milliGRAM(s) IV Intermittent every 8 hours    MEDICATIONS  (PRN):  acetaminophen    Suspension .. 650 milliGRAM(s) Oral every 6 hours PRN Temp greater or equal to 38C (100.4F), Mild Pain (1 - 3)  ondansetron Injectable 4 milliGRAM(s) IV Push every 6 hours PRN Nausea and/or Vomiting  traMADol 25 milliGRAM(s) Oral every 6 hours PRN Moderate Pain (4 - 6)    PHYSICAL EXAMINATION    ICU Vital Signs Last 24 Hrs  T(C): 37.1 (17 Aug 2021 05:26), Max: 38.4 (16 Aug 2021 21:00)  T(F): 98.8 (17 Aug 2021 05:26), Max: 101.1 (16 Aug 2021 21:00)  HR: 95 (17 Aug 2021 06:17) (92 - 133)  BP: 225/110 (17 Aug 2021 06:00) (173/90 - 229/107)  BP(mean): 158 (17 Aug 2021 06:00) (120 - 160)  ABP: 210/122 (17 Aug 2021 06:00) (156/90 - 210/122)  ABP(mean): 160 (17 Aug 2021 06:00) (118 - 160)  RR: 19 (17 Aug 2021 06:17) (16 - 28)  SpO2: 100% (17 Aug 2021 06:17) (98% - 100%)    Mode: AC, RR (machine): 14, TV (machine): 450, FiO2: 30, PEEP: 5, ITime: 1, MAP: 11, PIP: 18  NEUROLOGIC EXAMINATION:  Patient is rousable, restless at times, CON, no vertical/rotatory nystagmus, R gaze preference, downgaze preference, R slight LMN asymmetry, R + cerebellar drift, L spontaneous 4+/5, R UE 3/5, R LE 4-/5  EENT:  anicteric  CARDIOVASCULAR: (+) S1 S2, normal rate and regular rhythm  PULMONARY: clear to auscultation bilaterally  ABDOMEN: soft, nontender with normoactive bowel sounds  EXTREMITIES: no edema  SKIN: no rash    I/O's    08-15-21 @ 07:01  -  08-16-21 @ 07:00  --------------------------------------------------------  IN: 2411.2 mL / OUT: 1979 mL / NET: 432.2 mL    08-16-21 @ 07:01  -  08-17-21 @ 06:38  --------------------------------------------------------  IN: 4702.6 mL / OUT: 4954 mL / NET: -251.4 mL    LABS:              (25)      10.7   19.67 )-----------( 333      ( 17 Aug 2021 06:27 )             35.9     08-16     (144)  150<H>  |  117<H>  |  21  ----------------------------<  180<H>  3.8   |  23  |  0.54    Ca    8.9      16 Aug 2021 21:43  Phos  4.1     08-16  Mg     2.4     08-16    CAPILLARY BLOOD GLUCOSE    POCT Blood Glucose.: 136 mg/dL (16 Aug 2021 21:01)  POCT Blood Glucose.: 136 mg/dL (16 Aug 2021 17:11)  POCT Blood Glucose.: 289 mg/dL (16 Aug 2021 11:05)    Culture - CSF with Gram Stain (collected 08-17-21 @ 01:26)  Source: .CSF CSF  Gram Stain (08-17-21 @ 01:44):    No White blood cells    No organisms seen    Culture - Sputum (collected 08-15-21 @ 14:35)  Source: .Sputum Sputum  Gram Stain (08-16-21 @ 00:32):    Rare epithelial cells    Numerous white blood cells    Few Gram positive cocci in pairs  Preliminary Report (08-16-21 @ 09:27):    Normal Respiratory Mely present    Culture in progress    Culture - Blood (08.10.21 @ 12:08)    Specimen Source: .Blood Blood-Peripheral X 2    Culture Results:   No growth at 5 days.    Bacteriology:  CSF studies:  EEG:  Neuroimaging:  Cerebral Angiogram (08.16.2021) - Left vertebral injection demonstrates moderate diffuse vasospasm of left vertebral artery, basilar artery and posterior circulation including PCAs. 15mg Verapamil injected via left vert with mild radiographic improvement post treatment. Right ICA injection with mild supraclinoid spasm/narrowing, otherwise no vasospasm appreciated in anterior circulation.    CT (08.15.2021) - 1. Worsening the uncal herniation, with effacement of the bilateral suprasellar and perimesencephalic cisterns.  2. Redistribution of intraventricular hemorrhage, as above. Redemonstration of right EVD, with distal tip in the right frontal horn, near the foramen of Monro. Worsening hydrocephalus.  CTA - 1. Multifocal punctate and short segment stenoses, as above, with several areas of narrowing new as compared to recent CTA dated 08/09/2021. In the posterior circulation, stenoses are identified within the V4 left vertebral artery, the basilar artery, and in the bilateral PCA, more significant on the left.  Within the anterior circulation, stenosis are identified in the right supraclinoid C6 ICA as well as the proximal M1 MCA. Findings are most compatible with vasospasm.  2. In particular, the distal V4 right vertebral artery, which was visualized, though diminutive, on prior CTA dated 08/09/2021, does not fill with contrast on the present examination. The basilar artery demonstrates multiple stenoses, and is significantly smaller in caliber when compared to the prior CTA.    Other imaging:  Duplex - R IM calf DVT    EVD -5 cm, drained 50 mL O/N, then 5-15 mL/h, ICP 2-12    [Code] Full  [Goals] Aggressive  [Disposion] ICU     ==============================================   NEUROCRITICAL CARE ATTENDING NOTE (Tuesday, August 17, 2021)  ==============================================    LUDMILA PRIETO   MRN-[id number]  Summary:  45y/F with h/o recent gastric sleeve (08/04 [facility], Dr. Crisostomo ), fibromyalgia, thyroid dysfunction, PTSD, depression, anxiety.  Presented with seizures at home - brought to [place], with 1 more episode of seizure en route.  Intubated, CT showed SAH with IV extension, casted IV, hydrocephalus, given mannitol levetiracetam 6mg ativan, transferred to [facility].    Hospital Course:   8/7: Tx from [place] for SAH. Intubated. R frontal EVD placed, central line and a line placed. 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   8/8: POD1 s/p angio with R vert takedown, extubated, desatting on aerosol mask, required extensive suctioning, 3% nebs, chest PT, started on low dose precedex drip for restlessness/agitation, ABG stable. NGT placed. TF started with goal 20 pending nutrition recs. 3% stopped for Na 150. Ceribell EEG negative and d/c'ed.   8/9 Overnight, new Right pronator drift and right gaze preference that can't cross midline, Repeat CTH demonstrated stable vents, CTA head and neck unremarkable for spasm, hypoattenuation of right cerebellum edema vs. infarct.  8/10: POD3 R vert takedown, EVD open at [id number]. ASHA overnight, neuro stable. CTH with increased hydro, CTA head/neck with mild basilar spasm, but concern for PE. 1/2 am D50 for hypoglycemia. 1L bolus then 100 cc/hr, PM rounds for net negative fluid balance and mild vasospasm on CTA.   8/11: POD4 R vert takedown. EVD at 5cm H2O, ASHA overnight. neuro stable.   Febrile 104. depakote increased to q6. NCHCT stable. EVD lowered to 0. Lasix 20 given for dieuresis, UOP over 2 liters. Sedation given for a-line placement, BP drop needing levo.  8/12: POD5 R vert takedown. EVD at 0cm H2O. ASHA overnight, neuro stable. Precedex being weaned off. Pending IVCF with vascular today.  8/13: POD6 R vert takedown. EVD open at 0cmH2O. ASHA overnight. Neuro exam stable. Mottled skin on the left lower extremity improving. Started on Bromocriptine 15mg Q8.   8/14: POD7. EVD open at 0. 1L bolus given for euvolemia o/n. neuro stable. CTH stable. ENT scoped vocal cords, +R vocal cord paresis, NTD. started on zosyn empirically.   8/15: POD8. EVD open at 0. ASHA o/n, neuro stable. Pt developed increased work of breathing, unable to clear her secretions, received racemic epi and multiple nebulizer treatments, still with stridor. Patient re-intubated at bedside, on full vent support.   8/16: CTH/CTA head/neck/CT chest performed o/n for worsened exam, R gaze preference, sluggish pupils. CTA shows posterior circulation vasospasm.  Neurologically improved on norepinephrine and after 15 mg IA verapamil, sBP maintained ~180 mmHg, CVP ~ 6.  8/17: POD10. Overnight Pt remains on levophed drip for SBP goal 180-200. Also remains on propofol drip. EVD open at -5cmH2O. ICPs WNL. 8/17: POD10. Overnight Pt remains on levophed drip for SBP goal 180-220. Also remains on propofol drip. EVD open at -5cmH2O. ICPs WNL. Febrile 101F and pancultured    Past Medical History:  s/p gastric sleeve surgery  Allergies:  Allergy Status Unknown  Home meds:     Current Meds:  MEDICATIONS  (STANDING):  acetaminophen 300 mG/butalbital 50 mG/ caffeine 40 mG 1 Capsule(s) Oral every 6 hours  albumin human  5% IVPB 250 milliLiter(s) IV Intermittent once  albuterol/ipratropium for Nebulization 3 milliLiter(s) Nebulizer every 6 hours  aspirin  chewable 81 milliGRAM(s) Oral daily  bromocriptine Capsule 15 milliGRAM(s) Oral every 8 hours  dexAMETHasone  Injectable 4 milliGRAM(s) IV Push every 6 hours  enoxaparin Injectable 40 milliGRAM(s) SubCutaneous every 12 hours  insulin lispro (ADMELOG) corrective regimen sliding scale   SubCutaneous Before meals and at bedtime  norepinephrine Infusion 0.05 MICROgram(s)/kG/Min (4.79 mL/Hr) IV Continuous <Continuous>  piperacillin/tazobactam IVPB.. 3.375 Gram(s) IV Intermittent every 6 hours  propofol Infusion 10 MICROgram(s)/kG/Min (6.13 mL/Hr) IV Continuous <Continuous>  senna 2 Tablet(s) Oral at bedtime  sodium chloride 0.9%. 1000 milliLiter(s) (100 mL/Hr) IV Continuous <Continuous>  valproate sodium IVPB 500 milliGRAM(s) IV Intermittent every 8 hours    MEDICATIONS  (PRN):  acetaminophen    Suspension .. 650 milliGRAM(s) Oral every 6 hours PRN Temp greater or equal to 38C (100.4F), Mild Pain (1 - 3)  ondansetron Injectable 4 milliGRAM(s) IV Push every 6 hours PRN Nausea and/or Vomiting  traMADol 25 milliGRAM(s) Oral every 6 hours PRN Moderate Pain (4 - 6)    PHYSICAL EXAMINATION    ICU Vital Signs Last 24 Hrs  T(C): 37.1 (17 Aug 2021 05:26), Max: 38.4 (16 Aug 2021 21:00)  T(F): 98.8 (17 Aug 2021 05:26), Max: 101.1 (16 Aug 2021 21:00)  HR: 95 (17 Aug 2021 06:17) (92 - 133)  BP: 225/110 (17 Aug 2021 06:00) (173/90 - 229/107)  BP(mean): 158 (17 Aug 2021 06:00) (120 - 160)  ABP: 210/122 (17 Aug 2021 06:00) (156/90 - 210/122)  ABP(mean): 160 (17 Aug 2021 06:00) (118 - 160)  RR: 19 (17 Aug 2021 06:17) (16 - 28)  SpO2: 100% (17 Aug 2021 06:17) (98% - 100%)    Mode: AC, RR (machine): 14, TV (machine): 450, FiO2: 30, PEEP: 5, ITime: 1, MAP: 11, PIP: 18  NEUROLOGIC EXAMINATION:  Patient is rousable, restless at times, CON, no vertical/rotatory nystagmus, R gaze preference, downgaze preference, R slight LMN asymmetry, R + cerebellar drift, L spontaneous 4+/5, R UE 3/5, R LE 4-/5  EENT:  anicteric  CARDIOVASCULAR: (+) S1 S2, normal rate and regular rhythm  PULMONARY: clear to auscultation bilaterally  ABDOMEN: soft, nontender with normoactive bowel sounds  EXTREMITIES: no edema  SKIN: no rash    I/O's    08-15-21 @ 07:01  -  08-16-21 @ 07:00  --------------------------------------------------------  IN: 2411.2 mL / OUT: 1979 mL / NET: 432.2 mL    08-16-21 @ 07:01  -  08-17-21 @ 06:38  --------------------------------------------------------  IN: 4702.6 mL / OUT: 4954 mL / NET: -251.4 mL    LABS:              (25)      10.7   19.67 )-----------( 333      ( 17 Aug 2021 06:27 )             35.9     08-16     (144)  150<H>  |  117<H>  |  21  ----------------------------<  180<H>  3.8   |  23  |  0.54    Ca    8.9      16 Aug 2021 21:43  Phos  4.1     08-16  Mg     2.4     08-16    CAPILLARY BLOOD GLUCOSE    POCT Blood Glucose.: 136 mg/dL (16 Aug 2021 21:01)  POCT Blood Glucose.: 136 mg/dL (16 Aug 2021 17:11)  POCT Blood Glucose.: 289 mg/dL (16 Aug 2021 11:05)    Culture - CSF with Gram Stain (collected 08-17-21 @ 01:26)  Source: .CSF CSF  Gram Stain (08-17-21 @ 01:44):    No White blood cells    No organisms seen    Culture - Sputum (collected 08-15-21 @ 14:35)  Source: .Sputum Sputum  Gram Stain (08-16-21 @ 00:32):    Rare epithelial cells    Numerous white blood cells    Few Gram positive cocci in pairs  Preliminary Report (08-16-21 @ 09:27):    Normal Respiratory Mely present    Culture in progress    Culture - Blood (08.10.21 @ 12:08)    Specimen Source: .Blood Blood-Peripheral X 2    Culture Results:   No growth at 5 days.    Bacteriology:  CSF studies:  EEG:  Neuroimaging:  Cerebral Angiogram (08.16.2021) - Left vertebral injection demonstrates moderate diffuse vasospasm of left vertebral artery, basilar artery and posterior circulation including PCAs. 15mg Verapamil injected via left vert with mild radiographic improvement post treatment. Right ICA injection with mild supraclinoid spasm/narrowing, otherwise no vasospasm appreciated in anterior circulation.    CT (08.15.2021) - 1. Worsening the uncal herniation, with effacement of the bilateral suprasellar and perimesencephalic cisterns.  2. Redistribution of intraventricular hemorrhage, as above. Redemonstration of right EVD, with distal tip in the right frontal horn, near the foramen of Monro. Worsening hydrocephalus.  CTA - 1. Multifocal punctate and short segment stenoses, as above, with several areas of narrowing new as compared to recent CTA dated 08/09/2021. In the posterior circulation, stenoses are identified within the V4 left vertebral artery, the basilar artery, and in the bilateral PCA, more significant on the left.  Within the anterior circulation, stenosis are identified in the right supraclinoid C6 ICA as well as the proximal M1 MCA. Findings are most compatible with vasospasm.  2. In particular, the distal V4 right vertebral artery, which was visualized, though diminutive, on prior CTA dated 08/09/2021, does not fill with contrast on the present examination. The basilar artery demonstrates multiple stenoses, and is significantly smaller in caliber when compared to the prior CTA.    Other imaging:  Duplex - 1.  Since 8/9/2021, there is new deep venous thrombosis in the bilateral peroneal veins.  2.  Redemonstration of deep vein thrombosis in a right intramuscular calf vein.    EVD -5 cm, drained 50 mL O/N, then 10-30 mL/h, ICP < 5    [Code] Full  [Goals] Aggressive  [Disposion] ICU     ==============================================   NEUROCRITICAL CARE ATTENDING NOTE (Tuesday, August 17, 2021)  ==============================================    LUDMILA PRIETO   MRN-[id number]  Summary:  45y/F with h/o recent gastric sleeve (08/04 [facility], Dr. Crisostomo ), fibromyalgia, thyroid dysfunction, PTSD, depression, anxiety.  Presented with seizures at home - brought to [place], with 1 more episode of seizure en route.  Intubated, CT showed SAH with IV extension, casted IV, hydrocephalus, given mannitol levetiracetam 6mg ativan, transferred to [facility].    Hospital Course:   8/7: Tx from [place] for SAH. Intubated. R frontal EVD placed, central line and a line placed. 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   8/8: POD1 s/p angio with R vert takedown, extubated, desatting on aerosol mask, required extensive suctioning, 3% nebs, chest PT, started on low dose precedex drip for restlessness/agitation, ABG stable. NGT placed. TF started with goal 20 pending nutrition recs. 3% stopped for Na 150. Ceribell EEG negative and d/c'ed.   8/9 Overnight, new Right pronator drift and right gaze preference that can't cross midline, Repeat CTH demonstrated stable vents, CTA head and neck unremarkable for spasm, hypoattenuation of right cerebellum edema vs. infarct.  8/10: POD3 R vert takedown, EVD open at 37tcD2D. ASHA overnight, neuro stable. CTH with increased hydro, CTA head/neck with mild basilar spasm, but concern for PE. 1/2 am D50 for hypoglycemia. 1L bolus then 100 cc/hr, PM rounds for net negative fluid balance and mild vasospasm on CTA.   8/11: POD4 R vert takedown. EVD at 5cm H2O, ASHA overnight. neuro stable.   Febrile 104. depakote increased to q6. NCHCT stable. EVD lowered to 0. Lasix 20 given for dieuresis, UOP over 2 liters. Sedation given for a-line placement, BP drop needing levo.  8/12: POD5 R vert takedown. EVD at 0cm H2O. ASHA overnight, neuro stable. Precedex being weaned off. Pending IVCF with vascular today.  8/13: POD6 R vert takedown. EVD open at 0cmH2O. ASHA overnight. Neuro exam stable. Mottled skin on the left lower extremity improving. Started on Bromocriptine 15mg Q8.   8/14: POD7. EVD open at 0. 1L bolus given for euvolemia o/n. neuro stable. CTH stable. ENT scoped vocal cords, +R vocal cord paresis, NTD. started on zosyn empirically.   8/15: POD8. EVD open at 0. ASHA o/n, neuro stable. Pt developed increased work of breathing, unable to clear her secretions, received racemic epi and multiple nebulizer treatments, still with stridor. Patient re-intubated at bedside, on full vent support.   8/16: CTH/CTA head/neck/CT chest performed o/n for worsened exam, R gaze preference, sluggish pupils. CTA shows posterior circulation vasospasm.  Neurologically improved on norepinephrine and after 15 mg IA verapamil, sBP maintained ~180 mmHg, CVP ~ 6.  8/17: POD10. Overnight Pt remains on levophed drip for SBP goal 180-200. Also remains on propofol drip. EVD open at -5cmH2O. ICPs WNL. 8/17: POD10. Overnight Pt remains on levophed drip for SBP goal 180-220. Off propofol drip. EVD open at -5cmH2O. ICPs WNL. Febrile 101F and pancultured.  For repeat cerebral angiogram today (IA verapamil).    Past Medical History:  s/p gastric sleeve surgery  Allergies:  Allergy Status Unknown  Home meds:     Current Meds:  MEDICATIONS  (STANDING):  albuterol/ipratropium for Nebulization 3 milliLiter(s) Nebulizer every 6 hours  aspirin  chewable 81 milliGRAM(s) Oral daily  bromocriptine Capsule 15 milliGRAM(s) Oral every 8 hours  dexAMETHasone  Injectable 4 milliGRAM(s) IV Push every 6 hours  enoxaparin Injectable 40 milliGRAM(s) SubCutaneous every 12 hours  fludroCORTISONE 0.2 milliGRAM(s) Oral every 12 hours  insulin lispro (ADMELOG) corrective regimen sliding scale   SubCutaneous every 6 hours  norepinephrine Infusion 0.05 MICROgram(s)/kG/Min (4.79 mL/Hr) IV Continuous <Continuous>  pantoprazole   Suspension 40 milliGRAM(s) Oral before breakfast  piperacillin/tazobactam IVPB.. 3.375 Gram(s) IV Intermittent every 6 hours  polyethylene glycol 3350 17 Gram(s) Oral daily  potassium chloride   Solution 40 milliEquivalent(s) Oral every 4 hours  propofol Infusion 10 MICROgram(s)/kG/Min (6.13 mL/Hr) IV Continuous <Continuous>  senna 2 Tablet(s) Oral at bedtime  sodium chloride 3 Gram(s) Oral every 6 hours  sodium chloride 0.9%. 1000 milliLiter(s) (50 mL/Hr) IV Continuous <Continuous>  sodium chloride 3%. 500 milliLiter(s) (50 mL/Hr) IV Continuous <Continuous>  valproate sodium IVPB 500 milliGRAM(s) IV Intermittent every 8 hours  vancomycin  IVPB 1500 milliGRAM(s) IV Intermittent every 12 hours    MEDICATIONS  (PRN):  acetaminophen    Suspension .. 650 milliGRAM(s) Oral every 6 hours PRN Temp greater or equal to 38C (100.4F), Mild Pain (1 - 3)  ondansetron Injectable 4 milliGRAM(s) IV Push every 6 hours PRN Nausea and/or Vomiting    PHYSICAL EXAMINATION    ICU Vital Signs Last 24 Hrs  T(C): 37.1 (17 Aug 2021 09:10), Max: 38.4 (16 Aug 2021 21:00)  T(F): 98.7 (17 Aug 2021 09:10), Max: 101.1 (16 Aug 2021 21:00)  HR: 97 (17 Aug 2021 11:00) (87 - 119)  BP: 216/110 (17 Aug 2021 10:00) (191/90 - 229/107)  BP(mean): 155 (17 Aug 2021 10:00) (110 - 160)  ABP: 192/117 (17 Aug 2021 11:00) (147/95 - 210/122)  ABP(mean): 149 (17 Aug 2021 11:00) (114 - 160)  RR: 18 (17 Aug 2021 11:00) (17 - 28)  SpO2: 100% (17 Aug 2021 11:00) (98% - 100%)    Mode: AC, RR (machine): 14, TV (machine): 450, FiO2: 30, PEEP: 5, ITime: 1, MAP: 11, PIP: 18  NEUROLOGIC EXAMINATION:  Patient is rousable, restless at times, CON, no vertical/rotatory nystagmus, R gaze preference, downgaze preference, R slight LMN asymmetry, R + cerebellar drift, L spontaneous 4+/5, R UE 3/5 --> 2/, R LE 4-/5  EENT:  anicteric  CARDIOVASCULAR: (+) S1 S2, normal rate and regular rhythm  PULMONARY: clear to auscultation bilaterally  ABDOMEN: soft, nontender with normoactive bowel sounds  EXTREMITIES: no edema  SKIN: no rash    I/O's    08-15-21 @ 07:01  -  08-16-21 @ 07:00  --------------------------------------------------------  IN: 2411.2 mL / OUT: 1979 mL / NET: 432.2 mL    08-16-21 @ 07:01  -  08-17-21 @ 06:38  --------------------------------------------------------  IN: 4702.6 mL / OUT: 4954 mL / NET: -251.4 mL    LABS:              (25)      10.7   19.67 )-----------( 333      ( 17 Aug 2021 06:27 )             35.9     08-16     (144)  150<H>  |  117<H>  |  21  ----------------------------<  180<H>  3.8   |  23  |  0.54    Ca    8.9      16 Aug 2021 21:43  Phos  4.1     08-16  Mg     2.4     08-16    CAPILLARY BLOOD GLUCOSE    POCT Blood Glucose.: 136 mg/dL (16 Aug 2021 21:01)  POCT Blood Glucose.: 136 mg/dL (16 Aug 2021 17:11)  POCT Blood Glucose.: 289 mg/dL (16 Aug 2021 11:05)    Culture - CSF with Gram Stain (collected 08-17-21 @ 01:26)  Source: .CSF CSF  Gram Stain (08-17-21 @ 01:44):    No White blood cells    No organisms seen    Culture - Sputum . (08.15.21 @ 14:35)    Gram Stain:   Rare epithelial cells  Numerous white blood cells  Few Gram positive cocci in pairs    Specimen Source: .Sputum Sputum    Culture Results:   Normal Respiratory Mely present    Culture - Blood (08.10.21 @ 12:08)    Specimen Source: .Blood Blood-Peripheral X 2    Culture Results:   No growth at 5 days.    Bacteriology:  CSF studies:  EEG:  Neuroimaging:  Cerebral Angiogram (08.16.2021) - Left vertebral injection demonstrates moderate diffuse vasospasm of left vertebral artery, basilar artery and posterior circulation including PCAs. 15mg Verapamil injected via left vert with mild radiographic improvement post treatment. Right ICA injection with mild supraclinoid spasm/narrowing, otherwise no vasospasm appreciated in anterior circulation.    CT (08.15.2021) - 1. Worsening the uncal herniation, with effacement of the bilateral suprasellar and perimesencephalic cisterns.  2. Redistribution of intraventricular hemorrhage, as above. Redemonstration of right EVD, with distal tip in the right frontal horn, near the foramen of Monro. Worsening hydrocephalus.  CTA - 1. Multifocal punctate and short segment stenoses, as above, with several areas of narrowing new as compared to recent CTA dated 08/09/2021. In the posterior circulation, stenoses are identified within the V4 left vertebral artery, the basilar artery, and in the bilateral PCA, more significant on the left.  Within the anterior circulation, stenosis are identified in the right supraclinoid C6 ICA as well as the proximal M1 MCA. Findings are most compatible with vasospasm.  2. In particular, the distal V4 right vertebral artery, which was visualized, though diminutive, on prior CTA dated 08/09/2021, does not fill with contrast on the present examination. The basilar artery demonstrates multiple stenoses, and is significantly smaller in caliber when compared to the prior CTA.    Other imaging:  Duplex - 1.  Since 8/9/2021, there is new deep venous thrombosis in the bilateral peroneal veins.  2.  Redemonstration of deep vein thrombosis in a right intramuscular calf vein.    EVD -5 cm, drained 50 mL O/N, then 10-30 mL/h, ICP < 5    [Code] Full  [Goals] Aggressive  [Disposion] ICU

## 2021-08-17 NOTE — PROGRESS NOTE ADULT - ASSESSMENT
44y/o F with h/o recent gastric sleeve (08/04/21 [facility], Dr. Crisostomo ), fibromyalgia, thyroid dysfunction, PTSD, depression, anxiety.  Presented with seizures at home - brought to [place], with 1 more episode of seizure en route.  Intubated, CT showed SAH with IV extension, casted IV, hydrocephalus, given mannitol, levetiracetam 1g,  6mg ativan. Started on Cardene drip for BP goal < 140 and transferred to [facility] NICU for further management. HH5 MF4, BD 1 = 8/7. Now s/p cerebral angiogram for R vertebral artery takedown for R vertebral dissecting aneurysm (8/7), and R frontal EVD placement (8/7), now s/p IVC filter placement (8/12)    Plan:  NEURO:   - neurochecks q1h  - EVD open at -5 cm  - low dose propofol prn  - Niimodipine 60 mg po q4h   - Depakote 500q8 ( No keppra due to agitation) next level 8/18  - Ceribell EEG negative 8/8, d/c'ed   - cont ASA 81  - bromocriptine 15mg Q8  - s/p IA verapamil 15 mg for posterior circulation vasospasm    PULM:    - AC ventilation - intubated yesterday - thick secretions; R vocal cord paresis as per ENT  - CT PE 8/11: Probable pulmonary embolism in the left distal main and proximal lower lobar pulmonary artery, which was also seen on prior same day neck CT. No saddle embolism. No evidence of right heart strain.    CARDIO:    - SBP goal 180-200 mmHg -BP augmentation for VSP; on norepinephrine 0.35-0.6 mcg/kg/min; will maintain CVP ~6 with crystalloids and colloids  - TTE WNL, pending repeat per pulm reccs  - Troponin stable   - EKG normal     ENDO:    - glucose goal 140-180    GI:    - NPO for angio  - needs thin liquid diet x3 weeks as per bariatric when tolerated  - PPI per bariatric surgeon    RENAL:   - Maintain euvolemia   - Na goal 140-145  - primafit    HEM/ONC:   - ASA 81  - VTE Prophylaxis: SCDs, SQL  - LE Duplex 8/9: Acute completely occlusive deep venous thrombosis of the right intramuscular calf vein, s/p IVCF with vascular 8/12    ID:   -MRSA neg 8/12  -febrile, trend leukocytosis   -empiric zosyn for fever d/c'd (8/10-13) and now restarted 8/14 for persistent fevers  -CSF sent 8/13, NGTD     Dispol ICU status: family updated  PT/OT: on hold due to critical status  Full code    Social: Daughter Maggie and Son Kenya and sister (on the phone) updated    *****    ATTENDING ATTESTATION:  I was physically present for the key portions of the evaluation and management (E/M) service provided.  I agree with the above history, physical and plan, which I have reviewed and edited where appropriate.    Patient at high risk for neurological deterioration or death due to:  ICU delirium, aspiration PNA, DVT / PE.  Critical care time:  I have personally provided 45 minutes of critical care time, excluding time spent on separate procedures.      Plan discussed with RN, house staff.         46y/o F with h/o recent gastric sleeve (08/04/21 [facility], Dr. Crisostomo ), fibromyalgia, thyroid dysfunction, PTSD, depression, anxiety.  Presented with seizures at home - brought to [place], with 1 more episode of seizure en route.  Intubated, CT showed SAH with IV extension, casted IV, hydrocephalus, given mannitol, levetiracetam 1g,  6mg ativan. Started on Cardene drip for BP goal < 140 and transferred to [facility] NICU for further management. HH5 MF4, BD 1 = 8/7. Now s/p cerebral angiogram for R vertebral artery takedown for R vertebral dissecting aneurysm (8/7), and R frontal EVD placement (8/7), now s/p IVC filter placement (8/12)    Plan:  NEURO:   - neurochecks q1h  - EVD open at -5 cm  - low dose propofol prn  - Niimodipine 60 mg po q4h   - Depakote 500q8 ( No keppra due to agitation) next level 8/18  - Ceribell EEG negative 8/8, d/c'ed   - cont ASA 81  - bromocriptine 15mg Q8  - s/p IA verapamil 15 mg for posterior circulation vasospasm - consider repeat cerebral angio today    PULM:    - AC ventilation - intubated yesterday - thick secretions; R vocal cord paresis as per ENT  - CT PE 8/11: Probable pulmonary embolism in the left distal main and proximal lower lobar pulmonary artery, which was also seen on prior same day neck CT. No saddle embolism. No evidence of right heart strain.    CARDIO:    - SBP goal 180-200 mmHg -BP augmentation for VSP; on norepinephrine 0.35-0.6 mcg/kg/min; will maintain CVP ~6 with crystalloids and colloids  - TTE WNL, pending repeat per pulm reccs  - Troponin stable   - EKG normal     ENDO:    - glucose goal 140-180    GI:    - NPO for angio  - needs thin liquid diet x3 weeks as per bariatric when tolerated  - PPI per bariatric surgeon    RENAL:   - Maintain euvolemia   - Na goal 140-145  - primafit    HEM/ONC:   - ASA 81  - VTE Prophylaxis: SCDs, SQL  - LE Duplex 8/9: Acute completely occlusive deep venous thrombosis of the right intramuscular calf vein, s/p IVCF with vascular 8/12    ID:   -MRSA neg 8/12  -febrile, trend leukocytosis   -empiric zosyn for fever d/c'd (8/10-13) and now restarted 8/14 for persistent fevers  -CSF sent 8/13, NGTD     Dispol ICU status: family updated  PT/OT: on hold due to critical status  Full code    Social: Daughter Maggie and Son Kenya and sister (on the phone) updated    *****    ATTENDING ATTESTATION:  I was physically present for the key portions of the evaluation and management (E/M) service provided.  I agree with the above history, physical and plan, which I have reviewed and edited where appropriate.    Patient at high risk for neurological deterioration or death due to:  ICU delirium, aspiration PNA, DVT / PE.  Critical care time:  I have personally provided 45 minutes of critical care time, excluding time spent on separate procedures.      Plan discussed with RN, house staff.         44y/o F with h/o recent gastric sleeve (08/04/21 [facility], Dr. Crisostomo ), fibromyalgia, thyroid dysfunction, PTSD, depression, anxiety.  Presented with seizures at home - brought to [place], with 1 more episode of seizure en route.  Intubated, CT showed SAH with IV extension, casted IV, hydrocephalus, given mannitol, levetiracetam 1g,  6mg ativan. Started on Cardene drip for BP goal < 140 and transferred to [facility] NICU for further management. HH5 MF4, BD 1 = 8/7. Now s/p cerebral angiogram for R vertebral artery takedown for R vertebral dissecting aneurysm (8/7), and R frontal EVD placement (8/7), now s/p IVC filter placement (8/12)    Plan:  NEURO:   - neurochecks q1h  - EVD open at -5 cm  - Nimodipine 60 mg po q4h   - Depakote 500q8 (No keppra due to agitation) next level 8/18  - Ceribell EEG negative 8/8, d/c'ed   - cont ASA 81  - bromocriptine 15mg Q8  - s/p IA verapamil 15 mg for posterior circulation vasospasm - repeat cerebral angio today, re:  IA verapamil    PULM:    - AC ventilation - intubated yesterday - thick secretions; R vocal cord paresis as per ENT  - CT PE 8/11: Probable pulmonary embolism in the left distal main and proximal lower lobar pulmonary artery, which was also seen on prior same day neck CT. No saddle embolism. No evidence of right heart strain.    CARDIO:    - SBP goal 180-200 mmHg -BP augmentation for VSP; on norepinephrine 0.35-0.6 mcg/kg/min; will maintain CVP ~6 with crystalloids and colloids  - TTE WNL, pending repeat per pulm reccs  - Troponin stable   - EKG normal     ENDO:    - glucose goal 140-180    GI:    - NPO for angio  - needs thin liquid diet x3 weeks as per bariatric when tolerated  - PPI per bariatric surgeon    RENAL:   - Maintain euvolemia   - Na goal 144-150 (Na 149)  - fludrocortisone, Na tabs  - primafit    HEM/ONC:   - ASA 81  - VTE Prophylaxis: SCDs, SQL 40 mg BID (antiXa level 0.21)  - LE Duplex 8/9: Acute completely occlusive deep venous thrombosis of the right intramuscular calf vein, s/p IVCF with vascular 8/12    ID:   -MRSA neg 8/12  -febrile, trend leukocytosis   -empiric zosyn for fever d/c'd (8/10-13) and now restarted 8/14 for persistent fevers  -CSF sent 8/13, NGTD     Dispol ICU status: family updated  PT/OT: on hold due to critical status  Full code    Social: Daughter Maggie and Son Kenya and sister (on the phone) updated    *****    ATTENDING ATTESTATION:  I was physically present for the key portions of the evaluation and management (E/M) service provided.  I agree with the above history, physical and plan, which I have reviewed and edited where appropriate.    Patient at high risk for neurological deterioration or death due to:  ICU delirium, aspiration PNA, DVT / PE.  Critical care time:  I have personally provided 45 minutes of critical care time, excluding time spent on separate procedures.      Plan discussed with RN, house staff.       Followed protocol

## 2021-08-17 NOTE — BRIEF OPERATIVE NOTE - OPERATION/FINDINGS
Femoral cerebral angiogram performed under GA via right CFA using a 5Fr sheath. Left vertebral artery dissection demonstrates continued moderate to severe cerebral vasospasm of the distal let vert and basilar artery, some what improved caliber of proximal left vert. 15mg IA Verapamil injected over 10 minutes with mild improvement of posterior circulation post treatment. Right ICA injection with continued mild supraclinoid vasospasm, 10mg of IA Verapamil injected. Let ICA injection with no vasospasm. Right groin closed with manual compression for 15 minutes. EVD maintained open for the procedure with 45cc total output.     Full report to follow, d/w Dr. Yan. Femoral cerebral angiogram performed under GA via right CFA using a 5Fr sheath. Left vertebral artery injection demonstrates continued moderate to severe cerebral vasospasm of the distal let vert and basilar artery, some what improved caliber of proximal left vert. 15mg IA Verapamil injected over 10 minutes with mild improvement of posterior circulation post treatment. Right ICA injection with continued mild supraclinoid vasospasm, 10mg of IA Verapamil injected. Let ICA injection with no vasospasm. Right groin closed with manual compression for 15 minutes. EVD maintained open for the procedure with 45cc total output.     Full report to follow, d/w Dr. Yan.

## 2021-08-17 NOTE — PROGRESS NOTE ADULT - SUBJECTIVE AND OBJECTIVE BOX
=================================  NEUROCRITICAL CARE ATTENDING NOTE  =================================    LUDMILA PRIETO   MRN-[id number]  Summary:  45y/F with h/o recent gastric sleeve (08/04 [facility], Dr. Crisostomo ), fibromyalgia, thyroid dysfunction, PTSD, depression, anxiety.  Presented with seizures at home - brought to [place], with 1 more episode of seizure en route.  Intubated, CT showed SAH with IV extension, casted IV, hydrocephalus, given mannitol levetiracetam 6mg ativan, transferred to [facility].    COURSE IN THE HOSPITAL:  08/07 admitted to [facility], EVD inserted, high pressure, central line anita inserted  08/08 extubated  08/17 Tmax 38.4    Past Medical History:   Allergies:  Allergy Status Unknown  Home meds:     PHYSICAL EXAMINATION  T(C): 37.7 (08-17 @ 17:46), Max: 38.4 (08-16 @ 21:00) HR: 110 (08-17 @ 16:00) (87 - 119) BP: 217/100 (08-17 @ 15:00) (215/80 - 229/107) RR: 17 (08-17 @ 16:00) (16 - 28) SpO2: 98% (08-17 @ 16:00) (97% - 100%)   NEUROLOGIC EXAMINATION:  Patient is lethargic, but easily rousable, agitated at times, oriented x2, some confused speech, pupils equal and reactive, EOMs intact, moving all 4s with good strength  GENERAL: NC   EENT:  anicteric  CARDIOVASCULAR: (+) S1 S2, tachycardic rate and regular rhythm  PULMONARY: clear to auscultation bilaterally  ABDOMEN: soft, nontender with normoactive bowel sounds  EXTREMITIES: no edema  SKIN: no rash    LABS: 08-17  CAPILLARY BLOOD GLUCOSE 160 136 136     (25)    10.7  (10.8)  19.67 )-----------( 333      ( 17 Aug 2021 06:27 )             35.9   (149)  150<H>  |  115<H>  |  10  ----------------------------<  165<H>  4.1   |  25  |  0.46<L>    Ca    8.9      17 Aug 2021 16:22  Phos  3.0     08-17  Mg     2.4     08-17 08-16 @ 07:01  -  08-17 @ 07:00  IN: 5807.6 mL / OUT: 5594 mL / NET: 213.6 mL    HbA1C = 5.9 (08-08) 5.9 (08-07)  LDL = 84 (08-08)   HDL = 50 (08-08)  TG = 74 (08-08)  TSH = 0.078 (08-08)    Bacteriology:  CSF studies:  EEG:  Neuroimaging:  Other imaging:    MEDICATIONS: 08-17    ·	aspirin  chewable 81 Oral daily  ·	enoxaparin Injectable 40 SubCutaneous every 12 hours  ·	piperacillin/tazobactam IVPB.. 3.375 IV Intermittent every 6 hours  ·	vancomycin  IVPB 1500 IV Intermittent every 12 hours  ·	bromocriptine Capsule 15 Oral every 8 hours  ·	propofol Infusion 10 IV Continuous <Continuous>  ·	valproate sodium IVPB 500 IV Intermittent every 8 hours  ·	norepinephrine Infusion 0.05 MICROgram(s)/kG/Min IV Continuous <Continuous>  ·	albuterol/ipratropium for Nebulization 3 Nebulizer every 6 hours  ·	pantoprazole   Suspension 40 Oral before breakfast  ·	polyethylene glycol 3350 17 Oral daily  ·	senna 2 Oral at bedtime  ·	dexAMETHasone  Injectable 4 IV Push every 6 hours  ·	dextrose 50% Injectable 25 IV Push once  ·	fludroCORTISONE 0.2 Oral every 12 hours  ·	glucagon  Injectable 1 IntraMuscular once  ·	insulin lispro (ADMELOG) corrective regimen sliding scale  SubCutaneous every 6 hours  ·	chlorhexidine 2% Cloths 1 Topical <User Schedule>  ·	sodium chloride 3 Oral every 6 hours  ·	sodium chloride 0.9%. 1000 IV Continuous <Continuous>  ·	sodium chloride 3%. 500 IV Continuous <Continuous>  ·	acetaminophen    Suspension .. 650 Oral every 6 hours PRN  ·	ondansetron Injectable 4 IV Push every 6 hours PRN  ·	norepinephrine Infusion 0.05 MICROgram(s)/kG/Min (4.79 mL/Hr) IV Continuous <Continuous>  ·	propofol Infusion 10 MICROgram(s)/kG/Min (6.13 mL/Hr) IV Continuous <Continuous>    IV FLUIDS: 3%@50  DRIPS:  ·	dexMEDEtomidine Infusion 0.2 IV Continuous <Continuous>  DIET: NPO   Lines: Central line Anita  Drains:  Richey   Wounds:    CODE STATUS:  Full Code                       GOALS OF CARE:  aggressive                      DISPOSITION:  ICU   5 MF 4 =================================  NEUROCRITICAL CARE ATTENDING NOTE  =================================    LUDMILA PRIETO   MRN-[id number]  Summary:  45y/F with h/o recent gastric sleeve ( [facility], Dr. Crisostomo ), fibromyalgia, thyroid dysfunction, PTSD, depression, anxiety.  Presented with seizures at home - brought to [place], with 1 more episode of seizure en route.  Intubated, CT showed SAH with IV extension, casted IV, hydrocephalus, given mannitol levetiracetam 6mg ativan, transferred to [facility].    COURSE IN THE HOSPITAL:   admitted to [facility], EVD inserted, high pressure, central line anita inserted   extubated   Tmax 38.4    Past Medical History:   Allergies:  Allergy Status Unknown  Home meds:     PHYSICAL EXAMINATION  T(C): 37.7 ( @ 17:46), Max: 38.4 ( @ 21:00) HR: 110 ( @ 16:00) (87 - 119) BP: 217/100 ( @ 15:00) (215/80 - 229/107) RR: 17 ( @ 16:00) (16 - 28) SpO2: 98% ( @ 16:00) (97% - 100%)   NEUROLOGIC EXAMINATION:  Patient opens eyes to vigorous stimulation, does not follow commands, pupils reactive but anisocoric 2mm R 4mm L, R gaze preference, (+) cough/gag, localizes B UE L>R, withdraws B LE to noxious  GENERAL: NC   EENT:  anicteric  CARDIOVASCULAR: (+) S1 S2, tachycardic rate and regular rhythm  PULMONARY: clear to auscultation bilaterally  ABDOMEN: soft, nontender with normoactive bowel sounds  EXTREMITIES: no edema  SKIN: no rash    LABS:   CAPILLARY BLOOD GLUCOSE 160 136 136     (25)    10.7  (10.8)  19.67 )-----------( 333      ( 17 Aug 2021 06:27 )             35.9   (149)  150<H>  |  115<H>  |  10  ----------------------------<  165<H>  4.1   |  25  |  0.46<L>    Ca    8.9      17 Aug 2021 16:22  Phos  3.0       Mg     2.4      @ 07:01  -   @ 07:00  IN: 5807.6 mL / OUT: 5594 mL / NET: 213.6 mL    HbA1C = 5.9 () 5.9 ()  LDL = 84 ()   HDL = 50 ()  TG = 74 ()  TSH = 0.078 ()    Bacteriology:   sputum culture: No organisms   Blood CS NG12h x2   CSF No organisms  08/15 Sputum culture: normal trent   CSF: NGTD    CSF studies:  EEG:  Neuroimagin/17 CT head:  decrease in size of lateral and third ventricles; interval demarcated infarctions within R cerebellar hemisphere  08/15 CTA: CTA HEAD: Agree that there are new/more pronounced areas of irregularity and narrowing involving the distal left vertebral artery, basilar artery, bilateral PCAs, supraclinoid right ICA and right M1 segment. There is mild narrowing of the left A1 and left M1 segments as well as the bilateral A2 segments which is new from prior examination. These findings are suspicious for vasospasm.  Other imagin/16 LE Doppler:  new DVT bilateral peroneal veins, DVT in R intramuscular calf vein    MEDICATIONS:     ·	aspirin  chewable 81 Oral daily  ·	enoxaparin Injectable 40 SubCutaneous every 12 hours  ·	piperacillin/tazobactam IVPB.. 3.375 IV Intermittent every 6 hours  ·	vancomycin  IVPB 1500 IV Intermittent every 12 hours  ·	bromocriptine Capsule 15 Oral every 8 hours  ·	valproate sodium IVPB 500 IV Intermittent every 8 hours  ·	albuterol/ipratropium for Nebulization 3 Nebulizer every 6 hours  ·	pantoprazole   Suspension 40 Oral before breakfast  ·	polyethylene glycol 3350 17 Oral daily  ·	senna 2 Oral at bedtime  ·	dexAMETHasone  Injectable 4 IV Push every 6 hours  ·	fludroCORTISONE 0.2 Oral every 12 hours  ·	insulin lispro (ADMELOG) corrective regimen sliding scale  SubCutaneous every 6 hours  ·	sodium chloride 3 Oral every 6 hours  ·	acetaminophen    Suspension .. 650 Oral every 6 hours PRN  ·	ondansetron Injectable 4 IV Push every 6 hours PRN    IV FLUIDS: 3%@50 NS@50  DRIPS:  ·	propofol Infusion 10 IV Continuous <Continuous>  ·	norepinephrine Infusion 0.05 MICROgram(s)/kG/Min IV Continuous <Continuous>  DIET: Vital  Lines: Central line Anita  Drains:  Richey   Wounds:    CODE STATUS:  Full Code                       GOALS OF CARE:  aggressive                      DISPOSITION:  ICU   5  4

## 2021-08-17 NOTE — PROGRESS NOTE ADULT - ASSESSMENT
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  Hydrocephalus:  EVD insertion, set at 10cm H20 open to drain  Seizure prophylaxis:  switch levetiracetam to  q8h, f/u EEG read  REHAB:  physical therapy evaluation and management  - defer  EARLY MOB:  HOB elevated    PULM:  extbuated, CXR   CARDIO:  SBP goal 120-180mm Hg, baseline echo  ENDO:  Blood sugar goals 140-180 mg/dL, cont insulin sliding scale  GI:  d/c PPI  DIET: start tube feeds  RENAL: maintain euvolemia, accurate Is and Os, d/c IVF once TF at goal 3% @ 50 recheck BMP this afternoon, Na goal 135-145  HEM/ONC: no coagulopathy (INR= 1.2), no ASA / Plavix use  VTE Prophylaxis: SCDs, no DVT chemoprophylaxis for now as patient is high risk for bleed (fresh bleed), f/u baseline LE Doppler for DVT suspected on admission (h/o recent surgery, transfer from OSH)  ID: afebrile, leukocytosis, aspiration risk  Social: Daughter Maggie and Son Kenya and sister (on the phone) updated yesterday      CORE MEASURES  1.  Hunt and Mckeon Score = 5  2.  VTE prophylaxis:  [ ] administered within 24 hours of admission OR [X] reason not done: fresh bleed, unsecured aneurysm.  3.  Dysphagia screening:   [X] performed before any oral meds / liquids / food  4.  Nimodipine treatment:  [X] administered within 24 hours of admission OR [ ] reason not done:    ATTENDING ATTESTATION:  I was physically present for the key portions of the evaluation and management (E/M) service provided.  I agree with the above history, physical and plan, which I have reviewed and edited where appropriate.    Patient at high risk for neurological deterioration or death due to:  ICU delirium, aspiration PNA, DVT / PE.  Critical care time:  I have personally provided 45 minutes of critical care time, excluding time spent on separate procedures.      Plan discussed with RN, house staff.       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  VSP :  HDA to 180-220 mm Hg  Hydrocephalus:  EVD at -5cm H20 open to drain  Seizure prophylaxis:  cont  q8h  REHAB:  physical therapy evaluation and management  - defer  EARLY MOB:  HOB elevated    PULM:  cont full vent support  CARDIO:  SBP goal 180-200mm Hg   ENDO:  Blood sugar goals 140-180 mg/dL, cont insulin sliding scale  GI:  cont PPI while intubated and on steroids  DIET: restart tube feeds  RENAL: maintain euvolemia, accurate Is and Os, d/c IVF once TF at goal, cont 3% @ 50 recheck BMP this afternoon, Na goal 145-150; cont salt tabs and fludrocortisone  HEM/ONC: no coagulopathy (INR= 1.2), no ASA / Plavix use  VTE Prophylaxis: SCDs, no DVT chemoprophylaxis for now as patient is high risk for bleed (fresh bleed), f/u baseline LE Doppler for DVT suspected on admission (h/o recent surgery, transfer from OSH)  ID: afebrile, leukocytosis, aspiration risk  Social: Daughter Maggie and Son Kenya and sister (on the phone) updated yesterday      CORE MEASURES  1.  Hunt and Mckeon Score = 5  2.  VTE prophylaxis:  [ ] administered within 24 hours of admission OR [X] reason not done: fresh bleed, unsecured aneurysm.  3.  Dysphagia screening:   [X] performed before any oral meds / liquids / food  4.  Nimodipine treatment:  [X] administered within 24 hours of admission OR [ ] reason not done:    ATTENDING ATTESTATION:  I was physically present for the key portions of the evaluation and management (E/M) service provided.  I agree with the above history, physical and plan, which I have reviewed and edited where appropriate.    Patient at high risk for neurological deterioration or death due to:  ICU delirium, aspiration PNA, DVT / PE.  Critical care time:  I have personally provided 45 minutes of critical care time, excluding time spent on separate procedures.      Plan discussed with RN, house staff.       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  VSP :  HDA to 180-220 mm Hg  Hydrocephalus:  EVD at -5cm H20 open to drain  Seizure prophylaxis:  cont  q8h  REHAB:  physical therapy evaluation and management  - defer  EARLY MOB:  HOB elevated    PULM:  cont full vent support  CARDIO:  SBP goal 180-200mm Hg   ENDO:  Blood sugar goals 140-180 mg/dL, cont insulin sliding scale  GI:  cont PPI while intubated and on steroids  DIET: restart tube feeds  RENAL: maintain euvolemia, accurate Is and Os, d/c IVF once TF at goal, cont 3% @ 50 recheck BMP this afternoon, Na goal 145-150; cont salt tabs and fludrocortisone  HEM/ONC: no coagulopathy (INR= 1.2), no ASA / Plavix use  VTE Prophylaxis: SCDs, SQL; repeat surveillance doppler on 08/23  ID: afebrile, leukocytosis, aspiration risk  Social: will update family, Daughter Maggie and Son Kenya and sister (on the phone)     CORE MEASURES  1.  Hunt and Mckeon Score = 5  2.  VTE prophylaxis:  [ ] administered within 24 hours of admission OR [X] reason not done: fresh bleed, unsecured aneurysm.  3.  Dysphagia screening:   [X] performed before any oral meds / liquids / food  4.  Nimodipine treatment:  [X] administered within 24 hours of admission OR [ ] reason not done:    ATTENDING ATTESTATION:  I was physically present for the key portions of the evaluation and management (E/M) service provided.  I agree with the above history, physical and plan, which I have reviewed and edited where appropriate.    Patient at high risk for neurological deterioration or death due to:  ICU delirium, aspiration PNA, DVT / PE.  Critical care time:  I have personally provided 45 minutes of critical care time, excluding time spent on separate procedures.      Plan discussed with RN, house staff.       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  VSP :  HDA to 180-220 mm Hg  Hydrocephalus:  EVD at -5cm H20 open to drain  Seizure prophylaxis:  cont  q8h  REHAB:  physical therapy evaluation and management  - defer  EARLY MOB:  HOB elevated    PULM:  cont full vent support  CARDIO:  SBP goal 180-200mm Hg   ENDO:  Blood sugar goals 140-180 mg/dL, cont insulin sliding scale  GI:  cont PPI while intubated and on steroids  DIET: restart tube feeds  RENAL: maintain euvolemia, accurate Is and Os, d/c IVF once TF at goal, cont 3% @ 50 recheck BMP this afternoon, Na goal 145-150; cont salt tabs and fludrocortisone  HEM/ONC: no coagulopathy (INR= 1.2), no ASA / Plavix use  VTE Prophylaxis: SCDs, SQL; repeat surveillance doppler on 08/23  ID: afebrile, leukocytosis, aspiration risk  Social: will update family, Daughter Maggie and Son Keyna and sister (on the phone)     CORE MEASURES  1.  Hunt and Mckeon Score = 5  2.  VTE prophylaxis:  [ ] administered within 24 hours of admission OR [X] reason not done: fresh bleed, unsecured aneurysm.  3.  Dysphagia screening:   [X] performed before any oral meds / liquids / food  4.  Nimodipine treatment:  [X] administered within 24 hours of admission OR [ ] reason not done:    ATTENDING ATTESTATION:  I was physically present for the key portions of the evaluation and management (E/M) service provided.  I agree with the above history, physical and plan, which I have reviewed and edited where appropriate.    Patient at high risk for neurological deterioration or death due to:  ICU delirium, aspiration PNA, DVT / PE.  Critical care time:  I have personally provided 30 minutes of critical care time, excluding time spent on separate procedures.      Plan discussed with RN, house staff.

## 2021-08-17 NOTE — CHART NOTE - NSCHARTNOTEFT_GEN_A_CORE
Admitting Diagnosis:   Patient is a 45y old  Female who presents with a chief complaint of SAH (17 Aug 2021 06:20)    PAST MEDICAL & SURGICAL HISTORY:  gastric sleeve (8/4/21), fibromyalgia, thyroid dysfunction, PTSD, depression, anxiety.     Current Nutrition Order:   Diet, NPO after Midnight:      NPO Start Date: 16-Aug-2021,   NPO Start Time: 23:59    PO Intake: Good (%) [   ]  Fair (50-75%) [   ] Poor (<25%) [   ]- N/A NPO    GI Issues:   WDL, no n/v/d/c  No abd distention or discomfort noted    Pain:  No pain reported at this time    Skin Integrity:  Surgical incision, david score 16  No edema present  No pressure ulcers noted    Labs:   08-17    149<H>  |  112<H>  |  14  ----------------------------<  180<H>  3.4<L>   |  26  |  0.48<L>    Ca    8.8      17 Aug 2021 06:27  Phos  3.0     08-17  Mg     2.4     08-17    CAPILLARY BLOOD GLUCOSE    POCT Blood Glucose.: 136 mg/dL (17 Aug 2021 07:27)  POCT Blood Glucose.: 136 mg/dL (16 Aug 2021 21:01)  POCT Blood Glucose.: 136 mg/dL (16 Aug 2021 17:11)  POCT Blood Glucose.: 289 mg/dL (16 Aug 2021 11:05)    Medications:  MEDICATIONS  (STANDING):  albuterol/ipratropium for Nebulization 3 milliLiter(s) Nebulizer every 6 hours  aspirin  chewable 81 milliGRAM(s) Oral daily  bromocriptine Capsule 15 milliGRAM(s) Oral every 8 hours  chlorhexidine 2% Cloths 1 Application(s) Topical <User Schedule>  dexAMETHasone  Injectable 4 milliGRAM(s) IV Push every 6 hours  dextrose 40% Gel 15 Gram(s) Oral once  dextrose 5%. 1000 milliLiter(s) (50 mL/Hr) IV Continuous <Continuous>  dextrose 5%. 1000 milliLiter(s) (100 mL/Hr) IV Continuous <Continuous>  dextrose 50% Injectable 25 Gram(s) IV Push once  dextrose 50% Injectable 12.5 Gram(s) IV Push once  dextrose 50% Injectable 25 Gram(s) IV Push once  enoxaparin Injectable 40 milliGRAM(s) SubCutaneous every 12 hours  fludroCORTISONE 0.2 milliGRAM(s) Oral every 12 hours  glucagon  Injectable 1 milliGRAM(s) IntraMuscular once  insulin lispro (ADMELOG) corrective regimen sliding scale   SubCutaneous every 6 hours  norepinephrine Infusion 0.05 MICROgram(s)/kG/Min (4.79 mL/Hr) IV Continuous <Continuous>  pantoprazole   Suspension 40 milliGRAM(s) Oral before breakfast  piperacillin/tazobactam IVPB.. 3.375 Gram(s) IV Intermittent every 6 hours  polyethylene glycol 3350 17 Gram(s) Oral daily  potassium chloride   Solution 40 milliEquivalent(s) Oral every 4 hours  propofol Infusion 10 MICROgram(s)/kG/Min (6.13 mL/Hr) IV Continuous <Continuous>  senna 2 Tablet(s) Oral at bedtime  sodium chloride 3 Gram(s) Oral every 6 hours  sodium chloride 0.9%. 1000 milliLiter(s) (50 mL/Hr) IV Continuous <Continuous>  sodium chloride 3%. 500 milliLiter(s) (50 mL/Hr) IV Continuous <Continuous>  valproate sodium IVPB 500 milliGRAM(s) IV Intermittent every 8 hours  vancomycin  IVPB 1500 milliGRAM(s) IV Intermittent every 12 hours    MEDICATIONS  (PRN):  acetaminophen    Suspension .. 650 milliGRAM(s) Oral every 6 hours PRN Temp greater or equal to 38C (100.4F), Mild Pain (1 - 3)  ondansetron Injectable 4 milliGRAM(s) IV Push every 6 hours PRN Nausea and/or Vomiting    Admitting Anthropometrics:  · Height for BMI (FEET)	5 Feet  · Height for BMI (INCHES)	4 Inch(s)  · Height for BMI (CENTIMETERS)	162.56 Centimeter(s)  · Weight for BMI (lbs)	225.1 lb  · Weight for BMI (kg)	102.1 kg  · Body Mass Index	              38.6 kg/m2  · Ideal Body Weight (lbs)	120  · Ideal Body Weight (kg)	54.4    Weight:  8/7 225lbs    Weight Change:   No new weights obtained this admission.     Nutrition Focused Physical Exam: Completed [   ]  Not Pertinent [ x  ]    Estimated energy needs:   IBW used for calculations as pt >120% of IBW (188%). Needs adjusted for recent bariatric sx, critical care, obesity. **Fluids per team (aim for >64oz per day).  Kcal (25-30 kcal/kg): 7865-7564 kcal  Protein (1.5-2.0 g/kg pro): 81-108g pro    Subjective:   45F with h/o recent gastric sleeve (08/04/21 [facility], Dr. Crisostomo ), fibromyalgia, thyroid dysfunction, PTSD, depression, anxiety.  Presented with seizures at home - brought to [place], with 1 more episode of seizure en route.  Intubated, CT showed SAH with IV extension, casted IV, hydrocephalus, given mannitol, levetiracetam 1g,  6mg ativan. Started on Cardene drip for BP goal < 140 and transferred to [facility] NICU for further management. S/p angio with R vert takedown 8/7, pt extubated later that day. Pt remains on fentanyl and Precedex restlessness and agitation. NGT placed for initiation of EN. CTH with increased hydro, CTA head/neck with mild basilar spasm. Pt now weened off fentanyl and precedex 8/12 per disc with RN. S/p IVC placement 8/12. Pt with increased work of breathing with inability to clear secretions, and was reintubated 8/15. CTA shows posterior circulation vasospasm. Started on levophed drip for SBP goal 180-200. Also remains on propofol drip. EVD open at -5cmH2O. Pt noted to be febrile this am (101F) and pancultured 8/17. Pt made NPO for possible angio today.     On assessment, pt resting in bed without noted complaints per team. Pt remains agitated and restless unable to participate in exam at this time. Currently NPO for possible angio. Pt has been tolerating EN well per team. No s/sx of aspiration, no abd distention or discomfort. No noted n/v/d/c- currently on reglan. If team wishes to restart EN, please see recs below. FWF per team. Pertinent Labs: POCT 136H, Na 149H, K 3.4L, Cl 112H, Cre 0.48L, Glu 180H. Cont to monitor lytes and replete prn. Cont to adjust insulin regimen per team for BS control. Please see recs below. RD to follow.      Previous Nutrition Diagnosis: Inadequate Oral Intake RT inability to meet needs via PO AEB NPO, reliant on EN to meet 100% of est needs.     Active [ x  ]  Resolved [   ]    If resolved, new PES:     Goal/Expected Outcome Consistently meet >75% est needs via appropriate route.    Recommendations:  1. NPO  >> When medically feasible, recommend the following diet advancement; BARICLLIQ >> Phase 1 Bariatric Full liquid diet  2. When feasible recommend restart EN @ Vital HP @ 46 ml/hr x24hrs via NGT providing 1104 ml TV, 1104 kcal, 96g pro., 923 ml free water.   >>Cont with aspiration precautions at all times  >>FWF per team  3. Cont to monitor lytes and replete prn  4. RD diet edu prn  5. Pain and bowel regimen per team    Education: Deferred    Risk Level: High [ x  ] Moderate [   ] Low [   ]. Admitting Diagnosis:   Patient is a 45y old  Female who presents with a chief complaint of SAH (17 Aug 2021 06:20)    PAST MEDICAL & SURGICAL HISTORY:  gastric sleeve (8/4/21), fibromyalgia, thyroid dysfunction, PTSD, depression, anxiety.     Current Nutrition Order:   Diet, NPO after Midnight:      NPO Start Date: 16-Aug-2021,   NPO Start Time: 23:59    PO Intake: Good (%) [   ]  Fair (50-75%) [   ] Poor (<25%) [   ]- N/A NPO    GI Issues:   WDL, no n/v/d/c  No abd distention or discomfort noted    Pain:  No pain reported at this time    Skin Integrity:  Surgical incision, david score 16  No edema present  No pressure ulcers noted    Labs:   08-17    149<H>  |  112<H>  |  14  ----------------------------<  180<H>  3.4<L>   |  26  |  0.48<L>    Ca    8.8      17 Aug 2021 06:27  Phos  3.0     08-17  Mg     2.4     08-17    CAPILLARY BLOOD GLUCOSE    POCT Blood Glucose.: 136 mg/dL (17 Aug 2021 07:27)  POCT Blood Glucose.: 136 mg/dL (16 Aug 2021 21:01)  POCT Blood Glucose.: 136 mg/dL (16 Aug 2021 17:11)  POCT Blood Glucose.: 289 mg/dL (16 Aug 2021 11:05)    Medications:  MEDICATIONS  (STANDING):  albuterol/ipratropium for Nebulization 3 milliLiter(s) Nebulizer every 6 hours  aspirin  chewable 81 milliGRAM(s) Oral daily  bromocriptine Capsule 15 milliGRAM(s) Oral every 8 hours  chlorhexidine 2% Cloths 1 Application(s) Topical <User Schedule>  dexAMETHasone  Injectable 4 milliGRAM(s) IV Push every 6 hours  dextrose 40% Gel 15 Gram(s) Oral once  dextrose 5%. 1000 milliLiter(s) (50 mL/Hr) IV Continuous <Continuous>  dextrose 5%. 1000 milliLiter(s) (100 mL/Hr) IV Continuous <Continuous>  dextrose 50% Injectable 25 Gram(s) IV Push once  dextrose 50% Injectable 12.5 Gram(s) IV Push once  dextrose 50% Injectable 25 Gram(s) IV Push once  enoxaparin Injectable 40 milliGRAM(s) SubCutaneous every 12 hours  fludroCORTISONE 0.2 milliGRAM(s) Oral every 12 hours  glucagon  Injectable 1 milliGRAM(s) IntraMuscular once  insulin lispro (ADMELOG) corrective regimen sliding scale   SubCutaneous every 6 hours  norepinephrine Infusion 0.05 MICROgram(s)/kG/Min (4.79 mL/Hr) IV Continuous <Continuous>  pantoprazole   Suspension 40 milliGRAM(s) Oral before breakfast  piperacillin/tazobactam IVPB.. 3.375 Gram(s) IV Intermittent every 6 hours  polyethylene glycol 3350 17 Gram(s) Oral daily  potassium chloride   Solution 40 milliEquivalent(s) Oral every 4 hours  propofol Infusion 10 MICROgram(s)/kG/Min (6.13 mL/Hr) IV Continuous <Continuous>  senna 2 Tablet(s) Oral at bedtime  sodium chloride 3 Gram(s) Oral every 6 hours  sodium chloride 0.9%. 1000 milliLiter(s) (50 mL/Hr) IV Continuous <Continuous>  sodium chloride 3%. 500 milliLiter(s) (50 mL/Hr) IV Continuous <Continuous>  valproate sodium IVPB 500 milliGRAM(s) IV Intermittent every 8 hours  vancomycin  IVPB 1500 milliGRAM(s) IV Intermittent every 12 hours    MEDICATIONS  (PRN):  acetaminophen    Suspension .. 650 milliGRAM(s) Oral every 6 hours PRN Temp greater or equal to 38C (100.4F), Mild Pain (1 - 3)  ondansetron Injectable 4 milliGRAM(s) IV Push every 6 hours PRN Nausea and/or Vomiting    Admitting Anthropometrics:  · Height for BMI (FEET)	5 Feet  · Height for BMI (INCHES)	4 Inch(s)  · Height for BMI (CENTIMETERS)	162.56 Centimeter(s)  · Weight for BMI (lbs)	225.1 lb  · Weight for BMI (kg)	102.1 kg  · Body Mass Index	              38.6 kg/m2  · Ideal Body Weight (lbs)	120  · Ideal Body Weight (kg)	54.4    Weight:  8/7 225lbs    Weight Change:   No new weights obtained this admission.     Nutrition Focused Physical Exam: Completed [   ]  Not Pertinent [ x  ]    Estimated energy needs:   IBW used for calculations as pt >120% of IBW (188%). Needs adjusted for recent bariatric sx, critical care, obesity. **Fluids per team (aim for >64oz per day).  Kcal (25-30 kcal/kg): 0638-9236 kcal  Protein (1.5-2.0 g/kg pro): 81-108g pro    Subjective:   45F with h/o recent gastric sleeve (08/04/21 [facility], Dr. Crisostomo ), fibromyalgia, thyroid dysfunction, PTSD, depression, anxiety.  Presented with seizures at home - brought to [place], with 1 more episode of seizure en route.  Intubated, CT showed SAH with IV extension, casted IV, hydrocephalus, given mannitol, levetiracetam 1g,  6mg ativan. Started on Cardene drip for BP goal < 140 and transferred to [facility] NICU for further management. S/p angio with R vert takedown 8/7, pt extubated later that day. Pt remains on fentanyl and Precedex restlessness and agitation. NGT placed for initiation of EN. CTH with increased hydro, CTA head/neck with mild basilar spasm. Pt now weened off fentanyl and precedex 8/12 per disc with RN. S/p IVC placement 8/12. Pt with increased work of breathing with inability to clear secretions, and was reintubated 8/15. CTA shows posterior circulation vasospasm. Started on levophed drip for SBP goal 180-200. Also remains on propofol drip. EVD open at -5cmH2O. Pt noted to be febrile this am (101F) and pancultured 8/17. Pt made NPO for possible angio today.     On assessment, pt resting in bed without noted complaints per team, on Full Vent support. Vent AC/ VC. Tmax 98.7F. . Ordered for levo and propofol (@21 ml/hr providing 554 kcal). Currently NPO for possible angio. Pt has been tolerating EN well per team. No s/sx of aspiration, no abd distention or discomfort. No noted n/v/d/c- currently on reglan. If team wishes to restart EN, please see recs below. FWF per team. Pertinent Labs: POCT 136H, Na 149H, K 3.4L, Cl 112H, Cre 0.48L, Glu 180H. Cont to monitor lytes and replete prn. Cont to adjust insulin regimen per team for BS control. Please see recs below. RD to follow.      Previous Nutrition Diagnosis: Inadequate Oral Intake RT inability to meet needs via PO AEB NPO, reliant on EN to meet 100% of est needs.     Active [ x  ]  Resolved [   ]    If resolved, new PES:     Goal/Expected Outcome Consistently meet >75% est needs via appropriate route.    Recommendations:  1. NPO  >> When medically feasible, recommend the following diet advancement; BARICLLIQ >> Phase 1 Bariatric Full liquid diet  2. With current propofol rate, recommend EN regimen of Vital HP @ 30 ml/hr x24hrs with LPS daily (100 kcal, 15g pro) providing a total of 1374 kcal, 78g pro, 601 ml free water.   >> Once propofol is discontinued, recommend; Vital HP @ 46 ml/hr x24hrs via NGT providing 1104 ml TV, 1104 kcal, 96g pro., 923 ml free water.   >>Cont with aspiration precautions at all times  >>FWF per team  3. Cont to monitor lytes and replete prn  4. RD diet edu prn  5. Pain and bowel regimen per team    Education: Deferred    Risk Level: High [ x  ] Moderate [   ] Low [   ].

## 2021-08-18 NOTE — PROVIDER CONTACT NOTE (OTHER) - ACTION/TREATMENT ORDERED:
MD Sanches decided against reintubation, pt given nebs and mucomyst and placed on nonrebreather saturating well

## 2021-08-18 NOTE — PROGRESS NOTE ADULT - SUBJECTIVE AND OBJECTIVE BOX
NEUROSURGERY POST OP NOTE:    POD# 0 S/P cerebral angiogram with IA verapamil    S: Vasospasm improved with mild-moderate vasospasm in posterior circulation and mild to right ICA. Verapamil given to posterior circulation. No complications. Postop 25mcg fentanyl given for sedation/agitation.      T(C): 36.8 (08-18-21 @ 09:20), Max: 38.1 (08-17-21 @ 22:10)  HR: 112 (08-18-21 @ 15:00) (88 - 117)  BP: 237/124 (08-18-21 @ 15:00) (199/104 - 237/124)  RR: 18 (08-18-21 @ 13:00) (17 - 25)  SpO2: 99% (08-18-21 @ 15:00) (97% - 100%)      08-17-21 @ 07:01  -  08-18-21 @ 07:00  --------------------------------------------------------  IN: 6118.6 mL / OUT: 7772 mL / NET: -1653.4 mL    08-18-21 @ 07:01  -  08-18-21 @ 15:09  --------------------------------------------------------  IN: 2579.8 mL / OUT: 2690 mL / NET: -110.2 mL        acetaminophen    Suspension .. 650 milliGRAM(s) Oral every 6 hours PRN  albuterol/ipratropium for Nebulization 3 milliLiter(s) Nebulizer every 6 hours  aspirin  chewable 81 milliGRAM(s) Oral daily  bromocriptine Capsule 15 milliGRAM(s) Oral every 8 hours  chlorhexidine 0.12% Liquid 15 milliLiter(s) Oral Mucosa every 12 hours  chlorhexidine 2% Cloths 1 Application(s) Topical <User Schedule>  dextrose 50% Injectable 25 Gram(s) IV Push once  enoxaparin Injectable 40 milliGRAM(s) SubCutaneous every 12 hours  fentaNYL    Injectable 25 MICROGram(s) IV Push once  fludroCORTISONE 0.2 milliGRAM(s) Oral every 12 hours  glucagon  Injectable 1 milliGRAM(s) IntraMuscular once  insulin lispro (ADMELOG) corrective regimen sliding scale   SubCutaneous every 6 hours  norepinephrine Infusion 0.05 MICROgram(s)/kG/Min IV Continuous <Continuous>  ondansetron Injectable 4 milliGRAM(s) IV Push every 6 hours PRN  pantoprazole   Suspension 40 milliGRAM(s) Oral before breakfast  polyethylene glycol 3350 17 Gram(s) Oral daily  propofol Infusion 10 MICROgram(s)/kG/Min IV Continuous <Continuous>  senna 2 Tablet(s) Oral at bedtime  sodium chloride 3 Gram(s) Oral every 6 hours  sodium chloride 0.9%. 1000 milliLiter(s) IV Continuous <Continuous>  sodium chloride 3%  Inhalation 4 milliLiter(s) Inhalation every 6 hours  sodium chloride 3%. 500 milliLiter(s) IV Continuous <Continuous>  valproate sodium IVPB 2000 milliGRAM(s) IV Intermittent once  valproate sodium IVPB 1000 milliGRAM(s) IV Intermittent every 8 hours      RADIOLOGY:     Exam:  General: patient seen laying supine in bed, mild agitation  Neuro: OEs spontaneously, squeezes b/l hands and wiggles b/l toes to command, R gaze, +gag, +cough  HEENT: R pupil 3mm reactive, L pupil 4mm reactive, +NGT  Neck: supple  Cardiac: RRR, S1S2  Pulmonary: chest rise symmetric, +intubated  Abdomen: soft, nontender, nondistended  Ext: warm, perfusing well, DP/PT pulses 2+ B/L  Wound: right groin dressing c/d/i, no hematoma       WOUND/DRAINS: EVD at 15 cm H2O      46y/o F with h/o recent gastric sleeve (08/04/21 [facility], Dr. Crisostomo ), fibromyalgia, thyroid dysfunction, PTSD, depression, anxiety.  Presented with seizures at home - brought to [place], with 1 more episode of seizure en route.  Intubated, CT showed SAH with IV extension, casted IV, hydrocephalus, given mannitol, levetiracetam 1g,  6mg ativan. Started on Cardene drip for BP goal < 140 and transferred to [facility] NICU for further management. HH5 MF4, BD 1 = 8/7. Now s/p cerebral angiogram for R vertebral artery takedown for R vertebral dissecting aneurysm (8/7), and R frontal EVD placement (8/7), now s/p IVC filter placement (8/12,) now s/p angio IA verapamil 8/16, 8/17    NEURO:   - neurochecks q1h  - EVD open at -5cmH2O , allow up to 40cc/hr  - propofol prn   - Niimodipine 60 mg po q4h d/c'ed for SBP goal 180-200   - Depakote increased to 1g q8 ( No keppra due to agitation) next level 8/21  - Ceribell EEG negative 8/8, d/c'ed. Ceribell eeg placed again 8/18 f/u read  - cont ASA 81  - CTH 8/15: worsened uncal herniation, worsened hydro, vasospasm in b/l PCA, L vert, basilar arteries  - Bromocriptine 15mg Q8  - Per ENT, no vocal cord issues  8/16: angio with findings of moderate diffuse vasospasm of left vertebral artery, basilar artery and posterior circulation including PCAs IA verapamil given   8/17: angio findings of moderate to severe cerebral vasospasm of the distal left vert and basilar artery, mild supraclinoid vasospasm. IA verapamil given  - VPA subtherapeutic , increased dose to 750   - plan for angio today    PULM:    - Hypertonic nebs/duonebs   - CT PE 8/11: Probable pulmonary embolism in the left distal main and proximal lower lobar pulmonary artery, which was also seen on prior same day neck CT. No saddle embolism. No evidence of right heart strain.  -full vent support,     CARDIO:    - -220  - levo gtt, s/p albumin 8/16   - TTE WNL, repeat 8/12   - Troponin stable   - EKG normal     ENDO:    - glucose goal 140-180    GI:    - NPO for now  - needs thin liquid diet x3 weeks as per bariatric when tolerated  - PPI per bariatric surgeon    RENAL:   - Maintain euvolemia   - Na goal 145-150   - 50cc/hr NS, 3% at 50 total goal 100cc of fluids per hr   - albumin bolus     HEM/ONC:   - ASA 81  - VTE Prophylaxis: SCDs, SQL  - LE Duplex 8/9: Acute completely occlusive deep venous thrombosis of the right intramuscular calf vein, s/p IVCF with vascular 8/12, repeat 8/16 shows new b/l peroneal DVTs, factor xa c/w prophylactic dose   - Dr. Montiel following, f/u hypercoagulable w/u    ID:   -MRSA neg 8/12  -febrile, trend leukocytosis   -empiric Vanc/zoysn for fever d/c'd (8/10-13) and now restarted 8/14 for persistent fevers, dc'd again on 8/18   -CSF sent 8/13, NGTD   -f/u pancx 8/17    Dispol ICU status: family updated  PT/OT: on hold due to critical status  Full code    D/w Dr. Loamx, Dr. Mohr, and Dr. Yan           Assessment:  Present when checked    []  GCS  E   V  M     Heart Failure: []Acute, [] acute on chronic , []chronic  Heart Failure:  [] Diastolic (HFpEF), [] Systolic (HFrEF), []Combined (HFpEF and HFrEF), [] RHF, [] Pulm HTN, [] Other    [] TRUDI, [] ATN, [] AIN, [] other  [] CKD1, [] CKD2, [] CKD 3, [] CKD 4, [] CKD 5, []ESRD    Encephalopathy: [] Metabolic, [] Hepatic, [] toxic, [] Neurological, [] Other    Abnormal Nurtitional Status: [] malnurtition (see nutrition note), [ ]underweight: BMI < 19, [] morbid obesity: BMI >40, [] Cachexia    [] Sepsis  [] hypovolemic shock,[] cardiogenic shock, [] hemorrhagic shock, [] neuogenic shock  [] Acute Respiratory Failure  []Cerebral edema, [] Brain compression/ herniation,   [] Functional quadriplegia  [] Acute blood loss anemia

## 2021-08-18 NOTE — PROGRESS NOTE ADULT - SUBJECTIVE AND OBJECTIVE BOX
================================================   NEUROCRITICAL CARE ATTENDING NOTE (Wednesday, 2021)  ================================================    LUDMILA PRIETO   MRN-[id number]  Summary:  45y/F with h/o recent gastric sleeve ( [facility], Dr. Crisostomo ), fibromyalgia, thyroid dysfunction, PTSD, depression, anxiety.  Presented with seizures at home - brought to [place], with 1 more episode of seizure en route.  Intubated, CT showed SAH with IV extension, casted IV, hydrocephalus, given mannitol levetiracetam 6mg ativan, transferred to [facility].    Hospital Course:   : Tx from [place] for SAH. Intubated. R frontal EVD placed, central line and a line placed. 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   : POD1 s/p angio with R vert takedown, extubated, desatting on aerosol mask, required extensive suctioning, 3% nebs, chest PT, started on low dose precedex drip for restlessness/agitation, ABG stable. NGT placed. TF started with goal 20 pending nutrition recs. 3% stopped for Na 150. Ceribell EEG negative and d/c'ed.    Overnight, new Right pronator drift and right gaze preference that can't cross midline, Repeat CTH demonstrated stable vents, CTA head and neck unremarkable for spasm, hypoattenuation of right cerebellum edema vs. infarct.  8/10: POD3 R vert takedown, EVD open at [id number]. ASHA overnight, neuro stable. CTH with increased hydro, CTA head/neck with mild basilar spasm, but concern for PE. 1/2 am D50 for hypoglycemia. 1L bolus then 100 cc/hr, PM rounds for net negative fluid balance and mild vasospasm on CTA.   : POD4 R vert takedown. EVD at 5cm H2O, ASHA overnight. neuro stable.   Febrile 104. depakote increased to q6. NCHCT stable. EVD lowered to 0. Lasix 20 given for dieuresis, UOP over 2 liters. Sedation given for a-line placement, BP drop needing levo.  12: POD5 R vert takedown. EVD at 0cm H2O. ASHA overnight, neuro stable. Precedex being weaned off. Pending IVCF with vascular today.  : POD6 R vert takedown. EVD open at 0cmH2O. ASHA overnight. Neuro exam stable. Mottled skin on the left lower extremity improving. Started on Bromocriptine 15mg Q8.   : POD7. EVD open at 0. 1L bolus given for euvolemia o/n. neuro stable. CTH stable. ENT scoped vocal cords, +R vocal cord paresis, NTD. started on zosyn empirically.   8/15: POD8. EVD open at 0. ASHA o/n, neuro stable. Pt developed increased work of breathing, unable to clear her secretions, received racemic epi and multiple nebulizer treatments, still with stridor. Patient re-intubated at bedside, on full vent support.   : CTH/CTA head/neck/CT chest performed o/n for worsened exam, R gaze preference, sluggish pupils. +worsened uncal herniation, hydro, vasospasm in b/l PCAs, L vert, basilar arteries. preop angio today, restarted on 3% for Na goal 145-150  : POD10. Overnight Pt remains on levophed drip for SBP goal 180-220. Also remains on propofol drip. EVD open at -5cmH2O. ICPs WNL. Febrile 101F and pancultured. CTH today shows slightly smaller ventricles.   : POD11 R vert takedown. POD1 angio IA verpamil. Overnight, Pt noted to have downward gaze to the right and not following commands. Taken for stat head CT which is stable. Pupils also noted to be aniscoric left pupil 4mm and brisk and right 2mm brisk. Mannitol 50g given. Ceribell eeg placed for concern of subclinical seizures, so far appears negative for seizures    Past Medical History:  s/p gastric sleeve surgery  Allergies:  Allergy Status Unknown  Home meds:     Current Meds:  MEDICATIONS  (STANDING):  albuterol/ipratropium for Nebulization 3 milliLiter(s) Nebulizer every 6 hours  aspirin  chewable 81 milliGRAM(s) Oral daily  bromocriptine Capsule 15 milliGRAM(s) Oral every 8 hours  enoxaparin Injectable 40 milliGRAM(s) SubCutaneous every 12 hours  fludroCORTISONE 0.2 milliGRAM(s) Oral every 12 hours  insulin lispro (ADMELOG) corrective regimen sliding scale   SubCutaneous every 6 hours  norepinephrine Infusion 0.05 MICROgram(s)/kG/Min (4.79 mL/Hr) IV Continuous <Continuous>  pantoprazole   Suspension 40 milliGRAM(s) Oral before breakfast  piperacillin/tazobactam IVPB.. 3.375 Gram(s) IV Intermittent every 6 hours  polyethylene glycol 3350 17 Gram(s) Oral daily  propofol Infusion 10 MICROgram(s)/kG/Min (6.13 mL/Hr) IV Continuous <Continuous>  senna 2 Tablet(s) Oral at bedtime  sodium chloride 3 Gram(s) Oral every 6 hours  sodium chloride 0.9%. 1000 milliLiter(s) (50 mL/Hr) IV Continuous <Continuous>  sodium chloride 3%. 500 milliLiter(s) (50 mL/Hr) IV Continuous <Continuous>  valproate sodium IVPB 500 milliGRAM(s) IV Intermittent every 8 hours  vancomycin  IVPB 1500 milliGRAM(s) IV Intermittent every 12 hours    MEDICATIONS  (PRN):  acetaminophen    Suspension .. 650 milliGRAM(s) Oral every 6 hours PRN Temp greater or equal to 38C (100.4F), Mild Pain (1 - 3)  ondansetron Injectable 4 milliGRAM(s) IV Push every 6 hours PRN Nausea and/or Vomiting    PHYSICAL EXAMINATION    ICU Vital Signs Last 24 Hrs  T(C): 38.1 (17 Aug 2021 22:10), Max: 38.1 (17 Aug 2021 22:10)  T(F): 100.5 (17 Aug 2021 22:10), Max: 100.5 (17 Aug 2021 22:10)  HR: 105 (18 Aug 2021 06:13) (87 - 115)  BP: 217/115 (18 Aug 2021 06:00) (199/104 - 228/108)  BP(mean): 158 (18 Aug 2021 06:00) (110 - 158)  ABP: 208/131 (18 Aug 2021 06:00) (147/95 - 208/131)  ABP(mean): 164 (18 Aug 2021 06:00) (114 - 178)  RR: 20 (18 Aug 2021 06:00) (16 - 25)  SpO2: 99% (18 Aug 2021 06:13) (97% - 100%)    Mode: AC, RR (machine): 14, TV (machine): 450, FiO2: 40, PEEP: 5, ITime: 1, MAP: 8, PIP: 15  NEUROLOGIC EXAMINATION:  Patient is rousable, restless at times, CON, no vertical/rotatory nystagmus, R gaze preference, downgaze preference, R slight LMN asymmetry, R + cerebellar drift, L spontaneous 4+/5, R UE 3/5 --> 2/, R LE 4-/5  EENT:  anicteric  CARDIOVASCULAR: (+) S1 S2, normal rate and regular rhythm  PULMONARY: clear to auscultation bilaterally  ABDOMEN: soft, nontender with normoactive bowel sounds  EXTREMITIES: no edema  SKIN: no rash    I/O's    21 @ 07:  -  21 @ 07:00  --------------------------------------------------------  IN: 5807.6 mL / OUT: 5594 mL / NET: 213.6 mL    21 @ 07:01  -  21 @ 06:38  --------------------------------------------------------  IN: 5118.6 mL / OUT: 7560 mL / NET: -2441.4 mL    LABS:                    11.6   16.78 )-----------( 354      ( 18 Aug 2021 05:41 )             38.9     08-18    (150)  147<H>  |  113<H>  |  11  ----------------------------<  165<H>  3.7   |  21<L>  |  0.56    Ca    9.1      18 Aug 2021 05:41  Phos  2.8       Mg     2.0         TPro  6.6  /  Alb  4.0  /  TBili  0.4  /  DBili  x   /  AST  23  /  ALT  33  /  AlkPhos  64      Urinalysis Basic - ( 17 Aug 2021 06:27 )    Color: Yellow / Appearance: Clear / S.020 / pH: x  Gluc: x / Ketone: 15 mg/dL  / Bili: Negative / Urobili: 0.2 E.U./dL   Blood: x / Protein: Trace mg/dL / Nitrite: NEGATIVE   Leuk Esterase: NEGATIVE / RBC: < 5 /HPF / WBC < 5 /HPF   Sq Epi: x / Non Sq Epi: 5-10 /HPF / Bacteria: Present /HPF    CAPILLARY BLOOD GLUCOSE    POCT Blood Glucose.: 142 mg/dL (17 Aug 2021 21:51)  POCT Blood Glucose.: 160 mg/dL (17 Aug 2021 11:12)  POCT Blood Glucose.: 136 mg/dL (17 Aug 2021 07:27)    Culture - Sputum (collected 21 @ 08:05)  Source: .Sputum Sputum  Gram Stain (21 @ 17:08):    No epithelial cells seen    Few White blood cells    No organisms seen    Culture - Blood (21 @ 01:45)    Specimen Source: .Blood Blood X 2    Culture Results:   No growth at 1 day.    Bacteriology:  CSF studies:  EEG:  Neuroimaging:    Cerebral Angiogram (2021 - Left vertebral artery dissection demonstrates continued moderate to severe cerebral vasospasm of the distal let vert and basilar artery, some what improved caliber of proximal left vert. 15mg IA Verapamil injected over 10 minutes with mild improvement of posterior circulation post treatment. Right ICA injection with continued mild supraclinoid vasospasm, 10mg of IA Verapamil injected.  Cerebral Angiogram (2021) - Left vertebral injection demonstrates moderate diffuse vasospasm of left vertebral artery, basilar artery and posterior circulation including PCAs. 15mg Verapamil injected via left vert with mild radiographic improvement post treatment. Right ICA injection with mild supraclinoid spasm/narrowing, otherwise no vasospasm appreciated in anterior circulation.    CT (2021) - No significant interval change in right superior and lateral cerebellar hypodensities.  CT (08.15.2021) - 1. Worsening the uncal herniation, with effacement of the bilateral suprasellar and perimesencephalic cisterns.  2. Redistribution of intraventricular hemorrhage, as above. Redemonstration of right EVD, with distal tip in the right frontal horn, near the foramen of Monro. Worsening hydrocephalus.  CTA - 1. Multifocal punctate and short segment stenoses, as above, with several areas of narrowing new as compared to recent CTA dated 2021. In the posterior circulation, stenoses are identified within the V4 left vertebral artery, the basilar artery, and in the bilateral PCA, more significant on the left.  Within the anterior circulation, stenosis are identified in the right supraclinoid C6 ICA as well as the proximal M1 MCA. Findings are most compatible with vasospasm.  2. In particular, the distal V4 right vertebral artery, which was visualized, though diminutive, on prior CTA dated 2021, does not fill with contrast on the present examination. The basilar artery demonstrates multiple stenoses, and is significantly smaller in caliber when compared to the prior CTA.    Other imaging:  Duplex - 1.  Since 2021, there is new deep venous thrombosis in the bilateral peroneal veins.  2.  Redemonstration of deep vein thrombosis in a right intramuscular calf vein.    EVD -5 cm, drained 50 mL O/N, then 10-30 mL/h, ICP < 5    [Code] Full  [Goals] Aggressive  [Disposion] ICU     ================================================   NEUROCRITICAL CARE ATTENDING NOTE (Wednesday, 2021)  ================================================    LUDMILA PRIETO   MRN-[id number]  Summary:  45y/F with h/o recent gastric sleeve ( [facility], Dr. Crisostomo ), fibromyalgia, thyroid dysfunction, PTSD, depression, anxiety.  Presented with seizures at home - brought to [place], with 1 more episode of seizure en route.  Intubated, CT showed SAH with IV extension, casted IV, hydrocephalus, given mannitol levetiracetam 6mg ativan, transferred to [facility].    Hospital Course:   : Tx from [place] for SAH. Intubated. R frontal EVD placed, central line and a line placed. 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   : POD1 s/p angio with R vert takedown, extubated, desatting on aerosol mask, required extensive suctioning, 3% nebs, chest PT, started on low dose precedex drip for restlessness/agitation, ABG stable. NGT placed. TF started with goal 20 pending nutrition recs. 3% stopped for Na 150. Ceribell EEG negative and d/c'ed.    Overnight, new Right pronator drift and right gaze preference that can't cross midline, Repeat CTH demonstrated stable vents, CTA head and neck unremarkable for spasm, hypoattenuation of right cerebellum edema vs. infarct.  8/10: POD3 R vert takedown, EVD open at [id number]. ASHA overnight, neuro stable. CTH with increased hydro, CTA head/neck with mild basilar spasm, but concern for PE. 1/2 am D50 for hypoglycemia. 1L bolus then 100 cc/hr, PM rounds for net negative fluid balance and mild vasospasm on CTA.   : POD4 R vert takedown. EVD at 5cm H2O, ASHA overnight. neuro stable.   Febrile 104. depakote increased to q6. NCHCT stable. EVD lowered to 0. Lasix 20 given for dieuresis, UOP over 2 liters. Sedation given for a-line placement, BP drop needing levo.  12: POD5 R vert takedown. EVD at 0cm H2O. ASHA overnight, neuro stable. Precedex being weaned off. Pending IVCF with vascular today.  : POD6 R vert takedown. EVD open at 0cmH2O. ASHA overnight. Neuro exam stable. Mottled skin on the left lower extremity improving. Started on Bromocriptine 15mg Q8.   : POD7. EVD open at 0. 1L bolus given for euvolemia o/n. neuro stable. CTH stable. ENT scoped vocal cords, +R vocal cord paresis, NTD. started on zosyn empirically.   8/15: POD8. EVD open at 0. ASHA o/n, neuro stable. Pt developed increased work of breathing, unable to clear her secretions, received racemic epi and multiple nebulizer treatments, still with stridor. Patient re-intubated at bedside, on full vent support.   : CTH/CTA head/neck/CT chest performed o/n for worsened exam, R gaze preference, sluggish pupils. +worsened uncal herniation, hydro, vasospasm in b/l PCAs, L vert, basilar arteries. preop angio today, restarted on 3% for Na goal 145-150  : POD10. Overnight Pt remains on levophed drip for SBP goal 180-220. Also remains on propofol drip. EVD open at -5cmH2O. ICPs WNL. Febrile 101F and pancultured. CTH today shows slightly smaller ventricles.   : POD11 R vert takedown. POD1 angio IA verpamil. Overnight, Pt noted to have downward gaze to the right and not following commands. Taken for stat head CT which is stable. Pupils also noted to be aniscoric left pupil 4mm and brisk and right 2mm brisk. Mannitol 50g given. Ceribell eeg placed for concern of subclinical seizures, so far appears negative for seizures.  Dayshift:  improvement in neurological status - following commands.  Euvolemia treatment, pending repeat cerebral angiogram for IA verapamil.  BP augmentation.  Increased valproic acid (level subtherapeutic).    Past Medical History:  s/p gastric sleeve surgery  Allergies:  Allergy Status Unknown  Home meds:     Current Meds:  MEDICATIONS  (STANDING):  albumin human  5% IVPB 250 milliLiter(s) IV Intermittent once  albuterol/ipratropium for Nebulization 3 milliLiter(s) Nebulizer every 6 hours  aspirin  chewable 81 milliGRAM(s) Oral daily  bromocriptine Capsule 15 milliGRAM(s) Oral every 8 hours  enoxaparin Injectable 40 milliGRAM(s) SubCutaneous every 12 hours  fludroCORTISONE 0.2 milliGRAM(s) Oral every 12 hours  insulin lispro (ADMELOG) corrective regimen sliding scale   SubCutaneous every 6 hours  norepinephrine Infusion 0.05 MICROgram(s)/kG/Min (4.79 mL/Hr) IV Continuous <Continuous>  pantoprazole   Suspension 40 milliGRAM(s) Oral before breakfast  polyethylene glycol 3350 17 Gram(s) Oral daily  propofol Infusion 10 MICROgram(s)/kG/Min (6.13 mL/Hr) IV Continuous <Continuous>  senna 2 Tablet(s) Oral at bedtime  sodium chloride 3 Gram(s) Oral every 6 hours  sodium chloride 0.9%. 1000 milliLiter(s) (50 mL/Hr) IV Continuous <Continuous>  sodium chloride 3%  Inhalation 4 milliLiter(s) Inhalation every 6 hours  sodium chloride 3%. 500 milliLiter(s) (50 mL/Hr) IV Continuous <Continuous>  valproate sodium IVPB 750 milliGRAM(s) IV Intermittent every 8 hours    MEDICATIONS  (PRN):  acetaminophen    Suspension .. 650 milliGRAM(s) Oral every 6 hours PRN Temp greater or equal to 38C (100.4F), Mild Pain (1 - 3)  ondansetron Injectable 4 milliGRAM(s) IV Push every 6 hours PRN Nausea and/or Vomiting    PHYSICAL EXAMINATION    ICU Vital Signs Last 24 Hrs  T(C): 38.1 (17 Aug 2021 22:10), Max: 38.1 (17 Aug 2021 22:10)  T(F): 100.5 (17 Aug 2021 22:10), Max: 100.5 (17 Aug 2021 22:10)  HR: 105 (18 Aug 2021 06:13) (87 - 115)  BP: 217/115 (18 Aug 2021 06:00) (199/104 - 228/108)  BP(mean): 158 (18 Aug 2021 06:00) (110 - 158)  ABP: 208/131 (18 Aug 2021 06:00) (147/95 - 208/131)  ABP(mean): 164 (18 Aug 2021 06:00) (114 - 178)  RR: 20 (18 Aug 2021 06:00) (16 - 25)  SpO2: 99% (18 Aug 2021 06:13) (97% - 100%)    Mode: AC, RR (machine): 14, TV (machine): 450, FiO2: 40, PEEP: 5, ITime: 1, MAP: 8, PIP: 15  NEUROLOGIC EXAMINATION:  Patient is rousable, L 4 mm R 3 mm reactive, R gaze preference, downgaze preference, vertical downbeat nystagmus, R slight LMN asymmetry, R + cerebellar drift, L spontaneous 4+/5, R UE 3/5, R LE 4-/5  EENT:  anicteric  CARDIOVASCULAR: (+) S1 S2, normal rate and regular rhythm  PULMONARY: clear to auscultation bilaterally  ABDOMEN: soft, nontender with normoactive bowel sounds  EXTREMITIES: no edema  SKIN: no rash    I/O's    21 @ 07:  -  21 @ 07:00  --------------------------------------------------------  IN: 6118.6 mL / OUT: 7772 mL / NET: -1653.4 mL    21 @ 07:01  -  21 @ 11:52  --------------------------------------------------------  IN: 2164.9 mL / OUT: 1400 mL / NET: 764.9 mL    LABS:                        11.6   16.78 )-----------( 354      ( 18 Aug 2021 05:41 )             38.9     08-18    (150)  147<H>  |  113<H>  |  11  ----------------------------<  165<H>  3.7   |  21<L>  |  0.56    Ca    9.1      18 Aug 2021 05:41  Phos  2.8       Mg     2.0         TPro  6.6  /  Alb  4.0  /  TBili  0.4  /  DBili  x   /  AST  23  /  ALT  33  /  AlkPhos  64      Urinalysis Basic - ( 17 Aug 2021 06:27 )    Color: Yellow / Appearance: Clear / S.020 / pH: x  Gluc: x / Ketone: 15 mg/dL  / Bili: Negative / Urobili: 0.2 E.U./dL   Blood: x / Protein: Trace mg/dL / Nitrite: NEGATIVE   Leuk Esterase: NEGATIVE / RBC: < 5 /HPF / WBC < 5 /HPF   Sq Epi: x / Non Sq Epi: 5-10 /HPF / Bacteria: Present /HPF    CAPILLARY BLOOD GLUCOSE    POCT Blood Glucose.: 130 mg/dL (18 Aug 2021 11:25)  POCT Blood Glucose.: 142 mg/dL (17 Aug 2021 21:51)    Culture - Sputum . (21 @ 08:05)    Gram Stain:   No epithelial cells seen  Few White blood cells  No organisms seen    Specimen Source: .Sputum Sputum    Culture Results:   Rare Normal Respiratory Mely present to date    Culture - Blood (21 @ 01:45)    Specimen Source: .Blood Blood X 2    Culture Results:   No growth at 1 day.    Bacteriology:  CSF studies:  EEG:  Neuroimaging:    Cerebral Angiogram (2021 - Left vertebral artery dissection demonstrates continued moderate to severe cerebral vasospasm of the distal let vert and basilar artery, some what improved caliber of proximal left vert. 15mg IA Verapamil injected over 10 minutes with mild improvement of posterior circulation post treatment. Right ICA injection with continued mild supraclinoid vasospasm, 10mg of IA Verapamil injected.  Cerebral Angiogram (2021) - Left vertebral injection demonstrates moderate diffuse vasospasm of left vertebral artery, basilar artery and posterior circulation including PCAs. 15mg Verapamil injected via left vert with mild radiographic improvement post treatment. Right ICA injection with mild supraclinoid spasm/narrowing, otherwise no vasospasm appreciated in anterior circulation.    CT (2021) - No significant interval change in right superior and lateral cerebellar hypodensities.  CT (08.15.2021) - 1. Worsening the uncal herniation, with effacement of the bilateral suprasellar and perimesencephalic cisterns.  2. Redistribution of intraventricular hemorrhage, as above. Redemonstration of right EVD, with distal tip in the right frontal horn, near the foramen of Monro. Worsening hydrocephalus.  CTA - 1. Multifocal punctate and short segment stenoses, as above, with several areas of narrowing new as compared to recent CTA dated 2021. In the posterior circulation, stenoses are identified within the V4 left vertebral artery, the basilar artery, and in the bilateral PCA, more significant on the left.  Within the anterior circulation, stenosis are identified in the right supraclinoid C6 ICA as well as the proximal M1 MCA. Findings are most compatible with vasospasm.  2. In particular, the distal V4 right vertebral artery, which was visualized, though diminutive, on prior CTA dated 2021, does not fill with contrast on the present examination. The basilar artery demonstrates multiple stenoses, and is significantly smaller in caliber when compared to the prior CTA.    Other imaging:  Duplex - 1.  Since 2021, there is new deep venous thrombosis in the bilateral peroneal veins.  2.  Redemonstration of deep vein thrombosis in a right intramuscular calf vein.    EVD (d12) -5 cm, 5-15 mL/h, ICP < 5    [Code] Full  [Goals] Aggressive  [Disposion] ICU

## 2021-08-18 NOTE — PROGRESS NOTE ADULT - ASSESSMENT
46y/o F with h/o recent gastric sleeve (08/04/21 [facility], Dr. Crisostomo ), fibromyalgia, thyroid dysfunction, PTSD, depression, anxiety.  Presented with seizures at home - brought to [place], with 1 more episode of seizure en route.  Intubated, CT showed SAH with IV extension, casted IV, hydrocephalus, given mannitol, levetiracetam 1g,  6mg ativan. Started on Cardene drip for BP goal < 140 and transferred to [facility] NICU for further management. HH5 MF4, BD 1 = 8/7. 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 No saddle embolism. No evidence of right heart strain.    CARDIO:    - SBP goal 180-200 mmHg -BP augmentation for VSP; on norepinephrine 0.35-0.6 mcg/kg/min; will maintain CVP ~6 with crystalloids and colloids  - TTE WNL, pending repeat per pulm reccs  - Troponin stable   - EKG normal     ENDO:    - glucose goal 140-180    GI:    - NPO for angio  - needs thin liquid diet x3 weeks as per bariatric when tolerated  - PPI per bariatric surgeon    RENAL:   - Maintain euvolemia   - Na goal 144-150 (Na 149)  - fludrocortisone, Na tabs  - primafit    HEM/ONC:   - ASA 81  - VTE Prophylaxis: SCDs, SQL 40 mg BID (antiXa level 0.21)  - LE Duplex 8/9: Acute completely occlusive deep venous thrombosis of the right intramuscular calf vein, s/p IVCF with vascular 8/12    ID:   -MRSA neg 8/12  -febrile, trend leukocytosis   -empiric zosyn for fever d/c'd (8/10-13) and now restarted 8/14 for persistent fevers  -CSF sent 8/13, NGTD     Dispol ICU status: family updated  PT/OT: on hold due to critical status  Full code    Social: Daughter Maggie and Son Kenya and sister (on the phone) updated    *****    ATTENDING ATTESTATION:  I was physically present for the key portions of the evaluation and management (E/M) service provided.  I agree with the above history, physical and plan, which I have reviewed and edited where appropriate.    Patient at high risk for neurological deterioration or death due to:  ICU delirium, aspiration PNA, DVT / PE.  Critical care time:  I have personally provided 45 minutes of critical care time, excluding time spent on separate procedures.      Plan discussed with RN, house staff.         46y/o F with h/o recent gastric sleeve (08/04/21 [facility], Dr. Crisostomo ), fibromyalgia, thyroid dysfunction, PTSD, depression, anxiety.  Presented with seizures at home - brought to [place], with 1 more episode of seizure en route.  Intubated, CT showed SAH with IV extension, casted IV, hydrocephalus, given mannitol, levetiracetam 1g,  6mg ativan. Started on Cardene drip for BP goal < 140 and transferred to [facility] NICU for further management. HH5 MF4, BD 1 = 8/7. 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 No saddle embolism. No evidence of right heart strain.    CARDIO:    - SBP goal 180-200 mmHg -BP augmentation for VSP; on norepinephrine 0.35-0.6 mcg/kg/min; will maintain CVP ~6 with crystalloids and colloids  - TTE WNL, pending repeat per pulm reccs  - Troponin stable   - EKG normal     ENDO:    - glucose goal 140-180    GI:    - NPO for angio  - needs thin liquid diet x3 weeks as per bariatric when tolerated  - PPI per bariatric surgeon    RENAL:   - Maintain euvolemia   - Na goal 144-150 (Na 147)  - fludrocortisone, Na tabs  - primafit    HEM/ONC:   - ASA 81  - VTE Prophylaxis: SCDs, SQL 40 mg BID (antiXa level 0.21)  - LE Duplex 8/9: Acute completely occlusive deep venous thrombosis of the right intramuscular calf vein, s/p IVCF with vascular 8/12    ID:   -MRSA neg 8/12  -Tmax 100.5, trend leukocytosis 16.78  -empiric zosyn for fever d/c'd (8/10-13) and now restarted 8/14 for persistent fevers - D/Hammad 08.18 (pan cultures neg so far)  -CSF sent 8/13, NGTD     Dispol ICU status: family updated  PT/OT: on hold due to critical status  Full code    Social: Daughter Maggie and Son Kenya and sister (on the phone) updated    *****    ATTENDING ATTESTATION:  I was physically present for the key portions of the evaluation and management (E/M) service provided.  I agree with the above history, physical and plan, which I have reviewed and edited where appropriate.    Patient at high risk for neurological deterioration or death due to:  ICU delirium, aspiration PNA, DVT / PE.  Critical care time:  I have personally provided 45 minutes of critical care time, excluding time spent on separate procedures.      Plan discussed with RN, house staff.         44y/o F with h/o recent gastric sleeve (08/04/21 [facility], Dr. Crisostomo ), fibromyalgia, thyroid dysfunction, PTSD, depression, anxiety.  Presented with seizures at home - brought to [place], with 1 more episode of seizure en route.  Intubated, CT showed SAH with IV extension, casted IV, hydrocephalus, given mannitol, levetiracetam 1g,  6mg ativan. Started on Cardene drip for BP goal < 140 and transferred to [facility] NICU for further management. HH5 MF4, BD 1 = 8/7. 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 Patient given 2g of valproate and standing dose increased to 1g q8h.  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 No saddle embolism. No evidence of right heart strain.    CARDIO:    - SBP goal 180-200 mmHg -BP augmentation for VSP; on norepinephrine 0.35-0.6 mcg/kg/min; will maintain CVP ~6 with crystalloids and colloids  - TTE WNL, pending repeat per pulm reccs  - Troponin stable   - EKG normal     ENDO:    - glucose goal 140-180    GI:    - NPO for angio  - needs thin liquid diet x3 weeks as per bariatric when tolerated  - PPI per bariatric surgeon    RENAL:   - Maintain euvolemia   - Na goal 144-150 (Na 147)  - fludrocortisone, Na tabs  - primafit    HEM/ONC:   - ASA 81  - VTE Prophylaxis: SCDs, SQL 40 mg BID (antiXa level 0.21)  - LE Duplex 8/9: Acute completely occlusive deep venous thrombosis of the right intramuscular calf vein, s/p IVCF with vascular 8/12    ID:   -MRSA neg 8/12  -Tmax 100.5, trend leukocytosis 16.78  -empiric zosyn for fever d/c'd (8/10-13) and now restarted 8/14 for persistent fevers - D/Hammad 08.18 (pan cultures neg so far)  -CSF sent 8/13, NGTD     Dispol ICU status: family updated  PT/OT: on hold due to critical status  Full code    Social: Daughter Maggie and Son Kenya and sister (on the phone) updated    *****    ATTENDING ATTESTATION:  I was physically present for the key portions of the evaluation and management (E/M) service provided.  I agree with the above history, physical and plan, which I have reviewed and edited where appropriate.    Patient at high risk for neurological deterioration or death due to:  ICU delirium, aspiration PNA, DVT / PE.  Critical care time:  I have personally provided 45 minutes of critical care time, excluding time spent on separate procedures.      Plan discussed with RN, house staff.         44y/o F with h/o recent gastric sleeve (08/04/21 [facility], Dr. Crisostomo ), fibromyalgia, thyroid dysfunction, PTSD, depression, anxiety.  Presented with seizures at home - brought to [place], with 1 more episode of seizure en route.  Intubated, CT showed SAH with IV extension, casted IV, hydrocephalus, given mannitol, levetiracetam 1g,  6mg ativan. Started on Cardene drip for BP goal < 140 and transferred to [facility] NICU for further management. HH5 MF4, BD 1 = 8/7. 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 Patient given 2g of valproate and standing dose increased to 1g q8h.  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 No saddle embolism. No evidence of right heart strain.    CARDIO:    - SBP goal 180-200 mmHg -BP augmentation for VSP; on norepinephrine 0.35-0.6 mcg/kg/min; will maintain CVP ~6 with crystalloids and colloids  - TTE WNL, pending repeat per pulm reccs  - Troponin stable   - EKG normal     ENDO:    - glucose goal 140-180    GI:    - NPO for angio  - needs thin liquid diet x3 weeks as per bariatric when tolerated  - PPI per bariatric surgeon    RENAL:   - Maintain euvolemia   - Na goal 144-150 (Na 147)  - fludrocortisone, Na tabs  - primafit    HEM/ONC:   - ASA 81  - VTE Prophylaxis: SCDs, SQL 40 mg BID (antiXa level 0.21)  - LE Duplex 8/9: Acute completely occlusive deep venous thrombosis of the right intramuscular calf vein, s/p IVCF with vascular 8/12    ID:   -MRSA neg 8/12  -Tmax 100.5, trend leukocytosis 16.78  -empiric zosyn for fever d/c'd (8/10-13) and now restarted 8/14 for persistent fevers - D/Hammad 08.18 (pan cultures neg so far)  -CSF sent 8/13, NGTD     Dispol ICU status: family updated  PT/OT: on hold due to critical status  Full code    ADDENDUM:    8/18: POD11 R vert takedown. POD1 angio IA verpamil. Overnight, Pt noted to have downward gaze to the right and not following commands. Taken for stat head CT which is stable. Pupils also noted to be aniscoric left pupil 4mm and brisk and right 2mm brisk. Mannitol 50g given. Ceribell eeg placed for concern of subclinical seizures, so far appears negative for seizures. 1L NS o/n and 1.5L NS bolus in AM for euvolemia. vanc/zosyn stopped. 250cc 3% bolus and 250cc albumin given in AM. Brief seizure to L frontal lobe this AM on EEG, given 2g valproic acid and dose increased to 1g q8. Vimpat 100mg BID started.  In afternoon patient self-extubated, placed on NRB with mucomyst, 3% inhalation and duonebs. 3% at 50 d/c'd.    Social: Daughter Maggie and Son Kenya and sister (on the phone) updated    *****    ATTENDING ATTESTATION:  I was physically present for the key portions of the evaluation and management (E/M) service provided.  I agree with the above history, physical and plan, which I have reviewed and edited where appropriate.    Patient at high risk for neurological deterioration or death due to:  ICU delirium, aspiration PNA, DVT / PE.  Critical care time:  I have personally provided 90 minutes of critical care time, excluding time spent on separate procedures.      Plan discussed with RN, house staff.

## 2021-08-18 NOTE — CONSULT NOTE ADULT - PROBLEM SELECTOR RECOMMENDATION 9
Agree with plans for hypercoagulable w/u continue Heparin for now Agree with plans for hypercoagulable w/u continue Lovenox for now

## 2021-08-18 NOTE — CONSULT NOTE ADULT - SUBJECTIVE AND OBJECTIVE BOX
EPILEPSY CONSULT NOTE:  HPI:  46y/o F with h/o recent gastric sleeve (08/04/21 [facility], Dr. Crisostomo ), fibromyalgia, thyroid dysfunction, PTSD, depression, anxiety.  Presented with seizures at home - brought to [place], with 1 more episode of seizure en route.  Intubated, CT showed SAH with IV extension, casted IV, hydrocephalus, given mannitol, levetiracetam 1g,  6mg ativan. Started on Cardene drip for BP goal < 140 and transferred to [facility] NICU for further management.     HH5 MF4   NIHSS 26 on arrival  BD 1 = 8/7    Now s/p cerebral angiogram for R vertebral artery takedown for R vertebral dissecting aneurysm (8/7), and R frontal EVD placement (8/7), now s/p IVC filter placement (8/12). Course complicated by uncal herniation, and vasospasm in B/L PCAs and L vertebral artery.     Epilepsy consulted for seizure event in AM on 8/18. Patient is unable to provide seizure details, but daughter at bedside state that this is her first episodes of seizure on the day of admission.      Epilepsy risk factors:  Head injury with subsequent LOC?: Denies  Febrile seizures in infancy?: Denies  Hx of CNS infection?: Denies  Family hx of epilepsy?: Denies  Known CNS pathology?: SAH     Review of Systems:  Unobtainable 2/2 no verbal output    Allergies  Allergy Status Unknown     MEDICATIONS  (STANDING):  acetylcysteine 20%  Inhalation 4 milliLiter(s) Inhalation three times a day  aspirin  chewable 81 milliGRAM(s) Oral daily  bromocriptine Capsule 15 milliGRAM(s) Oral every 8 hours  chlorhexidine 0.12% Liquid 15 milliLiter(s) Oral Mucosa every 12 hours  chlorhexidine 2% Cloths 1 Application(s) Topical <User Schedule>  dextrose 50% Injectable 25 Gram(s) IV Push once  enoxaparin Injectable 40 milliGRAM(s) SubCutaneous every 12 hours  fludroCORTISONE 0.2 milliGRAM(s) Oral every 12 hours  glucagon  Injectable 1 milliGRAM(s) IntraMuscular once  insulin lispro (ADMELOG) corrective regimen sliding scale   SubCutaneous every 6 hours  lacosamide Solution 100 milliGRAM(s) Oral every 12 hours  methylphenidate 10 milliGRAM(s) Oral daily  norepinephrine Infusion 0.05 MICROgram(s)/kG/Min (4.79 mL/Hr) IV Continuous <Continuous>  pantoprazole   Suspension 40 milliGRAM(s) Oral before breakfast  polyethylene glycol 3350 17 Gram(s) Oral daily  potassium chloride   Powder 40 milliEquivalent(s) Oral every 4 hours  propofol Infusion 10 MICROgram(s)/kG/Min (6.13 mL/Hr) IV Continuous <Continuous>  senna 2 Tablet(s) Oral at bedtime  sodium chloride 3 Gram(s) Oral every 6 hours  sodium chloride 0.9%. 1000 milliLiter(s) (100 mL/Hr) IV Continuous <Continuous>  sodium chloride 3%  Inhalation 4 milliLiter(s) Inhalation every 6 hours  valproate sodium IVPB 1000 milliGRAM(s) IV Intermittent every 8 hours    MEDICATIONS  (PRN):  acetaminophen    Suspension .. 650 milliGRAM(s) Oral every 6 hours PRN Temp greater or equal to 38C (100.4F), Mild Pain (1 - 3)  albuterol/ipratropium for Nebulization 3 milliLiter(s) Nebulizer every 6 hours PRN Shortness of Breath and/or Wheezing  ondansetron Injectable 4 milliGRAM(s) IV Push every 6 hours PRN Nausea and/or Vomiting    VITAL SIGNS:  T(C): 36.8 (08-18-21 @ 09:20), Max: 38.1 (08-17-21 @ 22:10)  HR: 108 (08-18-21 @ 16:30) (88 - 120)  BP: 218/118 (08-18-21 @ 16:00) (199/104 - 237/124)  RR: 17 (08-18-21 @ 16:30) (17 - 25)  SpO2: 98% (08-18-21 @ 16:30) (97% - 100%)  Wt(kg): --    PHYSICAL EXAM:  Constitutional:  Lying in bed with mask, EVD and lethargic  Cardiovascular: regular rate and rhythm, normal S1/S2, no murmurs   Chest: Clear to bases. 	  Abdomen: soft, non-tender, no hepatosplenomegaly   Extremities: no edema, clubbing or cyanosis  Skin: no rash or neurocutaneous signs     Cognitive:  Lethargic but arousable to voice. No verbal output, but able to follow some simple commands.     Cranial Nerves:  III/IV/VI: PERRLA L 3mm brisk, R 2mm brisk   VII: Face appears symmetric  Motor:  Able to squeeze w/ bilateral hands L>R, Able to wiggle toes and bend knees on LLE, withdraws to pain on RLE (no movement to command)  Reflexes:  DTR: 2+ B/L UE and 1+ B/L LE   Plantar responses: Down bilaterally     Labs:  CBC Full  -  ( 18 Aug 2021 05:41 )  WBC Count : 16.78 K/uL  RBC Count : 4.71 M/uL  Hemoglobin : 11.6 g/dL  Hematocrit : 38.9 %  Platelet Count - Automated : 354 K/uL  Mean Cell Volume : 82.6 fl  Mean Cell Hemoglobin : 24.6 pg  Mean Cell Hemoglobin Concentration : 29.8 gm/dL  Auto Neutrophil # : x  Auto Lymphocyte # : x  Auto Monocyte # : x  Auto Eosinophil # : x  Auto Basophil # : x  Auto Neutrophil % : x  Auto Lymphocyte % : x  Auto Monocyte % : x  Auto Eosinophil % : x  Auto Basophil % : x    08-18    155<H>  |  119<H>  |  8   ----------------------------<  172<H>  3.4<L>   |  25  |  0.42<L>    Ca    9.1      18 Aug 2021 15:38  Phos  2.8     08-18  Mg     2.0     08-18    TPro  6.6  /  Alb  4.0  /  TBili  0.4  /  DBili  x   /  AST  23  /  ALT  33  /  AlkPhos  64  08-18    LIVER FUNCTIONS - ( 18 Aug 2021 05:41 )  Alb: 4.0 g/dL / Pro: 6.6 g/dL / ALK PHOS: 64 U/L / ALT: 33 U/L / AST: 23 U/L / GGT: x           Valproic Acid Level, Serum: 39.5 ug/mL *L* [50.0 - 100.0] (08-18 @ 06:11)

## 2021-08-18 NOTE — CHART NOTE - NSCHARTNOTEFT_GEN_A_CORE
pt was found to have self extubated at about 3:40 PM, stat anesthesia code was called. pt pulled ETT out while being bilaterally restrained in a flat position. Vitals, tachycardic to 120s, systolic in low 200s, oral suctioning performed, pt was briefly bagged, however, she maintained her airway and was following commands appropiately, albuterol and mucomyst given. pt appears to be stable. decision made to not proceed with intubation with close clinical observation.     Pt seen and examined with RRT, Dr. Sanches and Dr. Lomax notified.

## 2021-08-18 NOTE — PROGRESS NOTE ADULT - SUBJECTIVE AND OBJECTIVE BOX
HPI:  46y/o F with h/o recent gastric sleeve (21 [facility], Dr. Crisostomo ), fibromyalgia, thyroid dysfunction, PTSD, depression, anxiety.  Presented with seizures at home - brought to [place], with 1 more episode of seizure en route.  Intubated, CT showed SAH with IV extension, casted IV, hydrocephalus, given mannitol, levetiracetam 1g,  6mg ativan. Started on Cardene drip for BP goal < 140 and transferred to [facility] NICU for further management.     HH5 MF4   NIHSS 26 on arrival  BD 1 =  (07 Aug 2021 08:08)    Hospital Course:  : Tx from [place] for SAH. Intubated. R frontal EVD placed, central line and a line placed. 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   : POD1 s/p angio with R vert takedown, extubated, desatting on aerosol mask, required extensive suctioning, 3% nebs, chest PT, started on low dose precedex drip for restlessness/agitation, ABG stable. NGT placed. TF started with goal 20 pending nutrition recs. 3% stopped for Na 150. Ceribell EEG negative and d/c'ed.    Overnight, new Right pronator drift and right gaze preference that can't cross midline, Repeat CTH demonstrated stable vents, CTA head and neck unremarkable for spasm, hypoattenuation of right cerebellum edema vs. infarct.  8/10: POD3 R vert takedown, EVD open at [id number]. ASHA overnight, neuro stable. CTH with increased hydro, CTA head/neck with mild basilar spasm, but concern for PE. 1/2 am D50 for hypoglycemia. 1L bolus then 100 cc/hr, PM rounds for net negative fluid balance and mild vasospasm on CTA.   : POD4 R vert takedown. EVD at 5cm H2O, ASHA overnight. neuro stable.   Febrile 104. depakote increased to q6. NCHCT stable. EVD lowered to 0. Lasix 20 given for dieuresis, UOP over 2 liters. Sedation given for a-line placement, BP drop needing levo.  : POD5 R vert takedown. EVD at 0cm H2O. ASHA overnight, neuro stable. Precedex being weaned off. Pending IVCF with vascular today.  : POD6 R vert takedown. EVD open at 0cmH2O. ASHA overnight. Neuro exam stable. Mottled skin on the left lower extremity improving. Started on Bromocriptine 15mg Q8.   14: POD7. EVD open at 0. 1L bolus given for euvolemia o/n. neuro stable. CTH stable. ENT scoped vocal cords, +R voacl cord paresis, NTD. started on zosyn empirically.   8/15: POD8. EVD open at 0. ASHA o/n, neuro stable. Pt developed increased work of breathing, unable to clear her secretions, received racemic epi and multiple nebulizer treatments, still with stridor. Patient re-intubated at bedside, on full vent support.   : CTH/CTA head/neck/CT chest performed o/n for worsened exam, R gaze preference, sluggish pupils. +worsened uncal herniation, hydro, vasospasm in b/l PCAs, L vert, basilar arteries. preop angio today, restarted on 3% for Na goal 145-150  : POD10. Overnight Pt remains on levophed drip for SBP goal 180-220. Also remains on propofol drip. EVD open at -5cmH2O. ICPs WNL. Febrile 101F and pancultured. CTH today shows slightly smaller ventricles.   : POD11 R vert takedown. POD1 angio IA verpamil. Overnight, Pt noted to have downward gaze to the right and not following commands. Taken for stat head CT which is stable. Pupils also noted to be aniscoric left pupil 4mm and brisk and right 2mm brisk. Mannitol 50g given. Ceribell eeg placed for concern of subclinical seizures, so far appears negative for seizures    Vital Signs Last 24 Hrs  T(C): 38.1 (17 Aug 2021 22:10), Max: 38.1 (17 Aug 2021 22:10)  T(F): 100.5 (17 Aug 2021 22:10), Max: 100.5 (17 Aug 2021 22:10)  HR: 115 (18 Aug 2021 02:00) (87 - 115)  BP: 208/111 (18 Aug 2021 02:00) (199/104 - 228/108)  BP(mean): 150 (18 Aug 2021 02:00) (110 - 160)  RR: 24 (18 Aug 2021 02:00) (16 - 25)  SpO2: 98% (18 Aug 2021 02:00) (97% - 100%)    I&O's Summary    16 Aug 2021 07:01  -  17 Aug 2021 07:00  --------------------------------------------------------  IN: 5807.6 mL / OUT: 5594 mL / NET: 213.6 mL    17 Aug 2021 07:01  -  18 Aug 2021 03:09  --------------------------------------------------------  IN: 4418.1 mL / OUT: 5982 mL / NET: -1563.9 mL        PHYSICAL EXAM:  Constitutional: NAD, well groomed, obese, off propofol  Respiratory: breathing non-labored, symmetrical chest wall movement, intubated   Cardiovascuar: RRR, no murmurs  Gastrointestinal: abdomen soft, non tender  Neurological: not opening eyes to verbal/noxious stim  Cranial Nerves: II-XII: R pupil 2mm brisk and reactive, L pupil 4mm brisk and reactive, right downward gaze preference   Motor: not following commands, moving LUE/LLE spontaneously. Withdraws RLE to noxious. No movement of RUE to noxious stim  Sensation: intact to light touch in all extremities  Extremities: distal pulses 2+ x4  Wound/incision: right groin dressing dry and intact, no hematoma present,     TUBES/LINES:  [] Richey  [] Lumbar Drain  [] Wound Drains  [x] Others: EVD open at -5cmH2O    DIET:  [x] NPO  [] Mechanical  [] Tube feeds    LABS:                        10.7   16.69 )-----------( 310      ( 17 Aug 2021 19:56 )             35.1     08-17    150<H>  |  116<H>  |  10  ----------------------------<  155<H>  4.1   |  25  |  0.52    Ca    9.0      17 Aug 2021 19:56  Phos  3.5       Mg     2.0             Urinalysis Basic - ( 17 Aug 2021 06:27 )    Color: Yellow / Appearance: Clear / S.020 / pH: x  Gluc: x / Ketone: 15 mg/dL  / Bili: Negative / Urobili: 0.2 E.U./dL   Blood: x / Protein: Trace mg/dL / Nitrite: NEGATIVE   Leuk Esterase: NEGATIVE / RBC: < 5 /HPF / WBC < 5 /HPF   Sq Epi: x / Non Sq Epi: 5-10 /HPF / Bacteria: Present /HPF          CAPILLARY BLOOD GLUCOSE      POCT Blood Glucose.: 142 mg/dL (17 Aug 2021 21:51)  POCT Blood Glucose.: 160 mg/dL (17 Aug 2021 11:12)  POCT Blood Glucose.: 136 mg/dL (17 Aug 2021 07:27)      Drug Levels: [] N/A  Valproic Acid Level, Serum: 62.0 ug/mL ( @ 05:12)  Vancomycin Level, Trough: 5.7 ug/mL ( @ 05:25)    CSF Analysis: [] N/A      Allergies    Allergy Status Unknown    Intolerances      MEDICATIONS:  Antibiotics:  piperacillin/tazobactam IVPB.. 3.375 Gram(s) IV Intermittent every 6 hours  vancomycin  IVPB 1500 milliGRAM(s) IV Intermittent every 12 hours    Neuro:  acetaminophen    Suspension .. 650 milliGRAM(s) Oral every 6 hours PRN  bromocriptine Capsule 15 milliGRAM(s) Oral every 8 hours  ondansetron Injectable 4 milliGRAM(s) IV Push every 6 hours PRN  propofol Infusion 10 MICROgram(s)/kG/Min IV Continuous <Continuous>  valproate sodium IVPB 500 milliGRAM(s) IV Intermittent every 8 hours    Anticoagulation:  aspirin  chewable 81 milliGRAM(s) Oral daily  enoxaparin Injectable 40 milliGRAM(s) SubCutaneous every 12 hours    OTHER:  albuterol/ipratropium for Nebulization 3 milliLiter(s) Nebulizer every 6 hours  chlorhexidine 2% Cloths 1 Application(s) Topical <User Schedule>  dexAMETHasone  Injectable 4 milliGRAM(s) IV Push every 6 hours  dextrose 50% Injectable 25 Gram(s) IV Push once  fludroCORTISONE 0.2 milliGRAM(s) Oral every 12 hours  glucagon  Injectable 1 milliGRAM(s) IntraMuscular once  insulin lispro (ADMELOG) corrective regimen sliding scale   SubCutaneous every 6 hours  mannitol 20% IVPB 50 Gram(s) IV Intermittent once  norepinephrine Infusion 0.05 MICROgram(s)/kG/Min IV Continuous <Continuous>  pantoprazole   Suspension 40 milliGRAM(s) Oral before breakfast  polyethylene glycol 3350 17 Gram(s) Oral daily  senna 2 Tablet(s) Oral at bedtime    IVF:  sodium chloride 3 Gram(s) Oral every 6 hours  sodium chloride 0.9% Bolus 1000 milliLiter(s) IV Bolus once  sodium chloride 0.9%. 1000 milliLiter(s) IV Continuous <Continuous>  sodium chloride 3%. 500 milliLiter(s) IV Continuous <Continuous>    CULTURES:  Culture Results:   No growth at 1 day. ( @ 01:45)  Culture Results:   No growth at 1 day. ( @ 01:45)    RADIOLOGY & ADDITIONAL TESTS:      ASSESSMENT:  46y/o F with h/o recent gastric sleeve (21 [facility], Dr. Crisostomo ), fibromyalgia, thyroid dysfunction, PTSD, depression, anxiety.  Presented with seizures at home - brought to [place], with 1 more episode of seizure en route.  Intubated, CT showed SAH with IV extension, casted IV, hydrocephalus, given mannitol, levetiracetam 1g,  6mg ativan. Started on Cardene drip for BP goal < 140 and transferred to [facility] NICU for further management. HH5 MF4, BD 1 = . Now s/p cerebral angiogram for R vertebral artery takedown for R vertebral dissecting aneurysm (), and R frontal EVD placement (), now s/p IVC filter placement (,) now s/p angio with findings of moderate diffuse vasospasm of left vertebral artery, basilar artery and posterior circulation including PCAs IA verapamil given     HEAD BLEED    No pertinent family history in first degree relatives    Handoff    Cerebral aneurysm    Cerebral artery vasospasm    Cerebral aneurysm    DVT, lower extremity    Pulmonary embolism    Cerebral artery vasospasm    Multiple subsegmental pulmonary emboli without acute cor pulmonale    DVT, lower extremity    Angiogram, carotid and cerebral arteries    IVC filter placement    Angiogram, carotid and cerebral, bilateral    Angiogram, carotid and cerebral, bilateral    SysAdmin_[email or web address]      NEURO:   - neurochecks q1h  - EVD open at -5cmH2O , allow up to 40cc/hr  - off propofol  - Niimodipine 60 mg po q4h d/c'ed for SBP goal 180-200   - Depakote 500q8 ( No keppra due to agitation) next level   - Ceribell EEG negative , d/c'ed. Ceribell eeg placed again  f/u read  - cont ASA 81  - CTH 8/15: worsened uncal herniation, worsened hydro, vasospasm in b/l PCA, L vert, basilar arteries  - Bromocriptine 15mg Q8  - Per ENT, no vocal cord issues  : angio with findings of moderate diffuse vasospasm of left vertebral artery, basilar artery and posterior circulation including PCAs IA verapamil given   : angio findings of moderate to severe cerebral vasospasm of the distal left vert and basilar artery, mild supraclinoid vasospasm. IA verapamil given    PULM:    - Hypertonic nebs/duonebs   - CT PE : Probable pulmonary embolism in the left distal main and proximal lower lobar pulmonary artery, which was also seen on prior same day neck CT. No saddle embolism. No evidence of right heart strain.  -full vent support    CARDIO:    - -220  - levo gtt, s/p albumin    - TTE WNL, repeat    - Troponin stable   - EKG normal     ENDO:    - glucose goal 140-180    GI:    - NPO for now  - needs thin liquid diet x3 weeks as per bariatric when tolerated  - PPI per bariatric surgeon    RENAL:   - Maintain euvolemia   - Na goal 145-150, on 3% at 50  - 50cc/hr NS, 3% at 50 total goal 100cc of fluids per hr     HEM/ONC:   - ASA 81  - VTE Prophylaxis: SCDs, SQL  - LE Duplex : Acute completely occlusive deep venous thrombosis of the right intramuscular calf vein, s/p IVCF with vascular , repeat  shows new b/l peroneal DVTs, pending anti-xa level  1 AM.     ID:   -MRSA neg   -febrile, trend leukocytosis   -empiric Vanc/zoysn for fever d/c'd (8/10-) and now restarted  for persistent fevers, Vanc trough  5 AM   -CSF sent , NGTD   -f/u pancx     Dispol ICU status: family updated  PT/OT: on hold due to critical status  Full code    D/w Dr. Lomax, Dr. Yan

## 2021-08-18 NOTE — CHART NOTE - NSCHARTNOTEFT_GEN_A_CORE
EEG read in afternoon showing a brief seizure in left fronto-temporal lobe at 6:43am. Patient given 2g of valproate and standing dose increased to 1g q8h.

## 2021-08-18 NOTE — CONSULT NOTE ADULT - SUBJECTIVE AND OBJECTIVE BOX
HPI:  46y/o F with h/o recent gastric sleeve (21 [facility], Dr. Crisostomo ), fibromyalgia, thyroid dysfunction, PTSD, depression, anxiety.  Presented with seizures at home - brought to [place], with 1 more episode of seizure en route.  Intubated, CT showed SAH with IV extension, casted IV, hydrocephalus, given mannitol, levetiracetam 1g,  6mg ativan. Started on Cardene drip for BP goal < 140 and transferred to [facility] NICU for further management.     HH5 MF4   NIHSS 26 on arrival  BD 1 =  (07 Aug 2021 08:08)      PAST MEDICAL & SURGICAL HISTORY:       Review of Systems:  · General	negative  · Skin/Breast	negative  · Ophthalmologic	negative  · ENMT	negative  · Respiratory and Thorax	negative  · Cardiovascular	negative  · Gastrointestinal	negative  · Genitourinary	negative  · Musculoskeletal Comments	negative  · Neurological	negative      MEDICATIONS  (STANDING):  acetaminophen   Tablet .. 1000 milliGRAM(s) Oral every 6 hours  aspirin  chewable 81 milliGRAM(s) Oral daily  bromocriptine Capsule 15 milliGRAM(s) Oral every 8 hours  chlorhexidine 0.12% Liquid 15 milliLiter(s) Oral Mucosa every 12 hours  chlorhexidine 2% Cloths 1 Application(s) Topical every 12 hours  chlorhexidine 2% Cloths 1 Application(s) Topical <User Schedule>  dextrose 50% Injectable 25 Gram(s) IV Push once  enoxaparin Injectable 40 milliGRAM(s) SubCutaneous every 12 hours  fludroCORTISONE 0.1 milliGRAM(s) Oral two times a day  glucagon  Injectable 1 milliGRAM(s) IntraMuscular once  insulin lispro (ADMELOG) corrective regimen sliding scale   SubCutaneous every 6 hours  lacosamide Solution 100 milliGRAM(s) Oral every 12 hours  lactated ringers. 1000 milliLiter(s) (100 mL/Hr) IV Continuous <Continuous>  methylphenidate 10 milliGRAM(s) Oral daily  norepinephrine Infusion 0.05 MICROgram(s)/kG/Min (4.79 mL/Hr) IV Continuous <Continuous>  pantoprazole   Suspension 40 milliGRAM(s) Oral before breakfast  polyethylene glycol 3350 17 Gram(s) Oral daily  potassium chloride   Powder 40 milliEquivalent(s) Oral every 4 hours  senna 2 Tablet(s) Oral at bedtime  sodium chloride 3%  Inhalation 4 milliLiter(s) Inhalation every 6 hours  valproate sodium IVPB 1000 milliGRAM(s) IV Intermittent every 8 hours    MEDICATIONS  (PRN):  albuterol/ipratropium for Nebulization 3 milliLiter(s) Nebulizer every 6 hours PRN Shortness of Breath and/or Wheezing  ondansetron Injectable 4 milliGRAM(s) IV Push every 6 hours PRN Nausea and/or Vomiting      Allergies    Allergy Status Unknown    Intolerances        SOCIAL HISTORY:    FAMILY HISTORY:  No pertinent family history in first degree relatives        Vital Signs Last 24 Hrs  T(C): 36.9 (19 Aug 2021 17:00), Max: 38.3 (19 Aug 2021 09:00)  T(F): 98.4 (19 Aug 2021 17:00), Max: 101 (19 Aug 2021 09:00)  HR: 94 (19 Aug 2021 20:00) (91 - 112)  BP: 205/98 (19 Aug 2021 20:00) (146/77 - 249/125)  BP(mean): 141 (19 Aug 2021 20:00) (105 - 170)  RR: 15 (19 Aug 2021 20:00) (15 - 22)  SpO2: 100% (19 Aug 2021 20:00) (92% - 100%)     Physical Exam:  · Constitutional	detailed exam  · Constitutional Details	well-developed; well-groomed  · Eyes	EOMI; PERRL; no drainage or redness  · ENMT Comments	dry mucous membranes  · Respiratory	detailed exam  · Respiratory Comments	normal breath sounds at the lung bases bilaterally  · Cardiovascular	Regular rate & rhythm, normal S1, S2; no murmurs, gallops or rubs; no S3, S4  · Abd-Soft non tender  ·Ext-no edema, clubbing or cyanosis      LABS:                        10.5   14.03 )-----------( 347      ( 19 Aug 2021 05:36 )             35.9     08-19    154<H>  |  115<H>  |  11  ----------------------------<  127<H>  3.1<L>   |  28  |  0.46<L>    Ca    9.5      19 Aug 2021 17:26  Phos  2.3     08-  Mg     2.2     08-    TPro  6.6  /  Alb  4.0  /  TBili  0.4  /  DBili  x   /  AST  23  /  ALT  33  /  AlkPhos  64  08-18      Urinalysis Basic - ( 19 Aug 2021 19:08 )    Color: Yellow / Appearance: Clear / S.015 / pH: x  Gluc: x / Ketone: 15 mg/dL  / Bili: Negative / Urobili: 1.0 E.U./dL   Blood: x / Protein: NEGATIVE mg/dL / Nitrite: NEGATIVE   Leuk Esterase: NEGATIVE / RBC: > 10 /HPF / WBC < 5 /HPF   Sq Epi: x / Non Sq Epi: 0-5 /HPF / Bacteria: Present /HPF        RADIOLOGY & ADDITIONAL STUDIES:

## 2021-08-18 NOTE — EEG REPORT - NS EEG TEXT BOX
[facility]  Inpatient Epilepsy Monitoring Unit video-Electroencephalography Report    Acquisition Details:  Electroencephalography was acquired using the [employer] recording system.         Description:  Background: Predominantly theta/delta frequencies   Organization: rudimentary anterior-posterior organization of the background.  Posterior Dominant Rhythm: There was no posterior dominant rhythm present.  N2 sleep: present, with symmetric and synchronous sleep spindles and K-complexes.  Focal abnormalities No persistent asymmetries in voltage or frequencies.  Spontaneous Activity:  Sharp and spike wave discharges present occasionally bilaterally in the temporal regions, right> left ( T5, T4, T6, F8).   Events:   -On 8/18 @ 6:43, there was a 10 second burst of irregular discharges in the left temporal region. This portion was followed by disruption of the recording in this region, so it is not clear where this represented epileptic activity or artifact.   - During the majority of the recording, high impedance readings were present in the left temporal region which impeded analysis of the recording.       Summary:  This Ceribell recording is abnormal:   1.	Diffuse slowing of the recording   2.	Intermittent epileptiform discharges bitemporally.   3.	10 second event at 6:42 on 8/18, that could not be determined if epileptic vs. artifact.   Clinical Correlation:  1)	There were non-specific indicators of bilateral temporal dysfunction and epileptic potential.   2)	If there is persistent suspicion for continued seizures or seizure-like activity, a continuous video-electroencephalogram with the 10-20 international system is advised.      Mariya Lake MD  Attending [facility] Epilepsy Program

## 2021-08-18 NOTE — BRIEF OPERATIVE NOTE - OPERATION/FINDINGS
Femoral cerebral angiogram performed under GA via right CFA using a 5Fr sheath. Left vertebral artery injection demonstrates improved vasospasm now mild to moderate. 15mg IA Verapamil injected. Right ICA injection with improved mild distal ICA vasospasm, no treatment given. EVD maintained open for the procedure with 15cc total output.     Full report to follow, d/w Dr. Mohr

## 2021-08-18 NOTE — CONSULT NOTE ADULT - ASSESSMENT
45 year-old woman with h/o recent gastric sleeve (08/04/21 [facility], Dr. Crisostomo ), fibromyalgia, thyroid dysfunction, PTSD, depression, anxiety who was transferred from [facility] on 8/7/21 for seizures, and was found to have SAH 2/2 R vertebral dissecting aneurysm. Epilepsy consulted for seizure event on 8/18, most likely due to SAH w/ subsequent vasospasms.     Recommendations:  - Initiate vEEG monitoring  - Continue Vimpat 100mg Q12hrs  - Continue Depakote 1000mg Q8hrs  - Please obtain valproic acid level in AM  - Maintain seizure and fall precautions

## 2021-08-18 NOTE — PROVIDER CONTACT NOTE (OTHER) - SITUATION
while patient was restrained she self extubated after sliding down in bed while patient was on bilateral restraints when she self extubated after sliding down in bed, patient was off sedated as per neuro team to assess neuro status

## 2021-08-19 NOTE — EEG REPORT - NS EEG TEXT BOX
[facility] Department of Neurology  Inpatient Epilepsy Monitoring Unit video-Electroencephalography Report    Acquisition Details:  Electroencephalography was acquired using a minimum of 21 channels on an [employer] DA Neurology system v 8.5.1 with electrode placement according to the standard International 10-20 system following ACNS (American Clinical Neurophysiology Society) guidelines for Long-Term Video EEG monitoring.  Anterior temporal T1 and T2 electrodes were utilized whenever possible.   The [employer] automated spike & seizure detections were all reviewed in detail, in addition to extensive portions of raw EEG.  Specially-trained nurses were available for seizure-related events.  Continuous live-time video monitoring of the patients for seizure-related and safety events was performed by specially-trained technicians.      Day 1: 8/18/2021 @ 6:17:28 PM to next morning @ 07:00 am  Note: Due to incision, leads F4, C4, T6, and P4 not able to be placed.   Background:  continuous, with predominantly theta/delta frequencies.  Symmetry:  Frequent (10-49%) polymorphic delta slowing note din the right occipital region, but difficult to asses due to missing leads.   Posterior Dominant Rhythm:  8 Hz asymmetric posterior dominant rhythm, more prominent on the right posterior quadrant.   Organization: Appropriate anterior-posterior gradient.  Voltage:  Normal (20+ uV)  Variability: Yes. 		Reactivity: Yes.  N2 sleep: Rudimentary but likely N2 transients present.  Spontaneous Activity:  No epileptiform discharges.  Periodic/rhythmic activity:  None.  Events:  No electrographic seizures or significant clinical events.  Provocations:  Hyperventilation and Photic stimulation: was not performed.    Daily Summary:      Technically limited study due to lead placement.   Moderate generalized hemisphere slowing suggestive of a similar degree of diffuse or multifocal  dysfunction.  Focal slowing in the right posterior quadrant, which may be arifactual due to lead placement.   No epileptiform activity and no significant clinical events occurred.    Mariya Lake MD  Attending Neurologist, [facility] Epilepsy Program

## 2021-08-19 NOTE — PROGRESS NOTE ADULT - SUBJECTIVE AND OBJECTIVE BOX
================================================   NEUROCRITICAL CARE ATTENDING NOTE (Thursday, August 19, 2021)  ================================================    LUDMILA PRIETO   MRN-[id number]  Summary:  45y/F with h/o recent gastric sleeve (08/04 [facility], Dr. Crisostomo ), fibromyalgia, thyroid dysfunction, PTSD, depression, anxiety.  Presented with seizures at home - brought to [place], with 1 more episode of seizure en route.  Intubated, CT showed SAH with IV extension, casted IV, hydrocephalus, given mannitol levetiracetam 6mg ativan, transferred to [facility].    Hospital Course:   8/7: Tx from [place] for SAH. Intubated. R frontal EVD placed, central line and a line placed. 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   8/8: POD1 s/p angio with R vert takedown, extubated, desatting on aerosol mask, required extensive suctioning, 3% nebs, chest PT, started on low dose precedex drip for restlessness/agitation, ABG stable. NGT placed. TF started with goal 20 pending nutrition recs. 3% stopped for Na 150. Ceribell EEG negative and d/c'ed.   8/9 Overnight, new Right pronator drift and right gaze preference that can't cross midline, Repeat CTH demonstrated stable vents, CTA head and neck unremarkable for spasm, hypoattenuation of right cerebellum edema vs. infarct.  8/10: POD3 R vert takedown, EVD open at [id number]. ASHA overnight, neuro stable. CTH with increased hydro, CTA head/neck with mild basilar spasm, but concern for PE. 1/2 am D50 for hypoglycemia. 1L bolus then 100 cc/hr, PM rounds for net negative fluid balance and mild vasospasm on CTA.   8/11: POD4 R vert takedown. EVD at 5cm H2O, ASHA overnight. neuro stable.   Febrile 104. depakote increased to q6. NCHCT stable. EVD lowered to 0. Lasix 20 given for dieuresis, UOP over 2 liters. Sedation given for a-line placement, BP drop needing levo.  8/12: POD5 R vert takedown. EVD at 0cm H2O. ASHA overnight, neuro stable. Precedex being weaned off. Pending IVCF with vascular today.  8/13: POD6 R vert takedown. EVD open at 0cmH2O. ASHA overnight. Neuro exam stable. Mottled skin on the left lower extremity improving. Started on Bromocriptine 15mg Q8.   8/14: POD7. EVD open at 0. 1L bolus given for euvolemia o/n. neuro stable. CTH stable. ENT scoped vocal cords, +R vocal cord paresis, NTD. started on zosyn empirically.   8/15: POD8. EVD open at 0. ASHA o/n, neuro stable. Pt developed increased work of breathing, unable to clear her secretions, received racemic epi and multiple nebulizer treatments, still with stridor. Patient re-intubated at bedside, on full vent support.   8/16: CTH/CTA head/neck/CT chest performed o/n for worsened exam, R gaze preference, sluggish pupils. +worsened uncal herniation, hydro, vasospasm in b/l PCAs, L vert, basilar arteries. preop angio today, restarted on 3% for Na goal 145-150  8/17: POD10. Overnight Pt remains on levophed drip for SBP goal 180-220. Also remains on propofol drip. EVD open at -5cmH2O. ICPs WNL. Febrile 101F and pancultured. CTH today shows slightly smaller ventricles.   8/18: POD11 R vert takedown. POD1 angio IA verpamil. Overnight, Pt noted to have downward gaze to the right and not following commands. Taken for stat head CT which is stable. Pupils also noted to be aniscoric left pupil 4mm and brisk and right 2mm brisk. Mannitol 50g given. Ceribell eeg placed for concern of subclinical seizures, so far appears negative for seizures. 1L NS o/n and 1.5L NS bolus in AM for euvolemia. vanc/zosyn stopped. 250cc 3% bolus and 250cc albumin given in AM. Brief seizure to L frontal lobe this AM on EEG, given 2g valproic acid and dose increased to 1g q8. Vimpat 100mg BID started.  In afternoon patient self-extubated, placed on NRB with mucomyst, 3% inhalation and duonebs. 3% at 50 d/c'd.  8/19: POD 12. Remains on VEEG (negative so far). Axillary A line placed.  AM episode of decreased LOC, R gaze preference, L pupil 5 mm (vertical oval), not obeying commands - pending CT/CTA/CTP; sBP 180-220, norepinephrine infusion, euvolemia treatment with CVP 6, frequent suctioning    Past Medical History:  s/p gastric sleeve surgery  Allergies:  Allergy Status Unknown  Home meds:       ICU Vital Signs Last 24 Hrs  T(C): 36.9 (19 Aug 2021 17:00), Max: 38.3 (19 Aug 2021 09:00)  T(F): 98.4 (19 Aug 2021 17:00), Max: 101 (19 Aug 2021 09:00)  HR: 94 (19 Aug 2021 20:00) (91 - 112)  BP: 205/98 (19 Aug 2021 20:00) (146/77 - 249/125)  BP(mean): 141 (19 Aug 2021 20:00) (105 - 170)  ABP: 190/109 (19 Aug 2021 20:00) (172/102 - 215/114)  ABP(mean): 142 (19 Aug 2021 20:00) (128 - 159)  RR: 15 (19 Aug 2021 20:00) (15 - 22)  SpO2: 100% (19 Aug 2021 20:00) (92% - 100%)      08-18-21 @ 07:01  -  08-19-21 @ 07:00  --------------------------------------------------------  IN: 6296.4 mL / OUT: 5971 mL / NET: 325.4 mL    08-19-21 @ 07:01  -  08-19-21 @ 22:03  --------------------------------------------------------  IN: 1617.8 mL / OUT: 2844 mL / NET: -1226.2 mL      NEUROLOGIC EXAMINATION: Oriented x 1 (nods to name)  L 4 mm R 3 mm reactive, R gaze preference, hypophonic states her name, weak cough  Motor RUE 3/5, LUE strong spontaneous AG and purposeful, B/L LE AG at least.    EENT: Anicteric  CARDIOVASCULAR: (+) S1 S2, normal rate and regular rhythm  PULMONARY: clear to auscultation bilaterally  ABDOMEN: soft, nontender with normoactive bowel sounds  EXTREMITIES: no edema  SKIN: No rash      MEDICATIONS  acetaminophen   Tablet .. 1000 milliGRAM(s) Oral every 6 hours  albuterol/ipratropium for Nebulization 3 milliLiter(s) Nebulizer every 6 hours PRN  aspirin  chewable 81 milliGRAM(s) Oral daily  bromocriptine Capsule 15 milliGRAM(s) Oral every 8 hours  chlorhexidine 0.12% Liquid 15 milliLiter(s) Oral Mucosa every 12 hours  chlorhexidine 2% Cloths 1 Application(s) Topical every 12 hours  chlorhexidine 2% Cloths 1 Application(s) Topical <User Schedule>  dextrose 50% Injectable 25 Gram(s) IV Push once  enoxaparin Injectable 40 milliGRAM(s) SubCutaneous every 12 hours  fludroCORTISONE 0.1 milliGRAM(s) Oral two times a day  glucagon  Injectable 1 milliGRAM(s) IntraMuscular once  insulin lispro (ADMELOG) corrective regimen sliding scale   SubCutaneous every 6 hours  lacosamide Solution 100 milliGRAM(s) Oral every 12 hours  lactated ringers. 1000 milliLiter(s) (100 mL/Hr) IV Continuous <Continuous>  methylphenidate 10 milliGRAM(s) Oral daily  norepinephrine Infusion 0.05 MICROgram(s)/kG/Min (4.79 mL/Hr) IV Continuous <Continuous>  ondansetron Injectable 4 milliGRAM(s) IV Push every 6 hours PRN  pantoprazole   Suspension 40 milliGRAM(s) Oral before breakfast  polyethylene glycol 3350 17 Gram(s) Oral daily  potassium chloride   Powder 40 milliEquivalent(s) Oral every 4 hours  senna 2 Tablet(s) Oral at bedtime  sodium chloride 3%  Inhalation 4 milliLiter(s) Inhalation every 6 hours  valproate sodium IVPB 1000 milliGRAM(s) IV Intermittent every 8 hours               LABS:              10.5   14.03 )-----------( 347      ( 19 Aug 2021 05:36 )             35.9     08-19    154<H>  |  115<H>  |  11  ----------------------------<  127<H>  3.1<L>   |  28  |  0.46<L>    Ca    9.5      19 Aug 2021 17:26  Phos  2.3     08-19  Mg     2.2     08-19    TPro  6.6  /  Alb  4.0  /  TBili  0.4  /  DBili  x   /  AST  23  /  ALT  33  /  AlkPhos  64  08-18    LIVER FUNCTIONS - ( 18 Aug 2021 05:41 )  Alb: 4.0 g/dL / Pro: 6.6 g/dL / ALK PHOS: 64 U/L / ALT: 33 U/L / AST: 23 U/L / GGT: x           ABG - ( 19 Aug 2021 00:41 )  pH, Arterial: 7.44  pH, Blood: x     /  pCO2: 43    /  pO2: 102   / HCO3: 29    / Base Excess: 4.4   /  SaO2: 98.9        Ca    9.1      19 Aug 2021 00:41  Phos  2.8     08-18  Mg     2.0     08-18    T Protein:  6.6  /  Alb  4.0  /  TBili  0.4  /  DBili  x   /  AST  23  /  ALT  33  /  AlkPhos  64  08-18    Other imaging:  Duplex - 1.  Since 8/9/2021, there is new deep venous thrombosis in the bilateral peroneal veins.  2. Redemonstration of deep vein thrombosis in a right intramuscular calf vein.    EVD (d12) -5 cm, 5-15 mL/h, ICP < 5.     [Code] Full  [Goals] Aggressive  [Disposion] ICU     ================================================   NEUROCRITICAL CARE ATTENDING NOTE (Thursday, August 19, 2021)  ================================================    LUDMILA PRIETO   MRN-[id number]  Summary:  45y/F with h/o recent gastric sleeve (08/04 [facility], Dr. Crisostomo ), fibromyalgia, thyroid dysfunction, PTSD, depression, anxiety.  Presented with seizures at home - brought to [place], with 1 more episode of seizure en route.  Intubated, CT showed SAH with IV extension, casted IV, hydrocephalus, given mannitol levetiracetam 6mg ativan, transferred to [facility].    Hospital Course:   8/7: Tx from [place] for SAH. Intubated. R frontal EVD placed, central line and a line placed. 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   8/8: POD1 s/p angio with R vert takedown, extubated, desatting on aerosol mask, required extensive suctioning, 3% nebs, chest PT, started on low dose precedex drip for restlessness/agitation, ABG stable. NGT placed. TF started with goal 20 pending nutrition recs. 3% stopped for Na 150. Ceribell EEG negative and d/c'ed.   8/9 Overnight, new Right pronator drift and right gaze preference that can't cross midline, Repeat CTH demonstrated stable vents, CTA head and neck unremarkable for spasm, hypoattenuation of right cerebellum edema vs. infarct.  8/10: POD3 R vert takedown, EVD open at [id number]. ASHA overnight, neuro stable. CTH with increased hydro, CTA head/neck with mild basilar spasm, but concern for PE. 1/2 am D50 for hypoglycemia. 1L bolus then 100 cc/hr, PM rounds for net negative fluid balance and mild vasospasm on CTA.   8/11: POD4 R vert takedown. EVD at 5cm H2O, ASHA overnight. neuro stable.   Febrile 104. depakote increased to q6. NCHCT stable. EVD lowered to 0. Lasix 20 given for dieuresis, UOP over 2 liters. Sedation given for a-line placement, BP drop needing levo.  8/12: POD5 R vert takedown. EVD at 0cm H2O. ASHA overnight, neuro stable. Precedex being weaned off. Pending IVCF with vascular today.  8/13: POD6 R vert takedown. EVD open at 0cmH2O. ASHA overnight. Neuro exam stable. Mottled skin on the left lower extremity improving. Started on Bromocriptine 15mg Q8.   8/14: POD7. EVD open at 0. 1L bolus given for euvolemia o/n. neuro stable. CTH stable. ENT scoped vocal cords, +R vocal cord paresis, NTD. started on zosyn empirically.   8/15: POD8. EVD open at 0. ASHA o/n, neuro stable. Pt developed increased work of breathing, unable to clear her secretions, received racemic epi and multiple nebulizer treatments, still with stridor. Patient re-intubated at bedside, on full vent support.   8/16: CTH/CTA head/neck/CT chest performed o/n for worsened exam, R gaze preference, sluggish pupils. +worsened uncal herniation, hydro, vasospasm in b/l PCAs, L vert, basilar arteries. preop angio today, restarted on 3% for Na goal 145-150  8/17: POD10. Overnight Pt remains on levophed drip for SBP goal 180-220. Also remains on propofol drip. EVD open at -5cmH2O. ICPs WNL. Febrile 101F and pancultured. CTH today shows slightly smaller ventricles.   8/18: POD11 R vert takedown. POD1 angio IA verpamil. Overnight, Pt noted to have downward gaze to the right and not following commands. Taken for stat head CT which is stable. Pupils also noted to be aniscoric left pupil 4mm and brisk and right 2mm brisk. Mannitol 50g given. Ceribell eeg placed for concern of subclinical seizures, so far appears negative for seizures. 1L NS o/n and 1.5L NS bolus in AM for euvolemia. vanc/zosyn stopped. 250cc 3% bolus and 250cc albumin given in AM. Brief seizure to L frontal lobe this AM on EEG, given 2g valproic acid and dose increased to 1g q8. Vimpat 100mg BID started.  In afternoon patient self-extubated, placed on NRB with mucomyst, 3% inhalation and duonebs. 3% at 50 d/c'd.  8/19: POD 12. Remains on VEEG (negative so far). Axillary A line placed.  AM episode of decreased LOC, R gaze preference, L pupil 5 mm (vertical oval), not obeying commands - pending CT/CTA/CTP; sBP 180-220, norepinephrine infusion, euvolemia treatment with CVP 6, frequent suctioning    Past Medical History:  s/p gastric sleeve surgery  Allergies:  Allergy Status Unknown  Home meds:       ICU Vital Signs Last 24 Hrs  T(C): 36.9 (19 Aug 2021 17:00), Max: 38.3 (19 Aug 2021 09:00)  T(F): 98.4 (19 Aug 2021 17:00), Max: 101 (19 Aug 2021 09:00)  HR: 94 (19 Aug 2021 20:00) (91 - 112)  BP: 205/98 (19 Aug 2021 20:00) (146/77 - 249/125)  BP(mean): 141 (19 Aug 2021 20:00) (105 - 170)  ABP: 190/109 (19 Aug 2021 20:00) (172/102 - 215/114)  ABP(mean): 142 (19 Aug 2021 20:00) (128 - 159)  RR: 15 (19 Aug 2021 20:00) (15 - 22)  SpO2: 100% (19 Aug 2021 20:00) (92% - 100%)        18 Aug 2021 07:01  -  19 Aug 2021 07:00  --------------------------------------------------------  Total IN: 6296.4 mL    OUT:    External Ventricular Device (mL): 256 mL    Intermittent Catheterization - Urethral (mL): 15 mL    Voided (mL): 5700 mL  Total OUT: 5971 mL    Total NET: 325.4 mL      19 Aug 2021 07:01  -  20 Aug 2021 04:19  --------------------------------------------------------  IN:    Total IN: 3535.4 mL    OUT:    External Ventricular Device (mL): 334 mL    Voided (mL): 3350 mL  Total OUT: 3684 mL    Total NET: -148.6 mL        NEUROLOGIC EXAMINATION: Oriented x 1 (nods to name)  L 4 mm R 3 mm reactive, R gaze preference, hypophonic states her name, weak cough  Motor RUE 3/5, LUE strong spontaneous AG and purposeful, B/L LE AG at least.    EENT: Anicteric  CARDIOVASCULAR: (+) S1 S2, normal rate and regular rhythm  PULMONARY: clear to auscultation bilaterally  ABDOMEN: soft, nontender with normoactive bowel sounds  EXTREMITIES: no edema  SKIN: No rash      MEDICATIONS  acetaminophen   Tablet .. 1000 milliGRAM(s) Oral every 6 hours  albuterol/ipratropium for Nebulization 3 milliLiter(s) Nebulizer every 6 hours PRN  aspirin  chewable 81 milliGRAM(s) Oral daily  bromocriptine Capsule 15 milliGRAM(s) Oral every 8 hours  chlorhexidine 0.12% Liquid 15 milliLiter(s) Oral Mucosa every 12 hours  chlorhexidine 2% Cloths 1 Application(s) Topical every 12 hours  chlorhexidine 2% Cloths 1 Application(s) Topical <User Schedule>  dextrose 50% Injectable 25 Gram(s) IV Push once  enoxaparin Injectable 40 milliGRAM(s) SubCutaneous every 12 hours  fludroCORTISONE 0.1 milliGRAM(s) Oral two times a day  glucagon  Injectable 1 milliGRAM(s) IntraMuscular once  insulin lispro (ADMELOG) corrective regimen sliding scale   SubCutaneous every 6 hours  lacosamide Solution 100 milliGRAM(s) Oral every 12 hours  lactated ringers. 1000 milliLiter(s) (100 mL/Hr) IV Continuous <Continuous>  methylphenidate 10 milliGRAM(s) Oral daily  norepinephrine Infusion 0.05 MICROgram(s)/kG/Min (4.79 mL/Hr) IV Continuous <Continuous>  ondansetron Injectable 4 milliGRAM(s) IV Push every 6 hours PRN  pantoprazole   Suspension 40 milliGRAM(s) Oral before breakfast  polyethylene glycol 3350 17 Gram(s) Oral daily  potassium chloride   Powder 40 milliEquivalent(s) Oral every 4 hours  senna 2 Tablet(s) Oral at bedtime  sodium chloride 3%  Inhalation 4 milliLiter(s) Inhalation every 6 hours  valproate sodium IVPB 1000 milliGRAM(s) IV Intermittent every 8 hours               LABS:              10.5   14.03 )-----------( 347      ( 19 Aug 2021 05:36 )             35.9     08-19    154<H>  |  115<H>  |  11  ----------------------------<  127<H>  3.1<L>   |  28  |  0.46<L>    Ca    9.5      19 Aug 2021 17:26  Phos  2.3     08-19  Mg     2.2     08-19    TPro  6.6  /  Alb  4.0  /  TBili  0.4  /  DBili  x   /  AST  23  /  ALT  33  /  AlkPhos  64  08-18    LIVER FUNCTIONS - ( 18 Aug 2021 05:41 )  Alb: 4.0 g/dL / Pro: 6.6 g/dL / ALK PHOS: 64 U/L / ALT: 33 U/L / AST: 23 U/L / GGT: x           ABG - ( 19 Aug 2021 00:41 )  pH, Arterial: 7.44  pH, Blood: x     /  pCO2: 43    /  pO2: 102   / HCO3: 29    / Base Excess: 4.4   /  SaO2: 98.9        Ca    9.1      19 Aug 2021 00:41  Phos  2.8     08-18  Mg     2.0     08-18    T Protein:  6.6  /  Alb  4.0  /  TBili  0.4  /  DBili  x   /  AST  23  /  ALT  33  /  AlkPhos  64  08-18    Other imaging:  Duplex - 1.  Since 8/9/2021, there is new deep venous thrombosis in the bilateral peroneal veins.  2. Redemonstration of deep vein thrombosis in a right intramuscular calf vein.      EVD (d12) -5 cm, 5-15 mL/h, ICP <0.

## 2021-08-19 NOTE — PROGRESS NOTE ADULT - SUBJECTIVE AND OBJECTIVE BOX
HEALTH ISSUES - PROBLEM Dx:  Multiple subsegmental pulmonary emboli without acute cor pulmonale    DVT, lower extremity            CHEMOTHERAPY REGIMEN:        Day:                          Diet:  Protocol:                                    IVF:      MEDICATIONS  (STANDING):  acetaminophen   Tablet .. 1000 milliGRAM(s) Oral every 6 hours  aspirin  chewable 81 milliGRAM(s) Oral daily  bromocriptine Capsule 15 milliGRAM(s) Oral every 8 hours  chlorhexidine 0.12% Liquid 15 milliLiter(s) Oral Mucosa every 12 hours  chlorhexidine 2% Cloths 1 Application(s) Topical every 12 hours  chlorhexidine 2% Cloths 1 Application(s) Topical <User Schedule>  dextrose 50% Injectable 25 Gram(s) IV Push once  enoxaparin Injectable 40 milliGRAM(s) SubCutaneous every 12 hours  glucagon  Injectable 1 milliGRAM(s) IntraMuscular once  insulin lispro (ADMELOG) corrective regimen sliding scale   SubCutaneous every 6 hours  lacosamide Solution 100 milliGRAM(s) Oral every 12 hours  lactated ringers Bolus 1000 milliLiter(s) IV Bolus once  lactated ringers. 1000 milliLiter(s) (100 mL/Hr) IV Continuous <Continuous>  methylphenidate 10 milliGRAM(s) Oral daily  norepinephrine Infusion 0.05 MICROgram(s)/kG/Min (4.79 mL/Hr) IV Continuous <Continuous>  pantoprazole   Suspension 40 milliGRAM(s) Oral before breakfast  polyethylene glycol 3350 17 Gram(s) Oral daily  potassium chloride   Powder 40 milliEquivalent(s) Oral every 4 hours  senna 2 Tablet(s) Oral at bedtime  sodium chloride 3%  Inhalation 4 milliLiter(s) Inhalation every 6 hours  valproate sodium IVPB 1000 milliGRAM(s) IV Intermittent every 8 hours    MEDICATIONS  (PRN):  albuterol/ipratropium for Nebulization 3 milliLiter(s) Nebulizer every 6 hours PRN Shortness of Breath and/or Wheezing  ondansetron Injectable 4 milliGRAM(s) IV Push every 6 hours PRN Nausea and/or Vomiting      Allergies    Allergy Status Unknown    Intolerances        DVT Prophylaxis: [ ] YES [ ] NO      Antibiotics: [ ] YES [ ] NO    Pain Scale (1-10):       Location:    Vital Signs Last 24 Hrs  T(C): 36.9 (19 Aug 2021 17:00), Max: 38.3 (19 Aug 2021 09:00)  T(F): 98.4 (19 Aug 2021 17:00), Max: 101 (19 Aug 2021 09:00)  HR: 94 (19 Aug 2021 20:00) (91 - 112)  BP: 205/98 (19 Aug 2021 20:00) (146/77 - 249/125)  BP(mean): 141 (19 Aug 2021 20:00) (105 - 170)  RR: 15 (19 Aug 2021 20:00) (15 - 22)  SpO2: 100% (19 Aug 2021 20:00) (92% - 100%)    Drug Dosing Weight  Height (cm): 162.6 (12 Aug 2021 13:25)  Weight (kg): 102.1 (12 Aug 2021 13:25)  BMI (kg/m2): 38.6 (12 Aug 2021 13:25)  BSA (m2): 2.06 (12 Aug 2021 13:25)     Physical Exam:  · Constitutional	detailed exam  · Constitutional Details	well-developed; well-groomed  · Eyes	EOMI; PERRL; no drainage or redness  · ENMT Comments	dry mucous membranes  · Respiratory	detailed exam  · Respiratory Comments	normal breath sounds at the lung bases bilaterally  · Cardiovascular	Regular rate & rhythm, normal S1, S2; no murmurs, gallops or rubs; no S3, S4  · Abd-Soft non tender  ·Ext-no edema, clubbing or cyanosis    URINARY CATHETER: [ ] YES [ ] NO     LABS:  CBC Full  -  ( 19 Aug 2021 05:36 )  WBC Count : 14.03 K/uL  RBC Count : 4.27 M/uL  Hemoglobin : 10.5 g/dL  Hematocrit : 35.9 %  Platelet Count - Automated : 347 K/uL  Mean Cell Volume : 84.1 fl  Mean Cell Hemoglobin : 24.6 pg  Mean Cell Hemoglobin Concentration : 29.2 gm/dL  Auto Neutrophil # : x  Auto Lymphocyte # : x  Auto Monocyte # : x  Auto Eosinophil # : x  Auto Basophil # : x  Auto Neutrophil % : x  Auto Lymphocyte % : x  Auto Monocyte % : x  Auto Eosinophil % : x  Auto Basophil % : x    08-19    154<H>  |  115<H>  |  11  ----------------------------<  127<H>  3.1<L>   |  28  |  0.46<L>    Ca    9.5      19 Aug 2021 17:26  Phos  2.3     08-  Mg     2.2     08-    TPro  6.6  /  Alb  4.0  /  TBili  0.4  /  DBili  x   /  AST  23  /  ALT  33  /  AlkPhos  64  08-18      Urinalysis Basic - ( 19 Aug 2021 19:08 )    Color: Yellow / Appearance: Clear / S.015 / pH: x  Gluc: x / Ketone: 15 mg/dL  / Bili: Negative / Urobili: 1.0 E.U./dL   Blood: x / Protein: NEGATIVE mg/dL / Nitrite: NEGATIVE   Leuk Esterase: NEGATIVE / RBC: > 10 /HPF / WBC < 5 /HPF   Sq Epi: x / Non Sq Epi: 0-5 /HPF / Bacteria: Present /HPF        CULTURES:    RADIOLOGY & ADDITIONAL STUDIES:

## 2021-08-19 NOTE — PROGRESS NOTE ADULT - SUBJECTIVE AND OBJECTIVE BOX
46y/o F with h/o recent gastric sleeve (08/04/21 [facility], Dr. Crisostomo ), fibromyalgia, thyroid dysfunction, PTSD, depression, anxiety.  Presented with seizures at home - brought to [place], with 1 more episode of seizure en route.  Intubated, CT showed SAH with IV extension, casted IV, hydrocephalus, given mannitol, levetiracetam 1g,  6mg ativan. Started on Cardene drip for BP goal < 140 and transferred to [facility] NICU for further management.     HH5 MF4   NIHSS 26 on arrival  BD 1 = 8/7    Now s/p cerebral angiogram for R vertebral artery takedown for R vertebral dissecting aneurysm (8/7), and R frontal EVD placement (8/7), now s/p IVC filter placement (8/12). Course complicated by uncal herniation, and vasospasm in B/L PCAs and L vertebral artery.     Epilepsy consulted for seizure event in AM on 8/18. Patient is unable to provide seizure details, but daughter at bedside state that this is her first episodes of seizure on the day of admission.           Review of Systems:  Unobtainable 2/2 no verbal output    Allergies  Allergy Status Unknown     MEDICATIONS  (STANDING):  acetylcysteine 20%  Inhalation 4 milliLiter(s) Inhalation three times a day  aspirin  chewable 81 milliGRAM(s) Oral daily  bromocriptine Capsule 15 milliGRAM(s) Oral every 8 hours  chlorhexidine 0.12% Liquid 15 milliLiter(s) Oral Mucosa every 12 hours  chlorhexidine 2% Cloths 1 Application(s) Topical <User Schedule>  dextrose 50% Injectable 25 Gram(s) IV Push once  enoxaparin Injectable 40 milliGRAM(s) SubCutaneous every 12 hours  fludroCORTISONE 0.2 milliGRAM(s) Oral every 12 hours  glucagon  Injectable 1 milliGRAM(s) IntraMuscular once  insulin lispro (ADMELOG) corrective regimen sliding scale   SubCutaneous every 6 hours  lacosamide Solution 100 milliGRAM(s) Oral every 12 hours  methylphenidate 10 milliGRAM(s) Oral daily  norepinephrine Infusion 0.05 MICROgram(s)/kG/Min (4.79 mL/Hr) IV Continuous <Continuous>  pantoprazole   Suspension 40 milliGRAM(s) Oral before breakfast  polyethylene glycol 3350 17 Gram(s) Oral daily  potassium chloride   Powder 40 milliEquivalent(s) Oral every 4 hours  propofol Infusion 10 MICROgram(s)/kG/Min (6.13 mL/Hr) IV Continuous <Continuous>  senna 2 Tablet(s) Oral at bedtime  sodium chloride 3 Gram(s) Oral every 6 hours  sodium chloride 0.9%. 1000 milliLiter(s) (100 mL/Hr) IV Continuous <Continuous>  sodium chloride 3%  Inhalation 4 milliLiter(s) Inhalation every 6 hours  valproate sodium IVPB 1000 milliGRAM(s) IV Intermittent every 8 hours    MEDICATIONS  (PRN):  acetaminophen    Suspension .. 650 milliGRAM(s) Oral every 6 hours PRN Temp greater or equal to 38C (100.4F), Mild Pain (1 - 3)  albuterol/ipratropium for Nebulization 3 milliLiter(s) Nebulizer every 6 hours PRN Shortness of Breath and/or Wheezing  ondansetron Injectable 4 milliGRAM(s) IV Push every 6 hours PRN Nausea and/or Vomiting    VITAL SIGNS:  T(C): 36.8 (08-18-21 @ 09:20), Max: 38.1 (08-17-21 @ 22:10)  HR: 108 (08-18-21 @ 16:30) (88 - 120)  BP: 218/118 (08-18-21 @ 16:00) (199/104 - 237/124)  RR: 17 (08-18-21 @ 16:30) (17 - 25)  SpO2: 98% (08-18-21 @ 16:30) (97% - 100%)  Wt(kg): --    PHYSICAL EXAM:  Constitutional:  Lying in bed with mask, EVD and lethargic  Cardiovascular: regular rate and rhythm, normal S1/S2, no murmurs   Chest: Clear to bases. 	  Abdomen: soft, non-tender, no hepatosplenomegaly   Extremities: no edema, clubbing or cyanosis  Skin: no rash or neurocutaneous signs     Cognitive:  Lethargic but arousal to voice. No verbal output, she was not following commands. She tried to stick out her tongue, and she looked at me when I called her name.     Cranial Nerves:  III/IV/VI: PERRLA L 4mm brisk, R 2mm brisk   VII: Face appears symmetric  Motor:  She was restrained, and not following any commands.   Reflexes:  DTR: 2+ B/L UE and 1+ B/L LE   Plantar responses: Down bilaterally     Labs:  CBC Full  -  ( 18 Aug 2021 05:41 )  WBC Count : 16.78 K/uL  RBC Count : 4.71 M/uL  Hemoglobin : 11.6 g/dL  Hematocrit : 38.9 %  Platelet Count - Automated : 354 K/uL  Mean Cell Volume : 82.6 fl  Mean Cell Hemoglobin : 24.6 pg  Mean Cell Hemoglobin Concentration : 29.8 gm/dL  Auto Neutrophil # : x  Auto Lymphocyte # : x  Auto Monocyte # : x  Auto Eosinophil # : x  Auto Basophil # : x  Auto Neutrophil % : x  Auto Lymphocyte % : x  Auto Monocyte % : x  Auto Eosinophil % : x  Auto Basophil % : x    08-18    155<H>  |  119<H>  |  8   ----------------------------<  172<H>  3.4<L>   |  25  |  0.42<L>    Ca    9.1      18 Aug 2021 15:38  Phos  2.8     08-18  Mg     2.0     08-18    TPro  6.6  /  Alb  4.0  /  TBili  0.4  /  DBili  x   /  AST  23  /  ALT  33  /  AlkPhos  64  08-18    LIVER FUNCTIONS - ( 18 Aug 2021 05:41 )  Alb: 4.0 g/dL / Pro: 6.6 g/dL / ALK PHOS: 64 U/L / ALT: 33 U/L / AST: 23 U/L / GGT: x           Valproic Acid Level, Serum: 39.5 ug/mL *L* [50.0 - 100.0] (08-18 @ 06:11)       44y/o F with h/o recent gastric sleeve (08/04/21 [facility], Dr. Crisostomo ), fibromyalgia, thyroid dysfunction, PTSD, depression, anxiety.  Presented with seizures at home - brought to [place], with 1 more episode of seizure en route.  Intubated, CT showed SAH with IV extension, casted IV, hydrocephalus, given mannitol, levetiracetam 1g,  6mg ativan. Started on Cardene drip for BP goal < 140 and transferred to [facility] NICU for further management.     HH5 MF4   NIHSS 26 on arrival  BD 1 = 8/7    Now s/p cerebral angiogram for R vertebral artery takedown for R vertebral dissecting aneurysm (8/7), and R frontal EVD placement (8/7), now s/p IVC filter placement (8/12). Course complicated by uncal herniation, and vasospasm in B/L PCAs and L vertebral artery.     Epilepsy consulted for seizure event in AM on 8/18. Patient is unable to provide seizure details, but daughter at bedside state that this is her first episodes of seizure on the day of admission.           Review of Systems:  Unobtainable 2/2 no verbal output    Allergies  Allergy Status Unknown     MEDICATIONS  (STANDING):  acetylcysteine 20%  Inhalation 4 milliLiter(s) Inhalation three times a day  aspirin  chewable 81 milliGRAM(s) Oral daily  bromocriptine Capsule 15 milliGRAM(s) Oral every 8 hours  chlorhexidine 0.12% Liquid 15 milliLiter(s) Oral Mucosa every 12 hours  chlorhexidine 2% Cloths 1 Application(s) Topical <User Schedule>  dextrose 50% Injectable 25 Gram(s) IV Push once  enoxaparin Injectable 40 milliGRAM(s) SubCutaneous every 12 hours  fludroCORTISONE 0.2 milliGRAM(s) Oral every 12 hours  glucagon  Injectable 1 milliGRAM(s) IntraMuscular once  insulin lispro (ADMELOG) corrective regimen sliding scale   SubCutaneous every 6 hours  lacosamide Solution 100 milliGRAM(s) Oral every 12 hours  methylphenidate 10 milliGRAM(s) Oral daily  norepinephrine Infusion 0.05 MICROgram(s)/kG/Min (4.79 mL/Hr) IV Continuous <Continuous>  pantoprazole   Suspension 40 milliGRAM(s) Oral before breakfast  polyethylene glycol 3350 17 Gram(s) Oral daily  potassium chloride   Powder 40 milliEquivalent(s) Oral every 4 hours  propofol Infusion 10 MICROgram(s)/kG/Min (6.13 mL/Hr) IV Continuous <Continuous>  senna 2 Tablet(s) Oral at bedtime  sodium chloride 3 Gram(s) Oral every 6 hours  sodium chloride 0.9%. 1000 milliLiter(s) (100 mL/Hr) IV Continuous <Continuous>  sodium chloride 3%  Inhalation 4 milliLiter(s) Inhalation every 6 hours  valproate sodium IVPB 1000 milliGRAM(s) IV Intermittent every 8 hours    MEDICATIONS  (PRN):  acetaminophen    Suspension .. 650 milliGRAM(s) Oral every 6 hours PRN Temp greater or equal to 38C (100.4F), Mild Pain (1 - 3)  albuterol/ipratropium for Nebulization 3 milliLiter(s) Nebulizer every 6 hours PRN Shortness of Breath and/or Wheezing  ondansetron Injectable 4 milliGRAM(s) IV Push every 6 hours PRN Nausea and/or Vomiting    VITAL SIGNS:  T(C): 36.8 (08-18-21 @ 09:20), Max: 38.1 (08-17-21 @ 22:10)  HR: 108 (08-18-21 @ 16:30) (88 - 120)  BP: 218/118 (08-18-21 @ 16:00) (199/104 - 237/124)  RR: 17 (08-18-21 @ 16:30) (17 - 25)  SpO2: 98% (08-18-21 @ 16:30) (97% - 100%)  Wt(kg): --    PHYSICAL EXAM:  Constitutional:  Lying in bed with mask, EVD and lethargic  Cardiovascular: regular rate and rhythm, normal S1/S2, no murmurs   Chest: Clear to bases. 	  Abdomen: soft, non-tender, no hepatosplenomegaly   Extremities: no edema, clubbing or cyanosis  Skin: no rash or neurocutaneous signs     Cognitive:  Lethargic but arousal to voice. No verbal output, she was not following commands. She tried to stick out her tongue, and she looked at me when I called her name. However, when seen later in the round she was able to stick out her tongue and follow some simple commands.     Cranial Nerves:  III/IV/VI: PERRLA L 4mm brisk, R 2mm brisk   VII: Face appears symmetric  Motor:  She was restrained, and not following any commands.   Reflexes:  DTR: 2+ B/L UE and 1+ B/L LE   Plantar responses: Down bilaterally     Labs:  CBC Full  -  ( 18 Aug 2021 05:41 )  WBC Count : 16.78 K/uL  RBC Count : 4.71 M/uL  Hemoglobin : 11.6 g/dL  Hematocrit : 38.9 %  Platelet Count - Automated : 354 K/uL  Mean Cell Volume : 82.6 fl  Mean Cell Hemoglobin : 24.6 pg  Mean Cell Hemoglobin Concentration : 29.8 gm/dL  Auto Neutrophil # : x  Auto Lymphocyte # : x  Auto Monocyte # : x  Auto Eosinophil # : x  Auto Basophil # : x  Auto Neutrophil % : x  Auto Lymphocyte % : x  Auto Monocyte % : x  Auto Eosinophil % : x  Auto Basophil % : x    08-18    155<H>  |  119<H>  |  8   ----------------------------<  172<H>  3.4<L>   |  25  |  0.42<L>    Ca    9.1      18 Aug 2021 15:38  Phos  2.8     08-18  Mg     2.0     08-18    TPro  6.6  /  Alb  4.0  /  TBili  0.4  /  DBili  x   /  AST  23  /  ALT  33  /  AlkPhos  64  08-18    LIVER FUNCTIONS - ( 18 Aug 2021 05:41 )  Alb: 4.0 g/dL / Pro: 6.6 g/dL / ALK PHOS: 64 U/L / ALT: 33 U/L / AST: 23 U/L / GGT: x           Valproic Acid Level, Serum: 39.5 ug/mL *L* [50.0 - 100.0] (08-18 @ 06:11)

## 2021-08-19 NOTE — PROGRESS NOTE ADULT - SUBJECTIVE AND OBJECTIVE BOX
================================================   NEUROCRITICAL CARE ATTENDING NOTE ()  ================================================    LUDMILA PRIETO   MRN-[id number]  Summary:  45y/F with h/o recent gastric sleeve ( [facility], Dr. Crisostomo ), fibromyalgia, thyroid dysfunction, PTSD, depression, anxiety.  Presented with seizures at home - brought to [place], with 1 more episode of seizure en route.  Intubated, CT showed SAH with IV extension, casted IV, hydrocephalus, given mannitol levetiracetam 6mg ativan, transferred to [facility].    Hospital Course:   : Tx from [place] for SAH. Intubated. R frontal EVD placed, central line and a line placed. 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   : POD1 s/p angio with R vert takedown, extubated, desatting on aerosol mask, required extensive suctioning, 3% nebs, chest PT, started on low dose precedex drip for restlessness/agitation, ABG stable. NGT placed. TF started with goal 20 pending nutrition recs. 3% stopped for Na 150. Ceribell EEG negative and d/c'ed.    Overnight, new Right pronator drift and right gaze preference that can't cross midline, Repeat CTH demonstrated stable vents, CTA head and neck unremarkable for spasm, hypoattenuation of right cerebellum edema vs. infarct.  8/10: POD3 R vert takedown, EVD open at [id number]. ASHA overnight, neuro stable. CTH with increased hydro, CTA head/neck with mild basilar spasm, but concern for PE. 1/2 am D50 for hypoglycemia. 1L bolus then 100 cc/hr, PM rounds for net negative fluid balance and mild vasospasm on CTA.   : POD4 R vert takedown. EVD at 5cm H2O, ASHA overnight. neuro stable.   Febrile 104. depakote increased to q6. NCHCT stable. EVD lowered to 0. Lasix 20 given for dieuresis, UOP over 2 liters. Sedation given for a-line placement, BP drop needing levo.  12: POD5 R vert takedown. EVD at 0cm H2O. ASHA overnight, neuro stable. Precedex being weaned off. Pending IVCF with vascular today.  : POD6 R vert takedown. EVD open at 0cmH2O. ASHA overnight. Neuro exam stable. Mottled skin on the left lower extremity improving. Started on Bromocriptine 15mg Q8.   : POD7. EVD open at 0. 1L bolus given for euvolemia o/n. neuro stable. CTH stable. ENT scoped vocal cords, +R vocal cord paresis, NTD. started on zosyn empirically.   8/15: POD8. EVD open at 0. ASHA o/n, neuro stable. Pt developed increased work of breathing, unable to clear her secretions, received racemic epi and multiple nebulizer treatments, still with stridor. Patient re-intubated at bedside, on full vent support.   : CTH/CTA head/neck/CT chest performed o/n for worsened exam, R gaze preference, sluggish pupils. +worsened uncal herniation, hydro, vasospasm in b/l PCAs, L vert, basilar arteries. preop angio today, restarted on 3% for Na goal 145-150  : POD10. Overnight Pt remains on levophed drip for SBP goal 180-220. Also remains on propofol drip. EVD open at -5cmH2O. ICPs WNL. Febrile 101F and pancultured. CTH today shows slightly smaller ventricles.   : POD11 R vert takedown. POD1 angio IA verpamil. Overnight, Pt noted to have downward gaze to the right and not following commands. Taken for stat head CT which is stable. Pupils also noted to be aniscoric left pupil 4mm and brisk and right 2mm brisk. Mannitol 50g given. Ceribell eeg placed for concern of subclinical seizures, so far appears negative for seizures. 1L NS o/n and 1.5L NS bolus in AM for euvolemia. vanc/zosyn stopped. 250cc 3% bolus and 250cc albumin given in AM. Brief seizure to L frontal lobe this AM on EEG, given 2g valproic acid and dose increased to 1g q8. Vimpat 100mg BID started.  In afternoon patient self-extubated, placed on NRB with mucomyst, 3% inhalation and duonebs. 3% at 50 d/c'd.  : POD 12. Remains on VEEG. Axillary A line placed.    Past Medical History:  s/p gastric sleeve surgery  Allergies:  Allergy Status Unknown  Home meds:     Current Meds:  MEDICATIONS  (STANDING):  albumin human  5% IVPB 250 milliLiter(s) IV Intermittent once  albuterol/ipratropium for Nebulization 3 milliLiter(s) Nebulizer every 6 hours  aspirin  chewable 81 milliGRAM(s) Oral daily  bromocriptine Capsule 15 milliGRAM(s) Oral every 8 hours  enoxaparin Injectable 40 milliGRAM(s) SubCutaneous every 12 hours  fludroCORTISONE 0.2 milliGRAM(s) Oral every 12 hours  insulin lispro (ADMELOG) corrective regimen sliding scale   SubCutaneous every 6 hours  norepinephrine Infusion 0.05 MICROgram(s)/kG/Min (4.79 mL/Hr) IV Continuous <Continuous>  pantoprazole   Suspension 40 milliGRAM(s) Oral before breakfast  polyethylene glycol 3350 17 Gram(s) Oral daily  propofol Infusion 10 MICROgram(s)/kG/Min (6.13 mL/Hr) IV Continuous <Continuous>  senna 2 Tablet(s) Oral at bedtime  sodium chloride 3 Gram(s) Oral every 6 hours  sodium chloride 0.9%. 1000 milliLiter(s) (50 mL/Hr) IV Continuous <Continuous>  sodium chloride 3%  Inhalation 4 milliLiter(s) Inhalation every 6 hours  sodium chloride 3%. 500 milliLiter(s) (50 mL/Hr) IV Continuous <Continuous>  valproate sodium IVPB 750 milliGRAM(s) IV Intermittent every 8 hours    MEDICATIONS  (PRN):  acetaminophen    Suspension .. 650 milliGRAM(s) Oral every 6 hours PRN Temp greater or equal to 38C (100.4F), Mild Pain (1 - 3)  ondansetron Injectable 4 milliGRAM(s) IV Push every 6 hours PRN Nausea and/or Vomiting    PHYSICAL EXAMINATION    ICU Vital Signs Last 24 Hrs  T(C): 38 (19 Aug 2021 06:02), Max: 38.3 (18 Aug 2021 17:00)  T(F): 100.4 (19 Aug 2021 06:02), Max: 101 (18 Aug 2021 17:00)  HR: 107 (19 Aug 2021 06:00) (88 - 120)  BP: 196/100 (18 Aug 2021 22:37) (196/100 - 237/124)  BP(mean): 139 (18 Aug 2021 22:37) (139 - 171)  ABP: 187/105 (19 Aug 2021 06:00) (162/114 - 217/123)  ABP(mean): 139 (19 Aug 2021 06:00) (133 - 165)  RR: 18 (19 Aug 2021 06:00) (15 - 22)  SpO2: 92% (19 Aug 2021 06:00) (92% - 100%)    NEUROLOGIC EXAMINATION:  Patient alert, L 4 mm R 3 mm reactive, R gaze preference, hypophonic, weak cough, R slight LMN asymmetry, R + cerebellar drift, L spontaneous 4+/5, R UE 3/5, R LE 4-/5  EENT:  anicteric  CARDIOVASCULAR: (+) S1 S2, normal rate and regular rhythm  PULMONARY: clear to auscultation bilaterally  ABDOMEN: soft, nontender with normoactive bowel sounds  EXTREMITIES: no edema  SKIN: no rash    I/O's    21 @ 07:  -  21 @ 07:00  --------------------------------------------------------  IN: 6118.6 mL / OUT: 7772 mL / NET: -1653.4 mL    21 @ 07:  -  21 @ 06:15  --------------------------------------------------------  IN: 5968.4 mL / OUT: 4943 mL / NET: 1025.4 mL    LABS:                        10.5   14.03 )-----------( 347      ( 19 Aug 2021 05:36 )             35.9         156<H>  |  118<H>  |  10  ----------------------------<  161<H>  3.2<L>   |  28  |  0.51    Ca    9.1      19 Aug 2021 00:41  Phos  2.8       Mg     2.0         TPro  6.6  /  Alb  4.0  /  TBili  0.4  /  DBili  x   /  AST  23  /  ALT  33  /  AlkPhos  64      Urinalysis Basic - ( 17 Aug 2021 06:27 )    Color: Yellow / Appearance: Clear / S.020 / pH: x  Gluc: x / Ketone: 15 mg/dL  / Bili: Negative / Urobili: 0.2 E.U./dL   Blood: x / Protein: Trace mg/dL / Nitrite: NEGATIVE   Leuk Esterase: NEGATIVE / RBC: < 5 /HPF / WBC < 5 /HPF   Sq Epi: x / Non Sq Epi: 5-10 /HPF / Bacteria: Present /HPF    CAPILLARY BLOOD GLUCOSE    POCT Blood Glucose.: 125 mg/dL (19 Aug 2021 06:05)  POCT Blood Glucose.: 146 mg/dL (18 Aug 2021 22:08)  POCT Blood Glucose.: 149 mg/dL (18 Aug 2021 16:09)  POCT Blood Glucose.: 130 mg/dL (18 Aug 2021 11:25)    Culture - Sputum . (21 @ 08:05)    Gram Stain:   No epithelial cells seen  Few White blood cells  No organisms seen    Specimen Source: .Sputum Sputum    Culture Results:   Rare Normal Respiratory Mely present to date    Culture - Blood (21 @ 01:45)    Specimen Source: .Blood Blood X 2    Culture Results:   No growth at 1 day.    Bacteriology:  CSF studies:  EEG:  Neuroimaging:    Cerebral Angiogram (2021 - improvement in vasospasm, IA verapamil to posterior circulation  Cerebral Angiogram (2021 - Left vertebral artery dissection demonstrates continued moderate to severe cerebral vasospasm of the distal let vert and basilar artery, some what improved caliber of proximal left vert. 15mg IA Verapamil injected over 10 minutes with mild improvement of posterior circulation post treatment. Right ICA injection with continued mild supraclinoid vasospasm, 10mg of IA Verapamil injected.  Cerebral Angiogram (2021) - Left vertebral injection demonstrates moderate diffuse vasospasm of left vertebral artery, basilar artery and posterior circulation including PCAs. 15mg Verapamil injected via left vert with mild radiographic improvement post treatment. Right ICA injection with mild supraclinoid spasm/narrowing, otherwise no vasospasm appreciated in anterior circulation.    CT (2021) - No significant interval change in right superior and lateral cerebellar hypodensities.  CT (08.15.2021) - 1. Worsening the uncal herniation, with effacement of the bilateral suprasellar and perimesencephalic cisterns.  2. Redistribution of intraventricular hemorrhage, as above. Redemonstration of right EVD, with distal tip in the right frontal horn, near the foramen of Monro. Worsening hydrocephalus.  CTA - 1. Multifocal punctate and short segment stenoses, as above, with several areas of narrowing new as compared to recent CTA dated 2021. In the posterior circulation, stenoses are identified within the V4 left vertebral artery, the basilar artery, and in the bilateral PCA, more significant on the left.  Within the anterior circulation, stenosis are identified in the right supraclinoid C6 ICA as well as the proximal M1 MCA. Findings are most compatible with vasospasm.  2. In particular, the distal V4 right vertebral artery, which was visualized, though diminutive, on prior CTA dated 2021, does not fill with contrast on the present examination. The basilar artery demonstrates multiple stenoses, and is significantly smaller in caliber when compared to the prior CTA.    Other imaging:  Duplex - 1.  Since 2021, there is new deep venous thrombosis in the bilateral peroneal veins.  2.  Redemonstration of deep vein thrombosis in a right intramuscular calf vein.    EVD (d12) -5 cm, 5-15 mL/h, ICP < 5    [Code] Full  [Goals] Aggressive  [Disposion] ICU     ================================================   NEUROCRITICAL CARE ATTENDING NOTE ()  ================================================    LUDMILA PRIETO   MRN-[id number]  Summary:  45y/F with h/o recent gastric sleeve ( [facility], Dr. Crisostomo ), fibromyalgia, thyroid dysfunction, PTSD, depression, anxiety.  Presented with seizures at home - brought to [place], with 1 more episode of seizure en route.  Intubated, CT showed SAH with IV extension, casted IV, hydrocephalus, given mannitol levetiracetam 6mg ativan, transferred to [facility].    Hospital Course:   : Tx from [place] for SAH. Intubated. R frontal EVD placed, central line and a line placed. 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   : POD1 s/p angio with R vert takedown, extubated, desatting on aerosol mask, required extensive suctioning, 3% nebs, chest PT, started on low dose precedex drip for restlessness/agitation, ABG stable. NGT placed. TF started with goal 20 pending nutrition recs. 3% stopped for Na 150. Ceribell EEG negative and d/c'ed.    Overnight, new Right pronator drift and right gaze preference that can't cross midline, Repeat CTH demonstrated stable vents, CTA head and neck unremarkable for spasm, hypoattenuation of right cerebellum edema vs. infarct.  8/10: POD3 R vert takedown, EVD open at [id number]. ASHA overnight, neuro stable. CTH with increased hydro, CTA head/neck with mild basilar spasm, but concern for PE. 1/2 am D50 for hypoglycemia. 1L bolus then 100 cc/hr, PM rounds for net negative fluid balance and mild vasospasm on CTA.   : POD4 R vert takedown. EVD at 5cm H2O, ASHA overnight. neuro stable.   Febrile 104. depakote increased to q6. NCHCT stable. EVD lowered to 0. Lasix 20 given for dieuresis, UOP over 2 liters. Sedation given for a-line placement, BP drop needing levo.  12: POD5 R vert takedown. EVD at 0cm H2O. ASHA overnight, neuro stable. Precedex being weaned off. Pending IVCF with vascular today.  : POD6 R vert takedown. EVD open at 0cmH2O. ASHA overnight. Neuro exam stable. Mottled skin on the left lower extremity improving. Started on Bromocriptine 15mg Q8.   : POD7. EVD open at 0. 1L bolus given for euvolemia o/n. neuro stable. CTH stable. ENT scoped vocal cords, +R vocal cord paresis, NTD. started on zosyn empirically.   8/15: POD8. EVD open at 0. ASHA o/n, neuro stable. Pt developed increased work of breathing, unable to clear her secretions, received racemic epi and multiple nebulizer treatments, still with stridor. Patient re-intubated at bedside, on full vent support.   : CTH/CTA head/neck/CT chest performed o/n for worsened exam, R gaze preference, sluggish pupils. +worsened uncal herniation, hydro, vasospasm in b/l PCAs, L vert, basilar arteries. preop angio today, restarted on 3% for Na goal 145-150  : POD10. Overnight Pt remains on levophed drip for SBP goal 180-220. Also remains on propofol drip. EVD open at -5cmH2O. ICPs WNL. Febrile 101F and pancultured. CTH today shows slightly smaller ventricles.   : POD11 R vert takedown. POD1 angio IA verpamil. Overnight, Pt noted to have downward gaze to the right and not following commands. Taken for stat head CT which is stable. Pupils also noted to be aniscoric left pupil 4mm and brisk and right 2mm brisk. Mannitol 50g given. Ceribell eeg placed for concern of subclinical seizures, so far appears negative for seizures. 1L NS o/n and 1.5L NS bolus in AM for euvolemia. vanc/zosyn stopped. 250cc 3% bolus and 250cc albumin given in AM. Brief seizure to L frontal lobe this AM on EEG, given 2g valproic acid and dose increased to 1g q8. Vimpat 100mg BID started.  In afternoon patient self-extubated, placed on NRB with mucomyst, 3% inhalation and duonebs. 3% at 50 d/c'd.  : POD 12. Remains on VEEG (negative so far). Axillary A line placed.  AM episode of decreased LOC, R gaze preference, L pupil 5 mm (vertical oval), not obeying commands - pending CT/CTA/CTP; sBP 180-200, norepinephrine infusion with CVP 6, frequent suctioning    Past Medical History:  s/p gastric sleeve surgery  Allergies:  Allergy Status Unknown  Home meds:     Current Meds:  MEDICATIONS  (STANDING):  albumin human  5% IVPB 250 milliLiter(s) IV Intermittent once  albuterol/ipratropium for Nebulization 3 milliLiter(s) Nebulizer every 6 hours  aspirin  chewable 81 milliGRAM(s) Oral daily  bromocriptine Capsule 15 milliGRAM(s) Oral every 8 hours  enoxaparin Injectable 40 milliGRAM(s) SubCutaneous every 12 hours  fludroCORTISONE 0.2 milliGRAM(s) Oral every 12 hours  insulin lispro (ADMELOG) corrective regimen sliding scale   SubCutaneous every 6 hours  norepinephrine Infusion 0.05 MICROgram(s)/kG/Min (4.79 mL/Hr) IV Continuous <Continuous>  pantoprazole   Suspension 40 milliGRAM(s) Oral before breakfast  polyethylene glycol 3350 17 Gram(s) Oral daily  propofol Infusion 10 MICROgram(s)/kG/Min (6.13 mL/Hr) IV Continuous <Continuous>  senna 2 Tablet(s) Oral at bedtime  sodium chloride 3 Gram(s) Oral every 6 hours  sodium chloride 0.9%. 1000 milliLiter(s) (50 mL/Hr) IV Continuous <Continuous>  sodium chloride 3%  Inhalation 4 milliLiter(s) Inhalation every 6 hours  sodium chloride 3%. 500 milliLiter(s) (50 mL/Hr) IV Continuous <Continuous>  valproate sodium IVPB 750 milliGRAM(s) IV Intermittent every 8 hours    MEDICATIONS  (PRN):  acetaminophen    Suspension .. 650 milliGRAM(s) Oral every 6 hours PRN Temp greater or equal to 38C (100.4F), Mild Pain (1 - 3)  ondansetron Injectable 4 milliGRAM(s) IV Push every 6 hours PRN Nausea and/or Vomiting    PHYSICAL EXAMINATION    ICU Vital Signs Last 24 Hrs  T(C): 38 (19 Aug 2021 06:02), Max: 38.3 (18 Aug 2021 17:00)  T(F): 100.4 (19 Aug 2021 06:02), Max: 101 (18 Aug 2021 17:00)  HR: 107 (19 Aug 2021 06:00) (88 - 120)  BP: 196/100 (18 Aug 2021 22:37) (196/100 - 237/124)  BP(mean): 139 (18 Aug 2021 22:37) (139 - 171)  ABP: 187/105 (19 Aug 2021 06:00) (162/114 - 217/123)  ABP(mean): 139 (19 Aug 2021 06:00) (133 - 165)  RR: 18 (19 Aug 2021 06:00) (15 - 22)  SpO2: 92% (19 Aug 2021 06:00) (92% - 100%)    NEUROLOGIC EXAMINATION:  LOC fluctuation, L 5 mm R 3 mm reactive, R gaze preference, hypophonic, weak cough, R slight LMN asymmetry, R + cerebellar drift, (episode of not obeying commands) L spontaneous 4+/5, R UE 3/5, R LE 4-/5  EENT:  anicteric  CARDIOVASCULAR: (+) S1 S2, normal rate and regular rhythm  PULMONARY: clear to auscultation bilaterally  ABDOMEN: soft, nontender with normoactive bowel sounds  EXTREMITIES: no edema  SKIN: no rash    I/O's    21 @ 07:01  -  21 @ 07:00  --------------------------------------------------------  IN: 6118.6 mL / OUT: 7772 mL / NET: -1653.4 mL    21 @ 07:01  -  21 @ 06:15  --------------------------------------------------------  IN: 5968.4 mL / OUT: 4943 mL / NET: 1025.4 mL    LABS:                        10.5   14.03 )-----------( 347      ( 19 Aug 2021 05:36 )             35.9     08    156<H>  |  118<H>  |  10  ----------------------------<  161<H>  3.2<L>   |  28  |  0.51    Ca    9.1      19 Aug 2021 00:41  Phos  2.8       Mg     2.0         TPro  6.6  /  Alb  4.0  /  TBili  0.4  /  DBili  x   /  AST  23  /  ALT  33  /  AlkPhos  64      Urinalysis Basic - ( 17 Aug 2021 06:27 )    Color: Yellow / Appearance: Clear / S.020 / pH: x  Gluc: x / Ketone: 15 mg/dL  / Bili: Negative / Urobili: 0.2 E.U./dL   Blood: x / Protein: Trace mg/dL / Nitrite: NEGATIVE   Leuk Esterase: NEGATIVE / RBC: < 5 /HPF / WBC < 5 /HPF   Sq Epi: x / Non Sq Epi: 5-10 /HPF / Bacteria: Present /HPF    CAPILLARY BLOOD GLUCOSE    POCT Blood Glucose.: 125 mg/dL (19 Aug 2021 06:05)  POCT Blood Glucose.: 146 mg/dL (18 Aug 2021 22:08)  POCT Blood Glucose.: 149 mg/dL (18 Aug 2021 16:09)  POCT Blood Glucose.: 130 mg/dL (18 Aug 2021 11:25)    Culture - Sputum . (21 @ 08:05)    Gram Stain:   No epithelial cells seen  Few White blood cells  No organisms seen    Specimen Source: .Sputum Sputum    Culture Results:   Rare Normal Respiratory Mely present to date    Culture - Blood (21 @ 01:45)    Specimen Source: .Blood Blood X 2    Culture Results:   No growth at 1 day.    Bacteriology:  CSF studies:  EEG:  Neuroimaging:    Cerebral Angiogram (2021 - improvement in vasospasm, IA verapamil to posterior circulation  Cerebral Angiogram (2021 - Left vertebral artery dissection demonstrates continued moderate to severe cerebral vasospasm of the distal let vert and basilar artery, some what improved caliber of proximal left vert. 15mg IA Verapamil injected over 10 minutes with mild improvement of posterior circulation post treatment. Right ICA injection with continued mild supraclinoid vasospasm, 10mg of IA Verapamil injected.  Cerebral Angiogram (2021) - Left vertebral injection demonstrates moderate diffuse vasospasm of left vertebral artery, basilar artery and posterior circulation including PCAs. 15mg Verapamil injected via left vert with mild radiographic improvement post treatment. Right ICA injection with mild supraclinoid spasm/narrowing, otherwise no vasospasm appreciated in anterior circulation.    CT (2021) - No significant interval change in right superior and lateral cerebellar hypodensities.  CT (08.15.2021) - 1. Worsening the uncal herniation, with effacement of the bilateral suprasellar and perimesencephalic cisterns.  2. Redistribution of intraventricular hemorrhage, as above. Redemonstration of right EVD, with distal tip in the right frontal horn, near the foramen of Monro. Worsening hydrocephalus.  CTA - 1. Multifocal punctate and short segment stenoses, as above, with several areas of narrowing new as compared to recent CTA dated 2021. In the posterior circulation, stenoses are identified within the V4 left vertebral artery, the basilar artery, and in the bilateral PCA, more significant on the left.  Within the anterior circulation, stenosis are identified in the right supraclinoid C6 ICA as well as the proximal M1 MCA. Findings are most compatible with vasospasm.  2. In particular, the distal V4 right vertebral artery, which was visualized, though diminutive, on prior CTA dated 2021, does not fill with contrast on the present examination. The basilar artery demonstrates multiple stenoses, and is significantly smaller in caliber when compared to the prior CTA.    Other imaging:  Duplex - 1.  Since 2021, there is new deep venous thrombosis in the bilateral peroneal veins.  2.  Redemonstration of deep vein thrombosis in a right intramuscular calf vein.    EVD (d12) -5 cm, 5-15 mL/h, ICP < 5    [Code] Full  [Goals] Aggressive  [Disposion] ICU     ================================================   NEUROCRITICAL CARE ATTENDING NOTE ()  ================================================    LUDMILA PRIETO   MRN-[id number]  Summary:  45y/F with h/o recent gastric sleeve ( [facility], Dr. Crisostomo ), fibromyalgia, thyroid dysfunction, PTSD, depression, anxiety.  Presented with seizures at home - brought to [place], with 1 more episode of seizure en route.  Intubated, CT showed SAH with IV extension, casted IV, hydrocephalus, given mannitol levetiracetam 6mg ativan, transferred to [facility].    Hospital Course:   : Tx from [place] for SAH. Intubated. R frontal EVD placed, central line and a line placed. 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   : POD1 s/p angio with R vert takedown, extubated, desatting on aerosol mask, required extensive suctioning, 3% nebs, chest PT, started on low dose precedex drip for restlessness/agitation, ABG stable. NGT placed. TF started with goal 20 pending nutrition recs. 3% stopped for Na 150. Ceribell EEG negative and d/c'ed.    Overnight, new Right pronator drift and right gaze preference that can't cross midline, Repeat CTH demonstrated stable vents, CTA head and neck unremarkable for spasm, hypoattenuation of right cerebellum edema vs. infarct.  8/10: POD3 R vert takedown, EVD open at [id number]. ASHA overnight, neuro stable. CTH with increased hydro, CTA head/neck with mild basilar spasm, but concern for PE. 1/2 am D50 for hypoglycemia. 1L bolus then 100 cc/hr, PM rounds for net negative fluid balance and mild vasospasm on CTA.   : POD4 R vert takedown. EVD at 5cm H2O, ASHA overnight. neuro stable.   Febrile 104. depakote increased to q6. NCHCT stable. EVD lowered to 0. Lasix 20 given for dieuresis, UOP over 2 liters. Sedation given for a-line placement, BP drop needing levo.  12: POD5 R vert takedown. EVD at 0cm H2O. ASHA overnight, neuro stable. Precedex being weaned off. Pending IVCF with vascular today.  : POD6 R vert takedown. EVD open at 0cmH2O. ASHA overnight. Neuro exam stable. Mottled skin on the left lower extremity improving. Started on Bromocriptine 15mg Q8.   : POD7. EVD open at 0. 1L bolus given for euvolemia o/n. neuro stable. CTH stable. ENT scoped vocal cords, +R vocal cord paresis, NTD. started on zosyn empirically.   8/15: POD8. EVD open at 0. ASHA o/n, neuro stable. Pt developed increased work of breathing, unable to clear her secretions, received racemic epi and multiple nebulizer treatments, still with stridor. Patient re-intubated at bedside, on full vent support.   : CTH/CTA head/neck/CT chest performed o/n for worsened exam, R gaze preference, sluggish pupils. +worsened uncal herniation, hydro, vasospasm in b/l PCAs, L vert, basilar arteries. preop angio today, restarted on 3% for Na goal 145-150  : POD10. Overnight Pt remains on levophed drip for SBP goal 180-220. Also remains on propofol drip. EVD open at -5cmH2O. ICPs WNL. Febrile 101F and pancultured. CTH today shows slightly smaller ventricles.   : POD11 R vert takedown. POD1 angio IA verpamil. Overnight, Pt noted to have downward gaze to the right and not following commands. Taken for stat head CT which is stable. Pupils also noted to be aniscoric left pupil 4mm and brisk and right 2mm brisk. Mannitol 50g given. Ceribell eeg placed for concern of subclinical seizures, so far appears negative for seizures. 1L NS o/n and 1.5L NS bolus in AM for euvolemia. vanc/zosyn stopped. 250cc 3% bolus and 250cc albumin given in AM. Brief seizure to L frontal lobe this AM on EEG, given 2g valproic acid and dose increased to 1g q8. Vimpat 100mg BID started.  In afternoon patient self-extubated, placed on NRB with mucomyst, 3% inhalation and duonebs. 3% at 50 d/c'd.  : POD 12. Remains on VEEG (negative so far). Axillary A line placed.  AM episode of decreased LOC, R gaze preference, L pupil 5 mm (vertical oval), not obeying commands - pending CT/CTA/CTP; sBP 180-220, norepinephrine infusion, euvolemia treatment with CVP 6, frequent suctioning    Past Medical History:  s/p gastric sleeve surgery  Allergies:  Allergy Status Unknown  Home meds:     Current Meds:  MEDICATIONS  (STANDING):  albumin human  5% IVPB 250 milliLiter(s) IV Intermittent once  albuterol/ipratropium for Nebulization 3 milliLiter(s) Nebulizer every 6 hours  aspirin  chewable 81 milliGRAM(s) Oral daily  bromocriptine Capsule 15 milliGRAM(s) Oral every 8 hours  enoxaparin Injectable 40 milliGRAM(s) SubCutaneous every 12 hours  fludroCORTISONE 0.2 milliGRAM(s) Oral every 12 hours  insulin lispro (ADMELOG) corrective regimen sliding scale   SubCutaneous every 6 hours  norepinephrine Infusion 0.05 MICROgram(s)/kG/Min (4.79 mL/Hr) IV Continuous <Continuous>  pantoprazole   Suspension 40 milliGRAM(s) Oral before breakfast  polyethylene glycol 3350 17 Gram(s) Oral daily  propofol Infusion 10 MICROgram(s)/kG/Min (6.13 mL/Hr) IV Continuous <Continuous>  senna 2 Tablet(s) Oral at bedtime  sodium chloride 3 Gram(s) Oral every 6 hours  sodium chloride 0.9%. 1000 milliLiter(s) (50 mL/Hr) IV Continuous <Continuous>  sodium chloride 3%  Inhalation 4 milliLiter(s) Inhalation every 6 hours  sodium chloride 3%. 500 milliLiter(s) (50 mL/Hr) IV Continuous <Continuous>  valproate sodium IVPB 750 milliGRAM(s) IV Intermittent every 8 hours    MEDICATIONS  (PRN):  acetaminophen    Suspension .. 650 milliGRAM(s) Oral every 6 hours PRN Temp greater or equal to 38C (100.4F), Mild Pain (1 - 3)  ondansetron Injectable 4 milliGRAM(s) IV Push every 6 hours PRN Nausea and/or Vomiting    PHYSICAL EXAMINATION    ICU Vital Signs Last 24 Hrs  T(C): 38 (19 Aug 2021 06:02), Max: 38.3 (18 Aug 2021 17:00)  T(F): 100.4 (19 Aug 2021 06:02), Max: 101 (18 Aug 2021 17:00)  HR: 107 (19 Aug 2021 06:00) (88 - 120)  BP: 196/100 (18 Aug 2021 22:37) (196/100 - 237/124)  BP(mean): 139 (18 Aug 2021 22:37) (139 - 171)  ABP: 187/105 (19 Aug 2021 06:00) (162/114 - 217/123)  ABP(mean): 139 (19 Aug 2021 06:00) (133 - 165)  RR: 18 (19 Aug 2021 06:00) (15 - 22)  SpO2: 92% (19 Aug 2021 06:00) (92% - 100%)    NEUROLOGIC EXAMINATION:  LOC fluctuation, L 5 mm R 3 mm reactive, R gaze preference, hypophonic, weak cough, R slight LMN asymmetry, R + cerebellar drift, (episode of not obeying commands) L spontaneous 4+/5, R UE 3/5, R LE 4-/5  EENT:  anicteric  CARDIOVASCULAR: (+) S1 S2, normal rate and regular rhythm  PULMONARY: clear to auscultation bilaterally  ABDOMEN: soft, nontender with normoactive bowel sounds  EXTREMITIES: no edema  SKIN: no rash    I/O's    21 @ 07:01  -  21 @ 07:00  --------------------------------------------------------  IN: 6118.6 mL / OUT: 7772 mL / NET: -1653.4 mL    21 @ 07:01  -  08-19-21 @ 06:15  --------------------------------------------------------  IN: 5968.4 mL / OUT: 4943 mL / NET: 1025.4 mL    LABS:                        10.5   14.03 )-----------( 347      ( 19 Aug 2021 05:36 )             35.9         156<H>  |  118<H>  |  10  ----------------------------<  161<H>  3.2<L>   |  28  |  0.51    Ca    9.1      19 Aug 2021 00:41  Phos  2.8       Mg     2.0         TPro  6.6  /  Alb  4.0  /  TBili  0.4  /  DBili  x   /  AST  23  /  ALT  33  /  AlkPhos  64      Urinalysis Basic - ( 17 Aug 2021 06:27 )    Color: Yellow / Appearance: Clear / S.020 / pH: x  Gluc: x / Ketone: 15 mg/dL  / Bili: Negative / Urobili: 0.2 E.U./dL   Blood: x / Protein: Trace mg/dL / Nitrite: NEGATIVE   Leuk Esterase: NEGATIVE / RBC: < 5 /HPF / WBC < 5 /HPF   Sq Epi: x / Non Sq Epi: 5-10 /HPF / Bacteria: Present /HPF    CAPILLARY BLOOD GLUCOSE    POCT Blood Glucose.: 125 mg/dL (19 Aug 2021 06:05)  POCT Blood Glucose.: 146 mg/dL (18 Aug 2021 22:08)  POCT Blood Glucose.: 149 mg/dL (18 Aug 2021 16:09)  POCT Blood Glucose.: 130 mg/dL (18 Aug 2021 11:25)    Culture - Sputum . (21 @ 08:05)    Gram Stain:   No epithelial cells seen  Few White blood cells  No organisms seen    Specimen Source: .Sputum Sputum    Culture Results:   Rare Normal Respiratory Mely present to date    Culture - Blood (21 @ 01:45)    Specimen Source: .Blood Blood X 2    Culture Results:   No growth at 1 day.    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 Stable ischemic changes within the right cerebellum. Right-sided EVD catheter in place with slight increase in ventricular size. Interval improvement in the intraventricular hemorrhage. Evolving areas of acute/subacute ischemia within the right cerebellum.    CTA (2021) -  Interval improvement in the caliber of the proximal vertebral artery with progression of a vasospasm involving the mid to distal basilar artery. Interval improvement in the vasospasm involving the left vertebral artery. Persistent vasospasm involving the posterior cerebral and superior cerebellar arteries. Interval improvement in the vasospasm involving the right supraclinoid ICA as well as the left A1 and M1 segments. Progression of vasospasm involving the right M1 segment.    Cerebral Angiogram (2021 - improvement in vasospasm, IA verapamil to posterior circulation  Cerebral Angiogram (2021 - Left vertebral artery dissection demonstrates continued moderate to severe cerebral vasospasm of the distal let vert and basilar artery, some what improved caliber of proximal left vert. 15mg IA Verapamil injected over 10 minutes with mild improvement of posterior circulation post treatment. Right ICA injection with continued mild supraclinoid vasospasm, 10mg of IA Verapamil injected.  Cerebral Angiogram (2021) - Left vertebral injection demonstrates moderate diffuse vasospasm of left vertebral artery, basilar artery and posterior circulation including PCAs. 15mg Verapamil injected via left vert with mild radiographic improvement post treatment. Right ICA injection with mild supraclinoid spasm/narrowing, otherwise no vasospasm appreciated in anterior circulation.    CT (2021) - No significant interval change in right superior and lateral cerebellar hypodensities.  CT (08.15.2021) - 1. Worsening the uncal herniation, with effacement of the bilateral suprasellar and perimesencephalic cisterns.  2. Redistribution of intraventricular hemorrhage, as above. Redemonstration of right EVD, with distal tip in the right frontal horn, near the foramen of Monro. Worsening hydrocephalus.  CTA - 1. Multifocal punctate and short segment stenoses, as above, with several areas of narrowing new as compared to recent CTA dated 2021. In the posterior circulation, stenoses are identified within the V4 left vertebral artery, the basilar artery, and in the bilateral PCA, more significant on the left.  Within the anterior circulation, stenosis are identified in the right supraclinoid C6 ICA as well as the proximal M1 MCA. Findings are most compatible with vasospasm.  2. In particular, the distal V4 right vertebral artery, which was visualized, though diminutive, on prior CTA dated 2021, does not fill with contrast on the present examination. The basilar artery demonstrates multiple stenoses, and is significantly smaller in caliber when compared to the prior CTA.    Other imaging:  Duplex - 1.  Since 2021, there is new deep venous thrombosis in the bilateral peroneal veins.  2.  Redemonstration of deep vein thrombosis in a right intramuscular calf vein.    EVD (d12) -5 cm, 5-15 mL/h, ICP < 5    [Code] Full  [Goals] Aggressive  [Disposion] ICU

## 2021-08-19 NOTE — PROGRESS NOTE ADULT - ASSESSMENT
46 y/o F with h/o recent gastric sleeve (08/04/21 [facility], Dr. Crisostomo ), fibromyalgia, thyroid dysfunction, PTSD, depression, anxiety.  Presented with seizures at home - brought to [place], with 1 more episode of seizure en route.  Intubated, CT showed SAH with IV extension, casted IV, hydrocephalus, given mannitol, levetiracetam 1g,  6mg ativan. Started on Cardene drip for BP goal < 140 and transferred to [facility] NICU for further management. HH5 MF4, BD 1 = 8/7. Now s/p cerebral angiogram for R vertebral artery takedown for R vertebral dissecting aneurysm (8/7), and R frontal EVD placement (8/7), now s/p IVC filter placement (8/12)    NEURO:   - Neurochecks q1h  - EVD open at -5cmH2O , allow up to 40cc/hr --> given clinical change and ventricular enlargement:  EVD to drain 20 mL/h  - CT/CTA/CTP reviewed  - Nimodipine 60 mg po q4h d/c'ed for SBP goal 180-200   - VPA subtherapeutic , increased dose to 1g q8 . Check level. (No keppra due to agitation)  - Vimpat 100mg q12 started  - Ceribell EEG negative 8/8, d/c'ed. Ceribell EEG placed again 8/18 with read of brief L fronto-temporal seizure, now on VEEG (negative so far)  - Cont ASA 81  - CTH 8/15: worsened uncal herniation, worsened hydro, vasospasm in b/l PCA, L vert, basilar arteries  - Bromocriptine 15mg Q8  - Per ENT, no vocal cord issues  8/16: angio with findings of moderate diffuse vasospasm of left vertebral artery, basilar artery and posterior circulation including PCAs IA verapamil given   8/17: angio findings of moderate to severe cerebral vasospasm of the distal left vert and basilar artery, mild supraclinoid vasospasm. IA verapamil given  8/18: angio, improvement in vasospasm, IA verapamil to posterior circulaion\  - Ritalin started for neurostim    PULM:    - Hypertonic nebs/duonebs   - CT PE 8/11: Probable pulmonary embolism in the left distal main and proximal lower lobar pulmonary artery, which was also seen on prior same day neck CT. No saddle embolism. No evidence of right heart strain.  - self extubated, face mask, mucomyst, duoneb. Monitor respiratory status.   - chest PT   - CXR - aspiration precautions, requires frequent suctioning    CARDIO:    - -220  - levo gtt, s/p albumin 8/16   - TTE WNL, repeat 8/12   - Troponin stable   - EKG normal   - Keep euvolemic. Currently neg 1L. Bolus 1L LR    ENDO:    - Glucose goal 140-180    GI:    - NGT feeds; increase to 20cc. Advance as tolerated  - Needs thin liquid diet x3 weeks as per bariatric when tolerated  - PPI per bariatric surgeon    RENAL:   - Maintain euvolemia   - Na goal 145-150. Hypernatremic 154. Unlikely CSW.  - Decrease florinef.   - Keep euvolemic. Currently neg 1L. Bolus 1L LR and continue maintenance.  - Repeat BMP, replete lytes    HEM/ONC:   - ASA 81  - VTE Prophylaxis: SCDs, SQL  - LE Duplex 8/9: Acute completely occlusive deep venous thrombosis of the right intramuscular calf vein, s/p IVCF with vascular 8/12, repeat 8/16 shows new b/l peroneal DVTs, factor xa c/w appropriate prophylactic dose   - Hypercoagulable w/u pending    ID:   -MRSA neg 8/12  -febrile, trend leukocytosis   -empiric Vanc/zoysn for fever (8/10-13) d/bolivar, restarted 8/14 for persistent fevers, dc'd again on 8/18.   -CSF sent 8/13, NGTD   -Pancx 8/17--NGTF thus far    Dispol ICU status: family updated  PT/OT: On hold due to critical status  Full code    Social: Daughter Maggie and Son Kenya and sister (on the phone) updated    *****    ATTENDING ATTESTATION:  I was physically present for the key portions of the evaluation and management (E/M) service provided.  I agree with the above history, physical and plan, which I have reviewed and edited where appropriate.    Patient at high risk for neurological deterioration or death due to:  ICU delirium, vasospasm, aspiration PNA, DVT / PE.  Critical care time:  I have personally provided 30 minutes of critical care time, excluding time spent on separate procedures.      Plan discussed with RN, house staff..       46 y/o F with h/o recent gastric sleeve (08/04/21 [facility], Dr. Crisostomo ), fibromyalgia, thyroid dysfunction, PTSD, depression, anxiety.  Presented with seizures at home - brought to [place], with 1 more episode of seizure en route.  Intubated, CT showed SAH with IV extension, casted IV, hydrocephalus, given mannitol, levetiracetam 1g,  6mg ativan. Started on Cardene drip for BP goal < 140 and transferred to [facility] NICU for further management. HH5 MF4, BD 1 = 8/7. Now s/p cerebral angiogram for R vertebral artery takedown for R vertebral dissecting aneurysm (8/7), and R frontal EVD placement (8/7), now s/p IVC filter placement (8/12)    NEURO:   - Neurochecks Q1h  - EVD open at -5cmH2O , allow up to 40cc/hr --> given clinical change and ventricular enlargement:  EVD to drain 20 mL/h. Monitor ICPs. Avoid overdrainage.  - CT/CTA/CTP reviewed  - Nimodipine 60 mg po q4h d/c'ed for SBP goal 180-200   - VPA subtherapeutic, increased dose to 1g q8 . Check level. (No keppra due to agitation). Check ammonia level.   - Vimpat 100mg q12 started.   - Ceribell EEG negative 8/8, d/c'ed. Ceribell EEG placed again 8/18 with read of brief L fronto-temporal seizure; now on VEEG (negative so far)  - Cont ASA 81  - CTH 8/15: worsened uncal herniation, worsened hydro, vasospasm in b/l PCA, L vert, basilar arteries  - Bromocriptine 15mg Q8  - Per ENT, no vocal cord issues  8/16: angio with findings of moderate diffuse vasospasm of left vertebral artery, basilar artery and posterior circulation including PCAs IA verapamil given   8/17: angio findings of moderate to severe cerebral vasospasm of the distal left vert and basilar artery, mild supraclinoid vasospasm. IA verapamil given  8/18: angio, improvement in vasospasm, IA verapamil to posterior circulation.   - Ritalin started for neurostimulation.     PULM:    - CT PE 8/11: Probable pulmonary embolism in the left distal main and proximal lower lobar pulmonary artery, which was also seen on prior same day neck CT. No saddle embolism. No evidence of right heart strain.  -S/P IVC filter.  - Self extubated: Hypertonic nebs/duonebs RTC.  Monitor respiratory status.   - Xhest PT   - CXR - aspiration precautions, requires frequent suctioning. Repeat CXR re fever 8/20.     CARDIO:    - -220  - Levo gtt for VSP.   - TTE WNL, repeat 8/12, nl LV fxn.   - Troponin stable   - EKG normal. Monitor on vimpat.   - Keep euvolemic. Currently neg 1L. Bolus 1L LR    ENDO:    - Glucose goal 140-180    GI:    - NGT feeds; increase to 20cc. Advance as tolerated  - Needs thin liquid diet x3 weeks as per bariatric when tolerated  - PPI per bariatric surgeon    RENAL:   - Maintain euvolemia   - Na goal 145-150. Hypernatremic 154. Unlikely CSW.  - Decrease florinef.   - Keep euvolemic. Currently neg 1L. Bolus 1L LR and continue maintenance. Monitor Is/Os closely.   - Repeat BMP, replete lytes    HEM/ONC:   - ASA 81  - VTE Prophylaxis: SCDs, SQL  - LE Duplex 8/9: Acute completely occlusive deep venous thrombosis of the right intramuscular calf vein, s/p IVCF with vascular 8/12, repeat 8/16 shows new b/l peroneal DVTs, factor xa c/w appropriate prophylactic dose   - Hypercoagulable w/u pending    ID: Fevers  -MRSA neg 8/12  -Empiric Vanc/zoysn for fever (8/10-13) d/bolivar, restarted 8/14 for persistent fevers, dc'd again on 8/18.   -CSF sent 8/13, NGTD   -Pancx 8/17--NGTD thus far. Repeat cultures 8/20 re: fever. (UA, blood, sputum, CSF, CXR, procal)      [Code] Full  [Goals] Aggressive  [Disposition] ICU  PT/OT: On hold due to critical status      Social: Family updated by phone earlier 8/19.       ATTENDING ATTESTATION:  I was physically present for the key portions of the evaluation and management (E/M) service provided.  I agree with the above history, physical and plan, which I have reviewed and edited where appropriate.    Patient at high risk for neurological deterioration or death due to: Vasospasm, DCI, ICU delirium, aspiration PNA, DVT / PE.  Critical care time:  I have personally provided 30 minutes of critical care time, excluding time spent on separate procedures.      Plan discussed with RN, house staff..

## 2021-08-19 NOTE — PROGRESS NOTE ADULT - PROVIDER SPECIALTY LIST ADULT
NSICU General Sunscreen Counseling: I recommended a broad spectrum sunscreen with a SPF of 30 or higher.  I explained that SPF 30 sunscreens block approximately 97 percent of the sun's harmful rays.  Sunscreens should be applied at least 15 minutes prior to expected sun exposure and then every 2 hours after that as long as sun exposure continues. If swimming or exercising sunscreen should be reapplied every 45 minutes to an hour after getting wet or sweating.  One ounce, or the equivalent of a shot glass full of sunscreen, is adequate to protect the skin not covered by a bathing suit. I also recommended a lip balm with a sunscreen as well. Sun protective clothing can be used in lieu of sunscreen but must be worn the entire time you are exposed to the sun's rays. Detail Level: Detailed

## 2021-08-19 NOTE — PROGRESS NOTE ADULT - ASSESSMENT
46y/o F with h/o recent gastric sleeve (08/04/21 [facility], Dr. Crisostomo ), fibromyalgia, thyroid dysfunction, PTSD, depression, anxiety.  Presented with seizures at home - brought to [place], with 1 more episode of seizure en route.  Intubated, CT showed SAH with IV extension, casted IV, hydrocephalus, given mannitol, levetiracetam 1g,  6mg ativan. Started on Cardene drip for BP goal < 140 and transferred to [facility] NICU for further management. HH5 MF4, BD 1 = 8/7. Now s/p cerebral angiogram for R vertebral artery takedown for R vertebral dissecting aneurysm (8/7), and R frontal EVD placement (8/7), now s/p IVC filter placement (8/12)    NEURO:   - neurochecks q1h  - EVD open at -5cmH2O , allow up to 40cc/hr  - Nimodipine 60 mg po q4h d/c'ed for SBP goal 180-200   - Depakote increased to 1g q8 ( No keppra due to agitation) next level 8/21  - Ceribell EEG negative 8/8, d/c'ed. Ceribell eeg placed again 8/18 with read of brief L fronto-temporal seizure, now on VEEG  - cont ASA 81  - CTH 8/15: worsened uncal herniation, worsened hydro, vasospasm in b/l PCA, L vert, basilar arteries  - Bromocriptine 15mg Q8  - Per ENT, no vocal cord issues  8/16: angio with findings of moderate diffuse vasospasm of left vertebral artery, basilar artery and posterior circulation including PCAs IA verapamil given   8/17: angio findings of moderate to severe cerebral vasospasm of the distal left vert and basilar artery, mild supraclinoid vasospasm. IA verapamil given  8/18: angio, improvement in vasospasm, IA verapamil to posterior circulaion  - VPA subtherapeutic , increased dose to 1g q8   - vimpat 100mg q12 started  - Ritalin started for neurostim    PULM:    - Hypertonic nebs/duonebs   - CT PE 8/11: Probable pulmonary embolism in the left distal main and proximal lower lobar pulmonary artery, which was also seen on prior same day neck CT. No saddle embolism. No evidence of right heart strain.  - self extubated, face mask, mucomyst, duoneb   - chest PT   - CXR - aspiration precautions    CARDIO:    - -220  - levo gtt, s/p albumin 8/16   - TTE WNL, repeat 8/12   - Troponin stable   - EKG normal     ENDO:    - glucose goal 140-180    GI:    - NPO for now, NGT, hold feed   - needs thin liquid diet x3 weeks as per bariatric when tolerated  - PPI per bariatric surgeon    RENAL:   - Maintain euvolemia   - Na goal 145-150   - 100cc/hr NS  - albumin bolus yesterday to maintain CVP > 6    HEM/ONC:   - ASA 81  - VTE Prophylaxis: SCDs, SQL  - LE Duplex 8/9: Acute completely occlusive deep venous thrombosis of the right intramuscular calf vein, s/p IVCF with vascular 8/12, repeat 8/16 shows new b/l peroneal DVTs, factor xa c/w prophylactic dose   - hypercoagulable w/u?     ID:   -MRSA neg 8/12  -febrile, trend leukocytosis   -empiric Vanc/zoysn for fever d/c'd (8/10-13) and now restarted 8/14 for persistent fevers, dc'd again on 8/18   -CSF sent 8/13, NGTD   -f/u pancx 8/17    Dispol ICU status: family updated  PT/OT: on hold due to critical status  Full code    Social: Daughter Maggie and Son Kenya and sister (on the phone) updated    *****    ATTENDING ATTESTATION:  I was physically present for the key portions of the evaluation and management (E/M) service provided.  I agree with the above history, physical and plan, which I have reviewed and edited where appropriate.    Patient at high risk for neurological deterioration or death due to:  ICU delirium, aspiration PNA, DVT / PE.  Critical care time:  I have personally provided 45 minutes of critical care time, excluding time spent on separate procedures.      Plan discussed with RN, house staff.         46y/o F with h/o recent gastric sleeve (08/04/21 [facility], Dr. Crisostomo ), fibromyalgia, thyroid dysfunction, PTSD, depression, anxiety.  Presented with seizures at home - brought to [place], with 1 more episode of seizure en route.  Intubated, CT showed SAH with IV extension, casted IV, hydrocephalus, given mannitol, levetiracetam 1g,  6mg ativan. Started on Cardene drip for BP goal < 140 and transferred to [facility] NICU for further management. HH5 MF4, BD 1 = 8/7. Now s/p cerebral angiogram for R vertebral artery takedown for R vertebral dissecting aneurysm (8/7), and R frontal EVD placement (8/7), now s/p IVC filter placement (8/12)    NEURO:   - neurochecks q1h  - EVD open at -5cmH2O , allow up to 40cc/hr _____  - CT/CTA/CTP _____  - Nimodipine 60 mg po q4h d/c'ed for SBP goal 180-200   - Depakote increased to 1g q8 ( No keppra due to agitation) next level 8/21  - Ceribell EEG negative 8/8, d/c'ed. Ceribell eeg placed again 8/18 with read of brief L fronto-temporal seizure, now on VEEG  - cont ASA 81  - CTH 8/15: worsened uncal herniation, worsened hydro, vasospasm in b/l PCA, L vert, basilar arteries  - Bromocriptine 15mg Q8  - Per ENT, no vocal cord issues  8/16: angio with findings of moderate diffuse vasospasm of left vertebral artery, basilar artery and posterior circulation including PCAs IA verapamil given   8/17: angio findings of moderate to severe cerebral vasospasm of the distal left vert and basilar artery, mild supraclinoid vasospasm. IA verapamil given  8/18: angio, improvement in vasospasm, IA verapamil to posterior circulaion  - VPA subtherapeutic , increased dose to 1g q8   - vimpat 100mg q12 started  - Ritalin started for neurostim    PULM:    - Hypertonic nebs/duonebs   - CT PE 8/11: Probable pulmonary embolism in the left distal main and proximal lower lobar pulmonary artery, which was also seen on prior same day neck CT. No saddle embolism. No evidence of right heart strain.  - self extubated, face mask, mucomyst, duoneb   - chest PT   - CXR - aspiration precautions    CARDIO:    - -220  - levo gtt, s/p albumin 8/16   - TTE WNL, repeat 8/12   - Troponin stable   - EKG normal     ENDO:    - glucose goal 140-180    GI:    - NPO for now, NGT, hold feed   - needs thin liquid diet x3 weeks as per bariatric when tolerated  - PPI per bariatric surgeon    RENAL:   - Maintain euvolemia   - Na goal 145-150   - 100cc/hr NS  - albumin bolus yesterday to maintain CVP > 6    HEM/ONC:   - ASA 81  - VTE Prophylaxis: SCDs, SQL  - LE Duplex 8/9: Acute completely occlusive deep venous thrombosis of the right intramuscular calf vein, s/p IVCF with vascular 8/12, repeat 8/16 shows new b/l peroneal DVTs, factor xa c/w prophylactic dose   - hypercoagulable w/u?     ID:   -MRSA neg 8/12  -febrile, trend leukocytosis   -empiric Vanc/zoysn for fever d/c'd (8/10-13) and now restarted 8/14 for persistent fevers, dc'd again on 8/18   -CSF sent 8/13, NGTD   -f/u pancx 8/17    Dispol ICU status: family updated  PT/OT: on hold due to critical status  Full code    Social: Daughter Maggie and Son Kenya and sister (on the phone) updated    *****    ATTENDING ATTESTATION:  I was physically present for the key portions of the evaluation and management (E/M) service provided.  I agree with the above history, physical and plan, which I have reviewed and edited where appropriate.    Patient at high risk for neurological deterioration or death due to:  ICU delirium, aspiration PNA, DVT / PE.  Critical care time:  I have personally provided 45 minutes of critical care time, excluding time spent on separate procedures.      Plan discussed with RN, house staff.         46y/o F with h/o recent gastric sleeve (08/04/21 [facility], Dr. Crisostomo ), fibromyalgia, thyroid dysfunction, PTSD, depression, anxiety.  Presented with seizures at home - brought to [place], with 1 more episode of seizure en route.  Intubated, CT showed SAH with IV extension, casted IV, hydrocephalus, given mannitol, levetiracetam 1g,  6mg ativan. Started on Cardene drip for BP goal < 140 and transferred to [facility] NICU for further management. HH5 MF4, BD 1 = 8/7. Now s/p cerebral angiogram for R vertebral artery takedown for R vertebral dissecting aneurysm (8/7), and R frontal EVD placement (8/7), now s/p IVC filter placement (8/12)    NEURO:   - neurochecks q1h  - EVD open at -5cmH2O , allow up to 40cc/hr --> given clinical change and ventricular enlargement:  EVD to drain 20 mL/h  - CT/CTA/CTP as above  - Nimodipine 60 mg po q4h d/c'ed for SBP goal 180-200   - Depakote increased to 1g q8 ( No keppra due to agitation) next level 8/21  - Ceribell EEG negative 8/8, d/c'ed. Ceribell eeg placed again 8/18 with read of brief L fronto-temporal seizure, now on VEEG (negative so far)  - cont ASA 81  - CTH 8/15: worsened uncal herniation, worsened hydro, vasospasm in b/l PCA, L vert, basilar arteries  - Bromocriptine 15mg Q8  - Per ENT, no vocal cord issues  8/16: angio with findings of moderate diffuse vasospasm of left vertebral artery, basilar artery and posterior circulation including PCAs IA verapamil given   8/17: angio findings of moderate to severe cerebral vasospasm of the distal left vert and basilar artery, mild supraclinoid vasospasm. IA verapamil given  8/18: angio, improvement in vasospasm, IA verapamil to posterior circulaion  - VPA subtherapeutic , increased dose to 1g q8   - vimpat 100mg q12 started  - Ritalin started for neurostim    PULM:    - Hypertonic nebs/duonebs   - CT PE 8/11: Probable pulmonary embolism in the left distal main and proximal lower lobar pulmonary artery, which was also seen on prior same day neck CT. No saddle embolism. No evidence of right heart strain.  - self extubated, face mask, mucomyst, duoneb   - chest PT   - CXR - aspiration precautions, requires frequent suctioning    CARDIO:    - -220  - levo gtt, s/p albumin 8/16   - TTE WNL, repeat 8/12   - Troponin stable   - EKG normal     ENDO:    - glucose goal 140-180    GI:    - NPO for now, NGT, hold feed   - needs thin liquid diet x3 weeks as per bariatric when tolerated  - PPI per bariatric surgeon    RENAL:   - Maintain euvolemia   - Na goal 145-150   - 100cc/hr NS  - albumin bolus yesterday to maintain CVP > 6    HEM/ONC:   - ASA 81  - VTE Prophylaxis: SCDs, SQL  - LE Duplex 8/9: Acute completely occlusive deep venous thrombosis of the right intramuscular calf vein, s/p IVCF with vascular 8/12, repeat 8/16 shows new b/l peroneal DVTs, factor xa c/w prophylactic dose   - hypercoagulable w/u?     ID:   -MRSA neg 8/12  -febrile, trend leukocytosis   -empiric Vanc/zoysn for fever d/c'd (8/10-13) and now restarted 8/14 for persistent fevers, dc'd again on 8/18   -CSF sent 8/13, NGTD   -f/u pancx 8/17    Dispol ICU status: family updated  PT/OT: on hold due to critical status  Full code    Social: Daughter Maggie and Son [place] and sister (on the phone) updated    *****    ATTENDING ATTESTATION:  I was physically present for the key portions of the evaluation and management (E/M) service provided.  I agree with the above history, physical and plan, which I have reviewed and edited where appropriate.    Patient at high risk for neurological deterioration or death due to:  ICU delirium, aspiration PNA, DVT / PE.  Critical care time:  I have personally provided 45 minutes of critical care time, excluding time spent on separate procedures.      Plan discussed with RN, house staff.

## 2021-08-19 NOTE — PROGRESS NOTE ADULT - ASSESSMENT
45 year-old woman with h/o recent gastric sleeve (08/04/21 [facility], Dr. Crisostomo ), fibromyalgia, thyroid dysfunction, PTSD, depression, anxiety who was transferred from [facility] on 8/7/21 for seizures, and was found to have SAH 2/2 R vertebral dissecting aneurysm. Epilepsy consulted for seizure event on 8/18, most likely due to SAH w/ subsequent vasospasms.   The patient level of consciousness is worse today, and she is not following commands as yesterday. It is important to r/o any worsening vascular spasm. EEG did not show any seizures. Another possibility would be as side effect to the AEDs (she received 4g valproate in less than 24h, in addition to 200mg of Lacosamide      Recommendations:  - Continue vEEG monitoring  - Pending valproate level  - Continue Lacosamide 100mg bid  - Maintain seizure and fall precautions   45 year-old woman with h/o recent gastric sleeve (08/04/21 [facility], Dr. Crisostomo ), fibromyalgia, thyroid dysfunction, PTSD, depression, anxiety who was transferred from [facility] on 8/7/21 for seizures, and was found to have SAH 2/2 R vertebral dissecting aneurysm. Epilepsy consulted for seizure event on 8/18, most likely due to SAH w/ subsequent vasospasms.   The patient level of consciousness is worse today, and she is not following commands as yesterday. It is important to r/o any worsening vascular spasm. EEG did not show any seizures. Another possibility would be as side effect to the AEDs (she received 4g valproate in less than 24h, in addition to 200mg of Lacosamide      Recommendations:  - Continue vEEG monitoring  - Pending valproate level  - Continue Lacosamide 100mg q12h  - Continue Valproate 1000mg q8h until we have the level back, then re-evaluate  - Maintain seizure and fall precautions

## 2021-08-19 NOTE — PROGRESS NOTE ADULT - SUBJECTIVE AND OBJECTIVE BOX
HPI:  44y/o F with h/o recent gastric sleeve (21 [facility], Dr. Crisostomo ), fibromyalgia, thyroid dysfunction, PTSD, depression, anxiety.  Presented with seizures at home - brought to [place], with 1 more episode of seizure en route.  Intubated, CT showed SAH with IV extension, casted IV, hydrocephalus, given mannitol, levetiracetam 1g,  6mg ativan. Started on Cardene drip for BP goal < 140 and transferred to [facility] for further management.     HH5 MF4   NIHSS 26 on arrival  BD 1 =  (07 Aug 2021 08:08)    Hospital Course:  : Tx from [place] for SAH. Intubated. R frontal EVD placed, central line and a line placed. 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   : POD1 s/p angio with R vert takedown, extubated, desatting on aerosol mask, required extensive suctioning, 3% nebs, chest PT, started on low dose precedex drip for restlessness/agitation, ABG stable. NGT placed. TF started with goal 20 pending nutrition recs. 3% stopped for Na 150. Ceribell EEG negative and d/c'ed.    Overnight, new Right pronator drift and right gaze preference that can't cross midline, Repeat CTH demonstrated stable vents, CTA head and neck unremarkable for spasm, hypoattenuation of right cerebellum edema vs. infarct.  8/10: POD3 R vert takedown, EVD open at [id number]. ASHA overnight, neuro stable. CTH with increased hydro, CTA head/neck with mild basilar spasm, but concern for PE. 1/2 am D50 for hypoglycemia. 1L bolus then 100 cc/hr, PM rounds for net negative fluid balance and mild vasospasm on CTA.   : POD4 R vert takedown. EVD at 5cm H2O, ASHA overnight. neuro stable.   Febrile 104. depakote increased to q6. NCHCT stable. EVD lowered to 0. Lasix 20 given for dieuresis, UOP over 2 liters. Sedation given for a-line placement, BP drop needing levo.  : POD5 R vert takedown. EVD at 0cm H2O. ASHA overnight, neuro stable. Precedex being weaned off. Pending IVCF with vascular today.  : POD6 R vert takedown. EVD open at 0cmH2O. ASHA overnight. Neuro exam stable. Mottled skin on the left lower extremity improving. Started on Bromocriptine 15mg Q8.   : POD7. EVD open at 0. 1L bolus given for euvolemia o/n. neuro stable. CTH stable. ENT scoped vocal cords, +R voacl cord paresis, NTD. started on zosyn empirically.   8/15: POD8. EVD open at 0. ASHA o/n, neuro stable. Pt developed increased work of breathing, unable to clear her secretions, received racemic epi and multiple nebulizer treatments, still with stridor. Patient re-intubated at bedside, on full vent support.   : CTH/CTA head/neck/CT chest performed o/n for worsened exam, R gaze preference, sluggish pupils. +worsened uncal herniation, hydro, vasospasm in b/l PCAs, L vert, basilar arteries. preop angio today, restarted on 3% for Na goal 145-150  : POD10. Overnight Pt remains on levophed drip for SBP goal 180-220. Also remains on propofol drip. EVD open at -5cmH2O. ICPs WNL. Febrile 101F and pancultured. CTH today shows slightly smaller ventricles.   : POD11 R vert takedown. POD1 angio IA verpamil. Overnight, Pt noted to have downward gaze to the right and not following commands. Taken for stat head CT which is stable. Pupils also noted to be aniscoric left pupil 4mm and brisk and right 2mm brisk. Mannitol 50g given. Ceribell eeg placed for concern of subclinical seizures, so far appears negative for seizures. 1L NS o/n and 1.5L NS bolus in AM for euvolemia. vanc/zosyn stopped. 250cc 3% bolus and 250cc albumin given in AM. Brief seizure to L frontal lobe this AM on EEG, given 2g valproic acid and dose increased to 1g q8. Vimpat 100mg BID started.  In afternoon patient self-extubated, placed on NRB with mucomyst, 3% inhalation and duonebs. 3% at 50 d/c'd.  : POD 12. Remains on VEEG. Axillary A line placed.      Vital Signs Last 24 Hrs  T(C): 38.1 (18 Aug 2021 22:15), Max: 38.3 (18 Aug 2021 17:00)  T(F): 100.6 (18 Aug 2021 22:15), Max: 101 (18 Aug 2021 17:00)  HR: 112 (19 Aug 2021 00:00) (88 - 120)  BP: 196/100 (18 Aug 2021 22:37) (196/100 - 237/124)  BP(mean): 139 (18 Aug 2021 22:37) (139 - 171)  RR: 18 (19 Aug 2021 00:00) (15 - 24)  SpO2: 96% (19 Aug 2021 00:00) (96% - 100%)    I&O's Detail    17 Aug 2021 07:01  -  18 Aug 2021 07:00  --------------------------------------------------------  IN:    IV PiggyBack: 1200 mL    Norepinephrine: 683.6 mL    Propofol: 235 mL    sodium chloride 0.9%: 950 mL    Sodium Chloride 0.9% Bolus: 2000 mL    sodium chloride 3%: 1050 mL  Total IN: 6118.6 mL    OUT:    External Ventricular Device (mL): 272 mL    Voided (mL): 7500 mL  Total OUT: 7772 mL    Total NET: -1653.4 mL      18 Aug 2021 07:01  -  19 Aug 2021 01:14  --------------------------------------------------------  IN:    Norepinephrine: 578.4 mL    sodium chloride 0.9%: 400 mL    sodium chloride 0.9%: 600 mL    Sodium Chloride 0.9% Bolus: 2500 mL    sodium chloride 3%: 250 mL    sodium chloride 3%: 400 mL  Total IN: 4728.4 mL    OUT:    External Ventricular Device (mL): 188 mL    Intermittent Catheterization - Urethral (mL): 15 mL    Voided (mL): 4700 mL  Total OUT: 4903 mL    Total NET: -174.6 mL        I&O's Summary    17 Aug 2021 07:01  -  18 Aug 2021 07:00  --------------------------------------------------------  IN: 6118.6 mL / OUT: 7772 mL / NET: -1653.4 mL    18 Aug 2021 07:01  -  19 Aug 2021 01:14  --------------------------------------------------------  IN: 4728.4 mL / OUT: 4903 mL / NET: -174.6 mL        PHYSICAL EXAM:  Constitutional: NAD, well groomed, obese  Respiratory: breathing non-labored, symmetrical chest wall movement, intubated   Cardiovascuar: RRR, no murmurs  Gastrointestinal: abdomen soft, non tender  Neurological: OE, nods appropriately to questions. AO x 1-2  Cranial Nerves: II-XII: R pupil 2mm brisk and reactive, L pupil 4mm brisk and reactive, right gaze preference, not crossing midline   Motor: not following commands, moving LUE/LLE spontaneously and strong. Withdraws RLE to noxious. No movement of RUE to noxious stim  Sensation: intact to light touch in all extremities  Extremities: distal pulses 2+ x4  Wound/incision: right groin dressing dry and intact, no hematoma present     TUBES/LINES:  [] Richey  [] Lumbar Drain  [] Wound Drains  [x] Others: EVD open at -5cmH2O    DIET:  [x] NPO  [] Mechanical  [] Tube feeds      LABS:                        11.6   16.78 )-----------( 354      ( 18 Aug 2021 05:41 )             38.9     08-19    x   |  x   |  10  ----------------------------<  x   x    |  28  |  0.51    Ca    9.1      19 Aug 2021 00:41  Phos  2.8     -  Mg     2.0         TPro  6.6  /  Alb  4.0  /  TBili  0.4  /  DBili  x   /  AST  23  /  ALT  33  /  AlkPhos  64        Urinalysis Basic - ( 17 Aug 2021 06:27 )    Color: Yellow / Appearance: Clear / S.020 / pH: x  Gluc: x / Ketone: 15 mg/dL  / Bili: Negative / Urobili: 0.2 E.U./dL   Blood: x / Protein: Trace mg/dL / Nitrite: NEGATIVE   Leuk Esterase: NEGATIVE / RBC: < 5 /HPF / WBC < 5 /HPF   Sq Epi: x / Non Sq Epi: 5-10 /HPF / Bacteria: Present /HPF          CAPILLARY BLOOD GLUCOSE      POCT Blood Glucose.: 146 mg/dL (18 Aug 2021 22:08)  POCT Blood Glucose.: 149 mg/dL (18 Aug 2021 16:09)  POCT Blood Glucose.: 130 mg/dL (18 Aug 2021 11:25)      Drug Levels: [] N/A  Valproic Acid Level, Serum: 39.5 ug/mL ( @ 06:11)  Vancomycin Level, Trough: 6.4 ug/mL ( @ 06:11)  Valproic Acid Level, Serum: 62.0 ug/mL ( @ 05:12)    CSF Analysis: [] N/A      Allergies    Allergy Status Unknown    Intolerances      MEDICATIONS:  Antibiotics:    Neuro:  acetaminophen    Suspension .. 650 milliGRAM(s) Oral every 6 hours PRN  bromocriptine Capsule 15 milliGRAM(s) Oral every 8 hours  lacosamide Solution 100 milliGRAM(s) Oral every 12 hours  methylphenidate 10 milliGRAM(s) Oral daily  ondansetron Injectable 4 milliGRAM(s) IV Push every 6 hours PRN  propofol Infusion 10 MICROgram(s)/kG/Min IV Continuous <Continuous>  valproate sodium IVPB 1000 milliGRAM(s) IV Intermittent every 8 hours    Anticoagulation:  aspirin  chewable 81 milliGRAM(s) Oral daily  enoxaparin Injectable 40 milliGRAM(s) SubCutaneous every 12 hours    OTHER:  acetylcysteine 20%  Inhalation 4 milliLiter(s) Inhalation three times a day  albuterol/ipratropium for Nebulization 3 milliLiter(s) Nebulizer every 6 hours PRN  chlorhexidine 0.12% Liquid 15 milliLiter(s) Oral Mucosa every 12 hours  chlorhexidine 2% Cloths 1 Application(s) Topical <User Schedule>  dextrose 50% Injectable 25 Gram(s) IV Push once  fludroCORTISONE 0.2 milliGRAM(s) Oral every 12 hours  glucagon  Injectable 1 milliGRAM(s) IntraMuscular once  insulin lispro (ADMELOG) corrective regimen sliding scale   SubCutaneous every 6 hours  norepinephrine Infusion 0.05 MICROgram(s)/kG/Min IV Continuous <Continuous>  pantoprazole   Suspension 40 milliGRAM(s) Oral before breakfast  polyethylene glycol 3350 17 Gram(s) Oral daily  senna 2 Tablet(s) Oral at bedtime  sodium chloride 3%  Inhalation 4 milliLiter(s) Inhalation every 6 hours    IVF:  sodium chloride 3 Gram(s) Oral every 6 hours  sodium chloride 0.9%. 1000 milliLiter(s) IV Continuous <Continuous>    CULTURES:  Culture Results:   Rare Normal Respiratory Mely present to date ( @ 08:05)  Culture Results:   No growth at 1 day. ( @ 01:45)    RADIOLOGY & ADDITIONAL TESTS:      ASSESSMENT:  44y/o F with h/o recent gastric sleeve (21 [facility], Dr. Crisostomo ), fibromyalgia, thyroid dysfunction, PTSD, depression, anxiety.  Presented with seizures at home - brought to [place], with 1 more episode of seizure en route.  Intubated, CT showed SAH with IV extension, casted IV, hydrocephalus, given mannitol, levetiracetam 1g,  6mg ativan. Started on Cardene drip for BP goal < 140 and transferred to [facility] for further management. HH5 MF4, BD 1 = . Now s/p cerebral angiogram for R vertebral artery takedown for R vertebral dissecting aneurysm (), and R frontal EVD placement (), now s/p IVC filter placement (,) now s/p angio IA verapamil , , .    HEAD BLEED    No pertinent family history in first degree relatives    Handoff    Cerebral aneurysm    Cerebral artery vasospasm    Cerebral aneurysm    DVT, lower extremity    Pulmonary embolism    Cerebral artery vasospasm    Multiple subsegmental pulmonary emboli without acute cor pulmonale    DVT, lower extremity    Angiogram, carotid and cerebral arteries    IVC filter placement    Angiogram, carotid and cerebral, bilateral    Angiogram, carotid and cerebral, bilateral    Angiogram, carotid and cerebral, bilateral    [email or web address]        PLAN:    NEURO:   - neurochecks q1h  - EVD open at -5cmH2O , allow up to 40cc/hr  - Nimodipine 60 mg po q4h d/c'ed for SBP goal 180-200   - Depakote increased to 1g q8 ( No keppra due to agitation) next level   - Ceribell EEG negative , d/c'ed. Ceribell eeg placed again  with read of brief L fronto-temporal seizure, now on VEEG  - cont ASA 81  - CTH 8/15: worsened uncal herniation, worsened hydro, vasospasm in b/l PCA, L vert, basilar arteries  - Bromocriptine 15mg Q8  - Per ENT, no vocal cord issues  : angio with findings of moderate diffuse vasospasm of left vertebral artery, basilar artery and posterior circulation including PCAs IA verapamil given   : angio findings of moderate to severe cerebral vasospasm of the distal left vert and basilar artery, mild supraclinoid vasospasm. IA verapamil given  : angio, improvement in vasospasm, IA verapamil to posterior circulaion  - VPA subtherapeutic , increased dose to 1g q8   - vimpat 100mg q12 started  - Ritalin started for neurostim    PULM:    - Hypertonic nebs/duonebs   - CT PE : Probable pulmonary embolism in the left distal main and proximal lower lobar pulmonary artery, which was also seen on prior same day neck CT. No saddle embolism. No evidence of right heart strain.  - self extubated, face mask, mucomyst, duoneb   - chest PT     CARDIO:    - -220  - levo gtt, s/p albumin    - TTE WNL, repeat    - Troponin stable   - EKG normal     ENDO:    - glucose goal 140-180    GI:    - NPO for now, NGT, hold feed   - needs thin liquid diet x3 weeks as per bariatric when tolerated  - PPI per bariatric surgeon    RENAL:   - Maintain euvolemia   - Na goal 145-150   - 100cc/hr NS  - albumin bolus     HEM/ONC:   - ASA 81  - VTE Prophylaxis: SCDs, SQL  - LE Duplex : Acute completely occlusive deep venous thrombosis of the right intramuscular calf vein, s/p IVCF with vascular , repeat  shows new b/l peroneal DVTs, factor xa c/w prophylactic dose   - hypercoagulable w/u?     ID:   -MRSA neg   -febrile, trend leukocytosis   -empiric Vanc/zoysn for fever d/c'd (8/10-) and now restarted  for persistent fevers, dc'd again on    -CSF sent , NGTD   -f/u pancx     Dispol ICU status: family updated  PT/OT: on hold due to critical status  Full code    D/w Dr. Lomax, Dr. Yan

## 2021-08-20 NOTE — PROGRESS NOTE ADULT - ASSESSMENT
44y/o F with h/o recent gastric sleeve (08/04/21 [facility], Dr. Crisostomo ), fibromyalgia, thyroid dysfunction, PTSD, depression, anxiety.  Presented with seizures at home - brought to [place], with 1 more episode of seizure en route.  Intubated, CT showed SAH with IV extension, casted IV, hydrocephalus, given mannitol, levetiracetam 1g,  6mg ativan. Started on Cardene drip for BP goal < 140 and transferred to [facility] NICU for further management. HH5 MF4, BD 1 = 8/7. Now s/p cerebral angiogram for R vertebral artery takedown for R vertebral dissecting aneurysm (8/7), and R frontal EVD placement (8/7), now s/p IVC filter placement (8/12)    NEURO:   - neurochecks q1h  - EVD open at -5cmH2O , allow up to 40cc/hr --> given clinical change and ventricular enlargement:  EVD to drain 20 mL/h  - CT/CTA/CTP as above  - Nimodipine 60 mg po q4h d/c'ed for SBP goal 180-200   - Depakote increased to 1g q8 ( No keppra due to agitation) next level 8/21  - Ceribell EEG negative 8/8, d/c'ed. Ceribell eeg placed again 8/18 with read of brief L fronto-temporal seizure, now on VEEG (negative so far)  - cont ASA 81  - CTH 8/15: worsened uncal herniation, worsened hydro, vasospasm in b/l PCA, L vert, basilar arteries  - Bromocriptine 15mg Q8  - Per ENT, no vocal cord issues  8/16: angio with findings of moderate diffuse vasospasm of left vertebral artery, basilar artery and posterior circulation including PCAs IA verapamil given   8/17: angio findings of moderate to severe cerebral vasospasm of the distal left vert and basilar artery, mild supraclinoid vasospasm. IA verapamil given  8/18: angio, improvement in vasospasm, IA verapamil to posterior circulaion  - VPA subtherapeutic , increased dose to 1g q8   - vimpat 100mg q12 started  - Ritalin started for neurostim    PULM:    - Hypertonic nebs/duonebs   - CT PE 8/11: Probable pulmonary embolism in the left distal main and proximal lower lobar pulmonary artery, which was also seen on prior same day neck CT. No saddle embolism. No evidence of right heart strain.  - self extubated, face mask, mucomyst, duoneb   - chest PT   - CXR - aspiration precautions, requires frequent suctioning    CARDIO:    - -220  - levo gtt, s/p albumin 8/16   - TTE WNL, repeat 8/12   - Troponin stable   - EKG normal     ENDO:    - glucose goal 140-180    GI:    - NPO for now, NGT, hold feed   - needs thin liquid diet x3 weeks as per bariatric when tolerated  - PPI per bariatric surgeon    RENAL:   - Maintain euvolemia   - Na goal 145-150   - 100cc/hr NS  - albumin bolus yesterday to maintain CVP > 6    HEM/ONC:   - ASA 81  - VTE Prophylaxis: SCDs, SQL  - LE Duplex 8/9: Acute completely occlusive deep venous thrombosis of the right intramuscular calf vein, s/p IVCF with vascular 8/12, repeat 8/16 shows new b/l peroneal DVTs, factor xa c/w prophylactic dose   - hypercoagulable w/u?     ID:   -MRSA neg 8/12  -febrile, trend leukocytosis   -empiric Vanc/zoysn for fever d/c'd (8/10-13) and now restarted 8/14 for persistent fevers, dc'd again on 8/18   -CSF sent 8/13, NGTD   -f/u pancx 8/17    Dispol ICU status: family updated  PT/OT: on hold due to critical status  Full code    Social: Daughter Maggie and Son Kenya and sister (on the phone) updated    *****    ATTENDING ATTESTATION:  I was physically present for the key portions of the evaluation and management (E/M) service provided.  I agree with the above history, physical and plan, which I have reviewed and edited where appropriate.    Patient at high risk for neurological deterioration or death due to:  ICU delirium, aspiration PNA, DVT / PE.  Critical care time:  I have personally provided 45 minutes of critical care time, excluding time spent on separate procedures.      Plan discussed with RN, house staff.

## 2021-08-20 NOTE — PROGRESS NOTE ADULT - ASSESSMENT
46 y/o F with h/o recent gastric sleeve (08/04/21 [facility], Dr. Crisostomo ), fibromyalgia, thyroid dysfunction, PTSD, depression, anxiety.  Presented with seizures at home - brought to [place], with 1 more episode of seizure en route.  Intubated, CT showed SAH with IV extension, casted IV, hydrocephalus, given mannitol, levetiracetam 1g,  6mg ativan. Started on Cardene drip for BP goal < 140 and transferred to [facility] NICU for further management. HH5 MF4, BD 1 = 8/7. Now s/p cerebral angiogram for R vertebral artery takedown for R vertebral dissecting aneurysm (8/7), and R frontal EVD placement (8/7), now s/p IVC filter placement (8/12)    NEURO:   - Neurochecks Q1h  - EVD open at -5cmH2O , allow up to 40cc/hr --> given clinical change and ventricular enlargement:  EVD to drain 20 mL/h. Monitor ICPs. Avoid overdrainage.  - CT/CTA/CTP reviewed  - Nimodipine 60 mg po q4h d/c'ed for SBP goal 180-200   - VPA subtherapeutic, increased dose to 1g q8 . Check level. (No keppra due to agitation). Check ammonia level.   - Vimpat 100mg q12 started.   - Ceribell EEG negative 8/8, d/c'ed. Ceribell EEG placed again 8/18 with read of brief L fronto-temporal seizure; now on VEEG (negative so far)  - Cont ASA 81  - CTH 8/15: worsened uncal herniation, worsened hydro, vasospasm in b/l PCA, L vert, basilar arteries  - Bromocriptine 15mg Q8  - Per ENT, no vocal cord issues  8/16: angio with findings of moderate diffuse vasospasm of left vertebral artery, basilar artery and posterior circulation including PCAs IA verapamil given   8/17: angio findings of moderate to severe cerebral vasospasm of the distal left vert and basilar artery, mild supraclinoid vasospasm. IA verapamil given  8/18: angio, improvement in vasospasm, IA verapamil to posterior circulation.   - Ritalin started for neurostimulation.     PULM:    - CT PE 8/11: Probable pulmonary embolism in the left distal main and proximal lower lobar pulmonary artery, which was also seen on prior same day neck CT. No saddle embolism. No evidence of right heart strain.  -S/P IVC filter.  - Self extubated: Hypertonic nebs/duonebs RTC.  Monitor respiratory status.   - Xhest PT   - CXR - aspiration precautions, requires frequent suctioning. Repeat CXR re fever 8/20.     CARDIO:    - -220  - Levo gtt for VSP.   - TTE WNL, repeat 8/12, nl LV fxn.   - Troponin stable   - EKG normal. Monitor on vimpat.   - Keep euvolemic. Currently neg 1L. Bolus 1L LR    ENDO:    - Glucose goal 140-180    GI:    - NGT feeds; increase to 20cc. Advance as tolerated  - Needs thin liquid diet x3 weeks as per bariatric when tolerated  - PPI per bariatric surgeon    RENAL:   - Maintain euvolemia   - Na goal 145-150. Hypernatremic 154. Unlikely CSW.  - Decrease florinef.   - Keep euvolemic. Currently neg 1L. Bolus 1L LR and continue maintenance. Monitor Is/Os closely.   - Repeat BMP, replete lytes    HEM/ONC:   - ASA 81  - VTE Prophylaxis: SCDs, SQL  - LE Duplex 8/9: Acute completely occlusive deep venous thrombosis of the right intramuscular calf vein, s/p IVCF with vascular 8/12, repeat 8/16 shows new b/l peroneal DVTs, factor xa c/w appropriate prophylactic dose   - Hypercoagulable w/u pending    ID: Fevers  -MRSA neg 8/12  -Empiric Vanc/zoysn for fever (8/10-13) d/bolivar, restarted 8/14 for persistent fevers, dc'd again on 8/18.   -CSF sent 8/13, NGTD   -Pancx 8/17--NGTD thus far. Repeat cultures 8/20 re: fever. (UA, blood, sputum, CSF, CXR, procal)      [Code] Full  [Goals] Aggressive  [Disposition] ICU  PT/OT: On hold due to critical status      Social: Family updated by phone earlier 8/19.       ATTENDING ATTESTATION:  I was physically present for the key portions of the evaluation and management (E/M) service provided.  I agree with the above history, physical and plan, which I have reviewed and edited where appropriate.    Patient at high risk for neurological deterioration or death due to: Vasospasm, DCI, ICU delirium, aspiration PNA, DVT / PE.  Critical care time:  I have personally provided 30 minutes of critical care time, excluding time spent on separate procedures.      Plan discussed with RN, house staff..       46 y/o F with h/o recent gastric sleeve. Presented with seizures. Found to have CT SAH with IV extension, casted IV, hydrocephalus.  HH5 MF4, BD 1 = 8/7. Now s/p cerebral angiogram for R vertebral artery takedown for R vertebral dissecting aneurysm (8/7), and R frontal EVD placement (8/7), now s/p IVC filter placement (8/12)    NEURO:   - Neurochecks Q1h  - EVD open at -5cmH2O. Draining 15cc/hr. Monitor ICPs. Avoid overdrainage.  - CT/CTA/CTP reviewed.  - Nimodipine 60 mg po q4h d/c'ed for SBP goal 180-200.   - VPA subtherapeutic, increased dose to 1g q8. (No keppra due to agitation). Follow up NH3 and VPA levels. Also on Vimpat 50mg bid.   - Ceribell EEG negative 8/8, d/c'ed. Ceribell EEG placed again 8/18 with read of brief L fronto-temporal seizure; now on VEEG (negative so far)  - EEG 8/20L Moderate generalized hemisphere slowing suggestive of a similar degree of diffuse or multifocal  dysfunction.  Focal slowing in the right posterior quadrant, which may be artifactual  due to lead placement.   No epileptiform activity and no significant clinical events occurred.    - Cont ASA 81  - CTH 8/15: worsened uncal herniation, worsened hydro, vasospasm in b/l PCA, L vert, basilar arteries  - Bromocriptine 15mg Q8  - Per ENT, no vocal cord issues  8/16: angio with findings of moderate diffuse vasospasm of left vertebral artery, basilar artery and posterior circulation including PCAs IA verapamil given   8/17: angio findings of moderate to severe cerebral vasospasm of the distal left vert and basilar artery, mild supraclinoid vasospasm. IA verapamil given  8/18: angio, improvement in vasospasm, IA verapamil to posterior circulation.   8/20: angio multiterritory spasm. Given several doses of verapamil JOVANA, L vert, basilar.  - Ritalin for neurostimulation.     PULM:    -CT PE 8/11: Probable pulmonary embolism in the left distal main and proximal lower lobar pulmonary artery, which was also seen on prior same day neck CT. No saddle embolism. No evidence of right heart strain.  -S/P IVC filter.  - Self extubated: Hypertonic nebs/duonebs RTC.  Monitor respiratory status.   - Chest PT   - CXR - aspiration precautions, requires frequent suctioning. Repeat CXR re: fever 8/20.     CARDIO:    - -220  - Levo gtt for DCI.   - TTE WNL, repeat 8/12, nl LV fxn.   - Troponin stable   - EKG normal. Monitor on vimpat.   - Keep euvolemic. 8/21 neg 1L. Given bolus 1L    ENDO:    - Glucose goal 140-180    GI:    - NGT feeds; increase to 30cc. Advance as tolerated  - Needs thin liquid diet x3 weeks as per bariatric when tolerated  - PPI per bariatric surgeon    RENAL:   - Maintain euvolemia   - Na goal 145-150. Hypernatremic 154--> 151. Unlikely CSW.  - Decrease florinef. Consider DC.   - Keep euvolemic. Currently neg 1L. Bolus 1L LR. Monitor Is/Os closely.   - Replete lytes aggresively.     HEM/ONC:   - ASA 81  - VTE Prophylaxis: SCDs, SQL  - LE Duplex 8/9: Acute completely occlusive deep venous thrombosis of the right intramuscular calf vein, s/p IVCF with vascular 8/12, repeat 8/16 shows new b/l peroneal DVTs  - Ffactor xa c/w appropriate prophylactic dose.   - Hypercoagulable w/u pending    ID: Fevers  -MRSA neg 8/12  -Empiric Vanc/zoysn for fever (8/10-13) d/bolivar, restarted 8/14 for persistent fevers, dc'd again on 8/18.   -CSF sent 8/13, NGTD   -Pancx 8/17--NGTD thus far. Repeat cultures 8/20 re: fever. (UA, blood, sputum, CSF, CXR, procal)      [Code] Full  [Goals] Aggressive  [Disposition] ICU  PT/OT: On hold due to critical status      Social: Family updated       ATTENDING ATTESTATION:  I was physically present for the key portions of the evaluation and management (E/M) service provided.  I agree with the above history, physical and plan, which I have reviewed and edited where appropriate.    Patient at high risk for neurological deterioration or death due to: Vasospasm, DCI, ICU delirium, aspiration PNA, DVT / PE.  Critical care time:  I have personally provided 30 minutes of critical care time, excluding time spent on separate procedures.      Plan discussed with RN, house staff..

## 2021-08-20 NOTE — PROGRESS NOTE ADULT - SUBJECTIVE AND OBJECTIVE BOX
The patient was seen and examined at the bedside. She is off sedation. She was not following any of my commands.    Epilepsy consulted for seizure event in AM on 8/18.          Review of Systems:  Unobtainable 2/2 no verbal output    Allergies  Allergy Status Unknown     MEDICATIONS  (STANDING):  acetylcysteine 20%  Inhalation 4 milliLiter(s) Inhalation three times a day  aspirin  chewable 81 milliGRAM(s) Oral daily  bromocriptine Capsule 15 milliGRAM(s) Oral every 8 hours  chlorhexidine 0.12% Liquid 15 milliLiter(s) Oral Mucosa every 12 hours  chlorhexidine 2% Cloths 1 Application(s) Topical <User Schedule>  dextrose 50% Injectable 25 Gram(s) IV Push once  enoxaparin Injectable 40 milliGRAM(s) SubCutaneous every 12 hours  fludroCORTISONE 0.2 milliGRAM(s) Oral every 12 hours  glucagon  Injectable 1 milliGRAM(s) IntraMuscular once  insulin lispro (ADMELOG) corrective regimen sliding scale   SubCutaneous every 6 hours  lacosamide Solution 100 milliGRAM(s) Oral every 12 hours  methylphenidate 10 milliGRAM(s) Oral daily  norepinephrine Infusion 0.05 MICROgram(s)/kG/Min (4.79 mL/Hr) IV Continuous <Continuous>  pantoprazole   Suspension 40 milliGRAM(s) Oral before breakfast  polyethylene glycol 3350 17 Gram(s) Oral daily  potassium chloride   Powder 40 milliEquivalent(s) Oral every 4 hours  propofol Infusion 10 MICROgram(s)/kG/Min (6.13 mL/Hr) IV Continuous <Continuous>  senna 2 Tablet(s) Oral at bedtime  sodium chloride 3 Gram(s) Oral every 6 hours  sodium chloride 0.9%. 1000 milliLiter(s) (100 mL/Hr) IV Continuous <Continuous>  sodium chloride 3%  Inhalation 4 milliLiter(s) Inhalation every 6 hours  valproate sodium IVPB 1000 milliGRAM(s) IV Intermittent every 8 hours    MEDICATIONS  (PRN):  acetaminophen    Suspension .. 650 milliGRAM(s) Oral every 6 hours PRN Temp greater or equal to 38C (100.4F), Mild Pain (1 - 3)  albuterol/ipratropium for Nebulization 3 milliLiter(s) Nebulizer every 6 hours PRN Shortness of Breath and/or Wheezing  ondansetron Injectable 4 milliGRAM(s) IV Push every 6 hours PRN Nausea and/or Vomiting    VITAL SIGNS:  T(C): 36.8 (08-18-21 @ 09:20), Max: 38.1 (08-17-21 @ 22:10)  HR: 108 (08-18-21 @ 16:30) (88 - 120)  BP: 218/118 (08-18-21 @ 16:00) (199/104 - 237/124)  RR: 17 (08-18-21 @ 16:30) (17 - 25)  SpO2: 98% (08-18-21 @ 16:30) (97% - 100%)  Wt(kg): --    PHYSICAL EXAM:  Constitutional:  Lying in bed with mask, EVD and lethargic  Cardiovascular: regular rate and rhythm, normal S1/S2, no murmurs   Chest: Clear to bases. 	  Abdomen: soft, non-tender, no hepatosplenomegaly   Extremities: no edema, clubbing or cyanosis  Skin: no rash or neurocutaneous signs     Cognitive:  Lethargic but arousal to voice. No verbal output, she was not following commands. she looked at me when I called her name.     Cranial Nerves:  III/IV/VI: L 4mm brisk, R 2mm brisk   VII: Face appears symmetric  Motor:  She was restrained, and not following any commands.   Reflexes:  DTR: 3+ B/L UE and 3+ B/L LE   Plantar responses: up going bilaterally. Arvin bilaterally.      Labs:  CBC Full  -  ( 18 Aug 2021 05:41 )  WBC Count : 16.78 K/uL  RBC Count : 4.71 M/uL  Hemoglobin : 11.6 g/dL  Hematocrit : 38.9 %  Platelet Count - Automated : 354 K/uL  Mean Cell Volume : 82.6 fl  Mean Cell Hemoglobin : 24.6 pg  Mean Cell Hemoglobin Concentration : 29.8 gm/dL  Auto Neutrophil # : x  Auto Lymphocyte # : x  Auto Monocyte # : x  Auto Eosinophil # : x  Auto Basophil # : x  Auto Neutrophil % : x  Auto Lymphocyte % : x  Auto Monocyte % : x  Auto Eosinophil % : x  Auto Basophil % : x    08-18    155<H>  |  119<H>  |  8   ----------------------------<  172<H>  3.4<L>   |  25  |  0.42<L>    Ca    9.1      18 Aug 2021 15:38  Phos  2.8     08-18  Mg     2.0     08-18    TPro  6.6  /  Alb  4.0  /  TBili  0.4  /  DBili  x   /  AST  23  /  ALT  33  /  AlkPhos  64  08-18    LIVER FUNCTIONS - ( 18 Aug 2021 05:41 )  Alb: 4.0 g/dL / Pro: 6.6 g/dL / ALK PHOS: 64 U/L / ALT: 33 U/L / AST: 23 U/L / GGT: x           Valproic Acid Level, Serum: 39.5 ug/mL *L* [50.0 - 100.0] (08-18 @ 06:11)

## 2021-08-20 NOTE — EEG REPORT - NS EEG TEXT BOX
[facility] Department of Neurology  Inpatient Epilepsy Monitoring Unit video-Electroencephalography Report    Acquisition Details:  Electroencephalography was acquired using a minimum of 21 channels on an [employer] system v 8.5.1 with electrode placement according to the standard International 10-20 system following ACNS (American Clinical Neurophysiology Society) guidelines for Long-Term Video EEG monitoring.  Anterior temporal T1 and T2 electrodes were utilized whenever possible.   The [employer] automated spike & seizure detections were all reviewed in detail, in addition to extensive portions of raw EEG.  Specially-trained nurses were available for seizure-related events.  Continuous live-time video monitoring of the patients for seizure-related and safety events was performed by specially-trained technicians.    Daily Updates (from 07:00 am until 07:00 am):  Day 2  : 8/19/2021              Note: Due to incision, leads F4, C4, T6, and P4 not able to be placed.   Background:  continuous, with predominantly theta/delta frequencies.  Symmetry:  Frequent (10-49%) polymorphic delta slowing note din the right occipital region, but difficult to asses due to missing leads.   Posterior Dominant Rhythm:  8 Hz asymmetric posterior dominant rhythm, more prominent on the right posterior quadrant.   Organization: Appropriate anterior-posterior gradient.  Voltage:  Normal (20+ uV)  Variability: Yes. 		Reactivity: Yes.  N2 sleep: Rudimentary but likely N2 transients present.  Spontaneous Activity:  No epileptiform discharges.  Periodic/rhythmic activity:  None.  Events:  No electrographic seizures or significant clinical events.  Provocations:  Hyperventilation and Photic stimulation: was not performed.    Daily Summary:      Technically limited study due to lead placement.   No significant change from previous recording   Moderate generalized hemisphere slowing suggestive of a similar degree of diffuse or multifocal  dysfunction.  Focal slowing in the right posterior quadrant, which may be artifactual  due to lead placement.   No epileptiform activity and no significant clinical events occurred.    Mariya Lake MD  Attending Neurologist, [facility] Epilepsy Program

## 2021-08-20 NOTE — PROGRESS NOTE ADULT - SUBJECTIVE AND OBJECTIVE BOX
==============================================   NEUROCRITICAL CARE ATTENDING NOTE (2021)  ==============================================    LUDMILA PRIETO   MRN-[id number]  Summary:  45y/F with h/o recent gastric sleeve ( [facility], Dr. Crisostomo ), fibromyalgia, thyroid dysfunction, PTSD, depression, anxiety.  Presented with seizures at home - brought to [place], with 1 more episode of seizure en route.  Intubated, CT showed SAH with IV extension, casted IV, hydrocephalus, given mannitol levetiracetam 6mg ativan, transferred to [facility].    Hospital Course:   : Tx from [place] for SAH. Intubated. R frontal EVD placed, central line and a line placed. 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   : POD1 s/p angio with R vert takedown, extubated, desatting on aerosol mask, required extensive suctioning, 3% nebs, chest PT, started on low dose precedex drip for restlessness/agitation, ABG stable. NGT placed. TF started with goal 20 pending nutrition recs. 3% stopped for Na 150. Ceribell EEG negative and d/c'ed.    Overnight, new Right pronator drift and right gaze preference that can't cross midline, Repeat CTH demonstrated stable vents, CTA head and neck unremarkable for spasm, hypoattenuation of right cerebellum edema vs. infarct.  8/10: POD3 R vert takedown, EVD open at [id number]. ASHA overnight, neuro stable. CTH with increased hydro, CTA head/neck with mild basilar spasm, but concern for PE. 1/2 am D50 for hypoglycemia. 1L bolus then 100 cc/hr, PM rounds for net negative fluid balance and mild vasospasm on CTA.   : POD4 R vert takedown. EVD at 5cm H2O, ASHA overnight. neuro stable.   Febrile 104. depakote increased to q6. NCHCT stable. EVD lowered to 0. Lasix 20 given for dieuresis, UOP over 2 liters. Sedation given for a-line placement, BP drop needing levo.  : POD5 R vert takedown. EVD at 0cm H2O. ASHA overnight, neuro stable. Precedex being weaned off. Pending IVCF with vascular today.  : POD6 R vert takedown. EVD open at 0cmH2O. ASHA overnight. Neuro exam stable. Mottled skin on the left lower extremity improving. Started on Bromocriptine 15mg Q8.   : POD7. EVD open at 0. 1L bolus given for euvolemia o/n. neuro stable. CTH stable. ENT scoped vocal cords, +R vocal cord paresis, NTD. started on zosyn empirically.   8/15: POD8. EVD open at 0. ASHA o/n, neuro stable. Pt developed increased work of breathing, unable to clear her secretions, received racemic epi and multiple nebulizer treatments, still with stridor. Patient re-intubated at bedside, on full vent support.   : CTH/CTA head/neck/CT chest performed o/n for worsened exam, R gaze preference, sluggish pupils. +worsened uncal herniation, hydro, vasospasm in b/l PCAs, L vert, basilar arteries. preop angio today, restarted on 3% for Na goal 145-150  : POD10. Overnight Pt remains on levophed drip for SBP goal 180-220. Also remains on propofol drip. EVD open at -5cmH2O. ICPs WNL. Febrile 101F and pancultured. CTH today shows slightly smaller ventricles.   : POD11 R vert takedown. POD1 angio IA verpamil. Overnight, Pt noted to have downward gaze to the right and not following commands. Taken for stat head CT which is stable. Pupils also noted to be aniscoric left pupil 4mm and brisk and right 2mm brisk. Mannitol 50g given. Ceribell eeg placed for concern of subclinical seizures, so far appears negative for seizures. 1L NS o/n and 1.5L NS bolus in AM for euvolemia. vanc/zosyn stopped. 250cc 3% bolus and 250cc albumin given in AM. Brief seizure to L frontal lobe this AM on EEG, given 2g valproic acid and dose increased to 1g q8. Vimpat 100mg BID started.  In afternoon patient self-extubated, placed on NRB with mucomyst, 3% inhalation and duonebs. 3% at 50 d/c'd.  : POD 12. Remains on VEEG. Axillary A line placed. Salt tabs d/c'd, florinef decreased to 0.1mg, EVD @ -5cm H2O, now draining 20cc/hr. 250 albumin and 500 NS boluses on days, 1 L LR bolus  : POD 13. ASHA. on VEEG, no seizures noted. Pending VPA level.    Past Medical History:  s/p gastric sleeve surgery  Allergies:  Allergy Status Unknown  Home meds:     Current Meds:  MEDICATIONS  (STANDING):  albumin human  5% IVPB 250 milliLiter(s) IV Intermittent once  albuterol/ipratropium for Nebulization 3 milliLiter(s) Nebulizer every 6 hours  aspirin  chewable 81 milliGRAM(s) Oral daily  bromocriptine Capsule 15 milliGRAM(s) Oral every 8 hours  enoxaparin Injectable 40 milliGRAM(s) SubCutaneous every 12 hours  fludroCORTISONE 0.2 milliGRAM(s) Oral every 12 hours  insulin lispro (ADMELOG) corrective regimen sliding scale   SubCutaneous every 6 hours  norepinephrine Infusion 0.05 MICROgram(s)/kG/Min (4.79 mL/Hr) IV Continuous <Continuous>  pantoprazole   Suspension 40 milliGRAM(s) Oral before breakfast  polyethylene glycol 3350 17 Gram(s) Oral daily  propofol Infusion 10 MICROgram(s)/kG/Min (6.13 mL/Hr) IV Continuous <Continuous>  senna 2 Tablet(s) Oral at bedtime  sodium chloride 3 Gram(s) Oral every 6 hours  sodium chloride 0.9%. 1000 milliLiter(s) (50 mL/Hr) IV Continuous <Continuous>  sodium chloride 3%  Inhalation 4 milliLiter(s) Inhalation every 6 hours  sodium chloride 3%. 500 milliLiter(s) (50 mL/Hr) IV Continuous <Continuous>  valproate sodium IVPB 750 milliGRAM(s) IV Intermittent every 8 hours    MEDICATIONS  (PRN):  acetaminophen    Suspension .. 650 milliGRAM(s) Oral every 6 hours PRN Temp greater or equal to 38C (100.4F), Mild Pain (1 - 3)  ondansetron Injectable 4 milliGRAM(s) IV Push every 6 hours PRN Nausea and/or Vomiting    PHYSICAL EXAMINATION    ICU Vital Signs Last 24 Hrs  T(C): 38 (20 Aug 2021 05:30), Max: 38.3 (19 Aug 2021 09:00)  T(F): 100.4 (20 Aug 2021 05:30), Max: 101 (19 Aug 2021 09:00)  HR: 95 (20 Aug 2021 06:30) (91 - 112)  BP: 168/96 (20 Aug 2021 06:30) (137/78 - 249/125)  BP(mean): 126 (20 Aug 2021 06:30) (102 - 170)  ABP: 202/123 (20 Aug 2021 06:30) (136/78 - 215/114)  ABP(mean): 156 (20 Aug 2021 06:30) (100 - 159)  RR: 13 (20 Aug 2021 06:30) (13 - 24)  SpO2: 99% (20 Aug 2021 06:30) (91% - 100%)    NEUROLOGIC EXAMINATION:  LOC fluctuation, L 5 mm R 3 mm reactive, R gaze preference, hypophonic, weak cough, R slight LMN asymmetry, R + cerebellar drift, (episode of not obeying commands) L spontaneous 4+/5, R UE 3/5, R LE 4-/5  EENT:  anicteric  CARDIOVASCULAR: (+) S1 S2, normal rate and regular rhythm  PULMONARY: clear to auscultation bilaterally  ABDOMEN: soft, nontender with normoactive bowel sounds  EXTREMITIES: no edema  SKIN: no rash    I/O's    21 @ 07:  -  21 @ 07:00  --------------------------------------------------------  IN: 6296.4 mL / OUT: 5971 mL / NET: 325.4 mL    21 @ 07:01  -  21 @ 06:43  --------------------------------------------------------  IN: 4673.9 mL / OUT: 4224 mL / NET: 449.9 mL    LABS:                        10.5   14.03 )-----------( 347      ( 19 Aug 2021 05:36 )             35.9         155<H>  |  118<H>  |  11  ----------------------------<  141<H>  4.2   |  28  |  0.52    Ca    9.7      19 Aug 2021 23:09  Phos  2.3       Mg     2.2         Urinalysis Basic - ( 19 Aug 2021 19:08 )    Color: Yellow / Appearance: Clear / S.015 / pH: x  Gluc: x / Ketone: 15 mg/dL  / Bili: Negative / Urobili: 1.0 E.U./dL   Blood: x / Protein: NEGATIVE mg/dL / Nitrite: NEGATIVE   Leuk Esterase: NEGATIVE / RBC: > 10 /HPF / WBC < 5 /HPF   Sq Epi: x / Non Sq Epi: 0-5 /HPF / Bacteria: Present /HPF    CAPILLARY BLOOD GLUCOSE    POCT Blood Glucose.: 131 mg/dL (20 Aug 2021 06:16)  POCT Blood Glucose.: 126 mg/dL (19 Aug 2021 22:01)  POCT Blood Glucose.: 113 mg/dL (19 Aug 2021 17:04)  POCT Blood Glucose.: 129 mg/dL (19 Aug 2021 12:59)    Culture - CSF with Gram Stain (collected 21 @ 05:32)  Source: .CSF CSF  Gram Stain (21 @ 05:50):    No White blood cells    No organisms seen    Culture - Sputum . (21 @ 08:05)    Gram Stain:   No epithelial cells seen  Few White blood cells  No organisms seen    Specimen Source: .Sputum Sputum    Culture Results:   Rare Normal Respiratory Mely present to date    Culture - Blood (21 @ 01:45)    Specimen Source: .Blood Blood X 2    Culture Results:   No growth at 1 day.    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 Stable ischemic changes within the right cerebellum. Right-sided EVD catheter in place with slight increase in ventricular size. Interval improvement in the intraventricular hemorrhage. Evolving areas of acute/subacute ischemia within the right cerebellum.    CTA (2021) -  Interval improvement in the caliber of the proximal vertebral artery with progression of a vasospasm involving the mid to distal basilar artery. Interval improvement in the vasospasm involving the left vertebral artery. Persistent vasospasm involving the posterior cerebral and superior cerebellar arteries. Interval improvement in the vasospasm involving the right supraclinoid ICA as well as the left A1 and M1 segments. Progression of vasospasm involving the right M1 segment.    Cerebral Angiogram (2021 - improvement in vasospasm, IA verapamil to posterior circulation  Cerebral Angiogram (2021 - Left vertebral artery dissection demonstrates continued moderate to severe cerebral vasospasm of the distal let vert and basilar artery, some what improved caliber of proximal left vert. 15mg IA Verapamil injected over 10 minutes with mild improvement of posterior circulation post treatment. Right ICA injection with continued mild supraclinoid vasospasm, 10mg of IA Verapamil injected.  Cerebral Angiogram (2021) - Left vertebral injection demonstrates moderate diffuse vasospasm of left vertebral artery, basilar artery and posterior circulation including PCAs. 15mg Verapamil injected via left vert with mild radiographic improvement post treatment. Right ICA injection with mild supraclinoid spasm/narrowing, otherwise no vasospasm appreciated in anterior circulation.    CT (2021) - No significant interval change in right superior and lateral cerebellar hypodensities.  CT (08.15.2021) - 1. Worsening the uncal herniation, with effacement of the bilateral suprasellar and perimesencephalic cisterns.  2. Redistribution of intraventricular hemorrhage, as above. Redemonstration of right EVD, with distal tip in the right frontal horn, near the foramen of Monro. Worsening hydrocephalus.  CTA - 1. Multifocal punctate and short segment stenoses, as above, with several areas of narrowing new as compared to recent CTA dated 2021. In the posterior circulation, stenoses are identified within the V4 left vertebral artery, the basilar artery, and in the bilateral PCA, more significant on the left.  Within the anterior circulation, stenosis are identified in the right supraclinoid C6 ICA as well as the proximal M1 MCA. Findings are most compatible with vasospasm.  2. In particular, the distal V4 right vertebral artery, which was visualized, though diminutive, on prior CTA dated 2021, does not fill with contrast on the present examination. The basilar artery demonstrates multiple stenoses, and is significantly smaller in caliber when compared to the prior CTA.    Other imaging:  Duplex - 1.  Since 2021, there is new deep venous thrombosis in the bilateral peroneal veins.  2.  Redemonstration of deep vein thrombosis in a right intramuscular calf vein.    EVD (d13) -5 cm, 20 mL/h, ICP < 5    [Code] Full  [Goals] Aggressive  [Disposion] ICU

## 2021-08-20 NOTE — PROGRESS NOTE ADULT - SUBJECTIVE AND OBJECTIVE BOX
NEUROSURGERY POST OP NOTE:    POD# 0 S/P Femoral cerebral angiogram performed left ICA injection without evidence of vasospasm and  right ICA injection with mild persistent supraclinoid segment spasm, IA Verapamil was infused - 10mg into right supraclinoid, and 5mg into right PCOMM. Left vertebral artery injection with unchanged mild-moderate spasm of the distal vertebral artery and basilar artery/posterior circulation. IA Verapamil infused - 10mg into left V3 vert, and 10mg into the basilar confluence with radiographic improvement of vasospasm    S: 44 y/o F with PMHx of SAH s/p angiography Verapamil infusion  was extubated and is currently non-verbal unable to determine any complaints at this time. Patient's vitals are stable and SBP remains within the goal range of 180-220mmHg. Patient was seen in ICU with no complications at this time.       T(C): 37.1 (08-20-21 @ 14:45), Max: 38.1 (08-20-21 @ 04:00)  HR: 88 (08-20-21 @ 16:00) (87 - 105)  BP: 210/116 (08-20-21 @ 16:00) (137/78 - 211/118)  RR: 25 (08-20-21 @ 16:00) (12 - 29)  SpO2: 100% (08-20-21 @ 16:00) (91% - 100%)      08-19-21 @ 07:01  -  08-20-21 @ 07:00  --------------------------------------------------------  IN: 4916.9 mL / OUT: 4264 mL / NET: 652.9 mL    08-20-21 @ 07:01  -  08-20-21 @ 16:14  --------------------------------------------------------  IN: 566 mL / OUT: 2000 mL / NET: -1434 mL        acetaminophen   Tablet .. 1000 milliGRAM(s) Oral every 6 hours  albuterol/ipratropium for Nebulization 3 milliLiter(s) Nebulizer every 6 hours  aspirin  chewable 81 milliGRAM(s) Oral daily  bromocriptine Capsule 15 milliGRAM(s) Oral every 8 hours  chlorhexidine 0.12% Liquid 15 milliLiter(s) Oral Mucosa every 12 hours  chlorhexidine 2% Cloths 1 Application(s) Topical <User Schedule>  enoxaparin Injectable 40 milliGRAM(s) SubCutaneous every 12 hours  fludroCORTISONE 0.1 milliGRAM(s) Oral daily  glucagon  Injectable 1 milliGRAM(s) IntraMuscular once  insulin lispro (ADMELOG) corrective regimen sliding scale   SubCutaneous every 6 hours  lacosamide Solution 50 milliGRAM(s) Oral two times a day  lactated ringers. 1000 milliLiter(s) IV Continuous <Continuous>  methylphenidate 10 milliGRAM(s) Oral daily  norepinephrine Infusion 0.05 MICROgram(s)/kG/Min IV Continuous <Continuous>  ondansetron Injectable 4 milliGRAM(s) IV Push every 6 hours PRN  pantoprazole   Suspension 40 milliGRAM(s) Oral before breakfast  polyethylene glycol 3350 17 Gram(s) Oral daily  senna 2 Tablet(s) Oral at bedtime  sodium chloride 3%  Inhalation 4 milliLiter(s) Inhalation every 6 hours  valproate sodium IVPB 1000 milliGRAM(s) IV Intermittent every 8 hours      RADIOLOGY:     Exam:  Constitutional:  Lying in bed with mask, lethargic  Neuro: non-verbal, lethargic, arousal to voice, able to follow some commands. Patient is tracking when spoken to. cough reflex intact, face is symmetric. Motor: LE were moving spontaneously b/l, R. LE must remain straight secondary to angiography. R. UE has no  strength and is not responsive to noxious stimuli; L. UE reached towards R. UE noxious stimuli. L. UE 5/5  strength   HEENT: pupils reactive L 3mm brisk, R 4mm brisk   Cardiovascular: regular rate and rhythm, normal S1/S2, no murmurs, L. tibial pulse 1 - bounding, R. TP 1+ bounding, DP 2+ bounding b/l  Chest: Clear to bases with labored breathing on neb  Abdomen: soft, non-tender, no hepatosplenomegaly   Extremities: no edema, clubbing or cyanosis  Skin: no rash or neurocutaneous signs   Wounds: femoral entry wound gauze are dry, intact and non-tender       DEVICES:       Assessment: 45yFemale with PMHx of SAH is s/p DOP #0 Femoral cerebral angiogram performed left ICA injection without evidence of vasospasm and  right ICA injection with mild persistent supraclinoid segment spasm. Patient is following some commands. Pt was extubated and breathing on her own with 100% NR. Will continue to monitor. Will continue medical management.       Plan:    NEURO:   - neurochecks q1h  - EVD open at -5cmH2O , allow up to 40cc/hr --> given clinical change and ventricular enlargement:  EVD to drain 20 mL/h  - s/p CT/CTA/CTP with Verapamil infusion 8/20  - Nimodipine 60 mg po q4h d/c'ed for SBP goal 180-200   - Depakote increased to 1g q8 ( No keppra due to agitation) next level 8/21  - Ceribell EEG negative 8/8, d/c'ed. Ceribell eeg placed again 8/18 with read of brief L fronto-temporal seizure, now on VEEG (negative so far)  - cont ASA 81  - CTH 8/15: worsened uncal herniation, worsened hydro, vasospasm in b/l PCA, L vert, basilar arteries  - Bromocriptine 15mg Q8  - Per ENT, no vocal cord issues  8/16: angio with findings of moderate diffuse vasospasm of left vertebral artery, basilar artery and posterior circulation including PCAs IA verapamil given   8/17: angio findings of moderate to severe cerebral vasospasm of the distal left vert and basilar artery, mild supraclinoid vasospasm. IA verapamil given  8/18: angio, improvement in vasospasm, IA verapamil to posterior circulaion  - VPA subtherapeutic , increased dose to 1g q8   - vimpat 100mg q12 started  - Ritalin started for neurostim  8/20:  Femoral cerebral angiogram performed left ICA injection without evidence of vasospasm and  right ICA injection with mild persistent supraclinoid segment spasm, IA Verapamil was infused - 10mg into right supraclinoid, and 5mg into right PCOMM. Left vertebral artery injection with unchanged mild-moderate spasm of the distal vertebral artery and basilar artery/posterior circulation. IA Verapamil infused - 10mg into left V3 vert, and 10mg into the basilar confluence with radiographic improvement of vasospasm  PULM:    - extubated 8/20  - satting well on 100% NR   - self extubated, face mask, mucomyst, duoneb   - chest PT   - CXR - aspiration precautions, requires frequent suctioning    CARDIO:    - -220  - levo gtt, s/p albumin 8/16   - TTE WNL, repeat 8/12   - Troponin stable   - EKG normal     ENDO:    - glucose goal 140-180    GI:    - NPO for now, NGT,   - needs thin liquid diet x3 weeks as per bariatric when tolerated  - PPI per bariatric surgeon    RENAL:   - Maintain euvolemia   - Na goal 145-150   - 100cc/hr NS  - albumin bolus yesterday to maintain CVP > 6    HEM/ONC:   - ASA 81  - VTE Prophylaxis: SCDs, SQL  - LE Duplex 8/9: Acute completely occlusive deep venous thrombosis of the right intramuscular calf vein, s/p IVCF with vascular 8/12, repeat 8/16 shows new b/l peroneal DVTs, factor xa c/w prophylactic dose   - hypercoagulable w/u?     ID:   -MRSA neg 8/12  -febrile, trend leukocytosis   -empiric Vanc/zoysn for fever d/c'd (8/10-13) and now restarted 8/14 for persistent fevers, dc'd again on 8/18   -CSF sent 8/13, NGTD   -f/u pancx 8/17    Assessment:  Present when checked    []  GCS  E   V  M     Heart Failure: []Acute, [] acute on chronic , []chronic  Heart Failure:  [] Diastolic (HFpEF), [] Systolic (HFrEF), []Combined (HFpEF and HFrEF), [] RHF, [] Pulm HTN, [] Other    [] TRUDI, [] ATN, [] AIN, [] other  [] CKD1, [] CKD2, [] CKD 3, [] CKD 4, [] CKD 5, []ESRD    Encephalopathy: [] Metabolic, [] Hepatic, [] toxic, [] Neurological, [] Other    Abnormal Nurtitional Status: [] malnurtition (see nutrition note), [ ]underweight: BMI < 19, [] morbid obesity: BMI >40, [] Cachexia    [] Sepsis  [] hypovolemic shock,[] cardiogenic shock, [] hemorrhagic shock, [] neuogenic shock  [] Acute Respiratory Failure  []Cerebral edema, [] Brain compression/ herniation,   [] Functional quadriplegia  [] Acute blood loss anemia       NEUROSURGERY POST OP NOTE:    POD# 0 S/P Femoral cerebral angiogram performed left ICA injection without evidence of vasospasm and  right ICA injection with mild persistent supraclinoid segment spasm, IA Verapamil was infused - 10mg into right supraclinoid, and 5mg into right PCOMM. Left vertebral artery injection with unchanged mild-moderate spasm of the distal vertebral artery and basilar artery/posterior circulation. IA Verapamil infused - 10mg into left V3 vert, and 10mg into the basilar confluence with radiographic improvement of vasospasm    S: Patient was seen in ICU with no complications at this time. Currently non-sedated, non-verbal at this time, appears comfortable.       T(C): 37.1 (08-20-21 @ 14:45), Max: 38.1 (08-20-21 @ 04:00)  HR: 88 (08-20-21 @ 16:00) (87 - 105)  BP: 210/116 (08-20-21 @ 16:00) (137/78 - 211/118)  RR: 25 (08-20-21 @ 16:00) (12 - 29)  SpO2: 100% (08-20-21 @ 16:00) (91% - 100%)      08-19-21 @ 07:01  -  08-20-21 @ 07:00  --------------------------------------------------------  IN: 4916.9 mL / OUT: 4264 mL / NET: 652.9 mL    08-20-21 @ 07:01  -  08-20-21 @ 16:14  --------------------------------------------------------  IN: 566 mL / OUT: 2000 mL / NET: -1434 mL        acetaminophen   Tablet .. 1000 milliGRAM(s) Oral every 6 hours  albuterol/ipratropium for Nebulization 3 milliLiter(s) Nebulizer every 6 hours  aspirin  chewable 81 milliGRAM(s) Oral daily  bromocriptine Capsule 15 milliGRAM(s) Oral every 8 hours  chlorhexidine 0.12% Liquid 15 milliLiter(s) Oral Mucosa every 12 hours  chlorhexidine 2% Cloths 1 Application(s) Topical <User Schedule>  enoxaparin Injectable 40 milliGRAM(s) SubCutaneous every 12 hours  fludroCORTISONE 0.1 milliGRAM(s) Oral daily  glucagon  Injectable 1 milliGRAM(s) IntraMuscular once  insulin lispro (ADMELOG) corrective regimen sliding scale   SubCutaneous every 6 hours  lacosamide Solution 50 milliGRAM(s) Oral two times a day  lactated ringers. 1000 milliLiter(s) IV Continuous <Continuous>  methylphenidate 10 milliGRAM(s) Oral daily  norepinephrine Infusion 0.05 MICROgram(s)/kG/Min IV Continuous <Continuous>  ondansetron Injectable 4 milliGRAM(s) IV Push every 6 hours PRN  pantoprazole   Suspension 40 milliGRAM(s) Oral before breakfast  polyethylene glycol 3350 17 Gram(s) Oral daily  senna 2 Tablet(s) Oral at bedtime  sodium chloride 3%  Inhalation 4 milliLiter(s) Inhalation every 6 hours  valproate sodium IVPB 1000 milliGRAM(s) IV Intermittent every 8 hours      RADIOLOGY:     Exam:  Constitutional:  Lying in bed with mask, intubated to vent, on CPAP.  Neuro: non-verbal, lethargic, opens eye to voice, tracks, able to follow command (Squeeze and let go on command on LUE). +cough/gag reflex intact, face is symmetric. Motor: LE were moving spontaneously b/l, L>R. RUE not following commands but withdrew to noxious. LUE localizing to noxious stimuli and moving spontaneous and 3/5? UE 5/5  strength   HEENT: pupils reactive L 3mm brisk, R 4mm brisk   Cardiovascular: regular rate and rhythm, normal S1/S2, no murmurs, L. tibial pulse 1 - bounding, R. TP 1+ bounding, DP 2+ bounding b/l  Pulm: Clear to auscultation, mild rhonchi on left upper chest.   Abdomen: soft, non-tender, no hepatosplenomegaly   Extremities: no edema, clubbing or cyanosis  Skin: no rash or neurocutaneous signs   Wounds: femoral entry wound gauze are dry, intact and non-tender       DEVICES:       Assessment: 45yFemale with PMHx of SAH is s/p DOP #0 Femoral cerebral angiogram performed left ICA injection without evidence of vasospasm and  right ICA injection with mild persistent supraclinoid segment spasm. Patient is following some commands. Pt was extubated and breathing on her own with 100% NR. Will continue to monitor. Will continue medical management.       Plan:    NEURO:   - neurochecks q1h  - EVD open at -5cmH2O , allow up to 40cc/hr --> given clinical change and ventricular enlargement:  EVD to drain 20 mL/h  - s/p CT/CTA/CTP with Verapamil infusion 8/20  - Nimodipine 60 mg po q4h d/c'ed for SBP goal 180-200   - Depakote increased to 1g q8 ( No keppra due to agitation) next level 8/21  - Ceribell EEG negative 8/8, d/c'ed. Ceribell eeg placed again 8/18 with read of brief L fronto-temporal seizure, now on VEEG (negative so far)  - cont ASA 81  - CTH 8/15: worsened uncal herniation, worsened hydro, vasospasm in b/l PCA, L vert, basilar arteries  - Bromocriptine 15mg Q8  - Per ENT, no vocal cord issues  8/16: angio with findings of moderate diffuse vasospasm of left vertebral artery, basilar artery and posterior circulation including PCAs IA verapamil given   8/17: angio findings of moderate to severe cerebral vasospasm of the distal left vert and basilar artery, mild supraclinoid vasospasm. IA verapamil given  8/18: angio, improvement in vasospasm, IA verapamil to posterior circulaion  - VPA subtherapeutic , increased dose to 1g q8   - vimpat 100mg q12 started  - Ritalin started for neurostim  8/20:  Femoral cerebral angiogram performed left ICA injection without evidence of vasospasm and  right ICA injection with mild persistent supraclinoid segment spasm, IA Verapamil was infused - 10mg into right supraclinoid, and 5mg into right PCOMM. Left vertebral artery injection with unchanged mild-moderate spasm of the distal vertebral artery and basilar artery/posterior circulation. IA Verapamil infused - 10mg into left V3 vert, and 10mg into the basilar confluence with radiographic improvement of vasospasm  PULM:    - extubated 8/20  - satting well on 100% NR   - self extubated, face mask, mucomyst, duoneb   - chest PT   - CXR - aspiration precautions, requires frequent suctioning    CARDIO:    - -220  - levo gtt, s/p albumin 8/16   - TTE WNL, repeat 8/12   - Troponin stable   - EKG normal     ENDO:    - glucose goal 140-180    GI:    - NPO for now, NGT,   - needs thin liquid diet x3 weeks as per bariatric when tolerated  - PPI per bariatric surgeon    RENAL:   - Maintain euvolemia   - Na goal 145-150   - 100cc/hr NS  - albumin bolus yesterday to maintain CVP > 6    HEM/ONC:   - ASA 81  - VTE Prophylaxis: SCDs, SQL  - LE Duplex 8/9: Acute completely occlusive deep venous thrombosis of the right intramuscular calf vein, s/p IVCF with vascular 8/12, repeat 8/16 shows new b/l peroneal DVTs, factor xa c/w prophylactic dose   - hypercoagulable w/u?     ID:   -MRSA neg 8/12  -febrile, trend leukocytosis   -empiric Vanc/zoysn for fever d/c'd (8/10-13) and now restarted 8/14 for persistent fevers, dc'd again on 8/18   -CSF sent 8/13, NGTD   -f/u pancx 8/17    Assessment:  Present when checked    []  GCS  E   V  M     Heart Failure: []Acute, [] acute on chronic , []chronic  Heart Failure:  [] Diastolic (HFpEF), [] Systolic (HFrEF), []Combined (HFpEF and HFrEF), [] RHF, [] Pulm HTN, [] Other    [] TRUDI, [] ATN, [] AIN, [] other  [] CKD1, [] CKD2, [] CKD 3, [] CKD 4, [] CKD 5, []ESRD    Encephalopathy: [] Metabolic, [] Hepatic, [] toxic, [] Neurological, [] Other    Abnormal Nurtitional Status: [] malnurtition (see nutrition note), [ ]underweight: BMI < 19, [] morbid obesity: BMI >40, [] Cachexia    [] Sepsis  [] hypovolemic shock,[] cardiogenic shock, [] hemorrhagic shock, [] neuogenic shock  [] Acute Respiratory Failure  []Cerebral edema, [] Brain compression/ herniation,   [] Functional quadriplegia  [] Acute blood loss anemia       NEUROSURGERY POST OP NOTE:    POD# 0 S/P Femoral cerebral angiogram performed left ICA injection without evidence of vasospasm and  right ICA injection with mild persistent supraclinoid segment spasm, IA Verapamil was infused - 10mg into right supraclinoid, and 5mg into right PCOMM. Left vertebral artery injection with unchanged mild-moderate spasm of the distal vertebral artery and basilar artery/posterior circulation. IA Verapamil infused - 10mg into left V3 vert, and 10mg into the basilar confluence with radiographic improvement of vasospasm    S: Patient was seen in ICU with no complications at this time. Currently non-sedated, non-verbal at this time, appears comfortable.       T(C): 37.1 (08-20-21 @ 14:45), Max: 38.1 (08-20-21 @ 04:00)  HR: 88 (08-20-21 @ 16:00) (87 - 105)  BP: 210/116 (08-20-21 @ 16:00) (137/78 - 211/118)  RR: 25 (08-20-21 @ 16:00) (12 - 29)  SpO2: 100% (08-20-21 @ 16:00) (91% - 100%)      08-19-21 @ 07:01  -  08-20-21 @ 07:00  --------------------------------------------------------  IN: 4916.9 mL / OUT: 4264 mL / NET: 652.9 mL    08-20-21 @ 07:01  -  08-20-21 @ 16:14  --------------------------------------------------------  IN: 566 mL / OUT: 2000 mL / NET: -1434 mL        acetaminophen   Tablet .. 1000 milliGRAM(s) Oral every 6 hours  albuterol/ipratropium for Nebulization 3 milliLiter(s) Nebulizer every 6 hours  aspirin  chewable 81 milliGRAM(s) Oral daily  bromocriptine Capsule 15 milliGRAM(s) Oral every 8 hours  chlorhexidine 0.12% Liquid 15 milliLiter(s) Oral Mucosa every 12 hours  chlorhexidine 2% Cloths 1 Application(s) Topical <User Schedule>  enoxaparin Injectable 40 milliGRAM(s) SubCutaneous every 12 hours  fludroCORTISONE 0.1 milliGRAM(s) Oral daily  glucagon  Injectable 1 milliGRAM(s) IntraMuscular once  insulin lispro (ADMELOG) corrective regimen sliding scale   SubCutaneous every 6 hours  lacosamide Solution 50 milliGRAM(s) Oral two times a day  lactated ringers. 1000 milliLiter(s) IV Continuous <Continuous>  methylphenidate 10 milliGRAM(s) Oral daily  norepinephrine Infusion 0.05 MICROgram(s)/kG/Min IV Continuous <Continuous>  ondansetron Injectable 4 milliGRAM(s) IV Push every 6 hours PRN  pantoprazole   Suspension 40 milliGRAM(s) Oral before breakfast  polyethylene glycol 3350 17 Gram(s) Oral daily  senna 2 Tablet(s) Oral at bedtime  sodium chloride 3%  Inhalation 4 milliLiter(s) Inhalation every 6 hours  valproate sodium IVPB 1000 milliGRAM(s) IV Intermittent every 8 hours      RADIOLOGY:     Exam:  Constitutional:  Lying in bed with mask, intubated to vent, on CPAP.  Neuro: non-verbal, lethargic, opens eye to voice, tracks, right gaze preference able to overcome midline, not able to fully look left. Able to follow command (Squeeze and let go on command on LUE). +cough/gag reflex intact, face is symmetric. Motor: LE were moving spontaneously b/l, L>R. RUE not following commands but withdrew to noxious. LUE localizing to noxious stimuli and moving spontaneous and 3/5? UE 5/5  strength   HEENT: pupils reactive L 3mm brisk, R 4mm brisk   Cardiovascular: Regular rate and rhythm, normal S1/S2, no murmurs,   Pulm: Clear to auscultation, mild rhonchi on left upper chest.   Abdomen: +suprapubic well-healed incision with mild distention and underlying scar tissue, otherwise abdomen soft, non-tender  Extremities: no edema, clubbing or cyanosis, L PT 1 - bounding, R. PT 1+ bounding, DP 2+ bounding b/l  Skin: no rash or neurocutaneous signs   Wounds: +Right groin with dressing in place, C/D/I and no presence of hematoma.       DEVICES: +EVD, +R IJ CVC, +R a-line, +prima-fit, +rectal tube.       Assessment: 45yFemale with PMHx of SAH is s/p POD #0 Femoral cerebral angiogram performed left ICA injection without evidence of vasospasm and  right ICA injection with mild persistent supraclinoid segment spasm. Patient is following some commands.    Plan:    NEURO:   - neurochecks q1h  - EVD open at -5cmH2O , allow up to 15cc/hr --> given clinical change and ventricular enlargement:  EVD to drain 15mL/h  - s/p CTH 8/20: decreased size in ventricles, stable.   - Nimodipine 60 mg po q4h d/c'ed for SBP goal 180-200   - Depakote increased to 1g q8 ( No keppra due to agitation) next level 8/21  - Vimpat decreased to 50mg BID  - EEG no seizures x 2 days , D/c'ed  - cont ASA 81  - CTH 8/15: worsened uncal herniation, worsened hydro, vasospasm in b/l PCA, L vert, basilar arteries  - Bromocriptine 15mg Q8  - Ritalin started for neurostim    PULM:    - extubated 8/20  - satting well on 100% NR   - Duonebs and 3% nebs standing  - chest PT   - CXR - aspiration precautions, requires frequent suctioning    CARDIO:    - -220  - levo gtt, s/p albumin 8/16   - TTE 8/12 WNL    - Troponin stable   - EKG normal     ENDO:    - glucose goal 140-180    GI:    - NPO for now, NGT  - needs thin liquid diet x3 weeks as per bariatric when tolerated  - PPI per bariatric surgeon    RENAL:   - Maintain euvolemia   - Na goal 145-150   - 75cc/hr lactated ringers   - albumin bolus yesterday to maintain CVP > 6    HEM/ONC:   - ASA 81  - VTE Prophylaxis: SCDs, SQL  - LE Duplex 8/9: Acute completely occlusive deep venous thrombosis of the right intramuscular calf vein, s/p IVCF with vascular 8/12, repeat 8/16 shows new b/l peroneal DVTs, factor xa c/w prophylactic dose   - Repeat dopplers 8/23  - hypercoagulable w/u - still pending, however protein S/C and AT III normal     ID:   -MRSA neg 8/12  -Low grade fevers,, trend leukocytosis   -empiric Vanc/zoysn for fever d/c'd (8/10-13) and now restarted 8/14 for persistent fevers, dc'd again on 8/18   -CSF sent 8/13, NGTD   -f/u pancx 8/17    Assessment and plan discussed with Dr. Lomax, Dr. Conn and Dr. Sanches     Assessment:  Present when checked    []  GCS  E   V  M     Heart Failure: []Acute, [] acute on chronic , []chronic  Heart Failure:  [] Diastolic (HFpEF), [] Systolic (HFrEF), []Combined (HFpEF and HFrEF), [] RHF, [] Pulm HTN, [] Other    [] TRUDI, [] ATN, [] AIN, [] other  [] CKD1, [] CKD2, [] CKD 3, [] CKD 4, [] CKD 5, []ESRD    Encephalopathy: [] Metabolic, [] Hepatic, [] toxic, [] Neurological, [] Other    Abnormal Nurtitional Status: [] malnurtition (see nutrition note), [ ]underweight: BMI < 19, [] morbid obesity: BMI >40, [] Cachexia    [] Sepsis  [] hypovolemic shock,[] cardiogenic shock, [] hemorrhagic shock, [] neuogenic shock  [] Acute Respiratory Failure  []Cerebral edema, [] Brain compression/ herniation,   [] Functional quadriplegia  [] Acute blood loss anemia       NEUROSURGERY POST OP NOTE:    POD# 0 S/P Femoral cerebral angiogram,  shows spasm in R. supraclinoid, R. PCOMM, L. distal vertebral artery, and L. basilar posterior circulation. R. PCOMM received 5mg of Verapamil infusion and R. supraclinoid, L.V3 vertebral artery, and L. basilar confluence received 10mg Verapamil infusion.     S: Patient was seen in ICU with no complications at this time. Currently non-sedated, non-verbal at this time, appears comfortable.       T(C): 37.1 (08-20-21 @ 14:45), Max: 38.1 (08-20-21 @ 04:00)  HR: 88 (08-20-21 @ 16:00) (87 - 105)  BP: 210/116 (08-20-21 @ 16:00) (137/78 - 211/118)  RR: 25 (08-20-21 @ 16:00) (12 - 29)  SpO2: 100% (08-20-21 @ 16:00) (91% - 100%)      08-19-21 @ 07:01  -  08-20-21 @ 07:00  --------------------------------------------------------  IN: 4916.9 mL / OUT: 4264 mL / NET: 652.9 mL    08-20-21 @ 07:01  -  08-20-21 @ 16:14  --------------------------------------------------------  IN: 566 mL / OUT: 2000 mL / NET: -1434 mL        acetaminophen   Tablet .. 1000 milliGRAM(s) Oral every 6 hours  albuterol/ipratropium for Nebulization 3 milliLiter(s) Nebulizer every 6 hours  aspirin  chewable 81 milliGRAM(s) Oral daily  bromocriptine Capsule 15 milliGRAM(s) Oral every 8 hours  chlorhexidine 0.12% Liquid 15 milliLiter(s) Oral Mucosa every 12 hours  chlorhexidine 2% Cloths 1 Application(s) Topical <User Schedule>  enoxaparin Injectable 40 milliGRAM(s) SubCutaneous every 12 hours  fludroCORTISONE 0.1 milliGRAM(s) Oral daily  glucagon  Injectable 1 milliGRAM(s) IntraMuscular once  insulin lispro (ADMELOG) corrective regimen sliding scale   SubCutaneous every 6 hours  lacosamide Solution 50 milliGRAM(s) Oral two times a day  lactated ringers. 1000 milliLiter(s) IV Continuous <Continuous>  methylphenidate 10 milliGRAM(s) Oral daily  norepinephrine Infusion 0.05 MICROgram(s)/kG/Min IV Continuous <Continuous>  ondansetron Injectable 4 milliGRAM(s) IV Push every 6 hours PRN  pantoprazole   Suspension 40 milliGRAM(s) Oral before breakfast  polyethylene glycol 3350 17 Gram(s) Oral daily  senna 2 Tablet(s) Oral at bedtime  sodium chloride 3%  Inhalation 4 milliLiter(s) Inhalation every 6 hours  valproate sodium IVPB 1000 milliGRAM(s) IV Intermittent every 8 hours      RADIOLOGY:     Exam:  Constitutional:  Lying in bed with mask, intubated to vent, on CPAP.  Neuro: non-verbal, lethargic, opens eye to voice, tracks, right gaze preference able to overcome midline, not able to fully look left. Able to follow command (Squeeze and let go on command on LUE). +cough/gag reflex intact, face is symmetric. Motor: LE were moving spontaneously b/l, L>R. RUE not following commands but withdrew to noxious. LUE localizing to noxious stimuli and moving spontaneous and 3/5? UE 5/5  strength   HEENT: pupils reactive L 3mm brisk, R 4mm brisk   Cardiovascular: Regular rate and rhythm, normal S1/S2, no murmurs,   Pulm: Clear to auscultation, mild rhonchi on left upper chest.   Abdomen: +suprapubic well-healed incision with mild distention and underlying scar tissue, otherwise abdomen soft, non-tender  Extremities: no edema, clubbing or cyanosis, L PT 1 - bounding, R. PT 1+ bounding, DP 2+ bounding b/l  Skin: no rash or neurocutaneous signs   Wounds: +Right groin with dressing in place, C/D/I and no presence of hematoma.       DEVICES: +EVD, +R IJ CVC, +R a-line, +prima-fit, +rectal tube.       Assessment: 45yFemale with PMHx of SAH is s/p POD #0  Femoral cerebral angiogram,  shows spasm in R. supraclinoid, R. PCOMM, L. distal vertebral artery, and L. basilar posterior circulation. R. PCOMM received 5mg of Verapamil infusion and R. supraclinoid, L.V3 vertebral artery, and L. basilar confluence received 10mg Verapamil infusion.       Plan:    NEURO:   - neurochecks q1h  - EVD open at -5cmH2O , allow up to 15cc/hr --> given clinical change and ventricular enlargement:  EVD to drain 15mL/h  - s/p CTH 8/20: decreased size in ventricles, stable.   - Nimodipine 60 mg po q4h d/c'ed for SBP goal 180-200   - Depakote increased to 1g q8 ( No keppra due to agitation) next level 8/21  - Vimpat decreased to 50mg BID  - EEG no seizures x 2 days , D/c'ed  - cont ASA 81  - CTH 8/15: worsened uncal herniation, worsened hydro, vasospasm in b/l PCA, L vert, basilar arteries  - Bromocriptine 15mg Q8  - Ritalin started for neurostim    PULM:    - extubated 8/20  - satting well on 100% NR   - Duonebs and 3% nebs standing  - chest PT   - CXR - aspiration precautions, requires frequent suctioning    CARDIO:    - -220  - levo gtt, s/p albumin 8/16   - TTE 8/12 WNL    - Troponin stable   - EKG normal     ENDO:    - glucose goal 140-180    GI:    - NPO for now, NGT  - needs thin liquid diet x3 weeks as per bariatric when tolerated  - PPI per bariatric surgeon    RENAL:   - Maintain euvolemia   - Na goal 145-150   - 75cc/hr lactated ringers   - albumin bolus yesterday to maintain CVP > 6    HEM/ONC:   - ASA 81  - VTE Prophylaxis: SCDs, SQL  - LE Duplex 8/9: Acute completely occlusive deep venous thrombosis of the right intramuscular calf vein, s/p IVCF with vascular 8/12, repeat 8/16 shows new b/l peroneal DVTs, factor xa c/w prophylactic dose   - Repeat dopplers 8/23  - hypercoagulable w/u - still pending, however protein S/C and AT III normal     ID:   -MRSA neg 8/12  -Low grade fevers,, trend leukocytosis   -empiric Vanc/zoysn for fever d/c'd (8/10-13) and now restarted 8/14 for persistent fevers, dc'd again on 8/18   -CSF sent 8/13, NGTD   -f/u pancx 8/17    Assessment and plan discussed with Dr. Lomax, Dr. Conn and Dr. Sanches     Assessment:  Present when checked    []  GCS  E   V  M     Heart Failure: []Acute, [] acute on chronic , []chronic  Heart Failure:  [] Diastolic (HFpEF), [] Systolic (HFrEF), []Combined (HFpEF and HFrEF), [] RHF, [] Pulm HTN, [] Other    [] TRUDI, [] ATN, [] AIN, [] other  [] CKD1, [] CKD2, [] CKD 3, [] CKD 4, [] CKD 5, []ESRD    Encephalopathy: [] Metabolic, [] Hepatic, [] toxic, [] Neurological, [] Other    Abnormal Nurtitional Status: [] malnurtition (see nutrition note), [ ]underweight: BMI < 19, [] morbid obesity: BMI >40, [] Cachexia    [] Sepsis  [] hypovolemic shock,[] cardiogenic shock, [] hemorrhagic shock, [] neuogenic shock  [] Acute Respiratory Failure  []Cerebral edema, [] Brain compression/ herniation,   [] Functional quadriplegia  [] Acute blood loss anemia

## 2021-08-20 NOTE — BRIEF OPERATIVE NOTE - OPERATION/FINDINGS
Femoral cerebral angiogram performed under general anesthesia via right CFA using a 5Fr short sheath. Bilateral cerebral angiogram performed. Left ICA injection without evidence of vasospasm. Right ICA injection with mild persistent supraclinoid segment spasm, IA Verapamil was infused - 10mg into right supraclinoid, and 5mg into right PCOMM. Left vertebral artery injection with unchanged mild-moderate spasm of the distal vertebral artery and basilar artery/posterior circulation. IA Verapamil infused - 10mg into left V3 vert, and 10mg into the basilar confluence with radiographic improvement of vasospasm. 3000u of Heparin given for the procedure, and EVD left open with 25cc output. Right groin closed with exoseal and 5 minutes of manual compression with hemostasis obtained.    Full report to follow, d/w Dr. Conn.

## 2021-08-20 NOTE — CHART NOTE - NSCHARTNOTEFT_GEN_A_CORE
Admitting Diagnosis:   Patient is a 45y old  Female who presents with a chief complaint of SAH (20 Aug 2021 08:10)    PAST MEDICAL & SURGICAL HISTORY:  gastric sleeve (8/4/21), fibromyalgia, thyroid dysfunction, PTSD, depression, anxiety.     Current Nutrition Order:  NPO diet  EN regimen: Vital HP @ 20 ml/hr x24hrs via NGT providing     PO Intake: Good (%) [   ]  Fair (50-75%) [   ] Poor (<25%) [   ]- N/A    GI Issues:   WDL, no n/v/d/c noted per RN  No abd distention or discomfort    Pain:  No pain noted at this time    Skin Integrity:  WDL, david score 13  +2 generalized pitting edema present  No pressure uclers noted    Labs:   08-20    156<H>  |  115<H>  |  12  ----------------------------<  127<H>  3.5   |  29  |  0.61    Ca    10.0      20 Aug 2021 07:02  Phos  3.1     08-20  Mg     2.4     08-20    TPro  6.4  /  Alb  4.4  /  TBili  0.4  /  DBili  x   /  AST  29  /  ALT  25  /  AlkPhos  54  08-20    CAPILLARY BLOOD GLUCOSE    POCT Blood Glucose.: 118 mg/dL (20 Aug 2021 10:58)  POCT Blood Glucose.: 131 mg/dL (20 Aug 2021 06:16)  POCT Blood Glucose.: 126 mg/dL (19 Aug 2021 22:01)  POCT Blood Glucose.: 113 mg/dL (19 Aug 2021 17:04)  POCT Blood Glucose.: 129 mg/dL (19 Aug 2021 12:59)    Medications:  MEDICATIONS  (STANDING):  acetaminophen   Tablet .. 1000 milliGRAM(s) Oral every 6 hours  albuterol/ipratropium for Nebulization 3 milliLiter(s) Nebulizer every 6 hours  aspirin  chewable 81 milliGRAM(s) Oral daily  bromocriptine Capsule 15 milliGRAM(s) Oral every 8 hours  chlorhexidine 0.12% Liquid 15 milliLiter(s) Oral Mucosa every 12 hours  chlorhexidine 2% Cloths 1 Application(s) Topical <User Schedule>  dextrose 50% Injectable 25 Gram(s) IV Push once  enoxaparin Injectable 40 milliGRAM(s) SubCutaneous every 12 hours  fludroCORTISONE 0.1 milliGRAM(s) Oral daily  glucagon  Injectable 1 milliGRAM(s) IntraMuscular once  insulin lispro (ADMELOG) corrective regimen sliding scale   SubCutaneous every 6 hours  lacosamide Solution 50 milliGRAM(s) Oral two times a day  lactated ringers. 1000 milliLiter(s) (75 mL/Hr) IV Continuous <Continuous>  methylphenidate 10 milliGRAM(s) Oral daily  norepinephrine Infusion 0.05 MICROgram(s)/kG/Min (4.79 mL/Hr) IV Continuous <Continuous>  pantoprazole   Suspension 40 milliGRAM(s) Oral before breakfast  polyethylene glycol 3350 17 Gram(s) Oral daily  senna 2 Tablet(s) Oral at bedtime  sodium chloride 3%  Inhalation 4 milliLiter(s) Inhalation every 6 hours  valproate sodium IVPB 1000 milliGRAM(s) IV Intermittent every 8 hours    MEDICATIONS  (PRN):  ondansetron Injectable 4 milliGRAM(s) IV Push every 6 hours PRN Nausea and/or Vomiting    Admitting Anthropometrics:  · Height for BMI (FEET)	5 Feet  · Height for BMI (INCHES)	4 Inch(s)  · Height for BMI (CENTIMETERS)	162.56 Centimeter(s)  · Weight for BMI (lbs)	225.1 lb  · Weight for BMI (kg)	102.1 kg  · Body Mass Index	              38.6 kg/m2  · Ideal Body Weight (lbs)	120  · Ideal Body Weight (kg)	54.4    Weight:  8/7 225lbs    Weight Change:   No new weights obtained this admission.     Nutrition Focused Physical Exam: Completed [   ]  Not Pertinent [ x  ]    Estimated energy needs:   IBW used for calculations as pt >120% of IBW (188%). Needs adjusted for recent bariatric sx, critical care, obesity. **Fluids per team (aim for >64oz per day once medically stable).  Kcal (25-30 kcal/kg): 0829-6251 kcal  Protein (1.5-2.0 g/kg pro): 81-108g pro    Subjective:   45F with h/o recent gastric sleeve (08/04/21 [facility], Dr. Crisostomo ), fibromyalgia, thyroid dysfunction, PTSD, depression, anxiety.  Presented with seizures at home - brought to [place], with 1 more episode of seizure en route.  Intubated, CT showed SAH with IV extension, casted IV, hydrocephalus, given mannitol, levetiracetam 1g,  6mg ativan. Started on Cardene drip for BP goal < 140 and transferred to [facility] NICU for further management. S/p angio with R vert takedown 8/7, pt extubated later that day. Pt remains on fentanyl and Precedex restlessness and agitation. NGT placed for initiation of EN. CTH with increased hydro, CTA head/neck with mild basilar spasm. Pt now weened off fentanyl and precedex 8/12 per disc with RN. S/p IVC placement 8/12. Pt with increased work of breathing with inability to clear secretions, and was reintubated 8/15. CTA shows posterior circulation vasospasm. Started on levophed drip for SBP goal 180-200. Also remains on propofol drip. EVD open at -5cmH2O. Pt noted to be febrile this am (101F) and pancultured 8/17. Pt s/p multiple angiograms 8/16, 8/17, and 8/18. Pt self extubated 8/18 and transitioned to NC, tolerating well. Plan for another angio today per disc with team.     On assessment, pt resting in bed without noted complaints per team, on Full Vent support. Vent AC/ VC. Tmax 98.7F. . Ordered for levo and propofol (@21 ml/hr providing 554 kcal). Currently NPO for possible angio. Pt has been tolerating EN well per team. No s/sx of aspiration, no abd distention or discomfort. No noted n/v/d/c- currently on reglan. If team wishes to restart EN, please see recs below. FWF per team. Pertinent Labs: POCT 136H, Na 149H, K 3.4L, Cl 112H, Cre 0.48L, Glu 180H. Cont to monitor lytes and replete prn. Cont to adjust insulin regimen per team for BS control. Please see recs below. RD to follow.      Previous Nutrition Diagnosis: Inadequate Oral Intake RT inability to meet needs via PO AEB NPO, reliant on EN to meet 100% of est needs.     Active [ x  ]  Resolved [   ]    If resolved, new PES:     Goal/Expected Outcome Consistently meet >75% est needs via appropriate route.    Recommendations:  1. NPO  >> When medically feasible, recommend the following diet advancement; BARICLLIQ >> Phase 1 Bariatric Full liquid diet  2. With current propofol rate, recommend EN regimen of Vital HP @ 30 ml/hr x24hrs with LPS daily (100 kcal, 15g pro) providing a total of 1374 kcal, 78g pro, 601 ml free water.   >> Once propofol is discontinued, recommend; Vital HP @ 46 ml/hr x24hrs via NGT providing 1104 ml TV, 1104 kcal, 96g pro., 923 ml free water.   >>Cont with aspiration precautions at all times  >>FWF per team  3. Cont to monitor lytes and replete prn  4. RD diet edu prn  5. Pain and bowel regimen per team    Education: Deferred    Risk Level: High [ x  ] Moderate [   ] Low [   ]. Admitting Diagnosis:   Patient is a 45y old  Female who presents with a chief complaint of SAH (20 Aug 2021 08:10)    PAST MEDICAL & SURGICAL HISTORY:  gastric sleeve (8/4/21), fibromyalgia, thyroid dysfunction, PTSD, depression, anxiety.     Current Nutrition Order:  NPO diet  EN regimen: Vital HP @ 20 ml/hr x24hrs via NGT providing     PO Intake: Good (%) [   ]  Fair (50-75%) [   ] Poor (<25%) [   ]- N/A    GI Issues:   WDL, no n/v/d/c noted per RN  No abd distention or discomfort    Pain:  No pain noted at this time    Skin Integrity:  WDL, david score 13  +2 generalized pitting edema present  No pressure uclers noted    Labs:   08-20    156<H>  |  115<H>  |  12  ----------------------------<  127<H>  3.5   |  29  |  0.61    Ca    10.0      20 Aug 2021 07:02  Phos  3.1     08-20  Mg     2.4     08-20    TPro  6.4  /  Alb  4.4  /  TBili  0.4  /  DBili  x   /  AST  29  /  ALT  25  /  AlkPhos  54  08-20    CAPILLARY BLOOD GLUCOSE    POCT Blood Glucose.: 118 mg/dL (20 Aug 2021 10:58)  POCT Blood Glucose.: 131 mg/dL (20 Aug 2021 06:16)  POCT Blood Glucose.: 126 mg/dL (19 Aug 2021 22:01)  POCT Blood Glucose.: 113 mg/dL (19 Aug 2021 17:04)  POCT Blood Glucose.: 129 mg/dL (19 Aug 2021 12:59)    Medications:  MEDICATIONS  (STANDING):  acetaminophen   Tablet .. 1000 milliGRAM(s) Oral every 6 hours  albuterol/ipratropium for Nebulization 3 milliLiter(s) Nebulizer every 6 hours  aspirin  chewable 81 milliGRAM(s) Oral daily  bromocriptine Capsule 15 milliGRAM(s) Oral every 8 hours  chlorhexidine 0.12% Liquid 15 milliLiter(s) Oral Mucosa every 12 hours  chlorhexidine 2% Cloths 1 Application(s) Topical <User Schedule>  dextrose 50% Injectable 25 Gram(s) IV Push once  enoxaparin Injectable 40 milliGRAM(s) SubCutaneous every 12 hours  fludroCORTISONE 0.1 milliGRAM(s) Oral daily  glucagon  Injectable 1 milliGRAM(s) IntraMuscular once  insulin lispro (ADMELOG) corrective regimen sliding scale   SubCutaneous every 6 hours  lacosamide Solution 50 milliGRAM(s) Oral two times a day  lactated ringers. 1000 milliLiter(s) (75 mL/Hr) IV Continuous <Continuous>  methylphenidate 10 milliGRAM(s) Oral daily  norepinephrine Infusion 0.05 MICROgram(s)/kG/Min (4.79 mL/Hr) IV Continuous <Continuous>  pantoprazole   Suspension 40 milliGRAM(s) Oral before breakfast  polyethylene glycol 3350 17 Gram(s) Oral daily  senna 2 Tablet(s) Oral at bedtime  sodium chloride 3%  Inhalation 4 milliLiter(s) Inhalation every 6 hours  valproate sodium IVPB 1000 milliGRAM(s) IV Intermittent every 8 hours    MEDICATIONS  (PRN):  ondansetron Injectable 4 milliGRAM(s) IV Push every 6 hours PRN Nausea and/or Vomiting    Admitting Anthropometrics:  · Height for BMI (FEET)	5 Feet  · Height for BMI (INCHES)	4 Inch(s)  · Height for BMI (CENTIMETERS)	162.56 Centimeter(s)  · Weight for BMI (lbs)	225.1 lb  · Weight for BMI (kg)	102.1 kg  · Body Mass Index	              38.6 kg/m2  · Ideal Body Weight (lbs)	120  · Ideal Body Weight (kg)	54.4    Weight:  8/7 225lbs    Weight Change:   No new weights obtained this admission.     Nutrition Focused Physical Exam: Completed [   ]  Not Pertinent [ x  ]    Estimated energy needs:   IBW used for calculations as pt >120% of IBW (188%). Needs adjusted for recent bariatric sx, critical care, obesity. **Fluids per team (aim for >64oz per day once medically stable).  Kcal (25-30 kcal/kg): 4382-2719 kcal  Protein (1.5-2.0 g/kg pro): 81-108g pro    Subjective:   45F with h/o recent gastric sleeve (08/04/21 [facility], Dr. Crisostomo ), fibromyalgia, thyroid dysfunction, PTSD, depression, anxiety.  Presented with seizures at home - brought to [place], with 1 more episode of seizure en route.  Intubated, CT showed SAH with IV extension, casted IV, hydrocephalus, given mannitol, levetiracetam 1g,  6mg ativan. Started on Cardene drip for BP goal < 140 and transferred to [facility] NICU for further management. S/p angio with R vert takedown 8/7, pt extubated later that day. Pt remains on fentanyl and Precedex restlessness and agitation. NGT placed for initiation of EN. CTH with increased hydro, CTA head/neck with mild basilar spasm. Pt now weened off fentanyl and precedex 8/12 per disc with RN. S/p IVC placement 8/12. Pt with increased work of breathing with inability to clear secretions, and was reintubated 8/15. CTA shows posterior circulation vasospasm. Started on levophed drip for SBP goal 180-200. Also remains on propofol drip. EVD open at -5cmH2O. Pt noted to be febrile this am (101F) and pancultured 8/17. Pt s/p multiple angiograms 8/16, 8/17, and 8/18. Pt self extubated 8/18 and transitioned to NC, tolerating well. Plan for another angio today per disc with team.       Previous Nutrition Diagnosis: Inadequate Oral Intake RT inability to meet needs via PO AEB NPO, reliant on EN to meet 100% of est needs.     Active [ x  ]  Resolved [   ]    If resolved, new PES:     Goal/Expected Outcome Consistently meet >75% est needs via appropriate route.    Recommendations:  1. NPO  >> When medically feasible, recommend the following diet advancement; BARICLLIQ >> Phase 1 Bariatric Full liquid diet  2. With current propofol rate, recommend EN regimen of Vital HP @ 30 ml/hr x24hrs with LPS daily (100 kcal, 15g pro) providing a total of 1374 kcal, 78g pro, 601 ml free water.   >> Once propofol is discontinued, recommend; Vital HP @ 46 ml/hr x24hrs via NGT providing 1104 ml TV, 1104 kcal, 96g pro., 923 ml free water.   >>Cont with aspiration precautions at all times  >>FWF per team  3. Cont to monitor lytes and replete prn  4. RD diet edu prn  5. Pain and bowel regimen per team    Education: Deferred    Risk Level: High [ x  ] Moderate [   ] Low [   ]. Admitting Diagnosis:   Patient is a 45y old  Female who presents with a chief complaint of SAH (20 Aug 2021 08:10)    PAST MEDICAL & SURGICAL HISTORY:  gastric sleeve (8/4/21), fibromyalgia, thyroid dysfunction, PTSD, depression, anxiety.     Current Nutrition Order:  NPO diet  EN regimen: Vital HP @ 20 ml/hr x24hrs via NGT providing 480 ml TV, 480 kcal, 42g pro, 401 ml free water.     PO Intake: Good (%) [   ]  Fair (50-75%) [   ] Poor (<25%) [   ]- N/A    GI Issues:   WDL, no n/v/d/c noted per RN  No abd distention or discomfort    Pain:  No pain noted at this time    Skin Integrity:  WDL, david score 13  +2 generalized pitting edema present  No pressure uclers noted    Labs:   08-20    156<H>  |  115<H>  |  12  ----------------------------<  127<H>  3.5   |  29  |  0.61    Ca    10.0      20 Aug 2021 07:02  Phos  3.1     08-20  Mg     2.4     08-20    TPro  6.4  /  Alb  4.4  /  TBili  0.4  /  DBili  x   /  AST  29  /  ALT  25  /  AlkPhos  54  08-20    CAPILLARY BLOOD GLUCOSE    POCT Blood Glucose.: 118 mg/dL (20 Aug 2021 10:58)  POCT Blood Glucose.: 131 mg/dL (20 Aug 2021 06:16)  POCT Blood Glucose.: 126 mg/dL (19 Aug 2021 22:01)  POCT Blood Glucose.: 113 mg/dL (19 Aug 2021 17:04)  POCT Blood Glucose.: 129 mg/dL (19 Aug 2021 12:59)    Medications:  MEDICATIONS  (STANDING):  acetaminophen   Tablet .. 1000 milliGRAM(s) Oral every 6 hours  albuterol/ipratropium for Nebulization 3 milliLiter(s) Nebulizer every 6 hours  aspirin  chewable 81 milliGRAM(s) Oral daily  bromocriptine Capsule 15 milliGRAM(s) Oral every 8 hours  chlorhexidine 0.12% Liquid 15 milliLiter(s) Oral Mucosa every 12 hours  chlorhexidine 2% Cloths 1 Application(s) Topical <User Schedule>  dextrose 50% Injectable 25 Gram(s) IV Push once  enoxaparin Injectable 40 milliGRAM(s) SubCutaneous every 12 hours  fludroCORTISONE 0.1 milliGRAM(s) Oral daily  glucagon  Injectable 1 milliGRAM(s) IntraMuscular once  insulin lispro (ADMELOG) corrective regimen sliding scale   SubCutaneous every 6 hours  lacosamide Solution 50 milliGRAM(s) Oral two times a day  lactated ringers. 1000 milliLiter(s) (75 mL/Hr) IV Continuous <Continuous>  methylphenidate 10 milliGRAM(s) Oral daily  norepinephrine Infusion 0.05 MICROgram(s)/kG/Min (4.79 mL/Hr) IV Continuous <Continuous>  pantoprazole   Suspension 40 milliGRAM(s) Oral before breakfast  polyethylene glycol 3350 17 Gram(s) Oral daily  senna 2 Tablet(s) Oral at bedtime  sodium chloride 3%  Inhalation 4 milliLiter(s) Inhalation every 6 hours  valproate sodium IVPB 1000 milliGRAM(s) IV Intermittent every 8 hours    MEDICATIONS  (PRN):  ondansetron Injectable 4 milliGRAM(s) IV Push every 6 hours PRN Nausea and/or Vomiting    Admitting Anthropometrics:  · Height for BMI (FEET)	5 Feet  · Height for BMI (INCHES)	4 Inch(s)  · Height for BMI (CENTIMETERS)	162.56 Centimeter(s)  · Weight for BMI (lbs)	225.1 lb  · Weight for BMI (kg)	102.1 kg  · Body Mass Index	              38.6 kg/m2  · Ideal Body Weight (lbs)	120  · Ideal Body Weight (kg)	54.4    Weight:  8/7 225lbs    Weight Change:   No new weights obtained this admission.     Nutrition Focused Physical Exam: Completed [   ]  Not Pertinent [ x  ]    Estimated energy needs:   IBW used for calculations as pt >120% of IBW (188%). Needs adjusted for recent bariatric sx, critical care, obesity. **Fluids per team (aim for >64oz per day once medically stable).  Kcal (25-30 kcal/kg): 1994-7215 kcal  Protein (1.5-2.0 g/kg pro): 81-108g pro    Subjective:   45F with h/o recent gastric sleeve (08/04/21 [facility], Dr. Crisostomo ), fibromyalgia, thyroid dysfunction, PTSD, depression, anxiety.  Presented with seizures at home - brought to [place], with 1 more episode of seizure en route.  Intubated, CT showed SAH with IV extension, casted IV, hydrocephalus, given mannitol, levetiracetam 1g,  6mg ativan. Started on Cardene drip for BP goal < 140 and transferred to [facility] NICU for further management. S/p angio with R vert takedown 8/7, pt extubated later that day. Pt remains on fentanyl and Precedex restlessness and agitation. NGT placed for initiation of EN. CTH with increased hydro, CTA head/neck with mild basilar spasm. Pt now weened off fentanyl and precedex 8/12 per disc with RN. S/p IVC placement 8/12. Pt with increased work of breathing with inability to clear secretions, and was reintubated 8/15. CTA shows posterior circulation vasospasm. Started on levophed drip for SBP goal 180-200. Also remains on propofol drip. EVD open at -5cmH2O. Pt noted to be febrile this am (101F) and pancultured 8/17. Pt s/p multiple angiograms 8/16, 8/17, and 8/18. Pt self extubated 8/18 and transitioned to NC, tolerating well. Plan for another angio today per disc with team.     On assessment, pt resting in bed nonverbal. Remains NPO with EN regimen running at trickle feeds. Pt started on trickle feeds due to constant procedures and unexpected self extubation. Tolerating EN well- no s/sx of aspiration. No n/v/d/c. Education deferred. Would recommend increasing EN rate to best meet est needs when medically feasible. Pertinent Labs: Na 156H, Cl 115H, Glu 157H- cont to monitor lytes and replete prn. Please see nutr recs below. RD to follow.     Previous Nutrition Diagnosis: Inadequate Oral Intake RT inability to meet needs via PO AEB NPO, reliant on EN to meet 100% of est needs.     Active [ x  ]  Resolved [   ]    If resolved, new PES:     Goal/Expected Outcome Consistently meet >75% est needs via appropriate route.    Recommendations:  1. NPO  >> When medically feasible, recommend the following diet advancement; BARICLLIQ >> Phase 1 Bariatric Full liquid diet  2. If team wishes to cont EN regimen, recommend increasing; Vital HP @ 46 ml/hr x24hrs via NGT providing 1104 ml TV, 1104 kcal, 96g pro., 923 ml free water.   >> Recommend increasing 10 ml q4hrs until goal rate is met  >>Cont with aspiration precautions at all times  >>FWF per team  3. Cont to monitor lytes and replete prn  4. RD diet edu prn  5. Pain and bowel regimen per team    Education: Deferred    Risk Level: High [ x  ] Moderate [   ] Low [   ].

## 2021-08-20 NOTE — PROGRESS NOTE ADULT - SUBJECTIVE AND OBJECTIVE BOX
HPI:  46y/o F with h/o recent gastric sleeve (21 [facility], Dr. Crisostomo ), fibromyalgia, thyroid dysfunction, PTSD, depression, anxiety.  Presented with seizures at home - brought to [place], with 1 more episode of seizure en route.  Intubated, CT showed SAH with IV extension, casted IV, hydrocephalus, given mannitol, levetiracetam 1g,  6mg ativan. Started on Cardene drip for BP goal < 140 and transferred to [facility] NICU for further management.     HH5 MF4   NIHSS 26 on arrival  BD 1 =  (07 Aug 2021 08:08)    Hospital Course:  : Tx from [place] for SAH. Intubated. R frontal EVD placed, central line and a line placed. 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   : POD1 s/p angio with R vert takedown, extubated, desatting on aerosol mask, required extensive suctioning, 3% nebs, chest PT, started on low dose precedex drip for restlessness/agitation, ABG stable. NGT placed. TF started with goal 20 pending nutrition recs. 3% stopped for Na 150. Ceribell EEG negative and d/c'ed.    Overnight, new Right pronator drift and right gaze preference that can't cross midline, Repeat CTH demonstrated stable vents, CTA head and neck unremarkable for spasm, hypoattenuation of right cerebellum edema vs. infarct.  8/10: POD3 R vert takedown, EVD open at [id number]. ASHA overnight, neuro stable. CTH with increased hydro, CTA head/neck with mild basilar spasm, but concern for PE. 1/2 am D50 for hypoglycemia. 1L bolus then 100 cc/hr, PM rounds for net negative fluid balance and mild vasospasm on CTA.   : POD4 R vert takedown. EVD at 5cm H2O, ASHA overnight. neuro stable.   Febrile 104. depakote increased to q6. NCHCT stable. EVD lowered to 0. Lasix 20 given for dieuresis, UOP over 2 liters. Sedation given for a-line placement, BP drop needing levo.  : POD5 R vert takedown. EVD at 0cm H2O. ASHA overnight, neuro stable. Precedex being weaned off. Pending IVCF with vascular today.  : POD6 R vert takedown. EVD open at 0cmH2O. ASHA overnight. Neuro exam stable. Mottled skin on the left lower extremity improving. Started on Bromocriptine 15mg Q8.   : POD7. EVD open at 0. 1L bolus given for euvolemia o/n. neuro stable. CTH stable. ENT scoped vocal cords, +R voacl cord paresis, NTD. started on zosyn empirically.   8/15: POD8. EVD open at 0. ASHA o/n, neuro stable. Pt developed increased work of breathing, unable to clear her secretions, received racemic epi and multiple nebulizer treatments, still with stridor. Patient re-intubated at bedside, on full vent support.   : CTH/CTA head/neck/CT chest performed o/n for worsened exam, R gaze preference, sluggish pupils. +worsened uncal herniation, hydro, vasospasm in b/l PCAs, L vert, basilar arteries. preop angio today, restarted on 3% for Na goal 145-150. Angio for vasospasm with IA verapamil.  : POD10. Overnight Pt remains on levophed drip for SBP goal 180-220. Also remains on propofol drip. EVD open at -5cmH2O. ICPs WNL. Febrile 101F and pancultured. CTH today shows slightly smaller ventricles. Angio for vasospasm with IA verapamil.  : POD11 R vert takedown. POD1 angio IA verpamil. Overnight, Pt noted to have downward gaze to the right and not following commands. Taken for stat head CT which is stable. Pupils also noted to be aniscoric left pupil 4mm and brisk and right 2mm brisk. Mannitol 50g given. Ceribell eeg placed for concern of subclinical seizures, so far appears negative for seizures. 1L NS o/n and 1.5L NS bolus in AM for euvolemia. vanc/zosyn stopped. 250cc 3% bolus and 250cc albumin given in AM. Brief seizure to L frontal lobe this AM on EEG, given 2g valproic acid and dose increased to 1g q8. Vimpat 100mg BID started.  Angio with interval improvment in vasospasm, IA verapamil given. In afternoon patient self-extubated, placed on NRB with mucomyst, 3% inhalation and duonebs. 3% at 50 d/c'd.  : POD 12. Remains on VEEG. Axillary A line placed. Salt tabs d/c'd, florinef decreased to 0.1mg, EVD @ -5cm H2O, now draining 20cc/hr. 250 albumin and 500 NS boluses on , 1 L LR bolus  : POD 13. ASHA. on VEEG, no seizures noted. Pending VPA level.        Vital Signs Last 24 Hrs  T(C): 36.8 (19 Aug 2021 21:01), Max: 38.3 (19 Aug 2021 09:00)  T(F): 98.3 (19 Aug 2021 21:01), Max: 101 (19 Aug 2021 09:00)  HR: 104 (19 Aug 2021 23:00) (91 - 112)  BP: 189/97 (19 Aug 2021 23:00) (146/77 - 249/125)  BP(mean): 137 (19 Aug 2021 23:00) (105 - 170)  RR: 16 (19 Aug 2021 23:00) (15 - 22)  SpO2: 96% (19 Aug 2021 23:00) (92% - 100%)    I&O's Detail    18 Aug 2021 07:01  -  19 Aug 2021 07:00  --------------------------------------------------------  IN:    Norepinephrine: 690.4 mL    Norepinephrine: 56 mL    sodium chloride 0.9%: 1500 mL    sodium chloride 0.9%: 400 mL    Sodium Chloride 0.9% Bolus: 3000 mL    sodium chloride 3%: 250 mL    sodium chloride 3%: 400 mL  Total IN: 6296.4 mL    OUT:    External Ventricular Device (mL): 256 mL    Intermittent Catheterization - Urethral (mL): 15 mL    Voided (mL): 5700 mL  Total OUT: 5971 mL    Total NET: 325.4 mL      19 Aug 2021 07:01  -  20 Aug 2021 00:34  --------------------------------------------------------  IN:    Enteral Tube Flush: 130 mL    IV PiggyBack: 400 mL    Lactated Ringers: 1000 mL    Lactated Ringers Bolus: 1000 mL    Norepinephrine: 395.4 mL    Vital1.0: 70 mL  Total IN: 2995.4 mL    OUT:    External Ventricular Device (mL): 274 mL    Voided (mL): 2650 mL  Total OUT: 2924 mL    Total NET: 71.4 mL        I&O's Summary    18 Aug 2021 07:  -  19 Aug 2021 07:00  --------------------------------------------------------  IN: 6296.4 mL / OUT: 5971 mL / NET: 325.4 mL    19 Aug 2021 07:01  -  20 Aug 2021 00:34  --------------------------------------------------------  IN: 2995.4 mL / OUT: 2924 mL / NET: 71.4 mL        PHYSICAL EXAM:  Constitutional: NAD, well groomed, obese  Respiratory: breathing non-labored, symmetrical chest wall movement, intubated   Cardiovascuar: RRR, no murmurs  Gastrointestinal: abdomen soft, non tender  Neurological: OE, nods appropriately to questions. AO x 1-2  Cranial Nerves: II-XII: R pupil 2mm brisk and reactive, L pupil 4mm brisk and reactive, right gaze preference, not crossing midline   Motor: not following commands, moving LUE/LLE spontaneously and strong. Withdraws RLE to noxious. No movement of RUE to noxious stim  Sensation: intact to light touch in all extremities  Extremities: distal pulses 2+ x4  Wound/incision: right groin dressing dry and intact, no hematoma present     TUBES/LINES:  [x] CVC  [x] A-line  [] Lumbar Drain  [x] Ventriculostomy  [] Other    DIET:  [] NPO  [] Mechanical  [x] Tube feeds    LABS:                        10.5   14.03 )-----------( 347      ( 19 Aug 2021 05:36 )             35.9         155<H>  |  118<H>  |  11  ----------------------------<  141<H>  4.2   |  28  |  0.52    Ca    9.7      19 Aug 2021 23:09  Phos  2.3       Mg     2.2         TPro  6.6  /  Alb  4.0  /  TBili  0.4  /  DBili  x   /  AST  23  /  ALT  33  /  AlkPhos  64        Urinalysis Basic - ( 19 Aug 2021 19:08 )    Color: Yellow / Appearance: Clear / S.015 / pH: x  Gluc: x / Ketone: 15 mg/dL  / Bili: Negative / Urobili: 1.0 E.U./dL   Blood: x / Protein: NEGATIVE mg/dL / Nitrite: NEGATIVE   Leuk Esterase: NEGATIVE / RBC: > 10 /HPF / WBC < 5 /HPF   Sq Epi: x / Non Sq Epi: 0-5 /HPF / Bacteria: Present /HPF          CAPILLARY BLOOD GLUCOSE      POCT Blood Glucose.: 126 mg/dL (19 Aug 2021 22:01)  POCT Blood Glucose.: 113 mg/dL (19 Aug 2021 17:04)  POCT Blood Glucose.: 129 mg/dL (19 Aug 2021 12:59)  POCT Blood Glucose.: 125 mg/dL (19 Aug 2021 06:05)      Drug Levels: [] N/A  Valproic Acid Level, Serum: 39.5 ug/mL ( @ 06:11)  Vancomycin Level, Trough: 6.4 ug/mL ( @ 06:11)  Valproic Acid Level, Serum: 62.0 ug/mL ( @ 05:12)    CSF Analysis: [] N/A      Allergies    Allergy Status Unknown    Intolerances      MEDICATIONS:  Antibiotics:    Neuro:  acetaminophen   Tablet .. 1000 milliGRAM(s) Oral every 6 hours  bromocriptine Capsule 15 milliGRAM(s) Oral every 8 hours  lacosamide Solution 100 milliGRAM(s) Oral every 12 hours  methylphenidate 10 milliGRAM(s) Oral daily  ondansetron Injectable 4 milliGRAM(s) IV Push every 6 hours PRN  valproate sodium IVPB 1000 milliGRAM(s) IV Intermittent every 8 hours    Anticoagulation:  aspirin  chewable 81 milliGRAM(s) Oral daily  enoxaparin Injectable 40 milliGRAM(s) SubCutaneous every 12 hours    OTHER:  albuterol/ipratropium for Nebulization 3 milliLiter(s) Nebulizer every 6 hours PRN  chlorhexidine 0.12% Liquid 15 milliLiter(s) Oral Mucosa every 12 hours  chlorhexidine 2% Cloths 1 Application(s) Topical every 12 hours  chlorhexidine 2% Cloths 1 Application(s) Topical <User Schedule>  dextrose 50% Injectable 25 Gram(s) IV Push once  fludroCORTISONE 0.1 milliGRAM(s) Oral daily  glucagon  Injectable 1 milliGRAM(s) IntraMuscular once  insulin lispro (ADMELOG) corrective regimen sliding scale   SubCutaneous every 6 hours  norepinephrine Infusion 0.05 MICROgram(s)/kG/Min IV Continuous <Continuous>  pantoprazole   Suspension 40 milliGRAM(s) Oral before breakfast  polyethylene glycol 3350 17 Gram(s) Oral daily  senna 2 Tablet(s) Oral at bedtime  sodium chloride 3%  Inhalation 4 milliLiter(s) Inhalation every 6 hours    IVF:  lactated ringers. 1000 milliLiter(s) IV Continuous <Continuous>  potassium chloride   Powder 40 milliEquivalent(s) Oral every 4 hours    CULTURES:  Culture Results:   Rare Normal Respiratory Mely present ( @ 08:05)  Culture Results:   No growth at 2 days. 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 Stable ischemic changes within the right cerebellum. Right-sided EVD catheter in place with slight increase in ventricular size. Interval improvement in the intraventricular hemorrhage. Evolving areas of acute/subacute ischemia within the right cerebellum.    < end of copied text >    < from: CT Perfusion w/ Maps w/ IV Cont (21 @ 11:35) >  MPRESSION: Limited exam. No definite areas of perfusion abnormality in the posterior fossa to suggest vasospasm.    < end of copied text >    < from: CT Angio Head w/ IV Cont (21 @ 11:35) >  IMPRESSION: Findings compatible with vasospasm. Interval improvement in the caliber of the proximal vertebral artery with progression of a vasospasm involving the mid to distal basilar artery. Interval improvement in the vasospasm involving the left vertebral artery. Persistent vasospasm involving the posterior cerebral and superior cerebellar arteries. Interval improvement in the vasospasm involving the right supraclinoid ICA as well as the left A1 and M1 segments. Progression of vasospasm involving the right M1 segment.        < end of copied text >        ASSESSMENT:  46y/o F with h/o recent gastric sleeve (21 [facility], Dr. Crisostomo ), fibromyalgia, thyroid dysfunction, PTSD, depression, anxiety.  Presented with seizures at home - brought to [place], with 1 more episode of seizure en route.  Intubated, CT showed SAH with IV extension, casted IV, hydrocephalus, given mannitol, levetiracetam 1g,  6mg ativan. Started on Cardene drip for BP goal < 140 and transferred to [facility] NICU for further management. HH5 MF4, BD 1 = . Now s/p cerebral angiogram for R vertebral artery takedown for R vertebral dissecting aneurysm (), and R frontal EVD placement (), now s/p IVC filter placement (,) now s/p angio IA verapamil , , .    HEAD BLEED    No pertinent family history in first degree relatives    Handoff    Cerebral aneurysm    Cerebral artery vasospasm    Cerebral aneurysm    DVT, lower extremity    Pulmonary embolism    Cerebral artery vasospasm    Multiple subsegmental pulmonary emboli without acute cor pulmonale    DVT, lower extremity    Angiogram, carotid and cerebral arteries    IVC filter placement    Angiogram, carotid and cerebral, bilateral    Angiogram, carotid and cerebral, bilateral    Angiogram, carotid and cerebral, bilateral    [email or web address]        PLAN:  NEURO:   - neurochecks q1h  - EVD open at -5cmH2O, draining 20cc/hr  - Nimodipine 60 mg po q4h d/c'ed for SBP goal 180-200   - Depakote increased to 1g q8 (No keppra due to agitation)   - Ceribell EEG negative , d/c'ed. Ceribell eeg placed again  with read of brief L fronto-temporal seizure, now on VEEG, no seizures o/n   - cont ASA 81  - : CTH with R cerebellar ischemic changes, CTP WNL, CTA with improvement in spasm of L Vert, basilar, ICAs; spasm in PCAs, SCAs, and M1  - Bromocriptine 15mg Q8  - Per ENT, no vocal cord issues  : angio with findings of moderate diffuse vasospasm of left vertebral artery, basilar artery and posterior circulation including PCAs IA verapamil given   : angio findings of moderate to severe cerebral vasospasm of the distal left vert and basilar artery, mild supraclinoid vasospasm. IA verapamil given  : angio, improvement in vasospasm, IA verapamil to posterior circulaion  - VPA subtherapeutic , increased dose to 1g q8   - vimpat 100mg q12 started  - Ritalin started for neurostim    PULM:    - standing Hypertonic nebs/prn duonebs   - CT PE : Probable pulmonary embolism in the left distal main and proximal lower lobar pulmonary artery, which was also seen on prior same day neck CT. No saddle embolism. No evidence of right heart strain.  - chest PT     CARDIO:    - -220 on levo gtt  - TTE WNL, repeat    - Troponin stable   - EKG normal     ENDO:    - glucose goal 140-180    GI:    - TF advanced to 20/hr  - needs thin liquid diet x3 weeks as per bariatric when tolerated  - PPI per bariatric surgeon    RENAL:   - Maintain euvolemia   - Na goal 145-150   - 100cc/hr LR  - florinef 0.1mg daily, salt tabs d/c'd    HEM/ONC:   - ASA 81  - VTE Prophylaxis: SCDs, SQL  - s/p IVCF  - LE Duplex : Acute completely occlusive deep venous thrombosis of the right intramuscular calf vein, s/p IVCF with vascular , repeat  shows new b/l peroneal DVTs, factor xa c/w prophylactic dose   - hypercoagulable w/u, Dr. Montiel following    ID:   - MRSA neg   - febrile, trend leukocytosis   - empiric Vanc/zoysn for fever d/c'd (8/10-) and now restarted  for persistent fevers, dc'd again on    - CSF sent , NGTD   - f/u pancx   - standing tylenol    Dispol ICU status: family updated  PT/OT: on hold due to critical status  Full code    D/w Dr. Lomax, Dr. Yan

## 2021-08-20 NOTE — PROGRESS NOTE ADULT - ASSESSMENT
45 year-old woman with h/o recent gastric sleeve (08/04/21 [facility], Dr. Crisostomo ), fibromyalgia, thyroid dysfunction, PTSD, depression, anxiety who was transferred from [facility] on 8/7/21 for seizures, and was found to have SAH 2/2 R vertebral dissecting aneurysm. Epilepsy consulted for seizure event on 8/18, most likely due to SAH w/ subsequent vasospasms.   The patient is not following commands today. EEG did not show any seizures. The repeated CTA angio showed the vasospasm but no new issues.   The patient is on induced hypertension with levophed to keep her Bp 180-200mmHg as per Neurosurgery.      Recommendations:  - Continue vEEG monitoring  - Pending valproate level  - Continue Lacosamide 100mg q12h  - Continue Valproate 1000mg q8h until we have the level back, then re-evaluate  - Maintain seizure and fall precautions   45 year-old woman with h/o recent gastric sleeve (08/04/21 [facility], Dr. Crisostomo ), fibromyalgia, thyroid dysfunction, PTSD, depression, anxiety who was transferred from [facility] on 8/7/21 for seizures, and was found to have SAH 2/2 R vertebral dissecting aneurysm. Epilepsy consulted for seizure event on 8/18, most likely due to SAH w/ subsequent vasospasms.   The patient is not following commands today. EEG did not show any seizures. The repeated CTA angio showed the vasospasm but no new issues.   The patient is on induced hypertension with levophed to keep her Bp 180-200mmHg as per Neurosurgery.      Recommendations:  - Stop vEEG monitoring  - Pending valproate level  - Continue Lacosamide 100mg q12h  - Continue Valproate 1000mg q8h until we have the level back, then re-evaluate  - Maintain seizure and fall precautions

## 2021-08-20 NOTE — PROGRESS NOTE ADULT - SUBJECTIVE AND OBJECTIVE BOX
================================================   NEUROCRITICAL CARE ATTENDING NOTE (Friday, August 20, 2021)  ================================================    LUDMILA PRIETO [id number]  Summary:  44 y/o F with h/o recent gastric sleeve (08/04 [facility], Dr. Crisostomo ), fibromyalgia, thyroid dysfunction, PTSD, depression, anxiety.  Presented with seizures at home - brought to [place], with 1 more episode of seizure en route.  Intubated, CT showed SAH with IV extension, casted IV, hydrocephalus, given mannitol levetiracetam 6mg ativan, transferred to [facility].    Hospital Course:   8/7: Tx from [place] for SAH. Intubated. R frontal EVD placed, central line and a line placed. 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   8/8: POD1 s/p angio with R vert takedown, extubated, desatting on aerosol mask, required extensive suctioning, 3% nebs, chest PT, started on low dose precedex drip for restlessness/agitation, ABG stable. NGT placed. TF started with goal 20 pending nutrition recs. 3% stopped for Na 150. Ceribell EEG negative and d/c'ed.   8/9 Overnight, new Right pronator drift and right gaze preference that can't cross midline, Repeat CTH demonstrated stable vents, CTA head and neck unremarkable for spasm, hypoattenuation of right cerebellum edema vs. infarct.  8/10: POD3 R vert takedown, EVD open at 91cbH2A. ASHA overnight, neuro stable. CTH with increased hydro, CTA head/neck with mild basilar spasm, but concern for PE. 1/2 am D50 for hypoglycemia. 1L bolus then 100 cc/hr, PM rounds for net negative fluid balance and mild vasospasm on CTA.   8/11: POD4 R vert takedown. EVD at 5cm H2O, ASHA overnight. neuro stable.   Febrile 104. depakote increased to q6. NCHCT stable. EVD lowered to 0. Lasix 20 given for dieuresis, UOP over 2 liters. Sedation given for a-line placement, BP drop needing levo.  8/12: POD5 R vert takedown. EVD at 0cm H2O. ASHA overnight, neuro stable. Precedex being weaned off. Pending IVCF with vascular today.  8/13: POD6 R vert takedown. EVD open at 0cmH2O. ASHA overnight. Neuro exam stable. Mottled skin on the left lower extremity improving. Started on Bromocriptine 15mg Q8.   8/14: POD7. EVD open at 0. 1L bolus given for euvolemia o/n. neuro stable. CTH stable. ENT scoped vocal cords, +R vocal cord paresis, NTD. started on zosyn empirically.   8/15: POD8. EVD open at 0. ASHA o/n, neuro stable. Pt developed increased work of breathing, unable to clear her secretions, received racemic epi and multiple nebulizer treatments, still with stridor. Patient re-intubated at bedside, on full vent support.   8/16: CTH/CTA head/neck/CT chest performed o/n for worsened exam, R gaze preference, sluggish pupils. +worsened uncal herniation, hydro, vasospasm in b/l PCAs, L vert, basilar arteries. preop angio today, restarted on 3% for Na goal 145-150  8/17: POD10. Overnight Pt remains on levophed drip for SBP goal 180-220. Also remains on propofol drip. EVD open at -5cmH2O. ICPs WNL. Febrile 101F and pancultured. CTH today shows slightly smaller ventricles.   8/18: POD11 R vert takedown. POD1 angio IA verpamil. Overnight, Pt noted to have downward gaze to the right and not following commands. Taken for stat head CT which is stable. Pupils also noted to be aniscoric left pupil 4mm and brisk and right 2mm brisk. Mannitol 50g given. Ceribell eeg placed for concern of subclinical seizures, so far appears negative for seizures. 1L NS o/n and 1.5L NS bolus in AM for euvolemia. vanc/zosyn stopped. 250cc 3% bolus and 250cc albumin given in AM. Brief seizure to L frontal lobe this AM on EEG, given 2g valproic acid and dose increased to 1g q8. Vimpat 100mg BID started.  In afternoon patient self-extubated, placed on NRB with mucomyst, 3% inhalation and duonebs. 3% at 50 d/c'd.  8/19: POD 12. Remains on VEEG (negative so far). Axillary A line placed.  AM episode of decreased LOC, R gaze preference, L pupil 5 mm (vertical oval), not obeying commands - pending CT/CTA/CTP; sBP 180-220, norepinephrine infusion, euvolemia treatment with CVP 6, frequent suctioning.  8/20: POD 13. ASHA. on VEEG, no seizures noted. Pending VPA level.      Past Medical History:  s/p gastric sleeve surgery  Allergies:  Allergy Status Unknown  Home meds:     ICU Vital Signs Last 24 Hrs  T(C): 37.3 (20 Aug 2021 21:05), Max: 38.1 (20 Aug 2021 04:00)  T(F): 99.1 (20 Aug 2021 21:05), Max: 100.6 (20 Aug 2021 04:00)  HR: 92 (20 Aug 2021 22:00) (87 - 105)  BP: 190/103 (20 Aug 2021 22:00) (137/78 - 211/118)  BP(mean): 139 (20 Aug 2021 22:00) (102 - 158)  ABP: 179/100 (20 Aug 2021 22:00) (136/78 - 221/125)  ABP(mean): 131 (20 Aug 2021 22:00) (100 - 166)  RR: 22 (20 Aug 2021 22:00) (12 - 29)  SpO2: 96% (20 Aug 2021 22:00) (91% - 100%)      08-19-21 @ 07:01  -  08-20-21 @ 07:00  --------------------------------------------------------  IN: 4916.9 mL / OUT: 4264 mL / NET: 652.9 mL    08-20-21 @ 07:01  -  08-20-21 @ 23:02  --------------------------------------------------------  IN: 2204.5 mL / OUT: 3990 mL / NET: -1785.5 mL      NEUROLOGIC EXAMINATION: Oriented x 1 (nods to name)  L 4 mm R 3 mm reactive, R gaze preference, hypophonic states her name, weak cough  Motor RUE 3/5, LUE strong spontaneous AG and purposeful, B/L LE AG at least.    EENT: Anicteric  CARDIOVASCULAR: (+) S1 S2, normal rate and regular rhythm  PULMONARY: clear to auscultation bilaterally  ABDOMEN: soft, nontender with normoactive bowel sounds  EXTREMITIES: no edema  SKIN: No rash    MEDICATIONS:   acetaminophen   Tablet .. 1000 milliGRAM(s) Oral every 6 hours  albuterol/ipratropium for Nebulization 3 milliLiter(s) Nebulizer every 6 hours  aspirin  chewable 81 milliGRAM(s) Oral daily  bromocriptine Capsule 15 milliGRAM(s) Oral every 8 hours  chlorhexidine 0.12% Liquid 15 milliLiter(s) Oral Mucosa every 12 hours  chlorhexidine 2% Cloths 1 Application(s) Topical <User Schedule>  enoxaparin Injectable 40 milliGRAM(s) SubCutaneous every 12 hours  fludroCORTISONE 0.1 milliGRAM(s) Oral daily  glucagon  Injectable 1 milliGRAM(s) IntraMuscular once  insulin lispro (ADMELOG) corrective regimen sliding scale   SubCutaneous every 6 hours  lacosamide Solution 50 milliGRAM(s) Oral two times a day  lactated ringers. 1000 milliLiter(s) (75 mL/Hr) IV Continuous <Continuous>  methylphenidate 10 milliGRAM(s) Oral daily  norepinephrine Infusion 0.05 MICROgram(s)/kG/Min (4.79 mL/Hr) IV Continuous <Continuous>  ondansetron Injectable 4 milliGRAM(s) IV Push every 6 hours PRN  pantoprazole   Suspension 40 milliGRAM(s) Oral before breakfast  polyethylene glycol 3350 17 Gram(s) Oral daily  senna 2 Tablet(s) Oral at bedtime  sodium chloride 3%  Inhalation 4 milliLiter(s) Inhalation every 6 hours  valproate sodium IVPB 1000 milliGRAM(s) IV Intermittent every 8 hours              LABS:              9.7    13.51 )-----------( 335      ( 20 Aug 2021 16:39 )             32.8     08-20    151<H>  |  113<H>  |  13  ----------------------------<  141<H>  3.4<L>   |  27  |  0.72    Ca    9.4      20 Aug 2021 16:39  Phos  3.1     08-20  Mg     2.4     08-20    TPro  6.4  /  Alb  4.4  /  TBili  0.4  /  DBili  x   /  AST  29  /  ALT  25  /  AlkPhos  54  08-20    LIVER FUNCTIONS - ( 20 Aug 2021 07:02 )  Alb: 4.4 g/dL / Pro: 6.4 g/dL / ALK PHOS: 54 U/L / ALT: 25 U/L / AST: 29 U/L / GGT: x           ABG - ( 19 Aug 2021 00:41 )  pH, Arterial: 7.44  pH, Blood: x     /  pCO2: 43    /  pO2: 102   / HCO3: 29    / Base Excess: 4.4   /  SaO2: 98.9        Culture - Blood (collected 08-20-21 @ 06:00)  Source: .Blood Blood-Peripheral  Preliminary Report (08-20-21 @ 18:00):    No growth at 12 hours    Culture - CSF with Gram Stain (collected 08-20-21 @ 05:32)  Source: .CSF CSF  Gram Stain (08-20-21 @ 05:50):    No White blood cells    No organisms seen      Culture - Blood (collected 08-20-21 @ 06:00)  Source: .Blood Blood-Peripheral  Preliminary Report (08-20-21 @ 18:00):    No growth at 12 hours    Culture - CSF with Gram Stain (collected 08-20-21 @ 05:32)  Source: .CSF CSF  Gram Stain (08-20-21 @ 05:50):    No White blood cells    No organisms seen      EVD (d12) -5 cm, 5-15 mL/h, ICP <0.          ================================================   NEUROCRITICAL CARE ATTENDING NOTE (Friday, August 20, 2021)  ================================================    LUDMILA PRIETO [id number]  Summary:  44 y/o F with h/o recent gastric sleeve (08/04 [facility], Dr. Crisostomo ), fibromyalgia, thyroid dysfunction, PTSD, depression, anxiety.  Presented with seizures at home - brought to [place], with 1 more episode of seizure en route.  Intubated, CT showed SAH with IV extension, casted IV, hydrocephalus, given mannitol levetiracetam 6mg ativan, transferred to [facility].    Hospital Course:   8/7: Tx from [place] for SAH. Intubated. R frontal EVD placed, central line and a line placed. 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   8/8: POD1 s/p angio with R vert takedown, extubated, desatting on aerosol mask, required extensive suctioning, 3% nebs, chest PT, started on low dose precedex drip for restlessness/agitation, ABG stable. NGT placed. TF started with goal 20 pending nutrition recs. 3% stopped for Na 150. Ceribell EEG negative and d/c'ed.   8/9 Overnight, new Right pronator drift and right gaze preference that can't cross midline, Repeat CTH demonstrated stable vents, CTA head and neck unremarkable for spasm, hypoattenuation of right cerebellum edema vs. infarct.  8/10: POD3 R vert takedown, EVD open at 46mlE0L. ASHA overnight, neuro stable. CTH with increased hydro, CTA head/neck with mild basilar spasm, but concern for PE. 1/2 am D50 for hypoglycemia. 1L bolus then 100 cc/hr, PM rounds for net negative fluid balance and mild vasospasm on CTA.   8/11: POD4 R vert takedown. EVD at 5cm H2O, ASHA overnight. neuro stable.   Febrile 104. depakote increased to q6. NCHCT stable. EVD lowered to 0. Lasix 20 given for dieuresis, UOP over 2 liters. Sedation given for a-line placement, BP drop needing levo.  8/12: POD5 R vert takedown. EVD at 0cm H2O. ASHA overnight, neuro stable. Precedex being weaned off. Pending IVCF with vascular today.  8/13: POD6 R vert takedown. EVD open at 0cmH2O. ASHA overnight. Neuro exam stable. Mottled skin on the left lower extremity improving. Started on Bromocriptine 15mg Q8.   8/14: POD7. EVD open at 0. 1L bolus given for euvolemia o/n. neuro stable. CTH stable. ENT scoped vocal cords, +R vocal cord paresis, NTD. started on zosyn empirically.   8/15: POD8. EVD open at 0. ASHA o/n, neuro stable. Pt developed increased work of breathing, unable to clear her secretions, received racemic epi and multiple nebulizer treatments, still with stridor. Patient re-intubated at bedside, on full vent support.   8/16: CTH/CTA head/neck/CT chest performed o/n for worsened exam, R gaze preference, sluggish pupils. +worsened uncal herniation, hydro, vasospasm in b/l PCAs, L vert, basilar arteries. preop angio today, restarted on 3% for Na goal 145-150  8/17: POD10. Overnight Pt remains on levophed drip for SBP goal 180-220. Also remains on propofol drip. EVD open at -5cmH2O. ICPs WNL. Febrile 101F and pancultured. CTH today shows slightly smaller ventricles.   8/18: POD11 R vert takedown. POD1 angio IA verpamil. Overnight, Pt noted to have downward gaze to the right and not following commands. Taken for stat head CT which is stable. Pupils also noted to be aniscoric left pupil 4mm and brisk and right 2mm brisk. Mannitol 50g given. Ceribell eeg placed for concern of subclinical seizures, so far appears negative for seizures. 1L NS o/n and 1.5L NS bolus in AM for euvolemia. vanc/zosyn stopped. 250cc 3% bolus and 250cc albumin given in AM. Brief seizure to L frontal lobe this AM on EEG, given 2g valproic acid and dose increased to 1g q8. Vimpat 100mg BID started.  In afternoon patient self-extubated, placed on NRB with mucomyst, 3% inhalation and duonebs. 3% at 50 d/c'd.  8/19: POD 12. Remains on VEEG (negative so far). Axillary A line placed.  AM episode of decreased LOC, R gaze preference, L pupil 5 mm (vertical oval), not obeying commands - pending CT/CTA/CTP; sBP 180-220, norepinephrine infusion, euvolemia treatment with CVP 6, frequent suctioning.  8/20: POD 13. ASHA. On VEEG, no seizures noted. Pending VPA level. Taken for angio. Mild to moderate multivessel spasm.       Past Medical History:  s/p gastric sleeve surgery  Allergies: Allergy Status Unknown  Home meds:     ICU Vital Signs Last 24 Hrs  T(C): 37.3 (20 Aug 2021 21:05), Max: 38.1 (20 Aug 2021 04:00)  T(F): 99.1 (20 Aug 2021 21:05), Max: 100.6 (20 Aug 2021 04:00)  HR: 92 (20 Aug 2021 22:00) (87 - 105)  BP: 190/103 (20 Aug 2021 22:00) (137/78 - 211/118)  BP(mean): 139 (20 Aug 2021 22:00) (102 - 158)  ABP: 179/100 (20 Aug 2021 22:00) (136/78 - 221/125)  ABP(mean): 131 (20 Aug 2021 22:00) (100 - 166)  RR: 22 (20 Aug 2021 22:00) (12 - 29)  SpO2: 96% (20 Aug 2021 22:00) (91% - 100%)      08-19-21 @ 07:01  -  08-20-21 @ 07:00  --------------------------------------------------------  IN: 4916.9 mL / OUT: 4264 mL / NET: 652.9 mL    08-20-21 @ 07:01  -  08-20-21 @ 23:02  --------------------------------------------------------  IN: 2204.5 mL / OUT: 3990 mL / NET: -1785.5 mL      NEUROLOGIC EXAMINATION:   Oriented x 3 with choices. More verbal output,  L 2mm R 2 mm reactive, midline gaze (no longer has R gaze), hypophonic states her name, weak cough  Motor RUE 3/5, LUE strong spontaneous AG and purposeful, B/L LE AG at least.    EENT: Anicteric  CARDIOVASCULAR: (+) S1 S2, normal rate and regular rhythm  PULMONARY: clear to auscultation bilaterally  ABDOMEN: soft, nontender with normoactive bowel sounds  EXTREMITIES: no edema  SKIN: No rash      MEDICATIONS:   acetaminophen   Tablet .. 1000 milliGRAM(s) Oral every 6 hours  albuterol/ipratropium for Nebulization 3 milliLiter(s) Nebulizer every 6 hours  aspirin  chewable 81 milliGRAM(s) Oral daily  bromocriptine Capsule 15 milliGRAM(s) Oral every 8 hours  chlorhexidine 0.12% Liquid 15 milliLiter(s) Oral Mucosa every 12 hours  chlorhexidine 2% Cloths 1 Application(s) Topical <User Schedule>  enoxaparin Injectable 40 milliGRAM(s) SubCutaneous every 12 hours  fludroCORTISONE 0.1 milliGRAM(s) Oral daily  glucagon  Injectable 1 milliGRAM(s) IntraMuscular once  insulin lispro (ADMELOG) corrective regimen sliding scale   SubCutaneous every 6 hours  lacosamide Solution 50 milliGRAM(s) Oral two times a day  lactated ringers. 1000 milliLiter(s) (75 mL/Hr) IV Continuous <Continuous>  methylphenidate 10 milliGRAM(s) Oral daily  norepinephrine Infusion 0.05 MICROgram(s)/kG/Min (4.79 mL/Hr) IV Continuous <Continuous>  ondansetron Injectable 4 milliGRAM(s) IV Push every 6 hours PRN  pantoprazole   Suspension 40 milliGRAM(s) Oral before breakfast  polyethylene glycol 3350 17 Gram(s) Oral daily  senna 2 Tablet(s) Oral at bedtime  sodium chloride 3%  Inhalation 4 milliLiter(s) Inhalation every 6 hours  valproate sodium IVPB 1000 milliGRAM(s) IV Intermittent every 8 hours              LABS:              9.7    13.51 )-----------( 335      ( 20 Aug 2021 16:39 )             32.8     08-20    151<H>  |  113<H>  |  13  ----------------------------<  141<H>  3.4<L>   |  27  |  0.72    Ca    9.4      20 Aug 2021 16:39  Phos  3.1     08-20  Mg     2.4     08-20    TPro  6.4  /  Alb  4.4  /  TBili  0.4  /  DBili  x   /  AST  29  /  ALT  25  /  AlkPhos  54  08-20    LIVER FUNCTIONS - ( 20 Aug 2021 07:02 )  Alb: 4.4 g/dL / Pro: 6.4 g/dL / ALK PHOS: 54 U/L / ALT: 25 U/L / AST: 29 U/L / GGT: x           ABG - ( 19 Aug 2021 00:41 )  pH, Arterial: 7.44  pH, Blood: x     /  pCO2: 43    /  pO2: 102   / HCO3: 29    / Base Excess: 4.4   /  SaO2: 98.9        Culture - Blood (collected 08-20-21 @ 06:00)  Source: .Blood Blood-Peripheral  Preliminary Report (08-20-21 @ 18:00):    No growth at 12 hours    Culture - CSF with Gram Stain (collected 08-20-21 @ 05:32)  Source: .CSF CSF  Gram Stain (08-20-21 @ 05:50):    No White blood cells    No organisms seen      Culture - Blood (collected 08-20-21 @ 06:00)  Source: .Blood Blood-Peripheral  Preliminary Report (08-20-21 @ 18:00):    No growth at 12 hours    Culture - CSF with Gram Stain (collected 08-20-21 @ 05:32)  Source: .CSF CSF  Gram Stain (08-20-21 @ 05:50):    No White blood cells    No organisms seen      EVD (d12) -5 cm, 5-15 mL/h, ICP <0.

## 2021-08-21 NOTE — PROGRESS NOTE ADULT - SUBJECTIVE AND OBJECTIVE BOX
S/Overnight events:        Hospital Course:   POD#       Vital Signs Last 24 Hrs  T(C): 36.9 (21 Aug 2021 01:00), Max: 38.1 (20 Aug 2021 04:00)  T(F): 98.5 (21 Aug 2021 01:00), Max: 100.6 (20 Aug 2021 04:00)  HR: 90 (20 Aug 2021 23:00) (87 - 105)  BP: 176/97 (20 Aug 2021 23:00) (137/78 - 211/118)  BP(mean): 128 (20 Aug 2021 23:) (102 - 158)  RR: 17 (20 Aug 2021 23:00) (12 - 29)  SpO2: 95% (20 Aug 2021 23:) (91% - 100%)    I&O's Detail    19 Aug 2021 07:01  -  20 Aug 2021 07:00  --------------------------------------------------------  IN:    Enteral Tube Flush: 160 mL    IV PiggyBack: 1150 mL    Lactated Ringers: 1400 mL    Lactated Ringers Bolus: 1500 mL    Norepinephrine: 506.9 mL    Vital1.0: 200 mL  Total IN: 4916.9 mL    OUT:    External Ventricular Device (mL): 414 mL    Sodium Chloride 0.9% Bolus: 0 mL    Voided (mL): 3850 mL  Total OUT: 4264 mL    Total NET: 652.9 mL      20 Aug 2021 07:01  -  21 Aug 2021 01:56  --------------------------------------------------------  IN:    Enteral Tube Flush: 240 mL    IV PiggyBack: 60 mL    Lactated Ringers: 200 mL    Lactated Ringers: 450 mL    Norepinephrine: 194.5 mL    Sodium Chloride 0.9% Bolus: 1000 mL    Vital1.0: 60 mL  Total IN: 2204.5 mL    OUT:    External Ventricular Device (mL): 190 mL    Voided (mL): 3800 mL  Total OUT: 3990 mL    Total NET: -1785.5 mL        I&O's Summary    19 Aug 2021 07:01  -  20 Aug 2021 07:00  --------------------------------------------------------  IN: 4916.9 mL / OUT: 4264 mL / NET: 652.9 mL    20 Aug 2021 07:01  -  21 Aug 2021 01:56  --------------------------------------------------------  IN: 2204.5 mL / OUT: 3990 mL / NET: -1785.5 mL        PHYSICAL EXAM:  Neurological:    Motor exam:         [] Upper extremity              Bi(c5)  WE(c6)  EE(c7)   FF(c8)                                                R         5/5        5/5        5/5       5/5                                               L          5/5        5/5        5/5       5/5         [] Lower extremeity          HF(l2)   KE(l3)    TA(l4)   EHL(l5)  GS(s1)                                                 R        5/5        5/5        5/5       5/5         5/5                                               L         5/5        5/5       5/5       5/5          5/5                                                        [] warm well perfused; capillary refill <3 seconds     Sensation: [] intact to light touch  [] decreased:       Cardiovascular:  Respiratory:  Gastrointestinal:  Genitourinary:  Extremities:    Incision/Wound:    DEVICE/DRAIN DRESSING:    TUBES/LINES:  [] CVC  [] A-line  [] Lumbar Drain  [] Ventriculostomy  [] Other    DIET:  [] NPO  [] Mechanical  [] Tube feeds    LABS:                        9.7    13.51 )-----------( 335      ( 20 Aug 2021 16:39 )             32.8     08-20    151<H>  |  113<H>  |  13  ----------------------------<  141<H>  3.4<L>   |  27  |  0.72    Ca    9.4      20 Aug 2021 16:39  Phos  3.1       Mg     2.4         TPro  6.4  /  Alb  4.4  /  TBili  0.4  /  DBili  x   /  AST  29  /  ALT  25  /  AlkPhos  54        Urinalysis Basic - ( 19 Aug 2021 19:08 )    Color: Yellow / Appearance: Clear / S.015 / pH: x  Gluc: x / Ketone: 15 mg/dL  / Bili: Negative / Urobili: 1.0 E.U./dL   Blood: x / Protein: NEGATIVE mg/dL / Nitrite: NEGATIVE   Leuk Esterase: NEGATIVE / RBC: > 10 /HPF / WBC < 5 /HPF   Sq Epi: x / Non Sq Epi: 0-5 /HPF / Bacteria: Present /HPF          CAPILLARY BLOOD GLUCOSE      POCT Blood Glucose.: 122 mg/dL (20 Aug 2021 23:16)  POCT Blood Glucose.: 118 mg/dL (20 Aug 2021 10:58)  POCT Blood Glucose.: 131 mg/dL (20 Aug 2021 06:16)      Drug Levels: [] N/A  Valproic Acid Level, Serum: 39.5 ug/mL ( @ 06:11)  Vancomycin Level, Trough: 6.4 ug/mL ( @ 06:11)    CSF Analysis: [] N/A  RBC Count - Spinal Fluid: 7700 /uL ( @ 05:29)  CSF Lymphocytes: 47 % ( @ 05:29)  Glucose, CSF: 63 mg/dL ( @ 05:29)  Protein, CSF: 100 mg/dL ( @ 05:29)      Allergies    Allergy Status Unknown    Intolerances      MEDICATIONS:  Antibiotics:    Neuro:  acetaminophen   Tablet .. 1000 milliGRAM(s) Oral every 6 hours  bromocriptine Capsule 15 milliGRAM(s) Oral every 8 hours  lacosamide Solution 50 milliGRAM(s) Oral two times a day  methylphenidate 10 milliGRAM(s) Oral daily  ondansetron Injectable 4 milliGRAM(s) IV Push every 6 hours PRN  valproate sodium IVPB 1000 milliGRAM(s) IV Intermittent every 8 hours    Anticoagulation:  aspirin  chewable 81 milliGRAM(s) Oral daily  enoxaparin Injectable 40 milliGRAM(s) SubCutaneous every 12 hours    OTHER:  albuterol/ipratropium for Nebulization 3 milliLiter(s) Nebulizer every 6 hours  chlorhexidine 0.12% Liquid 15 milliLiter(s) Oral Mucosa every 12 hours  chlorhexidine 2% Cloths 1 Application(s) Topical <User Schedule>  fludroCORTISONE 0.1 milliGRAM(s) Oral daily  glucagon  Injectable 1 milliGRAM(s) IntraMuscular once  insulin lispro (ADMELOG) corrective regimen sliding scale   SubCutaneous every 6 hours  norepinephrine Infusion 0.05 MICROgram(s)/kG/Min IV Continuous <Continuous>  pantoprazole   Suspension 40 milliGRAM(s) Oral before breakfast  polyethylene glycol 3350 17 Gram(s) Oral daily  senna 2 Tablet(s) Oral at bedtime  sodium chloride 3%  Inhalation 4 milliLiter(s) Inhalation every 6 hours    IVF:  lactated ringers. 1000 milliLiter(s) IV Continuous <Continuous>    CULTURES:  Culture Results:   Sputum specimen rejected.  Microscopic examination indicates  oropharyngeal contamination.  Please repeat. ( @ 12:03)  Culture Results:   No growth at 12 hours ( @ 06:00)    RADIOLOGY & ADDITIONAL TESTS:      ASSESSMENT:  45y Female s/p    HEAD BLEED    No pertinent family history in first degree relatives    Handoff    Cerebral aneurysm    Cerebral artery vasospasm    Cerebral aneurysm    DVT, lower extremity    Pulmonary embolism    Cerebral artery vasospasm    Multiple subsegmental pulmonary emboli without acute cor pulmonale    DVT, lower extremity    Angiogram, carotid and cerebral arteries    IVC filter placement    Angiogram, carotid and cerebral, bilateral    Angiogram, carotid and cerebral, bilateral    Angiogram, carotid and cerebral, bilateral    Angiogram, carotid and cerebral, bilateral    [email or web address]        PLAN:  NEURO:    CARDIOVASCULAR:    PULMONARY:    RENAL:    GI:    HEME:    ID:    ENDO:    DVT PROPHYLAXIS:  [] Venodynes                                [] Heparin/Lovenox    FALL RISK:  [] Low Risk                                    [] Impulsive    DISPOSITION:       Assessment:  Present when checked    []  GCS  E   V  M     Heart Failure: []Acute, [] acute on chronic , []chronic  Heart Failure:  [] Diastolic (HFpEF), [] Systolic (HFrEF), []Combined (HFpEF and HFrEF), [] RHF, [] Pulm HTN, [] Other    [] TRUDI, [] ATN, [] AIN, [] other  [] CKD1, [] CKD2, [] CKD 3, [] CKD 4, [] CKD 5, []ESRD    Encephalopathy: [] Metabolic, [] Hepatic, [] toxic, [] Neurological, [] Other    Abnormal Nurtitional Status: [] malnurtition (see nutrition note), [ ]underweight: BMI < 19, [] morbid obesity: BMI >40, [] Cachexia    [] Sepsis  [] hypovolemic shock,[] cardiogenic shock, [] hemorrhagic shock, [] neuogenic shock  [] Acute Respiratory Failure  []Cerebral edema, [] Brain compression/ herniation,   [] Functional quadriplegia  [] Acute blood loss anemia   HPI:   46y/o F with h/o recent gastric sleeve (21 [facility], Dr. Crisostomo ), fibromyalgia, thyroid dysfunction, PTSD, depression, anxiety.  Presented with seizures at home - brought to [place], with 1 more episode of seizure en route.  Intubated, CT showed SAH with IV extension, casted IV, hydrocephalus, given mannitol, levetiracetam 1g,  6mg ativan. Started on Cardene drip for BP goal < 140 and transferred to [facility] NICU for further management.     HH5 MF4   NIHSS 26 on arrival  BD 1 =  (07 Aug 2021 08:08)      S/Overnight events:  ASHA overnight, neuro stable.    Hospital Course:   : Tx from [place] for SAH. Intubated. R frontal EVD placed, central line and a line placed. 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   : POD1 s/p angio with R vert takedown, extubated, desatting on aerosol mask, required extensive suctioning, 3% nebs, chest PT, started on low dose precedex drip for restlessness/agitation, ABG stable. NGT placed. TF started with goal 20 pending nutrition recs. 3% stopped for Na 150. Ceribell EEG negative and d/c'ed.    Overnight, new Right pronator drift and right gaze preference that can't cross midline, Repeat CTH demonstrated stable vents, CTA head and neck unremarkable for spasm, hypoattenuation of right cerebellum edema vs. infarct.  8/10: POD3 R vert takedown, EVD open at [id number]. ASHA overnight, neuro stable. CTH with increased hydro, CTA head/neck with mild basilar spasm, but concern for PE. 1/2 am D50 for hypoglycemia. 1L bolus then 100 cc/hr, PM rounds for net negative fluid balance and mild vasospasm on CTA.   : POD4 R vert takedown. EVD at 5cm H2O, ASHA overnight. neuro stable.   Febrile 104. depakote increased to q6. NCHCT stable. EVD lowered to 0. Lasix 20 given for dieuresis, UOP over 2 liters. Sedation given for a-line placement, BP drop needing levo.  : POD5 R vert takedown. EVD at 0cm H2O. ASHA overnight, neuro stable. Precedex being weaned off. Pending IVCF with vascular today.  : POD6 R vert takedown. EVD open at 0cmH2O. ASHA overnight. Neuro exam stable. Mottled skin on the left lower extremity improving. Started on Bromocriptine 15mg Q8.   : POD7. EVD open at 0. 1L bolus given for euvolemia o/n. neuro stable. CTH stable. ENT scoped vocal cords, +R voacl cord paresis, NTD. started on zosyn empirically.   8/15: POD8. EVD open at 0. ASHA o/n, neuro stable. Pt developed increased work of breathing, unable to clear her secretions, received racemic epi and multiple nebulizer treatments, still with stridor. Patient re-intubated at bedside, on full vent support.   : CTH/CTA head/neck/CT chest performed o/n for worsened exam, R gaze preference, sluggish pupils. +worsened uncal herniation, hydro, vasospasm in b/l PCAs, L vert, basilar arteries. preop angio today, restarted on 3% for Na goal 145-150. Angio for vasospasm with IA verapamil.  : POD10. Overnight Pt remains on levophed drip for SBP goal 180-220. Also remains on propofol drip. EVD open at -5cmH2O. ICPs WNL. Febrile 101F and pancultured. CTH today shows slightly smaller ventricles. Angio for vasospasm with IA verapamil.  : POD11 R vert takedown. POD1 angio IA verpamil. Overnight, Pt noted to have downward gaze to the right and not following commands. Taken for stat head CT which is stable. Pupils also noted to be aniscoric left pupil 4mm and brisk and right 2mm brisk. Mannitol 50g given. Ceribell eeg placed for concern of subclinical seizures, so far appears negative for seizures. 1L NS o/n and 1.5L NS bolus in AM for euvolemia. vanc/zosyn stopped. 250cc 3% bolus and 250cc albumin given in AM. Brief seizure to L frontal lobe this AM on EEG, given 2g valproic acid and dose increased to 1g q8. Vimpat 100mg BID started.  Angio with interval improvment in vasospasm, IA verapamil given. In afternoon patient self-extubated, placed on NRB with mucomyst, 3% inhalation and duonebs. 3% at 50 d/c'd.  : POD 12. Remains on VEEG. Axillary A line placed. Salt tabs d/c'd, florinef decreased to 0.1mg, EVD @ -5cm H2O, now draining 20cc/hr. 250 albumin and 500 NS boluses on , 1 L LR bolus  : POD 13. ASHA. on VEEG, no seizures noted. Pending VPA level. 500 NS, 250 albumin. Febrile, pancultured. EEG d/c'ed.   : BD14, POD14. ASHA overnight, EVD @ -5.     Vital Signs Last 24 Hrs  T(C): 36.9 (21 Aug 2021 01:00), Max: 38.1 (20 Aug 2021 04:00)  T(F): 98.5 (21 Aug 2021 01:00), Max: 100.6 (20 Aug 2021 04:00)  HR: 90 (20 Aug 2021 23:00) (87 - 105)  BP: 176/97 (20 Aug 2021 23:00) (137/78 - 211/118)  BP(mean): 128 (20 Aug 2021 23:00) (102 - 158)  RR: 17 (20 Aug 2021 23:00) (12 - 29)  SpO2: 95% (20 Aug 2021 23:00) (91% - 100%)    I&O's Detail    19 Aug 2021 07:01  -  20 Aug 2021 07:00  --------------------------------------------------------  IN:    Enteral Tube Flush: 160 mL    IV PiggyBack: 1150 mL    Lactated Ringers: 1400 mL    Lactated Ringers Bolus: 1500 mL    Norepinephrine: 506.9 mL    Vital1.0: 200 mL  Total IN: 4916.9 mL    OUT:    External Ventricular Device (mL): 414 mL    Sodium Chloride 0.9% Bolus: 0 mL    Voided (mL): 3850 mL  Total OUT: 4264 mL    Total NET: 652.9 mL      20 Aug 2021 07:  -  21 Aug 2021 01:56  --------------------------------------------------------  IN:    Enteral Tube Flush: 240 mL    IV PiggyBack: 60 mL    Lactated Ringers: 200 mL    Lactated Ringers: 450 mL    Norepinephrine: 194.5 mL    Sodium Chloride 0.9% Bolus: 1000 mL    Vital1.0: 60 mL  Total IN: 2204.5 mL    OUT:    External Ventricular Device (mL): 190 mL    Voided (mL): 3800 mL  Total OUT: 3990 mL    Total NET: -1785.5 mL        I&O's Summary    19 Aug 2021 07:  -  20 Aug 2021 07:00  --------------------------------------------------------  IN: 4916.9 mL / OUT: 4264 mL / NET: 652.9 mL    20 Aug 2021 07:01  -  21 Aug 2021 01:56  --------------------------------------------------------  IN: 2204.5 mL / OUT: 3990 mL / NET: -1785.5 mL        PHYSICAL EXAM:  Constitutional: NAD, well groomed, obese  Respiratory: breathing non-labored, symmetrical chest wall movement, intubated   Cardiovascuar: RRR, no murmurs  Gastrointestinal: abdomen soft, non tender  Neurological: OE, nods appropriately to questions. AO x 1-2  Cranial Nerves: II-XII: R pupil 2mm brisk and reactive, L pupil 4mm brisk and reactive, right gaze preference,  crossing midline   Motor: following commands, moving LUE/LLE spontaneously and strong. Withdraws RLE to noxious. W/d RUE to noxious stim  Sensation: intact to light touch in all extremities  Extremities: distal pulses 2+ x4  Wound/incision: right groin dressing dry and intact, no hematoma present     TUBES/LINES:  [x] CVC  [x] A-line  [] Lumbar Drain  [x] Ventriculostomy  [] Other    DIET:  [] NPO  [] Mechanical  [x] Tube feeds    LABS:                        9.7    13.51 )-----------( 335      ( 20 Aug 2021 16:39 )             32.8     08-    151<H>  |  113<H>  |  13  ----------------------------<  141<H>  3.4<L>   |  27  |  0.72    Ca    9.4      20 Aug 2021 16:39  Phos  3.1       Mg     2.4         TPro  6.4  /  Alb  4.4  /  TBili  0.4  /  DBili  x   /  AST  29  /  ALT  25  /  AlkPhos  54  -      Urinalysis Basic - ( 19 Aug 2021 19:08 )    Color: Yellow / Appearance: Clear / S.015 / pH: x  Gluc: x / Ketone: 15 mg/dL  / Bili: Negative / Urobili: 1.0 E.U./dL   Blood: x / Protein: NEGATIVE mg/dL / Nitrite: NEGATIVE   Leuk Esterase: NEGATIVE / RBC: > 10 /HPF / WBC < 5 /HPF   Sq Epi: x / Non Sq Epi: 0-5 /HPF / Bacteria: Present /HPF          CAPILLARY BLOOD GLUCOSE      POCT Blood Glucose.: 122 mg/dL (20 Aug 2021 23:16)  POCT Blood Glucose.: 118 mg/dL (20 Aug 2021 10:58)  POCT Blood Glucose.: 131 mg/dL (20 Aug 2021 06:16)      Drug Levels: [] N/A  Valproic Acid Level, Serum: 39.5 ug/mL ( @ 06:11)  Vancomycin Level, Trough: 6.4 ug/mL ( @ 06:11)    CSF Analysis: [] N/A  RBC Count - Spinal Fluid: 7700 /uL ( @ 05:29)  CSF Lymphocytes: 47 % ( @ 05:29)  Glucose, CSF: 63 mg/dL ( @ 05:29)  Protein, CSF: 100 mg/dL ( @ 05:29)      Allergies    Allergy Status Unknown    Intolerances      MEDICATIONS:  Antibiotics:    Neuro:  acetaminophen   Tablet .. 1000 milliGRAM(s) Oral every 6 hours  bromocriptine Capsule 15 milliGRAM(s) Oral every 8 hours  lacosamide Solution 50 milliGRAM(s) Oral two times a day  methylphenidate 10 milliGRAM(s) Oral daily  ondansetron Injectable 4 milliGRAM(s) IV Push every 6 hours PRN  valproate sodium IVPB 1000 milliGRAM(s) IV Intermittent every 8 hours    Anticoagulation:  aspirin  chewable 81 milliGRAM(s) Oral daily  enoxaparin Injectable 40 milliGRAM(s) SubCutaneous every 12 hours    OTHER:  albuterol/ipratropium for Nebulization 3 milliLiter(s) Nebulizer every 6 hours  chlorhexidine 0.12% Liquid 15 milliLiter(s) Oral Mucosa every 12 hours  chlorhexidine 2% Cloths 1 Application(s) Topical <User Schedule>  fludroCORTISONE 0.1 milliGRAM(s) Oral daily  glucagon  Injectable 1 milliGRAM(s) IntraMuscular once  insulin lispro (ADMELOG) corrective regimen sliding scale   SubCutaneous every 6 hours  norepinephrine Infusion 0.05 MICROgram(s)/kG/Min IV Continuous <Continuous>  pantoprazole   Suspension 40 milliGRAM(s) Oral before breakfast  polyethylene glycol 3350 17 Gram(s) Oral daily  senna 2 Tablet(s) Oral at bedtime  sodium chloride 3%  Inhalation 4 milliLiter(s) Inhalation every 6 hours    IVF:  lactated ringers. 1000 milliLiter(s) IV Continuous <Continuous>    CULTURES:  Culture Results:   Sputum specimen rejected.  Microscopic examination indicates  oropharyngeal contamination.  Please repeat. ( @ 12:03)  Culture Results:   No growth at 12 hours ( @ 06:00)    RADIOLOGY & ADDITIONAL TESTS: < from: CT Head No Cont (21 @ 11:11) >  MPRESSION: Limited exam. Right-sided EVD catheter in place with decrease in ventricular size.    < end of copied text >    < from: Xray Chest 1 View- PORTABLE-Routine (Xray Chest 1 View- PORTABLE-Routine in AM.) (21 @ 05:51) >  IMPRESSION:    Hypoinflation. Nasogastric tubetip in distal stomach. Right jugular venous catheter tip in right atrium. No focal or acute lung infiltrate. No definite pleural effusion. No pneumothorax.    < end of copied text >        ASSESSMENT:  46y/o F with h/o recent gastric sleeve (21 [facility], Dr. Crisostomo ), fibromyalgia, thyroid dysfunction, PTSD, depression, anxiety.  Presented with seizures at home - brought to [place], with 1 more episode of seizure en route.  Intubated, CT showed SAH with IV extension, casted IV, hydrocephalus, given mannitol, levetiracetam 1g,  6mg ativan. Started on Cardene drip for BP goal < 140 and transferred to [facility] NICU for further management. HH5 MF4, BD 1 = . Now s/p cerebral angiogram for R vertebral artery takedown for R vertebral dissecting aneurysm (), and R frontal EVD placement (), now s/p IVC filter placement (,) now s/p angio IA verapamil , , .    HEAD BLEED    No pertinent family history in first degree relatives    Handoff    Cerebral aneurysm    Cerebral artery vasospasm    Cerebral aneurysm    DVT, lower extremity    Pulmonary embolism    Cerebral artery vasospasm    Multiple subsegmental pulmonary emboli without acute cor pulmonale    DVT, lower extremity    Angiogram, carotid and cerebral arteries    IVC filter placement    Angiogram, carotid and cerebral, bilateral    Angiogram, carotid and cerebral, bilateral    Angiogram, carotid and cerebral, bilateral    Angiogram, carotid and cerebral, bilateral    SysAdmin_[email or web address]    Plan  NEURO:   - neurochecks q1h  - EVD open at -5cmH2O , allow up to 15cc/hr --> given clinical change and ventricular enlargement:  EVD to drain 15mL/h  - s/p CTH : decreased size in ventricles, stable.   - Nimodipine 60 mg po q4h d/c'ed for SBP goal 180-200   - Depakote increased to 1g q8 ( No keppra due to agitation) f/u level (pending)  - Vimpat decreased to 50mg BID  - EEG no seizures x 2 days , D/c'ed  - cont ASA 81  - CTH 8/15: worsened uncal herniation, worsened hydro, vasospasm in b/l PCA, L vert, basilar arteries  - Bromocriptine 15mg Q8  - Ritalin started for neurostim  - Angio demonstrating vasospasm of Right ICA, right PCOMM, Left distal vert and basilar confluence, and received IA verapamil on . 8/. 8/. .     PULM:    - extubated   - satting well on 100% NR   - Duonebs and 3% nebs standing  - chest PT   - CXR - aspiration precautions, requires frequent suctioning    CARDIO:    - -220  - levo gtt, s/p albumin    - TTE  WNL    - Troponin stable   - EKG normal     ENDO:    - glucose goal 140-180    GI:    - TF via NGT  - needs thin liquid diet x3 weeks as per bariatric when tolerated  - PPI per bariatric surgeon    RENAL:   - Maintain euvolemia   - Na goal 145-150   - 75cc/hr lactated ringers   - albumin bolus yesterday to maintain CVP > 6    HEM/ONC:   - ASA 81  - VTE Prophylaxis: SCDs, SQL  - LE Duplex : Acute completely occlusive deep venous thrombosis of the right intramuscular calf vein, s/p IVCF with vascular , repeat  shows new b/l peroneal DVTs, factor xa c/w prophylactic dose   - Repeat dopplers   - hypercoagulable w/u - still pending, however protein S/C and AT III normal     ID:   -MRSA neg   -Low grade fevers,, trend leukocytosis   -empiric Vanc/zoysn for fever d/c'd (8/10-) and now restarted  for persistent fevers, dc'd again on    -CSF sent , NGTD   -f/u pancx     Assessment and plan discussed with Dr. Lomax, Dr. Conn and Dr. Sanches       []  GCS  E   V  M     Heart Failure: []Acute, [] acute on chronic , []chronic  Heart Failure:  [] Diastolic (HFpEF), [] Systolic (HFrEF), []Combined (HFpEF and HFrEF), [] RHF, [] Pulm HTN, [] Other    [] TRUDI, [] ATN, [] AIN, [] other  [] CKD1, [] CKD2, [] CKD 3, [] CKD 4, [] CKD 5, []ESRD    Encephalopathy: [] Metabolic, [] Hepatic, [] toxic, [] Neurological, [] Other    Abnormal Nurtitional Status: [] malnurtition (see nutrition note), [ ]underweight: BMI < 19, [] morbid obesity: BMI >40, [] Cachexia    [] Sepsis  [] hypovolemic shock,[] cardiogenic shock, [] hemorrhagic shock, [] neuogenic shock  [] Acute Respiratory Failure  []Cerebral edema, [] Brain compression/ herniation,   [] Functional quadriplegia  [] Acute blood loss anemia

## 2021-08-21 NOTE — PROGRESS NOTE ADULT - SUBJECTIVE AND OBJECTIVE BOX
==============================================   NEUROCRITICAL CARE ATTENDING NOTE (Saturday, 2021)  ==============================================    LUDMILA PRIETO   MRN-[id number]  Summary:  45y/F with h/o recent gastric sleeve ( [facility], Dr. Crisostomo ), fibromyalgia, thyroid dysfunction, PTSD, depression, anxiety.  Presented with seizures at home - brought to [place], with 1 more episode of seizure en route.  Intubated, CT showed SAH with IV extension, casted IV, hydrocephalus, given mannitol levetiracetam 6mg ativan, transferred to [facility].    Hospital Course:   : Tx from [place] for SAH. Intubated. R frontal EVD placed, central line and a line placed. 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   : POD1 s/p angio with R vert takedown, extubated, desatting on aerosol mask, required extensive suctioning, 3% nebs, chest PT, started on low dose precedex drip for restlessness/agitation, ABG stable. NGT placed. TF started with goal 20 pending nutrition recs. 3% stopped for Na 150. Ceribell EEG negative and d/c'ed.    Overnight, new Right pronator drift and right gaze preference that can't cross midline, Repeat CTH demonstrated stable vents, CTA head and neck unremarkable for spasm, hypoattenuation of right cerebellum edema vs. infarct.  8/10: POD3 R vert takedown, EVD open at 76ptL1A. ASHA overnight, neuro stable. CTH with increased hydro, CTA head/neck with mild basilar spasm, but concern for PE. 1/2 am D50 for hypoglycemia. 1L bolus then 100 cc/hr, PM rounds for net negative fluid balance and mild vasospasm on CTA.   : POD4 R vert takedown. EVD at 5cm H2O, ASHA overnight. neuro stable.   Febrile 104. depakote increased to q6. NCHCT stable. EVD lowered to 0. Lasix 20 given for dieuresis, UOP over 2 liters. Sedation given for a-line placement, BP drop needing levo.  : POD5 R vert takedown. EVD at 0cm H2O. ASHA overnight, neuro stable. Precedex being weaned off. Pending IVCF with vascular today.  : POD6 R vert takedown. EVD open at 0cmH2O. ASHA overnight. Neuro exam stable. Mottled skin on the left lower extremity improving. Started on Bromocriptine 15mg Q8.   : POD7. EVD open at 0. 1L bolus given for euvolemia o/n. neuro stable. CTH stable. ENT scoped vocal cords, +R vocal cord paresis, NTD. started on zosyn empirically.   8/15: POD8. EVD open at 0. ASHA o/n, neuro stable. Pt developed increased work of breathing, unable to clear her secretions, received racemic epi and multiple nebulizer treatments, still with stridor. Patient re-intubated at bedside, on full vent support.   : CTH/CTA head/neck/CT chest performed o/n for worsened exam, R gaze preference, sluggish pupils. +worsened uncal herniation, hydro, vasospasm in b/l PCAs, L vert, basilar arteries. preop angio today, restarted on 3% for Na goal 145-150  : POD10. Overnight Pt remains on levophed drip for SBP goal 180-220. Also remains on propofol drip. EVD open at -5cmH2O. ICPs WNL. Febrile 101F and pancultured. CTH today shows slightly smaller ventricles.   : POD11 R vert takedown. POD1 angio IA verpamil. Overnight, Pt noted to have downward gaze to the right and not following commands. Taken for stat head CT which is stable. Pupils also noted to be aniscoric left pupil 4mm and brisk and right 2mm brisk. Mannitol 50g given. Ceribell eeg placed for concern of subclinical seizures, so far appears negative for seizures. 1L NS o/n and 1.5L NS bolus in AM for euvolemia. vanc/zosyn stopped. 250cc 3% bolus and 250cc albumin given in AM. Brief seizure to L frontal lobe this AM on EEG, given 2g valproic acid and dose increased to 1g q8. Vimpat 100mg BID started.  In afternoon patient self-extubated, placed on NRB with mucomyst, 3% inhalation and duonebs. 3% at 50 d/c'd.  : POD 12. Remains on VEEG. Axillary A line placed. Salt tabs d/c'd, florinef decreased to 0.1mg, EVD @ -5cm H2O, now draining 20cc/hr. 250 albumin and 500 NS boluses on days, 1 L LR bolus  : POD 13. ASHA. on VEEG, no seizures noted. Pending VPA level. 500 NS, 250 albumin. Febrile, pancultured. EEG d/c'ed.   : BD14, POD14. ASHA overnight, EVD @ -5.     Past Medical History:  s/p gastric sleeve surgery  Allergies:  Allergy Status Unknown  Home meds:     Current Meds:  MEDICATIONS  (STANDING):  albumin human  5% IVPB 250 milliLiter(s) IV Intermittent once  albuterol/ipratropium for Nebulization 3 milliLiter(s) Nebulizer every 6 hours  aspirin  chewable 81 milliGRAM(s) Oral daily  bromocriptine Capsule 15 milliGRAM(s) Oral every 8 hours  enoxaparin Injectable 40 milliGRAM(s) SubCutaneous every 12 hours  fludroCORTISONE 0.2 milliGRAM(s) Oral every 12 hours  insulin lispro (ADMELOG) corrective regimen sliding scale   SubCutaneous every 6 hours  norepinephrine Infusion 0.05 MICROgram(s)/kG/Min (4.79 mL/Hr) IV Continuous <Continuous>  pantoprazole   Suspension 40 milliGRAM(s) Oral before breakfast  polyethylene glycol 3350 17 Gram(s) Oral daily  propofol Infusion 10 MICROgram(s)/kG/Min (6.13 mL/Hr) IV Continuous <Continuous>  senna 2 Tablet(s) Oral at bedtime  sodium chloride 3 Gram(s) Oral every 6 hours  sodium chloride 0.9%. 1000 milliLiter(s) (50 mL/Hr) IV Continuous <Continuous>  sodium chloride 3%  Inhalation 4 milliLiter(s) Inhalation every 6 hours  sodium chloride 3%. 500 milliLiter(s) (50 mL/Hr) IV Continuous <Continuous>  valproate sodium IVPB 750 milliGRAM(s) IV Intermittent every 8 hours    MEDICATIONS  (PRN):  acetaminophen    Suspension .. 650 milliGRAM(s) Oral every 6 hours PRN Temp greater or equal to 38C (100.4F), Mild Pain (1 - 3)  ondansetron Injectable 4 milliGRAM(s) IV Push every 6 hours PRN Nausea and/or Vomiting    PHYSICAL EXAMINATION    ICU Vital Signs Last 24 Hrs  T(C): 36.4 (21 Aug 2021 05:46), Max: 37.8 (20 Aug 2021 07:00)  T(F): 97.5 (21 Aug 2021 05:46), Max: 100 (20 Aug 2021 07:00)  HR: 94 (21 Aug 2021 05:00) (81 - 104)  BP: 186/95 (21 Aug 2021 05:00) (169/102 - 211/118)  BP(mean): 133 (21 Aug 2021 05:00) (125 - 158)  ABP: 173/97 (21 Aug 2021 05:00) (160/91 - 221/125)  ABP(mean): 124 (21 Aug 2021 05:00) (116 - 166)  RR: 20 (21 Aug 2021 05:00) (12 - 29)  SpO2: 98% (21 Aug 2021 05:00) (92% - 100%)    NEUROLOGIC EXAMINATION:  LOC fluctuation, L 5 mm R 3 mm reactive, R gaze preference, hypophonic, weak cough, R slight LMN asymmetry, R + cerebellar drift, (episode of not obeying commands) L spontaneous 4+/5, R UE 3/5, R LE 4-/5  EENT:  anicteric  CARDIOVASCULAR: (+) S1 S2, normal rate and regular rhythm  PULMONARY: clear to auscultation bilaterally  ABDOMEN: soft, nontender with normoactive bowel sounds  EXTREMITIES: no edema  SKIN: no rash    I/O's    21 @ 07:01  -  21 @ 07:00  --------------------------------------------------------  IN: 4916.9 mL / OUT: 4264 mL / NET: 652.9 mL    21 @ 07:01  -  21 @ 06:45  --------------------------------------------------------  IN: 2996.5 mL / OUT: 4095 mL / NET: -1098.5 mL    LABS:                        9.7    13.51 )-----------( 335      ( 20 Aug 2021 16:39 )             32.8         151<H>  |  113<H>  |  13  ----------------------------<  141<H>  3.4<L>   |  27  |  0.72    Ca    9.4      20 Aug 2021 16:39  Phos  3.1       Mg     2.4         TPro  6.4  /  Alb  4.4  /  TBili  0.4  /  DBili  x   /  AST  29  /  ALT  25  /  AlkPhos  54      Urinalysis Basic - ( 19 Aug 2021 19:08 )    Color: Yellow / Appearance: Clear / S.015 / pH: x  Gluc: x / Ketone: 15 mg/dL  / Bili: Negative / Urobili: 1.0 E.U./dL   Blood: x / Protein: NEGATIVE mg/dL / Nitrite: NEGATIVE   Leuk Esterase: NEGATIVE / RBC: > 10 /HPF / WBC < 5 /HPF   Sq Epi: x / Non Sq Epi: 0-5 /HPF / Bacteria: Present /HPF    CAPILLARY BLOOD GLUCOSE    POCT Blood Glucose.: 118 mg/dL (21 Aug 2021 06:07)  POCT Blood Glucose.: 122 mg/dL (20 Aug 2021 23:16)  POCT Blood Glucose.: 118 mg/dL (20 Aug 2021 10:58)    Culture - Sputum (collected 21 @ 12:03)  Source: .Sputum Sputum  Gram Stain (21 @ 16:29):    Numerous epithelial cells    Few White blood cells    Moderate Gram positive cocci in pairs, chains and clusters    Rare Gram Positive Rods    Rare Gram Negative Rods  Final Report (21 @ 16:29):    Sputum specimen rejected.  Microscopic examination indicates    oropharyngeal contamination.  Please repeat.    Culture - CSF with Gram Stain (collected 21 @ 05:32)  Source: .CSF CSF  Gram Stain (21 @ 05:50):    No White blood cells    No organisms seen    Culture - Sputum . (21 @ 08:05)    Gram Stain:   No epithelial cells seen  Few White blood cells  No organisms seen    Specimen Source: .Sputum Sputum    Culture Results:   Rare Normal Respiratory Mely present to date    Culture - Blood (21 @ 01:45)    Specimen Source: .Blood Blood X 2    Culture Results:   No growth at 1 day.    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 Stable ischemic changes within the right cerebellum. Right-sided EVD catheter in place with slight increase in ventricular size. Interval improvement in the intraventricular hemorrhage. Evolving areas of acute/subacute ischemia within the right cerebellum.    CTA (2021) -  Interval improvement in the caliber of the proximal vertebral artery with progression of a vasospasm involving the mid to distal basilar artery. Interval improvement in the vasospasm involving the left vertebral artery. Persistent vasospasm involving the posterior cerebral and superior cerebellar arteries. Interval improvement in the vasospasm involving the right supraclinoid ICA as well as the left A1 and M1 segments. Progression of vasospasm involving the right M1 segment.    Cerebral Angiogram (2021 - improvement in vasospasm, IA verapamil to posterior circulation  Cerebral Angiogram (2021 - Left vertebral artery dissection demonstrates continued moderate to severe cerebral vasospasm of the distal let vert and basilar artery, some what improved caliber of proximal left vert. 15mg IA Verapamil injected over 10 minutes with mild improvement of posterior circulation post treatment. Right ICA injection with continued mild supraclinoid vasospasm, 10mg of IA Verapamil injected.  Cerebral Angiogram (2021) - Left vertebral injection demonstrates moderate diffuse vasospasm of left vertebral artery, basilar artery and posterior circulation including PCAs. 15mg Verapamil injected via left vert with mild radiographic improvement post treatment. Right ICA injection with mild supraclinoid spasm/narrowing, otherwise no vasospasm appreciated in anterior circulation.    CT (2021) - No significant interval change in right superior and lateral cerebellar hypodensities.  CT (08.15.2021) - 1. Worsening the uncal herniation, with effacement of the bilateral suprasellar and perimesencephalic cisterns.  2. Redistribution of intraventricular hemorrhage, as above. Redemonstration of right EVD, with distal tip in the right frontal horn, near the foramen of Monro. Worsening hydrocephalus.  CTA - 1. Multifocal punctate and short segment stenoses, as above, with several areas of narrowing new as compared to recent CTA dated 2021. In the posterior circulation, stenoses are identified within the V4 left vertebral artery, the basilar artery, and in the bilateral PCA, more significant on the left.  Within the anterior circulation, stenosis are identified in the right supraclinoid C6 ICA as well as the proximal M1 MCA. Findings are most compatible with vasospasm.  2. In particular, the distal V4 right vertebral artery, which was visualized, though diminutive, on prior CTA dated 2021, does not fill with contrast on the present examination. The basilar artery demonstrates multiple stenoses, and is significantly smaller in caliber when compared to the prior CTA.    Other imaging:  Duplex - 1.  Since 2021, there is new deep venous thrombosis in the bilateral peroneal veins.  2.  Redemonstration of deep vein thrombosis in a right intramuscular calf vein.    EVD (d14) -5 cm, 15 mL/h, ICP < 5    [Code] Full  [Goals] Aggressive  [Disposion] ICU

## 2021-08-21 NOTE — PROGRESS NOTE ADULT - ASSESSMENT
44y/o F with h/o recent gastric sleeve (08/04/21 [facility], Dr. Crisostomo ), fibromyalgia, thyroid dysfunction, PTSD, depression, anxiety.  Presented with seizures at home - brought to [place], with 1 more episode of seizure en route.  Intubated, CT showed SAH with IV extension, casted IV, hydrocephalus, given mannitol, levetiracetam 1g,  6mg ativan. Started on Cardene drip for BP goal < 140 and transferred to [facility] NICU for further management. HH5 MF4, BD 1 = 8/7. Now s/p cerebral angiogram for R vertebral artery takedown for R vertebral dissecting aneurysm (8/7), and R frontal EVD placement (8/7), now s/p IVC filter placement (8/12)    NEURO:   - neurochecks q1h  - EVD open at -5cmH2O , allow up to 40cc/hr --> given clinical change and ventricular enlargement:  EVD to drain 20 mL/h  - CT/CTA/CTP as above  - Nimodipine 60 mg po q4h d/c'ed for SBP goal 180-200   - Depakote increased to 1g q8 ( No keppra due to agitation) next level 8/21  - Ceribell EEG negative 8/8, d/c'ed. Ceribell eeg placed again 8/18 with read of brief L fronto-temporal seizure, now on VEEG (negative so far)  - cont ASA 81  - CTH 8/15: worsened uncal herniation, worsened hydro, vasospasm in b/l PCA, L vert, basilar arteries  - Bromocriptine 15mg Q8  - Per ENT, no vocal cord issues  8/16: angio with findings of moderate diffuse vasospasm of left vertebral artery, basilar artery and posterior circulation including PCAs IA verapamil given   8/17: angio findings of moderate to severe cerebral vasospasm of the distal left vert and basilar artery, mild supraclinoid vasospasm. IA verapamil given  8/18: angio, improvement in vasospasm, IA verapamil to posterior circulaion  - VPA subtherapeutic , increased dose to 1g q8   - vimpat 100mg q12 started  - Ritalin started for neurostim    PULM:    - Hypertonic nebs/duonebs   - CT PE 8/11: Probable pulmonary embolism in the left distal main and proximal lower lobar pulmonary artery, which was also seen on prior same day neck CT. No saddle embolism. No evidence of right heart strain.  - self extubated, face mask, mucomyst, duoneb   - chest PT   - CXR - aspiration precautions, requires frequent suctioning    CARDIO:    - -220  - levo gtt, s/p albumin 8/16   - TTE WNL, repeat 8/12   - Troponin stable   - EKG normal     ENDO:    - glucose goal 140-180    GI:    - NPO for now, NGT, hold feed   - needs thin liquid diet x3 weeks as per bariatric when tolerated  - PPI per bariatric surgeon    RENAL:   - Maintain euvolemia   - Na goal 145-150   - 100cc/hr NS  - albumin bolus yesterday to maintain CVP > 6    HEM/ONC:   - ASA 81  - VTE Prophylaxis: SCDs, SQL  - LE Duplex 8/9: Acute completely occlusive deep venous thrombosis of the right intramuscular calf vein, s/p IVCF with vascular 8/12, repeat 8/16 shows new b/l peroneal DVTs, factor xa c/w prophylactic dose   - hypercoagulable w/u?     ID:   -MRSA neg 8/12  -febrile, trend leukocytosis   -empiric Vanc/zoysn for fever d/c'd (8/10-13) and now restarted 8/14 for persistent fevers, dc'd again on 8/18   -CSF sent 8/13, NGTD   -f/u pancx 8/17    Dispol ICU status: family updated  PT/OT: on hold due to critical status  Full code    Social: Daughter Maggie and Son Kenya and sister (on the phone) updated    *****    ATTENDING ATTESTATION:  I was physically present for the key portions of the evaluation and management (E/M) service provided.  I agree with the above history, physical and plan, which I have reviewed and edited where appropriate.    Patient at high risk for neurological deterioration or death due to:  ICU delirium, aspiration PNA, DVT / PE.  Critical care time:  I have personally provided 45 minutes of critical care time, excluding time spent on separate procedures.      Plan discussed with RN, house staff.

## 2021-08-22 NOTE — PROGRESS NOTE ADULT - ASSESSMENT
46y/o F with h/o recent gastric sleeve (08/04/21 [facility], Dr. Crisostomo ), fibromyalgia, thyroid dysfunction, PTSD, depression, anxiety.  Presented with seizures at home - brought to [place], with 1 more episode of seizure en route.  Intubated, CT showed SAH with IV extension, casted IV, hydrocephalus, given mannitol, levetiracetam 1g,  6mg ativan. Started on Cardene drip for BP goal < 140 and transferred to [facility] NICU for further management. HH5 MF4, BD 1 = 8/7. Now s/p cerebral angiogram for R vertebral artery takedown for R vertebral dissecting aneurysm (8/7), and R frontal EVD placement (8/7), now s/p IVC filter placement (8/12)    NEURO:   - neurochecks q1h  - EVD open at -5cmH2O , allow up to 40cc/hr --> given clinical change and ventricular enlargement:  EVD to drain 20 mL/h  - CT/CTA/CTP as above  - Nimodipine 60 mg po q4h d/c'ed for SBP goal 180-200   - Depakote increased to 1g q8 ( No keppra due to agitation) next level 8/21  - Ceribell EEG negative 8/8, d/c'ed. Ceribell eeg placed again 8/18 with read of brief L fronto-temporal seizure, now on VEEG (negative so far)  - cont ASA 81  - CTH 8/15: worsened uncal herniation, worsened hydro, vasospasm in b/l PCA, L vert, basilar arteries  - Bromocriptine 15mg Q8  - Per ENT, no vocal cord issues  8/16: angio with findings of moderate diffuse vasospasm of left vertebral artery, basilar artery and posterior circulation including PCAs IA verapamil given   8/17: angio findings of moderate to severe cerebral vasospasm of the distal left vert and basilar artery, mild supraclinoid vasospasm. IA verapamil given  8/18: angio, improvement in vasospasm, IA verapamil to posterior circulaion  - VPA subtherapeutic , increased dose to 1g q8   - vimpat 100mg q12 started  - Ritalin started for neurostim    PULM:    - Hypertonic nebs/duonebs   - CT PE 8/11: Probable pulmonary embolism in the left distal main and proximal lower lobar pulmonary artery, which was also seen on prior same day neck CT. No saddle embolism. No evidence of right heart strain.  - self extubated, face mask, mucomyst, duoneb   - chest PT   - CXR - aspiration precautions, requires frequent suctioning    CARDIO:    - -220  - levo gtt, s/p albumin 8/16   - TTE WNL, repeat 8/12   - Troponin stable   - EKG normal     ENDO:    - glucose goal 140-180    GI:    - NPO for now, NGT, hold feed   - needs thin liquid diet x3 weeks as per bariatric when tolerated  - PPI per bariatric surgeon    RENAL:   - Maintain euvolemia   - Na goal 145-150   - 100cc/hr NS  - albumin bolus yesterday to maintain CVP > 6    HEM/ONC:   - ASA 81  - VTE Prophylaxis: SCDs, SQL  - LE Duplex 8/9: Acute completely occlusive deep venous thrombosis of the right intramuscular calf vein, s/p IVCF with vascular 8/12, repeat 8/16 shows new b/l peroneal DVTs, factor xa c/w prophylactic dose   - hypercoagulable w/u?     ID:   -MRSA neg 8/12  -febrile, trend leukocytosis   -empiric Vanc/zoysn for fever d/c'd (8/10-13) and now restarted 8/14 for persistent fevers, dc'd again on 8/18   -CSF sent 8/13, NGTD   -f/u pancx 8/17    Dispol ICU status: family updated  PT/OT: on hold due to critical status  Full code    Social: Daughter Maggie and Son Kenya and sister (on the phone) updated    *****    ATTENDING ATTESTATION:  I was physically present for the key portions of the evaluation and management (E/M) service provided.  I agree with the above history, physical and plan, which I have reviewed and edited where appropriate.    Patient at high risk for neurological deterioration or death due to:  ICU delirium, aspiration PNA, DVT / PE.  Critical care time:  I have personally provided 45 minutes of critical care time, excluding time spent on separate procedures.      Plan discussed with RN, house staff.         46y/o F with h/o recent gastric sleeve (08/04/21 [facility], Dr. Crisostomo ), fibromyalgia, thyroid dysfunction, PTSD, depression, anxiety.  Presented with seizures at home - brought to [place], with 1 more episode of seizure en route.  Intubated, CT showed SAH with IV extension, casted IV, hydrocephalus, given mannitol, levetiracetam 1g,  6mg ativan. Started on Cardene drip for BP goal < 140 and transferred to [facility] NICU for further management. HH5 MF4, BD 1 = 8/7. Now s/p cerebral angiogram for R vertebral artery takedown for R vertebral dissecting aneurysm (8/7), and R frontal EVD placement (8/7), now s/p IVC filter placement (8/12)    NEURO:   - neurochecks q1h  - EVD open at -5cmH2O ,   - s/p CTH 8/20: decreased size in ventricles, stable.   - Nimodipine 60 mg po q4h d/c'ed for SBP goal 180-200   - Depakote increased to 1g q8 ( No keppra due to agitation) f/u level (pending)  - Vimpat decreased to 50mg BID  - EEG no seizures x 2 days , D/c'ed  - cont ASA 81  - CTH 8/15: worsened uncal herniation, worsened hydro, vasospasm in b/l PCA, L vert, basilar arteries  - Bromocriptine 15mg Q8  - Ritalin started for neurostim  - Angio demonstrating vasospasm of Right ICA, right PCOMM, Left distal vert and basilar confluence, and received IA verapamil on 8/16. 8/17. 8/18. 8/20.     PULM:    - extubated 8/20  - satting well on 100% NR   - Duonebs and 3% nebs standing  - chest PT   - CXR - aspiration precautions, requires frequent suctioning    CARDIO:    - -220  - levo gtt, s/p albumin 8/16   - TTE 8/12 WNL    - Troponin stable   - EKG normal     ENDO:    - glucose goal 140-180    GI:    - TF via NGT  - needs thin liquid diet x3 weeks as per bariatric when tolerated  - PPI per bariatric surgeon    RENAL:   - Maintain euvolemia   - Na goal 145-150   - 50cc/hr lactated ringers   - albumin bolus yesterday to maintain CVP > 6    HEM/ONC:   - ASA 81  - VTE Prophylaxis: SCDs, SQL  - LE Duplex 8/9: Acute completely occlusive deep venous thrombosis of the right intramuscular calf vein, s/p IVCF with vascular 8/12, repeat 8/16 shows new b/l peroneal DVTs, factor xa c/w prophylactic dose   - Repeat dopplers 8/23  - hypercoagulable w/u - still pending, however protein S/C and AT III normal     ID:   -MRSA neg 8/12  -Low grade fevers,, trend leukocytosis   -empiric Vanc/zoysn for fever d/c'd (8/10-13) and now restarted 8/14 for persistent fevers, dc'd again on 8/18   -CSF sent 8/13, NGTD   -f/u pancx 8/17      Dispol ICU status: family updated  PT/OT: on hold due to critical status  Full code    Social: Daughter Maggie and Son Kenya and sister (on the phone) updated    *****    ATTENDING ATTESTATION:  I was physically present for the key portions of the evaluation and management (E/M) service provided.  I agree with the above history, physical and plan, which I have reviewed and edited where appropriate.    Patient at high risk for neurological deterioration or death due to:  ICU delirium, aspiration PNA, DVT / PE.  Critical care time:  I have personally provided 45 minutes of critical care time, excluding time spent on separate procedures.      Plan discussed with RN, house staff.         44y/o F with h/o recent gastric sleeve (08/04/21 [facility], Dr. Crisostomo ), fibromyalgia, thyroid dysfunction, PTSD, depression, anxiety.  Presented with seizures at home - brought to [place], with 1 more episode of seizure en route.  Intubated, CT showed SAH with IV extension, casted IV, hydrocephalus, given mannitol, levetiracetam 1g,  6mg ativan. Started on Cardene drip for BP goal < 140 and transferred to [facility] NICU for further management. HH5 MF4, BD 1 = 8/7. Now s/p cerebral angiogram for R vertebral artery takedown for R vertebral dissecting aneurysm (8/7), and R frontal EVD placement (8/7), now s/p IVC filter placement (8/12)    NEURO:   - neurochecks q1h  - EVD 15 mL/h  - s/p CTH 8/20: decreased size in ventricles, stable.   - Nimodipine 60 mg po q4h d/c'ed for SBP goal 180-200   - Depakote increased to 1g q8 ( No keppra due to agitation) f/u level (pending)  - Vimpat 100 mg BID  - EEG no seizures x 2 days , D/c'ed  - cont ASA 81  - CTH 8/15: worsened uncal herniation, worsened hydro, vasospasm in b/l PCA, L vert, basilar arteries  - Bromocriptine 15mg Q8  - Ritalin started for neurostim  - Angio demonstrating vasospasm of Right ICA, right PCOMM, Left distal vert and basilar confluence, and received IA verapamil on 8/16. 8/17. 8/18. 8/20.     PULM:    - extubated 8/20  - Duonebs and 3% nebs standing  - chest PT   - CXR - aspiration precautions, requires frequent suctioning    CARDIO:    - -220  - levo gtt, s/p albumin 8/16   - TTE 8/12 WNL    - Troponin stable   - EKG normal     ENDO:    - glucose goal 140-180    GI:    - TF via NGT  - needs thin liquid diet x3 weeks as per bariatric when tolerated  - PPI per bariatric surgeon    RENAL:   - Maintain euvolemia   - Na goal 145-150   - 50cc/hr lactated ringers   - maintain CVP > 6    HEM/ONC:   - ASA 81  - VTE Prophylaxis: SCDs, SQL  - LE Duplex 8/9: Acute completely occlusive deep venous thrombosis of the right intramuscular calf vein, s/p IVCF with vascular 8/12, repeat 8/16 shows new b/l peroneal DVTs, factor xa c/w prophylactic dose   - Repeat dopplers 8/23  - hypercoagulable w/u - still pending, however protein S/C and AT III normal     ID:   -MRSA neg 8/12  -Low grade fevers,, trend leukocytosis  -empiric Ceftriaxone (08/22) for aspiration PNA  -empiric Vanc/zoysn for fever d/c'd (8/10-13) and now restarted 8/14 for persistent fevers, dc'd again on 8/18   -CSF sent 8/13, NGTD   -f/u pancx 8/17    Dispol ICU status: family updated  PT/OT: on hold due to critical status  Full code    Social: Daughter Maggie and Son Kenya and sister (on the phone) updated    *****    ATTENDING ATTESTATION:  I was physically present for the key portions of the evaluation and management (E/M) service provided.  I agree with the above history, physical and plan, which I have reviewed and edited where appropriate.    Patient at high risk for neurological deterioration or death due to:  ICU delirium, aspiration PNA, DVT / PE.  Critical care time:  I have personally provided 45 minutes of critical care time, excluding time spent on separate procedures.      Plan discussed with RN, house staff.

## 2021-08-22 NOTE — PROGRESS NOTE ADULT - SUBJECTIVE AND OBJECTIVE BOX
HPI:  44y/o F with h/o recent gastric sleeve (08/04/21 [facility], Dr. Crisostomo ), fibromyalgia, thyroid dysfunction, PTSD, depression, anxiety.  Presented with seizures at home - brought to [place], with 1 more episode of seizure en route.  Intubated, CT showed SAH with IV extension, casted IV, hydrocephalus, given mannitol, levetiracetam 1g,  6mg ativan. Started on Cardene drip for BP goal < 140 and transferred to [facility] NICU for further management.     HH5 MF4   NIHSS 26 on arrival  BD 1 = 8/7 (07 Aug 2021 08:08)        S/Overnight events:  continued on levo, hypercoagulabitly profile pending, EVD @ -5, euvolemia, extubated, amantadine added, free water started for hypernatremia       Hospital Course:   8/7: Tx from [place] for SAH. Intubated. R frontal EVD placed, central line and a line placed. 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   8/8: POD1 s/p angio with R vert takedown, extubated, desatting on aerosol mask, required extensive suctioning, 3% nebs, chest PT, started on low dose precedex drip for restlessness/agitation, ABG stable. NGT placed. TF started with goal 20 pending nutrition recs. 3% stopped for Na 150. Ceribell EEG negative and d/c'ed.   8/9 Overnight, new Right pronator drift and right gaze preference that can't cross midline, Repeat CTH demonstrated stable vents, CTA head and neck unremarkable for spasm, hypoattenuation of right cerebellum edema vs. infarct.  8/10: POD3 R vert takedown, EVD open at 61ihP0V. ASHA overnight, neuro stable. CTH with increased hydro, CTA head/neck with mild basilar spasm, but concern for PE. 1/2 am D50 for hypoglycemia. 1L bolus then 100 cc/hr, PM rounds for net negative fluid balance and mild vasospasm on CTA.   8/11: POD4 R vert takedown. EVD at 5cm H2O, ASHA overnight. neuro stable.   Febrile 104. depakote increased to q6. NCHCT stable. EVD lowered to 0. Lasix 20 given for dieuresis, UOP over 2 liters. Sedation given for a-line placement, BP drop needing levo.  8/12: POD5 R vert takedown. EVD at 0cm H2O. ASHA overnight, neuro stable. Precedex being weaned off. Pending IVCF with vascular today.  8/13: POD6 R vert takedown. EVD open at 0cmH2O. ASHA overnight. Neuro exam stable. Mottled skin on the left lower extremity improving. Started on Bromocriptine 15mg Q8.   8/14: POD7. EVD open at 0. 1L bolus given for euvolemia o/n. neuro stable. CTH stable. ENT scoped vocal cords, +R voacl cord paresis, NTD. started on zosyn empirically.   8/15: POD8. EVD open at 0. ASHA o/n, neuro stable. Pt developed increased work of breathing, unable to clear her secretions, received racemic epi and multiple nebulizer treatments, still with stridor. Patient re-intubated at bedside, on full vent support.   8/16: CTH/CTA head/neck/CT chest performed o/n for worsened exam, R gaze preference, sluggish pupils. +worsened uncal herniation, hydro, vasospasm in b/l PCAs, L vert, basilar arteries. preop angio today, restarted on 3% for Na goal 145-150. Angio for vasospasm with IA verapamil.  8/17: POD10. Overnight Pt remains on levophed drip for SBP goal 180-220. Also remains on propofol drip. EVD open at -5cmH2O. ICPs WNL. Febrile 101F and pancultured. CTH today shows slightly smaller ventricles. Angio for vasospasm with IA verapamil.  8/18: POD11 R vert takedown. POD1 angio IA verpamil. Overnight, Pt noted to have downward gaze to the right and not following commands. Taken for stat head CT which is stable. Pupils also noted to be aniscoric left pupil 4mm and brisk and right 2mm brisk. Mannitol 50g given. Ceribell eeg placed for concern of subclinical seizures, so far appears negative for seizures. 1L NS o/n and 1.5L NS bolus in AM for euvolemia. vanc/zosyn stopped. 250cc 3% bolus and 250cc albumin given in AM. Brief seizure to L frontal lobe this AM on EEG, given 2g valproic acid and dose increased to 1g q8. Vimpat 100mg BID started.  Angio with interval improvment in vasospasm, IA verapamil given. In afternoon patient self-extubated, placed on NRB with mucomyst, 3% inhalation and duonebs. 3% at 50 d/c'd.  8/19: POD 12. Remains on VEEG. Axillary A line placed. Salt tabs d/c'd, florinef decreased to 0.1mg, EVD @ -5cm H2O, now draining 20cc/hr. 250 albumin and 500 NS boluses on dayshift, 1 L LR bolus  8/20: POD 13. ASHA. on VEEG, no seizures noted. Pending VPA level. 500 NS, 250 albumin. Febrile, pancultured. EEG d/c'ed.   8/21: BD14, POD14. ASHA overnight, EVD @ -5. s/p 1L bolus for euvolemia  8/22: BD #15: continued on levo, hypercoagulabitly profile pending, EVD @ -5, euvolemia, extubated, amantadine added, free water started for hypernatremia       Vital Signs Last 24 Hrs  T(C): 37.3 (22 Aug 2021 00:51), Max: 38 (21 Aug 2021 19:00)  T(F): 99.1 (22 Aug 2021 00:51), Max: 100.4 (21 Aug 2021 19:00)  HR: 92 (22 Aug 2021 03:00) (85 - 106)  BP: 194/102 (22 Aug 2021 03:00) (163/94 - 206/111)  BP(mean): 140 (22 Aug 2021 03:00) (112 - 151)  RR: 16 (22 Aug 2021 03:00) (15 - 20)  SpO2: 100% (22 Aug 2021 03:00) (95% - 100%)    I&O's Detail    20 Aug 2021 07:01  -  21 Aug 2021 07:00  --------------------------------------------------------  IN:    Enteral Tube Flush: 300 mL    IV PiggyBack: 160 mL    Lactated Ringers: 1200 mL    Lactated Ringers: 200 mL    Lactated Ringers Bolus: 1000 mL    Norepinephrine: 334.5 mL    Sodium Chloride 0.9% Bolus: 1000 mL    Vital1.0: 220 mL  Total IN: 4414.5 mL    OUT:    External Ventricular Device (mL): 325 mL    Voided (mL): 5200 mL  Total OUT: 5525 mL    Total NET: -1110.5 mL      21 Aug 2021 07:01  -  22 Aug 2021 04:01  --------------------------------------------------------  IN:    Enteral Tube Flush: 260 mL    Free Water: 500 mL    IV PiggyBack: 110 mL    Lactated Ringers: 800 mL    Lactated Ringers: 150 mL    Lactated Ringers Bolus: 1000 mL    Norepinephrine: 341.3 mL    Vital High Protein: 708 mL    Vital1.0: 20 mL  Total IN: 3889.3 mL    OUT:    External Ventricular Device (mL): 187 mL    Rectal Tube (mL): 200 mL    Voided (mL): 1000 mL  Total OUT: 1387 mL    Total NET: 2502.3 mL        I&O's Summary    20 Aug 2021 07:01  -  21 Aug 2021 07:00  --------------------------------------------------------  IN: 4414.5 mL / OUT: 5525 mL / NET: -1110.5 mL    21 Aug 2021 07:01  -  22 Aug 2021 04:01  --------------------------------------------------------  IN: 3889.3 mL / OUT: 1387 mL / NET: 2502.3 mL        PHYSICAL EXAM:  AOx1, hypophonic, face symmetric. OEs, R gaze preference does not cross midline, R pupil 3mm sluggish, L pupil 4 mm sluggish,   squeezes to command b/l UE, but will smile and wiggle toes to command, moves b/l UE spontaneously and strong (L>R),   BLE spontaneous and antigravity, weak cough      DEVICE/DRAIN DRESSING:  EVD -5cmH2O     TUBES/LINES:  [] CVC  [] A-line  [] Lumbar Drain  [] Ventriculostomy  [] Other    DIET:  [] NPO  [] Mechanical  [x] Tube feeds    LABS:                        9.1    13.15 )-----------( 358      ( 21 Aug 2021 07:55 )             31.4     08-21    158<H>  |  118<H>  |  12  ----------------------------<  130<H>  3.3<L>   |  30  |  0.67    Ca    9.5      21 Aug 2021 07:55  Phos  4.5     08-21  Mg     2.3     08-21    TPro  6.4  /  Alb  4.4  /  TBili  0.4  /  DBili  x   /  AST  29  /  ALT  25  /  AlkPhos  54  08-20            CAPILLARY BLOOD GLUCOSE      POCT Blood Glucose.: 151 mg/dL (21 Aug 2021 23:10)  POCT Blood Glucose.: 130 mg/dL (21 Aug 2021 16:53)  POCT Blood Glucose.: 118 mg/dL (21 Aug 2021 11:55)  POCT Blood Glucose.: 118 mg/dL (21 Aug 2021 06:07)      Drug Levels: [] N/A  Valproic Acid Level, Serum: 39.5 ug/mL (08-18 @ 06:11)  Vancomycin Level, Trough: 6.4 ug/mL (08-18 @ 06:11)    CSF Analysis: [] N/A      Allergies    apple (Angioedema)  No Known Drug Allergies  strawberry (Angioedema)    Intolerances    lactose (Stomach Upset)    MEDICATIONS:  Antibiotics:    Neuro:  acetaminophen    Suspension .. 650 milliGRAM(s) Oral every 6 hours PRN  amantadine Syrup 200 milliGRAM(s) Oral every 12 hours  bromocriptine Capsule 15 milliGRAM(s) Oral every 8 hours  methylphenidate 10 milliGRAM(s) Oral daily  ondansetron Injectable 4 milliGRAM(s) IV Push every 6 hours PRN  valproate sodium IVPB 1000 milliGRAM(s) IV Intermittent every 8 hours    Anticoagulation:  aspirin  chewable 81 milliGRAM(s) Oral daily  enoxaparin Injectable 40 milliGRAM(s) SubCutaneous every 12 hours    OTHER:  bisacodyl Suppository 10 milliGRAM(s) Rectal daily PRN  chlorhexidine 0.12% Liquid 15 milliLiter(s) Oral Mucosa every 12 hours  chlorhexidine 2% Cloths 1 Application(s) Topical <User Schedule>  glucagon  Injectable 1 milliGRAM(s) IntraMuscular once  insulin lispro (ADMELOG) corrective regimen sliding scale   SubCutaneous every 6 hours  norepinephrine Infusion 0.05 MICROgram(s)/kG/Min IV Continuous <Continuous>  polyethylene glycol 3350 17 Gram(s) Oral daily  senna 2 Tablet(s) Oral at bedtime  sodium chloride 3%  Inhalation 4 milliLiter(s) Inhalation every 6 hours    IVF:  lactated ringers. 1000 milliLiter(s) IV Continuous <Continuous>    CULTURES:  Culture Results:   Sputum specimen rejected.  Microscopic examination indicates  oropharyngeal contamination.  Please repeat. (08-20 @ 12:03)  Culture Results:   No growth at 1 day. (08-20 @ 06:00)    RADIOLOGY & ADDITIONAL TESTS:      ASSESSMENT:  44y/o F with h/o recent gastric sleeve (08/04/21 [facility], Dr. Crisostomo ), fibromyalgia, thyroid dysfunction, PTSD, depression, anxiety.  Presented with seizures at home - brought to [place], with 1 more episode of seizure en route.  Intubated, CT showed SAH with IV extension, casted IV, hydrocephalus, given mannitol, levetiracetam 1g,  6mg ativan. Started on Cardene drip for BP goal < 140 and transferred to [facility] NICU for further management. HH5 MF4, BD 1 = 8/7. Now s/p cerebral angiogram for R vertebral artery takedown for R vertebral dissecting aneurysm (8/7), and R frontal EVD placement (8/7), now s/p IVC filter placement (8/12,) now s/p angio IA verapamil 8/16, 8/17, 8/18.      HEAD BLEED    No pertinent family history in first degree relatives    Handoff    Cerebral aneurysm    Cerebral artery vasospasm    Cerebral aneurysm    DVT, lower extremity    Pulmonary embolism    Cerebral artery vasospasm    Multiple subsegmental pulmonary emboli without acute cor pulmonale    DVT, lower extremity    Angiogram, carotid and cerebral arteries    IVC filter placement    Angiogram, carotid and cerebral, bilateral    Angiogram, carotid and cerebral, bilateral    Angiogram, carotid and cerebral, bilateral    Angiogram, carotid and cerebral, bilateral    [email or web address]        Plan:     NEURO:   - neurochecks q1h  - EVD open at -5cmH2O ,   - s/p CTH 8/20: decreased size in ventricles, stable.   - Nimodipine 60 mg po q4h d/c'ed for SBP goal 180-200   - Depakote increased to 1g q8 ( No keppra due to agitation) f/u level (pending)  - Vimpat decreased to 50mg BID  - EEG no seizures x 2 days , D/c'ed  - cont ASA 81  - CTH 8/15: worsened uncal herniation, worsened hydro, vasospasm in b/l PCA, L vert, basilar arteries  - Bromocriptine 15mg Q8  - Ritalin started for neurostim  - Angio demonstrating vasospasm of Right ICA, right PCOMM, Left distal vert and basilar confluence, and received IA verapamil on 8/16. 8/17. 8/18. 8/20.     PULM:    - extubated 8/20  - satting well on 100% NR   - Duonebs and 3% nebs standing  - chest PT   - CXR - aspiration precautions, requires frequent suctioning    CARDIO:    - -220  - levo gtt, s/p albumin 8/16   - TTE 8/12 WNL    - Troponin stable   - EKG normal     ENDO:    - glucose goal 140-180    GI:    - TF via NGT  - needs thin liquid diet x3 weeks as per bariatric when tolerated  - PPI per bariatric surgeon    RENAL:   - Maintain euvolemia   - Na goal 145-150   - 50cc/hr lactated ringers   - albumin bolus yesterday to maintain CVP > 6    HEM/ONC:   - ASA 81  - VTE Prophylaxis: SCDs, SQL  - LE Duplex 8/9: Acute completely occlusive deep venous thrombosis of the right intramuscular calf vein, s/p IVCF with vascular 8/12, repeat 8/16 shows new b/l peroneal DVTs, factor xa c/w prophylactic dose   - Repeat dopplers 8/23  - hypercoagulable w/u - still pending, however protein S/C and AT III normal     ID:   -MRSA neg 8/12  -Low grade fevers,, trend leukocytosis   -empiric Vanc/zoysn for fever d/c'd (8/10-13) and now restarted 8/14 for persistent fevers, dc'd again on 8/18   -CSF sent 8/13, NGTD   -f/u pancx 8/17    Assessment and plan discussed with Dr. Lomax, Dr. Conn and Dr. Sanches     []  GCS  E   V  M     Heart Failure: []Acute, [] acute on chronic , []chronic  Heart Failure:  [] Diastolic (HFpEF), [] Systolic (HFrEF), []Combined (HFpEF and HFrEF), [] RHF, [] Pulm HTN, [] Other    [] TRUDI, [] ATN, [] AIN, [] other  [] CKD1, [] CKD2, [] CKD 3, [] CKD 4, [] CKD 5, []ESRD    Encephalopathy: [] Metabolic, [] Hepatic, [] toxic, [] Neurological, [] Other    Abnormal Nurtitional Status: [] malnurtition (see nutrition note), [ ]underweight: BMI < 19, [] morbid obesity: BMI >40, [] Cachexia    [] Sepsis  [] hypovolemic shock,[] cardiogenic shock, [] hemorrhagic shock, [] neuogenic shock  [] Acute Respiratory Failure  []Cerebral edema, [] Brain compression/ herniation,   [] Functional quadriplegia  [] Acute blood loss anemia

## 2021-08-22 NOTE — PROGRESS NOTE ADULT - ASSESSMENT
44y/o F with h/o recent gastric sleeve (08/04/21 [facility], Dr. Crisostomo ), fibromyalgia, thyroid dysfunction, PTSD, depression, anxiety.  Presented with seizures at home - brought to [place], with 1 more episode of seizure en route.  Intubated, CT showed SAH with IV extension, casted IV, hydrocephalus, given mannitol, levetiracetam 1g,  6mg ativan. Started on Cardene drip for BP goal < 140 and transferred to [facility] NICU for further management. HH5 MF4, BD 1 = 8/7. Now s/p cerebral angiogram for R vertebral artery takedown for R vertebral dissecting aneurysm (8/7), and R frontal EVD placement (8/7), now s/p IVC filter placement (8/12)    NEURO:   - neurochecks q1h  - EVD 15 mL/h  - s/p CTH 8/20: decreased size in ventricles, stable.   - Nimodipine 60 mg po q4h d/c'ed for SBP goal 180-200   - Depakote increased to 1g q8 ( No keppra due to agitation) f/u level (pending)  - Vimpat 100 mg BID  - EEG no seizures x 2 days , D/c'ed  - cont ASA 81  - CTH 8/15: worsened uncal herniation, worsened hydro, vasospasm in b/l PCA, L vert, basilar arteries  - Bromocriptine 15mg Q8  - Ritalin started for neurostim  - Angio demonstrating vasospasm of Right ICA, right PCOMM, Left distal vert and basilar confluence, and received IA verapamil on 8/16. 8/17. 8/18. 8/20.     PULM:    - extubated 8/20  - Duonebs and 3% nebs standing  - chest PT   - CXR - aspiration precautions, requires frequent suctioning    CARDIO:    - -220  - levo gtt, s/p albumin 8/16   - TTE 8/12 WNL    - Troponin stable   - EKG normal     ENDO:    - glucose goal 140-180    GI:    - TF via NGT  - needs thin liquid diet x3 weeks as per bariatric when tolerated  - PPI per bariatric surgeon    RENAL:   - Maintain euvolemia   - Na goal 145-150   - 50cc/hr lactated ringers   - maintain CVP > 6    HEM/ONC:   - ASA 81  - VTE Prophylaxis: SCDs, SQL  - LE Duplex 8/9: Acute completely occlusive deep venous thrombosis of the right intramuscular calf vein, s/p IVCF with vascular 8/12, repeat 8/16 shows new b/l peroneal DVTs, factor xa c/w prophylactic dose   - Repeat dopplers 8/23  - hypercoagulable w/u - still pending, however protein S/C and AT III normal     ID:   -MRSA neg 8/12  -Low grade fevers,, trend leukocytosis  -empiric Ceftriaxone (08/22) for aspiration PNA  -empiric Vanc/zoysn for fever d/c'd (8/10-13) and now restarted 8/14 for persistent fevers, dc'd again on 8/18   -CSF sent 8/13, NGTD   -f/u pancx 8/17    Dispol ICU status: family updated  PT/OT: on hold due to critical status  Full code    Social: Daughter Maggie and Son Kenya and sister (on the phone) updated    *****    ATTENDING ATTESTATION:  I was physically present for the key portions of the evaluation and management (E/M) service provided.  I agree with the above history, physical and plan, which I have reviewed and edited where appropriate.    Patient at high risk for neurological deterioration or death due to:  ICU delirium, aspiration PNA, DVT / PE.  Critical care time:  I have personally provided 45 minutes of critical care time, excluding time spent on separate procedures.      Plan discussed with RN, house staff.

## 2021-08-22 NOTE — PROGRESS NOTE ADULT - PROVIDER SPECIALTY LIST ADULT
Group Topic: BH Process Group     Date: 11/15/2020  Start Time: 1615  End Time: 1730  Facilitators: Jairo Ortega CNA    Focus: Process Group  Number in attendance: 10      Method: Group  Attendance: Present  Participation: Active  Patient Response: Appropriate feedback  Mood: Depressed  Affect: Type: Depressed   Range: Blunted/flat   Congruency: Congruent   Stability: Stable  Behavior/Socialization: Engaged  Thought Process: Blocked and Circumstantial  Task Performance: Follows directions  Patient Evaluation: Independent - full participation     Neurosurgery

## 2021-08-22 NOTE — PROGRESS NOTE ADULT - SUBJECTIVE AND OBJECTIVE BOX
==============================================   NEUROCRITICAL CARE ATTENDING NOTE (Sunday, August 22, 2021)  ==============================================    LUDMILA PRIETO   MRN-[id number]  Summary:  45y/F with h/o recent gastric sleeve (08/04 [facility], Dr. Crisostomo ), fibromyalgia, thyroid dysfunction, PTSD, depression, anxiety.  Presented with seizures at home - brought to [place], with 1 more episode of seizure en route.  Intubated, CT showed SAH with IV extension, casted IV, hydrocephalus, given mannitol levetiracetam 6mg ativan, transferred to [facility].    Hospital Course:   8/7: Tx from [place] for SAH. Intubated. R frontal EVD placed, central line and a line placed. 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   8/8: POD1 s/p angio with R vert takedown, extubated, desatting on aerosol mask, required extensive suctioning, 3% nebs, chest PT, started on low dose precedex drip for restlessness/agitation, ABG stable. NGT placed. TF started with goal 20 pending nutrition recs. 3% stopped for Na 150. Ceribell EEG negative and d/c'ed.   8/9 Overnight, new Right pronator drift and right gaze preference that can't cross midline, Repeat CTH demonstrated stable vents, CTA head and neck unremarkable for spasm, hypoattenuation of right cerebellum edema vs. infarct.  8/10: POD3 R vert takedown, EVD open at [id number]. ASHA overnight, neuro stable. CTH with increased hydro, CTA head/neck with mild basilar spasm, but concern for PE. 1/2 am D50 for hypoglycemia. 1L bolus then 100 cc/hr, PM rounds for net negative fluid balance and mild vasospasm on CTA.   8/11: POD4 R vert takedown. EVD at 5cm H2O, ASHA overnight. neuro stable.   Febrile 104. depakote increased to q6. NCHCT stable. EVD lowered to 0. Lasix 20 given for dieuresis, UOP over 2 liters. Sedation given for a-line placement, BP drop needing levo.  8/12: POD5 R vert takedown. EVD at 0cm H2O. ASHA overnight, neuro stable. Precedex being weaned off. Pending IVCF with vascular today.  8/13: POD6 R vert takedown. EVD open at 0cmH2O. ASHA overnight. Neuro exam stable. Mottled skin on the left lower extremity improving. Started on Bromocriptine 15mg Q8.   8/14: POD7. EVD open at 0. 1L bolus given for euvolemia o/n. neuro stable. CTH stable. ENT scoped vocal cords, +R vocal cord paresis, NTD. started on zosyn empirically.   8/15: POD8. EVD open at 0. ASHA o/n, neuro stable. Pt developed increased work of breathing, unable to clear her secretions, received racemic epi and multiple nebulizer treatments, still with stridor. Patient re-intubated at bedside, on full vent support.   8/16: CTH/CTA head/neck/CT chest performed o/n for worsened exam, R gaze preference, sluggish pupils. +worsened uncal herniation, hydro, vasospasm in b/l PCAs, L vert, basilar arteries. preop angio today, restarted on 3% for Na goal 145-150  8/17: POD10. Overnight Pt remains on levophed drip for SBP goal 180-220. Also remains on propofol drip. EVD open at -5cmH2O. ICPs WNL. Febrile 101F and pancultured. CTH today shows slightly smaller ventricles.   8/18: POD11 R vert takedown. POD1 angio IA verpamil. Overnight, Pt noted to have downward gaze to the right and not following commands. Taken for stat head CT which is stable. Pupils also noted to be aniscoric left pupil 4mm and brisk and right 2mm brisk. Mannitol 50g given. Ceribell eeg placed for concern of subclinical seizures, so far appears negative for seizures. 1L NS o/n and 1.5L NS bolus in AM for euvolemia. vanc/zosyn stopped. 250cc 3% bolus and 250cc albumin given in AM. Brief seizure to L frontal lobe this AM on EEG, given 2g valproic acid and dose increased to 1g q8. Vimpat 100mg BID started.  In afternoon patient self-extubated, placed on NRB with mucomyst, 3% inhalation and duonebs. 3% at 50 d/c'd.  8/19: POD 12. Remains on VEEG. Axillary A line placed. Salt tabs d/c'd, florinef decreased to 0.1mg, EVD @ -5cm H2O, now draining 20cc/hr. 250 albumin and 500 NS boluses on dayshift, 1 L LR bolus  8/20: POD 13. ASHA. on VEEG, no seizures noted. Pending VPA level. 500 NS, 250 albumin. Febrile, pancultured. EEG d/c'ed.   8/21: BD14, POD14. ASHA overnight, EVD @ -5. s/p 1L bolus for euvolemia  8/22: BD #15: continued on levo, hypercoagulability profile pending, EVD 15 mL/h, euvolemia, extubated, amantadine added, free water started for hypernatremia; continuing LOC fluctuations (stable vasospasm), frequent suctioning; empiric ceftriaxone started    Past Medical History:  s/p gastric sleeve surgery  Allergies:  Allergy Status Unknown  Home Meds:    Current Meds:  MEDICATIONS  (STANDING):  acetylcysteine 20%  Inhalation 4 milliLiter(s) Inhalation every 6 hours  albuterol/ipratropium for Nebulization 3 milliLiter(s) Nebulizer every 6 hours  amantadine Syrup 200 milliGRAM(s) Oral every 12 hours  aspirin  chewable 81 milliGRAM(s) Oral daily  bromocriptine Capsule 15 milliGRAM(s) Oral every 8 hours  cefTRIAXone   IVPB 1000 milliGRAM(s) IV Intermittent every 12 hours  enoxaparin Injectable 40 milliGRAM(s) SubCutaneous every 12 hours  insulin lispro (ADMELOG) corrective regimen sliding scale   SubCutaneous every 6 hours  lacosamide 100 milliGRAM(s) Oral two times a day  lactated ringers. 1000 milliLiter(s) (50 mL/Hr) IV Continuous <Continuous>  methylphenidate 10 milliGRAM(s) Oral daily  norepinephrine Infusion 0.05 MICROgram(s)/kG/Min (4.79 mL/Hr) IV Continuous <Continuous>  polyethylene glycol 3350 17 Gram(s) Oral daily  senna 2 Tablet(s) Oral at bedtime  sodium chloride 3%  Inhalation 4 milliLiter(s) Inhalation every 6 hours  valproate sodium IVPB 1000 milliGRAM(s) IV Intermittent every 8 hours    MEDICATIONS  (PRN):  acetaminophen    Suspension .. 650 milliGRAM(s) Oral every 6 hours PRN Temp greater or equal to 38C (100.4F), Mild Pain (1 - 3)  bisacodyl Suppository 10 milliGRAM(s) Rectal daily PRN Constipation  ondansetron Injectable 4 milliGRAM(s) IV Push every 6 hours PRN Nausea and/or Vomiting    PHYSICAL EXAMINATION    ICU Vital Signs Last 24 Hrs  T(C): 37.7 (22 Aug 2021 20:00), Max: 38.3 (22 Aug 2021 09:00)  T(F): 99.8 (22 Aug 2021 20:00), Max: 101 (22 Aug 2021 09:00)  HR: 96 (22 Aug 2021 21:00) (85 - 107)  BP: 211/100 (22 Aug 2021 21:00) (151/79 - 215/104)  BP(mean): 144 (22 Aug 2021 21:00) (108 - 151)  ABP: 197/107 (22 Aug 2021 21:00) (148/80 - 210/116)  ABP(mean): 139 (22 Aug 2021 21:00) (103 - 154)  RR: 20 (22 Aug 2021 19:00) (15 - 21)  SpO2: 98% (22 Aug 2021 21:00) (94% - 100%)    AOx1, hypophonic, face symmetric. OEs, R gaze preference (improved), R pupil 4.5 mm sluggish, L pupil 4 mm sluggish,   squeezes to command b/l UE (periodic), decreased R UE movement, periodic L UE obeying, BLE spontaneous and antigravity, weak cough    EENT:  anicteric  CARDIOVASCULAR: (+) S1 S2, normal rate and regular rhythm  PULMONARY: decreased to bases, no adventitia  ABDOMEN: soft, nontender with normoactive bowel sounds  EXTREMITIES: no edema  SKIN: no rash    I/O's    08-20-21 @ 07:01  -  08-21-21 @ 07:00  --------------------------------------------------------  IN: 4414.5 mL / OUT: 5525 mL / NET: -1110.5 mL    08-21-21 @ 07:01  -  08-22-21 @ 06:46  --------------------------------------------------------  IN: 3930.5 mL / OUT: 1411 mL / NET: 2519.5 mL    LABS:                        9.2    12.92 )-----------( 378      ( 22 Aug 2021 05:41 )             32.1     08-22    159<H>  |  116<H>  |  14  ----------------------------<  143<H>  3.0<L>   |  33<H>  |  0.74    Ca    9.6      22 Aug 2021 05:41  Phos  4.4     08-22  Mg     2.2     08-22    TPro  6.4  /  Alb  4.4  /  TBili  0.4  /  DBili  x   /  AST  29  /  ALT  25  /  AlkPhos  54  08-20    CAPILLARY BLOOD GLUCOSE    POCT Blood Glucose.: 143 mg/dL (22 Aug 2021 06:24)  POCT Blood Glucose.: 151 mg/dL (21 Aug 2021 23:10)  POCT Blood Glucose.: 130 mg/dL (21 Aug 2021 16:53)  POCT Blood Glucose.: 118 mg/dL (21 Aug 2021 11:55)    Culture - Sputum (collected 08-20-21 @ 12:03)  Source: .Sputum Sputum  Gram Stain (08-20-21 @ 16:29):    Numerous epithelial cells    Few White blood cells    Moderate Gram positive cocci in pairs, chains and clusters    Rare Gram Positive Rods    Rare Gram Negative Rods  Final Report (08-20-21 @ 16:29):    Sputum specimen rejected.  Microscopic examination indicates    oropharyngeal contamination.  Please repeat.    Culture - CSF with Gram Stain (collected 08-20-21 @ 05:32)  Source: .CSF CSF  Gram Stain (08-20-21 @ 05:50):    No White blood cells    No organisms seen    Culture - Sputum . (08.17.21 @ 08:05)    Gram Stain:   No epithelial cells seen  Few White blood cells  No organisms seen    Specimen Source: .Sputum Sputum    Culture Results:   Rare Normal Respiratory Mely present to date    Culture - Blood (08.17.21 @ 01:45)    Specimen Source: .Blood Blood X 2    Culture Results:   No growth at 1 day.    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 Stable ischemic changes within the right cerebellum. Right-sided EVD catheter in place with slight increase in ventricular size. Interval improvement in the intraventricular hemorrhage. Evolving areas of acute/subacute ischemia within the right cerebellum.    CTA (08.19.2021) -  Interval improvement in the caliber of the proximal vertebral artery with progression of a vasospasm involving the mid to distal basilar artery. Interval improvement in the vasospasm involving the left vertebral artery. Persistent vasospasm involving the posterior cerebral and superior cerebellar arteries. Interval improvement in the vasospasm involving the right supraclinoid ICA as well as the left A1 and M1 segments. Progression of vasospasm involving the right M1 segment.    Cerebral Angiogram (08.18.2021 - improvement in vasospasm, IA verapamil to posterior circulation  Cerebral Angiogram (08.17.2021 - Left vertebral artery dissection demonstrates continued moderate to severe cerebral vasospasm of the distal let vert and basilar artery, some what improved caliber of proximal left vert. 15mg IA Verapamil injected over 10 minutes with mild improvement of posterior circulation post treatment. Right ICA injection with continued mild supraclinoid vasospasm, 10mg of IA Verapamil injected.  Cerebral Angiogram (08.16.2021) - Left vertebral injection demonstrates moderate diffuse vasospasm of left vertebral artery, basilar artery and posterior circulation including PCAs. 15mg Verapamil injected via left vert with mild radiographic improvement post treatment. Right ICA injection with mild supraclinoid spasm/narrowing, otherwise no vasospasm appreciated in anterior circulation.    CT (08.18.2021) - No significant interval change in right superior and lateral cerebellar hypodensities.  CT (08.15.2021) - 1. Worsening the uncal herniation, with effacement of the bilateral suprasellar and perimesencephalic cisterns.  2. Redistribution of intraventricular hemorrhage, as above. Redemonstration of right EVD, with distal tip in the right frontal horn, near the foramen of Monro. Worsening hydrocephalus.  CTA - 1. Multifocal punctate and short segment stenoses, as above, with several areas of narrowing new as compared to recent CTA dated 08/09/2021. In the posterior circulation, stenoses are identified within the V4 left vertebral artery, the basilar artery, and in the bilateral PCA, more significant on the left.  Within the anterior circulation, stenosis are identified in the right supraclinoid C6 ICA as well as the proximal M1 MCA. Findings are most compatible with vasospasm.  2. In particular, the distal V4 right vertebral artery, which was visualized, though diminutive, on prior CTA dated 08/09/2021, does not fill with contrast on the present examination. The basilar artery demonstrates multiple stenoses, and is significantly smaller in caliber when compared to the prior CTA.    Other imaging:  Duplex - 1.  Since 8/9/2021, there is new deep venous thrombosis in the bilateral peroneal veins.  2.  Redemonstration of deep vein thrombosis in a right intramuscular calf vein.    EVD (d15) 15 mL/h    [Code] Full  [Goals] Aggressive  [Disposion] ICU

## 2021-08-22 NOTE — PROGRESS NOTE ADULT - SUBJECTIVE AND OBJECTIVE BOX
==============================================   NEUROCRITICAL CARE ATTENDING NOTE (Sunday, August 22, 2021)  ==============================================    LUDMILA PRIETO   MRN-[id number]  Summary:  45y/F with h/o recent gastric sleeve (08/04 [facility], Dr. Crisostomo ), fibromyalgia, thyroid dysfunction, PTSD, depression, anxiety.  Presented with seizures at home - brought to [place], with 1 more episode of seizure en route.  Intubated, CT showed SAH with IV extension, casted IV, hydrocephalus, given mannitol levetiracetam 6mg ativan, transferred to [facility].    Hospital Course:   8/7: Tx from [place] for SAH. Intubated. R frontal EVD placed, central line and a line placed. 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   8/8: POD1 s/p angio with R vert takedown, extubated, desatting on aerosol mask, required extensive suctioning, 3% nebs, chest PT, started on low dose precedex drip for restlessness/agitation, ABG stable. NGT placed. TF started with goal 20 pending nutrition recs. 3% stopped for Na 150. Ceribell EEG negative and d/c'ed.   8/9 Overnight, new Right pronator drift and right gaze preference that can't cross midline, Repeat CTH demonstrated stable vents, CTA head and neck unremarkable for spasm, hypoattenuation of right cerebellum edema vs. infarct.  8/10: POD3 R vert takedown, EVD open at [id number]. ASHA overnight, neuro stable. CTH with increased hydro, CTA head/neck with mild basilar spasm, but concern for PE. 1/2 am D50 for hypoglycemia. 1L bolus then 100 cc/hr, PM rounds for net negative fluid balance and mild vasospasm on CTA.   8/11: POD4 R vert takedown. EVD at 5cm H2O, ASHA overnight. neuro stable.   Febrile 104. depakote increased to q6. NCHCT stable. EVD lowered to 0. Lasix 20 given for dieuresis, UOP over 2 liters. Sedation given for a-line placement, BP drop needing levo.  8/12: POD5 R vert takedown. EVD at 0cm H2O. ASHA overnight, neuro stable. Precedex being weaned off. Pending IVCF with vascular today.  8/13: POD6 R vert takedown. EVD open at 0cmH2O. ASHA overnight. Neuro exam stable. Mottled skin on the left lower extremity improving. Started on Bromocriptine 15mg Q8.   8/14: POD7. EVD open at 0. 1L bolus given for euvolemia o/n. neuro stable. CTH stable. ENT scoped vocal cords, +R vocal cord paresis, NTD. started on zosyn empirically.   8/15: POD8. EVD open at 0. ASHA o/n, neuro stable. Pt developed increased work of breathing, unable to clear her secretions, received racemic epi and multiple nebulizer treatments, still with stridor. Patient re-intubated at bedside, on full vent support.   8/16: CTH/CTA head/neck/CT chest performed o/n for worsened exam, R gaze preference, sluggish pupils. +worsened uncal herniation, hydro, vasospasm in b/l PCAs, L vert, basilar arteries. preop angio today, restarted on 3% for Na goal 145-150  8/17: POD10. Overnight Pt remains on levophed drip for SBP goal 180-220. Also remains on propofol drip. EVD open at -5cmH2O. ICPs WNL. Febrile 101F and pancultured. CTH today shows slightly smaller ventricles.   8/18: POD11 R vert takedown. POD1 angio IA verpamil. Overnight, Pt noted to have downward gaze to the right and not following commands. Taken for stat head CT which is stable. Pupils also noted to be aniscoric left pupil 4mm and brisk and right 2mm brisk. Mannitol 50g given. Ceribell eeg placed for concern of subclinical seizures, so far appears negative for seizures. 1L NS o/n and 1.5L NS bolus in AM for euvolemia. vanc/zosyn stopped. 250cc 3% bolus and 250cc albumin given in AM. Brief seizure to L frontal lobe this AM on EEG, given 2g valproic acid and dose increased to 1g q8. Vimpat 100mg BID started.  In afternoon patient self-extubated, placed on NRB with mucomyst, 3% inhalation and duonebs. 3% at 50 d/c'd.  8/19: POD 12. Remains on VEEG. Axillary A line placed. Salt tabs d/c'd, florinef decreased to 0.1mg, EVD @ -5cm H2O, now draining 20cc/hr. 250 albumin and 500 NS boluses on dayshift, 1 L LR bolus  8/20: POD 13. ASHA. on VEEG, no seizures noted. Pending VPA level. 500 NS, 250 albumin. Febrile, pancultured. EEG d/c'ed.   8/21: BD14, POD14. ASHA overnight, EVD @ -5. s/p 1L bolus for euvolemia  8/22: BD #15: continued on levo, hypercoagulabitly profile pending, EVD @ -5, euvolemia, extubated, amantadine added, free water started for hypernatremia     Past Medical History:  s/p gastric sleeve surgery  Allergies:  Allergy Status Unknown  Home Meds:    Current Meds:  MEDICATIONS  (STANDING):  amantadine Syrup 200 milliGRAM(s) Oral every 12 hours  aspirin  chewable 81 milliGRAM(s) Oral daily  bromocriptine Capsule 15 milliGRAM(s) Oral every 8 hours  enoxaparin Injectable 40 milliGRAM(s) SubCutaneous every 12 hours  insulin lispro (ADMELOG) corrective regimen sliding scale   SubCutaneous every 6 hours  lactated ringers. 1000 milliLiter(s) (50 mL/Hr) IV Continuous <Continuous>  methylphenidate 10 milliGRAM(s) Oral daily  norepinephrine Infusion 0.05 MICROgram(s)/kG/Min (4.79 mL/Hr) IV Continuous <Continuous>  polyethylene glycol 3350 17 Gram(s) Oral daily  senna 2 Tablet(s) Oral at bedtime  sodium chloride 3%  Inhalation 4 milliLiter(s) Inhalation every 6 hours  valproate sodium IVPB 1000 milliGRAM(s) IV Intermittent every 8 hours    MEDICATIONS  (PRN):  acetaminophen    Suspension .. 650 milliGRAM(s) Oral every 6 hours PRN Temp greater or equal to 38C (100.4F), Mild Pain (1 - 3)  bisacodyl Suppository 10 milliGRAM(s) Rectal daily PRN Constipation  ondansetron Injectable 4 milliGRAM(s) IV Push every 6 hours PRN Nausea and/or Vomiting    PHYSICAL EXAMINATION    ICU Vital Signs Last 24 Hrs  T(C): 37.3 (22 Aug 2021 05:12), Max: 38 (21 Aug 2021 19:00)  T(F): 99.1 (22 Aug 2021 05:12), Max: 100.4 (21 Aug 2021 19:00)  HR: 97 (22 Aug 2021 05:00) (85 - 106)  BP: 210/104 (22 Aug 2021 05:00) (163/94 - 210/104)  BP(mean): 147 (22 Aug 2021 05:00) (112 - 151)  ABP: 189/99 (22 Aug 2021 05:00) (160/86 - 210/116)  ABP(mean): 133 (22 Aug 2021 05:00) (110 - 154)  RR: 15 (22 Aug 2021 05:00) (15 - 20)  SpO2: 99% (22 Aug 2021 05:00) (95% - 100%)    AOx1, hypophonic, face symmetric. OEs, R gaze preference does not cross midline, R pupil 3mm sluggish, L pupil 4 mm sluggish,   squeezes to command b/l UE, but will smile and wiggle toes to command, moves b/l UE spontaneously and strong (L>R),   BLE spontaneous and antigravity, weak cough    EENT:  anicteric  CARDIOVASCULAR: (+) S1 S2, normal rate and regular rhythm  PULMONARY: clear to auscultation bilaterally  ABDOMEN: soft, nontender with normoactive bowel sounds  EXTREMITIES: no edema  SKIN: no rash    I/O's    08-20-21 @ 07:01  -  08-21-21 @ 07:00  --------------------------------------------------------  IN: 4414.5 mL / OUT: 5525 mL / NET: -1110.5 mL    08-21-21 @ 07:01  -  08-22-21 @ 06:46  --------------------------------------------------------  IN: 3930.5 mL / OUT: 1411 mL / NET: 2519.5 mL    LABS:                        9.2    12.92 )-----------( 378      ( 22 Aug 2021 05:41 )             32.1     08-22    159<H>  |  116<H>  |  14  ----------------------------<  143<H>  3.0<L>   |  33<H>  |  0.74    Ca    9.6      22 Aug 2021 05:41  Phos  4.4     08-22  Mg     2.2     08-22    TPro  6.4  /  Alb  4.4  /  TBili  0.4  /  DBili  x   /  AST  29  /  ALT  25  /  AlkPhos  54  08-20    CAPILLARY BLOOD GLUCOSE    POCT Blood Glucose.: 143 mg/dL (22 Aug 2021 06:24)  POCT Blood Glucose.: 151 mg/dL (21 Aug 2021 23:10)  POCT Blood Glucose.: 130 mg/dL (21 Aug 2021 16:53)  POCT Blood Glucose.: 118 mg/dL (21 Aug 2021 11:55)    Culture - Sputum (collected 08-20-21 @ 12:03)  Source: .Sputum Sputum  Gram Stain (08-20-21 @ 16:29):    Numerous epithelial cells    Few White blood cells    Moderate Gram positive cocci in pairs, chains and clusters    Rare Gram Positive Rods    Rare Gram Negative Rods  Final Report (08-20-21 @ 16:29):    Sputum specimen rejected.  Microscopic examination indicates    oropharyngeal contamination.  Please repeat.    Culture - CSF with Gram Stain (collected 08-20-21 @ 05:32)  Source: .CSF CSF  Gram Stain (08-20-21 @ 05:50):    No White blood cells    No organisms seen    Culture - Sputum . (08.17.21 @ 08:05)    Gram Stain:   No epithelial cells seen  Few White blood cells  No organisms seen    Specimen Source: .Sputum Sputum    Culture Results:   Rare Normal Respiratory Mely present to date    Culture - Blood (08.17.21 @ 01:45)    Specimen Source: .Blood Blood X 2    Culture Results:   No growth at 1 day.    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 Stable ischemic changes within the right cerebellum. Right-sided EVD catheter in place with slight increase in ventricular size. Interval improvement in the intraventricular hemorrhage. Evolving areas of acute/subacute ischemia within the right cerebellum.    CTA (08.19.2021) -  Interval improvement in the caliber of the proximal vertebral artery with progression of a vasospasm involving the mid to distal basilar artery. Interval improvement in the vasospasm involving the left vertebral artery. Persistent vasospasm involving the posterior cerebral and superior cerebellar arteries. Interval improvement in the vasospasm involving the right supraclinoid ICA as well as the left A1 and M1 segments. Progression of vasospasm involving the right M1 segment.    Cerebral Angiogram (08.18.2021 - improvement in vasospasm, IA verapamil to posterior circulation  Cerebral Angiogram (08.17.2021 - Left vertebral artery dissection demonstrates continued moderate to severe cerebral vasospasm of the distal let vert and basilar artery, some what improved caliber of proximal left vert. 15mg IA Verapamil injected over 10 minutes with mild improvement of posterior circulation post treatment. Right ICA injection with continued mild supraclinoid vasospasm, 10mg of IA Verapamil injected.  Cerebral Angiogram (08.16.2021) - Left vertebral injection demonstrates moderate diffuse vasospasm of left vertebral artery, basilar artery and posterior circulation including PCAs. 15mg Verapamil injected via left vert with mild radiographic improvement post treatment. Right ICA injection with mild supraclinoid spasm/narrowing, otherwise no vasospasm appreciated in anterior circulation.    CT (08.18.2021) - No significant interval change in right superior and lateral cerebellar hypodensities.  CT (08.15.2021) - 1. Worsening the uncal herniation, with effacement of the bilateral suprasellar and perimesencephalic cisterns.  2. Redistribution of intraventricular hemorrhage, as above. Redemonstration of right EVD, with distal tip in the right frontal horn, near the foramen of Monro. Worsening hydrocephalus.  CTA - 1. Multifocal punctate and short segment stenoses, as above, with several areas of narrowing new as compared to recent CTA dated 08/09/2021. In the posterior circulation, stenoses are identified within the V4 left vertebral artery, the basilar artery, and in the bilateral PCA, more significant on the left.  Within the anterior circulation, stenosis are identified in the right supraclinoid C6 ICA as well as the proximal M1 MCA. Findings are most compatible with vasospasm.  2. In particular, the distal V4 right vertebral artery, which was visualized, though diminutive, on prior CTA dated 08/09/2021, does not fill with contrast on the present examination. The basilar artery demonstrates multiple stenoses, and is significantly smaller in caliber when compared to the prior CTA.    Other imaging:  Duplex - 1.  Since 8/9/2021, there is new deep venous thrombosis in the bilateral peroneal veins.  2.  Redemonstration of deep vein thrombosis in a right intramuscular calf vein.    EVD (d14) -5 cm, 15 mL/h, ICP < 5    [Code] Full  [Goals] Aggressive  [Disposion] ICU     ==============================================   NEUROCRITICAL CARE ATTENDING NOTE (Sunday, August 22, 2021)  ==============================================    LUDMILA PRIETO   MRN-[id number]  Summary:  45y/F with h/o recent gastric sleeve (08/04 [facility], Dr. Crisostomo ), fibromyalgia, thyroid dysfunction, PTSD, depression, anxiety.  Presented with seizures at home - brought to [place], with 1 more episode of seizure en route.  Intubated, CT showed SAH with IV extension, casted IV, hydrocephalus, given mannitol levetiracetam 6mg ativan, transferred to [facility].    Hospital Course:   8/7: Tx from [place] for SAH. Intubated. R frontal EVD placed, central line and a line placed. 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   8/8: POD1 s/p angio with R vert takedown, extubated, desatting on aerosol mask, required extensive suctioning, 3% nebs, chest PT, started on low dose precedex drip for restlessness/agitation, ABG stable. NGT placed. TF started with goal 20 pending nutrition recs. 3% stopped for Na 150. Ceribell EEG negative and d/c'ed.   8/9 Overnight, new Right pronator drift and right gaze preference that can't cross midline, Repeat CTH demonstrated stable vents, CTA head and neck unremarkable for spasm, hypoattenuation of right cerebellum edema vs. infarct.  8/10: POD3 R vert takedown, EVD open at [id number]. ASHA overnight, neuro stable. CTH with increased hydro, CTA head/neck with mild basilar spasm, but concern for PE. 1/2 am D50 for hypoglycemia. 1L bolus then 100 cc/hr, PM rounds for net negative fluid balance and mild vasospasm on CTA.   8/11: POD4 R vert takedown. EVD at 5cm H2O, ASHA overnight. neuro stable.   Febrile 104. depakote increased to q6. NCHCT stable. EVD lowered to 0. Lasix 20 given for dieuresis, UOP over 2 liters. Sedation given for a-line placement, BP drop needing levo.  8/12: POD5 R vert takedown. EVD at 0cm H2O. ASHA overnight, neuro stable. Precedex being weaned off. Pending IVCF with vascular today.  8/13: POD6 R vert takedown. EVD open at 0cmH2O. ASHA overnight. Neuro exam stable. Mottled skin on the left lower extremity improving. Started on Bromocriptine 15mg Q8.   8/14: POD7. EVD open at 0. 1L bolus given for euvolemia o/n. neuro stable. CTH stable. ENT scoped vocal cords, +R vocal cord paresis, NTD. started on zosyn empirically.   8/15: POD8. EVD open at 0. ASHA o/n, neuro stable. Pt developed increased work of breathing, unable to clear her secretions, received racemic epi and multiple nebulizer treatments, still with stridor. Patient re-intubated at bedside, on full vent support.   8/16: CTH/CTA head/neck/CT chest performed o/n for worsened exam, R gaze preference, sluggish pupils. +worsened uncal herniation, hydro, vasospasm in b/l PCAs, L vert, basilar arteries. preop angio today, restarted on 3% for Na goal 145-150  8/17: POD10. Overnight Pt remains on levophed drip for SBP goal 180-220. Also remains on propofol drip. EVD open at -5cmH2O. ICPs WNL. Febrile 101F and pancultured. CTH today shows slightly smaller ventricles.   8/18: POD11 R vert takedown. POD1 angio IA verpamil. Overnight, Pt noted to have downward gaze to the right and not following commands. Taken for stat head CT which is stable. Pupils also noted to be aniscoric left pupil 4mm and brisk and right 2mm brisk. Mannitol 50g given. Ceribell eeg placed for concern of subclinical seizures, so far appears negative for seizures. 1L NS o/n and 1.5L NS bolus in AM for euvolemia. vanc/zosyn stopped. 250cc 3% bolus and 250cc albumin given in AM. Brief seizure to L frontal lobe this AM on EEG, given 2g valproic acid and dose increased to 1g q8. Vimpat 100mg BID started.  In afternoon patient self-extubated, placed on NRB with mucomyst, 3% inhalation and duonebs. 3% at 50 d/c'd.  8/19: POD 12. Remains on VEEG. Axillary A line placed. Salt tabs d/c'd, florinef decreased to 0.1mg, EVD @ -5cm H2O, now draining 20cc/hr. 250 albumin and 500 NS boluses on dayshift, 1 L LR bolus  8/20: POD 13. ASHA. on VEEG, no seizures noted. Pending VPA level. 500 NS, 250 albumin. Febrile, pancultured. EEG d/c'ed.   8/21: BD14, POD14. ASHA overnight, EVD @ -5. s/p 1L bolus for euvolemia  8/22: BD #15: continued on levo, hypercoagulabitly profile pending, EVD @ -5, euvolemia, extubated, amantadine added, free water started for hypernatremia     Past Medical History:  s/p gastric sleeve surgery  Allergies:  Allergy Status Unknown  Home Meds:    Current Meds:  MEDICATIONS  (STANDING):  amantadine Syrup 200 milliGRAM(s) Oral every 12 hours  aspirin  chewable 81 milliGRAM(s) Oral daily  bromocriptine Capsule 15 milliGRAM(s) Oral every 8 hours  enoxaparin Injectable 40 milliGRAM(s) SubCutaneous every 12 hours  insulin lispro (ADMELOG) corrective regimen sliding scale   SubCutaneous every 6 hours  lactated ringers. 1000 milliLiter(s) (50 mL/Hr) IV Continuous <Continuous>  methylphenidate 10 milliGRAM(s) Oral daily  norepinephrine Infusion 0.05 MICROgram(s)/kG/Min (4.79 mL/Hr) IV Continuous <Continuous>  polyethylene glycol 3350 17 Gram(s) Oral daily  senna 2 Tablet(s) Oral at bedtime  sodium chloride 3%  Inhalation 4 milliLiter(s) Inhalation every 6 hours  valproate sodium IVPB 1000 milliGRAM(s) IV Intermittent every 8 hours    MEDICATIONS  (PRN):  acetaminophen    Suspension .. 650 milliGRAM(s) Oral every 6 hours PRN Temp greater or equal to 38C (100.4F), Mild Pain (1 - 3)  bisacodyl Suppository 10 milliGRAM(s) Rectal daily PRN Constipation  ondansetron Injectable 4 milliGRAM(s) IV Push every 6 hours PRN Nausea and/or Vomiting    PHYSICAL EXAMINATION    ICU Vital Signs Last 24 Hrs  T(C): 37.3 (22 Aug 2021 05:12), Max: 38 (21 Aug 2021 19:00)  T(F): 99.1 (22 Aug 2021 05:12), Max: 100.4 (21 Aug 2021 19:00)  HR: 97 (22 Aug 2021 05:00) (85 - 106)  BP: 210/104 (22 Aug 2021 05:00) (163/94 - 210/104)  BP(mean): 147 (22 Aug 2021 05:00) (112 - 151)  ABP: 189/99 (22 Aug 2021 05:00) (160/86 - 210/116)  ABP(mean): 133 (22 Aug 2021 05:00) (110 - 154)  RR: 15 (22 Aug 2021 05:00) (15 - 20)  SpO2: 99% (22 Aug 2021 05:00) (95% - 100%)    AOx1, hypophonic, face symmetric. OEs, R gaze preference does not cross midline, R pupil 3mm sluggish, L pupil 4 mm sluggish,   squeezes to command b/l UE, but will smile and wiggle toes to command, moves b/l UE spontaneously and strong (L>R),   BLE spontaneous and antigravity, weak cough    EENT:  anicteric  CARDIOVASCULAR: (+) S1 S2, normal rate and regular rhythm  PULMONARY: clear to auscultation bilaterally  ABDOMEN: soft, nontender with normoactive bowel sounds  EXTREMITIES: no edema  SKIN: no rash    I/O's    08-20-21 @ 07:01  -  08-21-21 @ 07:00  --------------------------------------------------------  IN: 4414.5 mL / OUT: 5525 mL / NET: -1110.5 mL    08-21-21 @ 07:01  -  08-22-21 @ 06:46  --------------------------------------------------------  IN: 3930.5 mL / OUT: 1411 mL / NET: 2519.5 mL    LABS:                        9.2    12.92 )-----------( 378      ( 22 Aug 2021 05:41 )             32.1     08-22    159<H>  |  116<H>  |  14  ----------------------------<  143<H>  3.0<L>   |  33<H>  |  0.74    Ca    9.6      22 Aug 2021 05:41  Phos  4.4     08-22  Mg     2.2     08-22    TPro  6.4  /  Alb  4.4  /  TBili  0.4  /  DBili  x   /  AST  29  /  ALT  25  /  AlkPhos  54  08-20    CAPILLARY BLOOD GLUCOSE    POCT Blood Glucose.: 143 mg/dL (22 Aug 2021 06:24)  POCT Blood Glucose.: 151 mg/dL (21 Aug 2021 23:10)  POCT Blood Glucose.: 130 mg/dL (21 Aug 2021 16:53)  POCT Blood Glucose.: 118 mg/dL (21 Aug 2021 11:55)    Culture - Sputum (collected 08-20-21 @ 12:03)  Source: .Sputum Sputum  Gram Stain (08-20-21 @ 16:29):    Numerous epithelial cells    Few White blood cells    Moderate Gram positive cocci in pairs, chains and clusters    Rare Gram Positive Rods    Rare Gram Negative Rods  Final Report (08-20-21 @ 16:29):    Sputum specimen rejected.  Microscopic examination indicates    oropharyngeal contamination.  Please repeat.    Culture - CSF with Gram Stain (collected 08-20-21 @ 05:32)  Source: .CSF CSF  Gram Stain (08-20-21 @ 05:50):    No White blood cells    No organisms seen    Culture - Sputum . (08.17.21 @ 08:05)    Gram Stain:   No epithelial cells seen  Few White blood cells  No organisms seen    Specimen Source: .Sputum Sputum    Culture Results:   Rare Normal Respiratory Mely present to date    Culture - Blood (08.17.21 @ 01:45)    Specimen Source: .Blood Blood X 2    Culture Results:   No growth at 1 day.    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 Stable ischemic changes within the right cerebellum. Right-sided EVD catheter in place with slight increase in ventricular size. Interval improvement in the intraventricular hemorrhage. Evolving areas of acute/subacute ischemia within the right cerebellum.    CTA (08.19.2021) -  Interval improvement in the caliber of the proximal vertebral artery with progression of a vasospasm involving the mid to distal basilar artery. Interval improvement in the vasospasm involving the left vertebral artery. Persistent vasospasm involving the posterior cerebral and superior cerebellar arteries. Interval improvement in the vasospasm involving the right supraclinoid ICA as well as the left A1 and M1 segments. Progression of vasospasm involving the right M1 segment.    Cerebral Angiogram (08.18.2021 - improvement in vasospasm, IA verapamil to posterior circulation  Cerebral Angiogram (08.17.2021 - Left vertebral artery dissection demonstrates continued moderate to severe cerebral vasospasm of the distal let vert and basilar artery, some what improved caliber of proximal left vert. 15mg IA Verapamil injected over 10 minutes with mild improvement of posterior circulation post treatment. Right ICA injection with continued mild supraclinoid vasospasm, 10mg of IA Verapamil injected.  Cerebral Angiogram (08.16.2021) - Left vertebral injection demonstrates moderate diffuse vasospasm of left vertebral artery, basilar artery and posterior circulation including PCAs. 15mg Verapamil injected via left vert with mild radiographic improvement post treatment. Right ICA injection with mild supraclinoid spasm/narrowing, otherwise no vasospasm appreciated in anterior circulation.    CT (08.18.2021) - No significant interval change in right superior and lateral cerebellar hypodensities.  CT (08.15.2021) - 1. Worsening the uncal herniation, with effacement of the bilateral suprasellar and perimesencephalic cisterns.  2. Redistribution of intraventricular hemorrhage, as above. Redemonstration of right EVD, with distal tip in the right frontal horn, near the foramen of Monro. Worsening hydrocephalus.  CTA - 1. Multifocal punctate and short segment stenoses, as above, with several areas of narrowing new as compared to recent CTA dated 08/09/2021. In the posterior circulation, stenoses are identified within the V4 left vertebral artery, the basilar artery, and in the bilateral PCA, more significant on the left.  Within the anterior circulation, stenosis are identified in the right supraclinoid C6 ICA as well as the proximal M1 MCA. Findings are most compatible with vasospasm.  2. In particular, the distal V4 right vertebral artery, which was visualized, though diminutive, on prior CTA dated 08/09/2021, does not fill with contrast on the present examination. The basilar artery demonstrates multiple stenoses, and is significantly smaller in caliber when compared to the prior CTA.    Other imaging:  Duplex - 1.  Since 8/9/2021, there is new deep venous thrombosis in the bilateral peroneal veins.  2.  Redemonstration of deep vein thrombosis in a right intramuscular calf vein.    EVD (d15) -5 cm, 8-15 mL/h, ICP 1-8    [Code] Full  [Goals] Aggressive  [Disposion] ICU     ==============================================   NEUROCRITICAL CARE ATTENDING NOTE (Sunday, August 22, 2021)  ==============================================    LUDMILA PRIETO   MRN-[id number]  Summary:  45y/F with h/o recent gastric sleeve (08/04 [facility], Dr. Crisostomo ), fibromyalgia, thyroid dysfunction, PTSD, depression, anxiety.  Presented with seizures at home - brought to [place], with 1 more episode of seizure en route.  Intubated, CT showed SAH with IV extension, casted IV, hydrocephalus, given mannitol levetiracetam 6mg ativan, transferred to [facility].    Hospital Course:   8/7: Tx from [place] for SAH. Intubated. R frontal EVD placed, central line and a line placed. 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   8/8: POD1 s/p angio with R vert takedown, extubated, desatting on aerosol mask, required extensive suctioning, 3% nebs, chest PT, started on low dose precedex drip for restlessness/agitation, ABG stable. NGT placed. TF started with goal 20 pending nutrition recs. 3% stopped for Na 150. Ceribell EEG negative and d/c'ed.   8/9 Overnight, new Right pronator drift and right gaze preference that can't cross midline, Repeat CTH demonstrated stable vents, CTA head and neck unremarkable for spasm, hypoattenuation of right cerebellum edema vs. infarct.  8/10: POD3 R vert takedown, EVD open at [id number]. ASHA overnight, neuro stable. CTH with increased hydro, CTA head/neck with mild basilar spasm, but concern for PE. 1/2 am D50 for hypoglycemia. 1L bolus then 100 cc/hr, PM rounds for net negative fluid balance and mild vasospasm on CTA.   8/11: POD4 R vert takedown. EVD at 5cm H2O, ASHA overnight. neuro stable.   Febrile 104. depakote increased to q6. NCHCT stable. EVD lowered to 0. Lasix 20 given for dieuresis, UOP over 2 liters. Sedation given for a-line placement, BP drop needing levo.  8/12: POD5 R vert takedown. EVD at 0cm H2O. ASHA overnight, neuro stable. Precedex being weaned off. Pending IVCF with vascular today.  8/13: POD6 R vert takedown. EVD open at 0cmH2O. ASHA overnight. Neuro exam stable. Mottled skin on the left lower extremity improving. Started on Bromocriptine 15mg Q8.   8/14: POD7. EVD open at 0. 1L bolus given for euvolemia o/n. neuro stable. CTH stable. ENT scoped vocal cords, +R vocal cord paresis, NTD. started on zosyn empirically.   8/15: POD8. EVD open at 0. ASHA o/n, neuro stable. Pt developed increased work of breathing, unable to clear her secretions, received racemic epi and multiple nebulizer treatments, still with stridor. Patient re-intubated at bedside, on full vent support.   8/16: CTH/CTA head/neck/CT chest performed o/n for worsened exam, R gaze preference, sluggish pupils. +worsened uncal herniation, hydro, vasospasm in b/l PCAs, L vert, basilar arteries. preop angio today, restarted on 3% for Na goal 145-150  8/17: POD10. Overnight Pt remains on levophed drip for SBP goal 180-220. Also remains on propofol drip. EVD open at -5cmH2O. ICPs WNL. Febrile 101F and pancultured. CTH today shows slightly smaller ventricles.   8/18: POD11 R vert takedown. POD1 angio IA verpamil. Overnight, Pt noted to have downward gaze to the right and not following commands. Taken for stat head CT which is stable. Pupils also noted to be aniscoric left pupil 4mm and brisk and right 2mm brisk. Mannitol 50g given. Ceribell eeg placed for concern of subclinical seizures, so far appears negative for seizures. 1L NS o/n and 1.5L NS bolus in AM for euvolemia. vanc/zosyn stopped. 250cc 3% bolus and 250cc albumin given in AM. Brief seizure to L frontal lobe this AM on EEG, given 2g valproic acid and dose increased to 1g q8. Vimpat 100mg BID started.  In afternoon patient self-extubated, placed on NRB with mucomyst, 3% inhalation and duonebs. 3% at 50 d/c'd.  8/19: POD 12. Remains on VEEG. Axillary A line placed. Salt tabs d/c'd, florinef decreased to 0.1mg, EVD @ -5cm H2O, now draining 20cc/hr. 250 albumin and 500 NS boluses on dayshift, 1 L LR bolus  8/20: POD 13. ASHA. on VEEG, no seizures noted. Pending VPA level. 500 NS, 250 albumin. Febrile, pancultured. EEG d/c'ed.   8/21: BD14, POD14. ASHA overnight, EVD @ -5. s/p 1L bolus for euvolemia  8/22: BD #15: continued on levo, hypercoagulability profile pending, EVD 15 mL/h, euvolemia, extubated, amantadine added, free water started for hypernatremia; continuing LOC fluctuations (stable vasospasm), frequent suctioning; empiric ceftriaxone started    Past Medical History:  s/p gastric sleeve surgery  Allergies:  Allergy Status Unknown  Home Meds:    Current Meds:  MEDICATIONS  (STANDING):  acetylcysteine 20%  Inhalation 4 milliLiter(s) Inhalation every 6 hours  albuterol/ipratropium for Nebulization 3 milliLiter(s) Nebulizer every 6 hours  amantadine Syrup 200 milliGRAM(s) Oral every 12 hours  aspirin  chewable 81 milliGRAM(s) Oral daily  bromocriptine Capsule 15 milliGRAM(s) Oral every 8 hours  cefTRIAXone   IVPB 1000 milliGRAM(s) IV Intermittent every 12 hours  enoxaparin Injectable 40 milliGRAM(s) SubCutaneous every 12 hours  insulin lispro (ADMELOG) corrective regimen sliding scale   SubCutaneous every 6 hours  lacosamide 100 milliGRAM(s) Oral two times a day  lactated ringers. 1000 milliLiter(s) (50 mL/Hr) IV Continuous <Continuous>  methylphenidate 10 milliGRAM(s) Oral daily  norepinephrine Infusion 0.05 MICROgram(s)/kG/Min (4.79 mL/Hr) IV Continuous <Continuous>  polyethylene glycol 3350 17 Gram(s) Oral daily  senna 2 Tablet(s) Oral at bedtime  sodium chloride 3%  Inhalation 4 milliLiter(s) Inhalation every 6 hours  valproate sodium IVPB 1000 milliGRAM(s) IV Intermittent every 8 hours    MEDICATIONS  (PRN):  acetaminophen    Suspension .. 650 milliGRAM(s) Oral every 6 hours PRN Temp greater or equal to 38C (100.4F), Mild Pain (1 - 3)  bisacodyl Suppository 10 milliGRAM(s) Rectal daily PRN Constipation  ondansetron Injectable 4 milliGRAM(s) IV Push every 6 hours PRN Nausea and/or Vomiting    PHYSICAL EXAMINATION    ICU Vital Signs Last 24 Hrs  T(C): 37.7 (22 Aug 2021 20:00), Max: 38.3 (22 Aug 2021 09:00)  T(F): 99.8 (22 Aug 2021 20:00), Max: 101 (22 Aug 2021 09:00)  HR: 96 (22 Aug 2021 21:00) (85 - 107)  BP: 211/100 (22 Aug 2021 21:00) (151/79 - 215/104)  BP(mean): 144 (22 Aug 2021 21:00) (108 - 151)  ABP: 197/107 (22 Aug 2021 21:00) (148/80 - 210/116)  ABP(mean): 139 (22 Aug 2021 21:00) (103 - 154)  RR: 20 (22 Aug 2021 19:00) (15 - 21)  SpO2: 98% (22 Aug 2021 21:00) (94% - 100%)    AOx1, hypophonic, face symmetric. OEs, R gaze preference (improved), R pupil 4.5 mm sluggish, L pupil 4 mm sluggish,   squeezes to command b/l UE (periodic), decreased R UE movement, periodic L UE obeying, BLE spontaneous and antigravity, weak cough    EENT:  anicteric  CARDIOVASCULAR: (+) S1 S2, normal rate and regular rhythm  PULMONARY: decreased to bases, no adventitia  ABDOMEN: soft, nontender with normoactive bowel sounds  EXTREMITIES: no edema  SKIN: no rash    I/O's    08-20-21 @ 07:01  -  08-21-21 @ 07:00  --------------------------------------------------------  IN: 4414.5 mL / OUT: 5525 mL / NET: -1110.5 mL    08-21-21 @ 07:01  -  08-22-21 @ 06:46  --------------------------------------------------------  IN: 3930.5 mL / OUT: 1411 mL / NET: 2519.5 mL    LABS:                        9.2    12.92 )-----------( 378      ( 22 Aug 2021 05:41 )             32.1     08-22    159<H>  |  116<H>  |  14  ----------------------------<  143<H>  3.0<L>   |  33<H>  |  0.74    Ca    9.6      22 Aug 2021 05:41  Phos  4.4     08-22  Mg     2.2     08-22    TPro  6.4  /  Alb  4.4  /  TBili  0.4  /  DBili  x   /  AST  29  /  ALT  25  /  AlkPhos  54  08-20    CAPILLARY BLOOD GLUCOSE    POCT Blood Glucose.: 143 mg/dL (22 Aug 2021 06:24)  POCT Blood Glucose.: 151 mg/dL (21 Aug 2021 23:10)  POCT Blood Glucose.: 130 mg/dL (21 Aug 2021 16:53)  POCT Blood Glucose.: 118 mg/dL (21 Aug 2021 11:55)    Culture - Sputum (collected 08-20-21 @ 12:03)  Source: .Sputum Sputum  Gram Stain (08-20-21 @ 16:29):    Numerous epithelial cells    Few White blood cells    Moderate Gram positive cocci in pairs, chains and clusters    Rare Gram Positive Rods    Rare Gram Negative Rods  Final Report (08-20-21 @ 16:29):    Sputum specimen rejected.  Microscopic examination indicates    oropharyngeal contamination.  Please repeat.    Culture - CSF with Gram Stain (collected 08-20-21 @ 05:32)  Source: .CSF CSF  Gram Stain (08-20-21 @ 05:50):    No White blood cells    No organisms seen    Culture - Sputum . (08.17.21 @ 08:05)    Gram Stain:   No epithelial cells seen  Few White blood cells  No organisms seen    Specimen Source: .Sputum Sputum    Culture Results:   Rare Normal Respiratory Mely present to date    Culture - Blood (08.17.21 @ 01:45)    Specimen Source: .Blood Blood X 2    Culture Results:   No growth at 1 day.    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 Stable ischemic changes within the right cerebellum. Right-sided EVD catheter in place with slight increase in ventricular size. Interval improvement in the intraventricular hemorrhage. Evolving areas of acute/subacute ischemia within the right cerebellum.    CTA (08.19.2021) -  Interval improvement in the caliber of the proximal vertebral artery with progression of a vasospasm involving the mid to distal basilar artery. Interval improvement in the vasospasm involving the left vertebral artery. Persistent vasospasm involving the posterior cerebral and superior cerebellar arteries. Interval improvement in the vasospasm involving the right supraclinoid ICA as well as the left A1 and M1 segments. Progression of vasospasm involving the right M1 segment.    Cerebral Angiogram (08.18.2021 - improvement in vasospasm, IA verapamil to posterior circulation  Cerebral Angiogram (08.17.2021 - Left vertebral artery dissection demonstrates continued moderate to severe cerebral vasospasm of the distal let vert and basilar artery, some what improved caliber of proximal left vert. 15mg IA Verapamil injected over 10 minutes with mild improvement of posterior circulation post treatment. Right ICA injection with continued mild supraclinoid vasospasm, 10mg of IA Verapamil injected.  Cerebral Angiogram (08.16.2021) - Left vertebral injection demonstrates moderate diffuse vasospasm of left vertebral artery, basilar artery and posterior circulation including PCAs. 15mg Verapamil injected via left vert with mild radiographic improvement post treatment. Right ICA injection with mild supraclinoid spasm/narrowing, otherwise no vasospasm appreciated in anterior circulation.    CT (08.18.2021) - No significant interval change in right superior and lateral cerebellar hypodensities.  CT (08.15.2021) - 1. Worsening the uncal herniation, with effacement of the bilateral suprasellar and perimesencephalic cisterns.  2. Redistribution of intraventricular hemorrhage, as above. Redemonstration of right EVD, with distal tip in the right frontal horn, near the foramen of Monro. Worsening hydrocephalus.  CTA - 1. Multifocal punctate and short segment stenoses, as above, with several areas of narrowing new as compared to recent CTA dated 08/09/2021. In the posterior circulation, stenoses are identified within the V4 left vertebral artery, the basilar artery, and in the bilateral PCA, more significant on the left.  Within the anterior circulation, stenosis are identified in the right supraclinoid C6 ICA as well as the proximal M1 MCA. Findings are most compatible with vasospasm.  2. In particular, the distal V4 right vertebral artery, which was visualized, though diminutive, on prior CTA dated 08/09/2021, does not fill with contrast on the present examination. The basilar artery demonstrates multiple stenoses, and is significantly smaller in caliber when compared to the prior CTA.    Other imaging:  Duplex - 1.  Since 8/9/2021, there is new deep venous thrombosis in the bilateral peroneal veins.  2.  Redemonstration of deep vein thrombosis in a right intramuscular calf vein.    EVD (d15) 15 mL/h    [Code] Full  [Goals] Aggressive  [Disposion] ICU

## 2021-08-22 NOTE — PROVIDER CONTACT NOTE (OTHER) - BACKGROUND
secretions. dr dickerson came to bedside to assess pt. dr dickerson draining 25 ml from evd. pt exam improved pt withdrawing from pain on all 4 extremities

## 2021-08-22 NOTE — PROVIDER CONTACT NOTE (OTHER) - SITUATION
pt lethargic arousable only by vigorous stimulations pupils 5 b/l and brisk. not withdrawing from pain on all extremities evd patent temp 100.8 and requiring deep nasal suctioning for thick white

## 2021-08-23 NOTE — PROGRESS NOTE ADULT - SUBJECTIVE AND OBJECTIVE BOX
==============================================   NEUROCRITICAL CARE ATTENDING NOTE (Monday, August 23, 2021)  ==============================================    LUDMILA PRIETO   MRN-[id number]  Summary:  45y/F with h/o recent gastric sleeve (08/04 [facility], Dr. Crisostomo ), fibromyalgia, thyroid dysfunction, PTSD, depression, anxiety.  Presented with seizures at home - brought to [place], with 1 more episode of seizure en route.  Intubated, CT showed SAH with IV extension, casted IV, hydrocephalus, given mannitol levetiracetam 6mg ativan, transferred to [facility].    Hospital Course:   8/7: Tx from [place] for SAH. Intubated. R frontal EVD placed, central line and a line placed. 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   8/8: POD1 s/p angio with R vert takedown, extubated, desatting on aerosol mask, required extensive suctioning, 3% nebs, chest PT, started on low dose precedex drip for restlessness/agitation, ABG stable. NGT placed. TF started with goal 20 pending nutrition recs. 3% stopped for Na 150. Ceribell EEG negative and d/c'ed.   8/9 Overnight, new Right pronator drift and right gaze preference that can't cross midline, Repeat CTH demonstrated stable vents, CTA head and neck unremarkable for spasm, hypoattenuation of right cerebellum edema vs. infarct.  8/10: POD3 R vert takedown, EVD open at [id number]. ASHA overnight, neuro stable. CTH with increased hydro, CTA head/neck with mild basilar spasm, but concern for PE. 1/2 am D50 for hypoglycemia. 1L bolus then 100 cc/hr, PM rounds for net negative fluid balance and mild vasospasm on CTA.   8/11: POD4 R vert takedown. EVD at 5cm H2O, ASHA overnight. neuro stable.   Febrile 104. depakote increased to q6. NCHCT stable. EVD lowered to 0. Lasix 20 given for dieuresis, UOP over 2 liters. Sedation given for a-line placement, BP drop needing levo.  8/12: POD5 R vert takedown. EVD at 0cm H2O. ASHA overnight, neuro stable. Precedex being weaned off. Pending IVCF with vascular today.  8/13: POD6 R vert takedown. EVD open at 0cmH2O. ASHA overnight. Neuro exam stable. Mottled skin on the left lower extremity improving. Started on Bromocriptine 15mg Q8.   8/14: POD7. EVD open at 0. 1L bolus given for euvolemia o/n. neuro stable. CTH stable. ENT scoped vocal cords, +R vocal cord paresis, NTD. started on zosyn empirically.   8/15: POD8. EVD open at 0. ASHA o/n, neuro stable. Pt developed increased work of breathing, unable to clear her secretions, received racemic epi and multiple nebulizer treatments, still with stridor. Patient re-intubated at bedside, on full vent support.   8/16: CTH/CTA head/neck/CT chest performed o/n for worsened exam, R gaze preference, sluggish pupils. +worsened uncal herniation, hydro, vasospasm in b/l PCAs, L vert, basilar arteries. preop angio today, restarted on 3% for Na goal 145-150  8/17: POD10. Overnight Pt remains on levophed drip for SBP goal 180-220. Also remains on propofol drip. EVD open at -5cmH2O. ICPs WNL. Febrile 101F and pancultured. CTH today shows slightly smaller ventricles.   8/18: POD11 R vert takedown. POD1 angio IA verpamil. Overnight, Pt noted to have downward gaze to the right and not following commands. Taken for stat head CT which is stable. Pupils also noted to be aniscoric left pupil 4mm and brisk and right 2mm brisk. Mannitol 50g given. Ceribell eeg placed for concern of subclinical seizures, so far appears negative for seizures. 1L NS o/n and 1.5L NS bolus in AM for euvolemia. vanc/zosyn stopped. 250cc 3% bolus and 250cc albumin given in AM. Brief seizure to L frontal lobe this AM on EEG, given 2g valproic acid and dose increased to 1g q8. Vimpat 100mg BID started.  In afternoon patient self-extubated, placed on NRB with mucomyst, 3% inhalation and duonebs. 3% at 50 d/c'd.  8/19: POD 12. Remains on VEEG. Axillary A line placed. Salt tabs d/c'd, florinef decreased to 0.1mg, EVD @ -5cm H2O, now draining 20cc/hr. 250 albumin and 500 NS boluses on dayshift, 1 L LR bolus  8/20: POD 13. ASHA. on VEEG, no seizures noted. Pending VPA level. 500 NS, 250 albumin. Febrile, pancultured. EEG d/c'ed.   8/21: BD14, POD14. ASHA overnight, EVD @ -5. s/p 1L bolus for euvolemia  8/22: BD #15: continued on levo, hypercoagulability profile pending, EVD 15 mL/h, euvolemia, extubated, amantadine added, free water started for hypernatremia; continuing LOC fluctuations (stable vasospasm), frequent suctioning; empiric ceftriaxone started    Past Medical History:  s/p gastric sleeve surgery  Allergies:  Allergy Status Unknown  Home Meds:    ICU Vital Signs Last 24 Hrs  T(C): 37.1 (23 Aug 2021 05:44), Max: 38.3 (22 Aug 2021 09:00)  T(F): 98.8 (23 Aug 2021 05:44), Max: 101 (22 Aug 2021 09:00)  HR: 92 (23 Aug 2021 06:00) (83 - 107)  BP: 192/97 (23 Aug 2021 06:00) (151/79 - 216/100)  BP(mean): 137 (23 Aug 2021 06:00) (108 - 144)  ABP: 181/107 (23 Aug 2021 06:00) (148/80 - 205/102)  ABP(mean): 135 (23 Aug 2021 06:00) (103 - 141)  RR: 20 (23 Aug 2021 06:00) (17 - 22)  SpO2: 98% (23 Aug 2021 06:00) (94% - 100%)      08-21-21 @ 07:01  -  08-22-21 @ 07:00  --------------------------------------------------------  IN: 4731.7 mL / OUT: 2532 mL / NET: 2199.7 mL    08-22-21 @ 07:01  -  08-23-21 @ 06:49  --------------------------------------------------------  IN: 5944.4 mL / OUT: 4508 mL / NET: 1436.4 mL    PHYSICAL EXAMINATION  AOx1, hypophonic, face symmetric. OEs, R gaze preference (improved), R pupil 4.5 mm sluggish, L pupil 4 mm sluggish,   squeezes to command b/l UE (periodic), decreased R UE movement, periodic L UE obeying, BLE spontaneous and antigravity, weak cough    EENT:  anicteric  CARDIOVASCULAR: (+) S1 S2, normal rate and regular rhythm  PULMONARY: decreased to bases, no adventitia  ABDOMEN: soft, nontender with normoactive bowel sounds  EXTREMITIES: no edema  SKIN: no rash      acetaminophen    Suspension .. 650 milliGRAM(s) Oral every 6 hours PRN  acetylcysteine 20%  Inhalation 4 milliLiter(s) Inhalation every 6 hours  albuterol/ipratropium for Nebulization 3 milliLiter(s) Nebulizer every 6 hours  amantadine Syrup 200 milliGRAM(s) Oral every 12 hours  aspirin  chewable 81 milliGRAM(s) Oral daily  bisacodyl Suppository 10 milliGRAM(s) Rectal daily PRN  bromocriptine Capsule 15 milliGRAM(s) Oral every 8 hours  cefTRIAXone   IVPB 1000 milliGRAM(s) IV Intermittent every 12 hours  chlorhexidine 0.12% Liquid 15 milliLiter(s) Oral Mucosa every 12 hours  chlorhexidine 2% Cloths 1 Application(s) Topical <User Schedule>  enoxaparin Injectable 40 milliGRAM(s) SubCutaneous every 12 hours  glucagon  Injectable 1 milliGRAM(s) IntraMuscular once  insulin lispro (ADMELOG) corrective regimen sliding scale   SubCutaneous every 6 hours  lacosamide 100 milliGRAM(s) Oral two times a day  lactated ringers. 1000 milliLiter(s) (50 mL/Hr) IV Continuous <Continuous>  methylphenidate 10 milliGRAM(s) Oral daily  norepinephrine Infusion 0.05 MICROgram(s)/kG/Min (4.79 mL/Hr) IV Continuous <Continuous>  ondansetron Injectable 4 milliGRAM(s) IV Push every 6 hours PRN  polyethylene glycol 3350 17 Gram(s) Oral daily  senna 2 Tablet(s) Oral at bedtime  sodium chloride 3%  Inhalation 4 milliLiter(s) Inhalation every 6 hours  valproate sodium IVPB 1000 milliGRAM(s) IV Intermittent every 8 hours                          9.3    18.13 )-----------( 381      ( 23 Aug 2021 05:46 )             32.3     08-23    157<H>  |  116<H>  |  14  ----------------------------<  167<H>  3.0<L>   |  31  |  0.57    Ca    9.3      23 Aug 2021 05:46  Phos  2.8     08-23  Mg     1.9     08-23        CAPILLARY BLOOD GLUCOSE    POCT Blood Glucose.: 143 mg/dL (22 Aug 2021 06:24)  POCT Blood Glucose.: 151 mg/dL (21 Aug 2021 23:10)  POCT Blood Glucose.: 130 mg/dL (21 Aug 2021 16:53)  POCT Blood Glucose.: 118 mg/dL (21 Aug 2021 11:55)    Culture - Sputum (collected 08-20-21 @ 12:03)  Source: .Sputum Sputum  Gram Stain (08-20-21 @ 16:29):    Numerous epithelial cells    Few White blood cells    Moderate Gram positive cocci in pairs, chains and clusters    Rare Gram Positive Rods    Rare Gram Negative Rods  Final Report (08-20-21 @ 16:29):    Sputum specimen rejected.  Microscopic examination indicates    oropharyngeal contamination.  Please repeat.    Culture - CSF with Gram Stain (collected 08-20-21 @ 05:32)  Source: .CSF CSF  Gram Stain (08-20-21 @ 05:50):    No White blood cells    No organisms seen    Culture - Sputum . (08.17.21 @ 08:05)    Gram Stain:   No epithelial cells seen  Few White blood cells  No organisms seen    Specimen Source: .Sputum Sputum    Culture Results:   Rare Normal Respiratory Mely present to date    Culture - Blood (08.17.21 @ 01:45)    Specimen Source: .Blood Blood X 2    Culture Results:   No growth at 1 day.    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 Stable ischemic changes within the right cerebellum. Right-sided EVD catheter in place with slight increase in ventricular size. Interval improvement in the intraventricular hemorrhage. Evolving areas of acute/subacute ischemia within the right cerebellum.    CTA (08.19.2021) -  Interval improvement in the caliber of the proximal vertebral artery with progression of a vasospasm involving the mid to distal basilar artery. Interval improvement in the vasospasm involving the left vertebral artery. Persistent vasospasm involving the posterior cerebral and superior cerebellar arteries. Interval improvement in the vasospasm involving the right supraclinoid ICA as well as the left A1 and M1 segments. Progression of vasospasm involving the right M1 segment.    Cerebral Angiogram (08.18.2021 - improvement in vasospasm, IA verapamil to posterior circulation  Cerebral Angiogram (08.17.2021 - Left vertebral artery dissection demonstrates continued moderate to severe cerebral vasospasm of the distal let vert and basilar artery, some what improved caliber of proximal left vert. 15mg IA Verapamil injected over 10 minutes with mild improvement of posterior circulation post treatment. Right ICA injection with continued mild supraclinoid vasospasm, 10mg of IA Verapamil injected.  Cerebral Angiogram (08.16.2021) - Left vertebral injection demonstrates moderate diffuse vasospasm of left vertebral artery, basilar artery and posterior circulation including PCAs. 15mg Verapamil injected via left vert with mild radiographic improvement post treatment. Right ICA injection with mild supraclinoid spasm/narrowing, otherwise no vasospasm appreciated in anterior circulation.    CT (08.18.2021) - No significant interval change in right superior and lateral cerebellar hypodensities.  CT (08.15.2021) - 1. Worsening the uncal herniation, with effacement of the bilateral suprasellar and perimesencephalic cisterns.  2. Redistribution of intraventricular hemorrhage, as above. Redemonstration of right EVD, with distal tip in the right frontal horn, near the foramen of Monro. Worsening hydrocephalus.  CTA - 1. Multifocal punctate and short segment stenoses, as above, with several areas of narrowing new as compared to recent CTA dated 08/09/2021. In the posterior circulation, stenoses are identified within the V4 left vertebral artery, the basilar artery, and in the bilateral PCA, more significant on the left.  Within the anterior circulation, stenosis are identified in the right supraclinoid C6 ICA as well as the proximal M1 MCA. Findings are most compatible with vasospasm.  2. In particular, the distal V4 right vertebral artery, which was visualized, though diminutive, on prior CTA dated 08/09/2021, does not fill with contrast on the present examination. The basilar artery demonstrates multiple stenoses, and is significantly smaller in caliber when compared to the prior CTA.    Other imaging:  Duplex - 1.  Since 8/9/2021, there is new deep venous thrombosis in the bilateral peroneal veins.  2.  Redemonstration of deep vein thrombosis in a right intramuscular calf vein.    EVD (d15) 15 mL/h    [Code] Full  [Goals] Aggressive  [Disposion] ICU     ==============================================   NEUROCRITICAL CARE ATTENDING NOTE (Monday, August 23, 2021)  ==============================================    LUDMILA PRIETO   MRN-[id number]  Summary:  45y/F with h/o recent gastric sleeve (08/04 [facility], Dr. Crisostomo ), fibromyalgia, thyroid dysfunction, PTSD, depression, anxiety.  Presented with seizures at home - brought to [place], with 1 more episode of seizure en route.  Intubated, CT showed SAH with IV extension, casted IV, hydrocephalus, given mannitol levetiracetam 6mg ativan, transferred to [facility].    Hospital Course:   8/7: Tx from [place] for SAH. Intubated. R frontal EVD placed, central line and a line placed. 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   8/8: POD1 s/p angio with R vert takedown, extubated, desatting on aerosol mask, required extensive suctioning, 3% nebs, chest PT, started on low dose precedex drip for restlessness/agitation, ABG stable. NGT placed. TF started with goal 20 pending nutrition recs. 3% stopped for Na 150. Ceribell EEG negative and d/c'ed.   8/9 Overnight, new Right pronator drift and right gaze preference that can't cross midline, Repeat CTH demonstrated stable vents, CTA head and neck unremarkable for spasm, hypoattenuation of right cerebellum edema vs. infarct.  8/10: POD3 R vert takedown, EVD open at [id number]. ASHA overnight, neuro stable. CTH with increased hydro, CTA head/neck with mild basilar spasm, but concern for PE. 1/2 am D50 for hypoglycemia. 1L bolus then 100 cc/hr, PM rounds for net negative fluid balance and mild vasospasm on CTA.   8/11: POD4 R vert takedown. EVD at 5cm H2O, ASHA overnight. neuro stable.   Febrile 104. depakote increased to q6. NCHCT stable. EVD lowered to 0. Lasix 20 given for dieuresis, UOP over 2 liters. Sedation given for a-line placement, BP drop needing levo.  8/12: POD5 R vert takedown. EVD at 0cm H2O. SAHA overnight, neuro stable. Precedex being weaned off. Pending IVCF with vascular today.  8/13: POD6 R vert takedown. EVD open at 0cmH2O. ASHA overnight. Neuro exam stable. Mottled skin on the left lower extremity improving. Started on Bromocriptine 15mg Q8.   8/14: POD7. EVD open at 0. 1L bolus given for euvolemia o/n. neuro stable. CTH stable. ENT scoped vocal cords, +R vocal cord paresis, NTD. started on zosyn empirically.   8/15: POD8. EVD open at 0. ASHA o/n, neuro stable. Pt developed increased work of breathing, unable to clear her secretions, received racemic epi and multiple nebulizer treatments, still with stridor. Patient re-intubated at bedside, on full vent support.   8/16: CTH/CTA head/neck/CT chest performed o/n for worsened exam, R gaze preference, sluggish pupils. +worsened uncal herniation, hydro, vasospasm in b/l PCAs, L vert, basilar arteries. preop angio today, restarted on 3% for Na goal 145-150  8/17: POD10. Overnight Pt remains on levophed drip for SBP goal 180-220. Also remains on propofol drip. EVD open at -5cmH2O. ICPs WNL. Febrile 101F and pancultured. CTH today shows slightly smaller ventricles.   8/18: POD11 R vert takedown. POD1 angio IA verpamil. Overnight, Pt noted to have downward gaze to the right and not following commands. Taken for stat head CT which is stable. Pupils also noted to be aniscoric left pupil 4mm and brisk and right 2mm brisk. Mannitol 50g given. Ceribell eeg placed for concern of subclinical seizures, so far appears negative for seizures. 1L NS o/n and 1.5L NS bolus in AM for euvolemia. vanc/zosyn stopped. 250cc 3% bolus and 250cc albumin given in AM. Brief seizure to L frontal lobe this AM on EEG, given 2g valproic acid and dose increased to 1g q8. Vimpat 100mg BID started.  In afternoon patient self-extubated, placed on NRB with mucomyst, 3% inhalation and duonebs. 3% at 50 d/c'd.  8/19: POD 12. Remains on VEEG. Axillary A line placed. Salt tabs d/c'd, florinef decreased to 0.1mg, EVD @ -5cm H2O, now draining 20cc/hr. 250 albumin and 500 NS boluses on dayshift, 1 L LR bolus  8/20: POD 13. ASHA. on VEEG, no seizures noted. Pending VPA level. 500 NS, 250 albumin. Febrile, pancultured. EEG d/c'ed.   8/21: BD14, POD14. ASHA overnight, EVD @ -5. s/p 1L bolus for euvolemia  8/22: BD #15: continued on levo, hypercoagulability profile pending, EVD 15 mL/h, euvolemia, extubated, amantadine added, free water started for hypernatremia; continuing LOC fluctuations (stable vasospasm), frequent suctioning; empiric ceftriaxone started. Tmax 101. Has thick secretions.     Past Medical History:  s/p gastric sleeve surgery  Allergies:  Allergy Status Unknown  Home Meds:    ICU Vital Signs Last 24 Hrs  T(C): 37.1 (23 Aug 2021 05:44), Max: 38.3 (22 Aug 2021 09:00)  T(F): 98.8 (23 Aug 2021 05:44), Max: 101 (22 Aug 2021 09:00)  HR: 92 (23 Aug 2021 06:00) (83 - 107)  BP: 192/97 (23 Aug 2021 06:00) (151/79 - 216/100)  BP(mean): 137 (23 Aug 2021 06:00) (108 - 144)  ABP: 181/107 (23 Aug 2021 06:00) (148/80 - 205/102)  ABP(mean): 135 (23 Aug 2021 06:00) (103 - 141)  RR: 20 (23 Aug 2021 06:00) (17 - 22)  SpO2: 98% (23 Aug 2021 06:00) (94% - 100%)      08-21-21 @ 07:01  -  08-22-21 @ 07:00  --------------------------------------------------------  IN: 4731.7 mL / OUT: 2532 mL / NET: 2199.7 mL    08-22-21 @ 07:01  -  08-23-21 @ 06:49  --------------------------------------------------------  IN: 5944.4 mL / OUT: 4508 mL / NET: 1436.4 mL      PHYSICAL EXAMINATION  AOx2 with choices, hypophonic, face symmetric. OEs, midline, pupils 4mm and reactive.   Squeezes to command b/l UE, shows thumbs up, spontaneous, BLE spontaneous and antigravity, weak cough    EENT:  anicteric  CARDIOVASCULAR: (+) S1 S2, normal rate and regular rhythm  PULMONARY: decreased to bases, no adventitia  ABDOMEN: soft, nontender with normoactive bowel sounds  EXTREMITIES: no edema  SKIN: no rash    MEDICATIONS:  acetaminophen    Suspension .. 650 milliGRAM(s) Oral every 6 hours PRN  acetylcysteine 20%  Inhalation 4 milliLiter(s) Inhalation every 6 hours  albuterol/ipratropium for Nebulization 3 milliLiter(s) Nebulizer every 6 hours  amantadine Syrup 200 milliGRAM(s) Oral every 12 hours  aspirin  chewable 81 milliGRAM(s) Oral daily  bisacodyl Suppository 10 milliGRAM(s) Rectal daily PRN  bromocriptine Capsule 15 milliGRAM(s) Oral every 8 hours  cefTRIAXone   IVPB 1000 milliGRAM(s) IV Intermittent every 12 hours  chlorhexidine 0.12% Liquid 15 milliLiter(s) Oral Mucosa every 12 hours  chlorhexidine 2% Cloths 1 Application(s) Topical <User Schedule>  enoxaparin Injectable 40 milliGRAM(s) SubCutaneous every 12 hours  glucagon  Injectable 1 milliGRAM(s) IntraMuscular once  insulin lispro (ADMELOG) corrective regimen sliding scale   SubCutaneous every 6 hours  lacosamide 100 milliGRAM(s) Oral two times a day  lactated ringers. 1000 milliLiter(s) (50 mL/Hr) IV Continuous <Continuous>  methylphenidate 10 milliGRAM(s) Oral daily  norepinephrine Infusion 0.05 MICROgram(s)/kG/Min (4.79 mL/Hr) IV Continuous <Continuous>  ondansetron Injectable 4 milliGRAM(s) IV Push every 6 hours PRN  polyethylene glycol 3350 17 Gram(s) Oral daily  senna 2 Tablet(s) Oral at bedtime  sodium chloride 3%  Inhalation 4 milliLiter(s) Inhalation every 6 hours  valproate sodium IVPB 1000 milliGRAM(s) IV Intermittent every 8 hours                          9.3    18.13 )-----------( 381      ( 23 Aug 2021 05:46 )             32.3     08-23    157<H>  |  116<H>  |  14  ----------------------------<  167<H>  3.0<L>   |  31  |  0.57    Ca    9.3      23 Aug 2021 05:46  Phos  2.8     08-23  Mg     1.9     08-23        CAPILLARY BLOOD GLUCOSE    POCT Blood Glucose.: 143 mg/dL (22 Aug 2021 06:24)  POCT Blood Glucose.: 151 mg/dL (21 Aug 2021 23:10)  POCT Blood Glucose.: 130 mg/dL (21 Aug 2021 16:53)  POCT Blood Glucose.: 118 mg/dL (21 Aug 2021 11:55)    Culture - Sputum (collected 08-20-21 @ 12:03)  Source: .Sputum Sputum  Gram Stain (08-20-21 @ 16:29):    Numerous epithelial cells    Few White blood cells    Moderate Gram positive cocci in pairs, chains and clusters    Rare Gram Positive Rods    Rare Gram Negative Rods  Final Report (08-20-21 @ 16:29):    Sputum specimen rejected.  Microscopic examination indicates    oropharyngeal contamination.  Please repeat.    Culture - CSF with Gram Stain (collected 08-20-21 @ 05:32)  Source: .CSF CSF  Gram Stain (08-20-21 @ 05:50):    No White blood cells    No organisms seen    Culture - Sputum . (08.17.21 @ 08:05)    Gram Stain:   No epithelial cells seen  Few White blood cells  No organisms seen    Specimen Source: .Sputum Sputum    Culture Results:   Rare Normal Respiratory Mely present to date    Culture - Blood (08.17.21 @ 01:45)    Specimen Source: .Blood Blood X 2    Culture Results:   No growth at 1 day.    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 Stable ischemic changes within the right cerebellum. Right-sided EVD catheter in place with slight increase in ventricular size. Interval improvement in the intraventricular hemorrhage. Evolving areas of acute/subacute ischemia within the right cerebellum.    CTA (08.19.2021) -  Interval improvement in the caliber of the proximal vertebral artery with progression of a vasospasm involving the mid to distal basilar artery. Interval improvement in the vasospasm involving the left vertebral artery. Persistent vasospasm involving the posterior cerebral and superior cerebellar arteries. Interval improvement in the vasospasm involving the right supraclinoid ICA as well as the left A1 and M1 segments. Progression of vasospasm involving the right M1 segment.    Cerebral Angiogram (08.18.2021 - improvement in vasospasm, IA verapamil to posterior circulation  Cerebral Angiogram (08.17.2021 - Left vertebral artery dissection demonstrates continued moderate to severe cerebral vasospasm of the distal let vert and basilar artery, some what improved caliber of proximal left vert. 15mg IA Verapamil injected over 10 minutes with mild improvement of posterior circulation post treatment. Right ICA injection with continued mild supraclinoid vasospasm, 10mg of IA Verapamil injected.  Cerebral Angiogram (08.16.2021) - Left vertebral injection demonstrates moderate diffuse vasospasm of left vertebral artery, basilar artery and posterior circulation including PCAs. 15mg Verapamil injected via left vert with mild radiographic improvement post treatment. Right ICA injection with mild supraclinoid spasm/narrowing, otherwise no vasospasm appreciated in anterior circulation.    CT (08.18.2021) - No significant interval change in right superior and lateral cerebellar hypodensities.  CT (08.15.2021) - 1. Worsening the uncal herniation, with effacement of the bilateral suprasellar and perimesencephalic cisterns.  2. Redistribution of intraventricular hemorrhage, as above. Redemonstration of right EVD, with distal tip in the right frontal horn, near the foramen of Monro. Worsening hydrocephalus.  CTA - 1. Multifocal punctate and short segment stenoses, as above, with several areas of narrowing new as compared to recent CTA dated 08/09/2021. In the posterior circulation, stenoses are identified within the V4 left vertebral artery, the basilar artery, and in the bilateral PCA, more significant on the left.  Within the anterior circulation, stenosis are identified in the right supraclinoid C6 ICA as well as the proximal M1 MCA. Findings are most compatible with vasospasm.  2. In particular, the distal V4 right vertebral artery, which was visualized, though diminutive, on prior CTA dated 08/09/2021, does not fill with contrast on the present examination. The basilar artery demonstrates multiple stenoses, and is significantly smaller in caliber when compared to the prior CTA.    Other imaging:  Duplex - 1.  Since 8/9/2021, there is new deep venous thrombosis in the bilateral peroneal veins.  2.  Redemonstration of deep vein thrombosis in a right intramuscular calf vein.    EVD (d15) 15 mL/h    [Code] Full  [Goals] Aggressive  [Disposion] ICU     ==============================================   NEUROCRITICAL CARE ATTENDING NOTE (Monday, August 23, 2021)  ==============================================    LUDMILA PRIETO   [id number]  Summary:  45y/F with h/o recent gastric sleeve (08/04 [facility], Dr. Crisostomo ), fibromyalgia, thyroid dysfunction, PTSD, depression, anxiety.  Presented with seizures at home - brought to [facility], with 1 more episode of seizure en route.  Intubated, CT showed SAH with IV extension, casted IV, hydrocephalus, given mannitol levetiracetam 6mg ativan, transferred to [facility].    Hospital Course:   8/7: Tx from [facility] for SAH. Intubated. R frontal EVD placed, central line and a line placed. 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   8/8: POD1 s/p angio with R vert takedown, extubated, desatting on aerosol mask, required extensive suctioning, 3% nebs, chest PT, started on low dose precedex drip for restlessness/agitation, ABG stable. NGT placed. TF started with goal 20 pending nutrition recs. 3% stopped for Na 150. Ceribell EEG negative and d/c'ed.   8/9 Overnight, new Right pronator drift and right gaze preference that can't cross midline, Repeat CTH demonstrated stable vents, CTA head and neck unremarkable for spasm, hypoattenuation of right cerebellum edema vs. infarct.  8/10: POD3 R vert takedown, EVD open at [id number]. ASHA overnight, neuro stable. CTH with increased hydro, CTA head/neck with mild basilar spasm, but concern for PE. 1/2 am D50 for hypoglycemia. 1L bolus then 100 cc/hr, PM rounds for net negative fluid balance and mild vasospasm on CTA.   8/11: POD4 R vert takedown. EVD at 5cm H2O, ASHA overnight. neuro stable.   Febrile 104. depakote increased to q6. NCHCT stable. EVD lowered to 0. Lasix 20 given for dieuresis, UOP over 2 liters. Sedation given for a-line placement, BP drop needing levo.  8/12: POD5 R vert takedown. EVD at 0cm H2O. ASHA overnight, neuro stable. Precedex being weaned off. Pending IVCF with vascular today.  8/13: POD6 R vert takedown. EVD open at 0cmH2O. ASHA overnight. Neuro exam stable. Mottled skin on the left lower extremity improving. Started on Bromocriptine 15mg Q8.   8/14: POD7. EVD open at 0. 1L bolus given for euvolemia o/n. neuro stable. CTH stable. ENT scoped vocal cords, +R vocal cord paresis, NTD. started on zosyn empirically.   8/15: POD8. EVD open at 0. ASHA o/n, neuro stable. Pt developed increased work of breathing, unable to clear her secretions, received racemic epi and multiple nebulizer treatments, still with stridor. Patient re-intubated at bedside, on full vent support.   8/16: CTH/CTA head/neck/CT chest performed o/n for worsened exam, R gaze preference, sluggish pupils. +worsened uncal herniation, hydro, vasospasm in b/l PCAs, L vert, basilar arteries. preop angio today, restarted on 3% for Na goal 145-150  8/17: POD10. Overnight Pt remains on levophed drip for SBP goal 180-220. Also remains on propofol drip. EVD open at -5cmH2O. ICPs WNL. Febrile 101F and pancultured. CTH today shows slightly smaller ventricles.   8/18: POD11 R vert takedown. POD1 angio IA verpamil. Overnight, Pt noted to have downward gaze to the right and not following commands. Taken for stat head CT which is stable. Pupils also noted to be aniscoric left pupil 4mm and brisk and right 2mm brisk. Mannitol 50g given. Ceribell eeg placed for concern of subclinical seizures, so far appears negative for seizures. 1L NS o/n and 1.5L NS bolus in AM for euvolemia. vanc/zosyn stopped. 250cc 3% bolus and 250cc albumin given in AM. Brief seizure to L frontal lobe this AM on EEG, given 2g valproic acid and dose increased to 1g q8. Vimpat 100mg BID started.  In afternoon patient self-extubated, placed on NRB with mucomyst, 3% inhalation and duonebs. 3% at 50 d/c'd.  8/19: POD 12. Remains on VEEG. Axillary A line placed. Salt tabs d/c'd, florinef decreased to 0.1mg, EVD @ -5cm H2O, now draining 20cc/hr. 250 albumin and 500 NS boluses on dayshift, 1 L LR bolus  8/20: POD 13. ASHA. on VEEG, no seizures noted. Pending VPA level. 500 NS, 250 albumin. Febrile, pancultured. EEG d/c'ed.   8/21: BD14, POD14. ASHA overnight, EVD @ -5. s/p 1L bolus for euvolemia  8/22: BD #15: continued on levo, hypercoagulability profile pending, EVD 15 mL/h, euvolemia, extubated, amantadine added, free water started for hypernatremia; continuing LOC fluctuations (stable vasospasm), frequent suctioning; empiric ceftriaxone started. Tmax 101. Has thick secretions.     Past Medical History:  s/p gastric sleeve surgery  Allergies:  Allergy Status Unknown  Home Meds:    ICU Vital Signs Last 24 Hrs  T(C): 37.1 (23 Aug 2021 05:44), Max: 38.3 (22 Aug 2021 09:00)  T(F): 98.8 (23 Aug 2021 05:44), Max: 101 (22 Aug 2021 09:00)  HR: 92 (23 Aug 2021 06:00) (83 - 107)  BP: 192/97 (23 Aug 2021 06:00) (151/79 - 216/100)  BP(mean): 137 (23 Aug 2021 06:00) (108 - 144)  ABP: 181/107 (23 Aug 2021 06:00) (148/80 - 205/102)  ABP(mean): 135 (23 Aug 2021 06:00) (103 - 141)  RR: 20 (23 Aug 2021 06:00) (17 - 22)  SpO2: 98% (23 Aug 2021 06:00) (94% - 100%)      08-21-21 @ 07:01  -  08-22-21 @ 07:00  --------------------------------------------------------  IN: 4731.7 mL / OUT: 2532 mL / NET: 2199.7 mL    08-22-21 @ 07:01  -  08-23-21 @ 06:49  --------------------------------------------------------  IN: 5944.4 mL / OUT: 4508 mL / NET: 1436.4 mL      PHYSICAL EXAMINATION  AOx2 (person, place) with choices, hypophonic, face symmetric. Midline gaze, pupils 4mm and reactive.   Squeezes to command b/l UE, shows thumbs up, spontaneous, BLE spontaneous and antigravity, weak cough    EENT:  anicteric  CARDIOVASCULAR: (+) S1 S2, normal rate and regular rhythm  PULMONARY: decreased to bases, no adventitia  ABDOMEN: soft, nontender with normoactive bowel sounds  EXTREMITIES: no edema  SKIN: no rash    MEDICATIONS:  acetaminophen    Suspension .. 650 milliGRAM(s) Oral every 6 hours PRN  acetylcysteine 20%  Inhalation 4 milliLiter(s) Inhalation every 6 hours  albuterol/ipratropium for Nebulization 3 milliLiter(s) Nebulizer every 6 hours  amantadine Syrup 200 milliGRAM(s) Oral every 12 hours  aspirin  chewable 81 milliGRAM(s) Oral daily  bisacodyl Suppository 10 milliGRAM(s) Rectal daily PRN  bromocriptine Capsule 15 milliGRAM(s) Oral every 8 hours  cefTRIAXone   IVPB 1000 milliGRAM(s) IV Intermittent every 12 hours  chlorhexidine 0.12% Liquid 15 milliLiter(s) Oral Mucosa every 12 hours  chlorhexidine 2% Cloths 1 Application(s) Topical <User Schedule>  enoxaparin Injectable 40 milliGRAM(s) SubCutaneous every 12 hours  glucagon  Injectable 1 milliGRAM(s) IntraMuscular once  insulin lispro (ADMELOG) corrective regimen sliding scale   SubCutaneous every 6 hours  lacosamide 100 milliGRAM(s) Oral two times a day  lactated ringers. 1000 milliLiter(s) (50 mL/Hr) IV Continuous <Continuous>  methylphenidate 10 milliGRAM(s) Oral daily  norepinephrine Infusion 0.05 MICROgram(s)/kG/Min (4.79 mL/Hr) IV Continuous <Continuous>  ondansetron Injectable 4 milliGRAM(s) IV Push every 6 hours PRN  polyethylene glycol 3350 17 Gram(s) Oral daily  senna 2 Tablet(s) Oral at bedtime  sodium chloride 3%  Inhalation 4 milliLiter(s) Inhalation every 6 hours  valproate sodium IVPB 1000 milliGRAM(s) IV Intermittent every 8 hours                          9.3    18.13 )-----------( 381      ( 23 Aug 2021 05:46 )             32.3     08-23    157<H>  |  116<H>  |  14  ----------------------------<  167<H>  3.0<L>   |  31  |  0.57    Ca    9.3      23 Aug 2021 05:46  Phos  2.8     08-23  Mg     1.9     08-23        CAPILLARY BLOOD GLUCOSE    POCT Blood Glucose.: 143 mg/dL (22 Aug 2021 06:24)  POCT Blood Glucose.: 151 mg/dL (21 Aug 2021 23:10)  POCT Blood Glucose.: 130 mg/dL (21 Aug 2021 16:53)  POCT Blood Glucose.: 118 mg/dL (21 Aug 2021 11:55)    Culture - Sputum (collected 08-20-21 @ 12:03)  Source: .Sputum Sputum  Gram Stain (08-20-21 @ 16:29):    Numerous epithelial cells    Few White blood cells    Moderate Gram positive cocci in pairs, chains and clusters    Rare Gram Positive Rods    Rare Gram Negative Rods  Final Report (08-20-21 @ 16:29):    Sputum specimen rejected.  Microscopic examination indicates    oropharyngeal contamination.  Please repeat.    Culture - CSF with Gram Stain (collected 08-20-21 @ 05:32)  Source: .CSF CSF  Gram Stain (08-20-21 @ 05:50):    No White blood cells    No organisms seen    Culture - Sputum . (08.17.21 @ 08:05)    Gram Stain:   No epithelial cells seen  Few White blood cells  No organisms seen    Specimen Source: .Sputum Sputum    Culture Results:   Rare Normal Respiratory Mely present to date    Culture - Blood (08.17.21 @ 01:45)    Specimen Source: .Blood Blood X 2    Culture Results:   No growth at 1 day.    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 Stable ischemic changes within the right cerebellum. Right-sided EVD catheter in place with slight increase in ventricular size. Interval improvement in the intraventricular hemorrhage. Evolving areas of acute/subacute ischemia within the right cerebellum.    CTA (08.19.2021) -  Interval improvement in the caliber of the proximal vertebral artery with progression of a vasospasm involving the mid to distal basilar artery. Interval improvement in the vasospasm involving the left vertebral artery. Persistent vasospasm involving the posterior cerebral and superior cerebellar arteries. Interval improvement in the vasospasm involving the right supraclinoid ICA as well as the left A1 and M1 segments. Progression of vasospasm involving the right M1 segment.    Cerebral Angiogram (08.18.2021 - improvement in vasospasm, IA verapamil to posterior circulation  Cerebral Angiogram (08.17.2021 - Left vertebral artery dissection demonstrates continued moderate to severe cerebral vasospasm of the distal let vert and basilar artery, some what improved caliber of proximal left vert. 15mg IA Verapamil injected over 10 minutes with mild improvement of posterior circulation post treatment. Right ICA injection with continued mild supraclinoid vasospasm, 10mg of IA Verapamil injected.  Cerebral Angiogram (08.16.2021) - Left vertebral injection demonstrates moderate diffuse vasospasm of left vertebral artery, basilar artery and posterior circulation including PCAs. 15mg Verapamil injected via left vert with mild radiographic improvement post treatment. Right ICA injection with mild supraclinoid spasm/narrowing, otherwise no vasospasm appreciated in anterior circulation.    CT (08.18.2021) - No significant interval change in right superior and lateral cerebellar hypodensities.  CT (08.15.2021) - 1. Worsening the uncal herniation, with effacement of the bilateral suprasellar and perimesencephalic cisterns.  2. Redistribution of intraventricular hemorrhage, as above. Redemonstration of right EVD, with distal tip in the right frontal horn, near the foramen of Monro. Worsening hydrocephalus.  CTA - 1. Multifocal punctate and short segment stenoses, as above, with several areas of narrowing new as compared to recent CTA dated 08/09/2021. In the posterior circulation, stenoses are identified within the V4 left vertebral artery, the basilar artery, and in the bilateral PCA, more significant on the left.  Within the anterior circulation, stenosis are identified in the right supraclinoid C6 ICA as well as the proximal M1 MCA. Findings are most compatible with vasospasm.  2. In particular, the distal V4 right vertebral artery, which was visualized, though diminutive, on prior CTA dated 08/09/2021, does not fill with contrast on the present examination. The basilar artery demonstrates multiple stenoses, and is significantly smaller in caliber when compared to the prior CTA.    Other imaging:  Duplex - 1.  Since 8/9/2021, there is new deep venous thrombosis in the bilateral peroneal veins.  2.  Redemonstration of deep vein thrombosis in a right intramuscular calf vein.      [Code] Full  [Goals] Aggressive  [Disposion] ICU

## 2021-08-23 NOTE — PROGRESS NOTE ADULT - SUBJECTIVE AND OBJECTIVE BOX
Interval Events: Reviewed  Patient seen and examined at bedside.    Patient is a 45y old  Female who presents with a chief complaint of SAH (23 Aug 2021 15:50)    Is lethargic and unable to answer questions  PAST MEDICAL & SURGICAL HISTORY:      MEDICATIONS:  Pulmonary:  acetylcysteine 20%  Inhalation 4 milliLiter(s) Inhalation every 6 hours  albuterol/ipratropium for Nebulization 3 milliLiter(s) Nebulizer every 6 hours  sodium chloride 3%  Inhalation 4 milliLiter(s) Inhalation every 6 hours    Antimicrobials:  cefTRIAXone   IVPB 1000 milliGRAM(s) IV Intermittent every 12 hours    Anticoagulants:  aspirin  chewable 81 milliGRAM(s) Oral daily  enoxaparin Injectable 40 milliGRAM(s) SubCutaneous every 12 hours    Cardiac:  norepinephrine Infusion 0.05 MICROgram(s)/kG/Min IV Continuous <Continuous>      Allergies    apple (Angioedema)  No Known Drug Allergies  strawberry (Angioedema)    Intolerances    lactose (Stomach Upset)      Vital Signs Last 24 Hrs  T(C): 37 (23 Aug 2021 21:00), Max: 38.3 (22 Aug 2021 23:00)  T(F): 98.6 (23 Aug 2021 21:00), Max: 101 (22 Aug 2021 23:00)  HR: 93 (23 Aug 2021 21:00) (83 - 109)  BP: 209/95 (23 Aug 2021 21:00) (152/77 - 216/100)  BP(mean): 136 (23 Aug 2021 21:00) (101 - 144)  RR: 18 (23 Aug 2021 21:00) (15 - 22)  SpO2: 99% (23 Aug 2021 21:00) (98% - 100%)     @ :  -   @ 07:00  --------------------------------------------------------  IN: 5944.4 mL / OUT: 4523 mL / NET: 1421.4 mL     @ 07:  -   @ 21:28  --------------------------------------------------------  IN: 3742.4 mL / OUT: 2627 mL / NET: 1115.4 mL          Review of Systems:   •	General: negative  •	Skin/Breast: negative  •	Ophthalmologic: negative  •	ENMT: negative  •	Respiratory and Thorax: cough  •	Cardiovascular: negative  •	Gastrointestinal: negative  •	Genitourinary: negative  •	Musculoskeletal: negative  •	Neurological: negative  •	Psychiatric: negative  •	Hematology/Lymphatics: negative  •	Endocrine: negative  •	Allergic/Immunologic: negative    Physical Exam:   • Constitutional:	refer to the dietion /Nutritionist note  • Eyes:	EOMI; PERRL; no drainage or redness  • ENMT:	No oral lesions; no gross abnormalities  • Neck	No bruits; no thyromegaly or nodules  • Breasts:	not examined  • Back:	No deformity or limitation of movement  • Respiratory:	Breath Sounds equal & clear to percussion & bl rhonchiauscultation, no accessory muscle use  • Cardiovascular:	Regular rate & rhythm, normal S1, S2; no murmurs, gallops or rubs; no S3, S4  • Gastrointestinal:	Soft, non-tender, no hepatosplenomegaly, normal bowel sounds  • Genitourinary:	not examined  • Rectal: not examined  • Extremities:	No cyanosis, clubbing or edema  • Vascular:	Equal and normal pulses (carotid, femoral, dorsalis pedis)  • Neurologica:l	not examined  • Skin:	No lesions; no rash  • Lymph Nodes:	No lymphadedenopathy  • Musculoskeletal:	No joint pain, swelling or deformity; no limitation of movement        LABS:      CBC Full  -  ( 23 Aug 2021 05:46 )  WBC Count : 18.13 K/uL  RBC Count : 3.68 M/uL  Hemoglobin : 9.3 g/dL  Hematocrit : 32.3 %  Platelet Count - Automated : 381 K/uL  Mean Cell Volume : 87.8 fl  Mean Cell Hemoglobin : 25.3 pg  Mean Cell Hemoglobin Concentration : 28.8 gm/dL  Auto Neutrophil # : 15.56 K/uL  Auto Lymphocyte # : 1.22 K/uL  Auto Monocyte # : 1.09 K/uL  Auto Eosinophil # : 0.11 K/uL  Auto Basophil # : 0.02 K/uL  Auto Neutrophil % : 85.9 %  Auto Lymphocyte % : 6.7 %  Auto Monocyte % : 6.0 %  Auto Eosinophil % : 0.6 %  Auto Basophil % : 0.1 %        157<H>  |  116<H>  |  14  ----------------------------<  167<H>  3.0<L>   |  31  |  0.57    Ca    9.3      23 Aug 2021 05:46  Phos  2.8       Mg     1.9                 Urinalysis Basic - ( 23 Aug 2021 10:47 )    Color: Yellow / Appearance: Clear / S.015 / pH: x  Gluc: x / Ketone: NEGATIVE  / Bili: Negative / Urobili: 1.0 E.U./dL   Blood: x / Protein: NEGATIVE mg/dL / Nitrite: NEGATIVE   Leuk Esterase: NEGATIVE / RBC: x / WBC x   Sq Epi: x / Non Sq Epi: x / Bacteria: x  < from: Xray Chest 1 View- PORTABLE-Routine (Xray Chest 1 View- PORTABLE-Routine in AM.) (21 @ 07:12) >    EXAM:  XR CHEST PORTABLE ROUTINE 1V                          PROCEDURE DATE:  2021          INTERPRETATION:  Clinical history/reason for exam: Infiltrate.    Frontal chest.    COMPARISON: 2021.    Findings/  impression: Dobbhoff tube in the descending duodenum. Stable positioning right internal jugular line. IVC filter. Heart size within normal limits. Bibasilar opacities.    < end of copied text >                  RADIOLOGY & ADDITIONAL STUDIES (The following images were personally reviewed):

## 2021-08-23 NOTE — PROGRESS NOTE ADULT - ASSESSMENT
45 year-old woman with h/o recent gastric sleeve (08/04/21 [facility], Dr. Crisostomo ), fibromyalgia, thyroid dysfunction, PTSD, depression, anxiety who was transferred from [facility] on 8/7/21 for seizures, and was found to have SAH 2/2 R vertebral dissecting aneurysm. Epilepsy consulted for seizure event on 8/18, most likely due to SAH w/ subsequent vasospasms.   The patient is not following commands today. EEG did not show any seizures. The repeated CTA angio showed the vasospasm but no new issues.   The patient is on induced hypertension with levophed to keep her Bp 180-200mmHg as per Neurosurgery.  She is not on EEG monitoring any more  Her valproate level was Valproic Acid Level, Serum: 55.1 ug/mL (08.22.21 @ 14:40)     Recommendations:  - Continue Lacosamide 100mg q12h  - Continue Valproate 1000mg q8h  - Maintain seizure and fall precautions

## 2021-08-23 NOTE — PROGRESS NOTE ADULT - ASSESSMENT
45y/F with  subarachnoid hemorrhage, intraventricular hemorrhage, brain compression, cerebral edema  obstructive hydrocephalus  prediabetic  DVT bilateral peroneal veins, R intramuscular calf vein' new acute DVT L IM calf vein    PLAN: Day 1 = 08-07 ; Day 4 = 08/10; Day 21 = 08/27  NEURO: neurochecks q1h, PRN pain meds with acetaminophen   SAH:  nimodipine held for hemodynamic augmentation; vertebral artery sacrificed at level of aneurysm  VSP :  HDA to 180-220 mm Hg  Hydrocephalus:  EVD at -5cm H20 open to drain  Seizure prophylaxis:  cont VPA/lacosamide  on amantadine, methylphenidate  on bromocriptine  REHAB:  physical therapy evaluation and management  - defer  EARLY MOB:  HOB elevated    PULM:  pulmo toilette; continue ipratropium / albuterol q6h duonebs  CARDIO:  SBP goal 180-220mm Hg, levophed - titrate to SBP goal, consider titrating SBP goals  ENDO:  Blood sugar goals 140-180 mg/dL, cont insulin sliding scale  GI:  no bowel regimen (diarrhea)  DIET: cont tube feeds  RENAL: maintain euvolemia, accurate Is and Os, d/c IVF Na goal 145-150  HEM/ONC: no coagulopathy (INR= 1.2), no ASA / Plavix use  VTE Prophylaxis: SCDs, SQL; repeat surveillance doppler on 08/30; s/p IVC filter  ID: febrile, leukocytosis, completed piperacillin/tazobactam (08/10 to 08/18), vancomycin (08/10-08/12, 08/16 to 08/18); now on ceftriaxone (08/22 - current); continue panculture, may need broader antibiotic coverage  Social: will update family, Daughter Maggie and Son Kenya and sister (on the phone)     CORE MEASURES  1.  Hunt and Mckeon Score = 5  2.  VTE prophylaxis:  [ ] administered within 24 hours of admission OR [X] reason not done: fresh bleed, unsecured aneurysm.  3.  Dysphagia screening:   [X] performed before any oral meds / liquids / food  4.  Nimodipine treatment:  [X] administered within 24 hours of admission OR [ ] reason not done:    ATTENDING ATTESTATION:  I was physically present for the key portions of the evaluation and management (E/M) service provided.  I agree with the above history, physical and plan, which I have reviewed and edited where appropriate.    Patient at high risk for neurological deterioration or death due to:  ICU delirium, aspiration PNA, DVT / PE.  Critical care time:  I have personally provided 30 minutes of critical care time, excluding time spent on separate procedures.      Plan discussed with RN, house staff.

## 2021-08-23 NOTE — CONSULT NOTE ADULT - ASSESSMENT
44yo F w above PMx, s/p gastric sleeve (8/4, [facility]), tx from [place], intubated for further management of SAH, now s/p cerebral angio w R vertebral cutdown for R vertebral dissecting aneurysm w placement of EVD on 8/8. Initially extubated 8/8,  IVCF on 8/12 for LE DVT, reintubation 8/15 due to inability to protect airway, self extubated 8/18.  Now w intermittent runs of L frontal lobe seizures on EEG, continuing to spike intermittent fevers w leukocytosis of 18.1, Procal 18.1, CRP 89.1 on CTX. General/Bariatric surgery service consulted for placement of PEG tube vs feeding J.     - Given proximity to index bariatric procedure, in conjunction w current BMI/Body habitus, patient is not a candidate for gastrostomy or jejunostomy feeding tubes at this time  - Recommend continuing TF as tolerated via Dobhoff tube, and continuing to monitor mental status   - Consider reconsulting in two weeks for reassessment if feeding tube still required/pt still with impaired PO intake/AMS  - For now 2/C signing off   - Discussed with attending bariatric surgeon

## 2021-08-23 NOTE — PROGRESS NOTE ADULT - SUBJECTIVE AND OBJECTIVE BOX
=================================  NEUROCRITICAL CARE ATTENDING NOTE  =================================    LUDMILA PRIETO   MRN-[id number]  Summary:  45y/F with h/o recent gastric sleeve ( [facility], Dr. Crisostomo ), fibromyalgia, thyroid dysfunction, PTSD, depression, anxiety.  Presented with seizures at home - brought to [place], with 1 more episode of seizure en route.  Intubated, CT showed SAH with IV extension, casted IV, hydrocephalus, given mannitol levetiracetam 6mg ativan, transferred to [facility].    COURSE IN THE HOSPITAL:   admitted to [facility], EVD inserted, high pressure, central line anita inserted   extubated   Tmax 38.4   Tmax 38.3    Past Medical History:   Allergies:  Allergy Status Unknown  Home meds:     PHYSICAL EXAMINATION  T(C): 37 ( @ 21:00), Max: 38.3 ( @ 23:00) HR: 93 ( @ 22:00) (83 - 109) BP: 175/83 ( @ 22:00) (152/77 - 216/100) RR: 18 ( @ 22:00) (15 - 22) SpO2: 97% ( @ 22:00) (97% - 100%)   NEUROLOGIC EXAMINATION:  Patient opens eyes spontaneously, follows simple commands, oriented x1, pupils reactive but anisocoric 2mm R 4mm L, R gaze preference moves to midline, moves B UE spontaneously, L>R, moves B LE spontaneously  GENERAL: room air  EENT:  anicteric  CARDIOVASCULAR: (+) S1 S2, normal rate and regular rhythm  PULMONARY: rhonchi all lung fields, gurgly  ABDOMEN: soft, nontender with normoactive bowel sounds  EXTREMITIES: no edema  SKIN: no rash    LABS:   CAPILLARY BLOOD GLUCOSE 120 116 137 146              (12.92)  9.3  (9.2)  18.13 )-----------( 381      ( 23 Aug 2021 05:46 )             32.3   (159)  157<H>  |  116<H>  |  14  ----------------------------<  167<H>  3.0<L>   |  31  |  0.57    Ca    9.3      23 Aug 2021 05:46  Phos  2.8       Mg     1.9      @ 07:01  -   @ 07:00  IN: 5944.4 mL / OUT: 4523 mL / NET: 1421.4 mL    HbA1C = 5.9 () 5.9 ()  LDL = 84 ()   HDL = 50 ()  TG = 74 ()  TSH = 0.078 ()    Bacteriology:   CSF: no organisms   sputum: few GPCIpairs   sputum: rejected, contaminated   Blood CS NG3D    CSF NGTD   sputum culture: No organisms   Blood CS NG5D x2 (FINAL)   CSF No organisms  08/15 Sputum culture: normal trent   CSF: NGTD    CSF studies:  EEG:  Neuroimagin/17 CT head:  decrease in size of lateral and third ventricles; interval demarcated infarctions within R cerebellar hemisphere  08/15 CTA: CTA HEAD: Agree that there are new/more pronounced areas of irregularity and narrowing involving the distal left vertebral artery, basilar artery, bilateral PCAs, supraclinoid right ICA and right M1 segment. There is mild narrowing of the left A1 and left M1 segments as well as the bilateral A2 segments which is new from prior examination. These findings are suspicious for vasospasm.  Other imagin/23 LE Doppler: new acute DVT L IM calf vein, persistent acute DVT bilateral peroneal veins, persistent acute DVT R IM calf veins   LE Doppler:  new DVT bilateral peroneal veins, DVT in R intramuscular calf vein    MEDICATIONS:     ·	aspirin  chewable 81 Oral daily  ·	enoxaparin Injectable 40 SubCutaneous every 12 hours  ·	cefTRIAXone   IVPB 1000 IV Intermittent every 12 hours  ·	amantadine Syrup 200 Oral every 12 hours  ·	bromocriptine Capsule 15 Oral every 8 hours  ·	lacosamide Solution 100 Oral two times a day  ·	methylphenidate 10 Oral daily  ·	valproate sodium IVPB 1000 IV Intermittent every 8 hours  ·	acetylcysteine 20%  Inhalation 4 Inhalation every 6 hours  ·	albuterol/ipratropium for Nebulization 3 Nebulizer every 6 hours  ·	sodium chloride 3%  Inhalation 4 Inhalation every 6 hours  ·	insulin lispro (ADMELOG) corrective regimen sliding scale  SubCutaneous every 6 hours  ·	acetaminophen    Suspension .. 650 Oral every 6 hours PRN  ·	ondansetron Injectable 4 IV Push every 6 hours PRN    IV FLUIDS:  LR@50cc/hr  DRIPS:  ·	norepinephrine Infusion 0.05 MICROgram(s)/kG/Min IV Continuous <Continuous>  DIET: Vital  Lines: Central line Anita  Drains:  Richey   Wounds:    CODE STATUS:  Full Code                       GOALS OF CARE:  aggressive                      DISPOSITION:  ICU   5 MF 4

## 2021-08-23 NOTE — CONSULT NOTE ADULT - SUBJECTIVE AND OBJECTIVE BOX
Surgery Consult Note    HPI:  46y/o F with h/o recent gastric sleeve ([facility], Dr. Crisostomo ), fibromyalgia, thyroid dysfunction, PTSD, depression, anxiety.  Presented with seizures at home - brought to [place], with 1 more episode of seizure en route.  Intubated, CT showed SAH with IV extension, casted IV, hydrocephalus, given mannitol, levetiracetam 1g,  6mg ativan. Started on Cardene drip for BP goal < 140 and transferred to [facility] NICU for further management.     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 Initially extubated ,  IVCF on  for LE DVT, reintubation 8/15 due to inability to protect airway, self extubated .  Now w intermittent runs of L frontal lobe seizures on EEG, continuing to spike intermittent fevers w leukocytosis of 18.1, Procal 18.1, CRP 89.1 on CTX. General/Bariatric surgery service consulted for placement of PEG tube vs feeding J.         PAST MEDICAL & SURGICAL HISTORY: As above      MEDICATIONS  (STANDING):  acetylcysteine 20%  Inhalation 4 milliLiter(s) Inhalation every 6 hours  albuterol/ipratropium for Nebulization 3 milliLiter(s) Nebulizer every 6 hours  amantadine Syrup 200 milliGRAM(s) Oral every 12 hours  aspirin  chewable 81 milliGRAM(s) Oral daily  bromocriptine Capsule 15 milliGRAM(s) Oral every 8 hours  cefTRIAXone   IVPB 1000 milliGRAM(s) IV Intermittent every 12 hours  chlorhexidine 0.12% Liquid 15 milliLiter(s) Oral Mucosa every 12 hours  chlorhexidine 2% Cloths 1 Application(s) Topical <User Schedule>  enoxaparin Injectable 40 milliGRAM(s) SubCutaneous every 12 hours  glucagon  Injectable 1 milliGRAM(s) IntraMuscular once  insulin lispro (ADMELOG) corrective regimen sliding scale   SubCutaneous every 6 hours  lacosamide 100 milliGRAM(s) Oral two times a day  lactated ringers. 1000 milliLiter(s) (50 mL/Hr) IV Continuous <Continuous>  methylphenidate 10 milliGRAM(s) Oral daily  norepinephrine Infusion 0.05 MICROgram(s)/kG/Min (4.79 mL/Hr) IV Continuous <Continuous>  sodium chloride 3%  Inhalation 4 milliLiter(s) Inhalation every 6 hours  valproate sodium IVPB 1000 milliGRAM(s) IV Intermittent every 8 hours    MEDICATIONS  (PRN):  acetaminophen    Suspension .. 650 milliGRAM(s) Oral every 6 hours PRN Temp greater or equal to 38C (100.4F), Mild Pain (1 - 3)  ondansetron Injectable 4 milliGRAM(s) IV Push every 6 hours PRN Nausea and/or Vomiting      Allergies:  apple (Angioedema)  No Known Drug Allergies  strawberry (Angioedema)    Intolerances: lactose (Stomach Upset)      FAMILY HISTORY:  No pertinent family history in first degree relatives    Vital Signs Last 24 Hrs  T(C): 37.8 (23 Aug 2021 15:00), Max: 38.3 (22 Aug 2021 23:00)  T(F): 100 (23 Aug 2021 15:00), Max: 101 (22 Aug 2021 23:00)  HR: 96 (23 Aug 2021 15:00) (83 - 109)  BP: 206/101 (23 Aug 2021 15:00) (151/79 - 216/100)  BP(mean): 141 (23 Aug 2021 15:00) (101 - 144)  RR: 20 (23 Aug 2021 15:00) (18 - 22)  SpO2: 98% (23 Aug 2021 15:00) (97% - 100%)    I&O's Summary    22 Aug 2021 07:01  -  23 Aug 2021 07:00  --------------------------------------------------------  IN: 5944.4 mL / OUT: 4523 mL / NET: 1421.4 mL    23 Aug 2021 07:01  -  23 Aug 2021 15:50  --------------------------------------------------------  IN: 2807.4 mL / OUT: 2320 mL / NET: 487.4 mL        Physical Exam:  General: NAD, nonverbal, attempts to follow commands, respond to question via head nods  HEENT: Dobhoff in nares w TF running 40cc/hr  CV: NSR  Abdominal: soft, ND/NT, no organomegaly, previous port site incisions C/D/I, healing well  Neuro: A/O x 2 at best      LABS:                        9.3    18.13 )-----------( 381      ( 23 Aug 2021 05:46 )             32.3     08-23    157<H>  |  116<H>  |  14  ----------------------------<  167<H>  3.0<L>   |  31  |  0.57    Ca    9.3      23 Aug 2021 05:46  Phos  2.8     08-  Mg     1.9             Urinalysis Basic - ( 23 Aug 2021 10:47 )    Color: Yellow / Appearance: Clear / S.015 / pH: x  Gluc: x / Ketone: NEGATIVE  / Bili: Negative / Urobili: 1.0 E.U./dL   Blood: x / Protein: NEGATIVE mg/dL / Nitrite: NEGATIVE   Leuk Esterase: NEGATIVE / RBC: x / WBC x   Sq Epi: x / Non Sq Epi: x / Bacteria: x      CAPILLARY BLOOD GLUCOSE      POCT Blood Glucose.: 116 mg/dL (23 Aug 2021 11:18)  POCT Blood Glucose.: 137 mg/dL (23 Aug 2021 05:10)  POCT Blood Glucose.: 146 mg/dL (22 Aug 2021 22:56)  POCT Blood Glucose.: 119 mg/dL (22 Aug 2021 16:51)        Cultures:      RADIOLOGY & ADDITIONAL STUDIES:

## 2021-08-23 NOTE — PROGRESS NOTE ADULT - SUBJECTIVE AND OBJECTIVE BOX
The patient was seen and examined at the bedside. She is off sedation. She was following some commands. but could not speak.    Epilepsy consulted for seizure event in AM on 8/18.          Review of Systems:  Unobtainable 2/2 no verbal output    Allergies  Allergy Status Unknown     MEDICATIONS  (STANDING):  acetylcysteine 20%  Inhalation 4 milliLiter(s) Inhalation three times a day  aspirin  chewable 81 milliGRAM(s) Oral daily  bromocriptine Capsule 15 milliGRAM(s) Oral every 8 hours  chlorhexidine 0.12% Liquid 15 milliLiter(s) Oral Mucosa every 12 hours  chlorhexidine 2% Cloths 1 Application(s) Topical <User Schedule>  dextrose 50% Injectable 25 Gram(s) IV Push once  enoxaparin Injectable 40 milliGRAM(s) SubCutaneous every 12 hours  fludroCORTISONE 0.2 milliGRAM(s) Oral every 12 hours  glucagon  Injectable 1 milliGRAM(s) IntraMuscular once  insulin lispro (ADMELOG) corrective regimen sliding scale   SubCutaneous every 6 hours  lacosamide Solution 100 milliGRAM(s) Oral every 12 hours  methylphenidate 10 milliGRAM(s) Oral daily  norepinephrine Infusion 0.05 MICROgram(s)/kG/Min (4.79 mL/Hr) IV Continuous <Continuous>  pantoprazole   Suspension 40 milliGRAM(s) Oral before breakfast  polyethylene glycol 3350 17 Gram(s) Oral daily  potassium chloride   Powder 40 milliEquivalent(s) Oral every 4 hours  propofol Infusion 10 MICROgram(s)/kG/Min (6.13 mL/Hr) IV Continuous <Continuous>  senna 2 Tablet(s) Oral at bedtime  sodium chloride 3 Gram(s) Oral every 6 hours  sodium chloride 0.9%. 1000 milliLiter(s) (100 mL/Hr) IV Continuous <Continuous>  sodium chloride 3%  Inhalation 4 milliLiter(s) Inhalation every 6 hours  valproate sodium IVPB 1000 milliGRAM(s) IV Intermittent every 8 hours    MEDICATIONS  (PRN):  acetaminophen    Suspension .. 650 milliGRAM(s) Oral every 6 hours PRN Temp greater or equal to 38C (100.4F), Mild Pain (1 - 3)  albuterol/ipratropium for Nebulization 3 milliLiter(s) Nebulizer every 6 hours PRN Shortness of Breath and/or Wheezing  ondansetron Injectable 4 milliGRAM(s) IV Push every 6 hours PRN Nausea and/or Vomiting    VITAL SIGNS:  T(C): 36.8 (08-18-21 @ 09:20), Max: 38.1 (08-17-21 @ 22:10)  HR: 108 (08-18-21 @ 16:30) (88 - 120)  BP: 218/118 (08-18-21 @ 16:00) (199/104 - 237/124)  RR: 17 (08-18-21 @ 16:30) (17 - 25)  SpO2: 98% (08-18-21 @ 16:30) (97% - 100%)  Wt(kg): --    PHYSICAL EXAM:  Constitutional:  Lying in bed with mask, EVD and lethargic  Cardiovascular: regular rate and rhythm, normal S1/S2, no murmurs   Chest: Clear to bases. 	  Abdomen: soft, non-tender, no hepatosplenomegaly   Extremities: no edema, clubbing or cyanosis  Skin: no rash or neurocutaneous signs     Cognitive:  Lethargic but arousal to voice. No verbal output, she was following some commands as sticking her tongue and showing two fingers. she looked at me when I called her name.     Cranial Nerves:  Visual field was grossly normal to threaten reflex  III/IV/VI: L 3mm brisk, R 3mm brisk   VII: Face appears symmetric  Motor:  She was restrained, but she was able to move her arms, the Rt is weaker as she was able to squeeze my hand in the Lt hand but not the Rt.   Reflexes:  DTR: 3+ B/L UE and 3+ B/L LE   Plantar responses: up going bilaterally. Arvin bilaterally.      Labs:  CBC Full  -  ( 18 Aug 2021 05:41 )  WBC Count : 16.78 K/uL  RBC Count : 4.71 M/uL  Hemoglobin : 11.6 g/dL  Hematocrit : 38.9 %  Platelet Count - Automated : 354 K/uL  Mean Cell Volume : 82.6 fl  Mean Cell Hemoglobin : 24.6 pg  Mean Cell Hemoglobin Concentration : 29.8 gm/dL  Auto Neutrophil # : x  Auto Lymphocyte # : x  Auto Monocyte # : x  Auto Eosinophil # : x  Auto Basophil # : x  Auto Neutrophil % : x  Auto Lymphocyte % : x  Auto Monocyte % : x  Auto Eosinophil % : x  Auto Basophil % : x    08-18    155<H>  |  119<H>  |  8   ----------------------------<  172<H>  3.4<L>   |  25  |  0.42<L>    Ca    9.1      18 Aug 2021 15:38  Phos  2.8     08-18  Mg     2.0     08-18    TPro  6.6  /  Alb  4.0  /  TBili  0.4  /  DBili  x   /  AST  23  /  ALT  33  /  AlkPhos  64  08-18    LIVER FUNCTIONS - ( 18 Aug 2021 05:41 )  Alb: 4.0 g/dL / Pro: 6.6 g/dL / ALK PHOS: 64 U/L / ALT: 33 U/L / AST: 23 U/L / GGT: x           Valproic Acid Level, Serum: 39.5 ug/mL *L* [50.0 - 100.0] (08-18 @ 06:11)         Interval History: The patient was seen and examined at the bedside. She is off sedation. She was following some commands. but could not speak.     Review of Systems:  Unobtainable 2/2 no verbal output    Allergies  Allergy Status Unknown     MEDICATIONS  (STANDING):  acetylcysteine 20%  Inhalation 4 milliLiter(s) Inhalation three times a day  aspirin  chewable 81 milliGRAM(s) Oral daily  bromocriptine Capsule 15 milliGRAM(s) Oral every 8 hours  chlorhexidine 0.12% Liquid 15 milliLiter(s) Oral Mucosa every 12 hours  chlorhexidine 2% Cloths 1 Application(s) Topical <User Schedule>  dextrose 50% Injectable 25 Gram(s) IV Push once  enoxaparin Injectable 40 milliGRAM(s) SubCutaneous every 12 hours  fludroCORTISONE 0.2 milliGRAM(s) Oral every 12 hours  glucagon  Injectable 1 milliGRAM(s) IntraMuscular once  insulin lispro (ADMELOG) corrective regimen sliding scale   SubCutaneous every 6 hours  lacosamide Solution 100 milliGRAM(s) Oral every 12 hours  methylphenidate 10 milliGRAM(s) Oral daily  norepinephrine Infusion 0.05 MICROgram(s)/kG/Min (4.79 mL/Hr) IV Continuous <Continuous>  pantoprazole   Suspension 40 milliGRAM(s) Oral before breakfast  polyethylene glycol 3350 17 Gram(s) Oral daily  potassium chloride   Powder 40 milliEquivalent(s) Oral every 4 hours  propofol Infusion 10 MICROgram(s)/kG/Min (6.13 mL/Hr) IV Continuous <Continuous>  senna 2 Tablet(s) Oral at bedtime  sodium chloride 3 Gram(s) Oral every 6 hours  sodium chloride 0.9%. 1000 milliLiter(s) (100 mL/Hr) IV Continuous <Continuous>  sodium chloride 3%  Inhalation 4 milliLiter(s) Inhalation every 6 hours  valproate sodium IVPB 1000 milliGRAM(s) IV Intermittent every 8 hours    MEDICATIONS  (PRN):  acetaminophen    Suspension .. 650 milliGRAM(s) Oral every 6 hours PRN Temp greater or equal to 38C (100.4F), Mild Pain (1 - 3)  albuterol/ipratropium for Nebulization 3 milliLiter(s) Nebulizer every 6 hours PRN Shortness of Breath and/or Wheezing  ondansetron Injectable 4 milliGRAM(s) IV Push every 6 hours PRN Nausea and/or Vomiting    VITAL SIGNS:  T(C): 36.8 (08-18-21 @ 09:20), Max: 38.1 (08-17-21 @ 22:10)  HR: 108 (08-18-21 @ 16:30) (88 - 120)  BP: 218/118 (08-18-21 @ 16:00) (199/104 - 237/124)  RR: 17 (08-18-21 @ 16:30) (17 - 25)  SpO2: 98% (08-18-21 @ 16:30) (97% - 100%)  Wt(kg): --    PHYSICAL EXAM:  Constitutional:  Lying in bed with mask, EVD and lethargic  Cardiovascular: regular rate and rhythm, normal S1/S2, no murmurs   Chest: Clear to bases. 	  Abdomen: soft, non-tender, no hepatosplenomegaly   Extremities: no edema, clubbing or cyanosis  Skin: no rash or neurocutaneous signs     Cognitive:  Lethargic but arousal to voice. No verbal output, she was following some commands as sticking her tongue and showing two fingers. she looked at me when I called her name.     Cranial Nerves:  Visual field was grossly normal to threaten reflex  III/IV/VI: L 3mm brisk, R 3mm brisk   VII: Face appears symmetric  Motor:  She was restrained, but she was able to move her arms, the Rt is weaker as she was able to squeeze my hand in the Lt hand but not the Rt.   Reflexes:  DTR: 3+ B/L UE and 3+ B/L LE   Plantar responses: up going bilaterally. Arvin bilaterally.      Labs:  CBC Full  -  ( 18 Aug 2021 05:41 )  WBC Count : 16.78 K/uL  RBC Count : 4.71 M/uL  Hemoglobin : 11.6 g/dL  Hematocrit : 38.9 %  Platelet Count - Automated : 354 K/uL  Mean Cell Volume : 82.6 fl  Mean Cell Hemoglobin : 24.6 pg  Mean Cell Hemoglobin Concentration : 29.8 gm/dL  Auto Neutrophil # : x  Auto Lymphocyte # : x  Auto Monocyte # : x  Auto Eosinophil # : x  Auto Basophil # : x  Auto Neutrophil % : x  Auto Lymphocyte % : x  Auto Monocyte % : x  Auto Eosinophil % : x  Auto Basophil % : x    08-18    155<H>  |  119<H>  |  8   ----------------------------<  172<H>  3.4<L>   |  25  |  0.42<L>    Ca    9.1      18 Aug 2021 15:38  Phos  2.8     08-18  Mg     2.0     08-18    TPro  6.6  /  Alb  4.0  /  TBili  0.4  /  DBili  x   /  AST  23  /  ALT  33  /  AlkPhos  64  08-18    LIVER FUNCTIONS - ( 18 Aug 2021 05:41 )  Alb: 4.0 g/dL / Pro: 6.6 g/dL / ALK PHOS: 64 U/L / ALT: 33 U/L / AST: 23 U/L / GGT: x           Valproic Acid Level, Serum: 39.5 ug/mL *L* [50.0 - 100.0] (08-18 @ 06:11)

## 2021-08-23 NOTE — PROGRESS NOTE ADULT - ASSESSMENT
46y/o F with h/o recent gastric sleeve (08/04/21 [facility], Dr. Crisostomo ), fibromyalgia, thyroid dysfunction, PTSD, depression, anxiety.  Presented with seizures at home - brought to [place], with 1 more episode of seizure en route.  Intubated, CT showed SAH with IV extension, casted IV, hydrocephalus, given mannitol, levetiracetam 1g,  6mg ativan. Started on Cardene drip for BP goal < 140 and transferred to [facility] NICU for further management. HH5 MF4, BD 1 = 8/7. Now s/p cerebral angiogram for R vertebral artery takedown for R vertebral dissecting aneurysm (8/7), and R frontal EVD placement (8/7), now s/p IVC filter placement (8/12)    NEURO:   - neurochecks q1h  - EVD 15 mL/h  - s/p CTH 8/20: decreased size in ventricles, stable.   - Nimodipine 60 mg po q4h d/c'ed for SBP goal 180-200   - Depakote increased to 1g q8 ( No keppra due to agitation) f/u level (pending)  - Vimpat 100 mg BID  - EEG no seizures x 2 days , D/c'ed  - cont ASA 81  - CTH 8/15: worsened uncal herniation, worsened hydro, vasospasm in b/l PCA, L vert, basilar arteries  - Bromocriptine 15mg Q8  - Ritalin started for neurostim  - Angio demonstrating vasospasm of Right ICA, right PCOMM, Left distal vert and basilar confluence, and received IA verapamil on 8/16. 8/17. 8/18. 8/20.     PULM:    - extubated 8/20  - Duonebs and 3% nebs standing  - chest PT   - CXR - aspiration precautions, requires frequent suctioning    CARDIO:    - -220  - levo gtt, s/p albumin 8/16   - TTE 8/12 WNL    - Troponin stable   - EKG normal     ENDO:    - glucose goal 140-180    GI:    - TF via NGT  - needs thin liquid diet x3 weeks as per bariatric when tolerated  - PPI per bariatric surgeon    RENAL:   - Maintain euvolemia   - Na goal 145-150   - 50cc/hr lactated ringers   - maintain CVP > 6    HEM/ONC:   - ASA 81  - VTE Prophylaxis: SCDs, SQL  - LE Duplex 8/9: Acute completely occlusive deep venous thrombosis of the right intramuscular calf vein, s/p IVCF with vascular 8/12, repeat 8/16 shows new b/l peroneal DVTs, factor xa c/w prophylactic dose   - Repeat dopplers 8/23  - hypercoagulable w/u - still pending, however protein S/C and AT III normal     ID:   -MRSA neg 8/12  -Low grade fevers,, trend leukocytosis  -empiric Ceftriaxone (08/22) for aspiration PNA  -empiric Vanc/zoysn for fever d/c'd (8/10-13) and now restarted 8/14 for persistent fevers, dc'd again on 8/18   -CSF sent 8/13, NGTD   -f/u pancx 8/17    Dispol ICU status: family updated  PT/OT: on hold due to critical status  Full code    Social: Daughter Maggie and Son Kenya and sister (on the phone) updated    *****    ATTENDING ATTESTATION:  I was physically present for the key portions of the evaluation and management (E/M) service provided.  I agree with the above history, physical and plan, which I have reviewed and edited where appropriate.    Patient at high risk for neurological deterioration or death due to:  ICU delirium, aspiration PNA, DVT / PE.  Critical care time:  I have personally provided 45 minutes of critical care time, excluding time spent on separate procedures.      Plan discussed with RN, house staff.       46 y/o F with h/o recent gastric sleeve (08/04/21 [facility], Dr. Crisostomo ), fibromyalgia, thyroid dysfunction, PTSD, depression, anxiety.  Presented with seizures at home - brought to [place], with 1 more episode of seizure en route.  Intubated, CT showed SAH with IV extension, casted IV, hydrocephalus, given mannitol, levetiracetam 1g,  6mg ativan. Started on Cardene drip for BP goal < 140 and transferred to [facility] NICU for further management. HH5 MF4, BD 1 = 8/7. Now s/p cerebral angiogram for R vertebral artery takedown for R vertebral dissecting aneurysm (8/7), and R frontal EVD placement (8/7), now s/p IVC filter placement (8/12)    NEURO:   - Neurochecks Q1h  - EVD@ neg 5, draining 15 mL/h (ICPs 0- neg 8)  - s/p CTH 8/20: decreased size in ventricles, stable.   - CTA 8.22, Follow uo result  - Nimodipine 60 mg po q4h d/c'ed for SBP goal 180-200   - Depakote increased to 1g q8 ( No keppra due to agitation). VPA level 55.   - Vimpat 100 mg BID  - EEG no seizures x 2 days , D/c'ed  - Cont ASA 81  - CTH 8/15: worsened uncal herniation, worsened hydro, vasospasm in b/l PCA, L vert, basilar arteries  - Angio demonstrating vasospasm of Right ICA, right PCOMM, Left distal vert and basilar confluence, and received IA verapamil on 8/16. 8/17. 8/18. 8/20.   - Bromocriptine 15mg Q8  - Ritalin started for neurostim  -Continue aggressive PT     PULM:    - Extubated 8/20  - Duonebs and 3% nebs standing  - chest PT   - CXR   - Aspiration precautions, requires frequent suctioning (Q2 hr)    CARDIO:    - -220  - levo gtt 0.26 mcg/kg/min, s/p albumin 8/16   - TTE 8/12 WNL    - Troponin stable   - EKG normal     ENDO:    - Glucose goal 140-180    GI:    - TF via NGT. Advanced to 40cc/hr.  - Free water flushes 250 Q6  - needs thin liquid diet x3 weeks as per bariatric when tolerated  - PPI per bariatric surgeon  -DC miralax, senna re rectal tube  -PEG needed    RENAL:   - Maintain euvolemia   - Na goal 145-150   - 50cc/hr lactated ringers   -Aggressively replete    HEM/ONC:   - ASA 81  - VTE Prophylaxis: SCDs, SQL  - LE Duplex 8/9: Acute completely occlusive deep venous thrombosis of the right intramuscular calf vein, s/p IVCF with vascular 8/12, repeat 8/16 shows new b/l peroneal DVTs, factor xa c/w prophylactic dose.  - Repeat dopplers 8/23  - Hb stable  - Hypercoagulable w/u - still pending, however protein S/C and AT III normal     ID: Persistent fever, leukocytosis.. Most recent fever 100.6F.  -MRSA neg 8/12  -Was on empiric Ceftriaxone (08/22) for aspiration PNA. Restarted on 8/23.   -Was on empiric Vanc/zoysn for fever d/c'd (8/10-13) and now restarted 8/14 for persistent fevers, dc'd again on 8/18.   -CSF sent 8/13, NGTD. Pancx 8/17, NGTD, 8/20 NGTD (blood, sputum, CSF.)  -Repeat cultures 8/23.   -Infectious disease consult re persistent fevers.  -Procal 0.13      Disposition:  ICU. Family updated  PT/OT: on hold due to critical status  Full code    Social: Daughter Maggie and Son Kenya and sister (on the phone) updated      Acces:   R IJ TLC  L axillary A line      *****    ATTENDING ATTESTATION:  I was physically present for the key portions of the evaluation and management (E/M) service provided.  I agree with the above history, physical and plan, which I have reviewed and edited where appropriate.    Patient at high risk for neurological deterioration or death due to:  ICU delirium, aspiration PNA, DVT / PE.  Critical care time:  I have personally provided 45 minutes of critical care time, excluding time spent on separate procedures.      Plan discussed with RN, house staff.       46 y/o F with h/o recent gastric sleeve (08/04/21 [facility], Dr. Crisostomo ), fibromyalgia, thyroid dysfunction, PTSD, depression, anxiety.  Presented with seizures at home - brought to [place], with 1 more episode of seizure en route.  Intubated, CT showed SAH with IV extension, casted IV, hydrocephalus, given mannitol, levetiracetam 1g,  6mg ativan. Started on Cardene drip for BP goal < 140 and transferred to [facility] NICU for further management. HH5 MF4, BD 1 = 8/7. Now s/p cerebral angiogram for R vertebral artery takedown for R vertebral dissecting aneurysm (8/7), and R frontal EVD placement (8/7), now s/p IVC filter placement (8/12)    NEURO:   Neurochecks Q1h  EVD@ neg 5, draining 15 mL/h (ICPs 0- neg 8)  s/p CTH 8/20: decreased size in ventricles, stable.   CTA 8.22: Spasm improved RMCA. Similar in other territories  Nimodipine 60 mg po q4h d/c'ed for SBP goal 180-200   Depakote increased to 1g q8 ( No keppra due to agitation). VPA level 55.   Vimpat 100 mg BID  EEG no seizures x 2 days , D/c'ed  Cont ASA 81  CTH 8/15: worsened uncal herniation, worsened hydro, vasospasm in b/l PCA, L vert, basilar arteries  Angio demonstrating vasospasm of Right ICA, right PCOMM, Left distal vert and basilar confluence, and received IA verapamil on 8/16. 8/17. 8/18. 8/20.   Bromocriptine 15mg Q8  Ritalin started for neurostim  Continue aggressive PT     PULM:    Extubated 8/20  Duonebs and 3% nebs standing  Chest PT   CXR   Aspiration precautions, requires frequent suctioning (Q2 hr)    CARDIO:    -220  Levo gtt 0.26 mcg/kg/min, s/p albumin 8/16   TTE 8/12 WNL    Troponin stable   EKG normal     ENDO:    Glucose goal 140-180    GI:    TF via NGT. Advanced to 40cc/hr.  Free water flushes 250 Q6  Thin liquid diet x3 weeks as per bariatric when tolerated  PPI per bariatric surgeon  DC miralax, senna re rectal tube  PEG needed    RENAL:   Maintain euvolemia   Na goal 145-150   50cc/hr lactated ringers   Aggressively replete    HEM/ONC:   ASA 81  VTE Prophylaxis: SCDs, SQL  LE Duplex 8/9: Acute completely occlusive deep venous thrombosis of the right intramuscular calf vein, s/p IVCF with vascular 8/12, repeat 8/16 shows new b/l peroneal DVTs, factor xa c/w prophylactic dose.  Repeat dopplers 8/23  Hb stable  Hypercoagulable w/u - still pending, however protein S/C and AT III normal     ID: Persistent fever, leukocytosis.. Most recent fever 100.6F.  MRSA neg 8/12  Was on empiric Ceftriaxone (08/22) for aspiration PNA. Restarted on 8/23.   Was on empiric Vanc/zoysn for fever d/c'd (8/10-13) and now restarted 8/14 for persistent fevers, dc'd again on 8/18.   CSF sent 8/13, NGTD. Pancx 8/17, NGTD, 8/20 NGTD   Repeat cultures 8/23 (blood, sputum, CSF, UA)  Infectious disease consult re persistent fevers.  Procal 0.13      Disposition:  ICU. Family updated  PT/OT: on hold due to critical status  Full code    Social: Daughter Maggie and Son Kenya and sister (on the phone) updated    Acces:   R IJ TLC  L axillary A line      *****    ATTENDING ATTESTATION:  I was physically present for the key portions of the evaluation and management (E/M) service provided.  I agree with the above history, physical and plan, which I have reviewed and edited where appropriate.    Patient at high risk for neurological deterioration or death due to:  ICU delirium, aspiration PNA, DVT / PE.  Critical care time:  I have personally provided 45 minutes of critical care time, excluding time spent on separate procedures.      Plan discussed with RN, house staff.

## 2021-08-23 NOTE — PROGRESS NOTE ADULT - SUBJECTIVE AND OBJECTIVE BOX
HPI:  46y/o F with h/o recent gastric sleeve (08/04/21 [facility], Dr. Crisostomo ), fibromyalgia, thyroid dysfunction, PTSD, depression, anxiety.  Presented with seizures at home - brought to [place], with 1 more episode of seizure en route.  Intubated, CT showed SAH with IV extension, casted IV, hydrocephalus, given mannitol, levetiracetam 1g,  6mg ativan. Started on Cardene drip for BP goal < 140 and transferred to [facility] NICU for further management. HH5 MF4, BD 1 = 8/7. Now s/p cerebral angiogram for R vertebral artery takedown for R vertebral dissecting aneurysm (8/7), and R frontal EVD placement.        Vital Signs Last 24 Hrs  T(C): 37.1 (23 Aug 2021 05:44), Max: 38.3 (22 Aug 2021 09:00)  T(F): 98.8 (23 Aug 2021 05:44), Max: 101 (22 Aug 2021 09:00)  HR: 90 (23 Aug 2021 04:00) (83 - 107)  BP: 216/100 (23 Aug 2021 04:00) (151/79 - 216/100)  BP(mean): 144 (23 Aug 2021 04:00) (108 - 144)  RR: 22 (23 Aug 2021 04:00) (17 - 22)  SpO2: 98% (23 Aug 2021 04:00) (94% - 100%)    I&O's Detail    21 Aug 2021 07:01  -  22 Aug 2021 07:00  --------------------------------------------------------  IN:    Enteral Tube Flush: 360 mL    Free Water: 750 mL    IV PiggyBack: 160 mL    Lactated Ringers: 1000 mL    Lactated Ringers: 150 mL    Lactated Ringers Bolus: 1000 mL    Norepinephrine: 423.7 mL    Vital High Protein: 868 mL    Vital1.0: 20 mL  Total IN: 4731.7 mL    OUT:    External Ventricular Device (mL): 232 mL    Rectal Tube (mL): 200 mL    Voided (mL): 2100 mL  Total OUT: 2532 mL    Total NET: 2199.7 mL      22 Aug 2021 07:01  -  23 Aug 2021 06:01  --------------------------------------------------------  IN:    Enteral Tube Flush: 250 mL    Free Water: 1000 mL    IV PiggyBack: 500 mL    Lactated Ringers: 900 mL    Norepinephrine: 465.6 mL    Sodium Chloride 0.9% Bolus: 2000 mL    Vital High Protein: 600 mL  Total IN: 5715.6 mL    OUT:    External Ventricular Device (mL): 343 mL    Rectal Tube (mL): 50 mL    Voided (mL): 4100 mL  Total OUT: 4493 mL    Total NET: 1222.6 mL        I&O's Summary    21 Aug 2021 07:01  -  22 Aug 2021 07:00  --------------------------------------------------------  IN: 4731.7 mL / OUT: 2532 mL / NET: 2199.7 mL    22 Aug 2021 07:01  -  23 Aug 2021 06:01  --------------------------------------------------------  IN: 5715.6 mL / OUT: 4493 mL / NET: 1222.6 mL        PHYSICAL EXAM:  General: patient seen laying supine in bed in NAD  Neuro: OEs spontaneously, squeezes hands b/l, wiggles toes b/l, and sticks out tongue to command, nonverbal, face symmetric   HEENT: Left pupil 5mm reactive, right pupil 4mm reactive, +NGT  Neck: supple  Cardiac: RRR, S1S2  Pulmonary: chest rise symmetric  Abdomen: soft, nontender, nondistended  Ext: warm, perfusing well, PT/DP pulses 2+ b/l  Wound: EVD incision c/d/i with staples, R groin puncture site with ecchymosis and small area of hematoma      TUBES/LINES:  [] CVC  [x] A-line  [] Lumbar Drain  [x] Ventriculostomy - EVD at -5cm H2O  [] Other    DIET:  [] NPO  [] Mechanical  [x] Tube feeds    LABS:                        9.2    12.92 )-----------( 378      ( 22 Aug 2021 05:41 )             32.1     08-22    159<H>  |  118<H>  |  12  ----------------------------<  147<H>  3.6   |  31  |  0.62    Ca    9.3      22 Aug 2021 14:40  Phos  4.4     08-22  Mg     2.2     08-22              CAPILLARY BLOOD GLUCOSE      POCT Blood Glucose.: 137 mg/dL (23 Aug 2021 05:10)  POCT Blood Glucose.: 146 mg/dL (22 Aug 2021 22:56)  POCT Blood Glucose.: 119 mg/dL (22 Aug 2021 16:51)  POCT Blood Glucose.: 102 mg/dL (22 Aug 2021 11:43)  POCT Blood Glucose.: 143 mg/dL (22 Aug 2021 06:24)      Drug Levels: [] N/A  Valproic Acid Level, Serum: 55.1 ug/mL (08-22 @ 14:40)  Valproic Acid Level, Serum: 39.5 ug/mL (08-18 @ 06:11)  Vancomycin Level, Trough: 6.4 ug/mL (08-18 @ 06:11)    CSF Analysis: [] N/A      Allergies    apple (Angioedema)  No Known Drug Allergies  strawberry (Angioedema)    Intolerances    lactose (Stomach Upset)    MEDICATIONS:  Antibiotics:  cefTRIAXone   IVPB 1000 milliGRAM(s) IV Intermittent every 12 hours    Neuro:  acetaminophen    Suspension .. 650 milliGRAM(s) Oral every 6 hours PRN  amantadine Syrup 200 milliGRAM(s) Oral every 12 hours  bromocriptine Capsule 15 milliGRAM(s) Oral every 8 hours  lacosamide 100 milliGRAM(s) Oral two times a day  methylphenidate 10 milliGRAM(s) Oral daily  ondansetron Injectable 4 milliGRAM(s) IV Push every 6 hours PRN  valproate sodium IVPB 1000 milliGRAM(s) IV Intermittent every 8 hours    Anticoagulation:  aspirin  chewable 81 milliGRAM(s) Oral daily  enoxaparin Injectable 40 milliGRAM(s) SubCutaneous every 12 hours    OTHER:  acetylcysteine 20%  Inhalation 4 milliLiter(s) Inhalation every 6 hours  albuterol/ipratropium for Nebulization 3 milliLiter(s) Nebulizer every 6 hours  bisacodyl Suppository 10 milliGRAM(s) Rectal daily PRN  chlorhexidine 0.12% Liquid 15 milliLiter(s) Oral Mucosa every 12 hours  chlorhexidine 2% Cloths 1 Application(s) Topical <User Schedule>  glucagon  Injectable 1 milliGRAM(s) IntraMuscular once  insulin lispro (ADMELOG) corrective regimen sliding scale   SubCutaneous every 6 hours  norepinephrine Infusion 0.05 MICROgram(s)/kG/Min IV Continuous <Continuous>  polyethylene glycol 3350 17 Gram(s) Oral daily  senna 2 Tablet(s) Oral at bedtime  sodium chloride 3%  Inhalation 4 milliLiter(s) Inhalation every 6 hours    IVF:  lactated ringers. 1000 milliLiter(s) IV Continuous <Continuous>    CULTURES:  Culture Results:   Sputum specimen rejected.  Microscopic examination indicates  oropharyngeal contamination.  Please repeat. (08-20 @ 12:03)  Culture Results:   No growth at 2 days. (08-20 @ 06:00)    RADIOLOGY & ADDITIONAL TESTS:          HEAD BLEED    No pertinent family history in first degree relatives    Handoff    Cerebral aneurysm    Cerebral artery vasospasm    Cerebral aneurysm    DVT, lower extremity    Pulmonary embolism    Cerebral artery vasospasm    Multiple subsegmental pulmonary emboli without acute cor pulmonale    DVT, lower extremity    Angiogram, carotid and cerebral arteries    IVC filter placement    Angiogram, carotid and cerebral, bilateral    Angiogram, carotid and cerebral, bilateral    Angiogram, carotid and cerebral, bilateral    Angiogram, carotid and cerebral, bilateral    [email or web address]    46y/o F with h/o recent gastric sleeve (08/04/21 [facility], Dr. Crisostomo ), fibromyalgia, thyroid dysfunction, PTSD, depression, anxiety.  Presented with seizures at home - brought to [place], with 1 more episode of seizure en route.  Intubated, CT showed SAH with IV extension, casted IV, hydrocephalus, given mannitol, levetiracetam 1g,  6mg ativan. Started on Cardene drip for BP goal < 140 and transferred to [facility] NICU for further management. HH5 MF4, BD 1 = 8/7. Now s/p cerebral angiogram for R vertebral artery takedown for R vertebral dissecting aneurysm (8/7), and R frontal EVD placement (8/7), now s/p IVC filter placement (8/12,) now s/p angio IA verapamil 8/16, 8/17, 8/18, 8/20.    Plan  NEURO:   - neurochecks q1h  - EVD open at -5cmH2O , drain at least 15cc/hr  - s/p CTH 8/20: decreased size in ventricles, stable.   - Nimodipine 60 mg po q4h d/c'ed for SBP goal 180-200   - Depakote increased to 1g q8 ( No keppra due to agitation) f/u level (pending)  - Vimpat 100mg bid   - EEG no seizures x 2 days , D/c'ed  - cont ASA 81  - CTH 8/15: worsened uncal herniation, worsened hydro, vasospasm in b/l PCA, L vert, basilar arteries  - Bromocriptine 15mg Q8  - Ritalin and Amantadine for neurostim  - Angio demonstrating vasospasm of Right ICA, right PCOMM, Left distal vert and basilar confluence, and received IA verapamil on 8/16. 8/17. 8/18. 8/20.   - CTH/CTA 8/22- f/u read    PULM:    - extubated 8/20  - satting well on 100% NR   - Duonebs and 3% nebs standing  - chest PT   - CXR - aspiration precautions, requires frequent suctioning    CARDIO:    - -220  - levo gtt, s/p albumin 8/16   - TTE 8/12 WNL    - Troponin stable   - EKG normal     ENDO:    - glucose goal 140-180    GI:    - TF via NGT  - needs thin liquid diet x3 weeks as per bariatric when tolerated  - PPI per bariatric surgeon    RENAL:   - Maintain euvolemia   - Na goal normal  - 75cc/hr lactated ringers   - albumin bolus yesterday to maintain CVP > 6  - free water 250 q6 for hypernatremia     HEM/ONC:   - ASA 81  - VTE Prophylaxis: SCDs, SQL  - LE Duplex 8/9: Acute completely occlusive deep venous thrombosis of the right intramuscular calf vein, s/p IVCF with vascular 8/12, repeat 8/16 shows new b/l peroneal DVTs, factor xa c/w prophylactic dose   - Repeat dopplers 8/23  - hypercoagulable w/u - still pending, however protein S/C and AT III normal     ID:   -MRSA neg 8/12  -Low grade fevers,, trend leukocytosis   -empiric Vanc/zoysn for fever d/c'd (8/10-13) and now restarted 8/14 for persistent fevers, dc'd again on 8/18   - f/u cx 8/20  -ceftriaxone for empiric pna coverage (x7 days, 8/22-8/29)      Assessment and plan discussed with Dr. Lomax, Dr. Conn and Dr. Bernard      Assessment:  Present when checked    []  GCS  E   V  M     Heart Failure: []Acute, [] acute on chronic , []chronic  Heart Failure:  [] Diastolic (HFpEF), [] Systolic (HFrEF), []Combined (HFpEF and HFrEF), [] RHF, [] Pulm HTN, [] Other    [] TRUDI, [] ATN, [] AIN, [] other  [] CKD1, [] CKD2, [] CKD 3, [] CKD 4, [] CKD 5, []ESRD    Encephalopathy: [] Metabolic, [] Hepatic, [] toxic, [] Neurological, [] Other    Abnormal Nurtitional Status: [] malnurtition (see nutrition note), [ ]underweight: BMI < 19, [] morbid obesity: BMI >40, [] Cachexia    [] Sepsis  [] hypovolemic shock,[] cardiogenic shock, [] hemorrhagic shock, [] neuogenic shock  [] Acute Respiratory Failure  []Cerebral edema, [] Brain compression/ herniation,   [] Functional quadriplegia  [] Acute blood loss anemia

## 2021-08-23 NOTE — PROVIDER CONTACT NOTE (OTHER) - SITUATION
Pt's EVD at -5cm below tragus, ICP been in the negative for the past few hours. Is it okay to drop the EVD to drain 15cc/hr with negative ICP.

## 2021-08-24 NOTE — PROGRESS NOTE ADULT - SUBJECTIVE AND OBJECTIVE BOX
==============================================   NEUROCRITICAL CARE ATTENDING NOTE (Tuesday, August 24, 2021)  ==============================================    LUDMILA PRIETO   MRN-[id number]  Summary:  45y/F with h/o recent gastric sleeve (08/04 [facility], Dr. Crisostomo ), fibromyalgia, thyroid dysfunction, PTSD, depression, anxiety.  Presented with seizures at home - brought to [place], with 1 more episode of seizure en route.  Intubated, CT showed SAH with IV extension, casted IV, hydrocephalus, given mannitol levetiracetam 6mg ativan, transferred to [facility].    Hospital Course:   8/7: Tx from [place] for SAH. Intubated. R frontal EVD placed, central line and a line placed. 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   8/8: POD1 s/p angio with R vert takedown, extubated, desatting on aerosol mask, required extensive suctioning, 3% nebs, chest PT, started on low dose precedex drip for restlessness/agitation, ABG stable. NGT placed. TF started with goal 20 pending nutrition recs. 3% stopped for Na 150. Ceribell EEG negative and d/c'ed.   8/9 Overnight, new Right pronator drift and right gaze preference that can't cross midline, Repeat CTH demonstrated stable vents, CTA head and neck unremarkable for spasm, hypoattenuation of right cerebellum edema vs. infarct.  8/10: POD3 R vert takedown, EVD open at [id number]. ASHA overnight, neuro stable. CTH with increased hydro, CTA head/neck with mild basilar spasm, but concern for PE. 1/2 am D50 for hypoglycemia. 1L bolus then 100 cc/hr, PM rounds for net negative fluid balance and mild vasospasm on CTA.   8/11: POD4 R vert takedown. EVD at 5cm H2O, ASHA overnight. neuro stable.   Febrile 104. depakote increased to q6. NCHCT stable. EVD lowered to 0. Lasix 20 given for dieuresis, UOP over 2 liters. Sedation given for a-line placement, BP drop needing levo.  8/12: POD5 R vert takedown. EVD at 0cm H2O. ASHA overnight, neuro stable. Precedex being weaned off. Pending IVCF with vascular today.  8/13: POD6 R vert takedown. EVD open at 0cmH2O. ASHA overnight. Neuro exam stable. Mottled skin on the left lower extremity improving. Started on Bromocriptine 15mg Q8.   8/14: POD7. EVD open at 0. 1L bolus given for euvolemia o/n. neuro stable. CTH stable. ENT scoped vocal cords, +R vocal cord paresis, NTD. started on zosyn empirically.   8/15: POD8. EVD open at 0. ASHA o/n, neuro stable. Pt developed increased work of breathing, unable to clear her secretions, received racemic epi and multiple nebulizer treatments, still with stridor. Patient re-intubated at bedside, on full vent support.   8/16: CTH/CTA head/neck/CT chest performed o/n for worsened exam, R gaze preference, sluggish pupils. +worsened uncal herniation, hydro, vasospasm in b/l PCAs, L vert, basilar arteries. preop angio today, restarted on 3% for Na goal 145-150  8/17: POD10. Overnight Pt remains on levophed drip for SBP goal 180-220. Also remains on propofol drip. EVD open at -5cmH2O. ICPs WNL. Febrile 101F and pancultured. CTH today shows slightly smaller ventricles.   8/18: POD11 R vert takedown. POD1 angio IA verpamil. Overnight, Pt noted to have downward gaze to the right and not following commands. Taken for stat head CT which is stable. Pupils also noted to be aniscoric left pupil 4mm and brisk and right 2mm brisk. Mannitol 50g given. Ceribell eeg placed for concern of subclinical seizures, so far appears negative for seizures. 1L NS o/n and 1.5L NS bolus in AM for euvolemia. vanc/zosyn stopped. 250cc 3% bolus and 250cc albumin given in AM. Brief seizure to L frontal lobe this AM on EEG, given 2g valproic acid and dose increased to 1g q8. Vimpat 100mg BID started.  In afternoon patient self-extubated, placed on NRB with mucomyst, 3% inhalation and duonebs. 3% at 50 d/c'd.  8/19: POD 12. Remains on VEEG. Axillary A line placed. Salt tabs d/c'd, florinef decreased to 0.1mg, EVD @ -5cm H2O, now draining 20cc/hr. 250 albumin and 500 NS boluses on dayshift, 1 L LR bolus  8/20: POD 13. ASHA. on VEEG, no seizures noted. Pending VPA level. 500 NS, 250 albumin. Febrile, pancultured. EEG d/c'ed.   8/21: BD14, POD14. ASHA overnight, EVD @ -5. s/p 1L bolus for euvolemia  8/22: BD #15: Continued on levo, hypercoagulability profile pending, EVD 15 mL/h, euvolemia, extubated, amantadine added, free water started for hypernatremia; continuing LOC fluctuations (stable vasospasm), frequent suctioning; empiric ceftriaxone started. Tmax 101. Has thick secretions.     Past Medical History:  s/p gastric sleeve surgery  Allergies:  Allergy Status Unknown  Home Meds:      ICU Vital Signs Last 24 Hrs  T(C): 37.6 (24 Aug 2021 09:07), Max: 38.3 (23 Aug 2021 14:11)  T(F): 99.6 (24 Aug 2021 09:07), Max: 101 (23 Aug 2021 14:11)  HR: 90 (24 Aug 2021 08:00) (85 - 109)  BP: 187/106 (24 Aug 2021 08:00) (152/77 - 209/95)  BP(mean): 140 (24 Aug 2021 08:00) (101 - 143)  ABP: 200/109 (24 Aug 2021 08:00) (161/89 - 205/107)  ABP(mean): 135 (24 Aug 2021 08:00) (110 - 144)  RR: 20 (24 Aug 2021 08:00) (15 - 22)  SpO2: 97% (24 Aug 2021 08:00) (97% - 100%)      08-23-21 @ 07:01  -  08-24-21 @ 07:00  --------------------------------------------------------  IN: 5431.9 mL / OUT: 5482 mL / NET: -50.1 mL    08-24-21 @ 07:01  -  08-24-21 @ 09:08  --------------------------------------------------------  IN: 50 mL / OUT: 16 mL / NET: 34 mL      PHYSICAL EXAMINATION  AOx2 (person, place) with choices, hypophonic, face symmetric. Midline gaze, pupils 4mm and reactive.   Squeezes to command b/l UE, shows thumbs up, spontaneous, BLE spontaneous and antigravity, weak cough    EENT:  Anicteric  CARDIOVASCULAR: (+) S1 S2, normal rate and regular rhythm  PULMONARY: decreased to bases, no adventitia  ABDOMEN: soft, nontender with normoactive bowel sounds  EXTREMITIES: no edema  SKIN: No rash      MEDICATION:   acetaminophen    Suspension .. 650 milliGRAM(s) Oral every 6 hours PRN  acetylcysteine 20%  Inhalation 4 milliLiter(s) Inhalation every 6 hours  albuterol/ipratropium for Nebulization 3 milliLiter(s) Nebulizer every 6 hours  amantadine Syrup 200 milliGRAM(s) Oral every 12 hours  aspirin  chewable 81 milliGRAM(s) Oral daily  bromocriptine Capsule 15 milliGRAM(s) Oral every 8 hours  cefTRIAXone   IVPB 1000 milliGRAM(s) IV Intermittent every 12 hours  chlorhexidine 0.12% Liquid 15 milliLiter(s) Oral Mucosa every 12 hours  chlorhexidine 2% Cloths 1 Application(s) Topical <User Schedule>  enoxaparin Injectable 60 milliGRAM(s) SubCutaneous every 12 hours  glucagon  Injectable 1 milliGRAM(s) IntraMuscular once  insulin lispro (ADMELOG) corrective regimen sliding scale   SubCutaneous every 6 hours  lacosamide Solution 100 milliGRAM(s) Oral two times a day  lactated ringers. 1000 milliLiter(s) (50 mL/Hr) IV Continuous <Continuous>  magnesium sulfate  IVPB 2 Gram(s) IV Intermittent every 2 hours  methylphenidate 10 milliGRAM(s) Oral daily  norepinephrine Infusion 0.05 MICROgram(s)/kG/Min (4.79 mL/Hr) IV Continuous <Continuous>  ondansetron Injectable 4 milliGRAM(s) IV Push every 6 hours PRN  potassium chloride   Powder 20 milliEquivalent(s) Enteral Tube every 2 hours  potassium chloride  20 mEq/100 mL IVPB 20 milliEquivalent(s) IV Intermittent every 2 hours  potassium phosphate / sodium phosphate Powder (PHOS-NaK) 1 Packet(s) Oral once  sodium chloride 3%  Inhalation 4 milliLiter(s) Inhalation every 6 hours  valproate sodium IVPB 1000 milliGRAM(s) IV Intermittent every 8 hours                          8.9    21.80 )-----------( 367      ( 24 Aug 2021 04:41 )             30.0     08-24    147<H>  |  107  |  10  ----------------------------<  146<H>  2.9<LL>   |  29  |  0.48<L>    Ca    9.3      24 Aug 2021 04:41  Phos  2.4     08-24  Mg     1.6     08-24          Culture - CSF with Gram Stain (collected 08-23-21 @ 19:42)  Source: .CSF CSF  Gram Stain (08-23-21 @ 20:07):    No organisms seen    No WBC's seen.  Preliminary Report (08-24-21 @ 08:55):    No growth to date    Culture - Blood (collected 08-23-21 @ 17:30)  Source: .Blood Blood  Preliminary Report (08-24-21 @ 06:00):    No growth at 12 hours    Culture - Blood (collected 08-23-21 @ 17:30)  Source: .Blood Blood-Peripheral  Preliminary Report (08-24-21 @ 06:00):    No growth at 12 hours      MEDICATIONS:  acetaminophen    Suspension .. 650 milliGRAM(s) Oral every 6 hours PRN  acetylcysteine 20%  Inhalation 4 milliLiter(s) Inhalation every 6 hours  albuterol/ipratropium for Nebulization 3 milliLiter(s) Nebulizer every 6 hours  amantadine Syrup 200 milliGRAM(s) Oral every 12 hours  aspirin  chewable 81 milliGRAM(s) Oral daily  bisacodyl Suppository 10 milliGRAM(s) Rectal daily PRN  bromocriptine Capsule 15 milliGRAM(s) Oral every 8 hours  cefTRIAXone   IVPB 1000 milliGRAM(s) IV Intermittent every 12 hours  chlorhexidine 0.12% Liquid 15 milliLiter(s) Oral Mucosa every 12 hours  chlorhexidine 2% Cloths 1 Application(s) Topical <User Schedule>  enoxaparin Injectable 40 milliGRAM(s) SubCutaneous every 12 hours  glucagon  Injectable 1 milliGRAM(s) IntraMuscular once  insulin lispro (ADMELOG) corrective regimen sliding scale   SubCutaneous every 6 hours  lacosamide 100 milliGRAM(s) Oral two times a day  lactated ringers. 1000 milliLiter(s) (50 mL/Hr) IV Continuous <Continuous>  methylphenidate 10 milliGRAM(s) Oral daily  norepinephrine Infusion 0.05 MICROgram(s)/kG/Min (4.79 mL/Hr) IV Continuous <Continuous>  ondansetron Injectable 4 milliGRAM(s) IV Push every 6 hours PRN  polyethylene glycol 3350 17 Gram(s) Oral daily  senna 2 Tablet(s) Oral at bedtime  sodium chloride 3%  Inhalation 4 milliLiter(s) Inhalation every 6 hours  valproate sodium IVPB 1000 milliGRAM(s) IV Intermittent every 8 hours                          9.3    18.13 )-----------( 381      ( 23 Aug 2021 05:46 )             32.3     08-23    157<H>  |  116<H>  |  14  ----------------------------<  167<H>  3.0<L>   |  31  |  0.57    Ca    9.3      23 Aug 2021 05:46  Phos  2.8     08-23  Mg     1.9     08-23        CAPILLARY BLOOD GLUCOSE    POCT Blood Glucose.: 143 mg/dL (22 Aug 2021 06:24)  POCT Blood Glucose.: 151 mg/dL (21 Aug 2021 23:10)  POCT Blood Glucose.: 130 mg/dL (21 Aug 2021 16:53)  POCT Blood Glucose.: 118 mg/dL (21 Aug 2021 11:55)    Culture - Sputum (collected 08-20-21 @ 12:03)  Source: .Sputum Sputum  Gram Stain (08-20-21 @ 16:29):    Numerous epithelial cells    Few White blood cells    Moderate Gram positive cocci in pairs, chains and clusters    Rare Gram Positive Rods    Rare Gram Negative Rods  Final Report (08-20-21 @ 16:29):    Sputum specimen rejected.  Microscopic examination indicates    oropharyngeal contamination.  Please repeat.    Culture - CSF with Gram Stain (collected 08-20-21 @ 05:32)  Source: .CSF CSF  Gram Stain (08-20-21 @ 05:50):    No White blood cells    No organisms seen    Culture - Sputum . (08.17.21 @ 08:05)    Gram Stain:   No epithelial cells seen  Few White blood cells  No organisms seen    Specimen Source: .Sputum Sputum    Culture Results:   Rare Normal Respiratory Mely present to date    Culture - Blood (08.17.21 @ 01:45)    Specimen Source: .Blood Blood X 2    Culture Results:   No growth at 1 day.    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 Stable ischemic changes within the right cerebellum. Right-sided EVD catheter in place with slight increase in ventricular size. Interval improvement in the intraventricular hemorrhage. Evolving areas of acute/subacute ischemia within the right cerebellum.    CTA (08.19.2021) -  Interval improvement in the caliber of the proximal vertebral artery with progression of a vasospasm involving the mid to distal basilar artery. Interval improvement in the vasospasm involving the left vertebral artery. Persistent vasospasm involving the posterior cerebral and superior cerebellar arteries. Interval improvement in the vasospasm involving the right supraclinoid ICA as well as the left A1 and M1 segments. Progression of vasospasm involving the right M1 segment.    Cerebral Angiogram (08.18.2021 - improvement in vasospasm, IA verapamil to posterior circulation  Cerebral Angiogram (08.17.2021 - Left vertebral artery dissection demonstrates continued moderate to severe cerebral vasospasm of the distal let vert and basilar artery, some what improved caliber of proximal left vert. 15mg IA Verapamil injected over 10 minutes with mild improvement of posterior circulation post treatment. Right ICA injection with continued mild supraclinoid vasospasm, 10mg of IA Verapamil injected.  Cerebral Angiogram (08.16.2021) - Left vertebral injection demonstrates moderate diffuse vasospasm of left vertebral artery, basilar artery and posterior circulation including PCAs. 15mg Verapamil injected via left vert with mild radiographic improvement post treatment. Right ICA injection with mild supraclinoid spasm/narrowing, otherwise no vasospasm appreciated in anterior circulation.    CT (08.18.2021) - No significant interval change in right superior and lateral cerebellar hypodensities.  CT (08.15.2021) - 1. Worsening the uncal herniation, with effacement of the bilateral suprasellar and perimesencephalic cisterns.  2. Redistribution of intraventricular hemorrhage, as above. Redemonstration of right EVD, with distal tip in the right frontal horn, near the foramen of Monro. Worsening hydrocephalus.  CTA - 1. Multifocal punctate and short segment stenoses, as above, with several areas of narrowing new as compared to recent CTA dated 08/09/2021. In the posterior circulation, stenoses are identified within the V4 left vertebral artery, the basilar artery, and in the bilateral PCA, more significant on the left.  Within the anterior circulation, stenosis are identified in the right supraclinoid C6 ICA as well as the proximal M1 MCA. Findings are most compatible with vasospasm.  2. In particular, the distal V4 right vertebral artery, which was visualized, though diminutive, on prior CTA dated 08/09/2021, does not fill with contrast on the present examination. The basilar artery demonstrates multiple stenoses, and is significantly smaller in caliber when compared to the prior CTA.    Other imaging:  Duplex - 1.  Since 8/9/2021, there is new deep venous thrombosis in the bilateral peroneal veins.  2.  Redemonstration of deep vein thrombosis in a right intramuscular calf vein.      [Code] Full  [Goals] Aggressive  [Disposion] ICU     ==============================================   NEUROCRITICAL CARE ATTENDING NOTE (Tuesday, August 24, 2021)  ==============================================    LUDMILA PRIETO   MRN-[id number]  Summary:  45y/F with h/o recent gastric sleeve (08/04 [facility], Dr. Crisostomo ), fibromyalgia, thyroid dysfunction, PTSD, depression, anxiety.  Presented with seizures at home - brought to [place], with 1 more episode of seizure en route.  Intubated, CT showed SAH with IV extension, casted IV, hydrocephalus, given mannitol levetiracetam 6mg ativan, transferred to [facility].    Hospital Course:   8/7: Tx from [place] for SAH. Intubated. R frontal EVD placed, central line and a line placed. 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   8/8: POD1 s/p angio with R vert takedown, extubated, desatting on aerosol mask, required extensive suctioning, 3% nebs, chest PT, started on low dose precedex drip for restlessness/agitation, ABG stable. NGT placed. TF started with goal 20 pending nutrition recs. 3% stopped for Na 150. Ceribell EEG negative and d/c'ed.   8/9 Overnight, new Right pronator drift and right gaze preference that can't cross midline, Repeat CTH demonstrated stable vents, CTA head and neck unremarkable for spasm, hypoattenuation of right cerebellum edema vs. infarct.  8/10: POD3 R vert takedown, EVD open at [id number]. ASHA overnight, neuro stable. CTH with increased hydro, CTA head/neck with mild basilar spasm, but concern for PE. 1/2 am D50 for hypoglycemia. 1L bolus then 100 cc/hr, PM rounds for net negative fluid balance and mild vasospasm on CTA.   8/11: POD4 R vert takedown. EVD at 5cm H2O, ASHA overnight. neuro stable.   Febrile 104. depakote increased to q6. NCHCT stable. EVD lowered to 0. Lasix 20 given for dieuresis, UOP over 2 liters. Sedation given for a-line placement, BP drop needing levo.  8/12: POD5 R vert takedown. EVD at 0cm H2O. ASHA overnight, neuro stable. Precedex being weaned off. Pending IVCF with vascular today.  8/13: POD6 R vert takedown. EVD open at 0cmH2O. ASHA overnight. Neuro exam stable. Mottled skin on the left lower extremity improving. Started on Bromocriptine 15mg Q8.   8/14: POD7. EVD open at 0. 1L bolus given for euvolemia o/n. neuro stable. CTH stable. ENT scoped vocal cords, +R vocal cord paresis, NTD. started on zosyn empirically.   8/15: POD8. EVD open at 0. ASHA o/n, neuro stable. Pt developed increased work of breathing, unable to clear her secretions, received racemic epi and multiple nebulizer treatments, still with stridor. Patient re-intubated at bedside, on full vent support.   8/16: CTH/CTA head/neck/CT chest performed o/n for worsened exam, R gaze preference, sluggish pupils. +worsened uncal herniation, hydro, vasospasm in b/l PCAs, L vert, basilar arteries. preop angio today, restarted on 3% for Na goal 145-150  8/17: POD10. Overnight Pt remains on levophed drip for SBP goal 180-220. Also remains on propofol drip. EVD open at -5cmH2O. ICPs WNL. Febrile 101F and pancultured. CTH today shows slightly smaller ventricles.   8/18: POD11 R vert takedown. POD1 angio IA verpamil. Overnight, Pt noted to have downward gaze to the right and not following commands. Taken for stat head CT which is stable. Pupils also noted to be aniscoric left pupil 4mm and brisk and right 2mm brisk. Mannitol 50g given. Ceribell eeg placed for concern of subclinical seizures, so far appears negative for seizures. 1L NS o/n and 1.5L NS bolus in AM for euvolemia. vanc/zosyn stopped. 250cc 3% bolus and 250cc albumin given in AM. Brief seizure to L frontal lobe this AM on EEG, given 2g valproic acid and dose increased to 1g q8. Vimpat 100mg BID started.  In afternoon patient self-extubated, placed on NRB with mucomyst, 3% inhalation and duonebs. 3% at 50 d/c'd.  8/19: POD 12. Remains on VEEG. Axillary A line placed. Salt tabs d/c'd, florinef decreased to 0.1mg, EVD @ -5cm H2O, now draining 20cc/hr. 250 albumin and 500 NS boluses on dayshift, 1 L LR bolus  8/20: POD 13. ASHA. on VEEG, no seizures noted. Pending VPA level. 500 NS, 250 albumin. Febrile, pancultured. EEG d/c'ed.   8/21: BD14, POD14. ASHA overnight, EVD @ -5. s/p 1L bolus for euvolemia  8/22: BD #15: Continued on levo, hypercoagulability profile pending, EVD 15 mL/h, euvolemia, extubated, amantadine added, free water started for hypernatremia; continuing LOC fluctuations (stable vasospasm), frequent suctioning; empiric ceftriaxone started. Tmax 101. Has thick secretions.   8/23: BD16  8/24: BD**      Past Medical History:  s/p gastric sleeve surgery  Allergies:  Allergy Status Unknown  Home Meds:      ICU Vital Signs Last 24 Hrs  T(C): 37.6 (24 Aug 2021 09:07), Max: 38.3 (23 Aug 2021 14:11)  T(F): 99.6 (24 Aug 2021 09:07), Max: 101 (23 Aug 2021 14:11)  HR: 90 (24 Aug 2021 08:00) (85 - 109)  BP: 187/106 (24 Aug 2021 08:00) (152/77 - 209/95)  BP(mean): 140 (24 Aug 2021 08:00) (101 - 143)  ABP: 200/109 (24 Aug 2021 08:00) (161/89 - 205/107)  ABP(mean): 135 (24 Aug 2021 08:00) (110 - 144)  RR: 20 (24 Aug 2021 08:00) (15 - 22)  SpO2: 97% (24 Aug 2021 08:00) (97% - 100%)      08-23-21 @ 07:01  -  08-24-21 @ 07:00  --------------------------------------------------------  IN: 5431.9 mL / OUT: 5482 mL / NET: -50.1 mL    08-24-21 @ 07:01  -  08-24-21 @ 09:08  --------------------------------------------------------  IN: 50 mL / OUT: 16 mL / NET: 34 mL      PHYSICAL EXAMINATION  AOx2 (person, place) with choices, hypophonic, face symmetric. R gaze can overcome, pupils 3mm and reactive.   Squeezes to command b/l UE, shows thumbs up, spontaneous, BLE spontaneous and antigravity, weak cough    EENT:  Anicteric  CARDIOVASCULAR: (+) S1 S2, normal rate and regular rhythm  PULMONARY: decreased to bases, no adventitia  ABDOMEN: soft, nontender with normoactive bowel sounds  EXTREMITIES: no edema  SKIN: No rash      MEDICATIONS:   acetaminophen    Suspension .. 650 milliGRAM(s) Oral every 6 hours PRN  acetylcysteine 20%  Inhalation 4 milliLiter(s) Inhalation every 6 hours  albuterol/ipratropium for Nebulization 3 milliLiter(s) Nebulizer every 6 hours  amantadine Syrup 200 milliGRAM(s) Oral every 12 hours  aspirin  chewable 81 milliGRAM(s) Oral daily  bromocriptine Capsule 15 milliGRAM(s) Oral every 8 hours  cefTRIAXone   IVPB 1000 milliGRAM(s) IV Intermittent every 12 hours  chlorhexidine 0.12% Liquid 15 milliLiter(s) Oral Mucosa every 12 hours  chlorhexidine 2% Cloths 1 Application(s) Topical <User Schedule>  enoxaparin Injectable 60 milliGRAM(s) SubCutaneous every 12 hours  glucagon  Injectable 1 milliGRAM(s) IntraMuscular once  insulin lispro (ADMELOG) corrective regimen sliding scale   SubCutaneous every 6 hours  lacosamide Solution 100 milliGRAM(s) Oral two times a day  lactated ringers. 1000 milliLiter(s) (50 mL/Hr) IV Continuous <Continuous>  magnesium sulfate  IVPB 2 Gram(s) IV Intermittent every 2 hours  methylphenidate 10 milliGRAM(s) Oral daily  norepinephrine Infusion 0.05 MICROgram(s)/kG/Min (4.79 mL/Hr) IV Continuous <Continuous>  ondansetron Injectable 4 milliGRAM(s) IV Push every 6 hours PRN  potassium chloride   Powder 20 milliEquivalent(s) Enteral Tube every 2 hours  potassium chloride  20 mEq/100 mL IVPB 20 milliEquivalent(s) IV Intermittent every 2 hours  potassium phosphate / sodium phosphate Powder (PHOS-NaK) 1 Packet(s) Oral once  sodium chloride 3%  Inhalation 4 milliLiter(s) Inhalation every 6 hours  valproate sodium IVPB 1000 milliGRAM(s) IV Intermittent every 8 hours                          8.9    21.80 )-----------( 367      ( 24 Aug 2021 04:41 )             30.0     08-24    147<H>  |  107  |  10  ----------------------------<  146<H>  2.9<LL>   |  29  |  0.48<L>    Ca    9.3      24 Aug 2021 04:41  Phos  2.4     08-24  Mg     1.6     08-24      Culture - CSF with Gram Stain (collected 08-23-21 @ 19:42)  Source: .CSF CSF  Gram Stain (08-23-21 @ 20:07):    No organisms seen    No WBC's seen.  Preliminary Report (08-24-21 @ 08:55):    No growth to date    Culture - Blood (collected 08-23-21 @ 17:30)  Source: .Blood Blood  Preliminary Report (08-24-21 @ 06:00):    No growth at 12 hours    Culture - Blood (collected 08-23-21 @ 17:30)  Source: .Blood Blood-Peripheral  Preliminary Report (08-24-21 @ 06:00):    No growth at 12 hours      MEDICATIONS:  acetaminophen    Suspension .. 650 milliGRAM(s) Oral every 6 hours PRN  acetylcysteine 20%  Inhalation 4 milliLiter(s) Inhalation every 6 hours  albuterol/ipratropium for Nebulization 3 milliLiter(s) Nebulizer every 6 hours  amantadine Syrup 200 milliGRAM(s) Oral every 12 hours  aspirin  chewable 81 milliGRAM(s) Oral daily  bisacodyl Suppository 10 milliGRAM(s) Rectal daily PRN  bromocriptine Capsule 15 milliGRAM(s) Oral every 8 hours  cefTRIAXone   IVPB 1000 milliGRAM(s) IV Intermittent every 12 hours  chlorhexidine 0.12% Liquid 15 milliLiter(s) Oral Mucosa every 12 hours  chlorhexidine 2% Cloths 1 Application(s) Topical <User Schedule>  enoxaparin Injectable 40 milliGRAM(s) SubCutaneous every 12 hours  glucagon  Injectable 1 milliGRAM(s) IntraMuscular once  insulin lispro (ADMELOG) corrective regimen sliding scale   SubCutaneous every 6 hours  lacosamide 100 milliGRAM(s) Oral two times a day  lactated ringers. 1000 milliLiter(s) (50 mL/Hr) IV Continuous <Continuous>  methylphenidate 10 milliGRAM(s) Oral daily  norepinephrine Infusion 0.05 MICROgram(s)/kG/Min (4.79 mL/Hr) IV Continuous <Continuous>  ondansetron Injectable 4 milliGRAM(s) IV Push every 6 hours PRN  polyethylene glycol 3350 17 Gram(s) Oral daily  senna 2 Tablet(s) Oral at bedtime  sodium chloride 3%  Inhalation 4 milliLiter(s) Inhalation every 6 hours  valproate sodium IVPB 1000 milliGRAM(s) IV Intermittent every 8 hours                          9.3    18.13 )-----------( 381      ( 23 Aug 2021 05:46 )             32.3     08-23    157<H>  |  116<H>  |  14  ----------------------------<  167<H>  3.0<L>   |  31  |  0.57    Ca    9.3      23 Aug 2021 05:46  Phos  2.8     08-23  Mg     1.9     08-23        CAPILLARY BLOOD GLUCOSE    POCT Blood Glucose.: 143 mg/dL (22 Aug 2021 06:24)  POCT Blood Glucose.: 151 mg/dL (21 Aug 2021 23:10)  POCT Blood Glucose.: 130 mg/dL (21 Aug 2021 16:53)  POCT Blood Glucose.: 118 mg/dL (21 Aug 2021 11:55)    Culture - Sputum (collected 08-20-21 @ 12:03)  Source: .Sputum Sputum  Gram Stain (08-20-21 @ 16:29):    Numerous epithelial cells    Few White blood cells    Moderate Gram positive cocci in pairs, chains and clusters    Rare Gram Positive Rods    Rare Gram Negative Rods  Final Report (08-20-21 @ 16:29):    Sputum specimen rejected.  Microscopic examination indicates    oropharyngeal contamination.  Please repeat.    Culture - CSF with Gram Stain (collected 08-20-21 @ 05:32)  Source: .CSF CSF  Gram Stain (08-20-21 @ 05:50):    No White blood cells    No organisms seen    Culture - Sputum . (08.17.21 @ 08:05)    Gram Stain:   No epithelial cells seen  Few White blood cells  No organisms seen    Specimen Source: .Sputum Sputum    Culture Results:   Rare Normal Respiratory Mely present to date    Culture - Blood (08.17.21 @ 01:45)    Specimen Source: .Blood Blood X 2    Culture Results:   No growth at 1 day.    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 Stable ischemic changes within the right cerebellum. Right-sided EVD catheter in place with slight increase in ventricular size. Interval improvement in the intraventricular hemorrhage. Evolving areas of acute/subacute ischemia within the right cerebellum.    CTA (08.19.2021) -  Interval improvement in the caliber of the proximal vertebral artery with progression of a vasospasm involving the mid to distal basilar artery. Interval improvement in the vasospasm involving the left vertebral artery. Persistent vasospasm involving the posterior cerebral and superior cerebellar arteries. Interval improvement in the vasospasm involving the right supraclinoid ICA as well as the left A1 and M1 segments. Progression of vasospasm involving the right M1 segment.    Cerebral Angiogram (08.18.2021 - improvement in vasospasm, IA verapamil to posterior circulation  Cerebral Angiogram (08.17.2021 - Left vertebral artery dissection demonstrates continued moderate to severe cerebral vasospasm of the distal let vert and basilar artery, some what improved caliber of proximal left vert. 15mg IA Verapamil injected over 10 minutes with mild improvement of posterior circulation post treatment. Right ICA injection with continued mild supraclinoid vasospasm, 10mg of IA Verapamil injected.  Cerebral Angiogram (08.16.2021) - Left vertebral injection demonstrates moderate diffuse vasospasm of left vertebral artery, basilar artery and posterior circulation including PCAs. 15mg Verapamil injected via left vert with mild radiographic improvement post treatment. Right ICA injection with mild supraclinoid spasm/narrowing, otherwise no vasospasm appreciated in anterior circulation.    CT (08.18.2021) - No significant interval change in right superior and lateral cerebellar hypodensities.  CT (08.15.2021) - 1. Worsening the uncal herniation, with effacement of the bilateral suprasellar and perimesencephalic cisterns.  2. Redistribution of intraventricular hemorrhage, as above. Redemonstration of right EVD, with distal tip in the right frontal horn, near the foramen of Monro. Worsening hydrocephalus.  CTA - 1. Multifocal punctate and short segment stenoses, as above, with several areas of narrowing new as compared to recent CTA dated 08/09/2021. In the posterior circulation, stenoses are identified within the V4 left vertebral artery, the basilar artery, and in the bilateral PCA, more significant on the left.  Within the anterior circulation, stenosis are identified in the right supraclinoid C6 ICA as well as the proximal M1 MCA. Findings are most compatible with vasospasm.  2. In particular, the distal V4 right vertebral artery, which was visualized, though diminutive, on prior CTA dated 08/09/2021, does not fill with contrast on the present examination. The basilar artery demonstrates multiple stenoses, and is significantly smaller in caliber when compared to the prior CTA.    Other imaging:  Duplex - 1.  Since 8/9/2021, there is new deep venous thrombosis in the bilateral peroneal veins.  2. Redemonstration of deep vein thrombosis in a right intramuscular calf vein.      [Code] Full  [Goals] Aggressive  [Disposion] ICU     ==============================================   NEUROCRITICAL CARE ATTENDING NOTE (Tuesday, August 24, 2021)  ==============================================    LUDMILA PRIETO   MRN-[id number]  Summary:  45y/F with h/o recent gastric sleeve (08/04 [facility], Dr. Crisostomo ), fibromyalgia, thyroid dysfunction, PTSD, depression, anxiety.  Presented with seizures at home - brought to [place], with 1 more episode of seizure en route.  Intubated, CT showed SAH with IV extension, casted IV, hydrocephalus, given mannitol levetiracetam 6mg ativan, transferred to [facility].    Hospital Course:   8/7: Tx from [place] for SAH. Intubated. R frontal EVD placed, central line and a line placed. 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   8/8: POD1 s/p angio with R vert takedown, extubated, desatting on aerosol mask, required extensive suctioning, 3% nebs, chest PT, started on low dose precedex drip for restlessness/agitation, ABG stable. NGT placed. TF started with goal 20 pending nutrition recs. 3% stopped for Na 150. Ceribell EEG negative and d/c'ed.   8/9 Overnight, new Right pronator drift and right gaze preference that can't cross midline, Repeat CTH demonstrated stable vents, CTA head and neck unremarkable for spasm, hypoattenuation of right cerebellum edema vs. infarct.  8/10: POD3 R vert takedown, EVD open at [id number]. ASHA overnight, neuro stable. CTH with increased hydro, CTA head/neck with mild basilar spasm, but concern for PE. 1/2 am D50 for hypoglycemia. 1L bolus then 100 cc/hr, PM rounds for net negative fluid balance and mild vasospasm on CTA.   8/11: POD4 R vert takedown. EVD at 5cm H2O, ASHA overnight. neuro stable.   Febrile 104. depakote increased to q6. NCHCT stable. EVD lowered to 0. Lasix 20 given for dieuresis, UOP over 2 liters. Sedation given for a-line placement, BP drop needing levo.  8/12: POD5 R vert takedown. EVD at 0cm H2O. ASHA overnight, neuro stable. Precedex being weaned off. Pending IVCF with vascular today.  8/13: POD6 R vert takedown. EVD open at 0cmH2O. ASHA overnight. Neuro exam stable. Mottled skin on the left lower extremity improving. Started on Bromocriptine 15mg Q8.   8/14: POD7. EVD open at 0. 1L bolus given for euvolemia o/n. neuro stable. CTH stable. ENT scoped vocal cords, +R vocal cord paresis, NTD. started on zosyn empirically.   8/15: POD8. EVD open at 0. ASHA o/n, neuro stable. Pt developed increased work of breathing, unable to clear her secretions, received racemic epi and multiple nebulizer treatments, still with stridor. Patient re-intubated at bedside, on full vent support.   8/16: CTH/CTA head/neck/CT chest performed o/n for worsened exam, R gaze preference, sluggish pupils. +worsened uncal herniation, hydro, vasospasm in b/l PCAs, L vert, basilar arteries. preop angio today, restarted on 3% for Na goal 145-150  8/17: POD10. Overnight Pt remains on levophed drip for SBP goal 180-220. Also remains on propofol drip. EVD open at -5cmH2O. ICPs WNL. Febrile 101F and pancultured. CTH today shows slightly smaller ventricles.   8/18: POD11 R vert takedown. POD1 angio IA verpamil. Overnight, Pt noted to have downward gaze to the right and not following commands. Taken for stat head CT which is stable. Pupils also noted to be aniscoric left pupil 4mm and brisk and right 2mm brisk. Mannitol 50g given. Ceribell eeg placed for concern of subclinical seizures, so far appears negative for seizures. 1L NS o/n and 1.5L NS bolus in AM for euvolemia. vanc/zosyn stopped. 250cc 3% bolus and 250cc albumin given in AM. Brief seizure to L frontal lobe this AM on EEG, given 2g valproic acid and dose increased to 1g q8. Vimpat 100mg BID started.  In afternoon patient self-extubated, placed on NRB with mucomyst, 3% inhalation and duonebs. 3% at 50 d/c'd.  8/19: POD 12. Remains on VEEG. Axillary A line placed. Salt tabs d/c'd, florinef decreased to 0.1mg, EVD @ -5cm H2O, now draining 20cc/hr. 250 albumin and 500 NS boluses on dayshift, 1 L LR bolus  8/20: POD 13. ASHA. on VEEG, no seizures noted. Pending VPA level. 500 NS, 250 albumin. Febrile, pancultured. EEG d/c'ed.   8/21: BD14, POD14. ASHA overnight, EVD @ -5. s/p 1L bolus for euvolemia  8/22: BD #15: Continued on levo, hypercoagulability profile pending, EVD 15 mL/h, euvolemia, extubated, amantadine added, free water started for hypernatremia; continuing LOC fluctuations (stable vasospasm), frequent suctioning; empiric ceftriaxone started. Tmax 101. Has thick secretions.   8/24: BD 18      Past Medical History:  s/p gastric sleeve surgery  Allergies:  Allergy Status Unknown  Home Meds:      ICU Vital Signs Last 24 Hrs  T(C): 37.6 (24 Aug 2021 09:07), Max: 38.3 (23 Aug 2021 14:11)  T(F): 99.6 (24 Aug 2021 09:07), Max: 101 (23 Aug 2021 14:11)  HR: 90 (24 Aug 2021 08:00) (85 - 109)  BP: 187/106 (24 Aug 2021 08:00) (152/77 - 209/95)  BP(mean): 140 (24 Aug 2021 08:00) (101 - 143)  ABP: 200/109 (24 Aug 2021 08:00) (161/89 - 205/107)  ABP(mean): 135 (24 Aug 2021 08:00) (110 - 144)  RR: 20 (24 Aug 2021 08:00) (15 - 22)  SpO2: 97% (24 Aug 2021 08:00) (97% - 100%)      08-23-21 @ 07:01  -  08-24-21 @ 07:00  --------------------------------------------------------  IN: 5431.9 mL / OUT: 5482 mL / NET: -50.1 mL    08-24-21 @ 07:01  -  08-24-21 @ 09:08  --------------------------------------------------------  IN: 50 mL / OUT: 16 mL / NET: 34 mL      PHYSICAL EXAMINATION  AOx2 (person, place) with choices, hypophonic, face symmetric. R gaze preference, can overcome, pupils 3mm and reactive.   Squeezes fingers to command b/l UE, shows thumbs up, spontaneous, BLE spontaneous and antigravity, weak cough    EENT:  Anicteric  CARDIOVASCULAR: (+) S1 S2, normal rate and regular rhythm  PULMONARY: decreased to bases, no adventitia  ABDOMEN: soft, nontender with normoactive bowel sounds  EXTREMITIES: no edema  SKIN: No rash      MEDICATIONS:   acetaminophen    Suspension .. 650 milliGRAM(s) Oral every 6 hours PRN  acetylcysteine 20%  Inhalation 4 milliLiter(s) Inhalation every 6 hours  albuterol/ipratropium for Nebulization 3 milliLiter(s) Nebulizer every 6 hours  amantadine Syrup 200 milliGRAM(s) Oral every 12 hours  aspirin  chewable 81 milliGRAM(s) Oral daily  bromocriptine Capsule 15 milliGRAM(s) Oral every 8 hours  cefTRIAXone   IVPB 1000 milliGRAM(s) IV Intermittent every 12 hours  chlorhexidine 0.12% Liquid 15 milliLiter(s) Oral Mucosa every 12 hours  chlorhexidine 2% Cloths 1 Application(s) Topical <User Schedule>  enoxaparin Injectable 60 milliGRAM(s) SubCutaneous every 12 hours  glucagon  Injectable 1 milliGRAM(s) IntraMuscular once  insulin lispro (ADMELOG) corrective regimen sliding scale   SubCutaneous every 6 hours  lacosamide Solution 100 milliGRAM(s) Oral two times a day  lactated ringers. 1000 milliLiter(s) (50 mL/Hr) IV Continuous <Continuous>  magnesium sulfate  IVPB 2 Gram(s) IV Intermittent every 2 hours  methylphenidate 10 milliGRAM(s) Oral daily  norepinephrine Infusion 0.05 MICROgram(s)/kG/Min (4.79 mL/Hr) IV Continuous <Continuous>  ondansetron Injectable 4 milliGRAM(s) IV Push every 6 hours PRN  potassium chloride   Powder 20 milliEquivalent(s) Enteral Tube every 2 hours  potassium chloride  20 mEq/100 mL IVPB 20 milliEquivalent(s) IV Intermittent every 2 hours  potassium phosphate / sodium phosphate Powder (PHOS-NaK) 1 Packet(s) Oral once  sodium chloride 3%  Inhalation 4 milliLiter(s) Inhalation every 6 hours  valproate sodium IVPB 1000 milliGRAM(s) IV Intermittent every 8 hours                          8.9    21.80 )-----------( 367      ( 24 Aug 2021 04:41 )             30.0     08-24    147<H>  |  107  |  10  ----------------------------<  146<H>  2.9<LL>   |  29  |  0.48<L>    Ca    9.3      24 Aug 2021 04:41  Phos  2.4     08-24  Mg     1.6     08-24      Culture - CSF with Gram Stain (collected 08-23-21 @ 19:42)  Source: .CSF CSF  Gram Stain (08-23-21 @ 20:07):    No organisms seen    No WBC's seen.  Preliminary Report (08-24-21 @ 08:55):    No growth to date    Culture - Blood (collected 08-23-21 @ 17:30)  Source: .Blood Blood  Preliminary Report (08-24-21 @ 06:00):    No growth at 12 hours    Culture - Blood (collected 08-23-21 @ 17:30)  Source: .Blood Blood-Peripheral  Preliminary Report (08-24-21 @ 06:00):    No growth at 12 hours      MEDICATIONS:  acetaminophen    Suspension .. 650 milliGRAM(s) Oral every 6 hours PRN  acetylcysteine 20%  Inhalation 4 milliLiter(s) Inhalation every 6 hours  albuterol/ipratropium for Nebulization 3 milliLiter(s) Nebulizer every 6 hours  amantadine Syrup 200 milliGRAM(s) Oral every 12 hours  aspirin  chewable 81 milliGRAM(s) Oral daily  bisacodyl Suppository 10 milliGRAM(s) Rectal daily PRN  bromocriptine Capsule 15 milliGRAM(s) Oral every 8 hours  cefTRIAXone   IVPB 1000 milliGRAM(s) IV Intermittent every 12 hours  chlorhexidine 0.12% Liquid 15 milliLiter(s) Oral Mucosa every 12 hours  chlorhexidine 2% Cloths 1 Application(s) Topical <User Schedule>  enoxaparin Injectable 40 milliGRAM(s) SubCutaneous every 12 hours  glucagon  Injectable 1 milliGRAM(s) IntraMuscular once  insulin lispro (ADMELOG) corrective regimen sliding scale   SubCutaneous every 6 hours  lacosamide 100 milliGRAM(s) Oral two times a day  lactated ringers. 1000 milliLiter(s) (50 mL/Hr) IV Continuous <Continuous>  methylphenidate 10 milliGRAM(s) Oral daily  norepinephrine Infusion 0.05 MICROgram(s)/kG/Min (4.79 mL/Hr) IV Continuous <Continuous>  ondansetron Injectable 4 milliGRAM(s) IV Push every 6 hours PRN  polyethylene glycol 3350 17 Gram(s) Oral daily  senna 2 Tablet(s) Oral at bedtime  sodium chloride 3%  Inhalation 4 milliLiter(s) Inhalation every 6 hours  valproate sodium IVPB 1000 milliGRAM(s) IV Intermittent every 8 hours                          9.3    18.13 )-----------( 381      ( 23 Aug 2021 05:46 )             32.3     08-23    157<H>  |  116<H>  |  14  ----------------------------<  167<H>  3.0<L>   |  31  |  0.57    Ca    9.3      23 Aug 2021 05:46  Phos  2.8     08-23  Mg     1.9     08-23        CAPILLARY BLOOD GLUCOSE    POCT Blood Glucose.: 143 mg/dL (22 Aug 2021 06:24)  POCT Blood Glucose.: 151 mg/dL (21 Aug 2021 23:10)  POCT Blood Glucose.: 130 mg/dL (21 Aug 2021 16:53)  POCT Blood Glucose.: 118 mg/dL (21 Aug 2021 11:55)    Culture - Sputum (collected 08-20-21 @ 12:03)  Source: .Sputum Sputum  Gram Stain (08-20-21 @ 16:29):    Numerous epithelial cells    Few White blood cells    Moderate Gram positive cocci in pairs, chains and clusters    Rare Gram Positive Rods    Rare Gram Negative Rods  Final Report (08-20-21 @ 16:29):    Sputum specimen rejected.  Microscopic examination indicates    oropharyngeal contamination.  Please repeat.    Culture - CSF with Gram Stain (collected 08-20-21 @ 05:32)  Source: .CSF CSF  Gram Stain (08-20-21 @ 05:50):    No White blood cells    No organisms seen    Culture - Sputum . (08.17.21 @ 08:05)    Gram Stain:   No epithelial cells seen  Few White blood cells  No organisms seen    Specimen Source: .Sputum Sputum    Culture Results:   Rare Normal Respiratory Mely present to date    Culture - Blood (08.17.21 @ 01:45)    Specimen Source: .Blood Blood X 2    Culture Results:   No growth at 1 day.    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 Stable ischemic changes within the right cerebellum. Right-sided EVD catheter in place with slight increase in ventricular size. Interval improvement in the intraventricular hemorrhage. Evolving areas of acute/subacute ischemia within the right cerebellum.    CTA (08.19.2021) -  Interval improvement in the caliber of the proximal vertebral artery with progression of a vasospasm involving the mid to distal basilar artery. Interval improvement in the vasospasm involving the left vertebral artery. Persistent vasospasm involving the posterior cerebral and superior cerebellar arteries. Interval improvement in the vasospasm involving the right supraclinoid ICA as well as the left A1 and M1 segments. Progression of vasospasm involving the right M1 segment.    Cerebral Angiogram (08.18.2021 - improvement in vasospasm, IA verapamil to posterior circulation  Cerebral Angiogram (08.17.2021 - Left vertebral artery dissection demonstrates continued moderate to severe cerebral vasospasm of the distal let vert and basilar artery, some what improved caliber of proximal left vert. 15mg IA Verapamil injected over 10 minutes with mild improvement of posterior circulation post treatment. Right ICA injection with continued mild supraclinoid vasospasm, 10mg of IA Verapamil injected.  Cerebral Angiogram (08.16.2021) - Left vertebral injection demonstrates moderate diffuse vasospasm of left vertebral artery, basilar artery and posterior circulation including PCAs. 15mg Verapamil injected via left vert with mild radiographic improvement post treatment. Right ICA injection with mild supraclinoid spasm/narrowing, otherwise no vasospasm appreciated in anterior circulation.    CT (08.18.2021) - No significant interval change in right superior and lateral cerebellar hypodensities.  CT (08.15.2021) - 1. Worsening the uncal herniation, with effacement of the bilateral suprasellar and perimesencephalic cisterns.  2. Redistribution of intraventricular hemorrhage, as above. Redemonstration of right EVD, with distal tip in the right frontal horn, near the foramen of Monro. Worsening hydrocephalus.  CTA - 1. Multifocal punctate and short segment stenoses, as above, with several areas of narrowing new as compared to recent CTA dated 08/09/2021. In the posterior circulation, stenoses are identified within the V4 left vertebral artery, the basilar artery, and in the bilateral PCA, more significant on the left.  Within the anterior circulation, stenosis are identified in the right supraclinoid C6 ICA as well as the proximal M1 MCA. Findings are most compatible with vasospasm.  2. In particular, the distal V4 right vertebral artery, which was visualized, though diminutive, on prior CTA dated 08/09/2021, does not fill with contrast on the present examination. The basilar artery demonstrates multiple stenoses, and is significantly smaller in caliber when compared to the prior CTA.    Other imaging:  Duplex - 1.  Since 8/9/2021, there is new deep venous thrombosis in the bilateral peroneal veins.  2. Redemonstration of deep vein thrombosis in a right intramuscular calf vein.      [Code] Full  [Goals] Aggressive  [Disposion] ICU

## 2021-08-24 NOTE — CONSULT NOTE ADULT - SUBJECTIVE AND OBJECTIVE BOX
incomplete HPI:  45y/F with h/o recent gastric sleeve ( [facility], Dr. Crisostomo ), fibromyalgia, thyroid dysfunction, PTSD, depression, anxiety. Presented with seizures at home - brought to [place], with 1 more episode of seizure en route. Intubated, CTH showed SAH with IV extension, casted IV, hydrocephalus, [facility]. At [facility] s/p cerebral angiogram for R vertebral artery takedown for R vertebral dissecting aneurysm (), and R frontal EVD placement. Since then patient is febrile, repeated BCx, UCx, sputum and CSF Cx are negative. Presumed as central fevers, originally patient was on Vanc 8/10-; Zosyn 10- with increase in WBC. Between - patient is off antibiotics with decrease in WBC, now patient with increase sputum production, increase in WBC and worsening CXR with suspected consolidation worse on the right, as well as,  sputum cultures positive to MSSA and primary team started patient on Cefazolin 2g q8. ID consulted for MSSA PNA.    SUBJECTIVE:  Patient seen and examined at bedside. Patient is lateritic the time of the exam as was given Fentanyl prior IJ line change.    Vital Signs Last 12 Hrs  T(F): 99.3 (21 @ 16:00), Max: 100.1 (21 @ 07:00)  HR: 100 (21 @ 17:00) (84 - 105)  BP: 188/96 (21 @ 17:00) (161/73 - 206/110)  BP(mean): 132 (21 @ 17:00) (105 - 148)  RR: 20 (21 @ 17:00) (16 - 22)  SpO2: 95% (21 @ 17:00) (93% - 100%)  I&O's Summary    23 Aug 2021 07:01  -  24 Aug 2021 07:00  --------------------------------------------------------  IN: 5431.9 mL / OUT: 5482 mL / NET: -50.1 mL    24 Aug 2021 07:01  -  24 Aug 2021 17:31  --------------------------------------------------------  IN: 3512.1 mL / OUT: 762 mL / NET: 2750.1 mL    PHYSICAL EXAM:  Constitutional: NAD  HEENT: NC/AT  Neck: Supple  Respiratory: Decrease bs bl  Cardiovascular: RRR, normal S1 and S2, no m/r/g.   Gastrointestinal: +BS, soft NTND   Extremities: wwp; no cyanosis, clubbing or edema.   Vascular: Pulses equal and strong throughout.   Neurological: lateritic at time of exam  Skin: No gross skin abnormalities or rashes    LABS:                        8.4    20.90 )-----------( 336      ( 24 Aug 2021 15:57 )             28.3         143  |  105  |  11  ----------------------------<  179<H>  3.8   |  26  |  0.48<L>    Ca    9.1      24 Aug 2021 13:06  Phos  2.4       Mg     1.6         Urinalysis Basic - ( 23 Aug 2021 10:47 )    Color: Yellow / Appearance: Clear / S.015 / pH: x  Gluc: x / Ketone: NEGATIVE  / Bili: Negative / Urobili: 1.0 E.U./dL   Blood: x / Protein: NEGATIVE mg/dL / Nitrite: NEGATIVE   Leuk Esterase: NEGATIVE / RBC: x / WBC x   Sq Epi: x / Non Sq Epi: x / Bacteria: x    MEDICATIONS  (STANDING):  acetylcysteine 20%  Inhalation 4 milliLiter(s) Inhalation every 6 hours  albuterol/ipratropium for Nebulization 3 milliLiter(s) Nebulizer every 6 hours  amantadine Syrup 200 milliGRAM(s) Oral every 12 hours  aspirin  chewable 81 milliGRAM(s) Oral daily  bromocriptine Capsule 15 milliGRAM(s) Oral every 8 hours  ceFAZolin   IVPB      ceFAZolin   IVPB 2000 milliGRAM(s) IV Intermittent every 8 hours  chlorhexidine 0.12% Liquid 15 milliLiter(s) Oral Mucosa every 12 hours  chlorhexidine 2% Cloths 1 Application(s) Topical <User Schedule>  enoxaparin Injectable 60 milliGRAM(s) SubCutaneous every 12 hours  glucagon  Injectable 1 milliGRAM(s) IntraMuscular once  insulin lispro (ADMELOG) corrective regimen sliding scale   SubCutaneous every 6 hours  lacosamide Solution 100 milliGRAM(s) Oral two times a day  methylphenidate 10 milliGRAM(s) Oral daily  norepinephrine Infusion 0.05 MICROgram(s)/kG/Min (4.79 mL/Hr) IV Continuous <Continuous>  sodium chloride 3%  Inhalation 4 milliLiter(s) Inhalation every 6 hours  valproate sodium IVPB 1000 milliGRAM(s) IV Intermittent every 8 hours    MEDICATIONS  (PRN):  acetaminophen    Suspension .. 650 milliGRAM(s) Oral every 6 hours PRN Temp greater or equal to 38C (100.4F), Mild Pain (1 - 3)  ondansetron Injectable 4 milliGRAM(s) IV Push every 6 hours PRN Nausea and/or Vomiting

## 2021-08-24 NOTE — PROGRESS NOTE ADULT - ASSESSMENT
46 y/o F with h/o recent gastric sleeve (08/04/21 [facility], Dr. Crisostomo ), fibromyalgia, thyroid dysfunction, PTSD, depression, anxiety.  Presented with seizures at home - brought to [place], with 1 more episode of seizure en route.  Intubated, CT showed SAH with IV extension, casted IV, hydrocephalus, given mannitol, levetiracetam 1g,  6mg ativan. Started on Cardene drip for BP goal < 140 and transferred to [facility] NICU for further management. HH5 MF4, BD 1 = 8/7. Now s/p cerebral angiogram for R vertebral artery takedown for R vertebral dissecting aneurysm (8/7), and R frontal EVD placement (8/7), now s/p IVC filter placement (8/12)    NEURO:   Neurochecks Q1h  EVD@ neg 5, draining 15 mL/h (ICPs 0- neg 8)  s/p CTH 8/20: decreased size in ventricles, stable.   CTA 8.22: Spasm improved RMCA. Similar in other territories  Nimodipine 60 mg po q4h d/c'ed for SBP goal 180-200   Depakote increased to 1g q8 ( No keppra due to agitation). VPA level 55.   Vimpat 100 mg BID  EEG no seizures x 2 days , D/c'ed  Cont ASA 81  CTH 8/15: worsened uncal herniation, worsened hydro, vasospasm in b/l PCA, L vert, basilar arteries  Angio demonstrating vasospasm of Right ICA, right PCOMM, Left distal vert and basilar confluence, and received IA verapamil on 8/16. 8/17. 8/18. 8/20.   Bromocriptine 15mg Q8  Ritalin started for neurostim  Continue aggressive PT     PULM:    Extubated 8/20  Duonebs and 3% nebs standing  Chest PT   CXR   Aspiration precautions, requires frequent suctioning (Q2 hr)    CARDIO:    -220  Levo gtt 0.26 mcg/kg/min, s/p albumin 8/16   TTE 8/12 WNL    Troponin stable   EKG normal     ENDO:    Glucose goal 140-180    GI:    TF via NGT. Advanced to 40cc/hr.  Free water flushes 250 Q6  Thin liquid diet x3 weeks as per bariatric when tolerated  PPI per bariatric surgeon  DC miralax, senna re rectal tube  PEG needed    RENAL:   Maintain euvolemia   Na goal 145-150   50cc/hr lactated ringers   Aggressively replete    HEM/ONC:   ASA 81  VTE Prophylaxis: SCDs, SQL  LE Duplex 8/9: Acute completely occlusive deep venous thrombosis of the right intramuscular calf vein, s/p IVCF with vascular 8/12, repeat 8/16 shows new b/l peroneal DVTs, factor xa c/w prophylactic dose.  Repeat dopplers 8/23  Hb stable  Hypercoagulable w/u - still pending, however protein S/C and AT III normal     ID: Persistent fever, leukocytosis.. Most recent fever 100.6F.  MRSA neg 8/12  Was on empiric Ceftriaxone (08/22) for aspiration PNA. Restarted on 8/23.   Was on empiric Vanc/zoysn for fever d/c'd (8/10-13) and now restarted 8/14 for persistent fevers, dc'd again on 8/18.   CSF sent 8/13, NGTD. Pancx 8/17, NGTD, 8/20 NGTD   Repeat cultures 8/23 (blood, sputum, CSF, UA)  Infectious disease consult re persistent fevers.  Procal 0.13      Disposition:  ICU. Family updated  PT/OT: on hold due to critical status  Full code    Social: Daughter Maggie and Son Kenya and sister (on the phone) updated    Acces:   R IJ TLC  L axillary A line      *****    ATTENDING ATTESTATION:  I was physically present for the key portions of the evaluation and management (E/M) service provided.  I agree with the above history, physical and plan, which I have reviewed and edited where appropriate.    Patient at high risk for neurological deterioration or death due to:  ICU delirium, aspiration PNA, DVT / PE.  Critical care time:  I have personally provided 45 minutes of critical care time, excluding time spent on separate procedures.      Plan discussed with RN, house staff.       46 y/o F with h/o recent gastric sleeve (08/04/21 [facility], Dr. Crisostomo ), fibromyalgia, thyroid dysfunction, PTSD, depression, anxiety.  Presented with seizures at home - brought to [place], with 1 more episode of seizure en route.  Intubated, CT showed SAH with IV extension, casted IV, hydrocephalus, given mannitol, levetiracetam 1g,  6mg ativan. Started on Cardene drip for BP goal < 140 and transferred to [facility] NICU for further management. HH5 MF4, BD 1 = 8/7. Now s/p cerebral angiogram for R vertebral artery takedown for R vertebral dissecting aneurysm (8/7), and R frontal EVD placement (8/7), now s/p IVC filter placement (8/12).  BD18    NEURO:   Neurochecks Q1h  EVD@ neg 5, draining 15-20 mL/h (ICPs 0- neg 9)  s/p CTH 8/20: decreased size in ventricles, stable.   CTA 8/22: Spasm improved RMCA. Similar in other territories  Nimodipine 60 mg po q4h d/c'ed for SBP goal 180-200   Seizures: On VPA 1000mg Q8,  Check VPA level 8/24 prior to next dose  Continue Vimpat 100 mg BID  EEG no seizures x 2 days , D/c'ed  Cont ASA 81mg  CTH 8/15: worsened uncal herniation, worsened hydro, vasospasm in b/l PCA, L vert, basilar arteries  Angio demonstrating vasospasm of Right ICA, right PCOMM, Left distal vert and basilar confluence, and received IA verapamil on 8/16. 8/17. 8/18. 8/20.   Bromocriptine 15mg Q8  Ritalin started for neurostimulation.   Continue aggressive PT     PULM:    Extubated 8/20  High aspiration risk.   Duonebs and 3% nebs standing  Chest PT   CXR clear 8/22  Aspiration precautions, requires frequent suctioning (Q2 hr)    CARDIO:    -220  Levo gtt 0.21 mcg/kg/min, s/p albumin 8/16   TTE 8/12 WNL    Troponin stable   EKG.   Central line RIJ- will replace.     ENDO:    Glucose goal 140-180    GI:    TF via NGT. Advanced to 40cc/hr.  Free water flushes 250 Q6  Thin liquid diet x3 weeks as per bariatric when tolerated  PPI per bariatric surgeon.  DCed miralax, senna re rectal tube  Liquid stool. DC rectal tube. If still with diarrhea, send Cdiff if still.   PEG needed. Bariatric surgeon/gen surg to faciliatate    RENAL:   Maintain euvolemia   Na goal 145-150   Bolus as needed   Severe hypokalemia. Replete aggressively    HEM/ONC:   ASA 81mg  VTE Prophylaxis: SCDs, SQL  LE Duplex 8/9: Acute completely occlusive deep venous thrombosis of the right intramuscular calf vein, s/p IVCF with vascular 8/12, repeat 8/16 shows new b/l peroneal DVTs, factor xa c/w prophylactic dose.  Repeat dopplers 8/23: New L calf DVT  Increase lovenox to 60mg bid   Anti Xa level on 60mg bid.   Hypercoagulable w/u - still pending, however protein S/C and AT III normal .    ID: Persistent fever, leukocytosis.. Most recent fever 100.6F.  MRSA neg 8/12  Was on empiric Ceftriaxone (08/22) for aspiration PNA. Restarted on 8/23.   Was on empiric Vanc/zoysn for fever d/c'd (8/10-13) and now restarted 8/14 for persistent fevers, dc'd again on 8/18.   CSF on 8/13, NGTD. Pancx 8/17, NGTD, 8/20 NGTD   Repeat cultures 8/23 (blood, sputum, CSF, UA)- sputum GPC in pairs  Infectious disease consulted 8/23 re: persistent fevers and clearance for eventual shunt.   Procal 0.13  Low threshold for broadening. ID   CTX 2g Q 24 hours  Will consider Cdiff if remains with diarrhea  Change lines    Disposition: ICU. Family updated  PT/OT: On hold due to critical status  Full code    Social: Daughter Maggie and Son Kenya and sister (on the phone) updated    Acces:   R IJ TLC (8/7)  L axillary A line      *****    ATTENDING ATTESTATION:  I was physically present for the key portions of the evaluation and management (E/M) service provided.  I agree with the above history, physical and plan, which I have reviewed and edited where appropriate.    Patient at high risk for neurological deterioration or death due to:  ICU delirium, aspiration PNA, DVT / PE.  Critical care time:  I have personally provided 45 minutes of critical care time, excluding time spent on separate procedures.      Plan discussed with RN, house staff.       46 y/o F with h/o recent gastric sleeve ([facility] Crisostomo ), fibromyalgia, thyroid dysfunction, PTSD, depression, anxiety.  Presented with seizures at home - brought to [place], with 1 more episode of seizure en route.  Intubated, CT showed SAH with IV extension, casted IV, hydrocephalus, given mannitol, levetiracetam 1g,  6mg ativan. Started on Cardene drip for BP goal < 140 and transferred to [facility] NICU for further management. HH5 MF4, BD 1 = 8/7. Now s/p cerebral angiogram for R vertebral artery takedown for R vertebral dissecting aneurysm (8/7), and R frontal EVD placement (8/7), now s/p IVC filter placement (8/12).  BD18    NEURO:   Neurochecks Q1h  EVD@ neg 5, draining 15-20 mL/h (ICPs 0- neg 9)  s/p CTH 8/20: decreased size in ventricles, stable.   CTA 8/22: Spasm improved RMCA. Similar in other territories  Nimodipine 60 mg po q4h d/c'ed for SBP goal 180-220  D/W NSGY; Relax SBP to 160-220   Clinical seizures at home: On VPA 1000mg Q8  Check VPA level 8/24 prior to next dose.  Continue Vimpat 100 mg BID  EEG no seizures x 2 days , D/c'ed  Cont ASA 81mg  CTH 8/15: worsened uncal herniation, worsened hydro, vasospasm in b/l PCA, L vert, basilar arteries  Angio demonstrating vasospasm of Right ICA, right PCOMM, Left distal vert and basilar confluence, and received IA verapamil on 8/16. 8/17. 8/18. 8/20.   Bromocriptine 15mg Q8. Ritalin for  neurostimulation.   Continue aggressive PT     PULM:    Extubated 8/20  High aspiration risk.   Duonebs and 3% nebs standing  Chest PT   CXR clear 8/22  Aspiration precautions, requires frequent suctioning (Q2 hr)  Currently on Ancef for likely  tracheitis    CARDIO:    -220  Levo gtt to maintain within parameters  TTE 8/12 WNL    Troponin stable   EKG.   Central line RIJ- will replace.     ENDO:    Glucose goal 140-180    GI:    TF via NGT. Advanced to 40cc/hr.  Free water flushes 250 Q6  Thin liquid diet x3 weeks as per bariatric when tolerated  PPI per bariatric surgeon.  DCed miralax, senna re rectal tube  Liquid stool, improving. DC rectal tube. If still with diarrhea, send Cdiff.  PEG needed. Bariatric surgeon/gen surg to facilitate    RENAL:   Maintain euvolemia   Na goal 145-150   Bolus as needed   Severe hypokalemia 2/2 enteric losses. Replete aggressively    HEM/ONC:   ASA 81mg  VTE Prophylaxis: SCDs, SQL  LE Duplex 8/9: Acute completely occlusive deep venous thrombosis of the right intramuscular calf vein, s/p IVCF with vascular 8/12, repeat 8/16 shows new b/l peroneal DVTs, factor xa c/w prophylactic dose.  Repeat dopplers 8/23: New L calf DVT  Increase lovenox to 60mg bid   Anti Xa level on 60mg bid.   Hypercoagulable w/u - still pending, however protein S/C and AT III normal .    ID: Persistent fever, leukocytosis.. Most recent fever 100.6F.  MRSA neg 8/12  Was on empiric Ceftriaxone (08/22) for aspiration PNA. Restarted on 8/23.   Was on empiric Vanc/zoysn for fever d/c'd (8/10-13) and now restarted 8/14 for persistent fevers, dc'd again on 8/18.   CSF on 8/13, NGTD. Pancx 8/17, NGTD, 8/20 NGTD   Repeat cultures 8/23 (blood, sputum, CSF, UA)- sputum GPC in pairs- MSSA. Change to [facility]  Infectious disease consulted 8/23 re: persistent fevers and clearance for eventual shunt. Awaiting  Procal 0.13  Low threshold for broadening. ID   CTX 2g Q 24 hours  Will consider Cdiff if remains with diarrhea  Change lines    Disposition: ICU. Family updated  Full code    Acces:   R IJ TLC (8/7)  L axillary A line    *****    ATTENDING ATTESTATION:  I was physically present for the key portions of the evaluation and management (E/M) service provided.  I agree with the above history, physical and plan, which I have reviewed and edited where appropriate.    Patient at high risk for neurological deterioration or death due to:  ICU delirium, aspiration PNA, DVT / PE.  Critical care time:  I have personally provided 45 minutes of critical care time, excluding time spent on separate procedures.      Plan discussed with RN, house staff.

## 2021-08-24 NOTE — DISCHARGE NOTE NURSING/CASE MANAGEMENT/SOCIAL WORK - PATIENT PORTAL LINK FT
You can access the FollowMyHealth Patient Portal offered by [facility] by registering at the following website: [email or web address]. By joining [facility]’s FollowMyHealth portal, you will also be able to view your health information using other applications (apps) compatible with our system.

## 2021-08-24 NOTE — PROGRESS NOTE ADULT - SUBJECTIVE AND OBJECTIVE BOX
=================================  NEUROCRITICAL CARE ATTENDING NOTE  =================================    LUDMILA PRIETO   MRN-[id number]  Summary:  45y/F with h/o recent gastric sleeve ( [facility], Dr. Crisostomo ), fibromyalgia, thyroid dysfunction, PTSD, depression, anxiety.  Presented with seizures at home - brought to [place], with 1 more episode of seizure en route.  Intubated, CT showed SAH with IV extension, casted IV, hydrocephalus, given mannitol levetiracetam 6mg ativan, transferred to [facility].    COURSE IN THE HOSPITAL:   admitted to [facility], EVD inserted, high pressure, central line lawrence inserted   extubated   Tmax 38.4   Tmax 38.3   Tmax 38.2 attempted L IJ / SC insertion with arterial puncture, otherwise stable    Past Medical History:   Allergies:  Allergy Status Unknown  Home meds:     PHYSICAL EXAMINATION  T(C): 38.2 ( @ 22:00), Max: 38.2 ( @ 22:00) HR: 95 ( @ 23:00) (84 - 105) BP: 191/93 ( @ 23:00) (161/73 - 206/110) RR: 19 ( @ 23:00) (16 - 22) SpO2: 97% ( @ 23:00) (93% - 100%)   NEUROLOGIC EXAMINATION:  Patient opens eyes spontaneously, follows simple commands, oriented x1, pupils reactive but anisocoric 2mm R 4mm L, R gaze preference moves to midline, moves B UE spontaneously, L>R, moves B LE spontaneously  GENERAL: room air  EENT:  anicteric  CARDIOVASCULAR: (+) S1 S2, normal rate and regular rhythm  PULMONARY: rhonchi all lung fields, gurgly  ABDOMEN: soft, nontender with normoactive bowel sounds  EXTREMITIES: no edema  SKIN: no rash    LABS:   CAPILLARY BLOOD GLUCOSE 136 148 139 119              (21.8)  8.4  (8.9)  20.90 )-----------( 336      ( 24 Aug 2021 15:57 )             28.3     147<H>  |  108  |  9   ----------------------------<  151<H>  3.3<L>   |  29  |  0.49<L>    Ca    9.0      24 Aug 2021 18:55  Phos  2.4       Mg     1.6      @ 07:01  -   @ 07:00  IN: 5431.9 mL / OUT: 5482 mL / NET: -50.1 mL    HbA1C = 5.9 () 5.9 ()  LDL = 84 ()   HDL = 50 ()  TG = 74 ()  TSH = 0.078 ()    Bacteriology:   sputum GS: few GPCIClusters and pairs, rare GNR   CSF:  NGTD   Blood CS NG1D x2   sputum: moderate MSSA   sputum: rejected, contaminated   Blood CS NG3D    CSF NGTD   sputum culture: No organisms   Blood CS NG5D x2 (FINAL)   CSF No organisms  08/15 Sputum culture: normal trent   CSF: NGTD    CSF studies:  EEG:  Neuroimagin/17 CT head:  decrease in size of lateral and third ventricles; interval demarcated infarctions within R cerebellar hemisphere  08/15 CTA: CTA HEAD: Agree that there are new/more pronounced areas of irregularity and narrowing involving the distal left vertebral artery, basilar artery, bilateral PCAs, supraclinoid right ICA and right M1 segment. There is mild narrowing of the left A1 and left M1 segments as well as the bilateral A2 segments which is new from prior examination. These findings are suspicious for vasospasm.  Other imagin/23 LE Doppler: new acute DVT L IM calf vein, persistent acute DVT bilateral peroneal veins, persistent acute DVT R IM calf veins   LE Doppler:  new DVT bilateral peroneal veins, DVT in R intramuscular calf vein    MEDICATIONS:     ·	aspirin  chewable 81 Oral daily  ·	enoxaparin Injectable 40 SubCutaneous every 12 hours  ·	ceFAZolin   IVPB 2000 IV Intermittent every 8 hours  ·	amantadine Syrup 200 Oral every 12 hours  ·	bromocriptine Capsule 15 Oral every 8 hours  ·	lacosamide Solution 100 Oral two times a day  ·	methylphenidate 10 Oral daily  ·	valproate sodium IVPB 1000 IV Intermittent every 8 hours  ·	acetylcysteine 20%  Inhalation 4 Inhalation every 6 hours  ·	albuterol/ipratropium for Nebulization 3 Nebulizer every 6 hours  ·	sodium chloride 3%  Inhalation 4 Inhalation every 6 hours  ·	insulin lispro (ADMELOG) corrective regimen sliding scale  SubCutaneous every 6 hours  ·	acetaminophen    Suspension .. 650 Oral every 6 hours PRN  ·	ondansetron Injectable 4 IV Push every 6 hours PRN    IV FLUIDS:   DRIPS:  ·	norepinephrine Infusion 0.05 MICROgram(s)/kG/Min IV Continuous <Continuous>  DIET: Vital  Lines: Central line [place]  Drains:  [place]   Wounds:    CODE STATUS:  Full Code                       GOALS OF CARE:  aggressive                      DISPOSITION:  ICU   5 MF 4

## 2021-08-24 NOTE — PROGRESS NOTE ADULT - ASSESSMENT
45y/F with  subarachnoid hemorrhage, intraventricular hemorrhage, brain compression, cerebral edema  obstructive hydrocephalus  prediabetic  DVT bilateral peroneal veins, R intramuscular calf vein' new acute DVT L IM calf vein    PLAN: Day 1 = 08-07 ; Day 4 = 08/10; Day 21 = 08/27  NEURO: neurochecks q1h, PRN pain meds with acetaminophen   SAH:  nimodipine held for hemodynamic augmentation; vertebral artery sacrificed at level of aneurysm  VSP :  HDA to 160-220 mm Hg  Hydrocephalus:  EVD at -5cm H20 open to drain  Seizure prophylaxis:  cont VPA/lacosamide  on amantadine, methylphenidate  on bromocriptine  REHAB:  physical therapy evaluation and management  - defer  EARLY MOB:  HOB elevated    PULM:  pulmo toilette; continue ipratropium / albuterol q6h duonebs  CARDIO:  SBP goal 160-220mm Hg, levophed - titrate to SBP goal  ENDO:  Blood sugar goals 140-180 mg/dL, cont insulin sliding scale  GI:  no bowel regimen (diarrhea)  DIET: cont tube feeds  RENAL: maintain euvolemia, accurate Is and Os Na goal 145-150  HEM/ONC: no coagulopathy (INR= 1.2), no ASA / Plavix use  VTE Prophylaxis: SCDs, SQL; repeat surveillance doppler on 08/30; s/p IVC filter  ID: febrile downtrending, leukocytosis, completed piperacillin/tazobactam (08/10 to 08/18), vancomycin (08/10-08/12, 08/16 to 08/18); now on ceftriaxone (08/22 - current); continue panculture, ABx streamlined to cefazolin for MSSA, as per ID  Social: will update family, Daughter Maggie and Son Kenya and sister (on the phone)     CORE MEASURES  1.  Hunt and Mckeon Score = 5  2.  VTE prophylaxis:  [ ] administered within 24 hours of admission OR [X] reason not done: fresh bleed, unsecured aneurysm.  3.  Dysphagia screening:   [X] performed before any oral meds / liquids / food  4.  Nimodipine treatment:  [X] administered within 24 hours of admission OR [ ] reason not done:    ATTENDING ATTESTATION:  I was physically present for the key portions of the evaluation and management (E/M) service provided.  I agree with the above history, physical and plan, which I have reviewed and edited where appropriate.    Patient at high risk for neurological deterioration or death due to:  ICU delirium, aspiration PNA, DVT / PE.  Critical care time:  I have personally provided 30 minutes of critical care time, excluding time spent on separate procedures.      Plan discussed with RN, house staff.

## 2021-08-24 NOTE — DISCHARGE NOTE NURSING/CASE MANAGEMENT/SOCIAL WORK - NSDCPEFALRISK_GEN_ALL_CORE
For information on Fall & injury Prevention, visit [email or web address]

## 2021-08-24 NOTE — PROGRESS NOTE ADULT - SUBJECTIVE AND OBJECTIVE BOX
Interval Events: Reviewed  Patient seen and examined at bedside.    Patient is a 45y old  Female who presents with a chief complaint of SAH (24 Aug 2021 13:50)    she is sob  PAST MEDICAL & SURGICAL HISTORY:      MEDICATIONS:  Pulmonary:  acetylcysteine 20%  Inhalation 4 milliLiter(s) Inhalation every 6 hours  albuterol/ipratropium for Nebulization 3 milliLiter(s) Nebulizer every 6 hours  sodium chloride 3%  Inhalation 4 milliLiter(s) Inhalation every 6 hours    Antimicrobials:  ceFAZolin   IVPB      ceFAZolin   IVPB 2000 milliGRAM(s) IV Intermittent every 8 hours    Anticoagulants:  aspirin  chewable 81 milliGRAM(s) Oral daily  enoxaparin Injectable 40 milliGRAM(s) SubCutaneous every 12 hours    Cardiac:  norepinephrine Infusion 0.052 MICROgram(s)/kG/Min IV Continuous <Continuous>      Allergies    apple (Angioedema)  No Known Drug Allergies  strawberry (Angioedema)    Intolerances    lactose (Stomach Upset)      Vital Signs Last 24 Hrs  T(C): 37.6 (24 Aug 2021 18:00), Max: 37.8 (23 Aug 2021 23:00)  T(F): 99.6 (24 Aug 2021 18:00), Max: 100.1 (23 Aug 2021 23:00)  HR: 96 (24 Aug 2021 19:00) (84 - 105)  BP: 187/89 (24 Aug 2021 19:00) (161/73 - 209/95)  BP(mean): 128 (24 Aug 2021 19:00) (105 - 148)  RR: 20 (24 Aug 2021 19:00) (16 - 22)  SpO2: 95% (24 Aug 2021 19:00) (93% - 100%)     @ :  -   @ 07:00  --------------------------------------------------------  IN: 5431.9 mL / OUT: 5482 mL / NET: -50.1 mL     @ 07:01  -   @ 19:41  --------------------------------------------------------  IN: 4337.1 mL / OUT: 2407 mL / NET: 1930.1 mL          Review of Systems:   •	General: negative  •	Skin/Breast: negative  •	Ophthalmologic: negative  •	ENMT: negative  •	Respiratory and Thorax: sob and cough  •	Cardiovascular: negative  •	Gastrointestinal: negative  •	Genitourinary: negative  •	Musculoskeletal: negative  •	Neurological: negative  •	Psychiatric: negative  •	Hematology/Lymphatics: negative  •	Endocrine: negative  •	Allergic/Immunologic: negative    Physical Exam:   • Constitutional:	refer to the dietion /Nutritionist note  • Eyes:	EOMI; PERRL; no drainage or redness  • ENMT:	No oral lesions; no gross abnormalities  • Neck	No bruits; no thyromegaly or nodules  • Breasts:	not examined  • Back:	No deformity or limitation of movement  • Respiratory:	Breath Sounds equal & clear to percussion & rales  auscultation, no accessory muscle use  • Cardiovascular:	Regular rate & rhythm, normal S1, S2; no murmurs, gallops or rubs; no S3, S4  • Gastrointestinal:	Soft, non-tender, no hepatosplenomegaly, normal bowel sounds  • Genitourinary:	not examined  • Rectal: not examined  • Extremities:	No cyanosis, clubbing or edema  • Vascular:	Equal and normal pulses (carotid, femoral, dorsalis pedis)  • Neurologica:l	not examined  • Skin:	No lesions; no rash  • Lymph Nodes:	No lymphadedenopathy  • Musculoskeletal:	No joint pain, swelling or deformity; no limitation of movement        LABS:      CBC Full  -  ( 24 Aug 2021 15:57 )  WBC Count : 20.90 K/uL  RBC Count : 3.35 M/uL  Hemoglobin : 8.4 g/dL  Hematocrit : 28.3 %  Platelet Count - Automated : 336 K/uL  Mean Cell Volume : 84.5 fl  Mean Cell Hemoglobin : 25.1 pg  Mean Cell Hemoglobin Concentration : 29.7 gm/dL  Auto Neutrophil # : x  Auto Lymphocyte # : x  Auto Monocyte # : x  Auto Eosinophil # : x  Auto Basophil # : x  Auto Neutrophil % : x  Auto Lymphocyte % : x  Auto Monocyte % : x  Auto Eosinophil % : x  Auto Basophil % : x    08-24    147<H>  |  108  |  9   ----------------------------<  151<H>  3.3<L>   |  29  |  0.49<L>    Ca    9.0      24 Aug 2021 18:55  Phos  2.4       Mg     1.6                 Urinalysis Basic - ( 23 Aug 2021 10:47 )    Color: Yellow / Appearance: Clear / S.015 / pH: x  Gluc: x / Ketone: NEGATIVE  / Bili: Negative / Urobili: 1.0 E.U./dL   Blood: x / Protein: NEGATIVE mg/dL / Nitrite: NEGATIVE   Leuk Esterase: NEGATIVE / RBC: x / WBC x   Sq Epi: x / Non Sq Epi: x / Bacteria: x    CXR RLL infiltrate      Culture Results:   No growth to date ( @ 19:42)  Culture Results:   No growth at 1 day. ( @ 17:30)  Culture Results:   No growth at 1 day. ( @ 17:30)  Culture Results:   Moderate Staphylococcus aureus Presumptive Methicillin susceptible  Confirmation to follow within 24 hrs.  Susceptibility to follow.  Accompanied by normal respiratory trent ( @ 11:15)      RADIOLOGY & ADDITIONAL STUDIES (The following images were personally reviewed):

## 2021-08-24 NOTE — CONSULT NOTE ADULT - TIME BILLING
discussed with Trudi LARA
Patient seen and examined with house-staff during bedside rounds.  Resident note read, including vitals, physical findings, laboratory data, and radiological reports.   Revisions included below.  Direct personal management at bed side and extensive interpretation of the data.  Plan was outlined and discussed in details with the housestaff.  Decision making of high complexity  Action taken for acute disease activity to reflect the level of care provided:  - medication reconciliation  - review laboratory data
management of MSSA PNA

## 2021-08-24 NOTE — PROCEDURE NOTE - ADDITIONAL PROCEDURE DETAILS
Attempted placement of left IJ CVC. Patient tolerated and was not able to thread line d/t clotting of blood. Arterial blood seen and immediately removed needle and held pressure for 30 minutes. Small coin sized hematoma palpable. Hemodynamically stable throughout. Carotid dopplers ordered to assess/rule out pseudoaneurysm.
The patient's hair was shaved and the site prepped and draped in sterile fashion. 5 cc of 2% lidocaine was used for local anesthesia. The patient was also sedated on propofol and midazolam. An incision was made 10.5 cm posterior to the nasion and 3 cm lateral to the midline. A hand twist drill was used for the creation of a matias hole. The dura was pierced with the sharp end of the trocar. The ventriculostomy catheter with the stylet was passed towards the ipsilateral medial canthus and tragus until it reached 7 cm at the scalp. Brisk flow of pink CSF under high pressure was observed. 10 cc of CSF was drained. The catheter was tunneled to emerge from the scalp 5 cm posterior to the incision. The catheter was then connected to an external drainage system and placed 10 cm above the tragus. The incision was closed with staples and the catheter was secured with a 2-0 nylon suture to the scalp. EVD was left open at 15 cm H20.
direct supervision by Dr. Sánchez

## 2021-08-24 NOTE — CONSULT NOTE ADULT - ASSESSMENT
45y/F with h/o recent gastric sleeve (08/04 [facility], Dr. Crisostomo ), fibromyalgia, thyroid dysfunction, PTSD, depression, anxiety. 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 Since then patient is febrile, repeated BCx, UCx, sputum and CSF Cx are negative. Presumed as central fevers, originally patient was on Vanc 8/10-12; Zosyn 10-17 with increase in WBC. On Cefazolin 2g q8. ID consulted for MSSA PNA.    - Due to increase sputum production, WBC 21.8 CXR findings and 8/23 sputum positive to MSSA, reasonable to treat mssa pna  - no signs for IC infection  - Procalcitonin neg, ddx can be central fevers in setting of SAH vs. fever 2/2 DVT as new DVTs on 8/23 US vs mssa pna  - C/w Cefazolin 2g q8 as appropriate for MSSA PNA, expected response to the abx within 48hr.  - f/u BCx. sputum Cx      ID Team 1 will continue to follow. Please see below attending attestation for finalized recommendations.    Ramon Morin MD PGY2 Internal Medicine  Infectious Disease Consult Service, Team 1 45y/F with h/o recent gastric sleeve (08/04 [facility], Dr. Crisostomo ), fibromyalgia, thyroid dysfunction, PTSD, depression, anxiety. 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 Since then patient is febrile, repeated BCx, UCx, sputum and CSF Cx are negative. Presumed as central fevers, originally patient was on Vanc 8/10-12; Zosyn 10-17 with increase in WBC. On Cefazolin 2g q8. ID consulted for MSSA PNA.    - Due to increase sputum production, WBC 21.8 CXR findings and 8/23 sputum positive to MSSA, reasonable to treat mssa pna  - no signs for IC infection  - Procalcitonin neg, ddx can be central fevers in setting of SAH vs. fever 2/2 DVT as new DVTs on 8/23 US vs mssa pna  - C/w Cefazolin 2g q8 as appropriate for MSSA PNA, expected response to the abx within 48hr.  - f/u BCx. sputum Cx, CSF cx as long EVD in place    ID Team 1 will continue to follow. Please see below attending attestation for finalized recommendations.    Ramon Morin MD PGY2 Internal Medicine  Infectious Disease Consult Service, Team 1 45y/F with h/o recent gastric sleeve (08/04 [facility], Dr. Crisostomo ), fibromyalgia, thyroid dysfunction, PTSD, depression, anxiety. 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 Since then patient is febrile, repeated BCx, UCx, sputum and CSF Cx are negative. Presumed as central fevers, originally patient was on Vanc 8/10-12; Zosyn 10-17 with increase in WBC. On Cefazolin 2g q8. ID consulted for MSSA PNA.    - Due to increase sputum production, WBC 21.8 CXR findings and 8/23 sputum positive to MSSA, reasonable to treat mssa pna  - no signs for IC infection  - Procalcitonin neg, ddx can be central fevers in setting of SAH vs. fever 2/2 DVT as new DVTs on 8/23 US vs mssa pna  - C/w Cefazolin 2g q8 as appropriate for MSSA PNA  - f/u BCx. sputum Cx, CSF cx as long EVD in place    ID Team 1 will continue to follow. Please see below attending attestation for finalized recommendations.    Ramon Morin MD PGY2 Internal Medicine  Infectious Disease Consult Service, Team 1

## 2021-08-24 NOTE — PROGRESS NOTE ADULT - SUBJECTIVE AND OBJECTIVE BOX
HPI:  44y/o F with h/o recent gastric sleeve (21 [facility], Dr. Crisostomo ), fibromyalgia, thyroid dysfunction, PTSD, depression, anxiety.  Presented with seizures at home - brought to [place], with 1 more episode of seizure en route.  Intubated, CT showed SAH with IV extension, casted IV, hydrocephalus, given mannitol, levetiracetam 1g,  6mg ativan. Started on Cardene drip for BP goal < 140 and transferred to [facility] NICU for further management. HH5 MF4, BD 1 = . Now s/p cerebral angiogram for R vertebral artery takedown for R vertebral dissecting aneurysm (), and R frontal EVD placement.      Hospital course:  : Tx from [place] for SAH. Intubated. R frontal EVD placed, central line and a line placed. 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   : POD1 s/p angio with R vert takedown, extubated, desatting on aerosol mask, required extensive suctioning, 3% nebs, chest PT, started on low dose precedex drip for restlessness/agitation, ABG stable. NGT placed. TF started with goal 20 pending nutrition recs. 3% stopped for Na 150. Ceribell EEG negative and d/c'ed.    Overnight, new Right pronator drift and right gaze preference that can't cross midline, Repeat CTH demonstrated stable vents, CTA head and neck unremarkable for spasm, hypoattenuation of right cerebellum edema vs. infarct.  8/10: POD3 R vert takedown, EVD open at [id number]. ASHA overnight, neuro stable. CTH with increased hydro, CTA head/neck with mild basilar spasm, but concern for PE. 1/2 am D50 for hypoglycemia. 1L bolus then 100 cc/hr, PM rounds for net negative fluid balance and mild vasospasm on CTA.   : POD4 R vert takedown. EVD at 5cm H2O, ASHA overnight. neuro stable.   Febrile 104. depakote increased to q6. NCHCT stable. EVD lowered to 0. Lasix 20 given for dieuresis, UOP over 2 liters. Sedation given for a-line placement, BP drop needing levo.  : POD5 R vert takedown. EVD at 0cm H2O. ASHA overnight, neuro stable. Precedex being weaned off. Pending IVCF with vascular today.  : POD6 R vert takedown. EVD open at 0cmH2O. ASHA overnight. Neuro exam stable. Mottled skin on the left lower extremity improving. Started on Bromocriptine 15mg Q8.   : POD7. EVD open at 0. 1L bolus given for euvolemia o/n. neuro stable. CTH stable. ENT scoped vocal cords, +R voacl cord paresis, NTD. started on zosyn empirically.   8/15: POD8. EVD open at 0. ASHA o/n, neuro stable. Pt developed increased work of breathing, unable to clear her secretions, received racemic epi and multiple nebulizer treatments, still with stridor. Patient re-intubated at bedside, on full vent support.   : CTH/CTA head/neck/CT chest performed o/n for worsened exam, R gaze preference, sluggish pupils. +worsened uncal herniation, hydro, vasospasm in b/l PCAs, L vert, basilar arteries. preop angio today, restarted on 3% for Na goal 145-150. Angio for vasospasm with IA verapamil.  : POD10. Overnight Pt remains on levophed drip for SBP goal 180-220. Also remains on propofol drip. EVD open at -5cmH2O. ICPs WNL. Febrile 101F and pancultured. CTH today shows slightly smaller ventricles. Angio for vasospasm with IA verapamil.  : POD11 R vert takedown. POD1 angio IA verpamil. Overnight, Pt noted to have downward gaze to the right and not following commands. Taken for stat head CT which is stable. Pupils also noted to be aniscoric left pupil 4mm and brisk and right 2mm brisk. Mannitol 50g given. Ceribell eeg placed for concern of subclinical seizures, so far appears negative for seizures. 1L NS o/n and 1.5L NS bolus in AM for euvolemia. vanc/zosyn stopped. 250cc 3% bolus and 250cc albumin given in AM. Brief seizure to L frontal lobe this AM on EEG, given 2g valproic acid and dose increased to 1g q8. Vimpat 100mg BID started.  Angio with interval improvment in vasospasm, IA verapamil given. In afternoon patient self-extubated, placed on NRB with mucomyst, 3% inhalation and duonebs. 3% at 50 d/c'd.  : POD 12. Remains on VEEG. Axillary A line placed. Salt tabs d/c'd, florinef decreased to 0.1mg, EVD @ -5cm H2O, now draining 20cc/hr. 250 albumin and 500 NS boluses on dayshi, 1 L LR bolus  : POD 13. ASHA. on VEEG, no seizures noted. Pending VPA level. 500 NS, 250 albumin. Febrile, pancultured. EEG d/c'ed.   : BD14, POD14. ASHA overnight, EVD @ -5. s/p 1L bolus for euvolemia  : BD #15: continued on levo, hypercoagulabitly profile pending, EVD @ -5, euvolemia, extubated, amantadine added, free water started for hypernatremia   : BD 17, tmax 101F o/n. Gen Sx consulted for PEG - not a candidate at this time, pancultured for fever, 1L LR given for euvolemia. EVD at -5  : BD18, ASHA o/n, neuro stable, EVD at -5      Vital Signs Last 24 Hrs  T(C): 37.5 (24 Aug 2021 00:46), Max: 38.3 (23 Aug 2021 14:11)  T(F): 99.5 (24 Aug 2021 00:46), Max: 101 (23 Aug 2021 14:11)  HR: 90 (24 Aug 2021 02:00) (83 - 109)  BP: 200/96 (24 Aug 2021 02:00) (152/77 - 216/100)  BP(mean): 138 (24 Aug 2021 02:00) (101 - 144)  RR: 20 (24 Aug 2021 02:00) (15 - 22)  SpO2: 100% (24 Aug 2021 02:00) (97% - 100%)    I&O's Detail    22 Aug 2021 07:01  -  23 Aug 2021 07:00  --------------------------------------------------------  IN:    Enteral Tube Flush: 250 mL    Free Water: 1000 mL    IV PiggyBack: 500 mL    Lactated Ringers: 1000 mL    Norepinephrine: 514.4 mL    Sodium Chloride 0.9% Bolus: 2000 mL    Vital High Protein: 680 mL  Total IN: 5944.4 mL    OUT:    External Ventricular Device (mL): 373 mL    Rectal Tube (mL): 50 mL    Voided (mL): 4100 mL  Total OUT: 4523 mL    Total NET: 1421.4 mL      23 Aug 2021 07:01  -  24 Aug 2021 02:29  --------------------------------------------------------  IN:    Enteral Tube Flush: 340 mL    Free Water: 750 mL    IV PiggyBack: 1350 mL    Lactated Ringers: 1000 mL    Norepinephrine: 392.4 mL    Vital High Protein: 800 mL  Total IN: 4632.4 mL    OUT:    External Ventricular Device (mL): 302 mL    Intermittent Catheterization - Urethral (mL): 3600 mL    Rectal Tube (mL): 0 mL    Voided (mL): 300 mL  Total OUT: 4202 mL    Total NET: 430.4 mL        I&O's Summary    22 Aug 2021 07:01  -  23 Aug 2021 07:00  --------------------------------------------------------  IN: 5944.4 mL / OUT: 4523 mL / NET: 1421.4 mL    23 Aug 2021 07:01  -  24 Aug 2021 02:29  --------------------------------------------------------  IN: 4632.4 mL / OUT: 4202 mL / NET: 430.4 mL        PHYSICAL EXAM:  General: patient seen laying supine in bed in NAD  Neuro: OEs spontaneously, squeezes hands b/l, wiggles toes b/l, and sticks out tongue to command, nonverbal, Moves UEs and LEs spontaneously L>R,  face symmetric   HEENT: Left pupil 4mm reactive, right pupil 3mm reactive, R gaze preference, cannot track past midline, +NGT  Neck: supple  Cardiac: RRR, S1S2  Pulmonary: chest rise symmetric  Abdomen: soft, nontender, nondistended  Ext: warm, perfusing well, PT/DP pulses 2+ b/l  Wound: EVD incision c/d/i with staples, R groin puncture site with ecchymosis and small area of hematoma      TUBES/LINES:  [] CVC  [x] A-line  [] Lumbar Drain  [x] Ventriculostomy - EVD at -5cm H2O  [] Other    DIET:  [] NPO  [] Mechanical  [x] Tube feeds    LABS:                        9.3    18.13 )-----------( 381      ( 23 Aug 2021 05:46 )             32.3     08-23    157<H>  |  116<H>  |  14  ----------------------------<  167<H>  3.0<L>   |  31  |  0.57    Ca    9.3      23 Aug 2021 05:46  Phos  2.8     08-23  Mg     1.9     08-23        Urinalysis Basic - ( 23 Aug 2021 10:47 )    Color: Yellow / Appearance: Clear / S.015 / pH: x  Gluc: x / Ketone: NEGATIVE  / Bili: Negative / Urobili: 1.0 E.U./dL   Blood: x / Protein: NEGATIVE mg/dL / Nitrite: NEGATIVE   Leuk Esterase: NEGATIVE / RBC: x / WBC x   Sq Epi: x / Non Sq Epi: x / Bacteria: x          CAPILLARY BLOOD GLUCOSE      POCT Blood Glucose.: 114 mg/dL (23 Aug 2021 22:57)  POCT Blood Glucose.: 120 mg/dL (23 Aug 2021 16:59)  POCT Blood Glucose.: 116 mg/dL (23 Aug 2021 11:18)  POCT Blood Glucose.: 137 mg/dL (23 Aug 2021 05:10)      Drug Levels: [] N/A  Valproic Acid Level, Serum: 55.1 ug/mL ( @ 14:40)  Valproic Acid Level, Serum: 39.5 ug/mL ( @ 06:11)  Vancomycin Level, Trough: 6.4 ug/mL ( @ 06:11)    CSF Analysis: [] N/A  RBC Count - Spinal Fluid: 2000 /uL ( @ 18:34)  CSF Lymphocytes: 1 % ( @ 18:34)  Glucose, CSF: 75 mg/dL ( @ 18:33)  Protein, CSF: 101 mg/dL ( @ 18:33)      Allergies    apple (Angioedema)  No Known Drug Allergies  strawberry (Angioedema)    Intolerances    lactose (Stomach Upset)    MEDICATIONS:  Antibiotics:  cefTRIAXone   IVPB 1000 milliGRAM(s) IV Intermittent every 12 hours    Neuro:  acetaminophen    Suspension .. 650 milliGRAM(s) Oral every 6 hours PRN  amantadine Syrup 200 milliGRAM(s) Oral every 12 hours  bromocriptine Capsule 15 milliGRAM(s) Oral every 8 hours  lacosamide Solution 100 milliGRAM(s) Oral two times a day  methylphenidate 10 milliGRAM(s) Oral daily  ondansetron Injectable 4 milliGRAM(s) IV Push every 6 hours PRN  valproate sodium IVPB 1000 milliGRAM(s) IV Intermittent every 8 hours    Anticoagulation:  aspirin  chewable 81 milliGRAM(s) Oral daily  enoxaparin Injectable 40 milliGRAM(s) SubCutaneous every 12 hours    OTHER:  acetylcysteine 20%  Inhalation 4 milliLiter(s) Inhalation every 6 hours  albuterol/ipratropium for Nebulization 3 milliLiter(s) Nebulizer every 6 hours  chlorhexidine 0.12% Liquid 15 milliLiter(s) Oral Mucosa every 12 hours  chlorhexidine 2% Cloths 1 Application(s) Topical <User Schedule>  glucagon  Injectable 1 milliGRAM(s) IntraMuscular once  insulin lispro (ADMELOG) corrective regimen sliding scale   SubCutaneous every 6 hours  norepinephrine Infusion 0.05 MICROgram(s)/kG/Min IV Continuous <Continuous>  sodium chloride 3%  Inhalation 4 milliLiter(s) Inhalation every 6 hours    IVF:  lactated ringers. 1000 milliLiter(s) IV Continuous <Continuous>    CULTURES:  Culture Results:   Sputum specimen rejected.  Microscopic examination indicates  oropharyngeal contamination.  Please repeat. ( @ 12:03)  Culture Results:   No growth at 3 days. ( @ 06:00)    RADIOLOGY & ADDITIONAL TESTS:      HEAD BLEED    No pertinent family history in first degree relatives    Handoff    Cerebral aneurysm    Cerebral artery vasospasm    Cerebral aneurysm    DVT, lower extremity    Pulmonary embolism    Cerebral artery vasospasm    Multiple subsegmental pulmonary emboli without acute cor pulmonale    DVT, lower extremity    Abnormality of lung on CXR    Angiogram, carotid and cerebral arteries    IVC filter placement    Angiogram, carotid and cerebral, bilateral    Angiogram, carotid and cerebral, bilateral    Angiogram, carotid and cerebral, bilateral    Angiogram, carotid and cerebral, bilateral    [email or web address]    Assessment:  44y/o F with h/o recent gastric sleeve (21 [facility], Dr. Crisostomo ), fibromyalgia, thyroid dysfunction, PTSD, depression, anxiety.  Presented with seizures at home - brought to [place], with 1 more episode of seizure en route.  Intubated, CT showed SAH with IV extension, casted IV, hydrocephalus, given mannitol, levetiracetam 1g,  6mg ativan. Started on Cardene drip for BP goal < 140 and transferred to [facility] NICU for further management. HH5 MF4, BD 1 = . Now s/p cerebral angiogram for R vertebral artery takedown for R vertebral dissecting aneurysm (), and R frontal EVD placement (), now s/p IVC filter placement (,) now s/p angio IA verapamil , , ,     Plan  NEURO:   - neurochecks q1h  - EVD open at -5cmH2O , drain at least 15cc/hr  - s/p CTH : decreased size in ventricles, stable.   - Nimodipine 60 mg po q4h d/c'ed for SBP goal 180-200   - Depakote increased to 1g q8 ( No keppra due to agitation) level 55 on   - Vimpat 100mg bid   - EEG no seizures x 2 days , D/c'ed  - cont ASA 81  - CTH 8/15: worsened uncal herniation, worsened hydro, vasospasm in b/l PCA, L vert, basilar arteries  - Bromocriptine 15mg Q8  - Ritalin and Amantadine for neurostim  - Angio demonstrating vasospasm of Right ICA, right PCOMM, Left distal vert and basilar confluence, and received IA verapamil on . 8/. 8/. 8.   - CTH/CTA - stable    PULM:    - extubated   - satting well on 100% NR   - Duonebs and 3% nebs standing, mucomyst  - chest PT   - CXR - aspiration precautions, requires frequent suctioning    CARDIO:    - -220  - levo gtt, s/p albumin    - TTE  WNL    - Troponin stable   - EKG normal     ENDO:    - glucose goal 140-180    GI:    - TF via NGT  - free H2O 250 q6  - needs thin liquid diet x3 weeks as per bariatric when tolerated  - PPI per bariatric surgeon  - rectal tube, hold bowel reg  - gen surg consulted for PEG, not candidate right now, can reconsult in 2 weeks if still needs (on )    RENAL:   - Maintain euvolemia   - Na goal normal  - 75cc/hr lactated ringers   - albumin bolus yesterday to maintain CVP > 6  - free water 250 q6 for hypernatremia     HEM/ONC:   - ASA 81  - VTE Prophylaxis: SCDs, SQL  - LE Duplex : Acute completely occlusive deep venous thrombosis of the right intramuscular calf vein, s/p IVCF with vascular , repeat  shows new b/l peroneal DVTs, factor xa c/w prophylactic dose   - Repeat dopplers , newly visualized acute DVT left intramuscular calf vein, persistent completely occlusive DVT b/l peroneal veins and right intramuscular calf vein.   - hypercoagulable w/u - still pending, however protein S/C and AT III normal   - pending repeat dopplers     ID:   -MRSA neg   -Low grade fevers,, trend leukocytosis   -empiric Vanc/zoysn for fever d/c'd (8/10-) and now restarted  for persistent fevers, dc'd again on    - f/u cx ,   -ceftriaxone for empiric pna coverage (x7 days, -)  - ID consult and OR clearance      Assessment and plan discussed with Dr. Lomax, Dr. Conn and Dr. Sánchez    []  GCS  E   V  M     Heart Failure: []Acute, [] acute on chronic , []chronic  Heart Failure:  [] Diastolic (HFpEF), [] Systolic (HFrEF), []Combined (HFpEF and HFrEF), [] RHF, [] Pulm HTN, [] Other    [] TRUDI, [] ATN, [] AIN, [] other  [] CKD1, [] CKD2, [] CKD 3, [] CKD 4, [] CKD 5, []ESRD    Encephalopathy: [] Metabolic, [] Hepatic, [] toxic, [] Neurological, [] Other    Abnormal Nurtitional Status: [] malnurtition (see nutrition note), [ ]underweight: BMI < 19, [] morbid obesity: BMI >40, [] Cachexia    [] Sepsis  [] hypovolemic shock,[] cardiogenic shock, [] hemorrhagic shock, [] neuogenic shock  [] Acute Respiratory Failure  []Cerebral edema, [] Brain compression/ herniation,   [] Functional quadriplegia  [] Acute blood loss anemia

## 2021-08-25 NOTE — OCCUPATIONAL THERAPY INITIAL EVALUATION ADULT - PERTINENT HX OF CURRENT PROBLEM, REHAB EVAL
46y/o F with h/o recent gastric sleeve (08/04/21 [facility], Dr. Crisostomo ), fibromyalgia, thyroid dysfunction, PTSD, depression, anxiety.  Presented with seizures at home - brought to [place], with 1 more episode of seizure en route.  CT showed SAH with IV extension, casted IV, hydrocephalus. Now s/p cerebral angiogram for R vertebral artery takedown for R vertebral dissecting aneurysm (8/7), and R frontal EVD placement

## 2021-08-25 NOTE — PROGRESS NOTE ADULT - SUBJECTIVE AND OBJECTIVE BOX
==============================================   NEUROCRITICAL CARE ATTENDING NOTE   ==============================================    LUDMILA PRIETO   [id number]  Summary:  45y/F with h/o recent gastric sleeve (08/04 [facility], Dr. Crisostomo ), fibromyalgia, thyroid dysfunction, PTSD, depression, anxiety.  Presented with seizures at home - brought to [place], with 1 more episode of seizure en route.  Intubated, CT showed SAH with IV extension, casted IV, hydrocephalus, given mannitol levetiracetam 6mg ativan, transferred to [facility].    Hospital Course:   8/7: Tx from [place] for SAH. Intubated. R frontal EVD placed, central line and a line placed. 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   8/8: POD1 s/p angio with R vert takedown, extubated, desatting on aerosol mask, required extensive suctioning, 3% nebs, chest PT, started on low dose precedex drip for restlessness/agitation, ABG stable. NGT placed. TF started with goal 20 pending nutrition recs. 3% stopped for Na 150. Ceribell EEG negative and d/c'ed.   8/9 Overnight, new Right pronator drift and right gaze preference that can't cross midline, Repeat CTH demonstrated stable vents, CTA head and neck unremarkable for spasm, hypoattenuation of right cerebellum edema vs. infarct.  8/10: POD3 R vert takedown, EVD open at [id number]. ASHA overnight, neuro stable. CTH with increased hydro, CTA head/neck with mild basilar spasm, but concern for PE. 1/2 am D50 for hypoglycemia. 1L bolus then 100 cc/hr, PM rounds for net negative fluid balance and mild vasospasm on CTA.   8/11: POD4 R vert takedown. EVD at 5cm H2O, ASHA overnight. neuro stable.   Febrile 104. depakote increased to q6. NCHCT stable. EVD lowered to 0. Lasix 20 given for dieuresis, UOP over 2 liters. Sedation given for a-line placement, BP drop needing levo.  8/12: POD5 R vert takedown. EVD at 0cm H2O. ASHA overnight, neuro stable. Precedex being weaned off. Pending IVCF with vascular today.  8/13: POD6 R vert takedown. EVD open at 0cmH2O. ASHA overnight. Neuro exam stable. Mottled skin on the left lower extremity improving. Started on Bromocriptine 15mg Q8.   8/14: POD7. EVD open at 0. 1L bolus given for euvolemia o/n. neuro stable. CTH stable. ENT scoped vocal cords, +R vocal cord paresis, NTD. started on zosyn empirically.   8/15: POD8. EVD open at 0. ASHA o/n, neuro stable. Pt developed increased work of breathing, unable to clear her secretions, received racemic epi and multiple nebulizer treatments, still with stridor. Patient re-intubated at bedside, on full vent support.   8/16: CTH/CTA head/neck/CT chest performed o/n for worsened exam, R gaze preference, sluggish pupils. +worsened uncal herniation, hydro, vasospasm in b/l PCAs, L vert, basilar arteries. preop angio today, restarted on 3% for Na goal 145-150  8/17: POD10. Overnight Pt remains on levophed drip for SBP goal 180-220. Also remains on propofol drip. EVD open at -5cmH2O. ICPs WNL. Febrile 101F and pancultured. CTH today shows slightly smaller ventricles.   8/18: POD11 R vert takedown. POD1 angio IA verpamil. Overnight, Pt noted to have downward gaze to the right and not following commands. Taken for stat head CT which is stable. Pupils also noted to be aniscoric left pupil 4mm and brisk and right 2mm brisk. Mannitol 50g given. Ceribell eeg placed for concern of subclinical seizures, so far appears negative for seizures. 1L NS o/n and 1.5L NS bolus in AM for euvolemia. vanc/zosyn stopped. 250cc 3% bolus and 250cc albumin given in AM. Brief seizure to L frontal lobe this AM on EEG, given 2g valproic acid and dose increased to 1g q8. Vimpat 100mg BID started.  In afternoon patient self-extubated, placed on NRB with mucomyst, 3% inhalation and duonebs. 3% at 50 d/c'd.  8/19: POD 12. Remains on VEEG. Axillary A line placed. Salt tabs d/c'd, florinef decreased to 0.1mg, EVD @ -5cm H2O, now draining 20cc/hr. 250 albumin and 500 NS boluses on dayshift, 1 L LR bolus  8/20: POD 13. ASHA. on VEEG, no seizures noted. Pending VPA level. 500 NS, 250 albumin. Febrile, pancultured. EEG d/c'ed.   8/21: BD14, POD14. ASHA overnight, EVD @ -5. s/p 1L bolus for euvolemia  8/22: BD #15: Continued on levo, hypercoagulability profile pending, EVD 15 mL/h, euvolemia, extubated, amantadine added, free water started for hypernatremia; continuing LOC fluctuations (stable vasospasm), frequent suctioning; empiric ceftriaxone started. Tmax 101. Has thick secretions.   8/24: BD 18      Past Medical History:  s/p gastric sleeve surgery  Allergies:  Allergy Status Unknown  Home Meds:        PHYSICAL EXAMINATION  AOx2 (person, place) with choices, hypophonic, face symmetric. R gaze preference, can overcome, pupils 3mm and reactive.   Squeezes fingers to command b/l UE, shows thumbs up, spontaneous, BLE spontaneous and antigravity, weak cough    EENT:  Anicteric  CARDIOVASCULAR: (+) S1 S2, normal rate and regular rhythm  PULMONARY: decreased to bases, no adventitia  ABDOMEN: soft, nontender with normoactive bowel sounds  EXTREMITIES: no edema  SKIN: No rash      Culture - CSF with Gram Stain (collected 08-23-21 @ 19:42)  Source: .CSF CSF  Gram Stain (08-23-21 @ 20:07):    No organisms seen    No WBC's seen.  Preliminary Report (08-24-21 @ 08:55):    No growth to date    Culture - Blood (collected 08-23-21 @ 17:30)  Source: .Blood Blood  Preliminary Report (08-24-21 @ 06:00):    No growth at 12 hours    Culture - Blood (collected 08-23-21 @ 17:30)  Source: .Blood Blood-Peripheral  Preliminary Report (08-24-21 @ 06:00):    No growth at 12 hours      MEDICATIONS:  acetaminophen    Suspension .. 650 milliGRAM(s) Oral every 6 hours PRN  acetylcysteine 20%  Inhalation 4 milliLiter(s) Inhalation every 6 hours  albuterol/ipratropium for Nebulization 3 milliLiter(s) Nebulizer every 6 hours  amantadine Syrup 200 milliGRAM(s) Oral every 12 hours  aspirin  chewable 81 milliGRAM(s) Oral daily  bisacodyl Suppository 10 milliGRAM(s) Rectal daily PRN  bromocriptine Capsule 15 milliGRAM(s) Oral every 8 hours  cefTRIAXone   IVPB 1000 milliGRAM(s) IV Intermittent every 12 hours  chlorhexidine 0.12% Liquid 15 milliLiter(s) Oral Mucosa every 12 hours  chlorhexidine 2% Cloths 1 Application(s) Topical <User Schedule>  enoxaparin Injectable 40 milliGRAM(s) SubCutaneous every 12 hours  glucagon  Injectable 1 milliGRAM(s) IntraMuscular once  insulin lispro (ADMELOG) corrective regimen sliding scale   SubCutaneous every 6 hours  lacosamide 100 milliGRAM(s) Oral two times a day  lactated ringers. 1000 milliLiter(s) (50 mL/Hr) IV Continuous <Continuous>  methylphenidate 10 milliGRAM(s) Oral daily  norepinephrine Infusion 0.05 MICROgram(s)/kG/Min (4.79 mL/Hr) IV Continuous <Continuous>  ondansetron Injectable 4 milliGRAM(s) IV Push every 6 hours PRN  polyethylene glycol 3350 17 Gram(s) Oral daily  senna 2 Tablet(s) Oral at bedtime  sodium chloride 3%  Inhalation 4 milliLiter(s) Inhalation every 6 hours  valproate sodium IVPB 1000 milliGRAM(s) IV Intermittent every 8 hours                          9.3    18.13 )-----------( 381      ( 23 Aug 2021 05:46 )             32.3     08-23    157<H>  |  116<H>  |  14  ----------------------------<  167<H>  3.0<L>   |  31  |  0.57    Ca    9.3      23 Aug 2021 05:46  Phos  2.8     08-23  Mg     1.9     08-23        CAPILLARY BLOOD GLUCOSE    POCT Blood Glucose.: 143 mg/dL (22 Aug 2021 06:24)  POCT Blood Glucose.: 151 mg/dL (21 Aug 2021 23:10)  POCT Blood Glucose.: 130 mg/dL (21 Aug 2021 16:53)  POCT Blood Glucose.: 118 mg/dL (21 Aug 2021 11:55)    Culture - Sputum (collected 08-20-21 @ 12:03)  Source: .Sputum Sputum  Gram Stain (08-20-21 @ 16:29):    Numerous epithelial cells    Few White blood cells    Moderate Gram positive cocci in pairs, chains and clusters    Rare Gram Positive Rods    Rare Gram Negative Rods  Final Report (08-20-21 @ 16:29):    Sputum specimen rejected.  Microscopic examination indicates    oropharyngeal contamination.  Please repeat.    Culture - CSF with Gram Stain (collected 08-20-21 @ 05:32)  Source: .CSF CSF  Gram Stain (08-20-21 @ 05:50):    No White blood cells    No organisms seen    Culture - Sputum . (08.17.21 @ 08:05)    Gram Stain:   No epithelial cells seen  Few White blood cells  No organisms seen    Specimen Source: .Sputum Sputum    Culture Results:   Rare Normal Respiratory Mely present to date    Culture - Blood (08.17.21 @ 01:45)    Specimen Source: .Blood Blood X 2    Culture Results:   No growth at 1 day.    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 Stable ischemic changes within the right cerebellum. Right-sided EVD catheter in place with slight increase in ventricular size. Interval improvement in the intraventricular hemorrhage. Evolving areas of acute/subacute ischemia within the right cerebellum.    CTA (08.19.2021) -  Interval improvement in the caliber of the proximal vertebral artery with progression of a vasospasm involving the mid to distal basilar artery. Interval improvement in the vasospasm involving the left vertebral artery. Persistent vasospasm involving the posterior cerebral and superior cerebellar arteries. Interval improvement in the vasospasm involving the right supraclinoid ICA as well as the left A1 and M1 segments. Progression of vasospasm involving the right M1 segment.    Cerebral Angiogram (08.18.2021 - improvement in vasospasm, IA verapamil to posterior circulation  Cerebral Angiogram (08.17.2021 - Left vertebral artery dissection demonstrates continued moderate to severe cerebral vasospasm of the distal let vert and basilar artery, some what improved caliber of proximal left vert. 15mg IA Verapamil injected over 10 minutes with mild improvement of posterior circulation post treatment. Right ICA injection with continued mild supraclinoid vasospasm, 10mg of IA Verapamil injected.  Cerebral Angiogram (08.16.2021) - Left vertebral injection demonstrates moderate diffuse vasospasm of left vertebral artery, basilar artery and posterior circulation including PCAs. 15mg Verapamil injected via left vert with mild radiographic improvement post treatment. Right ICA injection with mild supraclinoid spasm/narrowing, otherwise no vasospasm appreciated in anterior circulation.    CT (08.18.2021) - No significant interval change in right superior and lateral cerebellar hypodensities.  CT (08.15.2021) - 1. Worsening the uncal herniation, with effacement of the bilateral suprasellar and perimesencephalic cisterns.  2. Redistribution of intraventricular hemorrhage, as above. Redemonstration of right EVD, with distal tip in the right frontal horn, near the foramen of Monro. Worsening hydrocephalus.  CTA - 1. Multifocal punctate and short segment stenoses, as above, with several areas of narrowing new as compared to recent CTA dated 08/09/2021. In the posterior circulation, stenoses are identified within the V4 left vertebral artery, the basilar artery, and in the bilateral PCA, more significant on the left.  Within the anterior circulation, stenosis are identified in the right supraclinoid C6 ICA as well as the proximal M1 MCA. Findings are most compatible with vasospasm.  2. In particular, the distal V4 right vertebral artery, which was visualized, though diminutive, on prior CTA dated 08/09/2021, does not fill with contrast on the present examination. The basilar artery demonstrates multiple stenoses, and is significantly smaller in caliber when compared to the prior CTA.    Other imaging:  Duplex - 1.  Since 8/9/2021, there is new deep venous thrombosis in the bilateral peroneal veins.  2. Redemonstration of deep vein thrombosis in a right intramuscular calf vein.      [Code] Full  [Goals] Aggressive  [Disposion] ICU     ==============================================   NEUROCRITICAL CARE ATTENDING NOTE   ==============================================    LUDMILA PRIETO   MRN-[id number]  Summary:  45y/F with h/o recent gastric sleeve (08/04 [facility], Dr. Crisostomo ), fibromyalgia, thyroid dysfunction, PTSD, depression, anxiety.  Presented with seizures at home - brought to [place], with 1 more episode of seizure en route.  Intubated, CT showed SAH with IV extension, casted IV, hydrocephalus, given mannitol levetiracetam 6mg ativan, transferred to [facility].    Hospital Course:   8/7: Tx from [place] for SAH. Intubated. R frontal EVD placed, central line and a line placed. 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   8/8: POD1 s/p angio with R vert takedown, extubated, desatting on aerosol mask, required extensive suctioning, 3% nebs, chest PT, started on low dose precedex drip for restlessness/agitation, ABG stable. NGT placed. TF started with goal 20 pending nutrition recs. 3% stopped for Na 150. Ceribell EEG negative and d/c'ed.   8/9 Overnight, new Right pronator drift and right gaze preference that can't cross midline, Repeat CTH demonstrated stable vents, CTA head and neck unremarkable for spasm, hypoattenuation of right cerebellum edema vs. infarct.  8/10: POD3 R vert takedown, EVD open at [id number]. ASHA overnight, neuro stable. CTH with increased hydro, CTA head/neck with mild basilar spasm, but concern for PE. 1/2 am D50 for hypoglycemia. 1L bolus then 100 cc/hr, PM rounds for net negative fluid balance and mild vasospasm on CTA.   8/11: POD4 R vert takedown. EVD at 5cm H2O, ASHA overnight. neuro stable.   Febrile 104. depakote increased to q6. NCHCT stable. EVD lowered to 0. Lasix 20 given for dieuresis, UOP over 2 liters. Sedation given for a-line placement, BP drop needing levo.  8/12: POD5 R vert takedown. EVD at 0cm H2O. ASHA overnight, neuro stable. Precedex being weaned off. Pending IVCF with vascular today.  8/13: POD6 R vert takedown. EVD open at 0cmH2O. ASHA overnight. Neuro exam stable. Mottled skin on the left lower extremity improving. Started on Bromocriptine 15mg Q8.   8/14: POD7. EVD open at 0. 1L bolus given for euvolemia o/n. neuro stable. CTH stable. ENT scoped vocal cords, +R vocal cord paresis, NTD. started on zosyn empirically.   8/15: POD8. EVD open at 0. ASHA o/n, neuro stable. Pt developed increased work of breathing, unable to clear her secretions, received racemic epi and multiple nebulizer treatments, still with stridor. Patient re-intubated at bedside, on full vent support.   8/16: CTH/CTA head/neck/CT chest performed o/n for worsened exam, R gaze preference, sluggish pupils. +worsened uncal herniation, hydro, vasospasm in b/l PCAs, L vert, basilar arteries. preop angio today, restarted on 3% for Na goal 145-150  8/17: POD10. Overnight Pt remains on levophed drip for SBP goal 180-220. Also remains on propofol drip. EVD open at -5cmH2O. ICPs WNL. Febrile 101F and pancultured. CTH today shows slightly smaller ventricles.   8/18: POD11 R vert takedown. POD1 angio IA verpamil. Overnight, Pt noted to have downward gaze to the right and not following commands. Taken for stat head CT which is stable. Pupils also noted to be aniscoric left pupil 4mm and brisk and right 2mm brisk. Mannitol 50g given. Ceribell eeg placed for concern of subclinical seizures, so far appears negative for seizures. 1L NS o/n and 1.5L NS bolus in AM for euvolemia. vanc/zosyn stopped. 250cc 3% bolus and 250cc albumin given in AM. Brief seizure to L frontal lobe this AM on EEG, given 2g valproic acid and dose increased to 1g q8. Vimpat 100mg BID started.  In afternoon patient self-extubated, placed on NRB with mucomyst, 3% inhalation and duonebs. 3% at 50 d/c'd.  8/19: POD 12. Remains on VEEG. Axillary A line placed. Salt tabs d/c'd, florinef decreased to 0.1mg, EVD @ -5cm H2O, now draining 20cc/hr. 250 albumin and 500 NS boluses on dayshift, 1 L LR bolus  8/20: POD 13. ASHA. on VEEG, no seizures noted. Pending VPA level. 500 NS, 250 albumin. Febrile, pancultured. EEG d/c'ed.   8/21: BD14, POD14. ASHA overnight, EVD @ -5. s/p 1L bolus for euvolemia  8/22: BD #15: Continued on levo, hypercoagulability profile pending, EVD 15 mL/h, euvolemia, extubated, amantadine added, free water started for hypernatremia; continuing LOC fluctuations (stable vasospasm), frequent suctioning; empiric ceftriaxone started. Tmax 101. Has thick secretions.   8/24: BD 18      Past Medical History:  s/p gastric sleeve surgery  Allergies:  Allergy Status Unknown  Home Meds:      PHYSICAL EXAMINATION  AOx2 (person, place) with choices, hypophonic, face symmetric. R gaze preference, can overcome, pupils 3mm and reactive.   Squeezes fingers to command b/l UE, shows thumbs up, spontaneous, BLE spontaneous and antigravity, weak cough    EENT:  Anicteric  CARDIOVASCULAR: (+) S1 S2, normal rate and regular rhythm  PULMONARY: decreased to bases, no adventitia  ABDOMEN: soft, nontender with normoactive bowel sounds  EXTREMITIES: no edema  SKIN: No rash      Culture - CSF with Gram Stain (collected 08-23-21 @ 19:42)  Source: .CSF CSF  Gram Stain (08-23-21 @ 20:07):    No organisms seen    No WBC's seen.  Preliminary Report (08-24-21 @ 08:55):    No growth to date    Culture - Blood (collected 08-23-21 @ 17:30)  Source: .Blood Blood  Preliminary Report (08-24-21 @ 06:00):    No growth at 12 hours    Culture - Blood (collected 08-23-21 @ 17:30)  Source: .Blood Blood-Peripheral  Preliminary Report (08-24-21 @ 06:00):    No growth at 12 hours      MEDICATIONS:  acetaminophen    Suspension .. 650 milliGRAM(s) Oral every 6 hours PRN  acetylcysteine 20%  Inhalation 4 milliLiter(s) Inhalation every 6 hours  albuterol/ipratropium for Nebulization 3 milliLiter(s) Nebulizer every 6 hours  amantadine Syrup 200 milliGRAM(s) Oral every 12 hours  aspirin  chewable 81 milliGRAM(s) Oral daily  bisacodyl Suppository 10 milliGRAM(s) Rectal daily PRN  bromocriptine Capsule 15 milliGRAM(s) Oral every 8 hours  cefTRIAXone   IVPB 1000 milliGRAM(s) IV Intermittent every 12 hours  chlorhexidine 0.12% Liquid 15 milliLiter(s) Oral Mucosa every 12 hours  chlorhexidine 2% Cloths 1 Application(s) Topical <User Schedule>  enoxaparin Injectable 40 milliGRAM(s) SubCutaneous every 12 hours  glucagon  Injectable 1 milliGRAM(s) IntraMuscular once  insulin lispro (ADMELOG) corrective regimen sliding scale   SubCutaneous every 6 hours  lacosamide 100 milliGRAM(s) Oral two times a day  lactated ringers. 1000 milliLiter(s) (50 mL/Hr) IV Continuous <Continuous>  methylphenidate 10 milliGRAM(s) Oral daily  norepinephrine Infusion 0.05 MICROgram(s)/kG/Min (4.79 mL/Hr) IV Continuous <Continuous>  ondansetron Injectable 4 milliGRAM(s) IV Push every 6 hours PRN  polyethylene glycol 3350 17 Gram(s) Oral daily  senna 2 Tablet(s) Oral at bedtime  sodium chloride 3%  Inhalation 4 milliLiter(s) Inhalation every 6 hours  valproate sodium IVPB 1000 milliGRAM(s) IV Intermittent every 8 hours                          9.3    18.13 )-----------( 381      ( 23 Aug 2021 05:46 )             32.3     08-23    157<H>  |  116<H>  |  14  ----------------------------<  167<H>  3.0<L>   |  31  |  0.57    Ca    9.3      23 Aug 2021 05:46  Phos  2.8     08-23  Mg     1.9     08-23        CAPILLARY BLOOD GLUCOSE    POCT Blood Glucose.: 143 mg/dL (22 Aug 2021 06:24)  POCT Blood Glucose.: 151 mg/dL (21 Aug 2021 23:10)  POCT Blood Glucose.: 130 mg/dL (21 Aug 2021 16:53)  POCT Blood Glucose.: 118 mg/dL (21 Aug 2021 11:55)    Culture - Sputum (collected 08-20-21 @ 12:03)  Source: .Sputum Sputum  Gram Stain (08-20-21 @ 16:29):    Numerous epithelial cells    Few White blood cells    Moderate Gram positive cocci in pairs, chains and clusters    Rare Gram Positive Rods    Rare Gram Negative Rods  Final Report (08-20-21 @ 16:29):    Sputum specimen rejected.  Microscopic examination indicates    oropharyngeal contamination.  Please repeat.    Culture - CSF with Gram Stain (collected 08-20-21 @ 05:32)  Source: .CSF CSF  Gram Stain (08-20-21 @ 05:50):    No White blood cells    No organisms seen    Culture - Sputum . (08.17.21 @ 08:05)    Gram Stain:   No epithelial cells seen  Few White blood cells  No organisms seen    Specimen Source: .Sputum Sputum    Culture Results:   Rare Normal Respiratory Mely present to date    Culture - Blood (08.17.21 @ 01:45)    Specimen Source: .Blood Blood X 2    Culture Results:   No growth at 1 day.    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 Stable ischemic changes within the right cerebellum. Right-sided EVD catheter in place with slight increase in ventricular size. Interval improvement in the intraventricular hemorrhage. Evolving areas of acute/subacute ischemia within the right cerebellum.    CTA (08.19.2021) -  Interval improvement in the caliber of the proximal vertebral artery with progression of a vasospasm involving the mid to distal basilar artery. Interval improvement in the vasospasm involving the left vertebral artery. Persistent vasospasm involving the posterior cerebral and superior cerebellar arteries. Interval improvement in the vasospasm involving the right supraclinoid ICA as well as the left A1 and M1 segments. Progression of vasospasm involving the right M1 segment.    Cerebral Angiogram (08.18.2021 - improvement in vasospasm, IA verapamil to posterior circulation  Cerebral Angiogram (08.17.2021 - Left vertebral artery dissection demonstrates continued moderate to severe cerebral vasospasm of the distal let vert and basilar artery, some what improved caliber of proximal left vert. 15mg IA Verapamil injected over 10 minutes with mild improvement of posterior circulation post treatment. Right ICA injection with continued mild supraclinoid vasospasm, 10mg of IA Verapamil injected.  Cerebral Angiogram (08.16.2021) - Left vertebral injection demonstrates moderate diffuse vasospasm of left vertebral artery, basilar artery and posterior circulation including PCAs. 15mg Verapamil injected via left vert with mild radiographic improvement post treatment. Right ICA injection with mild supraclinoid spasm/narrowing, otherwise no vasospasm appreciated in anterior circulation.    CT (08.18.2021) - No significant interval change in right superior and lateral cerebellar hypodensities.  CT (08.15.2021) - 1. Worsening the uncal herniation, with effacement of the bilateral suprasellar and perimesencephalic cisterns.  2. Redistribution of intraventricular hemorrhage, as above. Redemonstration of right EVD, with distal tip in the right frontal horn, near the foramen of Monro. Worsening hydrocephalus.  CTA - 1. Multifocal punctate and short segment stenoses, as above, with several areas of narrowing new as compared to recent CTA dated 08/09/2021. In the posterior circulation, stenoses are identified within the V4 left vertebral artery, the basilar artery, and in the bilateral PCA, more significant on the left.  Within the anterior circulation, stenosis are identified in the right supraclinoid C6 ICA as well as the proximal M1 MCA. Findings are most compatible with vasospasm.  2. In particular, the distal V4 right vertebral artery, which was visualized, though diminutive, on prior CTA dated 08/09/2021, does not fill with contrast on the present examination. The basilar artery demonstrates multiple stenoses, and is significantly smaller in caliber when compared to the prior CTA.    Other imaging:  Duplex - 1.  Since 8/9/2021, there is new deep venous thrombosis in the bilateral peroneal veins.  2. Redemonstration of deep vein thrombosis in a right intramuscular calf vein.      [Code] Full  [Goals] Aggressive  [Disposion] ICU     ==============================================   NEUROCRITICAL CARE ATTENDING NOTE   ==============================================    LUDMILA PRIETO   MRN-[id number]  Summary:  45y/F with h/o recent gastric sleeve (08/04 [facility], Dr. Crisostomo ), fibromyalgia, thyroid dysfunction, PTSD, depression, anxiety.  Presented with seizures at home - brought to [place], with 1 more episode of seizure en route.  Intubated, CT showed SAH with IV extension, casted IV, hydrocephalus, given mannitol levetiracetam 6mg ativan, transferred to [facility].    Hospital Course:   8/7: Tx from [place] for SAH. Intubated. R frontal EVD placed, central line and a line placed. 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   8/8: POD1 s/p angio with R vert takedown, extubated, desatting on aerosol mask, required extensive suctioning, 3% nebs, chest PT, started on low dose precedex drip for restlessness/agitation, ABG stable. NGT placed. TF started with goal 20 pending nutrition recs. 3% stopped for Na 150. Ceribell EEG negative and d/c'ed.   8/9 Overnight, new Right pronator drift and right gaze preference that can't cross midline, Repeat CTH demonstrated stable vents, CTA head and neck unremarkable for spasm, hypoattenuation of right cerebellum edema vs. infarct.  8/10: POD 3 R vert takedown, EVD open at [id number]. ASHA overnight, neuro stable. CTH with increased hydro, CTA head/neck with mild basilar spasm, but concern for PE. 1/2 am D50 for hypoglycemia. 1L bolus then 100 cc/hr, PM rounds for net negative fluid balance and mild vasospasm on CTA.   8/11: POD 4 R vert takedown. EVD at 5cm H2O, ASHA overnight. neuro stable.   Febrile 104. depakote increased to q6. NCHCT stable. EVD lowered to 0. Lasix 20 given for dieuresis, UOP over 2 liters. Sedation given for a-line placement, BP drop needing levo.  8/12: POD 5 R vert takedown. EVD at 0cm H2O. ASHA overnight, neuro stable. Precedex being weaned off. Pending IVCF with vascular today.  8/13: POD 6 R vert takedown. EVD open at 0cmH2O. ASHA overnight. Neuro exam stable. Mottled skin on the left lower extremity improving. Started on Bromocriptine 15mg Q8.   8/14: POD 7. EVD open at 0. 1L bolus given for euvolemia o/n. neuro stable. CTH stable. ENT scoped vocal cords, +R vocal cord paresis, NTD. started on zosyn empirically.   8/15: POD 8. EVD open at 0. ASHA o/n, neuro stable. Pt developed increased work of breathing, unable to clear her secretions, received racemic epi and multiple nebulizer treatments, still with stridor. Patient re-intubated at bedside, on full vent support.   8/16: CTH/CTA head/neck/CT chest performed o/n for worsened exam, R gaze preference, sluggish pupils. +worsened uncal herniation, hydro, vasospasm in b/l PCAs, L vert, basilar arteries. preop angio today, restarted on 3% for Na goal 145-150  8/17: POD 10. Overnight Pt remains on levophed drip for SBP goal 180-220. Also remains on propofol drip. EVD open at -5cmH2O. ICPs WNL. Febrile 101F and pancultured. CTH today shows slightly smaller ventricles.   8/18: POD 11 R vert takedown. POD1 angio IA verpamil. Overnight, Pt noted to have downward gaze to the right and not following commands. Taken for stat head CT which is stable. Pupils also noted to be aniscoric left pupil 4mm and brisk and right 2mm brisk. Mannitol 50g given. Ceribell eeg placed for concern of subclinical seizures, so far appears negative for seizures. 1L NS o/n and 1.5L NS bolus in AM for euvolemia. vanc/zosyn stopped. 250cc 3% bolus and 250cc albumin given in AM. Brief seizure to L frontal lobe this AM on EEG, given 2g valproic acid and dose increased to 1g q8. Vimpat 100mg BID started.  In afternoon patient self-extubated, placed on NRB with mucomyst, 3% inhalation and duonebs. 3% at 50 d/c'd.  8/19: POD 12. Remains on VEEG. Axillary A line placed. Salt tabs d/c'd, florinef decreased to 0.1mg, EVD @ -5cm H2O, now draining 20cc/hr. 250 albumin and 500 NS boluses on dayshift, 1 L LR bolus  8/20: POD 13. ASHA. on VEEG, no seizures noted. Pending VPA level. 500 NS, 250 albumin. Febrile, pancultured. EEG d/c'ed.   8/21: BD 14, POD14. ASHA overnight, EVD @ -5. s/p 1L bolus for euvolemia  8/22: BD 15: Continued on levophed, hypercoagulability profile pending, EVD 15 mL/h, euvolemia, extubated, amantadine added, free water started for hypernatremia; continuing LOC fluctuations (stable vasospasm), frequent suctioning; empiric ceftriaxone started. Tmax 101. Has thick secretions.  CTA done. 8/23: Neuroexam stable. Febrile. Pancultures done.   8/24: Exam has not been BP dependent. Relaxed -220. IJ- carotid stick. Lovenox held  8/25: BD 19. CTA head and neck pending.     Past Medical History:  s/p gastric sleeve surgery  Allergies:  Allergy Status Unknown  Home Meds:      PHYSICAL EXAMINATION  AOx2 (person, place) with choices, hypophonic, face symmetric. R gaze preference, can overcome, pupils 3mm and reactive. Has a weak cough   Squeezes fingers to command b/l UE, shows thumbs up, spontaneous in uppers and lower, BLE spontaneous bends knees  EENT:  Anicteric, thrush  CARDIOVASCULAR: (+) S1 S2, normal rate and regular rhythm  PULMONARY: Decreased  ABDOMEN: Soft, nontender with normoactive bowel sounds  EXTREMITIES: No edema  SKIN: No rash    MEDICATIONS:  acetaminophen    Suspension .. 650 milliGRAM(s) Oral every 6 hours PRN  acetylcysteine 20%  Inhalation 4 milliLiter(s) Inhalation every 6 hours  albuterol/ipratropium for Nebulization 3 milliLiter(s) Nebulizer every 6 hours  amantadine Syrup 200 milliGRAM(s) Oral every 12 hours  aspirin  chewable 81 milliGRAM(s) Oral daily  bisacodyl Suppository 10 milliGRAM(s) Rectal daily PRN  bromocriptine Capsule 15 milliGRAM(s) Oral every 8 hours  cefTRIAXone   IVPB 1000 milliGRAM(s) IV Intermittent every 12 hours  chlorhexidine 0.12% Liquid 15 milliLiter(s) Oral Mucosa every 12 hours  chlorhexidine 2% Cloths 1 Application(s) Topical <User Schedule>  enoxaparin Injectable 40 milliGRAM(s) SubCutaneous every 12 hours  glucagon  Injectable 1 milliGRAM(s) IntraMuscular once  insulin lispro (ADMELOG) corrective regimen sliding scale   SubCutaneous every 6 hours  lacosamide 100 milliGRAM(s) Oral two times a day  lactated ringers. 1000 milliLiter(s) (50 mL/Hr) IV Continuous <Continuous>  methylphenidate 10 milliGRAM(s) Oral daily  norepinephrine Infusion 0.05 MICROgram(s)/kG/Min (4.79 mL/Hr) IV Continuous <Continuous>  ondansetron Injectable 4 milliGRAM(s) IV Push every 6 hours PRN  polyethylene glycol 3350 17 Gram(s) Oral daily  senna 2 Tablet(s) Oral at bedtime  sodium chloride 3%  Inhalation 4 milliLiter(s) Inhalation every 6 hours  valproate sodium IVPB 1000 milliGRAM(s) IV Intermittent every 8 hours                          9.3    18.13 )-----------( 381      ( 23 Aug 2021 05:46 )             32.3     08-23    157<H>  |  116<H>  |  14  ----------------------------<  167<H>  3.0<L>   |  31  |  0.57    Ca    9.3      23 Aug 2021 05:46  Phos  2.8     08-23  Mg     1.9     08-23        Neuroimaging:  CT (08.19.2021) - 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 Stable ischemic changes within the right cerebellum. Right-sided EVD catheter in place with slight increase in ventricular size. Interval improvement in the intraventricular hemorrhage. Evolving areas of acute/subacute ischemia within the right cerebellum.    CTA (08.19.2021) -  Interval improvement in the caliber of the proximal vertebral artery with progression of a vasospasm involving the mid to distal basilar artery. Interval improvement in the vasospasm involving the left vertebral artery. Persistent vasospasm involving the posterior cerebral and superior cerebellar arteries. Interval improvement in the vasospasm involving the right supraclinoid ICA as well as the left A1 and M1 segments. Progression of vasospasm involving the right M1 segment.    Cerebral Angiogram (08.18.2021 - improvement in vasospasm, IA verapamil to posterior circulation  Cerebral Angiogram (08.17.2021 - Left vertebral artery dissection demonstrates continued moderate to severe cerebral vasospasm of the distal let vert and basilar artery, some what improved caliber of proximal left vert. 15mg IA Verapamil injected over 10 minutes with mild improvement of posterior circulation post treatment. Right ICA injection with continued mild supraclinoid vasospasm, 10mg of IA Verapamil injected.  Cerebral Angiogram (08.16.2021) - Left vertebral injection demonstrates moderate diffuse vasospasm of left vertebral artery, basilar artery and posterior circulation including PCAs. 15mg Verapamil injected via left vert with mild radiographic improvement post treatment. Right ICA injection with mild supraclinoid spasm/narrowing, otherwise no vasospasm appreciated in anterior circulation.    CT (08.18.2021) - No significant interval change in right superior and lateral cerebellar hypodensities.  CT (08.15.2021) - 1. Worsening the uncal herniation, with effacement of the bilateral suprasellar and perimesencephalic cisterns.  2. Redistribution of intraventricular hemorrhage, as above. Redemonstration of right EVD, with distal tip in the right frontal horn, near the foramen of Monro. Worsening hydrocephalus.  CTA - 1. Multifocal punctate and short segment stenoses, as above, with several areas of narrowing new as compared to recent CTA dated 08/09/2021. In the posterior circulation, stenoses are identified within the V4 left vertebral artery, the basilar artery, and in the bilateral PCA, more significant on the left.  Within the anterior circulation, stenosis are identified in the right supraclinoid C6 ICA as well as the proximal M1 MCA. Findings are most compatible with vasospasm.  2. In particular, the distal V4 right vertebral artery, which was visualized, though diminutive, on prior CTA dated 08/09/2021, does not fill with contrast on the present examination. The basilar artery demonstrates multiple stenoses, and is significantly smaller in caliber when compared to the prior CTA.    Other imaging:  Duplex - 1.  Since 8/9/2021, there is new deep venous thrombosis in the bilateral peroneal veins.  2. Redemonstration of deep vein thrombosis in a right intramuscular calf vein.      [Code] Full  [Goals] Aggressive  [Disposion] ICU

## 2021-08-25 NOTE — PROGRESS NOTE ADULT - SUBJECTIVE AND OBJECTIVE BOX
HEALTH ISSUES - PROBLEM Dx:  Multiple subsegmental pulmonary emboli without acute cor pulmonale    DVT, lower extremity    Abnormality of lung on CXR            CHEMOTHERAPY REGIMEN:        Day:                          Diet:  Protocol:                                    IVF:      MEDICATIONS  (STANDING):  acetylcysteine 20%  Inhalation 4 milliLiter(s) Inhalation every 6 hours  albuterol/ipratropium for Nebulization 3 milliLiter(s) Nebulizer every 6 hours  amantadine Syrup 200 milliGRAM(s) Oral every 12 hours  aspirin  chewable 81 milliGRAM(s) Oral daily  bromocriptine Capsule 15 milliGRAM(s) Oral every 8 hours  ceFAZolin   IVPB      ceFAZolin   IVPB 2000 milliGRAM(s) IV Intermittent every 8 hours  chlorhexidine 0.12% Liquid 15 milliLiter(s) Oral Mucosa every 12 hours  chlorhexidine 2% Cloths 1 Application(s) Topical <User Schedule>  enoxaparin Injectable 60 milliGRAM(s) SubCutaneous every 12 hours  glucagon  Injectable 1 milliGRAM(s) IntraMuscular once  insulin lispro (ADMELOG) corrective regimen sliding scale   SubCutaneous every 6 hours  lacosamide Solution 100 milliGRAM(s) Oral two times a day  methylphenidate 10 milliGRAM(s) Oral daily  norepinephrine Infusion 0.052 MICROgram(s)/kG/Min (4.79 mL/Hr) IV Continuous <Continuous>  nystatin    Suspension 438877 Unit(s) Oral four times a day  sodium chloride 3%  Inhalation 4 milliLiter(s) Inhalation every 6 hours    MEDICATIONS  (PRN):  acetaminophen    Suspension .. 650 milliGRAM(s) Oral every 6 hours PRN Temp greater or equal to 38C (100.4F), Mild Pain (1 - 3)  ondansetron Injectable 4 milliGRAM(s) IV Push every 6 hours PRN Nausea and/or Vomiting      Allergies    apple (Angioedema)  No Known Drug Allergies  strawberry (Angioedema)    Intolerances    lactose (Stomach Upset)      DVT Prophylaxis: [ ] YES [ ] NO      Antibiotics: [ ] YES [ ] NO    Pain Scale (1-10):       Location:    Vital Signs Last 24 Hrs  T(C): 37.1 (25 Aug 2021 14:32), Max: 38.2 (24 Aug 2021 22:00)  T(F): 98.8 (25 Aug 2021 14:32), Max: 100.7 (24 Aug 2021 22:00)  HR: 86 (25 Aug 2021 15:00) (84 - 101)  BP: 167/121 (25 Aug 2021 15:00) (145/97 - 203/93)  BP(mean): 139 (25 Aug 2021 15:00) (110 - 148)  RR: 18 (25 Aug 2021 16:00) (16 - 20)  SpO2: 100% (25 Aug 2021 16:00) (95% - 100%)    Drug Dosing Weight  Height (cm): 162.6 (12 Aug 2021 13:25)  Weight (kg): 98.2 (20 Aug 2021 08:44)  BMI (kg/m2): 37.1 (20 Aug 2021 08:44)  BSA (m2): 2.02 (20 Aug 2021 08:44)     Physical Exam:  · Constitutional	detailed exam  · Constitutional Details	well-developed; well-groomed  · Eyes	EOMI; PERRL; no drainage or redness  · ENMT Comments	dry mucous membranes  · Respiratory	detailed exam  · Respiratory Comments	normal breath sounds at the lung bases bilaterally  · Cardiovascular	Regular rate & rhythm, normal S1, S2; no murmurs, gallops or rubs; no S3, S4  · Abd-Soft non tender  ·Ext-no edema, clubbing or cyanosis    URINARY CATHETER: [ ] YES [ ] NO     LABS:  CBC Full  -  ( 25 Aug 2021 05:19 )  WBC Count : 16.95 K/uL  RBC Count : 3.35 M/uL  Hemoglobin : 8.5 g/dL  Hematocrit : 28.8 %  Platelet Count - Automated : 353 K/uL  Mean Cell Volume : 86.0 fl  Mean Cell Hemoglobin : 25.4 pg  Mean Cell Hemoglobin Concentration : 29.5 gm/dL  Auto Neutrophil # : x  Auto Lymphocyte # : x  Auto Monocyte # : x  Auto Eosinophil # : x  Auto Basophil # : x  Auto Neutrophil % : x  Auto Lymphocyte % : x  Auto Monocyte % : x  Auto Eosinophil % : x  Auto Basophil % : x    08-25    145  |  106  |  9   ----------------------------<  127<H>  3.9   |  29  |  0.41<L>    Ca    9.4      25 Aug 2021 14:08  Phos  2.4     08-25  Mg     2.0     08-25            CULTURES:    RADIOLOGY & ADDITIONAL STUDIES:

## 2021-08-25 NOTE — CONSULT NOTE ADULT - SUBJECTIVE AND OBJECTIVE BOX
Surgery Consult Note    HPI:  46y/o F with h/o recent gastric sleeve (08/04/21 [facility], Dr. Crisostomo ), fibromyalgia, thyroid dysfunction, PTSD, depression, anxiety.  Presented with seizures at home - brought to [place], with 1 more episode of seizure en route.  Intubated, CT showed SAH with IV extension, casted IV, hydrocephalus, given mannitol, levetiracetam 1g,  6mg ativan. Started on Cardene drip for BP goal < 140 and transferred to [facility] NICU for further management.     HH5 MF4   NIHSS 26 on arrival  BD 1 = 8/7 (07 Aug 2021 08:08)      Attending:  Bhanu     Surgery Addendum: 44yo F w above PMx, s/p gastric sleeve (8/4, [facility]), tx from [place], intubated for further management of SAH, now s/p cerebral angio w R vertebral cutdown for R vertebral dissecting aneurysm w placement of EVD on 8/8. Initially extubated 8/8,  IVCF on 8/12 for LE DVT, reintubation 8/15 due to inability to protect airway, self extubated 8/18.  Now w intermittent runs of L frontal lobe seizures on EEG, continuing to spike intermittent fevers w leukocytosis of 18.1, Procal 18.1, CRP 89.1 on CTX. ID following suspect MSSA PNA, Bariatric surgery service consulted for placement of PEG tube vs feeding J on 8/23, with recommendations to delay placement of PEG/Lap J given recent intraabdominal surgery, continued pressor requirement, and fevers of unknown origin. General surgery service consulted for reevaluation of lap J tube placement.         PAST MEDICAL & SURGICAL HISTORY:      MEDICATIONS  (STANDING):  acetylcysteine 20%  Inhalation 4 milliLiter(s) Inhalation every 6 hours  albuterol/ipratropium for Nebulization 3 milliLiter(s) Nebulizer every 6 hours  amantadine Syrup 200 milliGRAM(s) Oral every 12 hours  aspirin  chewable 81 milliGRAM(s) Oral daily  bromocriptine Capsule 15 milliGRAM(s) Oral every 8 hours  ceFAZolin   IVPB      ceFAZolin   IVPB 2000 milliGRAM(s) IV Intermittent every 8 hours  chlorhexidine 0.12% Liquid 15 milliLiter(s) Oral Mucosa every 12 hours  chlorhexidine 2% Cloths 1 Application(s) Topical <User Schedule>  enoxaparin Injectable 60 milliGRAM(s) SubCutaneous every 12 hours  glucagon  Injectable 1 milliGRAM(s) IntraMuscular once  insulin lispro (ADMELOG) corrective regimen sliding scale   SubCutaneous every 6 hours  lacosamide Solution 100 milliGRAM(s) Oral two times a day  methylphenidate 10 milliGRAM(s) Oral daily  norepinephrine Infusion 0.052 MICROgram(s)/kG/Min (4.79 mL/Hr) IV Continuous <Continuous>  nystatin    Suspension 162152 Unit(s) Oral four times a day  potassium chloride   Powder 40 milliEquivalent(s) Oral every 4 hours  potassium chloride  20 mEq/100 mL IVPB 20 milliEquivalent(s) IV Intermittent every 1 hour  sodium chloride 3%  Inhalation 4 milliLiter(s) Inhalation every 6 hours    MEDICATIONS  (PRN):  acetaminophen    Suspension .. 650 milliGRAM(s) Oral every 6 hours PRN Temp greater or equal to 38C (100.4F), Mild Pain (1 - 3)  ondansetron Injectable 4 milliGRAM(s) IV Push every 6 hours PRN Nausea and/or Vomiting      Allergies  apple (Angioedema)  No Known Drug Allergies  strawberry (Angioedema)    Intolerances  lactose (Stomach Upset)      FAMILY HISTORY:  No pertinent family history in first degree relatives        Vital Signs Last 24 Hrs  T(C): 37.3 (25 Aug 2021 09:27), Max: 38.2 (24 Aug 2021 22:00)  T(F): 99.2 (25 Aug 2021 09:27), Max: 100.7 (24 Aug 2021 22:00)  HR: 88 (25 Aug 2021 10:43) (86 - 105)  BP: 166/87 (25 Aug 2021 10:43) (145/97 - 203/93)  BP(mean): 119 (25 Aug 2021 10:43) (105 - 148)  RR: 20 (25 Aug 2021 10:00) (16 - 20)  SpO2: 100% (25 Aug 2021 10:43) (93% - 100%)    I&O's Summary    24 Aug 2021 07:01  -  25 Aug 2021 07:00  --------------------------------------------------------  IN: 5978.3 mL / OUT: 4508 mL / NET: 1470.3 mL    25 Aug 2021 07:01  -  25 Aug 2021 12:23  --------------------------------------------------------  IN: 492.4 mL / OUT: 67 mL / NET: 425.4 mL        Physical Exam:  Physical Exam:  General: NAD, nonverbal, attempts to follow commands, respond to question via head nods  HEENT: Dobhoff in nares w TF running 40cc/hr  CV: NSR  Abdominal: soft, ND/NT, no organomegaly, previous port site incisions C/D/I, healing well  Neuro: A/O x 2 at best      LABS:                        8.5    16.95 )-----------( 353      ( 25 Aug 2021 05:19 )             28.8     08-25    148<H>  |  106  |  9   ----------------------------<  145<H>  2.8<LL>   |  30  |  0.45<L>    Ca    9.3      25 Aug 2021 05:19  Phos  2.4     08-25  Mg     2.0     08-25          CAPILLARY BLOOD GLUCOSE      POCT Blood Glucose.: 125 mg/dL (25 Aug 2021 11:40)  POCT Blood Glucose.: 141 mg/dL (25 Aug 2021 04:57)  POCT Blood Glucose.: 136 mg/dL (24 Aug 2021 22:49)  POCT Blood Glucose.: 148 mg/dL (24 Aug 2021 18:26)        Cultures:  Culture Results:   Normal Respiratory Mely present to date (08-24 @ 18:07)  Culture Results:   No growth to date (08-23 @ 19:42)  Culture Results:   No growth at 1 day. (08-23 @ 17:30)  Culture Results:   No growth at 1 day. (08-23 @ 17:30)      RADIOLOGY & ADDITIONAL STUDIES:

## 2021-08-25 NOTE — PHYSICAL THERAPY INITIAL EVALUATION ADULT - DID THE PATIENT HAVE SURGERY?
Angiogram, carotid and cerebral, bilateral, 8/16 Angiogram, carotid and cerebral, bilateral 8/12 IVC filter placement 8/7 Angiogram, carotid and cerebral arteries R vertebral takedown for R vertebral dissecting aneurysm/yes

## 2021-08-25 NOTE — CONSULT NOTE ADULT - ASSESSMENT
44yo F w above PMx, s/p gastric sleeve (8/4, [facility]), tx from [place], intubated for further management of SAH, now s/p cerebral angio w R vertebral cutdown for R vertebral dissecting aneurysm w placement of EVD on 8/8. Initially extubated 8/8,  IVCF on 8/12 for LE DVT, reintubation 8/15 due to inability to protect airway, self extubated 8/18.  Now w intermittent runs of L frontal lobe seizures on EEG, continuing to spike intermittent fevers w leukocytosis of 18.1, Procal 18.1, CRP 89.1 on CTX. Bariatric surgery service consulted for placement of PEG tube vs feeding J on 8/23, with recommendations to delay placement of PEG/Lap J given recent intraabdominal surgery, continued pressor requirement, and fevers of unknown origin. General surgery service consulted for reevaluation of lap J tube placement.     - Given proximity to recent intraabdominal surgery, continued levophed requirements, and persistent fevers 2/2 suspected MSSA PNA,  patient is not a candidate for a laparoscopic jejunostomy feeding tube at this time  - Recommend continuing TF as tolerated via Dobhoff tube, and continuing to monitor mental status   - Please reconsult for reevaluation at later date when patient will would potentially be more appropriate candidate for intraabdominal laparoscopic manipulation   - 4/C signing off   - Discussed case with ACS attending and chief resident on call

## 2021-08-25 NOTE — OCCUPATIONAL THERAPY INITIAL EVALUATION ADULT - RANGE OF MOTION EXAMINATION, UPPER EXTREMITY
Pt able to demo B hand flexion/extension AROM WFL and R elbow flex/ext AROM WFL./bilateral UE Passive ROM was WNL (within normal limits)

## 2021-08-25 NOTE — PHYSICAL THERAPY INITIAL EVALUATION ADULT - PERTINENT HX OF CURRENT PROBLEM, REHAB EVAL
46y/o F with h/o recent gastric sleeve (08/04/21 [facility], Dr. Crisostomo ), fibromyalgia, thyroid dysfunction, PTSD, depression, anxiety.  Presented with seizures at home - brought to [place], with 1 more episode of seizure en route.  Intubated, CT showed SAH with IV extension, casted IV, hydrocephalus, given mannitol, levetiracetam 1g,  6mg ativan. Started on Cardene drip for BP goal < 140 and transferred to [facility]

## 2021-08-25 NOTE — PHYSICAL THERAPY INITIAL EVALUATION ADULT - MANUAL MUSCLE TESTING RESULTS, REHAB EVAL
pt with difficulty following commands. Pt demonstrated 5/5 strength in b/l UE's however it was non purposeful movements. To be reassessed when cog status improves. Pt also demonstrated ability to flex left knee on command, however, pt was inconsistent and would not repeat the movement on demand

## 2021-08-25 NOTE — OCCUPATIONAL THERAPY INITIAL EVALUATION ADULT - NS ASR FOLLOW COMMAND OT EVAL
inconsistently following simple commands, required extra time to complete/25% of the time/able to follow single-step instructions

## 2021-08-25 NOTE — PROGRESS NOTE ADULT - SUBJECTIVE AND OBJECTIVE BOX
HPI:   46y/o F with h/o recent gastric sleeve (21 [facility], Dr. Crisostomo ), fibromyalgia, thyroid dysfunction, PTSD, depression, anxiety.  Presented with seizures at home - brought to [place], with 1 more episode of seizure en route.  Intubated, CT showed SAH with IV extension, casted IV, hydrocephalus, given mannitol, levetiracetam 1g,  6mg ativan. Started on Cardene drip for BP goal < 140 and transferred to [facility] NICU for further management. HH5 MF4, BD 1 = . Now s/p cerebral angiogram for R vertebral artery takedown for R vertebral dissecting aneurysm (), and R frontal EVD placement.    Hospital course:  : Tx from [place] for SAH. Intubated. R frontal EVD placed, central line and a line placed. 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   : POD1 s/p angio with R vert takedown, extubated, desatting on aerosol mask, required extensive suctioning, 3% nebs, chest PT, started on low dose precedex drip for restlessness/agitation, ABG stable. NGT placed. TF started with goal 20 pending nutrition recs. 3% stopped for Na 150. Ceribell EEG negative and d/c'ed.    Overnight, new Right pronator drift and right gaze preference that can't cross midline, Repeat CTH demonstrated stable vents, CTA head and neck unremarkable for spasm, hypoattenuation of right cerebellum edema vs. infarct.  8/10: POD3 R vert takedown, EVD open at [id number]. ASHA overnight, neuro stable. CTH with increased hydro, CTA head/neck with mild basilar spasm, but concern for PE. 1/2 am D50 for hypoglycemia. 1L bolus then 100 cc/hr, PM rounds for net negative fluid balance and mild vasospasm on CTA.   : POD4 R vert takedown. EVD at 5cm H2O, ASHA overnight. neuro stable.   Febrile 104. depakote increased to q6. NCHCT stable. EVD lowered to 0. Lasix 20 given for dieuresis, UOP over 2 liters. Sedation given for a-line placement, BP drop needing levo.  : POD5 R vert takedown. EVD at 0cm H2O. ASHA overnight, neuro stable. Precedex being weaned off. Pending IVCF with vascular today.  : POD6 R vert takedown. EVD open at 0cmH2O. ASHA overnight. Neuro exam stable. Mottled skin on the left lower extremity improving. Started on Bromocriptine 15mg Q8.   : POD7. EVD open at 0. 1L bolus given for euvolemia o/n. neuro stable. CTH stable. ENT scoped vocal cords, +R voacl cord paresis, NTD. started on zosyn empirically.   8/15: POD8. EVD open at 0. ASHA o/n, neuro stable. Pt developed increased work of breathing, unable to clear her secretions, received racemic epi and multiple nebulizer treatments, still with stridor. Patient re-intubated at bedside, on full vent support.   : CTH/CTA head/neck/CT chest performed o/n for worsened exam, R gaze preference, sluggish pupils. +worsened uncal herniation, hydro, vasospasm in b/l PCAs, L vert, basilar arteries. preop angio today, restarted on 3% for Na goal 145-150. Angio for vasospasm with IA verapamil.  : POD10. Overnight Pt remains on levophed drip for SBP goal 180-220. Also remains on propofol drip. EVD open at -5cmH2O. ICPs WNL. Febrile 101F and pancultured. CTH today shows slightly smaller ventricles. Angio for vasospasm with IA verapamil.  : POD11 R vert takedown. POD1 angio IA verpamil. Overnight, Pt noted to have downward gaze to the right and not following commands. Taken for stat head CT which is stable. Pupils also noted to be aniscoric left pupil 4mm and brisk and right 2mm brisk. Mannitol 50g given. Ceribell eeg placed for concern of subclinical seizures, so far appears negative for seizures. 1L NS o/n and 1.5L NS bolus in AM for euvolemia. vanc/zosyn stopped. 250cc 3% bolus and 250cc albumin given in AM. Brief seizure to L frontal lobe this AM on EEG, given 2g valproic acid and dose increased to 1g q8. Vimpat 100mg BID started.  Angio with interval improvment in vasospasm, IA verapamil given. In afternoon patient self-extubated, placed on NRB with mucomyst, 3% inhalation and duonebs. 3% at 50 d/c'd.  : POD 12. Remains on VEEG. Axillary A line placed. Salt tabs d/c'd, florinef decreased to 0.1mg, EVD @ -5cm H2O, now draining 20cc/hr. 250 albumin and 500 NS boluses on dayshi, 1 L LR bolus  : POD 13. ASHA. on VEEG, no seizures noted. Pending VPA level. 500 NS, 250 albumin. Febrile, pancultured. EEG d/c'ed.   : BD14, POD14. ASHA overnight, EVD @ -5. s/p 1L bolus for euvolemia  : BD #15: continued on levo, hypercoagulabitly profile pending, EVD @ -5, euvolemia, extubated, amantadine added, free water started for hypernatremia   : BD 17, tmax 101F o/n. Gen Sx consulted for PEG - not a candidate at this time, pancultured for fever, 1L LR given for euvolemia. EVD at -5  : BD18, ASHA o/n, neuro stable, EVD at -5, VPA level therapeutic, SBP goal 160-200, L IJ CVC attempted placement with carotid puncture, no pseudoaneurysm on US. MSSA growing in sputum. Ceftriaxone d/c'd and Ancef 2gq8 started. ID consulted. Recieved 1.5L LR and 500cc albumin.   : POD5 last angio, BD19 ASAH o/n, neuro stable, EVD at -5        Vital Signs Last 24 Hrs  T(C): 37.1 (25 Aug 2021 02:00), Max: 38.2 (24 Aug 2021 22:00)  T(F): 98.7 (25 Aug 2021 02:00), Max: 100.7 (24 Aug 2021 22:00)  HR: 86 (25 Aug 2021 02:00) (84 - 105)  BP: 181/90 (25 Aug 2021 02:00) (161/73 - 206/110)  BP(mean): 129 (25 Aug 2021 02:00) (105 - 148)  RR: 19 (25 Aug 2021 02:00) (16 - 22)  SpO2: 98% (25 Aug 2021 02:00) (93% - 100%)    I&O's Detail    23 Aug 2021 07:01  -  24 Aug 2021 07:00  --------------------------------------------------------  IN:    Enteral Tube Flush: 340 mL    Free Water: 1000 mL    IV PiggyBack: 1450 mL    Lactated Ringers: 1200 mL    Norepinephrine: 481.9 mL    Vital High Protein: 960 mL  Total IN: 5431.9 mL    OUT:    External Ventricular Device (mL): 382 mL    Intermittent Catheterization - Urethral (mL): 4800 mL    Rectal Tube (mL): 0 mL    Voided (mL): 300 mL  Total OUT: 5482 mL    Total NET: -50.1 mL      24 Aug 2021 07:01  -  25 Aug 2021 02:30  --------------------------------------------------------  IN:    Albumin 5%  - 250 mL: 500 mL    Enteral Tube Flush: 420 mL    Free Water: 750 mL    IV PiggyBack: 550 mL    Lactated Ringers: 100 mL    Lactated Ringers: 1500 mL    Norepinephrine: 324.1 mL    Norepinephrine: 196.6 mL    Vital High Protein: 800 mL  Total IN: 5140.7 mL    OUT:    External Ventricular Device (mL): 297 mL    Intermittent Catheterization - Urethral (mL): 2200 mL    Voided (mL): 700 mL  Total OUT: 3197 mL    Total NET: 1943.7 mL        I&O's Summary    23 Aug 2021 07:01  -  24 Aug 2021 07:00  --------------------------------------------------------  IN: 5431.9 mL / OUT: 5482 mL / NET: -50.1 mL    24 Aug 2021 07:01  -  25 Aug 2021 02:30  --------------------------------------------------------  IN: 5140.7 mL / OUT: 3197 mL / NET: 1943.7 mL      PHYSICAL EXAM:  General: patient seen laying supine in bed in NAD  Neuro: OEs spontaneously, Ox2 (self,place - mouths words), squeezes hands and gives thumbs up b/l, wiggles toes b/l, and sticks out tongue to command, Moves UEs and LEs spontaneously L>R,  face symmetric   HEENT: Left pupil 4mm reactive, right pupil 3mm reactive, R gaze preference but can track to left, +NGT  Neck: supple  Cardiac: RRR, S1S2  Pulmonary: chest rise symmetric  Abdomen: soft, nontender, nondistended  Ext: warm, perfusing well, PT/DP pulses 2+ b/l  Wound: EVD incision c/d/i with staples, R groin puncture site with ecchymosis and small area of hematoma      TUBES/LINES:  [x] CVC - R IJ  [x] A-line  [] Lumbar Drain  [x] Ventriculostomy - EVD at -5cm H2O  [] Other    DIET:  [] NPO  [] Mechanical  [x] Tube feeds    LABS:                        8.4    20.90 )-----------( 336      ( 24 Aug 2021 15:57 )             28.3     08-24    147<H>  |  108  |  9   ----------------------------<  151<H>  3.3<L>   |  29  |  0.49<L>    Ca    9.0      24 Aug 2021 18:55  Phos  2.4     08-24  Mg     1.6     08-24        Urinalysis Basic - ( 23 Aug 2021 10:47 )    Color: Yellow / Appearance: Clear / S.015 / pH: x  Gluc: x / Ketone: NEGATIVE  / Bili: Negative / Urobili: 1.0 E.U./dL   Blood: x / Protein: NEGATIVE mg/dL / Nitrite: NEGATIVE   Leuk Esterase: NEGATIVE / RBC: x / WBC x   Sq Epi: x / Non Sq Epi: x / Bacteria: x          CAPILLARY BLOOD GLUCOSE      POCT Blood Glucose.: 136 mg/dL (24 Aug 2021 22:49)  POCT Blood Glucose.: 148 mg/dL (24 Aug 2021 18:26)  POCT Blood Glucose.: 139 mg/dL (24 Aug 2021 10:56)  POCT Blood Glucose.: 119 mg/dL (24 Aug 2021 05:31)      Drug Levels: [] N/A  Valproic Acid Level, Serum: 68.7 ug/mL ( @ 13:06)  Valproic Acid Level, Serum: 55.1 ug/mL ( @ 14:40)  Valproic Acid Level, Serum: 39.5 ug/mL ( @ 06:11)    CSF Analysis: [] N/A      Allergies    apple (Angioedema)  No Known Drug Allergies  strawberry (Angioedema)    Intolerances    lactose (Stomach Upset)    MEDICATIONS:  Antibiotics:  ceFAZolin   IVPB      ceFAZolin   IVPB 2000 milliGRAM(s) IV Intermittent every 8 hours    Neuro:  acetaminophen    Suspension .. 650 milliGRAM(s) Oral every 6 hours PRN  amantadine Syrup 200 milliGRAM(s) Oral every 12 hours  bromocriptine Capsule 15 milliGRAM(s) Oral every 8 hours  lacosamide Solution 100 milliGRAM(s) Oral two times a day  methylphenidate 10 milliGRAM(s) Oral daily  ondansetron Injectable 4 milliGRAM(s) IV Push every 6 hours PRN  valproate sodium IVPB 1000 milliGRAM(s) IV Intermittent every 8 hours    Anticoagulation:  aspirin  chewable 81 milliGRAM(s) Oral daily  enoxaparin Injectable 40 milliGRAM(s) SubCutaneous every 12 hours    OTHER:  acetylcysteine 20%  Inhalation 4 milliLiter(s) Inhalation every 6 hours  albuterol/ipratropium for Nebulization 3 milliLiter(s) Nebulizer every 6 hours  chlorhexidine 0.12% Liquid 15 milliLiter(s) Oral Mucosa every 12 hours  chlorhexidine 2% Cloths 1 Application(s) Topical <User Schedule>  glucagon  Injectable 1 milliGRAM(s) IntraMuscular once  insulin lispro (ADMELOG) corrective regimen sliding scale   SubCutaneous every 6 hours  norepinephrine Infusion 0.052 MICROgram(s)/kG/Min IV Continuous <Continuous>  sodium chloride 3%  Inhalation 4 milliLiter(s) Inhalation every 6 hours    IVF:    CULTURES:  Culture Results:   No growth to date ( @ 19:42)  Culture Results:   No growth at 1 day. ( @ 17:30)    RADIOLOGY & ADDITIONAL TESTS:  carotid dopplers : neg for pseudoaneurysm on wet read    HEAD BLEED    No pertinent family history in first degree relatives    Handoff    Cerebral aneurysm    Cerebral artery vasospasm    Cerebral aneurysm    DVT, lower extremity    Pulmonary embolism    Cerebral artery vasospasm    Multiple subsegmental pulmonary emboli without acute cor pulmonale    DVT, lower extremity    Abnormality of lung on CXR    Angiogram, carotid and cerebral arteries    IVC filter placement    Angiogram, carotid and cerebral, bilateral    Angiogram, carotid and cerebral, bilateral    Angiogram, carotid and cerebral, bilateral    Angiogram, carotid and cerebral, bilateral    [email or web address]    Assessment:  46y/o F with h/o recent gastric sleeve (21 [facility], Dr. Crisostomo ), fibromyalgia, thyroid dysfunction, PTSD, depression, anxiety.  Presented with seizures at home - brought to [place], with 1 more episode of seizure en route.  Intubated, CT showed SAH with IV extension, casted IV, hydrocephalus, given mannitol, levetiracetam 1g,  6mg ativan. Started on Cardene drip for BP goal < 140 and transferred to [facility] NICU for further management. HH5 MF4, BD 1 = . Now s/p cerebral angiogram for R vertebral artery takedown for R vertebral dissecting aneurysm (), and R frontal EVD placement (), now s/p IVC filter placement (,) now s/p angio IA verapamil , , , .     Plan  NEURO:   - neurochecks q1h  - EVD open at -5cmH2O , drain at least 15cc/hr  - s/p CTH : decreased size in ventricles, stable.   - Nimodipine 60 mg po q4h d/c'ed for SBP goal 180-200   - Depakote 1g q8 ( No keppra due to agitation), therapeutic   - Vimpat 100mg bid   - EEG no seizures x 2 days , D/c'ed  - cont ASA 81  - CTH 8/15: worsened uncal herniation, worsened hydro, vasospasm in b/l PCA, L vert, basilar arteries  - Bromocriptine 15mg Q8  - Ritalin and Amantadine for neurostim  - Angio demonstrating vasospasm of Right ICA, right PCOMM, Left distal vert and basilar confluence, and received IA verapamil on . 8/. 8/. .   - CTH/CTA - stable, R MCA improved spasm     PULM:    - self extubated   - Duonebs, 3% nebs, mucomyst  - chest PT   - CXR - aspiration precautions, requires frequent suctioning    CARDIO:    - -220  - levo gtt  - TTE  WNL    - Troponin stable   - EKG normal     ENDO:    - glucose goal 140-180    GI:    - TF via NGT  - free H2O 250 q6  - needs thin liquid diet x3 weeks as per bariatric when tolerated  - PPI per bariatric surgeon  - dc rectal tube    - gen surg consulted for PEG, Dr. Acevedo willing to do PEG if off pressors and cx's neg    RENAL:   - Maintain euvolemia   - Na goal normal   - albumin bolus yesterday to maintain CVP > 6  - free water 250 q6 for hypernatremia     HEM/ONC:   - ASA 81   - VTE Prophylaxis: SCDs, SQL   - Repeat dopplers , newly visualized acute DVT left intramuscular calf vein, persistent completely occlusive DVT b/l peroneal veins and right intramuscular calf vein  - kept on SQL 40 BID due to carotid puncture during L IJ CVC attempt   - carotid doppler  wet read: neg for pseudoaneurysm   - hypercoagulable w/u - still pending, however protein S/C and AT III normal   - pending repeat dopplers      ID:   - MRSA neg   - Febrile, trend leukocytosis   - Last Pancx   - ceftriaxone 2 g q 24 for empiric pna coverage (-)  - ID consulted, need to f/u clearance for shunt and PEG    Assessment and plan discussed with Dr. Lomax, Dr. Conn and Dr. Sánchez    Assessment:  Present when checked    []  GCS  E   V  M     Heart Failure: []Acute, [] acute on chronic , []chronic  Heart Failure:  [] Diastolic (HFpEF), [] Systolic (HFrEF), []Combined (HFpEF and HFrEF), [] RHF, [] Pulm HTN, [] Other    [] TRUDI, [] ATN, [] AIN, [] other  [] CKD1, [] CKD2, [] CKD 3, [] CKD 4, [] CKD 5, []ESRD    Encephalopathy: [] Metabolic, [] Hepatic, [] toxic, [] Neurological, [] Other    Abnormal Nurtitional Status: [] malnurtition (see nutrition note), [ ]underweight: BMI < 19, [] morbid obesity: BMI >40, [] Cachexia    [] Sepsis  [] hypovolemic shock,[] cardiogenic shock, [] hemorrhagic shock, [] neuogenic shock  [] Acute Respiratory Failure  []Cerebral edema, [] Brain compression/ herniation,   [] Functional quadriplegia  [] Acute blood loss anemia   HPI:   46y/o F with h/o recent gastric sleeve (21 [facility], Dr. Crisostomo ), fibromyalgia, thyroid dysfunction, PTSD, depression, anxiety.  Presented with seizures at home - brought to [place], with 1 more episode of seizure en route.  Intubated, CT showed SAH with IV extension, casted IV, hydrocephalus, given mannitol, levetiracetam 1g,  6mg ativan. Started on Cardene drip for BP goal < 140 and transferred to [facility] NICU for further management. HH5 MF4, BD 1 = . Now s/p cerebral angiogram for R vertebral artery takedown for R vertebral dissecting aneurysm (), and R frontal EVD placement.    Hospital course:  : Tx from [place] for SAH. Intubated. R frontal EVD placed, central line and a line placed. 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   : POD1 s/p angio with R vert takedown, extubated, desatting on aerosol mask, required extensive suctioning, 3% nebs, chest PT, started on low dose precedex drip for restlessness/agitation, ABG stable. NGT placed. TF started with goal 20 pending nutrition recs. 3% stopped for Na 150. Ceribell EEG negative and d/c'ed.    Overnight, new Right pronator drift and right gaze preference that can't cross midline, Repeat CTH demonstrated stable vents, CTA head and neck unremarkable for spasm, hypoattenuation of right cerebellum edema vs. infarct.  8/10: POD3 R vert takedown, EVD open at [id number]. ASHA overnight, neuro stable. CTH with increased hydro, CTA head/neck with mild basilar spasm, but concern for PE. 1/2 am D50 for hypoglycemia. 1L bolus then 100 cc/hr, PM rounds for net negative fluid balance and mild vasospasm on CTA.   : POD4 R vert takedown. EVD at 5cm H2O, ASHA overnight. neuro stable.   Febrile 104. depakote increased to q6. NCHCT stable. EVD lowered to 0. Lasix 20 given for dieuresis, UOP over 2 liters. Sedation given for a-line placement, BP drop needing levo.  : POD5 R vert takedown. EVD at 0cm H2O. ASHA overnight, neuro stable. Precedex being weaned off. Pending IVCF with vascular today.  : POD6 R vert takedown. EVD open at 0cmH2O. ASHA overnight. Neuro exam stable. Mottled skin on the left lower extremity improving. Started on Bromocriptine 15mg Q8.   : POD7. EVD open at 0. 1L bolus given for euvolemia o/n. neuro stable. CTH stable. ENT scoped vocal cords, +R voacl cord paresis, NTD. started on zosyn empirically.   8/15: POD8. EVD open at 0. ASHA o/n, neuro stable. Pt developed increased work of breathing, unable to clear her secretions, received racemic epi and multiple nebulizer treatments, still with stridor. Patient re-intubated at bedside, on full vent support.   : CTH/CTA head/neck/CT chest performed o/n for worsened exam, R gaze preference, sluggish pupils. +worsened uncal herniation, hydro, vasospasm in b/l PCAs, L vert, basilar arteries. preop angio today, restarted on 3% for Na goal 145-150. Angio for vasospasm with IA verapamil.  : POD10. Overnight Pt remains on levophed drip for SBP goal 180-220. Also remains on propofol drip. EVD open at -5cmH2O. ICPs WNL. Febrile 101F and pancultured. CTH today shows slightly smaller ventricles. Angio for vasospasm with IA verapamil.  : POD11 R vert takedown. POD1 angio IA verpamil. Overnight, Pt noted to have downward gaze to the right and not following commands. Taken for stat head CT which is stable. Pupils also noted to be aniscoric left pupil 4mm and brisk and right 2mm brisk. Mannitol 50g given. Ceribell eeg placed for concern of subclinical seizures, so far appears negative for seizures. 1L NS o/n and 1.5L NS bolus in AM for euvolemia. vanc/zosyn stopped. 250cc 3% bolus and 250cc albumin given in AM. Brief seizure to L frontal lobe this AM on EEG, given 2g valproic acid and dose increased to 1g q8. Vimpat 100mg BID started.  Angio with interval improvment in vasospasm, IA verapamil given. In afternoon patient self-extubated, placed on NRB with mucomyst, 3% inhalation and duonebs. 3% at 50 d/c'd.  : POD 12. Remains on VEEG. Axillary A line placed. Salt tabs d/c'd, florinef decreased to 0.1mg, EVD @ -5cm H2O, now draining 20cc/hr. 250 albumin and 500 NS boluses on dayshi, 1 L LR bolus  : POD 13. ASHA. on VEEG, no seizures noted. Pending VPA level. 500 NS, 250 albumin. Febrile, pancultured. EEG d/c'ed.   : BD14, POD14. ASHA overnight, EVD @ -5. s/p 1L bolus for euvolemia  : BD #15: continued on levo, hypercoagulabitly profile pending, EVD @ -5, euvolemia, extubated, amantadine added, free water started for hypernatremia   : BD 17, tmax 101F o/n. Gen Sx consulted for PEG - not a candidate at this time, pancultured for fever, 1L LR given for euvolemia. EVD at -5  : BD18, ASHA o/n, neuro stable, EVD at -5, VPA level therapeutic, SBP goal 160-200, L IJ CVC attempted placement with carotid puncture, no pseudoaneurysm on US. MSSA growing in sputum. Ceftriaxone d/c'd and Ancef 2gq8 started. ID consulted. Recieved 1.5L LR and 500cc albumin.   : POD5 last angio, BD19 ASHA o/n, neuro stable, EVD at -5        Vital Signs Last 24 Hrs  T(C): 37.1 (25 Aug 2021 02:00), Max: 38.2 (24 Aug 2021 22:00)  T(F): 98.7 (25 Aug 2021 02:00), Max: 100.7 (24 Aug 2021 22:00)  HR: 86 (25 Aug 2021 02:00) (84 - 105)  BP: 181/90 (25 Aug 2021 02:00) (161/73 - 206/110)  BP(mean): 129 (25 Aug 2021 02:00) (105 - 148)  RR: 19 (25 Aug 2021 02:00) (16 - 22)  SpO2: 98% (25 Aug 2021 02:00) (93% - 100%)    I&O's Detail    23 Aug 2021 07:01  -  24 Aug 2021 07:00  --------------------------------------------------------  IN:    Enteral Tube Flush: 340 mL    Free Water: 1000 mL    IV PiggyBack: 1450 mL    Lactated Ringers: 1200 mL    Norepinephrine: 481.9 mL    Vital High Protein: 960 mL  Total IN: 5431.9 mL    OUT:    External Ventricular Device (mL): 382 mL    Intermittent Catheterization - Urethral (mL): 4800 mL    Rectal Tube (mL): 0 mL    Voided (mL): 300 mL  Total OUT: 5482 mL    Total NET: -50.1 mL      24 Aug 2021 07:01  -  25 Aug 2021 02:30  --------------------------------------------------------  IN:    Albumin 5%  - 250 mL: 500 mL    Enteral Tube Flush: 420 mL    Free Water: 750 mL    IV PiggyBack: 550 mL    Lactated Ringers: 100 mL    Lactated Ringers: 1500 mL    Norepinephrine: 324.1 mL    Norepinephrine: 196.6 mL    Vital High Protein: 800 mL  Total IN: 5140.7 mL    OUT:    External Ventricular Device (mL): 297 mL    Intermittent Catheterization - Urethral (mL): 2200 mL    Voided (mL): 700 mL  Total OUT: 3197 mL    Total NET: 1943.7 mL        I&O's Summary    23 Aug 2021 07:01  -  24 Aug 2021 07:00  --------------------------------------------------------  IN: 5431.9 mL / OUT: 5482 mL / NET: -50.1 mL    24 Aug 2021 07:01  -  25 Aug 2021 02:30  --------------------------------------------------------  IN: 5140.7 mL / OUT: 3197 mL / NET: 1943.7 mL      PHYSICAL EXAM:  General: patient seen laying supine in bed in NAD  Neuro: OEs spontaneously, Ox2 (self,place - mouths words), squeezes hands and gives thumbs up b/l, wiggles toes b/l, and sticks out tongue to command, Moves UEs and LEs spontaneously L>R,  face symmetric   HEENT: pupils 3mm equal reactive, R gaze preference but can track to left, +NGT  Neck: supple  Cardiac: RRR, S1S2  Pulmonary: chest rise symmetric  Abdomen: soft, nontender, nondistended  Ext: warm, perfusing well, PT/DP pulses 2+ b/l  Wound: EVD incision c/d/i with staples, R groin puncture site with ecchymosis and small area of hematoma      TUBES/LINES:  [x] CVC - R IJ  [x] A-line  [] Lumbar Drain  [x] Ventriculostomy - EVD at -5cm H2O  [] Other    DIET:  [] NPO  [] Mechanical  [x] Tube feeds    LABS:                        8.4    20.90 )-----------( 336      ( 24 Aug 2021 15:57 )             28.3     08-24    147<H>  |  108  |  9   ----------------------------<  151<H>  3.3<L>   |  29  |  0.49<L>    Ca    9.0      24 Aug 2021 18:55  Phos  2.4     08-24  Mg     1.6     08-24        Urinalysis Basic - ( 23 Aug 2021 10:47 )    Color: Yellow / Appearance: Clear / S.015 / pH: x  Gluc: x / Ketone: NEGATIVE  / Bili: Negative / Urobili: 1.0 E.U./dL   Blood: x / Protein: NEGATIVE mg/dL / Nitrite: NEGATIVE   Leuk Esterase: NEGATIVE / RBC: x / WBC x   Sq Epi: x / Non Sq Epi: x / Bacteria: x          CAPILLARY BLOOD GLUCOSE      POCT Blood Glucose.: 136 mg/dL (24 Aug 2021 22:49)  POCT Blood Glucose.: 148 mg/dL (24 Aug 2021 18:26)  POCT Blood Glucose.: 139 mg/dL (24 Aug 2021 10:56)  POCT Blood Glucose.: 119 mg/dL (24 Aug 2021 05:31)      Drug Levels: [] N/A  Valproic Acid Level, Serum: 68.7 ug/mL ( @ 13:06)  Valproic Acid Level, Serum: 55.1 ug/mL ( @ 14:40)  Valproic Acid Level, Serum: 39.5 ug/mL ( @ 06:11)    CSF Analysis: [] N/A      Allergies    apple (Angioedema)  No Known Drug Allergies  strawberry (Angioedema)    Intolerances    lactose (Stomach Upset)    MEDICATIONS:  Antibiotics:  ceFAZolin   IVPB      ceFAZolin   IVPB 2000 milliGRAM(s) IV Intermittent every 8 hours    Neuro:  acetaminophen    Suspension .. 650 milliGRAM(s) Oral every 6 hours PRN  amantadine Syrup 200 milliGRAM(s) Oral every 12 hours  bromocriptine Capsule 15 milliGRAM(s) Oral every 8 hours  lacosamide Solution 100 milliGRAM(s) Oral two times a day  methylphenidate 10 milliGRAM(s) Oral daily  ondansetron Injectable 4 milliGRAM(s) IV Push every 6 hours PRN  valproate sodium IVPB 1000 milliGRAM(s) IV Intermittent every 8 hours    Anticoagulation:  aspirin  chewable 81 milliGRAM(s) Oral daily  enoxaparin Injectable 40 milliGRAM(s) SubCutaneous every 12 hours    OTHER:  acetylcysteine 20%  Inhalation 4 milliLiter(s) Inhalation every 6 hours  albuterol/ipratropium for Nebulization 3 milliLiter(s) Nebulizer every 6 hours  chlorhexidine 0.12% Liquid 15 milliLiter(s) Oral Mucosa every 12 hours  chlorhexidine 2% Cloths 1 Application(s) Topical <User Schedule>  glucagon  Injectable 1 milliGRAM(s) IntraMuscular once  insulin lispro (ADMELOG) corrective regimen sliding scale   SubCutaneous every 6 hours  norepinephrine Infusion 0.052 MICROgram(s)/kG/Min IV Continuous <Continuous>  sodium chloride 3%  Inhalation 4 milliLiter(s) Inhalation every 6 hours    IVF:    CULTURES:  Culture Results:   No growth to date ( @ 19:42)  Culture Results:   No growth at 1 day. ( @ 17:30)    RADIOLOGY & ADDITIONAL TESTS:  carotid dopplers : neg for pseudoaneurysm on wet read    HEAD BLEED    No pertinent family history in first degree relatives    Handoff    Cerebral aneurysm    Cerebral artery vasospasm    Cerebral aneurysm    DVT, lower extremity    Pulmonary embolism    Cerebral artery vasospasm    Multiple subsegmental pulmonary emboli without acute cor pulmonale    DVT, lower extremity    Abnormality of lung on CXR    Angiogram, carotid and cerebral arteries    IVC filter placement    Angiogram, carotid and cerebral, bilateral    Angiogram, carotid and cerebral, bilateral    Angiogram, carotid and cerebral, bilateral    Angiogram, carotid and cerebral, bilateral    [email or web address]    Assessment:  46y/o F with h/o recent gastric sleeve (21 [facility], Dr. Crisostomo ), fibromyalgia, thyroid dysfunction, PTSD, depression, anxiety.  Presented with seizures at home - brought to [place], with 1 more episode of seizure en route.  Intubated, CT showed SAH with IV extension, casted IV, hydrocephalus, given mannitol, levetiracetam 1g,  6mg ativan. Started on Cardene drip for BP goal < 140 and transferred to [facility] NICU for further management. HH5 MF4, BD 1 = . Now s/p cerebral angiogram for R vertebral artery takedown for R vertebral dissecting aneurysm (), and R frontal EVD placement (), now s/p IVC filter placement (,) now s/p angio IA verapamil , , , .     Plan  NEURO:   - neurochecks q1h  - EVD open at -5cmH2O , drain at least 15cc/hr  - s/p CTH : decreased size in ventricles, stable.   - Nimodipine 60 mg po q4h d/c'ed for SBP goal 180-200   - Depakote 1g q8 ( No keppra due to agitation), therapeutic   - Vimpat 100mg bid   - EEG no seizures x 2 days , D/c'ed  - cont ASA 81  - CTH 8/15: worsened uncal herniation, worsened hydro, vasospasm in b/l PCA, L vert, basilar arteries  - Bromocriptine 15mg Q8  - Ritalin and Amantadine for neurostim  - Angio demonstrating vasospasm of Right ICA, right PCOMM, Left distal vert and basilar confluence, and received IA verapamil on . 8/. 8/. 8.   - CTH/CTA - stable, R MCA improved spasm     PULM:    - self extubated   - Duonebs, 3% nebs, mucomyst  - chest PT   - CXR - aspiration precautions, requires frequent suctioning    CARDIO:    - -220  - levo gtt  - TTE  WNL    - Troponin stable   - EKG normal     ENDO:    - glucose goal 140-180    GI:    - TF via NGT  - free H2O 250 q6  - needs thin liquid diet x3 weeks as per bariatric when tolerated  - PPI per bariatric surgeon  - dc rectal tube    - gen surg consulted for PEG, Dr. Acevedo willing to do PEG if off pressors and cx's neg    RENAL:   - Maintain euvolemia   - Na goal normal   - albumin bolus yesterday to maintain CVP > 6  - free water 250 q6 for hypernatremia     HEM/ONC:   - ASA 81   - VTE Prophylaxis: SCDs, SQL   - Repeat dopplers , newly visualized acute DVT left intramuscular calf vein, persistent completely occlusive DVT b/l peroneal veins and right intramuscular calf vein  - kept on SQL 40 BID due to carotid puncture during L IJ CVC attempt   - carotid doppler  wet read: neg for pseudoaneurysm   - hypercoagulable w/u - still pending, however protein S/C and AT III normal   - pending repeat dopplers      ID:   - MRSA neg   - Febrile, trend leukocytosis   - Last Pancx   - ceftriaxone 2 g q 24 for empiric pna coverage (-)  - ID consulted, need to f/u clearance for shunt and PEG    Assessment and plan discussed with Dr. Lomax, Dr. Conn and Dr. Sánchez    Assessment:  Present when checked    []  GCS  E   V  M     Heart Failure: []Acute, [] acute on chronic , []chronic  Heart Failure:  [] Diastolic (HFpEF), [] Systolic (HFrEF), []Combined (HFpEF and HFrEF), [] RHF, [] Pulm HTN, [] Other    [] TRUDI, [] ATN, [] AIN, [] other  [] CKD1, [] CKD2, [] CKD 3, [] CKD 4, [] CKD 5, []ESRD    Encephalopathy: [] Metabolic, [] Hepatic, [] toxic, [] Neurological, [] Other    Abnormal Nurtitional Status: [] malnurtition (see nutrition note), [ ]underweight: BMI < 19, [] morbid obesity: BMI >40, [] Cachexia    [] Sepsis  [] hypovolemic shock,[] cardiogenic shock, [] hemorrhagic shock, [] neuogenic shock  [] Acute Respiratory Failure  []Cerebral edema, [] Brain compression/ herniation,   [] Functional quadriplegia  [] Acute blood loss anemia

## 2021-08-25 NOTE — OCCUPATIONAL THERAPY INITIAL EVALUATION ADULT - GENERAL OBSERVATIONS, REHAB EVAL
Pt received semi-supine in bed, +EVD, +L axillary A-line, +4L O2 NC, +rectal thermometer, +b/l wrist restraints, +ng tube, in NAD but very lethargic. Cleared by EMMANUEL Carmona to see.

## 2021-08-25 NOTE — OCCUPATIONAL THERAPY INITIAL EVALUATION ADULT - MODALITIES TREATMENT COMMENTS
Pt able to demonstrate symmetrical eyebrow raise and smile. Unable to follow commands for tongue protrusion or to complete visual tracking/ visual quadrant test. Pt able to demonstrate symmetrical eyebrow raise and smile. Unable to follow commands for tongue protrusion. Unable to complete visual tracking/ visual quadrant test as pt too lethargic to keep eyes open throughout. Pt with involuntary movements demonstrate b/l elbow flexion and b/l shoulder retraction while supine in bed. Will continue to re-assess patient as patient's level of arousal improves.

## 2021-08-25 NOTE — PHYSICAL THERAPY INITIAL EVALUATION ADULT - MODALITIES TREATMENT COMMENTS
Attempted CN's and vision. Pt unable to track at this time. Pt able to smile, slightly raise eyebrows, and shrug shoulders. Unable to follow command to stick out tongue, squeeze eyes shut, or puff cheeks

## 2021-08-25 NOTE — OCCUPATIONAL THERAPY INITIAL EVALUATION ADULT - RANGE OF MOTION EXAMINATION, LOWER EXTREMITY
Pt only able to actively demo b/l toe flexion/extension. Involuntary movements noted for knee flex/extension/bilateral LE Passive ROM was WNL (within normal limits)

## 2021-08-25 NOTE — CHART NOTE - NSCHARTNOTEFT_GEN_A_CORE
Admitting Diagnosis:   Patient is a 45y old  Female who presents with a chief complaint of SAH (25 Aug 2021 10:04)    PAST MEDICAL & SURGICAL HISTORY:  gastric sleeve (8/4/21), fibromyalgia, thyroid dysfunction, PTSD, depression, anxiety.     Current Nutrition Order:  NPO diet  EN regimen: Vital HP @ 40 ml/hr x24hrs via NGT providing 960 ml TV, 960 kcal, 84g pro, 802 ml free water.     PO Intake: Good (%) [   ]  Fair (50-75%) [   ] Poor (<25%) [   ]- N/A    GI Issues:   WDL, no n/v/d/c noted.   No abd distention or discomfort    Pain:  No pain noted at this time    Skin Integrity:  WDL, david score 14  +3 generalized pitting edema present  No pressure uclers noted    Labs:   08-25    148<H>  |  106  |  9   ----------------------------<  145<H>  2.8<LL>   |  30  |  0.45<L>    Ca    9.3      25 Aug 2021 05:19  Phos  2.4     08-25  Mg     2.0     08-25    CAPILLARY BLOOD GLUCOSE    POCT Blood Glucose.: 141 mg/dL (25 Aug 2021 04:57)  POCT Blood Glucose.: 136 mg/dL (24 Aug 2021 22:49)  POCT Blood Glucose.: 148 mg/dL (24 Aug 2021 18:26)  POCT Blood Glucose.: 139 mg/dL (24 Aug 2021 10:56)    Medications:  MEDICATIONS  (STANDING):  acetylcysteine 20%  Inhalation 4 milliLiter(s) Inhalation every 6 hours  albuterol/ipratropium for Nebulization 3 milliLiter(s) Nebulizer every 6 hours  amantadine Syrup 200 milliGRAM(s) Oral every 12 hours  aspirin  chewable 81 milliGRAM(s) Oral daily  bromocriptine Capsule 15 milliGRAM(s) Oral every 8 hours  ceFAZolin   IVPB      ceFAZolin   IVPB 2000 milliGRAM(s) IV Intermittent every 8 hours  chlorhexidine 0.12% Liquid 15 milliLiter(s) Oral Mucosa every 12 hours  chlorhexidine 2% Cloths 1 Application(s) Topical <User Schedule>  enoxaparin Injectable 60 milliGRAM(s) SubCutaneous every 12 hours  glucagon  Injectable 1 milliGRAM(s) IntraMuscular once  insulin lispro (ADMELOG) corrective regimen sliding scale   SubCutaneous every 6 hours  lacosamide Solution 100 milliGRAM(s) Oral two times a day  methylphenidate 10 milliGRAM(s) Oral daily  norepinephrine Infusion 0.052 MICROgram(s)/kG/Min (4.79 mL/Hr) IV Continuous <Continuous>  nystatin    Suspension 507702 Unit(s) Oral four times a day  potassium chloride   Powder 40 milliEquivalent(s) Oral every 4 hours  potassium chloride  20 mEq/100 mL IVPB 20 milliEquivalent(s) IV Intermittent every 1 hour  potassium phosphate / sodium phosphate Powder (PHOS-NaK) 1 Packet(s) Oral every 4 hours  sodium chloride 3%  Inhalation 4 milliLiter(s) Inhalation every 6 hours    MEDICATIONS  (PRN):  acetaminophen    Suspension .. 650 milliGRAM(s) Oral every 6 hours PRN Temp greater or equal to 38C (100.4F), Mild Pain (1 - 3)  ondansetron Injectable 4 milliGRAM(s) IV Push every 6 hours PRN Nausea and/or Vomiting    Admitting Anthropometrics:  · Height for BMI (FEET)	5 Feet  · Height for BMI (INCHES)	4 Inch(s)  · Height for BMI (CENTIMETERS)	162.56 Centimeter(s)  · Weight for BMI (lbs)	225.1 lb  · Weight for BMI (kg)	102.1 kg  · Body Mass Index	              38.6 kg/m2  · Ideal Body Weight (lbs)	120  · Ideal Body Weight (kg)	54.4    Weight:  8/7 225lbs    Weight Change:   No new weights obtained this admission.     Nutrition Focused Physical Exam: Completed [   ]  Not Pertinent [ x  ]    Estimated energy needs:   IBW used for calculations as pt >120% of IBW (188%). Needs adjusted for recent bariatric sx, critical care, obesity. **Fluids per team (aim for >64oz per day once medically stable).  Kcal (25-30 kcal/kg): 7253-1721 kcal  Protein (1.5-2.0 g/kg pro): 81-108g pro    Subjective:   45F with h/o recent gastric sleeve (08/04/21 [facility], Dr. Crisostomo ), fibromyalgia, thyroid dysfunction, PTSD, depression, anxiety.  Presented with seizures at home - brought to [place], with 1 more episode of seizure en route.  Intubated, CT showed SAH with IV extension, casted IV, hydrocephalus, given mannitol, levetiracetam 1g,  6mg ativan. Started on Cardene drip for BP goal < 140 and transferred to [facility] NICU for further management. S/p angio with R vert takedown 8/7, pt extubated later that day. Pt remains on fentanyl and Precedex restlessness and agitation. NGT placed for initiation of EN. CTH with increased hydro, CTA head/neck with mild basilar spasm. Pt now weened off fentanyl and precedex 8/12 per disc with RN. S/p IVC placement 8/12. Pt with increased work of breathing with inability to clear secretions, and was reintubated 8/15. CTA shows posterior circulation vasospasm. Started on levophed drip for SBP goal 180-200. Also remains on propofol drip. EVD open at -5cmH2O. Pt noted to be febrile this am (101F) and pancultured 8/17. Pt s/p multiple angiograms 8/16, 8/17, 8/18, 8/20. Pt self extubated 8/18 and transitioned to NC, tolerating well. Pt con ton levo for BP support. SBP goal 160-200. MSSA growing in sputum. Ceftriaxone d/c'd and Ancef 2gq8 started. ID consulted.     On assessment, pt resting in bed nonverbal, very lethargic. Pt cont to be NPO with EN feeds running at goal rate. To optimize nutr regimen, recommend increasing goal rate from 40ml/hr to 46 ml/hr. No reported n/v/d/c. No abd distention or discomfort. No s/sx of aspiration. Education deferred. Pertinent Labs: Na 148H, K 2.8L. Cre 0.45L, GLu 145H. Phos 2.4L- recommend aggressive repletion of lytes. Please see nutr recs below. RD to follow.     Previous Nutrition Diagnosis: Inadequate Oral Intake RT inability to meet needs via PO AEB NPO, reliant on EN to meet 100% of est needs.     Active [ x  ]  Resolved [   ]    If resolved, new PES:     Goal/Expected Outcome Consistently meet >75% est needs via appropriate route.    Recommendations:  1. NPO  >> When medically feasible, recommend the following diet advancement; BARICLLIQ >> Phase 1 Bariatric Full liquid diet  2. If team wishes to cont EN regimen, recommend increasing; Vital HP @ 46 ml/hr x24hrs via NGT providing 1104 ml TV, 1104 kcal, 96g pro., 923 ml free water.   >>Cont with aspiration precautions at all times  >>FWF per team  3. Cont to monitor lytes and replete prn  4. RD diet edu prn  5. Pain and bowel regimen per team    Education: Deferred    Risk Level: High [ x  ] Moderate [   ] Low [   ].

## 2021-08-25 NOTE — PROGRESS NOTE ADULT - ASSESSMENT
44 y/o F with h/o recent gastric sleeve (08/04/21 [facility], Dr. Crisostomo ), fibromyalgia, thyroid dysfunction, PTSD, depression, anxiety.  Presented with seizures at home - brought to [place], with 1 more episode of seizure en route.  Intubated, CT showed SAH with IV extension, casted IV, hydrocephalus, given mannitol, levetiracetam 1g,  6mg ativan. Started on Cardene drip for BP goal < 140 and transferred to [facility] NICU for further management. HH5 MF4, BD 1 = 8/7. Now s/p cerebral angiogram for R vertebral artery takedown for R vertebral dissecting aneurysm (8/7), and R frontal EVD placement (8/7), now s/p IVC filter placement (8/12).  BD     NEURO:   Neurochecks Q1h  EVD@ neg 5, draining 15-20 mL/h (ICPs 0- neg 9)  s/p CTH 8/20: decreased size in ventricles, stable.   CTA 8/22: Spasm improved RMCA. Similar in other territories  Nimodipine 60 mg po q4h d/c'ed for SBP goal 180-220  D/W NSGY; Relax SBP to 160-220   Clinical seizures at home: On VPA 1000mg Q8  Check VPA level 8/24 prior to next dose.  Continue Vimpat 100 mg BID  EEG no seizures x 2 days , D/c'ed  Cont ASA 81mg  CTH 8/15: worsened uncal herniation, worsened hydro, vasospasm in b/l PCA, L vert, basilar arteries  Angio demonstrating vasospasm of Right ICA, right PCOMM, Left distal vert and basilar confluence, and received IA verapamil on 8/16. 8/17. 8/18. 8/20.   Bromocriptine 15mg Q8. Ritalin for  neurostimulation.   Continue aggressive PT     PULM:    Extubated 8/20  High aspiration risk.   Duonebs and 3% nebs standing  Chest PT   CXR clear 8/22  Aspiration precautions, requires frequent suctioning (Q2 hr)  Currently on Ancef for likely  tracheitis    CARDIO:    -220  Levo gtt to maintain within parameters  TTE 8/12 WNL    Troponin stable   EKG.   Central line RIJ- will replace.     ENDO:    Glucose goal 140-180    GI:    TF via NGT. Advanced to 40cc/hr.  Free water flushes 250 Q6  Thin liquid diet x3 weeks as per bariatric when tolerated  PPI per bariatric surgeon.  DCed miralax, senna re rectal tube  Liquid stool, improving. DC rectal tube. If still with diarrhea, send Cdiff.  PEG needed. Bariatric surgeon/gen surg to facilitate    RENAL:   Maintain euvolemia   Na goal 145-150   Bolus as needed   Severe hypokalemia 2/2 enteric losses. Replete aggressively    HEM/ONC:   ASA 81mg  VTE Prophylaxis: SCDs, SQL  LE Duplex 8/9: Acute completely occlusive deep venous thrombosis of the right intramuscular calf vein, s/p IVCF with vascular 8/12, repeat 8/16 shows new b/l peroneal DVTs, factor xa c/w prophylactic dose.  Repeat dopplers 8/23: New L calf DVT  Increase lovenox to 60mg bid   Anti Xa level on 60mg bid.   Hypercoagulable w/u - still pending, however protein S/C and AT III normal .    ID: Persistent fever, leukocytosis.. Most recent fever 100.6F.  MRSA neg 8/12  Was on empiric Ceftriaxone (08/22) for aspiration PNA. Restarted on 8/23.   Was on empiric Vanc/zoysn for fever d/c'd (8/10-13) and now restarted 8/14 for persistent fevers, dc'd again on 8/18.   CSF on 8/13, NGTD. Pancx 8/17, NGTD, 8/20 NGTD   Repeat cultures 8/23 (blood, sputum, CSF, UA)- sputum GPC in pairs- MSSA. Change to [facility]  Infectious disease consulted 8/23 re: persistent fevers and clearance for eventual shunt. Awaiting  Procal 0.13  Low threshold for broadening. ID   CTX 2g Q 24 hours  Will consider Cdiff if remains with diarrhea  Change lines    Disposition: ICU. Family updated  Full code    Acces:   R IJ TLC (8/7)  L axillary A line    *****    ATTENDING ATTESTATION:  I was physically present for the key portions of the evaluation and management (E/M) service provided.  I agree with the above history, physical and plan, which I have reviewed and edited where appropriate.    Patient at high risk for neurological deterioration or death due to:  ICU delirium, aspiration PNA, DVT / PE.  Critical care time:  I have personally provided 45 minutes of critical care time, excluding time spent on separate procedures.      Plan discussed with RN, house staff.       44 y/o F with h/o recent gastric sleeve (21 [facility], Dr. Crisostomo ), fibromyalgia, thyroid dysfunction, PTSD, depression, anxiety.  Presented with seizures at home - brought to [place], with 1 more episode of seizure en route.  Intubated, CT showed SAH with IV extension, casted IV, hydrocephalus, given mannitol, levetiracetam 1g,  6mg ativan. Started on Cardene drip for BP goal < 140 and transferred to [facility] NICU for further management. HH5 MF4, BD 1 = . Now s/p cerebral angiogram for R vertebral artery takedown for R vertebral dissecting aneurysm (), and R frontal EVD placement (), now s/p IVC filter placement (). BD 19    NEURO:   Neurochecks Q1h  EVD@ neg 5, draining 15 mL/h (ICPs 0- neg 9). 343/24 hrs.   s/p CTH : Decreased size in ventricles, stable.   CTA : Spasm improved RMCA. Similar in other territories  CTH 8/15: worsened uncal herniation, worsened hydro, vasospasm in b/l PCA, L vert, basilar arteries  Angio demonstrating vasospasm of Right ICA, right PCOMM, Left distal vert and basilar confluence, and received IA verapamil on . 8/. 8/. .   CTA : per NSGY.  Nimodipine 60 mg PO Q4h- held for SBP goal 180-220.  D/W NSGY; Relax SBP to 160-220. Will discuss with NSGY relaxing SBP to 140 on   Clinical seizures at home. Was on EEG.   EE/18- 10 second event at 6:42 on , that could not be determined if epileptic vs. artifact.   There were non-specific indicators of bilateral temporal dysfunction and epileptic potential.   If there is persistent suspicion for continued seizures or seizure-like activity, a continuous video-electroencephalogram with the 10-20 international system is advised.  D/C VPA. Continue only monotherapy with vimpat 100 mg BID only. Follow clinically   Cont ASA 81mg  Bromocriptine 15mg Q8. Ritalin for  neurostimulation.   Continue aggressive PT     PULM:    Extubated   Duonebs and 3% nebs standing  Continue Chest PT   CXR clear , has atelectasis, ?R infiltratre  Aspiration precautions, requires frequent suctioning (Q2 hr)  Currently on Ancef for likely  tracheitis  High aspiration risk.     CARDIO:    -220. Will D/W NSGY to relax to 140-220.   Levo gtt to maintain within parameters. Wean as tolerated  TTE  WNL    Troponin stable   EKG.   Central line RIJ- will replace as needed.   Follow up carotid dopplers .    ENDO:    Glucose goal 140-180    GI:    TF via NGT. Advanced to 40cc/hr.  Free water flushes 250 Q6  Thin liquid diet x3 weeks as per bariatric when tolerated  PPI per bariatric surgeon.  DCed miralax, senna re: rectal tube. Liquid stool- resolved .   PEG needed. Bariatric surgeon/gen surg to facilitate.     RENAL:   Maintain euvolemia   Na goal 145-150   Bolus as needed   Severe hypokalemia 2/2 enteric losses. Replete aggressively.    HEM/ONC:   ASA 81mg  VTE Prophylaxis: SCDs, SQL  LE Duplex : Acute completely occlusive deep venous thrombosis of the right intramuscular calf vein, s/p IVCF with vascular , repeat  shows new b/l peroneal DVTs, factor xa c/w prophylactic dose.  Repeat dopplers : New L calf DVT  Increase lovenox to 60mg bid   Anti Xa level on 60mg bid.   Hypercoagulable w/u - still pending, however protein S/C and AT III normal .    ID: Persistent fever, leukocytosis.  Was on empiric Ceftriaxone () for aspiration PNA. Restarted on ; changed to   Was on empiric Vanc/zoysn for fever d/c'd (8/10-) and now restarted  for persistent fevers, dc'd again on .   CSF on , NGTD. Pancx , NGTD,  NGTD   Repeat cultures  (blood, sputum, CSF, UA)- sputum GPC in pairs- MSSA. Changed to Ancef  Blood cultures neg.   Infectious disease consulted  re: persistent fevers and clearance for eventual shunt. Awaiting  Procal 0.13  Low threshold for broadening. ID   CTX --> ancef.   Diarrhea resolved.   Change lines    Disposition: ICU. Family updated  Full code.     Acces:   R IJ TLC ()  L axillary A line    *****    ATTENDING ATTESTATION:  I was physically present for the key portions of the evaluation and management (E/M) service provided.  I agree with the above history, physical and plan, which I have reviewed and edited where appropriate.    Patient at high risk for neurological deterioration or death due to:  ICU delirium, aspiration PNA, DVT / PE.  Critical care time:  I have personally provided 45 minutes of critical care time, excluding time spent on separate procedures.      Plan discussed with RN, house staff.       44 y/o F with h/o recent gastric sleeve (21 [facility], Dr. Crisostomo ), fibromyalgia, thyroid dysfunction, PTSD, depression, anxiety.  Presented with seizures at home - brought to [place], with 1 more episode of seizure en route.  Intubated, CT showed SAH with IV extension, casted IV, hydrocephalus, given mannitol, levetiracetam 1g,  6mg ativan. Started on Cardene drip for BP goal < 140 and transferred to [facility] NICU for further management. HH5 MF4, BD 1 = . Now s/p cerebral angiogram for R vertebral artery takedown for R vertebral dissecting aneurysm (), and R frontal EVD placement (), now s/p IVC filter placement (). BD 19    NEURO:   Neurochecks Q1h  EVD@ neg 5, draining 15 mL/h (ICPs 0- neg 9). 343/24 hrs.   s/p CTH : Decreased size in ventricles, stable.   CTA : Spasm improved RMCA. Similar in other territories. Repeat CTA .  Angio demonstrating vasospasm of Right ICA, right PCOMM, Left distal vert and basilar confluence, and received IA verapamil on . 8/. 8/. .   Nimodipine 60 mg PO Q4h- held for augmentation SBP goal 160-220.  D/W NSGY; Relax SBP to 160-220. Will discuss with NSGY relaxing SBP to 140 on  based on CTA results  Clinical seizures at home. Was on EEG.   EE/18- 10 second event at 6:42 on , that could not be determined if epileptic vs. artifact.   There were non-specific indicators of bilateral temporal dysfunction and epileptic potential.   If there is persistent suspicion for continued seizures or seizure-like activity, a continuous video-electroencephalogram with the 10-20 international system is advised.  D/C VPA. Continue only monotherapy with vimpat 100 mg BID only. Follow clinically,   Cont ASA 81mg  Bromocriptine 15mg Q8. Ritalin for  neurostimulation.   Continue aggressive PT     PULM:    Extubated   Duonebs and 3% nebs standing  Continue Chest PT   CXR clear , has atelectasis, ?R infiltratre  Aspiration precautions, requires frequent suctioning (Q2 hr)  Currently on Ancef for likely tracheitis  High aspiration risk.     CARDIO:    -220. Will D/W NSGY to relax to 140-220.   Levo gtt to maintain within parameters. Wean as tolerated  Daily EKGs and troponin while augmenting  TTE  WNL    Troponin stable   Central line RIJ- will replace as needed.   Follow up carotid dopplers.    ENDO:    Glucose goal 140-180    GI:    TF via NGT. Advanced to 40cc/hr.  Free water flushes 250 Q6  Thin liquid diet x3 weeks as per bariatric when tolerated  PPI per bariatric surgeon.  DCed miralax, senna re: rectal tube. Liquid stool- resolved .   PEG needed. Bariatric surgeon/gen surg to facilitate laparoscopic J tube.     RENAL: Hypernatremia- induced  Maintain euvolemia   Na goal 145-150. Decrease free water .  Bolus as needed   Severe hypokalemia 2/2 enteric losses. Replete aggressively.    HEM/ONC:   ASA 81mg  VTE Prophylaxis: SCDs, SQL  LE Duplex : Acute completely occlusive deep venous thrombosis of the right intramuscular calf vein, s/p IVCF with vascular . Repeat  shows new b/l peroneal DVTs, factor xa c/w prophylactic dose.  Repeat dopplers : New L calf DVT  Increase lovenox to 60mg bid   Anti Xa level on 60mg bid.   Hypercoagulable w/u- c/w heterozygous prothrombin gene mutation.       ID: Persistent fever, leukocytosis.  Was on empiric Ceftriaxone () for aspiration PNA. Changed to Ancef . WBC trending down.   (Previously was on empiric Vanc/zoysn for fever d/c'd (8/10-), restarted  for persistent fevers, dc'd again on )   CSF on , NGTD. Pancx , NGTD,  NGTD   Repeat cultures  (blood, sputum, CSF, UA)- sputum GPC in pairs- MSSA. Changed to Ancef.  Blood cultures neg.   Infectious disease consulted  re: persistent fevers and clearance for surgery. Appreciate input.   Diarrhea resolved.   Change lines.  Thrush; nystatin    Disposition: ICU. Family updated.  Full code.     Acces:   R IJ TLC ()  L axillary A line    *****    ATTENDING ATTESTATION:  I was physically present for the key portions of the evaluation and management (E/M) service provided.  I agree with the above history, physical and plan, which I have reviewed and edited where appropriate.    Patient at high risk for neurological deterioration or death due to: Sepsis, aspiration PNA, DVT / PE.  Critical care time:  I have personally provided 45 minutes of critical care time, excluding time spent on separate procedures.      Plan discussed with RN, house staff.

## 2021-08-25 NOTE — OCCUPATIONAL THERAPY INITIAL EVALUATION ADULT - PLANNED THERAPY INTERVENTIONS, OT EVAL
ADL retraining/balance training/bed mobility training/neuromuscular re-education/parent/caregiver training.../ROM/strengthening/transfer training

## 2021-08-25 NOTE — PROGRESS NOTE ADULT - SUBJECTIVE AND OBJECTIVE BOX
incomplete SUBJECTIVE:  Patient seen and examined at bedside. Patient is lethargic, not participating in interview     Vital Signs Last 12 Hrs  T(F): 98.1 (08-25-21 @ 18:00), Max: 99.2 (08-25-21 @ 09:27)  HR: 90 (08-25-21 @ 20:00) (84 - 95)  BP: 183/101 (08-25-21 @ 20:00) (139/84 - 188/107)  BP(mean): 135 (08-25-21 @ 20:00) (106 - 147)  RR: 18 (08-25-21 @ 20:00) (16 - 20)  SpO2: 100% (08-25-21 @ 20:00) (92% - 100%)  I&O's Summary    24 Aug 2021 07:01  -  25 Aug 2021 07:00  --------------------------------------------------------  IN: 5978.3 mL / OUT: 4508 mL / NET: 1470.3 mL    25 Aug 2021 07:01  -  25 Aug 2021 20:23  --------------------------------------------------------  IN: 1632.7 mL / OUT: 2510 mL / NET: -877.3 mL    PHYSICAL EXAM:  Constitutional: NAD  HEENT: NC/AT  Neck: Supple  Respiratory: Decrease bs bl  Cardiovascular: RRR, normal S1 and S2, no m/r/g.   Gastrointestinal: +BS, soft NTND   Extremities: wwp; no cyanosis, clubbing or edema.   Vascular: Pulses equal and strong throughout.   Neurological: lateritic at time of exam  Skin: No gross skin abnormalities or rashes    LABS:                        8.5    16.95 )-----------( 353      ( 25 Aug 2021 05:19 )             28.8     08-25    145  |  106  |  9   ----------------------------<  127<H>  3.9   |  29  |  0.41<L>    Ca    9.4      25 Aug 2021 14:08  Phos  2.4     08-25  Mg     2.0     08-25    MEDICATIONS  (STANDING):  acetylcysteine 20%  Inhalation 4 milliLiter(s) Inhalation every 6 hours  albuterol/ipratropium for Nebulization 3 milliLiter(s) Nebulizer every 6 hours  amantadine Syrup 200 milliGRAM(s) Oral every 12 hours  aspirin  chewable 81 milliGRAM(s) Oral daily  bromocriptine Capsule 15 milliGRAM(s) Oral every 8 hours  ceFAZolin   IVPB      ceFAZolin   IVPB 2000 milliGRAM(s) IV Intermittent every 8 hours  chlorhexidine 0.12% Liquid 15 milliLiter(s) Oral Mucosa every 12 hours  chlorhexidine 2% Cloths 1 Application(s) Topical <User Schedule>  enoxaparin Injectable 60 milliGRAM(s) SubCutaneous every 12 hours  glucagon  Injectable 1 milliGRAM(s) IntraMuscular once  insulin lispro (ADMELOG) corrective regimen sliding scale   SubCutaneous every 6 hours  lacosamide Solution 100 milliGRAM(s) Oral two times a day  methylphenidate 10 milliGRAM(s) Oral daily  norepinephrine Infusion 0.052 MICROgram(s)/kG/Min (4.79 mL/Hr) IV Continuous <Continuous>  nystatin    Suspension 742121 Unit(s) Oral four times a day  sodium chloride 3%  Inhalation 4 milliLiter(s) Inhalation every 6 hours    MEDICATIONS  (PRN):  acetaminophen    Suspension .. 650 milliGRAM(s) Oral every 6 hours PRN Temp greater or equal to 38C (100.4F), Mild Pain (1 - 3)  ondansetron Injectable 4 milliGRAM(s) IV Push every 6 hours PRN Nausea and/or Vomiting

## 2021-08-25 NOTE — OCCUPATIONAL THERAPY INITIAL EVALUATION ADULT - DIAGNOSIS, OT EVAL
Pt presents AxOx1, with impaired BUE/BLE ROM/strength, poor activity tolerance, and decreased arousal impairing overall participation with ADLs.

## 2021-08-25 NOTE — OCCUPATIONAL THERAPY INITIAL EVALUATION ADULT - MANUAL MUSCLE TESTING RESULTS, REHAB EVAL
R  strength ~3/5. R shoulder flexion ~2/5, R elbow flexion ~3/5, L shoulder flexion ~1/5, L elbow flexion ~2/5, L  strength ~3-/5

## 2021-08-25 NOTE — PROGRESS NOTE ADULT - ASSESSMENT
45y/F with h/o recent gastric sleeve (08/04 [facility], Dr. Crisostomo ), fibromyalgia, thyroid dysfunction, PTSD, depression, anxiety. 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 Since then patient is febrile, repeated BCx, UCx, sputum and CSF Cx are negative. Presumed as central fevers, originally patient was on Vanc 8/10-12; Zosyn 10-17 with increase in WBC. On Cefazolin 2g q8. ID consulted for MSSA PNA.    - no signs for IC infection, SCF cx NGTD  - Procalcitonin neg, ddx can be central fevers in setting of SAH vs. fever 2/2 DVT as new DVTs on 8/23 US vs mssa pna  - C/w Cefazolin 2g q8 as appropriate for MSSA PNA - for duration of 7 days 8/24- 8/31  - Patient may benefit from pulm PT due to poor cough  - f/u BCx. sputum Cx, CSF cx as long EVD in place    ID Team 1 will continue to follow. Please see below attending attestation for finalized recommendations.    Ramon Morin MD PGY2 Internal Medicine  Infectious Disease Consult Service, Team 1

## 2021-08-25 NOTE — OCCUPATIONAL THERAPY INITIAL EVALUATION ADULT - ADDITIONAL COMMENTS
Unable to obtain as patient hyphonic, highly lethargic, and with decreased arousal Unable to obtain as patient hyphonic, highly lethargic, and with decreased arousal, will follow up with DANA

## 2021-08-26 NOTE — PROGRESS NOTE ADULT - SUBJECTIVE AND OBJECTIVE BOX
**INCOMPLETE NOTE    OVERNIGHT EVENTS:    SUBJECTIVE:  Patient seen and examined at bedside.    Vital Signs Last 12 Hrs  T(F): 99.9 (08-26-21 @ 09:08), Max: 100.4 (08-26-21 @ 00:57)  HR: 95 (08-26-21 @ 09:00) (93 - 106)  BP: 162/80 (08-26-21 @ 09:00) (123/67 - 190/96)  BP(mean): 114 (08-26-21 @ 09:00) (88 - 135)  RR: 20 (08-26-21 @ 09:00) (16 - 20)  SpO2: 99% (08-26-21 @ 09:00) (95% - 100%)  I&O's Summary    25 Aug 2021 07:01  -  26 Aug 2021 07:00  --------------------------------------------------------  IN: 2669.4 mL / OUT: 3527 mL / NET: -857.6 mL    26 Aug 2021 07:01  -  26 Aug 2021 10:28  --------------------------------------------------------  IN: 140 mL / OUT: 37 mL / NET: 103 mL        PHYSICAL EXAM:  Constitutional: NAD, comfortable in bed.  HEENT: NC/AT, PERRLA, EOMI, no conjunctival pallor or scleral icterus, MMM  Neck: Supple, no JVD  Respiratory: CTA B/L. No w/r/r.   Cardiovascular: RRR, normal S1 and S2, no m/r/g.   Gastrointestinal: +BS, soft NTND, no guarding or rebound tenderness, no palpable masses   Extremities: wwp; no cyanosis, clubbing or edema.   Vascular: Pulses equal and strong throughout.   Neurological: AAOx3, no CN deficits, strength and sensation intact throughout.   Skin: No gross skin abnormalities or rashes        LABS:                        8.9    10.68 )-----------( 374      ( 26 Aug 2021 05:56 )             29.4     08-26    143  |  104  |  13  ----------------------------<  125<H>  3.6   |  28  |  0.57    Ca    9.6      26 Aug 2021 05:56  Phos  3.1     08-26  Mg     1.8     08-26              RADIOLOGY & ADDITIONAL TESTS:    MEDICATIONS  (STANDING):  acetylcysteine 20%  Inhalation 4 milliLiter(s) Inhalation every 6 hours  albuterol/ipratropium for Nebulization 3 milliLiter(s) Nebulizer every 6 hours  amantadine Syrup 200 milliGRAM(s) Oral every 12 hours  aspirin  chewable 81 milliGRAM(s) Oral daily  bromocriptine Capsule 15 milliGRAM(s) Oral every 8 hours  ceFAZolin   IVPB      ceFAZolin   IVPB 2000 milliGRAM(s) IV Intermittent every 8 hours  chlorhexidine 0.12% Liquid 15 milliLiter(s) Oral Mucosa every 12 hours  chlorhexidine 2% Cloths 1 Application(s) Topical <User Schedule>  enoxaparin Injectable 60 milliGRAM(s) SubCutaneous every 12 hours  glucagon  Injectable 1 milliGRAM(s) IntraMuscular once  insulin lispro (ADMELOG) corrective regimen sliding scale   SubCutaneous every 6 hours  lacosamide Solution 100 milliGRAM(s) Oral two times a day  methylphenidate 10 milliGRAM(s) Oral daily  norepinephrine Infusion 0.052 MICROgram(s)/kG/Min (4.79 mL/Hr) IV Continuous <Continuous>  nystatin    Suspension 648390 Unit(s) Oral four times a day  sodium chloride 3%  Inhalation 4 milliLiter(s) Inhalation every 6 hours    MEDICATIONS  (PRN):  acetaminophen    Suspension .. 650 milliGRAM(s) Oral every 6 hours PRN Temp greater or equal to 38C (100.4F), Mild Pain (1 - 3)  ondansetron Injectable 4 milliGRAM(s) IV Push every 6 hours PRN Nausea and/or Vomiting     SUBJECTIVE/ interval events:  Patient seen and examined at bedside. CT angio 8/25 showed scattered segmental PE and right neck hematoma. Lethargic but is able to answer a few questions, AOx2, Tmax over night 100.4 at 1am patient is on 4L NC. PICC line placed on 8/26 and RIJ was removed and was sent for cultures. Denies CP or Abdominal pain.     Vital Signs Last 12 Hrs  T(F): 99.9 (08-26-21 @ 09:08), Max: 100.4 (08-26-21 @ 00:57)  HR: 95 (08-26-21 @ 09:00) (93 - 106)  BP: 162/80 (08-26-21 @ 09:00) (123/67 - 190/96)  BP(mean): 114 (08-26-21 @ 09:00) (88 - 135)  RR: 20 (08-26-21 @ 09:00) (16 - 20)  SpO2: 99% (08-26-21 @ 09:00) (95% - 100%)  I&O's Summary    25 Aug 2021 07:01  -  26 Aug 2021 07:00  --------------------------------------------------------  IN: 2669.4 mL / OUT: 3527 mL / NET: -857.6 mL    26 Aug 2021 07:01  -  26 Aug 2021 10:28  --------------------------------------------------------  IN: 140 mL / OUT: 37 mL / NET: 103 mL        PHYSICAL EXAM:  Constitutional: NAD, comfortable in bed.  HEENT: NC/AT, PERRLA, EOMI, no conjunctival pallor or scleral icterus, MMM  Neck: Supple, no JVD  Respiratory: CTA B/L. No w/r/r.   Cardiovascular: RRR, normal S1 and S2, no m/r/g.   Gastrointestinal: +BS, soft NTND, no guarding or rebound tenderness, no palpable masses   Extremities: wwp; no cyanosis, clubbing or edema.   Vascular: Pulses equal and strong throughout.   Neurological: AAOx3, no CN deficits, strength and sensation intact throughout.   Skin: No gross skin abnormalities or rashes        LABS:                        8.9    10.68 )-----------( 374      ( 26 Aug 2021 05:56 )             29.4     08-26    143  |  104  |  13  ----------------------------<  125<H>  3.6   |  28  |  0.57    Ca    9.6      26 Aug 2021 05:56  Phos  3.1     08-26  Mg     1.8     08-26              RADIOLOGY & ADDITIONAL TESTS:    MEDICATIONS  (STANDING):  acetylcysteine 20%  Inhalation 4 milliLiter(s) Inhalation every 6 hours  albuterol/ipratropium for Nebulization 3 milliLiter(s) Nebulizer every 6 hours  amantadine Syrup 200 milliGRAM(s) Oral every 12 hours  aspirin  chewable 81 milliGRAM(s) Oral daily  bromocriptine Capsule 15 milliGRAM(s) Oral every 8 hours  ceFAZolin   IVPB      ceFAZolin   IVPB 2000 milliGRAM(s) IV Intermittent every 8 hours  chlorhexidine 0.12% Liquid 15 milliLiter(s) Oral Mucosa every 12 hours  chlorhexidine 2% Cloths 1 Application(s) Topical <User Schedule>  enoxaparin Injectable 60 milliGRAM(s) SubCutaneous every 12 hours  glucagon  Injectable 1 milliGRAM(s) IntraMuscular once  insulin lispro (ADMELOG) corrective regimen sliding scale   SubCutaneous every 6 hours  lacosamide Solution 100 milliGRAM(s) Oral two times a day  methylphenidate 10 milliGRAM(s) Oral daily  norepinephrine Infusion 0.052 MICROgram(s)/kG/Min (4.79 mL/Hr) IV Continuous <Continuous>  nystatin    Suspension 715753 Unit(s) Oral four times a day  sodium chloride 3%  Inhalation 4 milliLiter(s) Inhalation every 6 hours    MEDICATIONS  (PRN):  acetaminophen    Suspension .. 650 milliGRAM(s) Oral every 6 hours PRN Temp greater or equal to 38C (100.4F), Mild Pain (1 - 3)  ondansetron Injectable 4 milliGRAM(s) IV Push every 6 hours PRN Nausea and/or Vomiting

## 2021-08-26 NOTE — PROGRESS NOTE ADULT - ASSESSMENT
44 y/o F with h/o recent gastric sleeve (21 [facility], Dr. Crisostomo ), fibromyalgia, thyroid dysfunction, PTSD, depression, anxiety.  Presented with seizures at home - brought to [place], with 1 more episode of seizure en route.  Intubated, CT showed SAH with IV extension, casted IV, hydrocephalus, given mannitol, levetiracetam 1g,  6mg ativan. Started on Cardene drip for BP goal < 140 and transferred to [facility] NICU for further management. HH5 MF4, BD 1 = . Now s/p cerebral angiogram for R vertebral artery takedown for R vertebral dissecting aneurysm (), and R frontal EVD placement (), now s/p IVC filter placement (). BD 20    NEURO:   Neurochecks Q1h  EVD@ neg 5, draining 15 mL/h (ICPs 0- neg 9). 343/24 hrs.   Status post CTH : Decreased size in ventricles, stable.   Angio demonstrating vasospasm of Right ICA, right PCOMM, Left distal vert and basilar confluence, and received IA verapamil on . 8/. 8/18. .   CTA : Spasm improved RMCA. Similar in other territories.  Repeat CTA : Improved vasospasm of proximal basilar artery, persistent vasospasm and moderate narrowing of the distal one half of the basilar artery. Posterior cerebral artery P1 and P2 segments appear patent with multifocal vasospasm, not significantly changed.  Nimodipine 60 mg PO Q4h- held for augmentation SBP goal 160-220.  D/W NSGY; Relax SBP to 160-220. Will discuss with NSGY possibly relaxing SBP to 140.  Clinical seizures at home. Was on EEG.   EE/18- 10 second event at 6:42 on , that could not be determined if epileptic vs. artifact.   There were non-specific indicators of bilateral temporal dysfunction and epileptic potential.   If there is persistent suspicion for continued seizures or seizure-like activity, a continuous video-electroencephalogram with the 10-20 international system is advised.  D/C VPA. Continue only monotherapy with vimpat 100 mg BID only. Follow clinically,   Cont ASA 81mg  Bromocriptine 15mg Q8. Ritalin for neurostimulation.   Continue aggressive PT     PULM:    Extubated 8/20  Duonebs and 3% nebs standing  Continue Chest PT   CXR clear , has atelectasis, ?R infiltrate  Aspiration precautions, requires frequent suctioning (Q2 hr)  Currently on Ancef for likely tracheitis  High aspiration risk.     CARDIO:    -220. Will D/W NSGY to relax to 140-220.   Levo gtt to maintain within parameters.   Daily EKGs and troponin while augmenting BP. Wean levo.  TTE  WNL    Troponin stable   Central line RIJ- will replace as needed.   Carotid dopplers showed no pseudoaneurysm. CTA neck showed small hematoma. Monitor closely.     ENDO:    Glucose goal 140-180    GI:    TF via NGT. Advanced to 40cc/hr.  Free water flushes 250 Q6  Thin liquid diet x3 weeks as per bariatric when tolerated  PPI per bariatric surgeon.  DCed miralax, senna re: rectal tube. Liquid stool- resolved .   PEG needed. Bariatric surgeon/gen surg to facilitate laparoscopic J tube.     RENAL: Hypernatremia- induced  Maintain euvolemia   Na goal 145-150. Decrease free water .  Bolus as needed   Severe hypokalemia 2/2 enteric losses. Replete aggressively.    HEM/ONC:   ASA 81mg  VTE Prophylaxis: SCDs, SQL  LE Duplex : Acute completely occlusive deep venous thrombosis of the right intramuscular calf vein, s/p IVCF with vascular . Repeat  shows new b/l peroneal DVTs, factor xa c/w prophylactic dose.  Repeat dopplers : New L calf DVT  Increased lovenox to 60mg bid   Anti Xa level on 60mg bid: due    Hypercoagulable w/u- c/w heterozygous prothrombin gene mutation.       ID: Persistent fever, leukocytosis.  Was on empiric Ceftriaxone () for aspiration PNA. Changed to Ancef . WBC trending down.   (Previously was on empiric Vanc/zoysn for fever d/c'd (8/10-), restarted  for persistent fevers, dc'd again on )   CSF on , NGTD. Pancx , NGTD,  NGTD   Repeat cultures  (blood, sputum, CSF, UA)- sputum GPC in pairs- MSSA. Changed to Ancef.  Blood cultures neg.   Infectious disease consulted  re: persistent fevers and clearance for surgery. Appreciate input.   Cultures off central line:   Diarrhea- resolved.   Change lines.  Thrush; nystatin    Disposition: ICU. Family updated.  Full code.     Acces:   R IJ TLC ()  L axillary A line    *****    ATTENDING ATTESTATION:  I was physically present for the key portions of the evaluation and management (E/M) service provided.  I agree with the above history, physical and plan, which I have reviewed and edited where appropriate.    Patient at high risk for neurological deterioration or death due to: Sepsis, aspiration PNA, DVT / PE.  Critical care time:  I have personally provided 45 minutes of critical care time, excluding time spent on separate procedures.      Plan discussed with RN, house staff.       44 y/o F with h/o recent gastric sleeve (21 [facility], Dr. Crisostomo ), fibromyalgia, thyroid dysfunction, PTSD, depression, anxiety.  Presented with seizures at home - brought to [place], with 1 more episode of seizure en route.  Intubated, CT showed SAH with IV extension, casted IV, hydrocephalus, given mannitol, levetiracetam 1g,  6mg ativan. Started on Cardene drip for BP goal < 140 and transferred to [facility] NICU for further management. HH5 MF4, BD 1 = . Now s/p cerebral angiogram for R vertebral artery takedown for R vertebral dissecting aneurysm (), and R frontal EVD placement (), now s/p IVC filter placement (). BD 20.    NEURO:   Neurochecks Q1h.  EVD@ neg 5, draining 15 mL/h (ICPs neg 7 to- neg 9)  330/24 hrs.   Status post CTH : Decreased size in ventricles, stable.   Angio demonstrating vasospasm of Right ICA, right PCOMM, Left distal vert and basilar confluence, and received IA verapamil on . 8/. 8/. .   CTA : Spasm improved RMCA. Similar in other territories.  Repeat CTA : Improved vasospasm of proximal basilar artery, persistent vasospasm and moderate narrowing of the distal one half of the basilar artery. Posterior cerebral artery P1 and P2 segments appear patent with multifocal vasospasm, not significantly changed.  Nimodipine 60 mg PO Q4h- held for augmentation.  D/W NSGY; Relax SBP to 140-220.  Possible clinical seizures at home. Was on EEG.   EE/18- 10 second event at 6:42 on , that could not be determined if epileptic vs. artifact.   There were non-specific indicators of bilateral temporal dysfunction and epileptic potential.   If there is persistent suspicion for continued seizures or seizure-like activity, a continuous video-electroencephalogram with the 10-20 international system is advised.  D/C VPA. Continue only monotherapy with vimpat 100 mg BID only. Follow clinically,   Cont ASA 81mg  Bromocriptine 15mg Q8. Ritalin for neurostimulation.   Continue aggressive PT     PULM:    Extubated   Duonebs and 3% nebs standing  Continue Chest PT.   CXR clear , has atelectasis, ?R infiltrate  Aspiration precautions, requires frequent suctioning (Q2 hr)  Currently on Ancef for likely tracheitis: until   High aspiration risk.     CARDIO:    -220.   Levophed gtt to maintain within parameters.   Daily EKGs and troponin while augmenting BP. Wean levophed.  TTE  WNL    PICC line. DC central line .   Carotid dopplers showed no pseudoaneurysm. CTA neck showed small hematoma. Monitor closely.     ENDO:    Glucose goal 140-180  ISS    GI:    TF @ 40cc/hr. NPO at MN  for VPS  DC free water  Thin liquid diet x3 weeks as per bariatric when tolerated  PPI per bariatric surgeon.  DCed miralax, senna re: rectal tube. Liquid stool- resolved .   PEG to be placed by gen surg    RENAL: Hypernatremia- induced  Maintain euvolemia   Na goal 145-150. DC free water.   Bolus as needed (1L )  Severe hypokalemia 2/2 enteric losses. Replete aggressively. Improving.     HEM/ONC:   ASA 81mg- hold  pm for OR  VTE Prophylaxis: SCDs, SQL (hold for OR)  LE Duplex : Acute completely occlusive deep venous thrombosis of the right intramuscular calf vein, s/p IVCF with vascular . Repeat  shows new b/l peroneal DVTs, factor xa c/w prophylactic dose.   Repeat dopplers : New L calf DVT. Repeat dopplers .   Increased lovenox to 60mg bid   Anti Xa level on 60mg bid: due on  at 22:00.  Hypercoagulable w/u- c/w heterozygous prothrombin gene mutation.     ID: Persistent fever, leukocytosis.  Was on empiric Ceftriaxone () for aspiration PNA. Changed to Ancef . WBC trending down.   (Previously was on empiric Vanc/zoysn for fever d/c'd (8/10-), restarted  for persistent fevers, dc'd again on )   CSF on , NGTD. Pancx , NGTD,  NGTD   Repeat cultures  (blood, sputum, CSF, UA)- sputum GPC in pairs- MSSA. Changed to Ancef.  Blood cultures neg.   Infectious disease consulted  re: persistent fevers and clearance for surgery. Appreciate input.   Cultures off central line: repeat per ID. Repeat all cultures on  per ID.   Diarrhea- resolved.   Change lines.  Thrush; nystatin    Disposition: ICU. Family updated.  Full code.     Acces:   R IJ TLC ()  L axillary A line    *****    ATTENDING ATTESTATION:  I was physically present for the key portions of the evaluation and management (E/M) service provided.  I agree with the above history, physical and plan, which I have reviewed and edited where appropriate.    Patient at high risk for neurological deterioration or death due to: Sepsis, aspiration PNA, DVT / PE.  Critical care time:  I have personally provided 45 minutes of critical care time, excluding time spent on separate procedures.      Plan discussed with RN, house staff.       46 y/o F with h/o recent gastric sleeve (21 [facility], Dr. Crisostomo ), fibromyalgia, thyroid dysfunction, PTSD, depression, anxiety.  Presented with seizures at home - brought to [place], with 1 more episode of seizure en route.  Intubated, CT showed SAH with IV extension, casted IV, hydrocephalus, given mannitol, levetiracetam 1g,  6mg ativan. Started on Cardene drip for BP goal < 140 and transferred to [facility] NICU for further management. HH5 MF4, BD 1 = . Now s/p cerebral angiogram for R vertebral artery takedown for R vertebral dissecting aneurysm (), and R frontal EVD placement (), now s/p IVC filter placement (). BD 20.    NEURO:   Neurochecks Q1h.  EVD@ neg 5, draining 15 mL/h (ICPs neg 7 to- neg 9)  330/24 hrs.   Status post CTH : Decreased size in ventricles, stable.   Angio demonstrating vasospasm of Right ICA, right PCOMM, Left distal vert and basilar confluence, and received IA verapamil on . 8/. 8/. .   CTA : Spasm improved RMCA. Similar in other territories.  Repeat CTA : Improved vasospasm of proximal basilar artery, persistent vasospasm and moderate narrowing of the distal one half of the basilar artery. Posterior cerebral artery P1 and P2 segments appear patent with multifocal vasospasm, not significantly changed.  Nimodipine 60 mg PO Q4h- held for augmentation.  D/W NSGY; Relax SBP to 140-220.  Possible clinical seizures at home. Was on EEG.   EE/18- 10 second event at 6:42 on , that could not be determined if epileptic vs. artifact.   There were non-specific indicators of bilateral temporal dysfunction and epileptic potential.   If there is persistent suspicion for continued seizures or seizure-like activity, a continuous video-electroencephalogram with the 10-20 international system is advised.  D/C VPA. Continue only monotherapy with vimpat 100 mg BID only. Follow clinically  Cont ASA 81mg  Bromocriptine 15mg Q8. Ritalin for neurostimulation.   Continue aggressive PT     PULM:    Extubated   Duonebs and 3% nebs standing  Continue aggressive chest PT.   CXR clear , has atelectasis, ?R infiltrate  Aspiration precautions, requires frequent suctioning (Q2 hr)  Currently on Ancef for likely tracheitis: until   High aspiration risk.     CARDIO:    -220.   Levophed gtt to maintain within parameters.   Daily EKGs and troponin while augmenting BP. Wean levophed.  TTE  WNL    PICC line. DC central line .   Carotid dopplers showed no pseudoaneurysm. CTA neck showed small hematoma -extraluminal. Monitor closely.     ENDO:    Glucose goal 140-180  ISS    GI:    TF @ 40cc/hr. NPO at MN  for VPS  DC free water as Na drifting down  Thin liquid diet x3 weeks (as per bariatric) when tolerating  PPI per bariatric surgeon.  DCed miralax, senna re: rectal tube. Liquid stool- resolved .   PEG to be placed by gen surg.    RENAL: Hypernatremia- induced  Maintain euvolemia   Na goal 145-150. DC free water.   Bolus as needed (1L )  Severe hypokalemia 2/2 enteric losses. Replete aggressively. Improving.     HEM/ONC:   ASA 81mg- hold  pm for OR  VTE Prophylaxis: SCDs, SQL (hold for OR)  LE Duplex : Acute completely occlusive deep venous thrombosis of the right intramuscular calf vein, s/p IVCF with vascular . Repeat  showed new b/l peroneal DVTs.   Scattered segmental pulmonary emboli noted on CTA.   Repeat dopplers : New L calf DVT. Repeat dopplers .   Increased lovenox to 60mg bid   Anti Xa level on 60mg bid: due on  at 22:00.  Hypercoagulable w/u- c/w heterozygous prothrombin gene mutation.     ID: Persistent fever, leukocytosis.  Was on empiric Ceftriaxone () for aspiration PNA. Changed to Ancef . WBC trending down.   (Previously was on empiric Vanc/zoysn for fever d/c'd (8/10-), restarted  for persistent fevers, dc'd again on )   CSF on , NGTD. Pancx , NGTD,  NGTD   Repeat cultures  (blood, sputum, CSF, UA)- sputum GPC in pairs- MSSA. Changed to Ancef.  Infectious disease consulted  re: persistent fevers and clearance for surgery. Patient has been cleared for VPS on .  Repeat all cultures  per ID including cultures off central line, blood, CSF.  Diarrhea- resolved.   Remove R IJ  due to persistent fevers. Placed PICC.  Thrush; nystatin.  Left axilla: area of induration ~6x 4cm. Non tender, no erythema, non fluctuant. Doubtful of infection. Follow up ultrasound. Warm compress. Monitor area of demarcation.     Disposition: ICU. Family updated.  Full code.     Acces:   R IJ TLC ()  L axillary A line    *****    ATTENDING ATTESTATION:  I was physically present for the key portions of the evaluation and management (E/M) service provided.  I agree with the above history, physical and plan, which I have reviewed and edited where appropriate.    Patient at high risk for neurological deterioration or death due to: Sepsis, aspiration PNA, DVT / PE.  Critical care time:  I have personally provided 45 minutes of critical care time, excluding time spent on separate procedures.      Plan discussed with RN, house staff.

## 2021-08-26 NOTE — CONSULT NOTE ADULT - SUBJECTIVE AND OBJECTIVE BOX
Vascular Access Service Consult Note    45yFemaleHEALTH ISSUES - PROBLEM Dx:  Multiple subsegmental pulmonary emboli without acute cor pulmonale    DVT, lower extremity    Abnormality of lung on CXR    Heterozygous for prothrombin [id number] mutation               Diagnosis: sah    Indications for Vascular Access (Check all that apply)  [  ]  Antibiotic Therapy       Antibiotic Prescribed:              [  ]  IV Hydration  [  ]  Total Parenteral Nutrition  [  ]  Chemotherapy  [ x ]  Difficult Venous Access  [  ]  CVP monitoring  [  ]  Medications with high potential for tissue necrosis on extravasation  [  ]  Other    Screening (Check all that apply)  Previous Radiation to chest  [  ] Yes      [x  ]  No  Breast Cancer                          [  ] Left     [  ]  Right    [ x ]  No  Lymph Node Dissection         [  ] Left     [  ]  Right    [ x ]  No  Pacemaker or ICD                   [  ] Left     [  ]  Right    [x  ]  No  Upper Extremity DVT             [  ] Left     [  ]  Right    [ x ]  No  Chronic Kidney Disease         [  ]  Yes     [ x ]  No  Hemodialysis                           [  ]  Yes     [ x ]  No  AV Fistula/ Graft                     [  ]  Left    [  ]  Right    [ x ]  No  Temp>101F in past 24 H       [  ]  Yes     [  x]  No  H/O PICC/Midline                   [  ]  Yes     [x  ]  No    Lab data:                        8.9    10.68 )-----------( 374      ( 26 Aug 2021 05:56 )             29.4     08-26    143  |  104  |  13  ----------------------------<  125<H>  3.6   |  28  |  0.57    Ca    9.6      26 Aug 2021 05:56  Phos  3.1     08-26  Mg     1.8     08-26        bcx 8/23 ngtd        I have reviewed the chart, interviewed and examined the patient and determined that this patient:  [  x] Is a candidate for a PICC line  [  ] Is a candidate for a Midline  [  ] Is not a candidate for vascular access device (reason)    Lumens:    [  ] Single  [ x ] Double

## 2021-08-26 NOTE — PROGRESS NOTE ADULT - SUBJECTIVE AND OBJECTIVE BOX
==============================================   NEUROCRITICAL CARE ATTENDING NOTE ( PM)  ==============================================    LUDMILA PRIETO  MRN-[id number]  Summary:  46 y/o F with h/o recent gastric sleeve ( [facility], Dr. Crisostomo ), fibromyalgia, thyroid dysfunction, PTSD, depression, anxiety.  Presented with seizures at home - brought to [place], with 1 more episode of seizure en route.  Intubated, CT showed SAH with IV extension, casted IV, hydrocephalus, given mannitol levetiracetam 6mg ativan, transferred to [facility].    Hospital Course:   : Tx from [facility] for SAH. Intubated. R frontal EVD placed, central line and a line placed. 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   : POD 1 s/p angio with R vert takedown, extubated, desatting on aerosol mask, required extensive suctioning, 3% nebs, chest PT, started on low dose precedex drip for restlessness/agitation, ABG stable. NGT placed. TF started with goal 20 pending nutrition recs. 3% stopped for Na 150. Ceribell EEG negative and d/c'ed.    Overnight, new Right pronator drift and right gaze preference that can't cross midline, Repeat CTH demonstrated stable vents, CTA head and neck unremarkable for spasm, hypoattenuation of right cerebellum edema vs. infarct.  8/10: POD 3 R vert takedown, EVD open at [id number]. ASHA overnight, neuro stable. CTH with increased hydro, CTA head/neck with mild basilar spasm, but concern for PE. 1/2 am D50 for hypoglycemia. 1L bolus then 100 cc/hr, PM rounds for net negative fluid balance and mild vasospasm on CTA.   : POD 4 R vert takedown. EVD at 5cm H2O, ASHA overnight. neuro stable.   Febrile 104F. Depakote increased to q6. NCHCT stable. EVD lowered to 0. Lasix 20 given for dieuresis, UOP over 2 liters. Sedation given for a-line placement, BP drop needing levo.  : POD 5 R vert takedown. EVD at 0cm H2O. ASHA overnight, neuro stable. Precedex being weaned off. Pending IVCF with vascular today.  : POD 6 R vert takedown. EVD open at 0cmH2O. ASHA overnight. Neuro exam stable. Mottled skin on the left lower extremity improving. Started on Bromocriptine 15mg Q8.   : POD 7. EVD open at 0. 1L bolus given for euvolemia o/n. neuro stable. CTH stable. ENT scoped vocal cords, +R vocal cord paresis, NTD. started on zosyn empirically.   8/15: POD 8. EVD open at 0. ASHA o/n, neuro stable. Pt developed increased work of breathing, unable to clear her secretions, received racemic epi and multiple nebulizer treatments, still with stridor. Patient re-intubated at bedside, on full vent support.   : CTH/CTA head/neck/CT chest performed o/n for worsened exam, R gaze preference, sluggish pupils. +worsened uncal herniation, hydro, vasospasm in b/l PCAs, L vert, basilar arteries. preop angio today, restarted on 3% for Na goal 145-150  : POD 10. Overnight Pt remains on levophed drip for SBP goal 180-220. Also remains on propofol drip. EVD open at -5cmH2O. ICPs WNL. Febrile 101F and pancultured. CTH today shows slightly smaller ventricles.   : POD 11 R vert takedown. POD1 angio IA verpamil. Overnight, Pt noted to have downward gaze to the right and not following commands. Taken for stat head CT which is stable. Pupils also noted to be aniscoric left pupil 4mm and brisk and right 2mm brisk. Mannitol 50g given. Ceribell EEG placed for concern of subclinical seizures, so far appears negative for seizures. 1L NS o/n and 1.5L NS bolus in AM for euvolemia. vanc/zosyn stopped. 250cc 3% bolus and 250cc albumin given in AM. Brief seizure to L frontal lobe this AM on EEG, given 2g valproic acid and dose increased to 1g q8. Vimpat 100mg BID started.  In afternoon patient self-extubated, placed on NRB with mucomyst, 3% inhalation and duonebs. 3% at 50 d/c'd.  : POD 12. Remains on VEEG. Axillary A line placed. Salt tabs d/c'd, florinef decreased to 0.1mg, EVD @ -5cm H2O, now draining 20cc/hr. 250 albumin and 500 NS boluses on , 1 L LR bolus  : POD 13. ASHA. on VEEG, no seizures noted. Pending VPA level. 500 NS, 250 albumin. Febrile, pancultured. EEG d/c'ed.   : BD 14, POD14. ASHA overnight, EVD @ -5. s/p 1L bolus for euvolemia  : BD 15: Continued on levophed, hypercoagulability profile pending, EVD 15 mL/h, euvolemia, extubated, amantadine added, free water started for hypernatremia; continuing LOC fluctuations (stable vasospasm), frequent suctioning; empiric ceftriaxone started. Tmax 101. Has thick secretions.  CTA done. : Neuroexam stable. Febrile. Pancultures done.   : Exam has not been BP dependent. Relaxed -220. IJ- carotid stick. Lovenox held  : BD 19. CTA : Improved vasospasm of proximal basilar artery, persistent vasospasm and moderate narrowing of the distal one half of the basilar artery. Posterior cerebral artery P1 and P2 segments appear patent with multifocal vasospasm, not significantly changed.  : BD20. Preop for VPS . Weaning levophed.  Neurologically stable.  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  Lovenox 60 mg BID held for surgery.    Past Medical History:  s/p gastric sleeve surgery  Allergies:  Allergy Status Unknown  Home Meds:    Current Meds:  MEDICATIONS  (STANDING):  acetaminophen    Suspension .. 1000 milliGRAM(s) Oral every 6 hours  acetylcysteine 20%  Inhalation 4 milliLiter(s) Inhalation every 6 hours  albuterol/ipratropium for Nebulization 3 milliLiter(s) Nebulizer every 6 hours  amantadine Syrup 200 milliGRAM(s) Oral every 12 hours  bromocriptine Capsule 15 milliGRAM(s) Oral every 8 hours  ceFAZolin   IVPB 2000 milliGRAM(s) IV Intermittent every 8 hours  insulin lispro (ADMELOG) corrective regimen sliding scale   SubCutaneous every 6 hours  lacosamide Solution 100 milliGRAM(s) Oral two times a day  methylphenidate 10 milliGRAM(s) Oral daily  norepinephrine Infusion 0.052 MICROgram(s)/kG/Min (4.79 mL/Hr) IV Continuous <Continuous>  nystatin    Suspension [id number] Unit(s) Oral four times a day  potassium chloride   Solution 40 milliEquivalent(s) Oral every 4 hours  sodium chloride 3%  Inhalation 4 milliLiter(s) Inhalation every 6 hours    MEDICATIONS  (PRN):  acetaminophen    Suspension .. 650 milliGRAM(s) Oral every 6 hours PRN Temp greater or equal to 38C (100.4F), Mild Pain (1 - 3)  ondansetron Injectable 4 milliGRAM(s) IV Push every 6 hours PRN Nausea and/or Vomiting    PHYSICAL EXAMINATION    ICU Vital Signs Last 24 Hrs  T(C): 37.6 (26 Aug 2021 23:00), Max: 38.6 (26 Aug 2021 18:00)  T(F): 99.6 (26 Aug 2021 23:00), Max: 101.5 (26 Aug 2021 18:00)  HR: 89 (26 Aug 2021 23:00) (83 - 108)  BP: 147/86 (26 Aug 2021 11:00) (143/77 - 190/96)  BP(mean): 109 (26 Aug 2021 11:00) (103 - 135)  ABP: 145/85 (26 Aug 2021 23:00) (134/82 - 182/95)  ABP(mean): 111 (26 Aug 2021 23:00) (103 - 129)  RR: 18 (26 Aug 2021 23:00) (16 - 22)  SpO2: 99% (26 Aug 2021 23:00) (95% - 100%)    AOx2 (person, place) with choices, hypophonic, face symmetric. R gaze preference, can overcome, pupils 3mm and reactive. Has a weak cough   Squeezes fingers to command b/l UE, shows thumbs up, spontaneous antigravity in B/L uppers, BLE spontaneous bends knees.   EENT:  Anicteric, thrush noted  CARDIOVASCULAR: (+) S1 S2, normal rate and regular rhythm  PULMONARY: Decreased, with rhonchi  ABDOMEN: Soft, nontender with normoactive bowel sounds  EXTREMITIES: No edema  SKIN: No rash. Left axilla area of induration ~6x 4cm. Non tender, no erythema, non fluctuant.     I/O's    21 @ 07:01  -  21 @ 07:00  --------------------------------------------------------  IN: 2669.4 mL / OUT: 3527 mL / NET: -857.6 mL    21 @ 07:  -  21 @ 23:28  --------------------------------------------------------  IN: 2278.9 mL / OUT: 1657 mL / NET: 621.9 mL    LABS:                        8.9    10.68 )-----------( 374      ( 26 Aug 2021 05:56 )             29.4         142  |  105  |  11  ----------------------------<  130<H>  3.3<L>   |  28  |  0.48<L>    Ca    8.7      26 Aug 2021 21:55  Phos  3.1       Mg     1.8         CARDIAC MARKERS ( 26 Aug 2021 12:37 )  x     / 0.01 ng/mL / 96 U/L / x     / 3.9 ng/mL    CAPILLARY BLOOD GLUCOSE    POCT Blood Glucose.: 108 mg/dL (26 Aug 2021 22:02)  POCT Blood Glucose.: 146 mg/dL (26 Aug 2021 17:02)  POCT Blood Glucose.: 140 mg/dL (26 Aug 2021 10:56)  POCT Blood Glucose.: 114 mg/dL (26 Aug 2021 05:52)    Culture - CSF with Gram Stain (collected 21 @ 12:26)  Source: .CSF CSF  Gram Stain (21 @ 12:56):    No organisms seen    Rare White blood cells    Culture - Sputum . (21 @ 18:07)    Gram Stain:   Few epithelial cells  Few White blood cells  Few Gram Positive Cocci in Clusters and in pairs  Rare Gram Negative Rods    Specimen Source: .Sputum Sputum    Culture Results:   Normal Respiratory Mely present    Culture - Sputum . (21 @ 11:15)    -  Cefazolin: S <=4    -  Clindamycin: S <=0.25    -  Erythromycin: S <=0.25    -  Linezolid: S 2    -  Oxacillin: S <=0.25    -  RIF- Rifampin: S <=1 Should not be used as monotherapy    -  Trimethoprim/Sulfamethoxazole: S <=0.5/9.5    -  Vancomycin: S 1    Gram Stain:   Few epithelial cells  Moderate WBC's  Few Gram positive cocci in pairs    Specimen Source: .Sputum Sputum    Culture Results:     Organism: Staphylococcus aureus    Method Type: NOEL    Neuroimaging:    CT (2021) - 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 Stable ischemic changes within the right cerebellum. Right-sided EVD catheter in place with slight increase in ventricular size. Interval improvement in the intraventricular hemorrhage. Evolving areas of acute/subacute ischemia within the right cerebellum.    CTA (2021) -  Interval improvement in the caliber of the proximal vertebral artery with progression of a vasospasm involving the mid to distal basilar artery. Interval improvement in the vasospasm involving the left vertebral artery. Persistent vasospasm involving the posterior cerebral and superior cerebellar arteries. Interval improvement in the vasospasm involving the right supraclinoid ICA as well as the left A1 and M1 segments. Progression of vasospasm involving the right M1 segment.    Cerebral Angiogram (2021 - improvement in vasospasm, IA verapamil to posterior circulation  Cerebral Angiogram (2021 - Left vertebral artery dissection demonstrates continued moderate to severe cerebral vasospasm of the distal let vert and basilar artery, some what improved caliber of proximal left vert. 15mg IA Verapamil injected over 10 minutes with mild improvement of posterior circulation post treatment. Right ICA injection with continued mild supraclinoid vasospasm, 10mg of IA Verapamil injected.  Cerebral Angiogram (2021) - Left vertebral injection demonstrates moderate diffuse vasospasm of left vertebral artery, basilar artery and posterior circulation including PCAs. 15mg Verapamil injected via left vert with mild radiographic improvement post treatment. Right ICA injection with mild supraclinoid spasm/narrowing, otherwise no vasospasm appreciated in anterior circulation.    CT (2021) - No significant interval change in right superior and lateral cerebellar hypodensities.  CT (08.15.2021) - 1. Worsening the uncal herniation, with effacement of the bilateral suprasellar and perimesencephalic cisterns.  2. Redistribution of intraventricular hemorrhage, as above. Redemonstration of right EVD, with distal tip in the right frontal horn, near the foramen of Monro. Worsening hydrocephalus.  CTA - 1. Multifocal punctate and short segment stenoses, as above, with several areas of narrowing new as compared to recent CTA dated 2021. In the posterior circulation, stenoses are identified within the V4 left vertebral artery, the basilar artery, and in the bilateral PCA, more significant on the left.  Within the anterior circulation, stenosis are identified in the right supraclinoid C6 ICA as well as the proximal M1 MCA. Findings are most compatible with vasospasm.  2. In particular, the distal V4 right vertebral artery, which was visualized, though diminutive, on prior CTA dated 2021, does not fill with contrast on the present examination. The basilar artery demonstrates multiple stenoses, and is significantly smaller in caliber when compared to the prior CTA.    Other imagin/23 -   1.  Since 2021, newly visualized acute deep venous thrombosis of the left intramuscular calf vein.  2.  Persistent acute completely occlusive deep venous thrombosis of the bilateral peroneal veins.  3.  Since 2021, persistent acute completely occlusive deep venous thrombosis of the right intramuscular calf vein.     -  1.  Deep vein thrombosis of the left internal jugular vein.  2. Superficial venous thrombosis of the left basilic vein.    [Code] Full  [Goals] Aggressive  [Disposition] ICU

## 2021-08-26 NOTE — PROGRESS NOTE ADULT - ASSESSMENT
45y/F with h/o recent gastric sleeve (08/04 [facility], Dr. Crisostomo ), fibromyalgia, thyroid dysfunction, PTSD, depression, anxiety. 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 Since then patient is febrile, repeated BCx, UCx, sputum and CSF Cx are negative. Presumed as central fevers, originally patient was on Vanc 8/10-12; Zosyn 10-17 with increase in WBC. On Cefazolin 2g q8. ID consulted for MSSA PNA.    - no signs for IC infection, SCF cx NGTD  - Procalcitonin neg, ddx can be central fevers in setting of SAH vs. fever 2/2 DVT as new DVTs on 8/23 US vs [facility] pna  - C/w Cefazolin 2g q8 as appropriate for MSSA PNA - for duration of 7 days 8/24- 8/31  - Patient may benefit from pulm PT due to poor cough  - f/u BCx. sputum Cx, CSF cx as long EVD in place    ID Team 1 will continue to follow. Please see below attending attestation for finalized recommendations.    Ramon Morin MD PGY2 Internal Medicine  Infectious Disease Consult Service, Team 1 45y/F with h/o recent gastric sleeve (08/04 [facility], Dr. Crisostomo ), fibromyalgia, thyroid dysfunction, PTSD, depression, anxiety. 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 Since then patient is febrile, repeated BCx, UCx, sputum and CSF Cx are negative. Presumed as central fevers, originally patient was on Vanc 8/10-12; Zosyn 10-17 with increase in WBC. On Cefazolin 2g q8. ID consulted for MSSA PNA. CT angio 8/25 showed scattered segmental PE and right neck hematoma. PICC line placed on 8/26 and RIJ was removed and was sent for cultures. Denies CP or Abdominal pain.     - no signs for IC infection, SCF Cx (NGTD)  - Procalcitonin neg, ddx can be central fevers in setting of SAH vs. fever 2/2 DVT as new DVTs on 8/23 US vs [facility] pna  - C/w Cefazolin 2g q8 as appropriate for MSSA PNA - for duration of 7 days 8/24- 8/31  - Patient may benefit from pulm PT due to poor cough  - f/u BCx. sputum Cx, CSF cx as long EVD in place  - F/u RIJ Cx from 8/26  - There is no contraindication to proceed to VPS placement from ID standpoint.    Please reconsult with any new question. Please see below attending attestation for finalized recommendations.    Ramon Morin MD PGY2 Internal Medicine  Infectious Disease Consult Service, Team 1

## 2021-08-26 NOTE — PROCEDURE NOTE - PICC: IMPLANT LOT NUMBER
bard [id number], length 34cm. picc tip ends centrally at subclavian/SVC, confirmed with 3cg ultrasound. nursing advised to remove other iv's/lines

## 2021-08-26 NOTE — PROGRESS NOTE ADULT - SUBJECTIVE AND OBJECTIVE BOX
==============================================   NEUROCRITICAL CARE ATTENDING NOTE   ==============================================    LUDMILA PRIETO   [id number]  Summary:  45y/F with h/o recent gastric sleeve (08/04 [facility], Dr. Crisostomo ), fibromyalgia, thyroid dysfunction, PTSD, depression, anxiety.  Presented with seizures at home - brought to [place], with 1 more episode of seizure en route.  Intubated, CT showed SAH with IV extension, casted IV, hydrocephalus, given mannitol levetiracetam 6mg ativan, transferred to [facility].    Hospital Course:   8/7: Tx from [place] for SAH. Intubated. R frontal EVD placed, central line and a line placed. 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   8/8: POD1 s/p angio with R vert takedown, extubated, desatting on aerosol mask, required extensive suctioning, 3% nebs, chest PT, started on low dose precedex drip for restlessness/agitation, ABG stable. NGT placed. TF started with goal 20 pending nutrition recs. 3% stopped for Na 150. Ceribell EEG negative and d/c'ed.   8/9 Overnight, new Right pronator drift and right gaze preference that can't cross midline, Repeat CTH demonstrated stable vents, CTA head and neck unremarkable for spasm, hypoattenuation of right cerebellum edema vs. infarct.  8/10: POD 3 R vert takedown, EVD open at 75muC2A. ASHA overnight, neuro stable. CTH with increased hydro, CTA head/neck with mild basilar spasm, but concern for PE. 1/2 am D50 for hypoglycemia. 1L bolus then 100 cc/hr, PM rounds for net negative fluid balance and mild vasospasm on CTA.   8/11: POD 4 R vert takedown. EVD at 5cm H2O, ASHA overnight. neuro stable.   Febrile 104. depakote increased to q6. NCHCT stable. EVD lowered to 0. Lasix 20 given for dieuresis, UOP over 2 liters. Sedation given for a-line placement, BP drop needing levo.  8/12: POD 5 R vert takedown. EVD at 0cm H2O. ASHA overnight, neuro stable. Precedex being weaned off. Pending IVCF with vascular today.  8/13: POD 6 R vert takedown. EVD open at 0cmH2O. ASHA overnight. Neuro exam stable. Mottled skin on the left lower extremity improving. Started on Bromocriptine 15mg Q8.   8/14: POD 7. EVD open at 0. 1L bolus given for euvolemia o/n. neuro stable. CTH stable. ENT scoped vocal cords, +R vocal cord paresis, NTD. started on zosyn empirically.   8/15: POD 8. EVD open at 0. ASHA o/n, neuro stable. Pt developed increased work of breathing, unable to clear her secretions, received racemic epi and multiple nebulizer treatments, still with stridor. Patient re-intubated at bedside, on full vent support.   8/16: CTH/CTA head/neck/CT chest performed o/n for worsened exam, R gaze preference, sluggish pupils. +worsened uncal herniation, hydro, vasospasm in b/l PCAs, L vert, basilar arteries. preop angio today, restarted on 3% for Na goal 145-150  8/17: POD 10. Overnight Pt remains on levophed drip for SBP goal 180-220. Also remains on propofol drip. EVD open at -5cmH2O. ICPs WNL. Febrile 101F and pancultured. CTH today shows slightly smaller ventricles.   8/18: POD 11 R vert takedown. POD1 angio IA verpamil. Overnight, Pt noted to have downward gaze to the right and not following commands. Taken for stat head CT which is stable. Pupils also noted to be aniscoric left pupil 4mm and brisk and right 2mm brisk. Mannitol 50g given. Ceribell eeg placed for concern of subclinical seizures, so far appears negative for seizures. 1L NS o/n and 1.5L NS bolus in AM for euvolemia. vanc/zosyn stopped. 250cc 3% bolus and 250cc albumin given in AM. Brief seizure to L frontal lobe this AM on EEG, given 2g valproic acid and dose increased to 1g q8. Vimpat 100mg BID started.  In afternoon patient self-extubated, placed on NRB with mucomyst, 3% inhalation and duonebs. 3% at 50 d/c'd.  8/19: POD 12. Remains on VEEG. Axillary A line placed. Salt tabs d/c'd, florinef decreased to 0.1mg, EVD @ -5cm H2O, now draining 20cc/hr. 250 albumin and 500 NS boluses on dayshift, 1 L LR bolus  8/20: POD 13. ASHA. on VEEG, no seizures noted. Pending VPA level. 500 NS, 250 albumin. Febrile, pancultured. EEG d/c'ed.   8/21: BD 14, POD14. ASHA overnight, EVD @ -5. s/p 1L bolus for euvolemia  8/22: BD 15: Continued on levophed, hypercoagulability profile pending, EVD 15 mL/h, euvolemia, extubated, amantadine added, free water started for hypernatremia; continuing LOC fluctuations (stable vasospasm), frequent suctioning; empiric ceftriaxone started. Tmax 101. Has thick secretions.  CTA done. 8/23: Neuroexam stable. Febrile. Pancultures done.   8/24: Exam has not been BP dependent. Relaxed -220. IJ- carotid stick. Lovenox held  8/25: BD 19. CTA head and neck pending.     Past Medical History:  s/p gastric sleeve surgery  Allergies:  Allergy Status Unknown  Home Meds:      PHYSICAL EXAMINATION  AOx2 (person, place) with choices, hypophonic, face symmetric. R gaze preference, can overcome, pupils 3mm and reactive. Has a weak cough   Squeezes fingers to command b/l UE, shows thumbs up, spontaneous in uppers and lower, BLE spontaneous bends knees  EENT:  Anicteric, thrush  CARDIOVASCULAR: (+) S1 S2, normal rate and regular rhythm  PULMONARY: Decreased  ABDOMEN: Soft, nontender with normoactive bowel sounds  EXTREMITIES: No edema  SKIN: No rash    MEDICATIONS:  acetaminophen    Suspension .. 650 milliGRAM(s) Oral every 6 hours PRN  acetylcysteine 20%  Inhalation 4 milliLiter(s) Inhalation every 6 hours  albuterol/ipratropium for Nebulization 3 milliLiter(s) Nebulizer every 6 hours  amantadine Syrup 200 milliGRAM(s) Oral every 12 hours  aspirin  chewable 81 milliGRAM(s) Oral daily  bisacodyl Suppository 10 milliGRAM(s) Rectal daily PRN  bromocriptine Capsule 15 milliGRAM(s) Oral every 8 hours  cefTRIAXone   IVPB 1000 milliGRAM(s) IV Intermittent every 12 hours  chlorhexidine 0.12% Liquid 15 milliLiter(s) Oral Mucosa every 12 hours  chlorhexidine 2% Cloths 1 Application(s) Topical <User Schedule>  enoxaparin Injectable 40 milliGRAM(s) SubCutaneous every 12 hours  glucagon  Injectable 1 milliGRAM(s) IntraMuscular once  insulin lispro (ADMELOG) corrective regimen sliding scale   SubCutaneous every 6 hours  lacosamide 100 milliGRAM(s) Oral two times a day  lactated ringers. 1000 milliLiter(s) (50 mL/Hr) IV Continuous <Continuous>  methylphenidate 10 milliGRAM(s) Oral daily  norepinephrine Infusion 0.05 MICROgram(s)/kG/Min (4.79 mL/Hr) IV Continuous <Continuous>  ondansetron Injectable 4 milliGRAM(s) IV Push every 6 hours PRN  polyethylene glycol 3350 17 Gram(s) Oral daily  senna 2 Tablet(s) Oral at bedtime  sodium chloride 3%  Inhalation 4 milliLiter(s) Inhalation every 6 hours  valproate sodium IVPB 1000 milliGRAM(s) IV Intermittent every 8 hours                          9.3    18.13 )-----------( 381      ( 23 Aug 2021 05:46 )             32.3     08-23    157<H>  |  116<H>  |  14  ----------------------------<  167<H>  3.0<L>   |  31  |  0.57    Ca    9.3      23 Aug 2021 05:46  Phos  2.8     08-23  Mg     1.9     08-23        Neuroimaging:  CT (08.19.2021) - 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 Stable ischemic changes within the right cerebellum. Right-sided EVD catheter in place with slight increase in ventricular size. Interval improvement in the intraventricular hemorrhage. Evolving areas of acute/subacute ischemia within the right cerebellum.    CTA (08.19.2021) -  Interval improvement in the caliber of the proximal vertebral artery with progression of a vasospasm involving the mid to distal basilar artery. Interval improvement in the vasospasm involving the left vertebral artery. Persistent vasospasm involving the posterior cerebral and superior cerebellar arteries. Interval improvement in the vasospasm involving the right supraclinoid ICA as well as the left A1 and M1 segments. Progression of vasospasm involving the right M1 segment.    Cerebral Angiogram (08.18.2021 - improvement in vasospasm, IA verapamil to posterior circulation  Cerebral Angiogram (08.17.2021 - Left vertebral artery dissection demonstrates continued moderate to severe cerebral vasospasm of the distal let vert and basilar artery, some what improved caliber of proximal left vert. 15mg IA Verapamil injected over 10 minutes with mild improvement of posterior circulation post treatment. Right ICA injection with continued mild supraclinoid vasospasm, 10mg of IA Verapamil injected.  Cerebral Angiogram (08.16.2021) - Left vertebral injection demonstrates moderate diffuse vasospasm of left vertebral artery, basilar artery and posterior circulation including PCAs. 15mg Verapamil injected via left vert with mild radiographic improvement post treatment. Right ICA injection with mild supraclinoid spasm/narrowing, otherwise no vasospasm appreciated in anterior circulation.    CT (08.18.2021) - No significant interval change in right superior and lateral cerebellar hypodensities.  CT (08.15.2021) - 1. Worsening the uncal herniation, with effacement of the bilateral suprasellar and perimesencephalic cisterns.  2. Redistribution of intraventricular hemorrhage, as above. Redemonstration of right EVD, with distal tip in the right frontal horn, near the foramen of Monro. Worsening hydrocephalus.  CTA - 1. Multifocal punctate and short segment stenoses, as above, with several areas of narrowing new as compared to recent CTA dated 08/09/2021. In the posterior circulation, stenoses are identified within the V4 left vertebral artery, the basilar artery, and in the bilateral PCA, more significant on the left.  Within the anterior circulation, stenosis are identified in the right supraclinoid C6 ICA as well as the proximal M1 MCA. Findings are most compatible with vasospasm.  2. In particular, the distal V4 right vertebral artery, which was visualized, though diminutive, on prior CTA dated 08/09/2021, does not fill with contrast on the present examination. The basilar artery demonstrates multiple stenoses, and is significantly smaller in caliber when compared to the prior CTA.    Other imaging:  Duplex - 1.  Since 8/9/2021, there is new deep venous thrombosis in the bilateral peroneal veins.  2. Redemonstration of deep vein thrombosis in a right intramuscular calf vein.      [Code] Full  [Goals] Aggressive  [Disposion] ICU     ==============================================   NEUROCRITICAL CARE ATTENDING NOTE   8/25/2021  ==============================================    LUDMILA PRIETO   MRN-[id number]  Summary:  45y/F with h/o recent gastric sleeve (08/04 [facility], Dr. Crisostomo ), fibromyalgia, thyroid dysfunction, PTSD, depression, anxiety.  Presented with seizures at home - brought to [place], with 1 more episode of seizure en route.  Intubated, CT showed SAH with IV extension, casted IV, hydrocephalus, given mannitol levetiracetam 6mg ativan, transferred to [facility].    Hospital Course:   8/7: Tx from [place] for SAH. Intubated. R frontal EVD placed, central line and a line placed. 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   8/8: POD1 s/p angio with R vert takedown, extubated, desatting on aerosol mask, required extensive suctioning, 3% nebs, chest PT, started on low dose precedex drip for restlessness/agitation, ABG stable. NGT placed. TF started with goal 20 pending nutrition recs. 3% stopped for Na 150. Ceribell EEG negative and d/c'ed.   8/9 Overnight, new Right pronator drift and right gaze preference that can't cross midline, Repeat CTH demonstrated stable vents, CTA head and neck unremarkable for spasm, hypoattenuation of right cerebellum edema vs. infarct.  8/10: POD 3 R vert takedown, EVD open at [id number]. ASHA overnight, neuro stable. CTH with increased hydro, CTA head/neck with mild basilar spasm, but concern for PE. 1/2 am D50 for hypoglycemia. 1L bolus then 100 cc/hr, PM rounds for net negative fluid balance and mild vasospasm on CTA.   8/11: POD 4 R vert takedown. EVD at 5cm H2O, ASHA overnight. neuro stable.   Febrile 104. depakote increased to q6. NCHCT stable. EVD lowered to 0. Lasix 20 given for dieuresis, UOP over 2 liters. Sedation given for a-line placement, BP drop needing levo.  8/12: POD 5 R vert takedown. EVD at 0cm H2O. ASHA overnight, neuro stable. Precedex being weaned off. Pending IVCF with vascular today.  8/13: POD 6 R vert takedown. EVD open at 0cmH2O. ASHA overnight. Neuro exam stable. Mottled skin on the left lower extremity improving. Started on Bromocriptine 15mg Q8.   8/14: POD 7. EVD open at 0. 1L bolus given for euvolemia o/n. neuro stable. CTH stable. ENT scoped vocal cords, +R vocal cord paresis, NTD. started on zosyn empirically.   8/15: POD 8. EVD open at 0. ASHA o/n, neuro stable. Pt developed increased work of breathing, unable to clear her secretions, received racemic epi and multiple nebulizer treatments, still with stridor. Patient re-intubated at bedside, on full vent support.   8/16: CTH/CTA head/neck/CT chest performed o/n for worsened exam, R gaze preference, sluggish pupils. +worsened uncal herniation, hydro, vasospasm in b/l PCAs, L vert, basilar arteries. preop angio today, restarted on 3% for Na goal 145-150  8/17: POD 10. Overnight Pt remains on levophed drip for SBP goal 180-220. Also remains on propofol drip. EVD open at -5cmH2O. ICPs WNL. Febrile 101F and pancultured. CTH today shows slightly smaller ventricles.   8/18: POD 11 R vert takedown. POD1 angio IA verpamil. Overnight, Pt noted to have downward gaze to the right and not following commands. Taken for stat head CT which is stable. Pupils also noted to be aniscoric left pupil 4mm and brisk and right 2mm brisk. Mannitol 50g given. Ceribell eeg placed for concern of subclinical seizures, so far appears negative for seizures. 1L NS o/n and 1.5L NS bolus in AM for euvolemia. vanc/zosyn stopped. 250cc 3% bolus and 250cc albumin given in AM. Brief seizure to L frontal lobe this AM on EEG, given 2g valproic acid and dose increased to 1g q8. Vimpat 100mg BID started.  In afternoon patient self-extubated, placed on NRB with mucomyst, 3% inhalation and duonebs. 3% at 50 d/c'd.  8/19: POD 12. Remains on VEEG. Axillary A line placed. Salt tabs d/c'd, florinef decreased to 0.1mg, EVD @ -5cm H2O, now draining 20cc/hr. 250 albumin and 500 NS boluses on dayshift, 1 L LR bolus  8/20: POD 13. ASHA. on VEEG, no seizures noted. Pending VPA level. 500 NS, 250 albumin. Febrile, pancultured. EEG d/c'ed.   8/21: BD 14, POD14. ASHA overnight, EVD @ -5. s/p 1L bolus for euvolemia  8/22: BD 15: Continued on levophed, hypercoagulability profile pending, EVD 15 mL/h, euvolemia, extubated, amantadine added, free water started for hypernatremia; continuing LOC fluctuations (stable vasospasm), frequent suctioning; empiric ceftriaxone started. Tmax 101. Has thick secretions.  CTA done. 8/23: Neuroexam stable. Febrile. Pancultures done.   8/24: Exam has not been BP dependent. Relaxed -220. IJ- carotid stick. Lovenox held  8/25: BD 19. CTA head and neck pending.     Past Medical History:  s/p gastric sleeve surgery  Allergies:  Allergy Status Unknown  Home Meds:      PHYSICAL EXAMINATION  AOx2 (person, place) with choices, hypophonic, face symmetric. R gaze preference, can overcome, pupils 3mm and reactive. Has a weak cough   Squeezes fingers to command b/l UE, shows thumbs up, spontaneous in uppers and lower, BLE spontaneous bends knees  EENT:  Anicteric, thrush  CARDIOVASCULAR: (+) S1 S2, normal rate and regular rhythm  PULMONARY: Decreased  ABDOMEN: Soft, nontender with normoactive bowel sounds  EXTREMITIES: No edema  SKIN: No rash    MEDICATIONS:  acetaminophen    Suspension .. 650 milliGRAM(s) Oral every 6 hours PRN  acetylcysteine 20%  Inhalation 4 milliLiter(s) Inhalation every 6 hours  albuterol/ipratropium for Nebulization 3 milliLiter(s) Nebulizer every 6 hours  amantadine Syrup 200 milliGRAM(s) Oral every 12 hours  aspirin  chewable 81 milliGRAM(s) Oral daily  bisacodyl Suppository 10 milliGRAM(s) Rectal daily PRN  bromocriptine Capsule 15 milliGRAM(s) Oral every 8 hours  cefTRIAXone   IVPB 1000 milliGRAM(s) IV Intermittent every 12 hours  chlorhexidine 0.12% Liquid 15 milliLiter(s) Oral Mucosa every 12 hours  chlorhexidine 2% Cloths 1 Application(s) Topical <User Schedule>  enoxaparin Injectable 40 milliGRAM(s) SubCutaneous every 12 hours  glucagon  Injectable 1 milliGRAM(s) IntraMuscular once  insulin lispro (ADMELOG) corrective regimen sliding scale   SubCutaneous every 6 hours  lacosamide 100 milliGRAM(s) Oral two times a day  lactated ringers. 1000 milliLiter(s) (50 mL/Hr) IV Continuous <Continuous>  methylphenidate 10 milliGRAM(s) Oral daily  norepinephrine Infusion 0.05 MICROgram(s)/kG/Min (4.79 mL/Hr) IV Continuous <Continuous>  ondansetron Injectable 4 milliGRAM(s) IV Push every 6 hours PRN  polyethylene glycol 3350 17 Gram(s) Oral daily  senna 2 Tablet(s) Oral at bedtime  sodium chloride 3%  Inhalation 4 milliLiter(s) Inhalation every 6 hours  valproate sodium IVPB 1000 milliGRAM(s) IV Intermittent every 8 hours                          9.3    18.13 )-----------( 381      ( 23 Aug 2021 05:46 )             32.3     08-23    157<H>  |  116<H>  |  14  ----------------------------<  167<H>  3.0<L>   |  31  |  0.57    Ca    9.3      23 Aug 2021 05:46  Phos  2.8     08-23  Mg     1.9     08-23        Neuroimaging:  CT (08.19.2021) - 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 Stable ischemic changes within the right cerebellum. Right-sided EVD catheter in place with slight increase in ventricular size. Interval improvement in the intraventricular hemorrhage. Evolving areas of acute/subacute ischemia within the right cerebellum.    CTA (08.19.2021) -  Interval improvement in the caliber of the proximal vertebral artery with progression of a vasospasm involving the mid to distal basilar artery. Interval improvement in the vasospasm involving the left vertebral artery. Persistent vasospasm involving the posterior cerebral and superior cerebellar arteries. Interval improvement in the vasospasm involving the right supraclinoid ICA as well as the left A1 and M1 segments. Progression of vasospasm involving the right M1 segment.    Cerebral Angiogram (08.18.2021 - improvement in vasospasm, IA verapamil to posterior circulation  Cerebral Angiogram (08.17.2021 - Left vertebral artery dissection demonstrates continued moderate to severe cerebral vasospasm of the distal let vert and basilar artery, some what improved caliber of proximal left vert. 15mg IA Verapamil injected over 10 minutes with mild improvement of posterior circulation post treatment. Right ICA injection with continued mild supraclinoid vasospasm, 10mg of IA Verapamil injected.  Cerebral Angiogram (08.16.2021) - Left vertebral injection demonstrates moderate diffuse vasospasm of left vertebral artery, basilar artery and posterior circulation including PCAs. 15mg Verapamil injected via left vert with mild radiographic improvement post treatment. Right ICA injection with mild supraclinoid spasm/narrowing, otherwise no vasospasm appreciated in anterior circulation.    CT (08.18.2021) - No significant interval change in right superior and lateral cerebellar hypodensities.  CT (08.15.2021) - 1. Worsening the uncal herniation, with effacement of the bilateral suprasellar and perimesencephalic cisterns.  2. Redistribution of intraventricular hemorrhage, as above. Redemonstration of right EVD, with distal tip in the right frontal horn, near the foramen of Monro. Worsening hydrocephalus.  CTA - 1. Multifocal punctate and short segment stenoses, as above, with several areas of narrowing new as compared to recent CTA dated 08/09/2021. In the posterior circulation, stenoses are identified within the V4 left vertebral artery, the basilar artery, and in the bilateral PCA, more significant on the left.  Within the anterior circulation, stenosis are identified in the right supraclinoid C6 ICA as well as the proximal M1 MCA. Findings are most compatible with vasospasm.  2. In particular, the distal V4 right vertebral artery, which was visualized, though diminutive, on prior CTA dated 08/09/2021, does not fill with contrast on the present examination. The basilar artery demonstrates multiple stenoses, and is significantly smaller in caliber when compared to the prior CTA.    Other imaging:  Duplex - 1.  Since 8/9/2021, there is new deep venous thrombosis in the bilateral peroneal veins.  2. Redemonstration of deep vein thrombosis in a right intramuscular calf vein.      [Code] Full  [Goals] Aggressive  [Disposion] ICU

## 2021-08-26 NOTE — PROGRESS NOTE ADULT - SUBJECTIVE AND OBJECTIVE BOX
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 1000 milliGRAM(s) Oral every 6 hours  acetylcysteine 20%  Inhalation 4 milliLiter(s) Inhalation every 6 hours  albuterol/ipratropium for Nebulization 3 milliLiter(s) Nebulizer every 6 hours  amantadine Syrup 200 milliGRAM(s) Oral every 12 hours  bromocriptine Capsule 15 milliGRAM(s) Oral every 8 hours  ceFAZolin   IVPB 2000 milliGRAM(s) IV Intermittent every 8 hours  chlorhexidine 0.12% Liquid 15 milliLiter(s) Oral Mucosa every 12 hours  chlorhexidine 2% Cloths 1 Application(s) Topical <User Schedule>  chlorhexidine 2% Cloths 1 Application(s) Topical <User Schedule>  glucagon  Injectable 1 milliGRAM(s) IntraMuscular once  insulin lispro (ADMELOG) corrective regimen sliding scale   SubCutaneous every 6 hours  lacosamide Solution 100 milliGRAM(s) Oral two times a day  methylphenidate 10 milliGRAM(s) Oral daily  norepinephrine Infusion 0.052 MICROgram(s)/kG/Min (4.79 mL/Hr) IV Continuous <Continuous>  nystatin    Suspension [id number] Unit(s) Oral four times a day  sodium chloride 3%  Inhalation 4 milliLiter(s) Inhalation every 6 hours    MEDICATIONS  (PRN):  acetaminophen    Suspension .. 650 milliGRAM(s) Oral every 6 hours PRN Temp greater or equal to 38C (100.4F), Mild Pain (1 - 3)  ondansetron Injectable 4 milliGRAM(s) IV Push every 6 hours PRN Nausea and/or Vomiting  sodium chloride 0.9% lock flush 10 milliLiter(s) IV Push every 1 hour PRN Pre/post blood products, medications, blood draw, and to maintain line patency      Allergies    apple (Angioedema)  No Known Drug Allergies  strawberry (Angioedema)    Intolerances    lactose (Stomach Upset)      DVT Prophylaxis: [ ] YES [ ] NO      Antibiotics: [ ] YES [ ] NO    Pain Scale (1-10):       Location:    Vital Signs Last 24 Hrs  T(C): 38.3 (26 Aug 2021 20:00), Max: 38.6 (26 Aug 2021 18:00)  T(F): 100.9 (26 Aug 2021 20:00), Max: 101.5 (26 Aug 2021 18:00)  HR: 90 (26 Aug 2021 20:00) (90 - 108)  BP: 147/86 (26 Aug 2021 11:00) (123/67 - 190/96)  BP(mean): 109 (26 Aug 2021 11:00) (88 - 135)  RR: 19 (26 Aug 2021 20:00) (16 - 22)  SpO2: 99% (26 Aug 2021 20:00) (95% - 100%)    Drug Dosing Weight  Height (cm): 162.6 (12 Aug 2021 13:25)  Weight (kg): 98.2 (20 Aug 2021 08:44)  BMI (kg/m2): 37.1 (20 Aug 2021 08:44)  BSA (m2): 2.02 (20 Aug 2021 08:44)     Physical Exam:  · Constitutional	detailed exam  · Constitutional Details	well-developed; well-groomed  · Eyes	EOMI; PERRL; no drainage or redness  · ENMT Comments	dry mucous membranes  · Respiratory	detailed exam  · Respiratory Comments	normal breath sounds at the lung bases bilaterally  · Cardiovascular	Regular rate & rhythm, normal S1, S2; no murmurs, gallops or rubs; no S3, S4  · Abd-Soft non tender  ·Ext-no edema, clubbing or cyanosis    URINARY CATHETER: [ ] YES [ ] NO     LABS:  CBC Full  -  ( 26 Aug 2021 05:56 )  WBC Count : 10.68 K/uL  RBC Count : 3.45 M/uL  Hemoglobin : 8.9 g/dL  Hematocrit : 29.4 %  Platelet Count - Automated : 374 K/uL  Mean Cell Volume : 85.2 fl  Mean Cell Hemoglobin : 25.8 pg  Mean Cell Hemoglobin Concentration : 30.3 gm/dL  Auto Neutrophil # : x  Auto Lymphocyte # : x  Auto Monocyte # : x  Auto Eosinophil # : x  Auto Basophil # : x  Auto Neutrophil % : x  Auto Lymphocyte % : x  Auto Monocyte % : x  Auto Eosinophil % : x  Auto Basophil % : x    08-26    143  |  104  |  13  ----------------------------<  125<H>  3.6   |  28  |  0.57    Ca    9.6      26 Aug 2021 05:56  Phos  3.1     08-26  Mg     1.8     08-26            CULTURES:    RADIOLOGY & ADDITIONAL STUDIES:

## 2021-08-26 NOTE — PROGRESS NOTE ADULT - SUBJECTIVE AND OBJECTIVE BOX
==============================================   NEUROCRITICAL CARE ATTENDING NOTE   ==============================================    LUDMILA PRIETO  MRN-[id number]  Summary:  44 y/o F with h/o recent gastric sleeve (08/04 [facility], Dr. Crisostomo ), fibromyalgia, thyroid dysfunction, PTSD, depression, anxiety.  Presented with seizures at home - brought to [place], with 1 more episode of seizure en route.  Intubated, CT showed SAH with IV extension, casted IV, hydrocephalus, given mannitol levetiracetam 6mg ativan, transferred to [facility].    Hospital Course:   8/7: Tx from [place] for SAH. Intubated. R frontal EVD placed, central line and a line placed. 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   8/8: POD 1 s/p angio with R vert takedown, extubated, desatting on aerosol mask, required extensive suctioning, 3% nebs, chest PT, started on low dose precedex drip for restlessness/agitation, ABG stable. NGT placed. TF started with goal 20 pending nutrition recs. 3% stopped for Na 150. Ceribell EEG negative and d/c'ed.   8/9 Overnight, new Right pronator drift and right gaze preference that can't cross midline, Repeat CTH demonstrated stable vents, CTA head and neck unremarkable for spasm, hypoattenuation of right cerebellum edema vs. infarct.  8/10: POD 3 R vert takedown, EVD open at [id number]. ASHA overnight, neuro stable. CTH with increased hydro, CTA head/neck with mild basilar spasm, but concern for PE. 1/2 am D50 for hypoglycemia. 1L bolus then 100 cc/hr, PM rounds for net negative fluid balance and mild vasospasm on CTA.   8/11: POD 4 R vert takedown. EVD at 5cm H2O, ASHA overnight. neuro stable.   Febrile 104F. Depakote increased to q6. NCHCT stable. EVD lowered to 0. Lasix 20 given for dieuresis, UOP over 2 liters. Sedation given for a-line placement, BP drop needing levo.  8/12: POD 5 R vert takedown. EVD at 0cm H2O. ASHA overnight, neuro stable. Precedex being weaned off. Pending IVCF with vascular today.  8/13: POD 6 R vert takedown. EVD open at 0cmH2O. ASHA overnight. Neuro exam stable. Mottled skin on the left lower extremity improving. Started on Bromocriptine 15mg Q8.   8/14: POD 7. EVD open at 0. 1L bolus given for euvolemia o/n. neuro stable. CTH stable. ENT scoped vocal cords, +R vocal cord paresis, NTD. started on zosyn empirically.   8/15: POD 8. EVD open at 0. ASHA o/n, neuro stable. Pt developed increased work of breathing, unable to clear her secretions, received racemic epi and multiple nebulizer treatments, still with stridor. Patient re-intubated at bedside, on full vent support.   8/16: CTH/CTA head/neck/CT chest performed o/n for worsened exam, R gaze preference, sluggish pupils. +worsened uncal herniation, hydro, vasospasm in b/l PCAs, L vert, basilar arteries. preop angio today, restarted on 3% for Na goal 145-150  8/17: POD 10. Overnight Pt remains on levophed drip for SBP goal 180-220. Also remains on propofol drip. EVD open at -5cmH2O. ICPs WNL. Febrile 101F and pancultured. CTH today shows slightly smaller ventricles.   8/18: POD 11 R vert takedown. POD1 angio IA verpamil. Overnight, Pt noted to have downward gaze to the right and not following commands. Taken for stat head CT which is stable. Pupils also noted to be aniscoric left pupil 4mm and brisk and right 2mm brisk. Mannitol 50g given. Ceribell EEG placed for concern of subclinical seizures, so far appears negative for seizures. 1L NS o/n and 1.5L NS bolus in AM for euvolemia. vanc/zosyn stopped. 250cc 3% bolus and 250cc albumin given in AM. Brief seizure to L frontal lobe this AM on EEG, given 2g valproic acid and dose increased to 1g q8. Vimpat 100mg BID started.  In afternoon patient self-extubated, placed on NRB with mucomyst, 3% inhalation and duonebs. 3% at 50 d/c'd.  8/19: POD 12. Remains on VEEG. Axillary A line placed. Salt tabs d/c'd, florinef decreased to 0.1mg, EVD @ -5cm H2O, now draining 20cc/hr. 250 albumin and 500 NS boluses on dayshift, 1 L LR bolus  8/20: POD 13. ASHA. on VEEG, no seizures noted. Pending VPA level. 500 NS, 250 albumin. Febrile, pancultured. EEG d/c'ed.   8/21: BD 14, POD14. ASHA overnight, EVD @ -5. s/p 1L bolus for euvolemia  8/22: BD 15: Continued on levophed, hypercoagulability profile pending, EVD 15 mL/h, euvolemia, extubated, amantadine added, free water started for hypernatremia; continuing LOC fluctuations (stable vasospasm), frequent suctioning; empiric ceftriaxone started. Tmax 101. Has thick secretions.  CTA done. 8/23: Neuroexam stable. Febrile. Pancultures done.   8/24: Exam has not been BP dependent. Relaxed -220. IJ- carotid stick. Lovenox held  8/25: BD 19. CTA 8/25: Improved vasospasm of proximal basilar artery, persistent vasospasm and moderate narrowing of the distal one half of the basilar artery. Posterior cerebral artery P1 and P2 segments appear patent with multifocal vasospasm, not significantly changed.  8/26: BD 20    Past Medical History:  s/p gastric sleeve surgery  Allergies:  Allergy Status Unknown  Home Meds:      PHYSICAL EXAMINATION  AOx2 (person, place) with choices, hypophonic, face symmetric. R gaze preference, can overcome, pupils 3mm and reactive. Has a weak cough   Squeezes fingers to command b/l UE, shows thumbs up, spontaneous in uppers and lower, BLE spontaneous bends knees  EENT:  Anicteric, thrush  CARDIOVASCULAR: (+) S1 S2, normal rate and regular rhythm  PULMONARY: Decreased  ABDOMEN: Soft, nontender with normoactive bowel sounds  EXTREMITIES: No edema  SKIN: No rash    MEDICATIONS:  acetaminophen    Suspension .. 650 milliGRAM(s) Oral every 6 hours PRN  acetylcysteine 20%  Inhalation 4 milliLiter(s) Inhalation every 6 hours  albuterol/ipratropium for Nebulization 3 milliLiter(s) Nebulizer every 6 hours  amantadine Syrup 200 milliGRAM(s) Oral every 12 hours  aspirin  chewable 81 milliGRAM(s) Oral daily  bisacodyl Suppository 10 milliGRAM(s) Rectal daily PRN  bromocriptine Capsule 15 milliGRAM(s) Oral every 8 hours  cefTRIAXone   IVPB 1000 milliGRAM(s) IV Intermittent every 12 hours  chlorhexidine 0.12% Liquid 15 milliLiter(s) Oral Mucosa every 12 hours  chlorhexidine 2% Cloths 1 Application(s) Topical <User Schedule>  enoxaparin Injectable 40 milliGRAM(s) SubCutaneous every 12 hours  glucagon  Injectable 1 milliGRAM(s) IntraMuscular once  insulin lispro (ADMELOG) corrective regimen sliding scale   SubCutaneous every 6 hours  lacosamide 100 milliGRAM(s) Oral two times a day  lactated ringers. 1000 milliLiter(s) (50 mL/Hr) IV Continuous <Continuous>  methylphenidate 10 milliGRAM(s) Oral daily  norepinephrine Infusion 0.05 MICROgram(s)/kG/Min (4.79 mL/Hr) IV Continuous <Continuous>  ondansetron Injectable 4 milliGRAM(s) IV Push every 6 hours PRN  polyethylene glycol 3350 17 Gram(s) Oral daily  senna 2 Tablet(s) Oral at bedtime  sodium chloride 3%  Inhalation 4 milliLiter(s) Inhalation every 6 hours  valproate sodium IVPB 1000 milliGRAM(s) IV Intermittent every 8 hours                          9.3    18.13 )-----------( 381      ( 23 Aug 2021 05:46 )             32.3     08-23    157<H>  |  116<H>  |  14  ----------------------------<  167<H>  3.0<L>   |  31  |  0.57    Ca    9.3      23 Aug 2021 05:46  Phos  2.8     08-23  Mg     1.9     08-23        Neuroimaging:  CT (08.19.2021) - 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 Stable ischemic changes within the right cerebellum. Right-sided EVD catheter in place with slight increase in ventricular size. Interval improvement in the intraventricular hemorrhage. Evolving areas of acute/subacute ischemia within the right cerebellum.    CTA (08.19.2021) -  Interval improvement in the caliber of the proximal vertebral artery with progression of a vasospasm involving the mid to distal basilar artery. Interval improvement in the vasospasm involving the left vertebral artery. Persistent vasospasm involving the posterior cerebral and superior cerebellar arteries. Interval improvement in the vasospasm involving the right supraclinoid ICA as well as the left A1 and M1 segments. Progression of vasospasm involving the right M1 segment.    Cerebral Angiogram (08.18.2021 - improvement in vasospasm, IA verapamil to posterior circulation  Cerebral Angiogram (08.17.2021 - Left vertebral artery dissection demonstrates continued moderate to severe cerebral vasospasm of the distal let vert and basilar artery, some what improved caliber of proximal left vert. 15mg IA Verapamil injected over 10 minutes with mild improvement of posterior circulation post treatment. Right ICA injection with continued mild supraclinoid vasospasm, 10mg of IA Verapamil injected.  Cerebral Angiogram (08.16.2021) - Left vertebral injection demonstrates moderate diffuse vasospasm of left vertebral artery, basilar artery and posterior circulation including PCAs. 15mg Verapamil injected via left vert with mild radiographic improvement post treatment. Right ICA injection with mild supraclinoid spasm/narrowing, otherwise no vasospasm appreciated in anterior circulation.    CT (08.18.2021) - No significant interval change in right superior and lateral cerebellar hypodensities.  CT (08.15.2021) - 1. Worsening the uncal herniation, with effacement of the bilateral suprasellar and perimesencephalic cisterns.  2. Redistribution of intraventricular hemorrhage, as above. Redemonstration of right EVD, with distal tip in the right frontal horn, near the foramen of Monro. Worsening hydrocephalus.  CTA - 1. Multifocal punctate and short segment stenoses, as above, with several areas of narrowing new as compared to recent CTA dated 08/09/2021. In the posterior circulation, stenoses are identified within the V4 left vertebral artery, the basilar artery, and in the bilateral PCA, more significant on the left.  Within the anterior circulation, stenosis are identified in the right supraclinoid C6 ICA as well as the proximal M1 MCA. Findings are most compatible with vasospasm.  2. In particular, the distal V4 right vertebral artery, which was visualized, though diminutive, on prior CTA dated 08/09/2021, does not fill with contrast on the present examination. The basilar artery demonstrates multiple stenoses, and is significantly smaller in caliber when compared to the prior CTA.    Other imaging:  Duplex 1.  Since 8/9/2021, there is new deep venous thrombosis in the bilateral peroneal veins.  2. Redemonstration of deep vein thrombosis in a right intramuscular calf vein.      [Code] Full  [Goals] Aggressive  [Disposition] ICU     ==============================================   NEUROCRITICAL CARE ATTENDING NOTE   ==============================================    LUDMILA PRIETO  MRN-[id number]  Summary:  46 y/o F with h/o recent gastric sleeve (08/04 [facility], Dr. Crisostomo ), fibromyalgia, thyroid dysfunction, PTSD, depression, anxiety.  Presented with seizures at home - brought to [place], with 1 more episode of seizure en route.  Intubated, CT showed SAH with IV extension, casted IV, hydrocephalus, given mannitol levetiracetam 6mg ativan, transferred to [facility].    Hospital Course:   8/7: Tx from [place] for SAH. Intubated. R frontal EVD placed, central line and a line placed. 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   8/8: POD 1 s/p angio with R vert takedown, extubated, desatting on aerosol mask, required extensive suctioning, 3% nebs, chest PT, started on low dose precedex drip for restlessness/agitation, ABG stable. NGT placed. TF started with goal 20 pending nutrition recs. 3% stopped for Na 150. Ceribell EEG negative and d/c'ed.   8/9 Overnight, new Right pronator drift and right gaze preference that can't cross midline, Repeat CTH demonstrated stable vents, CTA head and neck unremarkable for spasm, hypoattenuation of right cerebellum edema vs. infarct.  8/10: POD 3 R vert takedown, EVD open at [id number]. ASHA overnight, neuro stable. CTH with increased hydro, CTA head/neck with mild basilar spasm, but concern for PE. 1/2 am D50 for hypoglycemia. 1L bolus then 100 cc/hr, PM rounds for net negative fluid balance and mild vasospasm on CTA.   8/11: POD 4 R vert takedown. EVD at 5cm H2O, ASHA overnight. neuro stable.   Febrile 104F. Depakote increased to q6. NCHCT stable. EVD lowered to 0. Lasix 20 given for dieuresis, UOP over 2 liters. Sedation given for a-line placement, BP drop needing levo.  8/12: POD 5 R vert takedown. EVD at 0cm H2O. ASHA overnight, neuro stable. Precedex being weaned off. Pending IVCF with vascular today.  8/13: POD 6 R vert takedown. EVD open at 0cmH2O. ASHA overnight. Neuro exam stable. Mottled skin on the left lower extremity improving. Started on Bromocriptine 15mg Q8.   8/14: POD 7. EVD open at 0. 1L bolus given for euvolemia o/n. neuro stable. CTH stable. ENT scoped vocal cords, +R vocal cord paresis, NTD. started on zosyn empirically.   8/15: POD 8. EVD open at 0. ASHA o/n, neuro stable. Pt developed increased work of breathing, unable to clear her secretions, received racemic epi and multiple nebulizer treatments, still with stridor. Patient re-intubated at bedside, on full vent support.   8/16: CTH/CTA head/neck/CT chest performed o/n for worsened exam, R gaze preference, sluggish pupils. +worsened uncal herniation, hydro, vasospasm in b/l PCAs, L vert, basilar arteries. preop angio today, restarted on 3% for Na goal 145-150  8/17: POD 10. Overnight Pt remains on levophed drip for SBP goal 180-220. Also remains on propofol drip. EVD open at -5cmH2O. ICPs WNL. Febrile 101F and pancultured. CTH today shows slightly smaller ventricles.   8/18: POD 11 R vert takedown. POD1 angio IA verpamil. Overnight, Pt noted to have downward gaze to the right and not following commands. Taken for stat head CT which is stable. Pupils also noted to be aniscoric left pupil 4mm and brisk and right 2mm brisk. Mannitol 50g given. Ceribell EEG placed for concern of subclinical seizures, so far appears negative for seizures. 1L NS o/n and 1.5L NS bolus in AM for euvolemia. vanc/zosyn stopped. 250cc 3% bolus and 250cc albumin given in AM. Brief seizure to L frontal lobe this AM on EEG, given 2g valproic acid and dose increased to 1g q8. Vimpat 100mg BID started.  In afternoon patient self-extubated, placed on NRB with mucomyst, 3% inhalation and duonebs. 3% at 50 d/c'd.  8/19: POD 12. Remains on VEEG. Axillary A line placed. Salt tabs d/c'd, florinef decreased to 0.1mg, EVD @ -5cm H2O, now draining 20cc/hr. 250 albumin and 500 NS boluses on dayshift, 1 L LR bolus  8/20: POD 13. ASHA. on VEEG, no seizures noted. Pending VPA level. 500 NS, 250 albumin. Febrile, pancultured. EEG d/c'ed.   8/21: BD 14, POD14. ASHA overnight, EVD @ -5. s/p 1L bolus for euvolemia  8/22: BD 15: Continued on levophed, hypercoagulability profile pending, EVD 15 mL/h, euvolemia, extubated, amantadine added, free water started for hypernatremia; continuing LOC fluctuations (stable vasospasm), frequent suctioning; empiric ceftriaxone started. Tmax 101. Has thick secretions.  CTA done. 8/23: Neuroexam stable. Febrile. Pancultures done.   8/24: Exam has not been BP dependent. Relaxed -220. IJ- carotid stick. Lovenox held  8/25: BD 19. CTA 8/25: Improved vasospasm of proximal basilar artery, persistent vasospasm and moderate narrowing of the distal one half of the basilar artery. Posterior cerebral artery P1 and P2 segments appear patent with multifocal vasospasm, not significantly changed.  8/26: BD20. Preop for VPS 8/27.     Past Medical History:  s/p gastric sleeve surgery  Allergies:  Allergy Status Unknown  Home Meds:      PHYSICAL EXAMINATION  AOx2 (person, place) with choices, hypophonic, face symmetric. R gaze preference, can overcome, pupils 3mm and reactive. Has a weak cough   Squeezes fingers to command b/l UE, shows thumbs up, spontaneous antigravity- uppers and lower, BLE spontaneous bends knees  EENT:  Anicteric, thrush  CARDIOVASCULAR: (+) S1 S2, normal rate and regular rhythm  PULMONARY: Decreased  ABDOMEN: Soft, nontender with normoactive bowel sounds  EXTREMITIES: No edema  SKIN: No rash    MEDICATIONS:  acetaminophen    Suspension .. 650 milliGRAM(s) Oral every 6 hours PRN  acetylcysteine 20%  Inhalation 4 milliLiter(s) Inhalation every 6 hours  albuterol/ipratropium for Nebulization 3 milliLiter(s) Nebulizer every 6 hours  amantadine Syrup 200 milliGRAM(s) Oral every 12 hours  aspirin  chewable 81 milliGRAM(s) Oral daily  bisacodyl Suppository 10 milliGRAM(s) Rectal daily PRN  bromocriptine Capsule 15 milliGRAM(s) Oral every 8 hours  cefTRIAXone   IVPB 1000 milliGRAM(s) IV Intermittent every 12 hours  chlorhexidine 0.12% Liquid 15 milliLiter(s) Oral Mucosa every 12 hours  chlorhexidine 2% Cloths 1 Application(s) Topical <User Schedule>  enoxaparin Injectable 40 milliGRAM(s) SubCutaneous every 12 hours  glucagon  Injectable 1 milliGRAM(s) IntraMuscular once  insulin lispro (ADMELOG) corrective regimen sliding scale   SubCutaneous every 6 hours  lacosamide 100 milliGRAM(s) Oral two times a day  lactated ringers. 1000 milliLiter(s) (50 mL/Hr) IV Continuous <Continuous>  methylphenidate 10 milliGRAM(s) Oral daily  norepinephrine Infusion 0.05 MICROgram(s)/kG/Min (4.79 mL/Hr) IV Continuous <Continuous>  ondansetron Injectable 4 milliGRAM(s) IV Push every 6 hours PRN  polyethylene glycol 3350 17 Gram(s) Oral daily  senna 2 Tablet(s) Oral at bedtime  sodium chloride 3%  Inhalation 4 milliLiter(s) Inhalation every 6 hours  valproate sodium IVPB 1000 milliGRAM(s) IV Intermittent every 8 hours                          9.3    18.13 )-----------( 381      ( 23 Aug 2021 05:46 )             32.3     08-23    157<H>  |  116<H>  |  14  ----------------------------<  167<H>  3.0<L>   |  31  |  0.57    Ca    9.3      23 Aug 2021 05:46  Phos  2.8     08-23  Mg     1.9     08-23        Neuroimaging:  CT (08.19.2021) - 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 Stable ischemic changes within the right cerebellum. Right-sided EVD catheter in place with slight increase in ventricular size. Interval improvement in the intraventricular hemorrhage. Evolving areas of acute/subacute ischemia within the right cerebellum.    CTA (08.19.2021) -  Interval improvement in the caliber of the proximal vertebral artery with progression of a vasospasm involving the mid to distal basilar artery. Interval improvement in the vasospasm involving the left vertebral artery. Persistent vasospasm involving the posterior cerebral and superior cerebellar arteries. Interval improvement in the vasospasm involving the right supraclinoid ICA as well as the left A1 and M1 segments. Progression of vasospasm involving the right M1 segment.    Cerebral Angiogram (08.18.2021 - improvement in vasospasm, IA verapamil to posterior circulation  Cerebral Angiogram (08.17.2021 - Left vertebral artery dissection demonstrates continued moderate to severe cerebral vasospasm of the distal let vert and basilar artery, some what improved caliber of proximal left vert. 15mg IA Verapamil injected over 10 minutes with mild improvement of posterior circulation post treatment. Right ICA injection with continued mild supraclinoid vasospasm, 10mg of IA Verapamil injected.  Cerebral Angiogram (08.16.2021) - Left vertebral injection demonstrates moderate diffuse vasospasm of left vertebral artery, basilar artery and posterior circulation including PCAs. 15mg Verapamil injected via left vert with mild radiographic improvement post treatment. Right ICA injection with mild supraclinoid spasm/narrowing, otherwise no vasospasm appreciated in anterior circulation.    CT (08.18.2021) - No significant interval change in right superior and lateral cerebellar hypodensities.  CT (08.15.2021) - 1. Worsening the uncal herniation, with effacement of the bilateral suprasellar and perimesencephalic cisterns.  2. Redistribution of intraventricular hemorrhage, as above. Redemonstration of right EVD, with distal tip in the right frontal horn, near the foramen of Monro. Worsening hydrocephalus.  CTA - 1. Multifocal punctate and short segment stenoses, as above, with several areas of narrowing new as compared to recent CTA dated 08/09/2021. In the posterior circulation, stenoses are identified within the V4 left vertebral artery, the basilar artery, and in the bilateral PCA, more significant on the left.  Within the anterior circulation, stenosis are identified in the right supraclinoid C6 ICA as well as the proximal M1 MCA. Findings are most compatible with vasospasm.  2. In particular, the distal V4 right vertebral artery, which was visualized, though diminutive, on prior CTA dated 08/09/2021, does not fill with contrast on the present examination. The basilar artery demonstrates multiple stenoses, and is significantly smaller in caliber when compared to the prior CTA.    Other imaging:  Duplex 1.  Since 8/9/2021, there is new deep venous thrombosis in the bilateral peroneal veins.  2. Redemonstration of deep vein thrombosis in a right intramuscular calf vein.      [Code] Full  [Goals] Aggressive  [Disposition] ICU     ==============================================   NEUROCRITICAL CARE ATTENDING NOTE   ==============================================    LUDMILA PRIETO  MRN-[id number]  Summary:  46 y/o F with h/o recent gastric sleeve (08/04 [facility], Dr. Crisostomo ), fibromyalgia, thyroid dysfunction, PTSD, depression, anxiety.  Presented with seizures at home - brought to [place], with 1 more episode of seizure en route.  Intubated, CT showed SAH with IV extension, casted IV, hydrocephalus, given mannitol levetiracetam 6mg ativan, transferred to [facility].    Hospital Course:   8/7: Tx from [place] for SAH. Intubated. R frontal EVD placed, central line and a line placed. 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   8/8: POD 1 s/p angio with R vert takedown, extubated, desatting on aerosol mask, required extensive suctioning, 3% nebs, chest PT, started on low dose precedex drip for restlessness/agitation, ABG stable. NGT placed. TF started with goal 20 pending nutrition recs. 3% stopped for Na 150. Ceribell EEG negative and d/c'ed.   8/9 Overnight, new Right pronator drift and right gaze preference that can't cross midline, Repeat CTH demonstrated stable vents, CTA head and neck unremarkable for spasm, hypoattenuation of right cerebellum edema vs. infarct.  8/10: POD 3 R vert takedown, EVD open at [id number]. ASHA overnight, neuro stable. CTH with increased hydro, CTA head/neck with mild basilar spasm, but concern for PE. 1/2 am D50 for hypoglycemia. 1L bolus then 100 cc/hr, PM rounds for net negative fluid balance and mild vasospasm on CTA.   8/11: POD 4 R vert takedown. EVD at 5cm H2O, ASHA overnight. neuro stable.   Febrile 104F. Depakote increased to q6. NCHCT stable. EVD lowered to 0. Lasix 20 given for dieuresis, UOP over 2 liters. Sedation given for a-line placement, BP drop needing levo.  8/12: POD 5 R vert takedown. EVD at 0cm H2O. ASHA overnight, neuro stable. Precedex being weaned off. Pending IVCF with vascular today.  8/13: POD 6 R vert takedown. EVD open at 0cmH2O. ASHA overnight. Neuro exam stable. Mottled skin on the left lower extremity improving. Started on Bromocriptine 15mg Q8.   8/14: POD 7. EVD open at 0. 1L bolus given for euvolemia o/n. neuro stable. CTH stable. ENT scoped vocal cords, +R vocal cord paresis, NTD. started on zosyn empirically.   8/15: POD 8. EVD open at 0. ASHA o/n, neuro stable. Pt developed increased work of breathing, unable to clear her secretions, received racemic epi and multiple nebulizer treatments, still with stridor. Patient re-intubated at bedside, on full vent support.   8/16: CTH/CTA head/neck/CT chest performed o/n for worsened exam, R gaze preference, sluggish pupils. +worsened uncal herniation, hydro, vasospasm in b/l PCAs, L vert, basilar arteries. preop angio today, restarted on 3% for Na goal 145-150  8/17: POD 10. Overnight Pt remains on levophed drip for SBP goal 180-220. Also remains on propofol drip. EVD open at -5cmH2O. ICPs WNL. Febrile 101F and pancultured. CTH today shows slightly smaller ventricles.   8/18: POD 11 R vert takedown. POD1 angio IA verpamil. Overnight, Pt noted to have downward gaze to the right and not following commands. Taken for stat head CT which is stable. Pupils also noted to be aniscoric left pupil 4mm and brisk and right 2mm brisk. Mannitol 50g given. Ceribell EEG placed for concern of subclinical seizures, so far appears negative for seizures. 1L NS o/n and 1.5L NS bolus in AM for euvolemia. vanc/zosyn stopped. 250cc 3% bolus and 250cc albumin given in AM. Brief seizure to L frontal lobe this AM on EEG, given 2g valproic acid and dose increased to 1g q8. Vimpat 100mg BID started.  In afternoon patient self-extubated, placed on NRB with mucomyst, 3% inhalation and duonebs. 3% at 50 d/c'd.  8/19: POD 12. Remains on VEEG. Axillary A line placed. Salt tabs d/c'd, florinef decreased to 0.1mg, EVD @ -5cm H2O, now draining 20cc/hr. 250 albumin and 500 NS boluses on dayshift, 1 L LR bolus  8/20: POD 13. ASHA. on VEEG, no seizures noted. Pending VPA level. 500 NS, 250 albumin. Febrile, pancultured. EEG d/c'ed.   8/21: BD 14, POD14. ASHA overnight, EVD @ -5. s/p 1L bolus for euvolemia  8/22: BD 15: Continued on levophed, hypercoagulability profile pending, EVD 15 mL/h, euvolemia, extubated, amantadine added, free water started for hypernatremia; continuing LOC fluctuations (stable vasospasm), frequent suctioning; empiric ceftriaxone started. Tmax 101. Has thick secretions.  CTA done. 8/23: Neuroexam stable. Febrile. Pancultures done.   8/24: Exam has not been BP dependent. Relaxed -220. IJ- carotid stick. Lovenox held  8/25: BD 19. CTA 8/25: Improved vasospasm of proximal basilar artery, persistent vasospasm and moderate narrowing of the distal one half of the basilar artery. Posterior cerebral artery P1 and P2 segments appear patent with multifocal vasospasm, not significantly changed.  8/26: BD20. Preop for VPS 8/27.     Past Medical History:  s/p gastric sleeve surgery  Allergies:  Allergy Status Unknown  Home Meds:      PHYSICAL EXAMINATION  AOx2 (person, place) with choices, hypophonic, face symmetric. R gaze preference, can overcome, pupils 3mm and reactive. Has a weak cough   Squeezes fingers to command b/l UE, shows thumbs up, spontaneous antigravity in B/L uppers, BLE spontaneous bends knees.   EENT:  Anicteric, thrush noted  CARDIOVASCULAR: (+) S1 S2, normal rate and regular rhythm  PULMONARY: Decreased  ABDOMEN: Soft, nontender with normoactive bowel sounds  EXTREMITIES: No edema  SKIN: No rash. Left axilla area of induration ~6x 4cm. Non tender, no erythema, non fluctuant.     MEDICATIONS:  acetaminophen    Suspension .. 650 milliGRAM(s) Oral every 6 hours PRN  acetylcysteine 20%  Inhalation 4 milliLiter(s) Inhalation every 6 hours  albuterol/ipratropium for Nebulization 3 milliLiter(s) Nebulizer every 6 hours  amantadine Syrup 200 milliGRAM(s) Oral every 12 hours  aspirin  chewable 81 milliGRAM(s) Oral daily  bisacodyl Suppository 10 milliGRAM(s) Rectal daily PRN  bromocriptine Capsule 15 milliGRAM(s) Oral every 8 hours  cefTRIAXone   IVPB 1000 milliGRAM(s) IV Intermittent every 12 hours  chlorhexidine 0.12% Liquid 15 milliLiter(s) Oral Mucosa every 12 hours  chlorhexidine 2% Cloths 1 Application(s) Topical <User Schedule>  enoxaparin Injectable 40 milliGRAM(s) SubCutaneous every 12 hours  glucagon  Injectable 1 milliGRAM(s) IntraMuscular once  insulin lispro (ADMELOG) corrective regimen sliding scale   SubCutaneous every 6 hours  lacosamide 100 milliGRAM(s) Oral two times a day  lactated ringers. 1000 milliLiter(s) (50 mL/Hr) IV Continuous <Continuous>  methylphenidate 10 milliGRAM(s) Oral daily  norepinephrine Infusion 0.05 MICROgram(s)/kG/Min (4.79 mL/Hr) IV Continuous <Continuous>  ondansetron Injectable 4 milliGRAM(s) IV Push every 6 hours PRN  polyethylene glycol 3350 17 Gram(s) Oral daily  senna 2 Tablet(s) Oral at bedtime  sodium chloride 3%  Inhalation 4 milliLiter(s) Inhalation every 6 hours  valproate sodium IVPB 1000 milliGRAM(s) IV Intermittent every 8 hours                          9.3    18.13 )-----------( 381      ( 23 Aug 2021 05:46 )             32.3     08-23    157<H>  |  116<H>  |  14  ----------------------------<  167<H>  3.0<L>   |  31  |  0.57    Ca    9.3      23 Aug 2021 05:46  Phos  2.8     08-23  Mg     1.9     08-23        Neuroimaging:  CT (08.19.2021) - 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 Stable ischemic changes within the right cerebellum. Right-sided EVD catheter in place with slight increase in ventricular size. Interval improvement in the intraventricular hemorrhage. Evolving areas of acute/subacute ischemia within the right cerebellum.    CTA (08.19.2021) -  Interval improvement in the caliber of the proximal vertebral artery with progression of a vasospasm involving the mid to distal basilar artery. Interval improvement in the vasospasm involving the left vertebral artery. Persistent vasospasm involving the posterior cerebral and superior cerebellar arteries. Interval improvement in the vasospasm involving the right supraclinoid ICA as well as the left A1 and M1 segments. Progression of vasospasm involving the right M1 segment.    Cerebral Angiogram (08.18.2021 - improvement in vasospasm, IA verapamil to posterior circulation  Cerebral Angiogram (08.17.2021 - Left vertebral artery dissection demonstrates continued moderate to severe cerebral vasospasm of the distal let vert and basilar artery, some what improved caliber of proximal left vert. 15mg IA Verapamil injected over 10 minutes with mild improvement of posterior circulation post treatment. Right ICA injection with continued mild supraclinoid vasospasm, 10mg of IA Verapamil injected.  Cerebral Angiogram (08.16.2021) - Left vertebral injection demonstrates moderate diffuse vasospasm of left vertebral artery, basilar artery and posterior circulation including PCAs. 15mg Verapamil injected via left vert with mild radiographic improvement post treatment. Right ICA injection with mild supraclinoid spasm/narrowing, otherwise no vasospasm appreciated in anterior circulation.    CT (08.18.2021) - No significant interval change in right superior and lateral cerebellar hypodensities.  CT (08.15.2021) - 1. Worsening the uncal herniation, with effacement of the bilateral suprasellar and perimesencephalic cisterns.  2. Redistribution of intraventricular hemorrhage, as above. Redemonstration of right EVD, with distal tip in the right frontal horn, near the foramen of Monro. Worsening hydrocephalus.  CTA - 1. Multifocal punctate and short segment stenoses, as above, with several areas of narrowing new as compared to recent CTA dated 08/09/2021. In the posterior circulation, stenoses are identified within the V4 left vertebral artery, the basilar artery, and in the bilateral PCA, more significant on the left.  Within the anterior circulation, stenosis are identified in the right supraclinoid C6 ICA as well as the proximal M1 MCA. Findings are most compatible with vasospasm.  2. In particular, the distal V4 right vertebral artery, which was visualized, though diminutive, on prior CTA dated 08/09/2021, does not fill with contrast on the present examination. The basilar artery demonstrates multiple stenoses, and is significantly smaller in caliber when compared to the prior CTA.    Other imaging:  Duplex 1.  Since 8/9/2021, there is new deep venous thrombosis in the bilateral peroneal veins.  2. Redemonstration of deep vein thrombosis in a right intramuscular calf vein.      [Code] Full  [Goals] Aggressive  [Disposition] ICU     walking/standing

## 2021-08-26 NOTE — PROCEDURE NOTE - ATTENDING PROVIDER
Dr. Lomax
bal
Dr. Lomax
Dami
Dr. Lomax
Dami
Dr. Lomax
Dami

## 2021-08-26 NOTE — PROGRESS NOTE ADULT - ASSESSMENT
44 y/o F with h/o recent gastric sleeve (21 [facility], Dr. Crisostomo ), fibromyalgia, thyroid dysfunction, PTSD, depression, anxiety.  Presented with seizures at home - brought to [place], with 1 more episode of seizure en route.  Intubated, CT showed SAH with IV extension, casted IV, hydrocephalus, given mannitol, levetiracetam 1g,  6mg ativan. Started on Cardene drip for BP goal < 140 and transferred to [facility] NICU for further management. HH5 MF4, BD 1 = . Now s/p cerebral angiogram for R vertebral artery takedown for R vertebral dissecting aneurysm (), and R frontal EVD placement (), now s/p IVC filter placement (). BD 19    NEURO:   Neurochecks Q1h  EVD@ neg 5, draining 15 mL/h (ICPs 0- neg 9). 343/24 hrs.   s/p CTH : Decreased size in ventricles, stable.   CTA : Spasm improved RMCA. Similar in other territories. Repeat CTA .  Angio demonstrating vasospasm of Right ICA, right PCOMM, Left distal vert and basilar confluence, and received IA verapamil on . 8/. 8/. .   Nimodipine 60 mg PO Q4h- held for augmentation SBP goal 160-220.  D/W NSGY; Relax SBP to 160-220. Will discuss with NSGY relaxing SBP to 140 on  based on CTA results  Clinical seizures at home. Was on EEG.   EE/18- 10 second event at 6:42 on , that could not be determined if epileptic vs. artifact.   There were non-specific indicators of bilateral temporal dysfunction and epileptic potential.   If there is persistent suspicion for continued seizures or seizure-like activity, a continuous video-electroencephalogram with the 10-20 international system is advised.  D/C VPA. Continue only monotherapy with vimpat 100 mg BID only. Follow clinically,   Cont ASA 81mg  Bromocriptine 15mg Q8. Ritalin for  neurostimulation.   Continue aggressive PT     PULM:    Extubated   Duonebs and 3% nebs standing  Continue Chest PT   CXR clear , has atelectasis, ?R infiltratre  Aspiration precautions, requires frequent suctioning (Q2 hr)  Currently on Ancef for likely tracheitis  High aspiration risk.     CARDIO:    -220. Will D/W NSGY to relax to 140-220.   Levo gtt to maintain within parameters. Wean as tolerated  Daily EKGs and troponin while augmenting  TTE  WNL    Troponin stable   Central line RIJ- will replace as needed.   Follow up carotid dopplers.    ENDO:    Glucose goal 140-180    GI:    TF via NGT. Advanced to 40cc/hr.  Free water flushes 250 Q6  Thin liquid diet x3 weeks as per bariatric when tolerated  PPI per bariatric surgeon.  DCed miralax, senna re: rectal tube. Liquid stool- resolved .   PEG needed. Bariatric surgeon/gen surg to facilitate laparoscopic J tube.     RENAL: Hypernatremia- induced  Maintain euvolemia   Na goal 145-150. Decrease free water .  Bolus as needed   Severe hypokalemia 2/2 enteric losses. Replete aggressively.    HEM/ONC:   ASA 81mg  VTE Prophylaxis: SCDs, SQL  LE Duplex : Acute completely occlusive deep venous thrombosis of the right intramuscular calf vein, s/p IVCF with vascular . Repeat  shows new b/l peroneal DVTs, factor xa c/w prophylactic dose.  Repeat dopplers : New L calf DVT  Increase lovenox to 60mg bid   Anti Xa level on 60mg bid.   Hypercoagulable w/u- c/w heterozygous prothrombin gene mutation.       ID: Persistent fever, leukocytosis.  Was on empiric Ceftriaxone () for aspiration PNA. Changed to Ancef . WBC trending down.   (Previously was on empiric Vanc/zoysn for fever d/c'd (8/10-), restarted  for persistent fevers, dc'd again on )   CSF on , NGTD. Pancx , NGTD,  NGTD   Repeat cultures  (blood, sputum, CSF, UA)- sputum GPC in pairs- MSSA. Changed to Ancef.  Blood cultures neg.   Infectious disease consulted  re: persistent fevers and clearance for surgery. Appreciate input.   Diarrhea resolved.   Change lines.  Thrush; nystatin    Disposition: ICU. Family updated.  Full code.     Acces:   R IJ TLC ()  L axillary A line    *****    ATTENDING ATTESTATION:  I was physically present for the key portions of the evaluation and management (E/M) service provided.  I agree with the above history, physical and plan, which I have reviewed and edited where appropriate.    Patient at high risk for neurological deterioration or death due to: Sepsis, aspiration PNA, DVT / PE.  Critical care time:  I have personally provided 45 minutes of critical care time, excluding time spent on separate procedures.      Plan discussed with RN, house staff.

## 2021-08-26 NOTE — PROVIDER CONTACT NOTE (OTHER) - ACTION/TREATMENT ORDERED:
MARCO Granados assessed pt. Ultrasound pending. A-line dressing changed. MARCO Granados assessed pt. Ultrasound pending. Area of swelling marked. Tubing changed, new dressing applied.

## 2021-08-26 NOTE — PROGRESS NOTE ADULT - SUBJECTIVE AND OBJECTIVE BOX
Surgery: Placement of right ventriculoperitoneal shunt  Consent: Signed by patient vs HCP Maggie Blum                   NAME/NUMBER of HCP: [phone]    Representative Consent: [  ] Signed by patient vs HCP                                                 [  ] N/A -> only for cerebral angiogram    apple (Angioedema)  lactose (Stomach Upset)  No Known Drug Allergies  strawberry (Angioedema)    T(C): 37.7 (21 @ 09:08), Max: 38 (21 @ 00:57)  HR: 95 (21 @ 09:00) (85 - 106)  BP: 162/80 (21 @ 09:00) (123/67 - 190/96)  RR: 20 (21 @ 09:00) (16 - 20)  SpO2: 99% (21 @ 09:00) (92% - 100%)  Wt(kg): --    PHYSICAL EXAM:  General: patient seen laying supine in bed in NAD  Neuro: OEs spontaneously, Ox2 (self, place - mouths words), squeezes hands and gives thumbs up b/l, wiggles toes b/l, and sticks out tongue to command, Moves UEs and LEs spontaneously L>R,  face symmetric   HEENT: R pupil 4mm reactive to light, L pupil 5mm reactive to light, R gaze preference but can cross midline +NGT  Neck: supple  Cardiac: RRR, S1S2, small hematoma L neck   Pulmonary: chest rise symmetric  Abdomen: soft, nontender, nondistended  Ext: warm, perfusing well, PT/DP pulses 2+ b/l  Wound: EVD incision c/d/i with staples, R groin puncture site with ecchymosis and small area of hematoma        143  |  104  |  13  ----------------------------<  125<H>  3.6   |  28  |  0.57    Ca    9.6      26 Aug 2021 05:56  Phos  3.1       Mg     1.8           CBC Full  -  ( 26 Aug 2021 05:56 )  WBC Count : 10.68 K/uL  RBC Count : 3.45 M/uL  Hemoglobin : 8.9 g/dL  Hematocrit : 29.4 %  Platelet Count - Automated : 374 K/uL  Mean Cell Volume : 85.2 fl  Mean Cell Hemoglobin : 25.8 pg  Mean Cell Hemoglobin Concentration : 30.3 gm/dL  Auto Neutrophil # : x  Auto Lymphocyte # : x  Auto Monocyte # : x  Auto Eosinophil # : x  Auto Basophil # : x  Auto Neutrophil % : x  Auto Lymphocyte % : x  Auto Monocyte % : x  Auto Eosinophil % : x  Auto Basophil % : x    Pregnancy test: pending in AM  Type & Screen (in past 72hrs): 21     2 Type & Screen within 72 hours if anticipate blood need in OR:  xY _ N     Blood ordered and on hold for OR:   [ ] No need     [ ] 1u pRBC on hold      [x] 2u pRBC on hold    CXR: 21  EKG: WNL  ECHO: 21 Grossly, left ventricular function appears normal  Medical Clearances: NSICU    Last dose of antiplatelet/anticoagulation dru21    Implanted Devices (pacemaker, drug pump...etc):  []YES   [x] NO                  If yes --> EPS consulted to interrogate/adjust device:    3M nasal swab ordered?  xY  _N  Cranial surgery: Order written for hair to be shampooed night before surgery and morning before surgery  [] yes   [x]no EVD in place  Chlorhexidine Wipes ordered for Neck Down?  xY  _ N  (twice a day if 1 day before surgery, daily for 3 days if 3 days prior, daily if in ICU)               Assessment:  46y/o F with h/o recent gastric sleeve (21 [facility], Dr. Crisostomo ), fibromyalgia, thyroid dysfunction, PTSD, depression, anxiety.  Presented with seizures at home - brought to [place], with 1 more episode of seizure en route.  Intubated, CT showed SAH with IV extension, casted IV, hydrocephalus, given mannitol, levetiracetam 1g,  6mg ativan. Started on Cardene drip for BP goal < 140 and transferred to [facility] NICU for further management. HH5 MF4, BD 1 = . Now s/p cerebral angiogram for R vertebral artery takedown for R vertebral dissecting aneurysm (), and R frontal EVD placement (), now s/p IVC filter placement (,) now s/p angio IA verapamil , , , . CTA , continued spasm    Plan:  -Plan for VPS tomorrow   -Type and screen 21  -NPO after midnight, IVF while NPO  -2 units PRBCs on hold  -Consents in chart  -SQL held   -F/u pregnancy test in AM  -F/u coags in AM  -D/w Dr. Lomax    Assessment:  Present when checked    []  GCS  E   V  M     Heart Failure: []Acute, [] acute on chronic , []chronic  Heart Failure:  [] Diastolic (HFpEF), [] Systolic (HFrEF), []Combined (HFpEF and HFrEF), [] RHF, [] Pulm HTN, [] Other    [] TRUDI, [] ATN, [] AIN, [] other  [] CKD1, [] CKD2, [] CKD 3, [] CKD 4, [] CKD 5, []ESRD    Encephalopathy: [] Metabolic, [] Hepatic, [] toxic, [] Neurological, [] Other    Abnormal Nurtitional Status: [] malnurtition (see nutrition note), [ ]underweight: BMI < 19, [] morbid obesity: BMI >40, [] Cachexia    [] Sepsis  [] hypovolemic shock,[] cardiogenic shock, [] hemorrhagic shock, [] neuogenic shock  [] Acute Respiratory Failure  []Cerebral edema, [] Brain compression/ herniation,   [] Functional quadriplegia  [] Acute blood loss anemia

## 2021-08-26 NOTE — PROGRESS NOTE ADULT - ASSESSMENT
46 y/o F with h/o recent gastric sleeve (21 [facility], Dr. Crisostomo ), fibromyalgia, thyroid dysfunction, PTSD, depression, anxiety.  Presented with seizures at home - brought to [place], with 1 more episode of seizure en route.  Intubated, CT showed SAH with IV extension, casted IV, hydrocephalus, given mannitol, levetiracetam 1g,  6mg ativan. Started on Cardene drip for BP goal < 140 and transferred to [facility] NICU for further management. HH5 MF4, BD 1 = . Now s/p cerebral angiogram for R vertebral artery takedown for R vertebral dissecting aneurysm (), and R frontal EVD placement (), now s/p IVC filter placement (). BD 20.    NEURO:   Neurochecks Q1h.  EVD@ neg 5, draining 15 mL/h (ICPs neg 7 to- neg 9)  330/24 hrs.   Status post CTH : Decreased size in ventricles, stable.   Angio demonstrating vasospasm of Right ICA, right PCOMM, Left distal vert and basilar confluence, and received IA verapamil on . 8/. 8/. .   CTA : Spasm improved RMCA. Similar in other territories.  Repeat CTA : Improved vasospasm of proximal basilar artery, persistent vasospasm and moderate narrowing of the distal one half of the basilar artery. Posterior cerebral artery P1 and P2 segments appear patent with multifocal vasospasm, not significantly changed.  Nimodipine 60 mg PO Q4h- held for augmentation.  D/W NSGY; Relax SBP to 140-220. - weaning levophed 0.15-0.2 mcg/kg/min  Possible clinical seizures at home. Was on EEG.   EE/18- 10 second event at 6:42 on , that could not be determined if epileptic vs. artifact.   There were non-specific indicators of bilateral temporal dysfunction and epileptic potential.   If there is persistent suspicion for continued seizures or seizure-like activity, a continuous video-electroencephalogram with the 10-20 international system is advised.  D/C VPA. Continue only monotherapy with vimpat 100 mg BID only. Follow clinically  Cont ASA 81mg  Bromocriptine 15mg Q8. Ritalin for neurostimulation.   Continue aggressive PT     PULM:    Extubated   Duonebs and 3% nebs standing  Continue aggressive chest PT.   CXR clear , has atelectasis, ?R infiltrate  Aspiration precautions, requires frequent suctioning (Q2 hr)  Currently on Ancef for likely tracheitis: until   High aspiration risk.     CARDIO:    -220.   Levophed gtt to maintain within parameters.   Daily EKGs and troponin while augmenting BP. Wean levophed.  TTE  WNL    PICC line. DC central line .   Carotid dopplers showed no pseudoaneurysm. CTA neck showed small hematoma -extraluminal. Monitor closely.     ENDO:    Glucose goal 140-180  ISS    GI:    TF @ 40cc/hr. NPO at MN  for VPS  DC free water as Na drifting down  Thin liquid diet x3 weeks (as per bariatric) when tolerating  PPI per bariatric surgeon.  DCed miralax, senna re: rectal tube. Liquid stool- resolved .   PEG to be placed by gen surg.    RENAL: Hypernatremia- induced  Maintain euvolemia   Na goal 145-150. DC free water.   Bolus as needed (1L )  Severe hypokalemia 2/2 enteric losses. Replete aggressively. Improving.     HEM/ONC:   ASA 81mg- hold  pm for OR  VTE Prophylaxis: SCDs, SQL (hold for OR)  LE Duplex : Acute completely occlusive deep venous thrombosis of the right intramuscular calf vein, s/p IVCF with vascular . Repeat  showed new b/l peroneal DVTs.   Scattered segmental pulmonary emboli noted on CTA.   Repeat dopplers : New L calf DVT. Repeat dopplers .   Increased lovenox to 60mg bid   Anti Xa level on 60mg bid: due on  at 22:00.  Hypercoagulable w/u- c/w heterozygous prothrombin gene mutation.     ID: Persistent fever, leukocytosis.  Was on empiric Ceftriaxone () for aspiration PNA. Changed to Ancef . WBC trending down.   (Previously was on empiric Vanc/zoysn for fever d/c'd (8/10-), restarted  for persistent fevers, dc'd again on )   CSF on , NGTD. Pancx , NGTD,  NGTD   Repeat cultures  (blood, sputum, CSF, UA)- sputum GPC in pairs- MSSA. Changed to Ancef.  Infectious disease consulted  re: persistent fevers and clearance for surgery. Patient has been cleared for VPS on .  Repeat all cultures  per ID including cultures off central line, blood, CSF.  Diarrhea- resolved.   Remove R IJ  due to persistent fevers. Placed PICC.  Thrush; nystatin.  Left axilla: area of induration ~6x 4cm. Non tender, no erythema, non fluctuant. Doubtful of infection. Follow up ultrasound. Warm compress. Monitor area of demarcation.     Disposition: ICU. Family updated.  Full code.     Acces:   R IJ TLC ()  L axillary A line    *****    ATTENDING ATTESTATION:  I was physically present for the key portions of the evaluation and management (E/M) service provided.  I agree with the above history, physical and plan, which I have reviewed and edited where appropriate.    Patient at high risk for neurological deterioration or death due to: Sepsis, aspiration PNA, DVT / PE.  Critical care time:  I have personally provided 35 minutes of critical care time, excluding time spent on separate procedures.      Plan discussed with RN, house staff.

## 2021-08-26 NOTE — PROGRESS NOTE ADULT - SUBJECTIVE AND OBJECTIVE BOX
HPI:   46y/o F with h/o recent gastric sleeve (08/04/21 [facility], Dr. Crisostomo ), fibromyalgia, thyroid dysfunction, PTSD, depression, anxiety.  Presented with seizures at home - brought to [place], with 1 more episode of seizure en route.  Intubated, CT showed SAH with IV extension, casted IV, hydrocephalus, given mannitol, levetiracetam 1g,  6mg ativan. Started on Cardene drip for BP goal < 140 and transferred to [facility] NICU for further management. HH5 MF4, BD 1 = 8/7. Now s/p cerebral angiogram for R vertebral artery takedown for R vertebral dissecting aneurysm (8/7), and R frontal EVD placement.    Hospital course:  8/7: Tx from [place] for SAH. Intubated. R frontal EVD placed, central line and a line placed. 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   8/8: POD1 s/p angio with R vert takedown, extubated, desatting on aerosol mask, required extensive suctioning, 3% nebs, chest PT, started on low dose precedex drip for restlessness/agitation, ABG stable. NGT placed. TF started with goal 20 pending nutrition recs. 3% stopped for Na 150. Ceribell EEG negative and d/c'ed.   8/9 Overnight, new Right pronator drift and right gaze preference that can't cross midline, Repeat CTH demonstrated stable vents, CTA head and neck unremarkable for spasm, hypoattenuation of right cerebellum edema vs. infarct.  8/10: POD3 R vert takedown, EVD open at 10ypN8O. ASHA overnight, neuro stable. CTH with increased hydro, CTA head/neck with mild basilar spasm, but concern for PE. 1/2 am D50 for hypoglycemia. 1L bolus then 100 cc/hr, PM rounds for net negative fluid balance and mild vasospasm on CTA.   8/11: POD4 R vert takedown. EVD at 5cm H2O, ASHA overnight. neuro stable.   Febrile 104. depakote increased to q6. NCHCT stable. EVD lowered to 0. Lasix 20 given for dieuresis, UOP over 2 liters. Sedation given for a-line placement, BP drop needing levo.  8/12: POD5 R vert takedown. EVD at 0cm H2O. ASHA overnight, neuro stable. Precedex being weaned off. Pending IVCF with vascular today.  8/13: POD6 R vert takedown. EVD open at 0cmH2O. ASHA overnight. Neuro exam stable. Mottled skin on the left lower extremity improving. Started on Bromocriptine 15mg Q8.   8/14: POD7. EVD open at 0. 1L bolus given for euvolemia o/n. neuro stable. CTH stable. ENT scoped vocal cords, +R voacl cord paresis, NTD. started on zosyn empirically.   8/15: POD8. EVD open at 0. ASHA o/n, neuro stable. Pt developed increased work of breathing, unable to clear her secretions, received racemic epi and multiple nebulizer treatments, still with stridor. Patient re-intubated at bedside, on full vent support.   8/16: CTH/CTA head/neck/CT chest performed o/n for worsened exam, R gaze preference, sluggish pupils. +worsened uncal herniation, hydro, vasospasm in b/l PCAs, L vert, basilar arteries. preop angio today, restarted on 3% for Na goal 145-150. Angio for vasospasm with IA verapamil.  8/17: POD10. Overnight Pt remains on levophed drip for SBP goal 180-220. Also remains on propofol drip. EVD open at -5cmH2O. ICPs WNL. Febrile 101F and pancultured. CTH today shows slightly smaller ventricles. Angio for vasospasm with IA verapamil.  8/18: POD11 R vert takedown. POD1 angio IA verpamil. Overnight, Pt noted to have downward gaze to the right and not following commands. Taken for stat head CT which is stable. Pupils also noted to be aniscoric left pupil 4mm and brisk and right 2mm brisk. Mannitol 50g given. Ceribell eeg placed for concern of subclinical seizures, so far appears negative for seizures. 1L NS o/n and 1.5L NS bolus in AM for euvolemia. vanc/zosyn stopped. 250cc 3% bolus and 250cc albumin given in AM. Brief seizure to L frontal lobe this AM on EEG, given 2g valproic acid and dose increased to 1g q8. Vimpat 100mg BID started.  Angio with interval improvment in vasospasm, IA verapamil given. In afternoon patient self-extubated, placed on NRB with mucomyst, 3% inhalation and duonebs. 3% at 50 d/c'd.  8/19: POD 12. Remains on VEEG. Axillary A line placed. Salt tabs d/c'd, florinef decreased to 0.1mg, EVD @ -5cm H2O, now draining 20cc/hr. 250 albumin and 500 NS boluses on dayshift, 1 L LR bolus  8/20: POD 13. ASHA. on VEEG, no seizures noted. Pending VPA level. 500 NS, 250 albumin. Febrile, pancultured. EEG d/c'ed.   8/21: BD14, POD14. ASHA overnight, EVD @ -5. s/p 1L bolus for euvolemia  8/22: BD #15: continued on levo, hypercoagulabitly profile pending, EVD @ -5, euvolemia, extubated, amantadine added, free water started for hypernatremia   8/23: BD 17, tmax 101F o/n. Gen Sx consulted for PEG - not a candidate at this time, pancultured for fever, 1L LR given for euvolemia. EVD at -5  8/24: BD18, ASHA o/n, neuro stable, EVD at -5, VPA level therapeutic, SBP goal 160-200, L IJ CVC attempted placement with carotid puncture, no pseudoaneurysm on US. MSSA growing in sputum. Ceftriaxone d/c'd and Ancef 2gq8 started. ID consulted. Recieved 1.5L LR and 500cc albumin.   8/25: POD5 last angio, BD19 ASHA o/n, neuro stable, EVD at -5  8/26: BD 20. ASHA o/n. EVD @ -5       Vital Signs Last 24 Hrs  T(C): 38 (26 Aug 2021 00:57), Max: 38 (26 Aug 2021 00:57)  T(F): 100.4 (26 Aug 2021 00:57), Max: 100.4 (26 Aug 2021 00:57)  HR: 96 (26 Aug 2021 01:00) (84 - 98)  BP: 178/92 (26 Aug 2021 01:00) (123/67 - 203/93)  BP(mean): 128 (26 Aug 2021 01:00) (88 - 148)  RR: 18 (26 Aug 2021 01:00) (16 - 20)  SpO2: 99% (26 Aug 2021 01:00) (92% - 100%)    I&O's Detail    24 Aug 2021 07:01  -  25 Aug 2021 07:00  --------------------------------------------------------  IN:    Albumin 5%  - 250 mL: 500 mL    Enteral Tube Flush: 470 mL    Free Water: 1000 mL    IV PiggyBack: 800 mL    Lactated Ringers: 100 mL    Lactated Ringers: 1500 mL    Norepinephrine: 324.2 mL    Norepinephrine: 324.1 mL    Vital High Protein: 960 mL  Total IN: 5978.3 mL    OUT:    External Ventricular Device (mL): 358 mL    Intermittent Catheterization - Urethral (mL): 3450 mL    Voided (mL): 700 mL  Total OUT: 4508 mL    Total NET: 1470.3 mL      25 Aug 2021 07:01  -  26 Aug 2021 01:23  --------------------------------------------------------  IN:    Free Water: 500 mL    IV PiggyBack: 450 mL    Norepinephrine: 309.4 mL    Vital High Protein: 720 mL  Total IN: 1979.4 mL    OUT:    External Ventricular Device (mL): 296 mL    Intermittent Catheterization - Urethral (mL): 2705 mL  Total OUT: 3001 mL    Total NET: -1021.6 mL        I&O's Summary    24 Aug 2021 07:01  -  25 Aug 2021 07:00  --------------------------------------------------------  IN: 5978.3 mL / OUT: 4508 mL / NET: 1470.3 mL    25 Aug 2021 07:01  -  26 Aug 2021 01:23  --------------------------------------------------------  IN: 1979.4 mL / OUT: 3001 mL / NET: -1021.6 mL    PHYSICAL EXAM:  General: patient seen laying supine in bed in NAD  Neuro: OEs spontaneously, Ox2 (self,place - mouths words), squeezes hands and gives thumbs up b/l, wiggles toes b/l, and sticks out tongue to command, Moves UEs and LEs spontaneously L>R,  face symmetric   HEENT: pupils 3mm equal reactive, R gaze preference but can track to left, +NGT  Neck: supple  Cardiac: RRR, S1S2, small hematoma L neck   Pulmonary: chest rise symmetric  Abdomen: soft, nontender, nondistended  Ext: warm, perfusing well, PT/DP pulses 2+ b/l  Wound: EVD incision c/d/i with staples, R groin puncture site with ecchymosis and small area of hematoma    TUBES/LINES:  [x] CVC - R IJ  [x] A-line  [] Lumbar Drain  [x] Ventriculostomy - EVD at -5cm H2O  [] Other    DIET:  [] NPO  [] Mechanical  [x] Tube feeds    LABS:                        8.5    16.95 )-----------( 353      ( 25 Aug 2021 05:19 )             28.8     08-25    145  |  106  |  9   ----------------------------<  127<H>  3.9   |  29  |  0.41<L>    Ca    9.4      25 Aug 2021 14:08  Phos  2.4     08-25  Mg     2.0     08-25    CAPILLARY BLOOD GLUCOSE    POCT Blood Glucose.: 151 mg/dL (25 Aug 2021 23:11)  POCT Blood Glucose.: 115 mg/dL (25 Aug 2021 18:32)  POCT Blood Glucose.: 125 mg/dL (25 Aug 2021 11:40)  POCT Blood Glucose.: 141 mg/dL (25 Aug 2021 04:57)    Drug Levels: [] N/A  Valproic Acid Level, Serum: 68.7 ug/mL (08-24 @ 13:06)  Valproic Acid Level, Serum: 55.1 ug/mL (08-22 @ 14:40)    CSF Analysis: [] N/A      Allergies    apple (Angioedema)  No Known Drug Allergies  strawberry (Angioedema)    Intolerances    lactose (Stomach Upset)    MEDICATIONS:  Antibiotics:  ceFAZolin   IVPB      ceFAZolin   IVPB 2000 milliGRAM(s) IV Intermittent every 8 hours  nystatin    Suspension 339958 Unit(s) Oral four times a day    Neuro:  acetaminophen    Suspension .. 650 milliGRAM(s) Oral every 6 hours PRN  amantadine Syrup 200 milliGRAM(s) Oral every 12 hours  bromocriptine Capsule 15 milliGRAM(s) Oral every 8 hours  lacosamide Solution 100 milliGRAM(s) Oral two times a day  methylphenidate 10 milliGRAM(s) Oral daily  ondansetron Injectable 4 milliGRAM(s) IV Push every 6 hours PRN    Anticoagulation:  aspirin  chewable 81 milliGRAM(s) Oral daily  enoxaparin Injectable 60 milliGRAM(s) SubCutaneous every 12 hours    OTHER:  acetylcysteine 20%  Inhalation 4 milliLiter(s) Inhalation every 6 hours  albuterol/ipratropium for Nebulization 3 milliLiter(s) Nebulizer every 6 hours  chlorhexidine 0.12% Liquid 15 milliLiter(s) Oral Mucosa every 12 hours  chlorhexidine 2% Cloths 1 Application(s) Topical <User Schedule>  glucagon  Injectable 1 milliGRAM(s) IntraMuscular once  insulin lispro (ADMELOG) corrective regimen sliding scale   SubCutaneous every 6 hours  norepinephrine Infusion 0.052 MICROgram(s)/kG/Min IV Continuous <Continuous>  sodium chloride 3%  Inhalation 4 milliLiter(s) Inhalation every 6 hours    IVF:    CULTURES:  Culture Results:   Normal Respiratory Mely present to date (08-24 @ 18:07)  Culture Results:   No growth to date (08-23 @ 19:42)    RADIOLOGY & ADDITIONAL TESTS:    ASSESSMENT:  46y/o F with h/o recent gastric sleeve (08/04/21 [facility], Dr. Crisostomo ), fibromyalgia, thyroid dysfunction, PTSD, depression, anxiety.  Presented with seizures at home - brought to [place], with 1 more episode of seizure en route.  Intubated, CT showed SAH with IV extension, casted IV, hydrocephalus, given mannitol, levetiracetam 1g,  6mg ativan. Started on Cardene drip for BP goal < 140 and transferred to [facility] NICU for further management. HH5 MF4, BD 1 = 8/7. Now s/p cerebral angiogram for R vertebral artery takedown for R vertebral dissecting aneurysm (8/7), and R frontal EVD placement (8/7), now s/p IVC filter placement (8/12,) now s/p angio IA verapamil 8/16, 8/17, 8/18, 8/20. CTA 8/25, continued spasm.     Plan  NEURO:   - neurochecks q1h  - EVD open at -5cmH2O , drain at least 15cc/hr  - s/p CTH 8/20: decreased size in ventricles, stable.   - Nimodipine 60 mg po q4h d/c'ed for SBP goal 180-200   - Depakote 1g q8 ( No keppra due to agitation), therapeutic 8/224  - Vimpat 100mg bid   - EEG no seizures x 2 days, D/c'ed  - cont ASA 81  - CTH 8/15: worsened uncal herniation, worsened hydro, vasospasm in b/l PCA, L vert, basilar arteries  - Bromocriptine 15mg Q8  - Ritalin and Amantadine for neurostim  - Angio demonstrating vasospasm of Right ICA, right PCOMM, Left distal vert and basilar confluence, and received IA verapamil on 8/16. 8/17. 8/18. 8/20.   - CTH/CTA 8/22- stable, R MCA improved spasm     PULM:    - self extubated 8/20  - Duonebs, 3% nebs, mucomyst  - chest PT   - CXR - aspiration precautions, requires frequent suctioning    CARDIO:    - -220  - levo gtt  - TTE 8/12 WNL    - Troponin stable   - EKG normal     ENDO:    - glucose goal 140-180    GI:    - TF via NGT  - free H2O 250 q6  - needs thin liquid diet x3 weeks as per bariatric when tolerated  - PPI per bariatric surgeon  - dc rectal tube 8/24   - gen surg consulted for PEG, Dr. Acevedo willing to do PEG if off pressors and cx's neg    RENAL:   - Maintain euvolemia   - Na goal normal   - albumin bolus yesterday to maintain CVP > 6  - free water 250 q6 for hypernatremia     HEM/ONC:   - ASA 81   - VTE Prophylaxis: SCDs, SQL   - Repeat dopplers 8/23, newly visualized acute DVT left intramuscular calf vein, persistent completely occlusive DVT b/l peroneal veins and right intramuscular calf vein  - kept on SQL 40 BID due to carotid puncture during L IJ CVC attempt 8/24  - carotid doppler 8/24 wet read: neg for pseudoaneurysm   - hypercoagulable w/u - still pending, however protein S/C and AT III normal   - pending repeat dopplers 8/30     ID:   - MRSA neg 8/12  - Febrile, trend leukocytosis   - Last Pancx 8/23  - ceftriaxone 2 g q 24 for empiric pna coverage (8/22-8/29)  - ID consulted, need to f/u clearance for shunt and PEG    Assessment and plan discussed with Dr. Lomax, Dr. Conn and Dr. Sánchez

## 2021-08-26 NOTE — PROCEDURE NOTE - NSPOSTCAREGUIDE_GEN_A_CORE
Verbal/written post procedure instructions were given to patient/caregiver/Care for catheter as per unit/ICU protocols
Verbal/written post procedure instructions were given to patient/caregiver
Verbal/written post procedure instructions were given to patient/caregiver/Care for catheter as per unit/ICU protocols
Verbal/written post procedure instructions were given to patient/caregiver
Verbal/written post procedure instructions were given to patient/caregiver/Care for catheter as per unit/ICU protocols
Verbal/written post procedure instructions were given to patient/caregiver
Verbal/written post procedure instructions were given to patient/caregiver/Instructed patient/caregiver regarding signs and symptoms of infection/Keep the cast/splint/dressing clean and dry/Care for catheter as per unit/ICU protocols

## 2021-08-26 NOTE — PROCEDURE NOTE - NSPOSTPRCRAD_GEN_A_CORE
central line located in the superior vena cava/no pneumothorax/post-procedure radiography performed
feeding tube in correct intestinal position/post-procedure radiography performed
line in appropriate postion/ultrasound

## 2021-08-26 NOTE — PROCEDURE NOTE - GENERAL PROCEDURE DETAILS
10cc of CSF was aspirated from the buretrol in a sterile fashion
5cc of CSF was aspirated from EVD chamber (buretrol) in a sterile fashion
5cc of CSF was aspirated from the buretrol and collected in a sterile fashion
2 sets of blood cultures were obtained in a sterile fashion
2 sets of blood cultures were obtained in a sterile fashion

## 2021-08-27 NOTE — PROGRESS NOTE ADULT - ASSESSMENT
44 y/o F with h/o recent gastric sleeve (21 [facility], Dr. Crisostomo ), fibromyalgia, thyroid dysfunction, PTSD, depression, anxiety.  Presented with seizures at home - brought to [place], with 1 more episode of seizure en route.  Intubated, CT showed SAH with IV extension, casted IV, hydrocephalus, given mannitol, levetiracetam 1g,  6mg ativan. Started on Cardene drip for BP goal < 140 and transferred to [facility] NICU for further management. HH5 MF4, BD 1 = . Now s/p cerebral angiogram for R vertebral artery takedown for R vertebral dissecting aneurysm (), and R frontal EVD placement (), now s/p IVC filter placement (). BD 21.    NEURO:   Neurochecks Q1h.  EVD@ neg 5, draining 15 mL/h (ICPs neg 7 to- neg 9)  330/24 hrs.   Status post CTH : Decreased size in ventricles, stable.   Angio demonstrating vasospasm of Right ICA, right PCOMM, Left distal vert and basilar confluence, and received IA verapamil on . 8/. 8/. .   CTA : Spasm improved RMCA. Similar in other territories.  Repeat CTA : Improved vasospasm of proximal basilar artery, persistent vasospasm and moderate narrowing of the distal one half of the basilar artery. Posterior cerebral artery P1 and P2 segments appear patent with multifocal vasospasm, not significantly changed.  Nimodipine 60 mg PO Q4h- held for augmentation.  D/W NSGY; Relax SBP to 140-220.  Possible clinical seizures at home. Was on EEG.   EE/18- 10 second event at 6:42 on , that could not be determined if epileptic vs. artifact.   There were non-specific indicators of bilateral temporal dysfunction and epileptic potential.   If there is persistent suspicion for continued seizures or seizure-like activity, a continuous video-electroencephalogram with the 10-20 international system is advised.  D/C VPA. Continue only monotherapy with vimpat 100 mg BID only. Follow clinically  Cont ASA 81mg  Bromocriptine 15mg Q8. Ritalin for neurostimulation.   Continue aggressive PT     PULM:    Extubated   Duonebs and 3% nebs standing  Continue aggressive chest PT.   CXR clear , has atelectasis, ?R infiltrate  Aspiration precautions, requires frequent suctioning (Q2 hr)  Currently on Ancef for likely tracheitis: until   High aspiration risk.     CARDIO:    -220.   Levophed gtt to maintain within parameters.   Daily EKGs and troponin while augmenting BP. Wean levophed.  TTE  WNL    PICC line. DC central line .   Carotid dopplers showed no pseudoaneurysm. CTA neck showed small hematoma -extraluminal. Monitor closely.     ENDO:    Glucose goal 140-180  ISS    GI:    TF @ 40cc/hr. NPO for VPS  Off free water as Na drifting down  Thin liquid diet x3 weeks (as per bariatric) when tolerating  PPI per bariatric surgeon.  DCed miralax, senna re: rectal tube. Liquid stool- resolved .   PEG to be placed by GI at some point.    RENAL: Hypernatremia- induced  Maintain euvolemia   Na goal 145-150.  Bolus as needed (1L )  Severe hypokalemia 2/2 enteric losses. Replete aggressively. Improving.     HEM/ONC:   ASA 81mg- hold  pm for OR  VTE Prophylaxis: SCDs, SQL (hold for OR)  LE Duplex : Acute completely occlusive deep venous thrombosis of the right intramuscular calf vein, s/p IVCF with vascular . Repeat  showed new b/l peroneal DVTs.   Scattered segmental pulmonary emboli noted on CTA.   Repeat dopplers : New L calf DVT. Repeat dopplers .   Increased lovenox to 60mg bid   Anti Xa level on 60mg bid: due on  at 22:00.  Hypercoagulable w/u- c/w heterozygous prothrombin gene mutation.     ID: Persistent fever, leukocytosis.  Was on empiric Ceftriaxone () for aspiration PNA. Changed to Ancef . WBC trending down.   (Previously was on empiric Vanc/zoysn for fever d/c'd (8/10-), restarted  for persistent fevers, dc'd again on )   CSF on , NGTD. Pancx , NGTD,  NGTD   Repeat cultures  (blood, sputum, CSF, UA)- sputum GPC in pairs- MSSA. Changed to Ancef.  Infectious disease consulted  re: persistent fevers and clearance for surgery. Patient has been cleared for VPS on .  Repeat all cultures  per ID including cultures off central line, blood, CSF.  Diarrhea- resolved.   Remove R IJ  due to persistent fevers. Placed PICC.  Thrush; nystatin.  Left axilla: area of induration ~6x 4cm. Non tender, no erythema, non fluctuant. Doubtful of infection. Follow up ultrasound. Warm compress. Monitor area of demarcation.     Disposition: ICU. Family updated.  Full code.     Acces:   R IJ TLC ()  L axillary A line    *****    ATTENDING ATTESTATION:  I was physically present for the key portions of the evaluation and management (E/M) service provided.  I agree with the above history, physical and plan, which I have reviewed and edited where appropriate.    Patient at high risk for neurological deterioration or death due to: Sepsis, aspiration PNA, DVT / PE.  Critical care time:  I have personally provided 45 minutes of critical care time, excluding time spent on separate procedures.      Plan discussed with RN, house staff.       46 y/o F with h/o recent gastric sleeve (21 [facility], Dr. Crisostomo ), fibromyalgia, thyroid dysfunction, PTSD, depression, anxiety.  Presented with seizures at home - brought to [place], with 1 more episode of seizure en route.  Intubated, CT showed SAH with IV extension, casted IV, hydrocephalus, given mannitol, levetiracetam 1g,  6mg ativan. Started on Cardene drip for BP goal < 140 and transferred to [facility] NICU for further management. HH5 MF4, BD 1 = . Now s/p cerebral angiogram for R vertebral artery takedown for R vertebral dissecting aneurysm (), and R frontal EVD placement (), now s/p IVC filter placement (). BD 21.    NEURO:   Neurochecks Q1h.  EVD- clamped for  shunt .   CT head and abd  status post  shunt.   Status post CTH : Decreased size in ventricles, stable.   Angio demonstrating vasospasm of Right ICA, right PCOMM, Left distal vert and basilar confluence, and received IA verapamil on . 8/. 8/. .   CTA : Spasm improved RMCA. Similar in other territories.  Repeat CTA : Improved vasospasm of proximal basilar artery, persistent vasospasm and moderate narrowing of the distal one half of the basilar artery. Posterior cerebral artery P1 and P2 segments appear patent with multifocal vasospasm, not significantly changed.  Nimodipine 60 mg PO Q4h- held for augmentation.  Possible clinical seizures at home. Was on EEG (see below).   EE/18- 10 second event at 6:42 on , that could not be determined if epileptic vs. artifact.   There were non-specific indicators of bilateral temporal dysfunction and epileptic potential.   If there is persistent suspicion for continued seizures or seizure-like activity, a continuous video-electroencephalogram with the 10-20 international system is advised.  D/C VPA. Continue only monotherapy with Vimpat 100 mg BID only. Follow clinically  Cont ASA 81mg (held  for OR)  Continue Bromocriptine 15mg Q8. Continue Ritalin for neurostimulation.   Continue aggressive PT     PULM:    Extubated   Duonebs and 3% nebs standing  Continue aggressive chest PT.   CXR clear , has atelectasis, ?R infiltrate  Aspiration precautions, requires frequent suctioning (Q2 hr)  Currently on Ancef for likely tracheitis: until   High aspiration risk.     CARDIO:    -220. Decrease -220 on  once OK with NSGY  Levophed gtt to maintain within parameters.   Daily EKGs and troponin while augmenting BP. Wean levophed as tolerated.  TTE  WNL    PICC line.  Carotid dopplers showed no pseudoaneurysm. CTA neck showed small hematoma -extraluminal. Monitor closely.     ENDO:    Glucose goal 140-180  ISS      GI:    NPO for VPS  Off free water as Na drifting down  Thin liquid diet x3 weeks (as per bariatric) when tolerating  PPI per bariatric surgeon.  Bowel regimen  PEG to be placed by GI at some point next week.   LFTs wnl    RENAL: Hypernatremia- induced  Maintain euvolemia   Na goal 145-150.  Bolus as needed (1L )  Severe hypokalemia 2/2 enteric losses. Repleted aggressively.  Resolved.    HEM/ONC:   ASA 81mg- hold  pm for OR  VTE Prophylaxis: SCDs, SQL. Increased lovenox to 60mg bid  (hold for OR)  LE Duplex : Acute completely occlusive deep venous thrombosis of the right intramuscular calf vein, s/p IVCF with vascular . Repeat  showed new b/l peroneal DVTs. Scattered segmental pulmonary emboli noted on CTA.   Repeat dopplers : New L calf DVT. Repeat dopplers .   Anti Xa level on 60mg bid when restarted on lovenox post op  Hypercoagulable w/u- c/w heterozygous prothrombin gene mutation.     ID: Persistent fever, leukocytosis.  Was on empiric Ceftriaxone () for aspiration PNA. Changed to Ancef . WBC trending down.   (Previously was on empiric Vanc/zoysn for fever d/c'd (8/10-), restarted  for persistent fevers, dc'd again on )   CSF on , NGTD. Pancx , NGTD,  NGTD   Repeat cultures  (blood, sputum, CSF, UA)- sputum GPC in pairs- MSSA. Changed to Ancef- end date  (7 days total).  Infectious disease consulted  re: persistent fevers and clearance for surgery. Patient has been cleared for VPS on .  Repeat all cultures  per ID including cultures off central line, blood, CSF--> preliminary negative.  Diarrhea- resolved.   Removed R IJ  due to persistent fevers.  Thrush; nystatin.  Left axilla: area of induration ~6x 4cm. Non tender, no erythema, non fluctuant. Doubtful of infection. Follow up ultrasound. Warm compresses. Improving. Monitor area of demarcation.     Disposition: ICU.   Social: Family updated at bedside.   Full code.     Acces:   PICC R basilic  L axillary A line    *****    ATTENDING ATTESTATION:  I was physically present for the key portions of the evaluation and management (E/M) service provided.  I agree with the above history, physical and plan, which I have reviewed and edited where appropriate.    Patient at high risk for neurological deterioration or death due to: Sepsis, aspiration PNA, DVT / PE.  Critical care time:  I have personally provided 45 minutes of critical care time, excluding time spent on separate procedures.      Plan discussed with RN, house staff.       46 y/o F with h/o recent gastric sleeve (21 [facility], Dr. Crisostomo ), fibromyalgia, thyroid dysfunction, PTSD, depression, anxiety.  Presented with seizures at home - brought to [place], with 1 more episode of seizure en route.  Intubated, CT showed SAH with IV extension, casted IV, hydrocephalus, given mannitol, levetiracetam 1g,  6mg ativan. Started on Cardene drip for BP goal < 140 and transferred to [facility] NICU for further management. HH5 MF4, BD 1 = . Now s/p cerebral angiogram for R vertebral artery takedown for R vertebral dissecting aneurysm (), and R frontal EVD placement (), now s/p IVC filter placement (). BD 21.    NEURO:   Neurochecks Q1h.  Hydrocephalus: EVD- clamped for  shunt .   CT head and abd  status post  shunt.   CTH : Decreased size in ventricles, stable.   Vasospasm: Angio demonstrating vasospasm of right ICA, right PCOMM, left distal vert and basilar confluence. Pt received IA verapamil on , , , .   CTA : Spasm improved RMCA. Similar in other territories.  Repeat CTA : Improved vasospasm of proximal basilar artery, persistent vasospasm and moderate narrowing of the distal one half of the basilar artery. Posterior cerebral artery P1 and P2 segments appear patent with multifocal vasospasm, not significantly changed.  DCI prophylaxis: Nimodipine 60 mg PO Q4h- held for BP augmentation.  Clinical seizures at home. Was on EEG (see below).   EE/18- 10 second event at 6:42 on , that could not be determined if epileptic vs. artifact.   There were non-specific indicators of bilateral temporal dysfunction and epileptic potential.   If there is persistent suspicion for continued seizures or seizure-like activity, a continuous video-electroencephalogram with the 10-20 international system is advised.  D/Hammad VPA. Continue only monotherapy with Vimpat 100 mg BID only. Follow clinically  Cont ASA 81mg (held  for OR)  Centra fevers: Continue Bromocriptine 15mg Q8.  Neurostimulation: Continue Ritalin for neurostimulation.   Continue aggressive PT/OT as tolerated.     PULM: Tracheitis  Extubated   Duonebs and 3% nebs standing  Continue aggressive chest PT.   CXR clear , has atelectasis, ?R infiltrate  Aspiration precautions, requires frequent suctioning (Q2 hr)  Currently on Ancef for likely tracheitis: until   High aspiration risk.     CARDIO:    -220. Decrease -220 on  once OK with NSGY  Levophed gtt to maintain within parameters.   Daily EKGs and troponin while augmenting BP. Wean levophed as tolerated.  TTE  WNL    PICC line.  Carotid dopplers showed no pseudoaneurysm. CTA neck showed small hematoma -extraluminal. Monitor closely.     ENDO:    Glucose goal 140-180  ISS      GI:    Nutrition: NPO for VPS  Off free water as Na drifting down.  Thin liquid diet x3 weeks (as per bariatric) when tolerating.  GI ppx: PPI per bariatric surgeon.  Bowel regimen: receiving. Last bowel movement ~  PEG to be placed by GI at some point next week.   LFTs wnl    RENAL: Hypernatremia- induced  Maintain euvolemia   Na goal 145-150.  Severe hypokalemia 2/2 enteric losses. Repleted aggressively.  Resolved.    HEM/ONC: PE, DVT.  ASA 81mg- Held  pm for OR  VTE Prophylaxis: SCDs, SQL. Increased lovenox to 60mg bid (due to high VTE burden/risk)  (held for OR )  LE Duplex : Acute completely occlusive deep venous thrombosis of the right intramuscular calf vein, s/p IVCF with vascular . Repeat  showed new b/l peroneal DVTs. Scattered segmental pulmonary emboli noted on CTA.   Repeat dopplers : New L calf DVT. Repeat dopplers .   Anti Xa level on 60mg bid when restarted on lovenox post op  Hypercoagulable w/u- c/w heterozygous prothrombin gene mutation.     ID: Persistent fever, leukocytosis.  Was on empiric Ceftriaxone () for aspiration PNA. Changed to Ancef . WBC trending down.   Repeat cultures  (blood, sputum, CSF, UA). Found MSSA. Changed to Ancef- end date  (7 days total). Other cx NGTD.   Repeat all cultures  per ID including cultures off central line, blood, CSF--> preliminary negative.  Diarrhea- resolved.   Removed R IJ  due to persistent fevers.  Thrush; nystatin swish spit  Left axilla: area of induration ~6x 4cm. Non tender, no erythema, non fluctuant. Doubtful of infection. Warm compresses. Improving. Monitor area of demarcation.   Infectious disease consulted  re: persistent fevers and clearance for surgery. Patient cleared for VPS on .    Disposition: ICU.   Social: Family updated at bedside.   Full code.     Acces:   PICC R basilic  L axillary A line    *****    ATTENDING ATTESTATION:  I was physically present for the key portions of the evaluation and management (E/M) service provided.  I agree with the above history, physical and plan, which I have reviewed and edited where appropriate.    Patient at high risk for neurological deterioration or death due to: Sepsis, aspiration PNA, DVT / PE.  Critical care time:  I have personally provided 45 minutes of critical care time, excluding time spent on separate procedures.      Plan discussed with RN, house staff.

## 2021-08-27 NOTE — PROGRESS NOTE ADULT - SUBJECTIVE AND OBJECTIVE BOX
NEUROSURGERY POST OP NOTE:    POD# 0 S/P Right VPS Certas @4    S: Patient seen in ICU, no complaints at this time.       T(C): 36.1 (08-27-21 @ 18:15), Max: 38.1 (08-27-21 @ 11:00)  HR: 90 (08-27-21 @ 21:00) (81 - 107)  BP: --  RR: 12 (08-27-21 @ 21:00) (12 - 22)  SpO2: 99% (08-27-21 @ 21:00) (92% - 100%)      08-26-21 @ 07:01  -  08-27-21 @ 07:00  --------------------------------------------------------  IN: 2922.3 mL / OUT: 1902 mL / NET: 1020.3 mL    08-27-21 @ 07:01  -  08-27-21 @ 22:36  --------------------------------------------------------  IN: 1638.7 mL / OUT: 3074 mL / NET: -1435.3 mL        acetaminophen    Suspension .. 1000 milliGRAM(s) Oral every 6 hours  acetylcysteine 20%  Inhalation 4 milliLiter(s) Inhalation every 6 hours  albuterol/ipratropium for Nebulization 3 milliLiter(s) Nebulizer every 6 hours  amantadine Syrup 200 milliGRAM(s) Oral every 12 hours  bisacodyl Suppository 10 milliGRAM(s) Rectal daily PRN  bromocriptine Capsule 15 milliGRAM(s) Oral every 8 hours  ceFAZolin   IVPB 2000 milliGRAM(s) IV Intermittent every 8 hours  chlorhexidine 0.12% Liquid 15 milliLiter(s) Oral Mucosa every 12 hours  chlorhexidine 2% Cloths 1 Application(s) Topical <User Schedule>  chlorhexidine 2% Cloths 1 Application(s) Topical <User Schedule>  glucagon  Injectable 1 milliGRAM(s) IntraMuscular once  insulin lispro (ADMELOG) corrective regimen sliding scale   SubCutaneous every 6 hours  lacosamide Solution 100 milliGRAM(s) Oral two times a day  methylphenidate 10 milliGRAM(s) Oral daily  norepinephrine Infusion 0.05 MICROgram(s)/kG/Min IV Continuous <Continuous>  nystatin    Suspension 879555 Unit(s) Oral four times a day  ondansetron Injectable 4 milliGRAM(s) IV Push every 6 hours PRN  senna 2 Tablet(s) Oral at bedtime  sodium chloride 0.9% lock flush 10 milliLiter(s) IV Push every 1 hour PRN  sodium chloride 0.9%. 1000 milliLiter(s) IV Continuous <Continuous>  sodium chloride 3%  Inhalation 4 milliLiter(s) Inhalation every 6 hours      RADIOLOGY:     Exam:  General: NAD, pt is comfortably sitting up in bed, on room air  HEENT: CN II-XII grossly intact, Right pupil 4mm briskly reactive, Left 3mm briskly reactive, EOMI b/l, face symmetric, tongue midline, neck FROM, Right gaze preference but can cross to left. +NGT  Cardiovascular: RRR, normal S1 and S2, small Left neck hematoma   Respiratory: lungs CTAB, no wheezing, rhonchi, or crackles   GI: normoactive BS to auscultation, abd soft, NTND, +abdominal incision.  Neuro: A&Ox2 (not orientated to time), hypophonic. Follows commands (squeeze and let go to command, shows 2 fingers, opens and closes eyes to commands)  CALEL x4 spontaneously moves L >R. SILT throughout   Extremities: distal pulses 2+ x4  Wound/incision: +Craniotomy incision with dressing in place, C/D/I, +abdominal incision with dressing in place, C/D/I      Assessment: 45yFemale  46y/o F with h/o recent gastric sleeve (08/04/21 [facility], Dr. Crisostomo ), fibromyalgia, thyroid dysfunction, PTSD, depression, anxiety.  Presented with seizures at home - brought to [place], with 1 more episode of seizure en route.  Intubated, CT showed SAH with IV extension, casted IV, hydrocephalus, given mannitol, levetiracetam 1g,  6mg ativan. Started on Cardene drip for BP goal < 140 and transferred to [facility] NICU for further management. HH5 MF4, BD 1 = 8/7. Now s/p cerebral angiogram for R vertebral artery takedown for R vertebral dissecting aneurysm (8/7), and R frontal EVD placement (8/7), now s/p IVC filter placement (8/12,) s/p angio IA verapamil 8/16, 8/17, 8/18, 8/20. Now s/p right VPS certas at 4 (8/27/2021)    Plan:  NEURO:   - neurochecks q1h   - s/p CTH 8/20: decreased size in ventricles, stable.   - CTH and AP stable post op 8/27, some ventriculomegaly   - Depakote dc'd   - Vimpat 100mg bid   - EEG no seizures x 2 days , D/c'ed  - cont ASA 81  - CTH 8/15: worsened uncal herniation, worsened hydro, vasospasm in b/l PCA, L vert, basilar arteries  - Bromocriptine 15mg Q8  - Ritalin and Amantadine for neurostim  - Angio demonstrating vasospasm of Right ICA, right PCOMM, Left distal vert and basilar confluence, and received IA verapamil on 8/16. 8/17. 8/18. 8/20.   - CTH/CTA 8/22- stable, R MCA improved spasm     PULM:    - self extubated 8/20  - copious thick secretion, standing, Duonebs, 3% nebs, mucomyst  - chest PT   - CXR - aspiration precautions, requires frequent suctioning  - Ancef for tracheitis    CARDIO:    - -150  - PICC line today, dc central line after PICC established   - levo gtt- attempt to taper off   - TTE 8/12 WNL    - Troponin stable   - EKG normal     ENDO:    - glucose goal 140 -180    GI:    - Resume tube feeds in AM  - free H2O 250 q8 dc'd  - needs thin liquid diet x 3 weeks as per bariatric when tolerated   - PPI per bariatric surgeon   - plan for GI Dr. Yaya Milner to do peg Monday, [phone], call to confirm    RENAL:   - Maintain euvolemia  - Na goal normal- downtrending   - albumin bolus yesterday to maintain CVP > 6  - free water 250 q6 dc'd   - straight cath prn     HEM/ONC:   - ASA 81 - held for OR   - VTE Prophylaxis: SCDs, SQL 60 BID - held for OR   - Repeat dopplers 8/23, newly visualized acute DVT left intramuscular calf vein, persistent completely occlusive DVT b/l peroneal veins and right intramuscular calf vein  - carotid doppler 8/24: neg for pseudoaneurysm, CTA shows soft tissue small hematoma, non-occlusive L IJ and L basillic vein thrombus   - hypercoagulable w/u: - prothrombin gene mutation - heterozygous   - pending repeat dopplers 8/30  - Factor Xa trend once back on SQL  - Prothrombin gene mutation - Heterozygous   - 8/26 LUE doppler with findings of left IJ non-occlusive DVT and left basilic thrombus    ID:   - MRSA neg 8/12   - Febrile, trend leukocytosis   - Last Pancx 8/23   - Ancef for MSSA pna coverage x 7 d (until 8/30)  - ID consulted, cleared for VPS, recommends Bcx today   - pending PICC line, will remove central line once PICC is established     Assessment and plan discussed with Dr. Lomax, Dr. Conn and Dr. Bernard

## 2021-08-27 NOTE — PROGRESS NOTE ADULT - SUBJECTIVE AND OBJECTIVE BOX
==============================================   NEUROCRITICAL CARE ATTENDING NOTE   ==============================================    LUDMILA PRIETO  [id number]  Summary:  44 y/o F with h/o recent gastric sleeve (08/04 [facility], Dr. Crisostomo ), fibromyalgia, thyroid dysfunction, PTSD, depression, anxiety.  Presented with seizures at home - brought to [place], with 1 more episode of seizure en route.  Intubated, CT showed SAH with IV extension, casted IV, hydrocephalus, given mannitol levetiracetam 6mg ativan, transferred to [facility].    Hospital Course:   8/7: Tx from [place] for SAH. Intubated. R frontal EVD placed, central line and a line placed. 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   8/8: POD 1 s/p angio with R vert takedown, extubated, desatting on aerosol mask, required extensive suctioning, 3% nebs, chest PT, started on low dose precedex drip for restlessness/agitation, ABG stable. NGT placed. TF started with goal 20 pending nutrition recs. 3% stopped for Na 150. Ceribell EEG negative and d/c'ed.   8/9 Overnight, new Right pronator drift and right gaze preference that can't cross midline, Repeat CTH demonstrated stable vents, CTA head and neck unremarkable for spasm, hypoattenuation of right cerebellum edema vs. infarct.  8/10: POD 3 R vert takedown, EVD open at [id number]. ASHA overnight, neuro stable. CTH with increased hydro, CTA head/neck with mild basilar spasm, but concern for PE. 1/2 am D50 for hypoglycemia. 1L bolus then 100 cc/hr, PM rounds for net negative fluid balance and mild vasospasm on CTA.   8/11: POD 4 R vert takedown. EVD at 5cm H2O, ASHA overnight. neuro stable.   Febrile 104F. Depakote increased to q6. NCHCT stable. EVD lowered to 0. Lasix 20 given for dieuresis, UOP over 2 liters. Sedation given for a-line placement, BP drop needing levo.  8/12: POD 5 R vert takedown. EVD at 0cm H2O. ASHA overnight, neuro stable. Precedex being weaned off. Pending IVCF with vascular today.  8/13: POD 6 R vert takedown. EVD open at 0cmH2O. ASHA overnight. Neuro exam stable. Mottled skin on the left lower extremity improving. Started on Bromocriptine 15mg Q8.   8/14: POD 7. EVD open at 0. 1L bolus given for euvolemia o/n. neuro stable. CTH stable. ENT scoped vocal cords, +R vocal cord paresis, NTD. started on zosyn empirically.   8/15: POD 8. EVD open at 0. ASHA o/n, neuro stable. Pt developed increased work of breathing, unable to clear her secretions, received racemic epi and multiple nebulizer treatments, still with stridor. Patient re-intubated at bedside, on full vent support.   8/16: CTH/CTA head/neck/CT chest performed o/n for worsened exam, R gaze preference, sluggish pupils. +worsened uncal herniation, hydro, vasospasm in b/l PCAs, L vert, basilar arteries. preop angio today, restarted on 3% for Na goal 145-150  8/17: POD 10. Overnight Pt remains on levophed drip for SBP goal 180-220. Also remains on propofol drip. EVD open at -5cmH2O. ICPs WNL. Febrile 101F and pancultured. CTH today shows slightly smaller ventricles.   8/18: POD 11 R vert takedown. POD1 angio IA verpamil. Overnight, Pt noted to have downward gaze to the right and not following commands. Taken for stat head CT which is stable. Pupils also noted to be aniscoric left pupil 4mm and brisk and right 2mm brisk. Mannitol 50g given. Ceribell EEG placed for concern of subclinical seizures, so far appears negative for seizures. 1L NS o/n and 1.5L NS bolus in AM for euvolemia. vanc/zosyn stopped. 250cc 3% bolus and 250cc albumin given in AM. Brief seizure to L frontal lobe this AM on EEG, given 2g valproic acid and dose increased to 1g q8. Vimpat 100mg BID started.  In afternoon patient self-extubated, placed on NRB with mucomyst, 3% inhalation and duonebs. 3% at 50 d/c'd.  8/19: POD 12. Remains on VEEG. Axillary A line placed. Salt tabs d/c'd, florinef decreased to 0.1mg, EVD @ -5cm H2O, now draining 20cc/hr. 250 albumin and 500 NS boluses on dayshift, 1 L LR bolus  8/20: POD 13. ASHA. on VEEG, no seizures noted. Pending VPA level. 500 NS, 250 albumin. Febrile, pancultured. EEG d/c'ed.   8/21: BD 14, POD14. ASHA overnight, EVD @ -5. s/p 1L bolus for euvolemia  8/22: BD 15: Continued on levophed, hypercoagulability profile pending, EVD 15 mL/h, euvolemia, extubated, amantadine added, free water started for hypernatremia; continuing LOC fluctuations (stable vasospasm), frequent suctioning; empiric ceftriaxone started. Tmax 101. Has thick secretions.  CTA done. 8/23: Neuroexam stable. Febrile. Pancultures done.   8/24: Exam has not been BP dependent. Relaxed -220. IJ- carotid stick. Lovenox held  8/25: BD 19. CTA 8/25: Improved vasospasm of proximal basilar artery, persistent vasospasm and moderate narrowing of the distal one half of the basilar artery. Posterior cerebral artery P1 and P2 segments appear patent with multifocal vasospasm, not significantly changed.  8/26: BD20. Preop for VPS 8/27.     Past Medical History:  s/p gastric sleeve surgery  Allergies:  Allergy Status Unknown  Home Meds:      ICU Vital Signs Last 24 Hrs:  T(C): 36.9 (27 Aug 2021 06:00), Max: 38.6 (26 Aug 2021 18:00)  T(F): 98.5 (27 Aug 2021 06:00), Max: 101.5 (26 Aug 2021 18:00)  HR: 103 (27 Aug 2021 06:00) (83 - 108)  BP: 147/86 (26 Aug 2021 11:00) (143/77 - 162/80)  BP(mean): 109 (26 Aug 2021 11:00) (103 - 114)  ABP: 152/84 (27 Aug 2021 06:00) (134/82 - 165/93)  ABP(mean): 112 (27 Aug 2021 06:00) (102 - 126)  RR: 20 (27 Aug 2021 06:00) (17 - 22)  SpO2: 97% (27 Aug 2021 06:00) (93% - 100%)      08-26-21 @ 07:01  -  08-27-21 @ 07:00  --------------------------------------------------------  IN: 2912.3 mL / OUT: 2287 mL / NET: 625.3 mL        PHYSICAL EXAMINATION:  AOx2 (person, place) with choices, hypophonic, face symmetric. R gaze preference, can overcome, pupils 3mm and reactive. Has a weak cough   Squeezes fingers to command b/l UE, shows thumbs up, spontaneous antigravity in B/L uppers, BLE spontaneous bends knees.   EENT:  Anicteric, thrush noted  CARDIOVASCULAR: (+) S1 S2, normal rate and regular rhythm  PULMONARY: Decreased  ABDOMEN: Soft, nontender with normoactive bowel sounds  EXTREMITIES: No edema  SKIN: No rash. Left axilla area of induration ~6x 4cm. Non tender, no erythema, non fluctuant.     MEDICATIONS:   acetaminophen    Suspension .. 650 milliGRAM(s) Oral every 6 hours PRN  acetaminophen    Suspension .. 1000 milliGRAM(s) Oral every 6 hours  acetylcysteine 20%  Inhalation 4 milliLiter(s) Inhalation every 6 hours  albuterol/ipratropium for Nebulization 3 milliLiter(s) Nebulizer every 6 hours  amantadine Syrup 200 milliGRAM(s) Oral every 12 hours  bromocriptine Capsule 15 milliGRAM(s) Oral every 8 hours  ceFAZolin   IVPB 2000 milliGRAM(s) IV Intermittent every 8 hours  chlorhexidine 0.12% Liquid 15 milliLiter(s) Oral Mucosa every 12 hours  chlorhexidine 2% Cloths 1 Application(s) Topical <User Schedule>  chlorhexidine 2% Cloths 1 Application(s) Topical <User Schedule>  glucagon  Injectable 1 milliGRAM(s) IntraMuscular once  insulin lispro (ADMELOG) corrective regimen sliding scale   SubCutaneous every 6 hours  lacosamide Solution 100 milliGRAM(s) Oral two times a day  methylphenidate 10 milliGRAM(s) Oral daily  norepinephrine Infusion 0.052 MICROgram(s)/kG/Min (4.79 mL/Hr) IV Continuous <Continuous>  nystatin    Suspension 630071 Unit(s) Oral four times a day  ondansetron Injectable 4 milliGRAM(s) IV Push every 6 hours PRN  potassium phosphate IVPB 30 milliMole(s) IV Intermittent once  povidone iodine 5% Nasal Swab 1 Application(s) Both Nostrils once  sodium chloride 0.9% lock flush 10 milliLiter(s) IV Push every 1 hour PRN  sodium chloride 0.9%. 1000 milliLiter(s) (75 mL/Hr) IV Continuous <Continuous>  sodium chloride 3%  Inhalation 4 milliLiter(s) Inhalation every 6 hours               LABS:              8.4    9.00  )-----------( 350      ( 27 Aug 2021 03:11 )             28.3     08-27    142  |  105  |  12  ----------------------------<  127<H>  3.9   |  27  |  0.54    Ca    9.3      27 Aug 2021 03:11  Phos  2.1     08-27  Mg     1.9     08-27    TPro  6.8  /  Alb  3.9  /  TBili  0.3  /  DBili  <0.2  /  AST  28  /  ALT  24  /  AlkPhos  80  08-27    LIVER FUNCTIONS - ( 27 Aug 2021 03:11 )  Alb: 3.9 g/dL / Pro: 6.8 g/dL / ALK PHOS: 80 U/L / ALT: 24 U/L / AST: 28 U/L / GGT: x             CULTURES  Culture - Blood (collected 08-26-21 @ 12:48)  Source: .Blood Blood  Preliminary Report (08-27-21 @ 01:00):    No growth at 12 hours    Culture - Blood (collected 08-26-21 @ 12:48)  Source: .Blood Blood  Preliminary Report (08-27-21 @ 01:00):    No growth at 12 hours    Culture - CSF with Gram Stain (collected 08-26-21 @ 12:26)  Source: .CSF CSF  Gram Stain (08-26-21 @ 12:56):    No organisms seen    Rare White blood cells        Neuroimaging:  CT (08.19.2021) - 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 Stable ischemic changes within the right cerebellum. Right-sided EVD catheter in place with slight increase in ventricular size. Interval improvement in the intraventricular hemorrhage. Evolving areas of acute/subacute ischemia within the right cerebellum.    CTA (08.19.2021) -  Interval improvement in the caliber of the proximal vertebral artery with progression of a vasospasm involving the mid to distal basilar artery. Interval improvement in the vasospasm involving the left vertebral artery. Persistent vasospasm involving the posterior cerebral and superior cerebellar arteries. Interval improvement in the vasospasm involving the right supraclinoid ICA as well as the left A1 and M1 segments. Progression of vasospasm involving the right M1 segment.    Cerebral Angiogram (08.18.2021 - improvement in vasospasm, IA verapamil to posterior circulation  Cerebral Angiogram (08.17.2021 - Left vertebral artery dissection demonstrates continued moderate to severe cerebral vasospasm of the distal let vert and basilar artery, some what improved caliber of proximal left vert. 15mg IA Verapamil injected over 10 minutes with mild improvement of posterior circulation post treatment. Right ICA injection with continued mild supraclinoid vasospasm, 10mg of IA Verapamil injected.  Cerebral Angiogram (08.16.2021) - Left vertebral injection demonstrates moderate diffuse vasospasm of left vertebral artery, basilar artery and posterior circulation including PCAs. 15mg Verapamil injected via left vert with mild radiographic improvement post treatment. Right ICA injection with mild supraclinoid spasm/narrowing, otherwise no vasospasm appreciated in anterior circulation.    CT (08.18.2021) - No significant interval change in right superior and lateral cerebellar hypodensities.  CT (08.15.2021) - 1. Worsening the uncal herniation, with effacement of the bilateral suprasellar and perimesencephalic cisterns.  2. Redistribution of intraventricular hemorrhage, as above. Redemonstration of right EVD, with distal tip in the right frontal horn, near the foramen of Monro. Worsening hydrocephalus.  CTA - 1. Multifocal punctate and short segment stenoses, as above, with several areas of narrowing new as compared to recent CTA dated 08/09/2021. In the posterior circulation, stenoses are identified within the V4 left vertebral artery, the basilar artery, and in the bilateral PCA, more significant on the left.  Within the anterior circulation, stenosis are identified in the right supraclinoid C6 ICA as well as the proximal M1 MCA. Findings are most compatible with vasospasm.  2. In particular, the distal V4 right vertebral artery, which was visualized, though diminutive, on prior CTA dated 08/09/2021, does not fill with contrast on the present examination. The basilar artery demonstrates multiple stenoses, and is significantly smaller in caliber when compared to the prior CTA.    Other imaging:  Duplex 1.  Since 8/9/2021, there is new deep venous thrombosis in the bilateral peroneal veins.  2. Redemonstration of deep vein thrombosis in a right intramuscular calf vein.      [Code] Full  [Goals] Aggressive  [Disposition] ICU     ==============================================   NEUROCRITICAL CARE ATTENDING NOTE   ==============================================    LUDMILA PRIETO  MRN-[id number]  Summary:  44 y/o F with h/o recent gastric sleeve (08/04 [facility], Dr. Crisostomo ), fibromyalgia, thyroid dysfunction, PTSD, depression, anxiety.  Presented with seizures at home - brought to [place], with 1 more episode of seizure en route.  Intubated, CT showed SAH with IV extension, casted IV, hydrocephalus, given mannitol levetiracetam 6mg ativan, transferred to [facility].    Hospital Course:   8/7: Tx from [place] for SAH. Intubated. R frontal EVD placed, central line and a line placed. 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   8/8: POD 1 s/p angio with R vert takedown, extubated, desatting on aerosol mask, required extensive suctioning, 3% nebs, chest PT, started on low dose precedex drip for restlessness/agitation, ABG stable. NGT placed. TF started with goal 20 pending nutrition recs. 3% stopped for Na 150. Ceribell EEG negative and d/c'ed.   8/9 Overnight, new Right pronator drift and right gaze preference that can't cross midline, Repeat CTH demonstrated stable vents, CTA head and neck unremarkable for spasm, hypoattenuation of right cerebellum edema vs. infarct.  8/10: POD3 R vert takedown, EVD open at [id number]. ASHA overnight, neuro stable. CTH with increased hydro, CTA head/neck with mild basilar spasm, but concern for PE. 1/2 am D50 for hypoglycemia. 1L bolus then 100 cc/hr, PM rounds for net negative fluid balance and mild vasospasm on CTA.   8/11: POD 4 R vert takedown. EVD at 5cm H2O, ASHA overnight. neuro stable.   Febrile 104F. Depakote increased to q6. NCHCT stable. EVD lowered to 0. Lasix 20 given for dieuresis, UOP over 2 liters. Sedation given for a-line placement, BP drop needing levo.  8/12: POD 5 R vert takedown. EVD at 0cm H2O. ASHA overnight, neuro stable. Precedex being weaned off. Pending IVCF with vascular today.  8/13: POD 6 R vert takedown. EVD open at 0cmH2O. ASHA overnight. Neuro exam stable. Mottled skin on the left lower extremity improving. Started on Bromocriptine 15mg Q8.   8/14: POD 7. EVD open at 0. 1L bolus given for euvolemia o/n. neuro stable. CTH stable. ENT scoped vocal cords, +R vocal cord paresis, NTD. started on zosyn empirically.   8/15: POD 8. EVD open at 0. ASHA o/n, neuro stable. Pt developed increased work of breathing, unable to clear her secretions, received racemic epi and multiple nebulizer treatments, still with stridor. Patient re-intubated at bedside, on full vent support.   8/16: CTH/CTA head/neck/CT chest performed o/n for worsened exam, R gaze preference, sluggish pupils. +worsened uncal herniation, hydro, vasospasm in b/l PCAs, L vert, basilar arteries. preop angio today, restarted on 3% for Na goal 145-150  8/17: POD 10. Overnight Pt remains on levophed drip for SBP goal 180-220. Also remains on propofol drip. EVD open at -5cmH2O. ICPs WNL. Febrile 101F and pancultured. CTH today shows slightly smaller ventricles.   8/18: POD 11 R vert takedown. POD1 angio IA verpamil. Overnight, Pt noted to have downward gaze to the right and not following commands. Taken for stat head CT which is stable. Pupils also noted to be aniscoric left pupil 4mm and brisk and right 2mm brisk. Mannitol 50g given. Ceribell EEG placed for concern of subclinical seizures, so far appears negative for seizures. 1L NS o/n and 1.5L NS bolus in AM for euvolemia. vanc/zosyn stopped. 250cc 3% bolus and 250cc albumin given in AM. Brief seizure to L frontal lobe this AM on EEG, given 2g valproic acid and dose increased to 1g q8. Vimpat 100mg BID started.  In afternoon patient self-extubated, placed on NRB with mucomyst, 3% inhalation and duonebs. 3% at 50 d/c'd.  8/19: POD 12. Remains on VEEG. Axillary A line placed. Salt tabs d/c'd, florinef decreased to 0.1mg, EVD @ -5cm H2O, now draining 20cc/hr. 250 albumin and 500 NS boluses on dayshift, 1 L LR bolus  8/20: POD 13. ASHA. on VEEG, no seizures noted. Pending VPA level. 500 NS, 250 albumin. Febrile, pancultured. EEG d/c'ed.   8/21: BD 14, POD14. ASHA overnight, EVD @ -5. s/p 1L bolus for euvolemia  8/22: BD 15: Continued on levophed, hypercoagulability profile pending, EVD 15 mL/h, euvolemia, extubated, amantadine added, free water started for hypernatremia; continuing LOC fluctuations (stable vasospasm), frequent suctioning; empiric ceftriaxone started. Tmax 101. Has thick secretions.  CTA done. 8/23: Neuroexam stable. Febrile. Pancultures done.   8/24: Exam has not been BP dependent. Relaxed -220. IJ- carotid stick. Lovenox held  8/25: BD 19. CTA 8/25: Improved vasospasm of proximal basilar artery, persistent vasospasm and moderate narrowing of the distal one half of the basilar artery. Posterior cerebral artery P1 and P2 segments appear patent with multifocal vasospasm, not significantly changed.  8/26: BD20. Preop for VPS 8/27. EVD has clamped.     Past Medical History:  s/p gastric sleeve surgery  Allergies:  Allergy Status Unknown      ICU Vital Signs Last 24 Hrs:  T(C): 36.9 (27 Aug 2021 06:00), Max: 38.6 (26 Aug 2021 18:00)  T(F): 98.5 (27 Aug 2021 06:00), Max: 101.5 (26 Aug 2021 18:00)  HR: 103 (27 Aug 2021 06:00) (83 - 108)  BP: 147/86 (26 Aug 2021 11:00) (143/77 - 162/80)  BP(mean): 109 (26 Aug 2021 11:00) (103 - 114)  ABP: 152/84 (27 Aug 2021 06:00) (134/82 - 165/93)  ABP(mean): 112 (27 Aug 2021 06:00) (102 - 126)  RR: 20 (27 Aug 2021 06:00) (17 - 22)  SpO2: 97% (27 Aug 2021 06:00) (93% - 100%)      08-26-21 @ 07:01  -  08-27-21 @ 07:00  --------------------------------------------------------  IN: 2912.3 mL / OUT: 2287 mL / NET: 625.3 mL        PHYSICAL EXAMINATION:  AOx2 (person, place) with choices, hypophonic, face symmetric. R gaze preference, can overcome, pupils 3mm and reactive. Has a weak cough   Squeezes fingers to command b/l UE, shows thumbs up, spontaneous antigravity in B/L uppers, BLE spontaneous bends knees.   EENT:  Anicteric, thrush noted  CARDIOVASCULAR: (+) S1 S2, normal rate and regular rhythm  PULMONARY: Decreased  ABDOMEN: Soft, nontender with normoactive bowel sounds  EXTREMITIES: No edema  SKIN: No rash. Left axilla area of induration ~6x 4cm. Non tender, no erythema, non fluctuant.       MEDICATIONS:   acetaminophen    Suspension .. 650 milliGRAM(s) Oral every 6 hours PRN  acetaminophen    Suspension .. 1000 milliGRAM(s) Oral every 6 hours  acetylcysteine 20%  Inhalation 4 milliLiter(s) Inhalation every 6 hours  albuterol/ipratropium for Nebulization 3 milliLiter(s) Nebulizer every 6 hours  amantadine Syrup 200 milliGRAM(s) Oral every 12 hours  bromocriptine Capsule 15 milliGRAM(s) Oral every 8 hours  ceFAZolin   IVPB 2000 milliGRAM(s) IV Intermittent every 8 hours  chlorhexidine 0.12% Liquid 15 milliLiter(s) Oral Mucosa every 12 hours  chlorhexidine 2% Cloths 1 Application(s) Topical <User Schedule>  chlorhexidine 2% Cloths 1 Application(s) Topical <User Schedule>  glucagon  Injectable 1 milliGRAM(s) IntraMuscular once  insulin lispro (ADMELOG) corrective regimen sliding scale   SubCutaneous every 6 hours  lacosamide Solution 100 milliGRAM(s) Oral two times a day  methylphenidate 10 milliGRAM(s) Oral daily  norepinephrine Infusion 0.052 MICROgram(s)/kG/Min (4.79 mL/Hr) IV Continuous <Continuous>  nystatin    Suspension [id number] Unit(s) Oral four times a day  ondansetron Injectable 4 milliGRAM(s) IV Push every 6 hours PRN  potassium phosphate IVPB 30 milliMole(s) IV Intermittent once  povidone iodine 5% Nasal Swab 1 Application(s) Both Nostrils once  sodium chloride 0.9% lock flush 10 milliLiter(s) IV Push every 1 hour PRN  sodium chloride 0.9%. 1000 milliLiter(s) (75 mL/Hr) IV Continuous <Continuous>  sodium chloride 3%  Inhalation 4 milliLiter(s) Inhalation every 6 hours               LABS:              8.4    9.00  )-----------( 350      ( 27 Aug 2021 03:11 )             28.3     08-27    142  |  105  |  12  ----------------------------<  127<H>  3.9   |  27  |  0.54    Ca    9.3      27 Aug 2021 03:11  Phos  2.1     08-27  Mg     1.9     08-27    TPro  6.8  /  Alb  3.9  /  TBili  0.3  /  DBili  <0.2  /  AST  28  /  ALT  24  /  AlkPhos  80  08-27    LIVER FUNCTIONS - ( 27 Aug 2021 03:11 )  Alb: 3.9 g/dL / Pro: 6.8 g/dL / ALK PHOS: 80 U/L / ALT: 24 U/L / AST: 28 U/L / GGT: x             CULTURES  Culture - Blood (collected 08-26-21 @ 12:48)  Source: .Blood Blood  Preliminary Report (08-27-21 @ 01:00):    No growth at 12 hours    Culture - Blood (collected 08-26-21 @ 12:48)  Source: .Blood Blood  Preliminary Report (08-27-21 @ 01:00):    No growth at 12 hours    Culture - CSF with Gram Stain (collected 08-26-21 @ 12:26)  Source: .CSF CSF  Gram Stain (08-26-21 @ 12:56):    No organisms seen    Rare White blood cells        Neuroimaging:  CT (08.19.2021) - 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 Stable ischemic changes within the right cerebellum. Right-sided EVD catheter in place with slight increase in ventricular size. Interval improvement in the intraventricular hemorrhage. Evolving areas of acute/subacute ischemia within the right cerebellum.    CTA (08.19.2021) -  Interval improvement in the caliber of the proximal vertebral artery with progression of a vasospasm involving the mid to distal basilar artery. Interval improvement in the vasospasm involving the left vertebral artery. Persistent vasospasm involving the posterior cerebral and superior cerebellar arteries. Interval improvement in the vasospasm involving the right supraclinoid ICA as well as the left A1 and M1 segments. Progression of vasospasm involving the right M1 segment.    Cerebral Angiogram (08.18.2021 - improvement in vasospasm, IA verapamil to posterior circulation  Cerebral Angiogram (08.17.2021 - Left vertebral artery dissection demonstrates continued moderate to severe cerebral vasospasm of the distal let vert and basilar artery, some what improved caliber of proximal left vert. 15mg IA Verapamil injected over 10 minutes with mild improvement of posterior circulation post treatment. Right ICA injection with continued mild supraclinoid vasospasm, 10mg of IA Verapamil injected.  Cerebral Angiogram (08.16.2021) - Left vertebral injection demonstrates moderate diffuse vasospasm of left vertebral artery, basilar artery and posterior circulation including PCAs. 15mg Verapamil injected via left vert with mild radiographic improvement post treatment. Right ICA injection with mild supraclinoid spasm/narrowing, otherwise no vasospasm appreciated in anterior circulation.    CT (08.18.2021) - No significant interval change in right superior and lateral cerebellar hypodensities.  CT (08.15.2021) - 1. Worsening the uncal herniation, with effacement of the bilateral suprasellar and perimesencephalic cisterns.  2. Redistribution of intraventricular hemorrhage, as above. Redemonstration of right EVD, with distal tip in the right frontal horn, near the foramen of Monro. Worsening hydrocephalus.  CTA - 1. Multifocal punctate and short segment stenoses, as above, with several areas of narrowing new as compared to recent CTA dated 08/09/2021. In the posterior circulation, stenoses are identified within the V4 left vertebral artery, the basilar artery, and in the bilateral PCA, more significant on the left.  Within the anterior circulation, stenosis are identified in the right supraclinoid C6 ICA as well as the proximal M1 MCA. Findings are most compatible with vasospasm.  2. In particular, the distal V4 right vertebral artery, which was visualized, though diminutive, on prior CTA dated 08/09/2021, does not fill with contrast on the present examination. The basilar artery demonstrates multiple stenoses, and is significantly smaller in caliber when compared to the prior CTA.    Other imaging:  Duplex 1.  Since 8/9/2021, there is new deep venous thrombosis in the bilateral peroneal veins.  2. Redemonstration of deep vein thrombosis in a right intramuscular calf vein.      [Code] Full  [Goals] Aggressive  [Disposition] ICU     ==============================================   NEUROCRITICAL CARE ATTENDING NOTE   ==============================================    LUDMILA PRIETO  MRN-[id number]  Summary:  46 y/o F with h/o recent gastric sleeve (08/04 [facility], Dr. Crisostomo ), fibromyalgia, thyroid dysfunction, PTSD, depression, anxiety.  Presented with seizures at home - brought to [place], with 1 more episode of seizure en route.  Intubated, CT showed SAH with IV extension, casted IV, hydrocephalus, given mannitol levetiracetam 6mg ativan, transferred to [facility].    Hospital Course:   8/7: Transferred from [place] for SAH. Intubated. R frontal EVD placed, central line and  Bricelyn placed. 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   8/8: POD 1 s/p angio with R vert takedown, extubated, desatting on aerosol mask, required extensive suctioning, 3% nebs, chest PT, started on low dose precedex drip for restlessness/agitation, ABG stable. 3% stopped for Na 150. Ceribell EEG negative and d/c'ed.   8/9 Overnight, new right pronator drift and right gaze preference that can't cross midline, Repeat CTH demonstrated stable vents, CTA head and neck unremarkable for spasm, hypoattenuation of right cerebellum edema vs. infarct.  8/10: POD3 R vert takedown, EVD open at 44gmZ1T. CTH with increased hydro. CTA head/neck with mild basilar spasm.  8/11: POD 4 R vert takedown. EVD at 5cm H2O. Febrile 104F. Depakote increased to q6. HCT stable. EVD lowered to 0.8/12: POD 5 R vert takedown. EVD at 0cm H2O. Pending IVCF with vascular.  8/13: POD 6 R vert takedown. EVD open at 0cmH2O. Mottled skin on the left lower extremity improving. Started on Bromocriptine 15mg Q8.   8/14: POD 7. EVD open at 0. ENT scoped vocal cords, +R vocal cord paresis, NTD. Started on zosyn empirically.   8/15: POD 8. EVD open at 0. Developed increased work of breathing, unable to clear her secretions, received racemic epi and multiple nebulizer treatments, however, still with stridor. Patient re-intubated at bedside, on full vent support.   8/16: CTH/CTA head/neck/CT chest performed o/n for worsened exam, R gaze preference, sluggish pupils. +worsened uncal herniation, hydrocephalus, vasospasm in b/l PCAs, L vert, basilar arteries. Angio today, restarted on 3% for Na goal 145-150.  8/17: POD 10. EVD open at -5cmH2O. ICPs WNL. Febrile 101F and pancultured.  8/18: POD 11. R vert takedown. POD1 angio IA verpamil. Overnight, Pt noted to have downward gaze to the right and not following commands. Taken for stat head CT- stable. Pupils also noted to be aniscoric left pupil 4mm and brisk and right 2mm brisk. Mannitol 50g given. Ceribell EEG placed for concern of subclinical seizures. Brief seizure to L frontal lobe. Given 2g valproic acid and dose increased to 1g q8. Vimpat 100mg BID started. Patient self-extubated, placed on NRB with mucomyst, 3% inhalation and duonebs. 3% at 50 d/c'd.  8/19: POD 12. Remains on VEEG. Axillary A line placed. Salt tabs d/c'd, florinef decreased to 0.1mg, EVD @ -5cm H2O draining 20cc/hr.   8/20: POD 13. VEEG, no seizures noted.  Febrile, pancultured.   8/22: BD 15: Continued on levophed for BP augmentation. Hypercoagulability profile. EVD 15 mL/h. Amantadine added, free water started for hypernatremia. Fluctuating level of consciousness. Frequent suctioning; empiric ceftriaxone started. Tmax 101.Thick secretions.  8/23: Neuroexam stable. Febrile. Pancultures done.   8/24: Exam has not been BP dependent. Relaxed -220. IJ- carotid stick. Lovenox held  8/25: BD 19. CTA 8/25: Improved vasospasm of proximal basilar artery, persistent vasospasm and moderate narrowing of the distal one half of the basilar artery. Posterior cerebral artery P1 and P2 segments appear patent with multifocal vasospasm, not significantly changed. SBP changed to 140-220. No change in exam. Weaning levophed.   8/26: BD20. Preop for VPS 8/27. EVD clamped.     Past Medical History:  s/p gastric sleeve surgery  Allergies:  Allergy Status Unknown      ICU Vital Signs Last 24 Hrs:  T(C): 36.9 (27 Aug 2021 06:00), Max: 38.6 (26 Aug 2021 18:00)  T(F): 98.5 (27 Aug 2021 06:00), Max: 101.5 (26 Aug 2021 18:00)  HR: 103 (27 Aug 2021 06:00) (83 - 108)  BP: 147/86 (26 Aug 2021 11:00) (143/77 - 162/80)  BP(mean): 109 (26 Aug 2021 11:00) (103 - 114)  ABP: 152/84 (27 Aug 2021 06:00) (134/82 - 165/93)  ABP(mean): 112 (27 Aug 2021 06:00) (102 - 126)  RR: 20 (27 Aug 2021 06:00) (17 - 22)  SpO2: 97% (27 Aug 2021 06:00) (93% - 100%)      08-26-21 @ 07:01  -  08-27-21 @ 07:00  --------------------------------------------------------  IN: 2912.3 mL / OUT: 2287 mL / NET: 625.3 mL      PHYSICAL EXAMINATION: 8/27  AOx2 (person, place) with choices, hypophonic, face symmetric. R gaze preference, can overcome, pupils 4mm on R, 5mm on L.  Squeezes fingers to commands in b/l UE, spontaneous and antigravity in B/L uppers, B/L lower extremities spontaneous, follows commands, able to bend knees.   EENT:  Anicteric, thrush noted  CARDIOVASCULAR: (+) S1 S2, normal rate and regular rhythm  PULMONARY: Decreased  ABDOMEN: Soft, nontender with normoactive bowel sounds  EXTREMITIES: No edema  SKIN: No rash. Left axilla area of induration ~5x 4cm. Non tender, no erythema, non fluctuant. Improving      MEDICATIONS:   acetaminophen    Suspension .. 650 milliGRAM(s) Oral every 6 hours PRN  acetaminophen    Suspension .. 1000 milliGRAM(s) Oral every 6 hours  acetylcysteine 20%  Inhalation 4 milliLiter(s) Inhalation every 6 hours  albuterol/ipratropium for Nebulization 3 milliLiter(s) Nebulizer every 6 hours  amantadine Syrup 200 milliGRAM(s) Oral every 12 hours  bromocriptine Capsule 15 milliGRAM(s) Oral every 8 hours  ceFAZolin   IVPB 2000 milliGRAM(s) IV Intermittent every 8 hours  chlorhexidine 0.12% Liquid 15 milliLiter(s) Oral Mucosa every 12 hours  chlorhexidine 2% Cloths 1 Application(s) Topical <User Schedule>  chlorhexidine 2% Cloths 1 Application(s) Topical <User Schedule>  glucagon  Injectable 1 milliGRAM(s) IntraMuscular once  insulin lispro (ADMELOG) corrective regimen sliding scale   SubCutaneous every 6 hours  lacosamide Solution 100 milliGRAM(s) Oral two times a day  methylphenidate 10 milliGRAM(s) Oral daily  norepinephrine Infusion 0.052 MICROgram(s)/kG/Min (4.79 mL/Hr) IV Continuous <Continuous>  nystatin    Suspension 245149 Unit(s) Oral four times a day  ondansetron Injectable 4 milliGRAM(s) IV Push every 6 hours PRN  potassium phosphate IVPB 30 milliMole(s) IV Intermittent once  povidone iodine 5% Nasal Swab 1 Application(s) Both Nostrils once  sodium chloride 0.9% lock flush 10 milliLiter(s) IV Push every 1 hour PRN  sodium chloride 0.9%. 1000 milliLiter(s) (75 mL/Hr) IV Continuous <Continuous>  sodium chloride 3%  Inhalation 4 milliLiter(s) Inhalation every 6 hours               LABS:              8.4    9.00  )-----------( 350      ( 27 Aug 2021 03:11 )             28.3     08-27    142  |  105  |  12  ----------------------------<  127<H>  3.9   |  27  |  0.54    Ca    9.3      27 Aug 2021 03:11  Phos  2.1     08-27  Mg     1.9     08-27    TPro  6.8  /  Alb  3.9  /  TBili  0.3  /  DBili  <0.2  /  AST  28  /  ALT  24  /  AlkPhos  80  08-27    LIVER FUNCTIONS - ( 27 Aug 2021 03:11 )  Alb: 3.9 g/dL / Pro: 6.8 g/dL / ALK PHOS: 80 U/L / ALT: 24 U/L / AST: 28 U/L / GGT: x             CULTURES  Culture - Blood (collected 08-26-21 @ 12:48)  Source: .Blood Blood  Preliminary Report (08-27-21 @ 01:00):    No growth at 12 hours    Culture - Blood (collected 08-26-21 @ 12:48)  Source: .Blood Blood  Preliminary Report (08-27-21 @ 01:00):    No growth at 12 hours    Culture - CSF with Gram Stain (collected 08-26-21 @ 12:26)  Source: .CSF CSF  Gram Stain (08-26-21 @ 12:56):    No organisms seen    Rare White blood cells        Neuroimaging:  CT (08.19.2021) - 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 Stable ischemic changes within the right cerebellum. Right-sided EVD catheter in place with slight increase in ventricular size. Interval improvement in the intraventricular hemorrhage. Evolving areas of acute/subacute ischemia within the right cerebellum.    CTA (08.19.2021) -  Interval improvement in the caliber of the proximal vertebral artery with progression of a vasospasm involving the mid to distal basilar artery. Interval improvement in the vasospasm involving the left vertebral artery. Persistent vasospasm involving the posterior cerebral and superior cerebellar arteries. Interval improvement in the vasospasm involving the right supraclinoid ICA as well as the left A1 and M1 segments. Progression of vasospasm involving the right M1 segment.    Cerebral Angiogram (08.18.2021 - improvement in vasospasm, IA verapamil to posterior circulation  Cerebral Angiogram (08.17.2021 - Left vertebral artery dissection demonstrates continued moderate to severe cerebral vasospasm of the distal let vert and basilar artery, some what improved caliber of proximal left vert. 15mg IA Verapamil injected over 10 minutes with mild improvement of posterior circulation post treatment. Right ICA injection with continued mild supraclinoid vasospasm, 10mg of IA Verapamil injected.  Cerebral Angiogram (08.16.2021) - Left vertebral injection demonstrates moderate diffuse vasospasm of left vertebral artery, basilar artery and posterior circulation including PCAs. 15mg Verapamil injected via left vert with mild radiographic improvement post treatment. Right ICA injection with mild supraclinoid spasm/narrowing, otherwise no vasospasm appreciated in anterior circulation.    CT (08.15.2021) - 1. Worsening the uncal herniation, with effacement of the bilateral suprasellar and perimesencephalic cisterns.  2. Redistribution of intraventricular hemorrhage, as above. Redemonstration of right EVD, with distal tip in the right frontal horn, near the foramen of Monro. Worsening hydrocephalus.  CTA - 1. Multifocal punctate and short segment stenoses, as above, with several areas of narrowing new as compared to recent CTA dated 08/09/2021. In the posterior circulation, stenoses are identified within the V4 left vertebral artery, the basilar artery, and in the bilateral PCA, more significant on the left.  Within the anterior circulation, stenosis are identified in the right supraclinoid C6 ICA as well as the proximal M1 MCA. Findings are most compatible with vasospasm.  2. In particular, the distal V4 right vertebral artery, which was visualized, though diminutive, on prior CTA dated 08/09/2021, does not fill with contrast on the present examination. The basilar artery demonstrates multiple stenoses, and is significantly smaller in caliber when compared to the prior CTA.    Other imaging:  Duplex 1.  Since 8/9/2021, there is new deep venous thrombosis in the bilateral peroneal veins.  2. Redemonstration of deep vein thrombosis in a right intramuscular calf vein.      [Code] Full  [Goals] Aggressive  [Disposition] ICU

## 2021-08-28 NOTE — PROGRESS NOTE ADULT - SUBJECTIVE AND OBJECTIVE BOX
HPI:  46y/o F with h/o recent gastric sleeve (08/04/21 [facility], Dr. Crisostomo ), fibromyalgia, thyroid dysfunction, PTSD, depression, anxiety.  Presented with seizures at home - brought to [place], with 1 more episode of seizure en route.  Intubated, CT showed SAH with IV extension, casted IV, hydrocephalus, given mannitol, levetiracetam 1g,  6mg ativan. Started on Cardene drip for BP goal < 140 and transferred to [facility] NICU for further management.     HH5 MF4   NIHSS 26 on arrival  BD 1 = 8/7 (07 Aug 2021 08:08)    OVERNIGHT EVENTS: ASHA overnight, post-op CTH and AP stable s/p VPS placement     HOSPITAL COURSE:  8/7: Tx from [place] for SAH. Intubated. R frontal EVD placed, central line and a line placed. 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   8/8: POD1 s/p angio with R vert takedown, extubated, desatting on aerosol mask, required extensive suctioning, 3% nebs, chest PT, started on low dose precedex drip for restlessness/agitation, ABG stable. NGT placed. TF started with goal 20 pending nutrition recs. 3% stopped for Na 150. Ceribell EEG negative and d/c'ed.   8/9 Overnight, new Right pronator drift and right gaze preference that can't cross midline, Repeat CTH demonstrated stable vents, CTA head and neck unremarkable for spasm, hypoattenuation of right cerebellum edema vs. infarct.  8/10: POD3 R vert takedown, EVD open at 34cfF1H. ASHA overnight, neuro stable. CTH with increased hydro, CTA head/neck with mild basilar spasm, but concern for PE. 1/2 am D50 for hypoglycemia. 1L bolus then 100 cc/hr, PM rounds for net negative fluid balance and mild vasospasm on CTA.   8/11: POD4 R vert takedown. EVD at 5cm H2O, ASHA overnight. neuro stable.   Febrile 104. depakote increased to q6. NCHCT stable. EVD lowered to 0. Lasix 20 given for dieuresis, UOP over 2 liters. Sedation given for a-line placement, BP drop needing levo.  8/12: POD5 R vert takedown. EVD at 0cm H2O. ASHA overnight, neuro stable. Precedex being weaned off. Pending IVCF with vascular today.  8/13: POD6 R vert takedown. EVD open at 0cmH2O. ASHA overnight. Neuro exam stable. Mottled skin on the left lower extremity improving. Started on Bromocriptine 15mg Q8.   8/14: POD7. EVD open at 0. 1L bolus given for euvolemia o/n. neuro stable. CTH stable. ENT scoped vocal cords, +R voacl cord paresis, NTD. started on zosyn empirically.   8/15: POD8. EVD open at 0. ASHA o/n, neuro stable. Pt developed increased work of breathing, unable to clear her secretions, received racemic epi and multiple nebulizer treatments, still with stridor. Patient re-intubated at bedside, on full vent support.   8/16: CTH/CTA head/neck/CT chest performed o/n for worsened exam, R gaze preference, sluggish pupils. +worsened uncal herniation, hydro, vasospasm in b/l PCAs, L vert, basilar arteries. preop angio today, restarted on 3% for Na goal 145-150. Angio for vasospasm with IA verapamil.  8/17: POD10. Overnight Pt remains on levophed drip for SBP goal 180-220. Also remains on propofol drip. EVD open at -5cmH2O. ICPs WNL. Febrile 101F and pancultured. CTH today shows slightly smaller ventricles. Angio for vasospasm with IA verapamil.  8/18: POD11 R vert takedown. POD1 angio IA verpamil. Overnight, Pt noted to have downward gaze to the right and not following commands. Taken for stat head CT which is stable. Pupils also noted to be aniscoric left pupil 4mm and brisk and right 2mm brisk. Mannitol 50g given. Ceribell eeg placed for concern of subclinical seizures, so far appears negative for seizures. 1L NS o/n and 1.5L NS bolus in AM for euvolemia. vanc/zosyn stopped. 250cc 3% bolus and 250cc albumin given in AM. Brief seizure to L frontal lobe this AM on EEG, given 2g valproic acid and dose increased to 1g q8. Vimpat 100mg BID started.  Angio with interval improvment in vasospasm, IA verapamil given. In afternoon patient self-extubated, placed on NRB with mucomyst, 3% inhalation and duonebs. 3% at 50 d/c'd.  8/19: POD 12. Remains on VEEG. Axillary A line placed. Salt tabs d/c'd, florinef decreased to 0.1mg, EVD @ -5cm H2O, now draining 20cc/hr. 250 albumin and 500 NS boluses on dayshift, 1 L LR bolus  8/20: POD 13. ASHA. on VEEG, no seizures noted. Pending VPA level. 500 NS, 250 albumin. Febrile, pancultured. EEG d/c'ed.   8/21: BD14, POD14. ASHA overnight, EVD @ -5. s/p 1L bolus for euvolemia  8/22: BD #15: continued on levo, hypercoagulabitly profile pending, EVD @ -5, euvolemia, extubated, amantadine added, free water started for hypernatremia   8/23: BD 17, tmax 101F o/n. Gen Sx consulted for PEG - not a candidate at this time, pancultured for fever, 1L LR given for euvolemia. EVD at -5  8/24: BD18, ASHA o/n, neuro stable, EVD at -5, VPA level therapeutic, SBP goal 160-200, L IJ CVC attempted placement with carotid puncture, no pseudoaneurysm on US. MSSA growing in sputum. Ceftriaxone d/c'd and Ancef 2gq8 started. ID consulted. Recieved 1.5L LR and 500cc albumin.   8/25: POD5 last angio, BD19 ASHA o/n, neuro stable, EVD at -5  8/26: BD 20. ASHA o/n. EVD @ -5   8/27: BD 21, POD 20 Right vert takedown. ASHA overnight. Exam stable. EVD remains open at -5 and to be clamped at 0600 in preparation for right VPS placement today. Possible PEG placement Monday 8/30 8/28: BD22, POD22 Right vert takedown, POD1 R VPS, neuro exam stable.       Vital Signs Last 24 Hrs  T(C): 37 (27 Aug 2021 22:06), Max: 38.1 (27 Aug 2021 11:00)  T(F): 98.6 (27 Aug 2021 22:06), Max: 100.6 (27 Aug 2021 11:00)  HR: 95 (28 Aug 2021 00:00) (81 - 107)  BP: --  BP(mean): --  RR: 14 (28 Aug 2021 00:00) (12 - 22)  SpO2: 100% (28 Aug 2021 00:00) (92% - 100%)    I&O's Summary    26 Aug 2021 07:01  -  27 Aug 2021 07:00  --------------------------------------------------------  IN: 2922.3 mL / OUT: 1902 mL / NET: 1020.3 mL    27 Aug 2021 07:01  -  28 Aug 2021 00:39  --------------------------------------------------------  IN: 1891.3 mL / OUT: 3299 mL / NET: -1407.7 mL        PHYSICAL EXAM:  General: NAD, pt is comfortably sitting up in bed, on room air  HEENT: CN II-XII grossly intact, Right pupil 4mm briskly reactive, Left 3mm briskly reactive, EOMI b/l, face symmetric, tongue midline, neck FROM, Right gaze preference but can cross to left. +NGT  Cardiovascular: RRR, normal S1 and S2, small Left neck hematoma   Respiratory: lungs CTAB, no wheezing, rhonchi, or crackles   GI: normoactive BS to auscultation, abd soft, NTND, +abdominal incision.  Neuro: A&Ox2 (not orientated to time), hypophonic. Follows commands (squeeze and let go to command, shows 2 fingers, opens and closes eyes to commands)  CALLE x4 spontaneously moves L >R. SILT throughout   Extremities: distal pulses 2+ x4  Wound/incision: +Craniotomy incision with dressing in place, C/D/I, +abdominal incision with dressing in place, C/D/I      TUBES/LINES:  [] Richey  [] Lumbar Drain  [] Wound Drains  [] Others      DIET:  [] NPO  [] Mechanical  [X] Tube feeds    LABS:                        8.3    12.02 )-----------( 307      ( 27 Aug 2021 18:58 )             28.3     08-27    145  |  109<H>  |  9   ----------------------------<  115<H>  4.0   |  28  |  0.56    Ca    9.1      27 Aug 2021 18:58  Phos  4.0     08-27  Mg     1.9     08-27    TPro  6.8  /  Alb  3.9  /  TBili  0.3  /  DBili  <0.2  /  AST  28  /  ALT  24  /  AlkPhos  80  08-27    PT/INR - ( 27 Aug 2021 03:11 )   PT: 15.2 sec;   INR: 1.28          PTT - ( 27 Aug 2021 03:11 )  PTT:30.6 sec    CARDIAC MARKERS ( 27 Aug 2021 12:02 )  x     / 0.01 ng/mL / x     / x     / x      CARDIAC MARKERS ( 26 Aug 2021 12:37 )  x     / 0.01 ng/mL / 96 U/L / x     / 3.9 ng/mL      CAPILLARY BLOOD GLUCOSE      POCT Blood Glucose.: 132 mg/dL (27 Aug 2021 22:55)  POCT Blood Glucose.: 102 mg/dL (27 Aug 2021 19:38)  POCT Blood Glucose.: 112 mg/dL (27 Aug 2021 11:13)  POCT Blood Glucose.: 126 mg/dL (27 Aug 2021 06:26)      Drug Levels: [] N/A  Valproic Acid Level, Serum: 68.7 ug/mL (08-24 @ 13:06)  Valproic Acid Level, Serum: 55.1 ug/mL (08-22 @ 14:40)    CSF Analysis: [] N/A      Allergies    apple (Angioedema)  No Known Drug Allergies  strawberry (Angioedema)    Intolerances    lactose (Stomach Upset)    MEDICATIONS:  Antibiotics:  ceFAZolin   IVPB 2000 milliGRAM(s) IV Intermittent every 8 hours  nystatin    Suspension 187093 Unit(s) Oral four times a day    Neuro:  acetaminophen    Suspension .. 1000 milliGRAM(s) Oral every 6 hours  amantadine Syrup 200 milliGRAM(s) Oral every 12 hours  bromocriptine Capsule 15 milliGRAM(s) Oral every 8 hours  lacosamide Solution 100 milliGRAM(s) Oral two times a day  methylphenidate 10 milliGRAM(s) Oral daily  ondansetron Injectable 4 milliGRAM(s) IV Push every 6 hours PRN    Anticoagulation:    OTHER:  acetylcysteine 20%  Inhalation 4 milliLiter(s) Inhalation every 6 hours  albuterol/ipratropium for Nebulization 3 milliLiter(s) Nebulizer every 6 hours  bisacodyl Suppository 10 milliGRAM(s) Rectal daily PRN  chlorhexidine 0.12% Liquid 15 milliLiter(s) Oral Mucosa every 12 hours  chlorhexidine 2% Cloths 1 Application(s) Topical <User Schedule>  chlorhexidine 2% Cloths 1 Application(s) Topical <User Schedule>  glucagon  Injectable 1 milliGRAM(s) IntraMuscular once  insulin lispro (ADMELOG) corrective regimen sliding scale   SubCutaneous every 6 hours  norepinephrine Infusion 0.05 MICROgram(s)/kG/Min IV Continuous <Continuous>  senna 2 Tablet(s) Oral at bedtime  sodium chloride 3%  Inhalation 4 milliLiter(s) Inhalation every 6 hours    IVF:  sodium chloride 0.9%. 1000 milliLiter(s) IV Continuous <Continuous>    CULTURES:  Culture Results:   Sputum specimen rejected.  Microscopic examination indicates  oropharyngeal contamination.  Please repeat. (08-27 @ 01:53)  Culture Results:   No growth to date (08-26 @ 14:27)    RADIOLOGY & ADDITIONAL TESTS:  CXR 8/27/2021:   IMPRESSION:  Similar appearance to prior exam 8/24/2021 except for minimal focal atelectasis right lung base. Possible small pleural effusions. Hypoinflation lungs. Gastric tube courses off image at distal stomach. Right venous catheter tip in right atrium. No pneumothorax.    CTH/AP 8/27/2021: Wet read: shunt ending in peritoneal space, persistent ventriculomegaly with interval placement of shunt placed.       ASSESSMENT:  46y/o F with h/o recent gastric sleeve (08/04/21 [facility], Dr. Crisostomo ), fibromyalgia, thyroid dysfunction, PTSD, depression, anxiety.  Presented with seizures at home - brought to [place], with 1 more episode of seizure en route.  Intubated, CT showed SAH with IV extension, casted IV, hydrocephalus, given mannitol, levetiracetam 1g,  6mg ativan. Started on Cardene drip for BP goal < 140 and transferred to [facility] NICU for further management. HH5 MF4, BD 1 = 8/7. Now s/p cerebral angiogram for R vertebral artery takedown for R vertebral dissecting aneurysm (8/7), and R frontal EVD placement (8/7), now s/p IVC filter placement (8/12,) s/p angio IA verapamil 8/16, 8/17, 8/18, 8/20. 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   - CTH and AP stable post op 8/27, some ventriculomegaly   - Depakote dc'd   - Vimpat 100mg bid   - EEG no seizures x 2 days , D/c'ed  - cont ASA 81  - CTH 8/15: worsened uncal herniation, worsened hydro, vasospasm in b/l PCA, L vert, basilar arteries  - Bromocriptine 15mg Q8  - Ritalin and Amantadine for neurostim  - Angio demonstrating vasospasm of Right ICA, right PCOMM, Left distal vert and basilar confluence, and received IA verapamil on 8/16. 8/17. 8/18. 8/20.   - CTH/CTA 8/22- stable, R MCA improved spasm     PULM:    - self extubated 8/20  - copious thick secretion, standing, Duonebs, 3% nebs, mucomyst  - chest PT   - CXR - aspiration precautions, requires frequent suctioning  - Ancef for tracheitis    CARDIO:    - -150  - PICC line today, dc central line after PICC established   - levo gtt- attempt to taper off   - TTE 8/12 WNL    - Troponin stable   - EKG normal     ENDO:    - glucose goal 140 -180    GI:    - Resume tube feeds in AM  - free H2O 250 q8 dc'd  - needs thin liquid diet x 3 weeks as per bariatric when tolerated   - PPI per bariatric surgeon   - plan for GI Dr. Yaya Milner to do peg Monday, [phone], call to confirm    RENAL:   - Maintain euvolemia  - Na goal normal- downtrending   - albumin bolus yesterday to maintain CVP > 6  - free water 250 q6 dc'd   - straight cath prn     HEM/ONC:   - ASA 81 - held for OR   - VTE Prophylaxis: SCDs, SQL 60 BID - held for OR   - Repeat dopplers 8/23, newly visualized acute DVT left intramuscular calf vein, persistent completely occlusive DVT b/l peroneal veins and right intramuscular calf vein  - carotid doppler 8/24: neg for pseudoaneurysm, CTA shows soft tissue small hematoma, non-occlusive L IJ and L basillic vein thrombus   - hypercoagulable w/u: - prothrombin gene mutation - heterozygous   - pending repeat dopplers 8/30  - Factor Xa trend once back on SQL  - Prothrombin gene mutation - Heterozygous   - 8/26 LUE doppler with findings of left IJ non-occlusive DVT and left basilic thrombus    ID:   - MRSA neg 8/12   - Febrile, trend leukocytosis   - Last Pancx 8/23   - Ancef for MSSA pna coverage x 7 d (until 8/30)  - ID consulted, cleared for VPS, recommends Bcx today   - pending PICC line, will remove central line once PICC is established     Assessment and plan discussed with Dr. Lomax, Dr. Conn and Dr. Bernard  Assessment:  Present when checked    []  GCS  E   V  M     Heart Failure: []Acute, [] acute on chronic , []chronic  Heart Failure:  [] Diastolic (HFpEF), [] Systolic (HFrEF), []Combined (HFpEF and HFrEF), [] RHF, [] Pulm HTN, [] Other    [] TRUDI, [] ATN, [] AIN, [] other  [] CKD1, [] CKD2, [] CKD 3, [] CKD 4, [] CKD 5, []ESRD    Encephalopathy: [] Metabolic, [] Hepatic, [] toxic, [] Neurological, [] Other    Abnormal Nurtitional Status: [] malnurtition (see nutrition note), [ ]underweight: BMI < 19, [] morbid obesity: BMI >40, [] Cachexia    [] Sepsis  [] hypovolemic shock,[] cardiogenic shock, [] hemorrhagic shock, [] neuogenic shock  [] Acute Respiratory Failure  []Cerebral edema, [] Brain compression/ herniation,   [] Functional quadriplegia  [] Acute blood loss anemia

## 2021-08-28 NOTE — PROGRESS NOTE ADULT - SUBJECTIVE AND OBJECTIVE BOX
==============================================   NEUROCRITICAL CARE ATTENDING NOTE   ==============================================    LUDMILA PRIETO  MRN-[id number]  Summary:  46 y/o F with h/o recent gastric sleeve (08/04 [facility], Dr. Crisostomo ), fibromyalgia, thyroid dysfunction, PTSD, depression, anxiety.  Presented with seizures at home - brought to [place], with 1 more episode of seizure en route.  Intubated, CT showed SAH with IV extension, casted IV, hydrocephalus, given mannitol levetiracetam 6mg ativan, transferred to [facility].    Hospital Course:   8/7: Transferred from [place] for SAH. Intubated. R frontal EVD placed, central line and  [place] placed. 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   8/8: POD 1 s/p angio with R vert takedown, extubated, desatting on aerosol mask, required extensive suctioning, 3% nebs, chest PT, started on low dose precedex drip for restlessness/agitation, ABG stable. 3% stopped for Na 150. Ceribell EEG negative and d/c'ed.   8/9 Overnight, new right pronator drift and right gaze preference that can't cross midline, Repeat CTH demonstrated stable vents, CTA head and neck unremarkable for spasm, hypoattenuation of right cerebellum edema vs. infarct.  8/10: POD3 R vert takedown, EVD open at [id number]. CTH with increased hydro. CTA head/neck with mild basilar spasm.  8/11: POD 4 R vert takedown. EVD at 5cm H2O. Febrile 104F. Depakote increased to q6. HCT stable. EVD lowered to 0.8/12: POD 5 R vert takedown. EVD at 0cm H2O. Pending IVCF with vascular.  8/13: POD 6 R vert takedown. EVD open at 0cmH2O. Mottled skin on the left lower extremity improving. Started on Bromocriptine 15mg Q8.   8/14: POD 7. EVD open at 0. ENT scoped vocal cords, +R vocal cord paresis, NTD. Started on zosyn empirically.   8/15: POD 8. EVD open at 0. Developed increased work of breathing, unable to clear her secretions, received racemic epi and multiple nebulizer treatments, however, still with stridor. Patient re-intubated at bedside, on full vent support.   8/16: CTH/CTA head/neck/CT chest performed o/n for worsened exam, R gaze preference, sluggish pupils. +worsened uncal herniation, hydrocephalus, vasospasm in b/l PCAs, L vert, basilar arteries. Angio today, restarted on 3% for Na goal 145-150.  8/17: POD 10. EVD open at -5cmH2O. ICPs WNL. Febrile 101F and pancultured.  8/18: POD 11. R vert takedown. POD1 angio IA verpamil. Overnight, Pt noted to have downward gaze to the right and not following commands. Taken for stat head CT- stable. Pupils also noted to be aniscoric left pupil 4mm and brisk and right 2mm brisk. Mannitol 50g given. Ceribell EEG placed for concern of subclinical seizures. Brief seizure to L frontal lobe. Given 2g valproic acid and dose increased to 1g q8. Vimpat 100mg BID started. Patient self-extubated, placed on NRB with mucomyst, 3% inhalation and duonebs. 3% at 50 d/c'd.  8/19: POD 12. Remains on VEEG. Axillary A line placed. Salt tabs d/c'd, florinef decreased to 0.1mg, EVD @ -5cm H2O draining 20cc/hr.   8/20: POD 13. VEEG, no seizures noted.  Febrile, pancultured.   8/22: BD 15: Continued on levophed for BP augmentation. Hypercoagulability profile. EVD 15 mL/h. Amantadine added, free water started for hypernatremia. Fluctuating level of consciousness. Frequent suctioning; empiric ceftriaxone started. Tmax 101.Thick secretions.  8/23: Neuroexam stable. Febrile. Pancultures done.   8/24: Exam has not been BP dependent. Relaxed -220. IJ- carotid stick. Lovenox held  8/25: BD 19. CTA 8/25: Improved vasospasm of proximal basilar artery, persistent vasospasm and moderate narrowing of the distal one half of the basilar artery. Posterior cerebral artery P1 and P2 segments appear patent with multifocal vasospasm, not significantly changed. SBP changed to 140-220. No change in exam. Weaning levophed.   8/26: BD20. Preop for VPS 8/27. EVD clamped.   8/27: BD21.  shunt placed. Post op CT head stable.   8/28:    Past Medical History:  s/p gastric sleeve surgery  Allergies:  Allergy Status Unknown      ICU Vital Signs Last 24 Hrs  T(C): 37.8 (28 Aug 2021 04:59), Max: 38.1 (27 Aug 2021 11:00)  T(F): 100 (28 Aug 2021 04:59), Max: 100.6 (27 Aug 2021 11:00)  HR: 97 (28 Aug 2021 07:00) (81 - 103)  ABP: 136/75 (28 Aug 2021 07:00) (125/73 - 161/93)  ABP(mean): 100 (28 Aug 2021 07:00) (92 - 120)  RR: 22 (28 Aug 2021 07:00) (12 - 24)  SpO2: 98% (28 Aug 2021 07:00) (91% - 100%)      08-27-21 @ 07:01  -  08-28-21 @ 07:00  --------------------------------------------------------  IN: 2704.3 mL / OUT: 4069 mL / NET: -1364.7 mL        PHYSICAL EXAMINATION: 8/28  AOx2 (person, place) with choices, hypophonic, face symmetric. R gaze preference, can overcome, pupils 4mm on R, 5mm on L.  Squeezes fingers to commands in b/l UE, spontaneous and antigravity in B/L uppers, B/L lower extremities spontaneous, follows commands, able to bend knees.   EENT:  Anicteric, thrush noted  CARDIOVASCULAR: (+) S1 S2, normal rate and regular rhythm  PULMONARY: Decreased  ABDOMEN: Soft, nontender with normoactive bowel sounds  EXTREMITIES: No edema  SKIN: No rash. Left axilla area of induration ~5x 4cm. Non tender, no erythema, non fluctuant. Improving        MEDICATIONS:   acetaminophen    Suspension .. 1000 milliGRAM(s) Oral every 6 hours  acetylcysteine 20%  Inhalation 4 milliLiter(s) Inhalation every 6 hours  albuterol/ipratropium for Nebulization 3 milliLiter(s) Nebulizer every 6 hours  amantadine Syrup 200 milliGRAM(s) Oral every 12 hours  bisacodyl Suppository 10 milliGRAM(s) Rectal daily PRN  bromocriptine Capsule 15 milliGRAM(s) Oral every 8 hours  ceFAZolin   IVPB 2000 milliGRAM(s) IV Intermittent every 8 hours  chlorhexidine 0.12% Liquid 15 milliLiter(s) Oral Mucosa every 12 hours  chlorhexidine 2% Cloths 1 Application(s) Topical <User Schedule>  chlorhexidine 2% Cloths 1 Application(s) Topical <User Schedule>  glucagon  Injectable 1 milliGRAM(s) IntraMuscular once  insulin lispro (ADMELOG) corrective regimen sliding scale   SubCutaneous every 6 hours  lacosamide Solution 100 milliGRAM(s) Oral two times a day  magnesium sulfate  IVPB 2 Gram(s) IV Intermittent once  methylphenidate 10 milliGRAM(s) Oral daily  norepinephrine Infusion 0.05 MICROgram(s)/kG/Min (4.6 mL/Hr) IV Continuous <Continuous>  nystatin    Suspension 784031 Unit(s) Oral four times a day  ondansetron Injectable 4 milliGRAM(s) IV Push every 6 hours PRN  senna 2 Tablet(s) Oral at bedtime  sodium chloride 0.9% lock flush 10 milliLiter(s) IV Push every 1 hour PRN  sodium chloride 0.9%. 1000 milliLiter(s) (75 mL/Hr) IV Continuous <Continuous>  sodium chloride 3%  Inhalation 4 milliLiter(s) Inhalation every 6 hours  sodium phosphate IVPB 15 milliMole(s) IV Intermittent once                          8.3    12.17 )-----------( 302      ( 28 Aug 2021 05:07 )             28.4     08-28    143  |  105  |  8   ----------------------------<  129<H>  4.0   |  28  |  0.45<L>    Ca    9.3      28 Aug 2021 05:07  Phos  2.7     08-28  Mg     1.8     08-28    TPro  6.8  /  Alb  3.9  /  TBili  0.3  /  DBili  <0.2  /  AST  28  /  ALT  24  /  AlkPhos  80  08-27    LIVER FUNCTIONS - ( 27 Aug 2021 03:11 )  Alb: 3.9 g/dL / Pro: 6.8 g/dL / ALK PHOS: 80 U/L / ALT: 24 U/L / AST: 28 U/L / GGT: x                 CULTURES  Culture - Blood (collected 08-26-21 @ 12:48)  Source: .Blood Blood  Preliminary Report (08-27-21 @ 01:00):    No growth at 12 hours    Culture - Blood (collected 08-26-21 @ 12:48)  Source: .Blood Blood  Preliminary Report (08-27-21 @ 01:00):    No growth at 12 hours    Culture - CSF with Gram Stain (collected 08-26-21 @ 12:26)  Source: .CSF CSF  Gram Stain (08-26-21 @ 12:56):    No organisms seen    Rare White blood cells        Neuroimaging:  CT (08.19.2021) - 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 Stable ischemic changes within the right cerebellum. Right-sided EVD catheter in place with slight increase in ventricular size. Interval improvement in the intraventricular hemorrhage. Evolving areas of acute/subacute ischemia within the right cerebellum.    CTA (08.19.2021) -  Interval improvement in the caliber of the proximal vertebral artery with progression of a vasospasm involving the mid to distal basilar artery. Interval improvement in the vasospasm involving the left vertebral artery. Persistent vasospasm involving the posterior cerebral and superior cerebellar arteries. Interval improvement in the vasospasm involving the right supraclinoid ICA as well as the left A1 and M1 segments. Progression of vasospasm involving the right M1 segment.    Cerebral Angiogram (08.18.2021 - improvement in vasospasm, IA verapamil to posterior circulation  Cerebral Angiogram (08.17.2021 - Left vertebral artery dissection demonstrates continued moderate to severe cerebral vasospasm of the distal let vert and basilar artery, some what improved caliber of proximal left vert. 15mg IA Verapamil injected over 10 minutes with mild improvement of posterior circulation post treatment. Right ICA injection with continued mild supraclinoid vasospasm, 10mg of IA Verapamil injected.  Cerebral Angiogram (08.16.2021) - Left vertebral injection demonstrates moderate diffuse vasospasm of left vertebral artery, basilar artery and posterior circulation including PCAs. 15mg Verapamil injected via left vert with mild radiographic improvement post treatment. Right ICA injection with mild supraclinoid spasm/narrowing, otherwise no vasospasm appreciated in anterior circulation.      [Code] Full  [Goals] Aggressive  [Disposition] ICU     ==============================================   NEUROCRITICAL CARE ATTENDING NOTE   ==============================================    LUDMILA PRIETO  MRN-[id number]  Summary:  44 y/o F with h/o recent gastric sleeve (08/04 [facility], Dr. Crisostomo ), fibromyalgia, thyroid dysfunction, PTSD, depression, anxiety.  Presented with seizures at home - brought to [place], with 1 more episode of seizure en route.  Intubated, CT showed SAH with IV extension, casted IV, hydrocephalus, given mannitol levetiracetam 6mg ativan, transferred to [facility].    Hospital Course:   8/7: Transferred from [place] for SAH. Intubated. R frontal EVD placed, central line and  [place] placed. 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   8/8: POD 1 s/p angio with R vert takedown, extubated, desatting on aerosol mask, required extensive suctioning, 3% nebs, chest PT, started on low dose precedex drip for restlessness/agitation, ABG stable. 3% stopped for Na 150. Ceribell EEG negative and d/c'ed.   8/9 Overnight, new right pronator drift and right gaze preference that can't cross midline, Repeat CTH demonstrated stable vents, CTA head and neck unremarkable for spasm, hypoattenuation of right cerebellum edema vs. infarct.  8/10: POD3 R vert takedown, EVD open at [id number]. CTH with increased hydro. CTA head/neck with mild basilar spasm.  8/11: POD 4 R vert takedown. EVD at 5cm H2O. Febrile 104F. Depakote increased to q6. HCT stable. EVD lowered to 0.8/12: POD 5 R vert takedown. EVD at 0cm H2O. Pending IVCF with vascular.  8/13: POD 6 R vert takedown. EVD open at 0cmH2O. Mottled skin on the left lower extremity improving. Started on Bromocriptine 15mg Q8.   8/14: POD 7. EVD open at 0. ENT scoped vocal cords, +R vocal cord paresis, NTD. Started on zosyn empirically.   8/15: POD 8. EVD open at 0. Developed increased work of breathing, unable to clear her secretions, received racemic epi and multiple nebulizer treatments, however, still with stridor. Patient re-intubated at bedside, on full vent support.   8/16: CTH/CTA head/neck/CT chest performed o/n for worsened exam, R gaze preference, sluggish pupils. +worsened uncal herniation, hydrocephalus, vasospasm in b/l PCAs, L vert, basilar arteries. Angio today, restarted on 3% for Na goal 145-150.  8/17: POD 10. EVD open at -5cmH2O. ICPs WNL. Febrile 101F and pancultured.  8/18: POD 11. R vert takedown. POD1 angio IA verpamil. Overnight, Pt noted to have downward gaze to the right and not following commands. Taken for stat head CT- stable. Pupils also noted to be aniscoric left pupil 4mm and brisk and right 2mm brisk. Mannitol 50g given. Ceribell EEG placed for concern of subclinical seizures. Brief seizure to L frontal lobe. Given 2g valproic acid and dose increased to 1g q8. Vimpat 100mg BID started. Patient self-extubated, placed on NRB with mucomyst, 3% inhalation and duonebs. 3% at 50 d/c'd.  8/19: POD 12. Remains on VEEG. Axillary A line placed. Salt tabs d/c'd, florinef decreased to 0.1mg, EVD @ -5cm H2O draining 20cc/hr.   8/20: POD 13. VEEG, no seizures noted.  Febrile, pancultured.   8/22: BD 15: Continued on levophed for BP augmentation. Hypercoagulability profile. EVD 15 mL/h. Amantadine added, free water started for hypernatremia. Fluctuating level of consciousness. Frequent suctioning; empiric ceftriaxone started. Tmax 101.Thick secretions.  8/23: Neuroexam stable. Febrile. Pancultures done.   8/24: Exam has not been BP dependent. Relaxed -220. IJ- carotid stick. Lovenox held  8/25: BD 19. CTA 8/25: Improved vasospasm of proximal basilar artery, persistent vasospasm and moderate narrowing of the distal one half of the basilar artery. Posterior cerebral artery P1 and P2 segments appear patent with multifocal vasospasm, not significantly changed. SBP changed to 140-220. No change in exam. Weaning levophed.   8/26: BD20. Preop for VPS 8/27. EVD clamped.   8/27: BD21.  shunt placed. Post op CT head stable.   8/28: BD22. POD 1 s/p VPS. Slightly more lethargic but still follows commands.     Past Medical History:  s/p gastric sleeve surgery  Allergies:  Allergy Status Unknown      ICU Vital Signs Last 24 Hrs  T(C): 37.8 (28 Aug 2021 04:59), Max: 38.1 (27 Aug 2021 11:00)  T(F): 100 (28 Aug 2021 04:59), Max: 100.6 (27 Aug 2021 11:00)  HR: 97 (28 Aug 2021 07:00) (81 - 103)  ABP: 136/75 (28 Aug 2021 07:00) (125/73 - 161/93)  ABP(mean): 100 (28 Aug 2021 07:00) (92 - 120)  RR: 22 (28 Aug 2021 07:00) (12 - 24)  SpO2: 98% (28 Aug 2021 07:00) (91% - 100%)      08-27-21 @ 07:01  -  08-28-21 @ 07:00  --------------------------------------------------------  IN: 2704.3 mL / OUT: 4069 mL / NET: -1364.7 mL        PHYSICAL EXAMINATION: 8/28  AOx2 (person, place ) with choices, hypophonic, face symmetric. R gaze preference but can overcome, pupils 3mm on R, 4mm on L.  Squeezes fingers to commands in b/l UE, b/l uppers spontaneous, distally antigravity.  B/L lower extremities spontaneous, follows commands, able to bend knees.   EENT:  Anicteric, NGT  CARDIOVASCULAR: (+) S1 S2, normal rate and regular rhythm  PULMONARY: Decreased  ABDOMEN: Soft, nontender with normoactive bowel sounds  EXTREMITIES: No edema  SKIN: No rash. Left axilla area of induration ~5x 4cm. Non tender, no erythema, non fluctuant. Improving        MEDICATIONS:   acetaminophen    Suspension .. 1000 milliGRAM(s) Oral every 6 hours  acetylcysteine 20%  Inhalation 4 milliLiter(s) Inhalation every 6 hours  albuterol/ipratropium for Nebulization 3 milliLiter(s) Nebulizer every 6 hours  amantadine Syrup 200 milliGRAM(s) Oral every 12 hours  bisacodyl Suppository 10 milliGRAM(s) Rectal daily PRN  bromocriptine Capsule 15 milliGRAM(s) Oral every 8 hours  ceFAZolin   IVPB 2000 milliGRAM(s) IV Intermittent every 8 hours  chlorhexidine 0.12% Liquid 15 milliLiter(s) Oral Mucosa every 12 hours  chlorhexidine 2% Cloths 1 Application(s) Topical <User Schedule>  chlorhexidine 2% Cloths 1 Application(s) Topical <User Schedule>  glucagon  Injectable 1 milliGRAM(s) IntraMuscular once  insulin lispro (ADMELOG) corrective regimen sliding scale   SubCutaneous every 6 hours  lacosamide Solution 100 milliGRAM(s) Oral two times a day  magnesium sulfate  IVPB 2 Gram(s) IV Intermittent once  methylphenidate 10 milliGRAM(s) Oral daily  norepinephrine Infusion 0.05 MICROgram(s)/kG/Min (4.6 mL/Hr) IV Continuous <Continuous>  nystatin    Suspension [id number] Unit(s) Oral four times a day  ondansetron Injectable 4 milliGRAM(s) IV Push every 6 hours PRN  senna 2 Tablet(s) Oral at bedtime  sodium chloride 0.9% lock flush 10 milliLiter(s) IV Push every 1 hour PRN  sodium chloride 0.9%. 1000 milliLiter(s) (75 mL/Hr) IV Continuous <Continuous>  sodium chloride 3%  Inhalation 4 milliLiter(s) Inhalation every 6 hours  sodium phosphate IVPB 15 milliMole(s) IV Intermittent once    LABS:                         8.3    12.17 )-----------( 302      ( 28 Aug 2021 05:07 )             28.4     08-28    143  |  105  |  8   ----------------------------<  129<H>  4.0   |  28  |  0.45<L>    Ca    9.3      28 Aug 2021 05:07  Phos  2.7     08-28  Mg     1.8     08-28    TPro  6.8  /  Alb  3.9  /  TBili  0.3  /  DBili  <0.2  /  AST  28  /  ALT  24  /  AlkPhos  80  08-27    LIVER FUNCTIONS - ( 27 Aug 2021 03:11 )  Alb: 3.9 g/dL / Pro: 6.8 g/dL / ALK PHOS: 80 U/L / ALT: 24 U/L / AST: 28 U/L / GGT: x             CULTURES  Culture - Blood (collected 08-26-21 @ 12:48)  Source: .Blood Blood  Preliminary Report (08-27-21 @ 01:00):    No growth at 12 hours    Culture - Blood (collected 08-26-21 @ 12:48)  Source: .Blood Blood  Preliminary Report (08-27-21 @ 01:00):    No growth at 12 hours    Culture - CSF with Gram Stain (collected 08-26-21 @ 12:26)  Source: .CSF CSF  Gram Stain (08-26-21 @ 12:56):    No organisms seen    Rare White blood cells        Neuroimaging:  CT (08.19.2021) - 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 Stable ischemic changes within the right cerebellum. Right-sided EVD catheter in place with slight increase in ventricular size. Interval improvement in the intraventricular hemorrhage. Evolving areas of acute/subacute ischemia within the right cerebellum.    CTA (08.19.2021) -  Interval improvement in the caliber of the proximal vertebral artery with progression of a vasospasm involving the mid to distal basilar artery. Interval improvement in the vasospasm involving the left vertebral artery. Persistent vasospasm involving the posterior cerebral and superior cerebellar arteries. Interval improvement in the vasospasm involving the right supraclinoid ICA as well as the left A1 and M1 segments. Progression of vasospasm involving the right M1 segment.    Cerebral Angiogram (08.18.2021 - improvement in vasospasm, IA verapamil to posterior circulation  Cerebral Angiogram (08.17.2021 - Left vertebral artery dissection demonstrates continued moderate to severe cerebral vasospasm of the distal let vert and basilar artery, some what improved caliber of proximal left vert. 15mg IA Verapamil injected over 10 minutes with mild improvement of posterior circulation post treatment. Right ICA injection with continued mild supraclinoid vasospasm, 10mg of IA Verapamil injected.  Cerebral Angiogram (08.16.2021) - Left vertebral injection demonstrates moderate diffuse vasospasm of left vertebral artery, basilar artery and posterior circulation including PCAs. 15mg Verapamil injected via left vert with mild radiographic improvement post treatment. Right ICA injection with mild supraclinoid spasm/narrowing, otherwise no vasospasm appreciated in anterior circulation.      [Code] Full  [Goals] Aggressive  [Disposition] ICU

## 2021-08-28 NOTE — PROGRESS NOTE ADULT - ASSESSMENT
46 y/o F with h/o recent gastric sleeve (21 [facility], Dr. Crisostomo ), fibromyalgia, thyroid dysfunction, PTSD, depression, anxiety.  Presented with seizures at home - brought to [place], with 1 more episode of seizure en route.  Intubated, CT showed SAH with IV extension, casted IV, hydrocephalus, given mannitol, levetiracetam 1g,  6mg ativan. Started on Cardene drip for BP goal < 140 and transferred to [facility] NICU for further management. HH5 MF4, BD 1 = . Now s/p cerebral angiogram for R vertebral artery takedown for R vertebral dissecting aneurysm (), and R frontal EVD placement (), now s/p IVC filter placement (). BD 22.    NEURO:   Neurochecks Q1h.  Hydrocephalus: Status post  shunt.   CT head and abd:  CTH : Decreased size in ventricles, stable.   Vasospasm: Angio demonstrating vasospasm of right ICA, right PCOMM, left distal vert and basilar confluence. Pt received IA verapamil on , , , .   CTA : Spasm improved RMCA. Similar in other territories.  Repeat CTA : Improved vasospasm of proximal basilar artery, persistent vasospasm and moderate narrowing of the distal one half of the basilar artery. Posterior cerebral artery P1 and P2 segments appear patent with multifocal vasospasm, not significantly changed.  DCI prophylaxis: Nimodipine 60 mg PO Q4h- held for BP augmentation.  Clinical seizures at home. Was on EEG (see below).   EE/18- 10 second event at 6:42 on , that could not be determined if epileptic vs. artifact.   There were non-specific indicators of bilateral temporal dysfunction and epileptic potential.   If there is persistent suspicion for continued seizures or seizure-like activity, a continuous video-electroencephalogram with the 10-20 international system is advised.  D/Hammad VPA. Continue only monotherapy with Vimpat 100 mg BID only. Follow clinically  Cont ASA 81mg (held  for OR)  Centra fevers: Continue Bromocriptine 15mg Q8.  Neurostimulation: Continue Ritalin for neurostimulation.   Continue aggressive PT/OT as tolerated.     PULM: Tracheitis  Extubated   Duonebs and 3% nebs standing  Continue aggressive chest PT.   CXR clear , has atelectasis, ?R infiltrate  Aspiration precautions, requires frequent suctioning (Q2 hr)  Currently on Ancef for likely tracheitis: until   High aspiration risk.     CARDIO:    - 150**  Levophed gtt to maintain within parameters.   No longer augmenting SBPs. No further need for continued daily EKGS and trops  TTE  WNL    PICC line.  Carotid dopplers showed no pseudoaneurysm. CTA neck showed small hematoma -extraluminal. Monitor closely.     ENDO:    Glucose goal 140-180  ISS      GI:    Nutrition: NPO for VPS  Off free water as Na drifting down.  Thin liquid diet x3 weeks (as per bariatric) when tolerating.  GI ppx: PPI per bariatric surgeon.  Bowel regimen: receiving. Last bowel movement ~  PEG to be placed by GI Dr*  LFTs wnl    RENAL: Hypernatremia- induced  Maintain euvolemia   Na goal 145-150.  Severe hypokalemia 2/2 enteric losses. Repleted aggressively.  Resolved.    HEM/ONC: PE, DVT.  ASA 81mg- Held  pm for OR  VTE Prophylaxis: SCDs, SQL. Increased lovenox to 60mg bid (due to high VTE burden/risk)  (held for OR )  LE Duplex : Acute completely occlusive deep venous thrombosis of the right intramuscular calf vein, s/p IVCF with vascular . Repeat  showed new b/l peroneal DVTs. Scattered segmental pulmonary emboli noted on CTA.   Repeat dopplers : New L calf DVT. Repeat dopplers .   Anti Xa level on 60mg bid when restarted on lovenox post op.   Hypercoagulable w/u- c/w heterozygous prothrombin gene mutation.     ID: Persistent fever, leukocytosis.  Was on empiric Ceftriaxone () for aspiration PNA. Changed to Ancef . WBC trending down.   Repeat cultures  (blood, sputum, CSF, UA). Found MSSA. Changed to Ancef- end date  (7 days total). Other cx NGTD.   Repeat all cultures  per ID including cultures off central line, blood, CSF--> preliminary negative.  Diarrhea- resolved.   Removed R IJ  due to persistent fevers.  Thrush; nystatin swish spit  Left axilla: area of induration ~6x 4cm. Non tender, no erythema, non fluctuant. Doubtful of infection. Warm compresses. Improving. Monitor area of demarcation.   Infectious disease consulted  re: persistent fevers and clearance for surgery. Patient cleared for VPS on .    Disposition: ICU.   Social: Family updated at bedside.   Full code.     Acces:   PICC R basilic  L axillary A line    *****    ATTENDING ATTESTATION:  I was physically present for the key portions of the evaluation and management (E/M) service provided.  I agree with the above history, physical and plan, which I have reviewed and edited where appropriate.    Patient at high risk for neurological deterioration or death due to: Sepsis, aspiration PNA, DVT / PE.  Critical care time:  I have personally provided 45 minutes of critical care time, excluding time spent on separate procedures.      Plan discussed with RN, house staff.       46 y/o F with h/o recent gastric sleeve (21 [facility], Dr. Crisostomo ), fibromyalgia, thyroid dysfunction, PTSD, depression, anxiety.  Presented with seizures at home - brought to [place], with 1 more episode of seizure en route.  Intubated, CT showed SAH with IV extension, casted IV, hydrocephalus, given mannitol, levetiracetam 1g,  6mg ativan. Started on Cardene drip for BP goal < 140 and transferred to [facility] NICU for further management. HH5 MF4, BD 1 = . Now s/p cerebral angiogram for R vertebral artery takedown for R vertebral dissecting aneurysm (), and R frontal EVD placement (), now s/p IVC filter placement (). BD 22.    NEURO:   Neurochecks Q1h.  Hydrocephalus: Status post  shunt placed  .  CT head and abd post op reviewed.   CTH : Decreased size in ventricles, stable.   Vasospasm: Angio demonstrating vasospasm of right ICA, right PCOMM, left distal vert and basilar confluence. Pt received IA verapamil on , , , .   CTA : Spasm improved RMCA. Similar in other territories.  Repeat CTA : Improved vasospasm of proximal basilar artery, persistent vasospasm and moderate narrowing of the distal one half of the basilar artery. Posterior cerebral artery P1 and P2 segments appear patent with multifocal vasospasm, not significantly changed.  DCI prophylaxis: Nimodipine 60 mg PO Q4h- held as on levophed.   Clinical seizures at home. Was on EEG (see below).   EE/18- 10 second event at 6:42 on , that could not be determined if epileptic vs. artifact.   There were non-specific indicators of bilateral temporal dysfunction and epileptic potential.   D/Hammad VPA. Continue only monotherapy with Vimpat 100 mg BID only. Follow clinically.  Cont ASA 81mg (held  for OR)  Centra fevers: Continue Bromocriptine 15mg Q8.  Neurostimulation: Continue Ritalin for neurostimulation.   Continue aggressive PT/OT as tolerated.     PULM: Tracheitis  Extubated   Duonebs and 3% nebs standing  Continue aggressive chest PT as high aspiration risk.   CXR clear , has atelectasis, ?R infiltrate  Aspiration precautions, requires frequent suctioning (Q2 hr)  Currently on Ancef for likely tracheitis: until   CT chest done . Atelectasis, air bronchograms.     CARDIO:    - 150  Levophed gtt to maintain within parameters. WEAN LEVOPHED.   TTE  WNL    PICC line.  Carotid dopplers showed no pseudoaneurysm. CTA neck showed small hematoma -extraluminal. Monitor closely.     ENDO:    Glucose goal 140-180  ISS      GI:    Nutrition: Restart tube feeds.   Thin liquid diet x3 weeks (as per bariatric) when tolerating.  GI ppx: PPI per bariatric surgeon.  Bowel regimen: receiving. Last bowel movement ~  PEG to be placed by GI Dr Milner.   LFTs wnl    RENAL: Hypernatremia- induced.   Maintain euvolemia   Na goal 140-150.  Severe hypokalemia 2/2 enteric losses. Repleted aggressively.  Resolved.    HEM/ONC: PE, DVT.  ASA 81mg- on hold post op.   VTE Prophylaxis: SCDs, SQL. Increased lovenox to 60mg bid (due to high VTE burden/risk)  (held for OR )  LE Duplex : Acute completely occlusive deep venous thrombosis of the right intramuscular calf vein, s/p IVCF with vascular . Repeat  showed new b/l peroneal DVTs. Scattered segmental pulmonary emboli noted on CTA.   Repeat dopplers : New L calf DVT. Repeat dopplers .   Anti Xa level on 60mg bid when restarted on lovenox post op. Resume once OK with NSGY as high risk for VTE.   Hypercoagulable w/u- c/w heterozygous prothrombin gene mutation.     ID: Persistent fever, leukocytosis.  Was on empiric Ceftriaxone () for aspiration PNA. Changed to Ancef . WBC trending down.   Repeat cultures  (blood, sputum, CSF, UA). Found MSSA. Changed to Ancef- end date  (7 days total). Other cx NGTD.   Repeated all cultures  per ID including cultures off central line, blood, CSF--> preliminary negative thus far.  Diarrhea- resolved.   Removed R IJ  due to persistent fevers.  Thrush; nystatin swish spit  Left axilla: area of induration ~6x 4cm. Non tender, no erythema, non fluctuant. Doubtful of infection. Warm compresses. Improving. Monitor area of demarcation.   Infectious disease consulted  re: persistent fevers and clearance for surgery. Patient cleared for VPS on .    Disposition: ICU.   Social: Family updated at bedside.   Full code.     Acces:   PICC R basilic  L axillary A line    *****    ATTENDING ATTESTATION:  I was physically present for the key portions of the evaluation and management (E/M) service provided.  I agree with the above history, physical and plan, which I have reviewed and edited where appropriate.    Patient at high risk for neurological deterioration or death due to: Sepsis, aspiration PNA, DVT / PE.  Critical care time:  I have personally provided 45 minutes of critical care time, excluding time spent on separate procedures.      Plan discussed with RN, house staff.

## 2021-08-28 NOTE — PROGRESS NOTE ADULT - ASSESSMENT
46 y/o F with h/o recent gastric sleeve (21 [facility], Dr. Crisostomo ), fibromyalgia, thyroid dysfunction, PTSD, depression, anxiety.  Presented with seizures at home - brought to [place], with 1 more episode of seizure en route.  Intubated, CT showed SAH with IV extension, casted IV, hydrocephalus, given mannitol, levetiracetam 1g,  6mg ativan. Started on Cardene drip for BP goal < 140 and transferred to [facility] NICU for further management. HH5 MF4, BD 1 = . Now s/p cerebral angiogram for R vertebral artery takedown for R vertebral dissecting aneurysm (), and R frontal EVD placement (), now s/p IVC filter placement (). BD 22.    NEURO:   Neurochecks Q1h.  Hydrocephalus: Status post  shunt placed  .  CT head and abd post op reviewed.   CTH : Decreased size in ventricles, stable.   Vasospasm: Angio demonstrating vasospasm of right ICA, right PCOMM, left distal vert and basilar confluence. Pt received IA verapamil on , , , .   CTA : Spasm improved RMCA. Similar in other territories.  Repeat CTA : Improved vasospasm of proximal basilar artery, persistent vasospasm and moderate narrowing of the distal one half of the basilar artery. Posterior cerebral artery P1 and P2 segments appear patent with multifocal vasospasm, not significantly changed.  DCI prophylaxis: Nimodipine 60 mg PO Q4h- held as on levophed.   Clinical seizures at home. Was on EEG (see below).   EE/18- 10 second event at 6:42 on , that could not be determined if epileptic vs. artifact.   There were non-specific indicators of bilateral temporal dysfunction and epileptic potential.   D/Hammad VPA. Continue only monotherapy with Vimpat 100 mg BID only. Follow clinically.  Cont ASA 81mg (held  for OR)  Centra fevers: Continue Bromocriptine 15mg Q8.  Neurostimulation: Continue Ritalin for neurostimulation.   Continue aggressive PT/OT as tolerated.     PULM: Tracheitis  Extubated   Duonebs and 3% nebs standing  Continue aggressive chest PT as high aspiration risk.   CXR clear , has atelectasis, ?R infiltrate  Aspiration precautions, requires frequent suctioning (Q2 hr)  Currently on Ancef for likely tracheitis: until   CT chest done . Atelectasis, air bronchograms.     CARDIO:    - 150  Levophed gtt to maintain within parameters. WEAN LEVOPHED.   TTE  WNL    PICC line.  Carotid dopplers showed no pseudoaneurysm. CTA neck showed small hematoma -extraluminal. Monitor closely.     ENDO:    Glucose goal 140-180  ISS      GI:    Nutrition: Restart tube feeds.   Thin liquid diet x3 weeks (as per bariatric) when tolerating.  GI ppx: PPI per bariatric surgeon.  Bowel regimen: receiving. Last bowel movement ~  PEG to be placed by GI Dr Milner.   LFTs wnl    RENAL: Hypernatremia- induced.   Maintain euvolemia   Na goal 140-150.  Severe hypokalemia 2/2 enteric losses. Repleted aggressively.  Resolved.    HEM/ONC: PE, DVT.  ASA 81mg- on hold post op.   VTE Prophylaxis: SCDs, SQL. Increased lovenox to 60mg bid (due to high VTE burden/risk)  (held for OR )  LE Duplex : Acute completely occlusive deep venous thrombosis of the right intramuscular calf vein, s/p IVCF with vascular . Repeat  showed new b/l peroneal DVTs. Scattered segmental pulmonary emboli noted on CTA.   Repeat dopplers : New L calf DVT. Repeat dopplers .   Anti Xa level on 60mg bid when restarted on lovenox post op. Resume once OK with NSGY as high risk for VTE.   Hypercoagulable w/u- c/w heterozygous prothrombin gene mutation.     ID: Persistent fever, leukocytosis.  Was on empiric Ceftriaxone () for aspiration PNA. Changed to Ancef . WBC trending down.   Repeat cultures  (blood, sputum, CSF, UA). Found MSSA. Changed to Ancef- end date  (7 days total). Other cx NGTD.   Repeated all cultures  per ID including cultures off central line, blood, CSF--> preliminary negative thus far.  Diarrhea- resolved.   Removed R IJ  due to persistent fevers.  Thrush; nystatin swish spit  Left axilla: area of induration ~6x 4cm. Non tender, no erythema, non fluctuant. Doubtful of infection. Warm compresses. Improving. Monitor area of demarcation.   Infectious disease consulted  re: persistent fevers and clearance for surgery. Patient cleared for VPS on .    Disposition: ICU.   Social: Family updated at bedside.   Full code.     Acces:   PICC R basilic  L axillary A line    *****    ATTENDING ATTESTATION:  I was physically present for the key portions of the evaluation and management (E/M) service provided.  I agree with the above history, physical and plan, which I have reviewed and edited where appropriate.    Patient at high risk for neurological deterioration or death due to: Sepsis, aspiration PNA, DVT / PE.  Critical care time:  I have personally provided 30 minutes of critical care time, excluding time spent on separate procedures.      Plan discussed with RN, house staff.

## 2021-08-29 NOTE — PROGRESS NOTE ADULT - SUBJECTIVE AND OBJECTIVE BOX
==============================================   NEUROCRITICAL CARE ATTENDING NOTE   ==============================================    LUDMILA PRIETO  MRN-[id number]  Summary:  44 y/o F with h/o recent gastric sleeve (08/04 [facility], Dr. Crisostomo ), fibromyalgia, thyroid dysfunction, PTSD, depression, anxiety.  Presented with seizures at home - brought to [place], with 1 more episode of seizure en route.  Intubated, CT showed SAH with IV extension, casted IV, hydrocephalus, given mannitol levetiracetam 6mg ativan, transferred to [facility].      Hospital Course:   8/7: Transferred from [place] for SAH. Intubated. R frontal EVD placed, central line and  [place] placed. 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   8/8: POD 1 s/p angio with R vert takedown, extubated, desatting on aerosol mask, required extensive suctioning, 3% nebs, chest PT, started on low dose precedex drip for restlessness/agitation, ABG stable. 3% stopped for Na 150. Ceribell EEG negative and d/c'ed.   8/9 Overnight, new right pronator drift and right gaze preference that can't cross midline, Repeat CTH demonstrated stable vents, CTA head and neck unremarkable for spasm, hypoattenuation of right cerebellum edema vs. infarct.  8/10: POD3 R vert takedown, EVD open at [id number]. CTH with increased hydro. CTA head/neck with mild basilar spasm.  8/11: POD 4 R vert takedown. EVD at 5cm H2O. Febrile 104F. Depakote increased to q6. HCT stable. EVD lowered to 0.8/12: POD 5 R vert takedown. EVD at 0cm H2O. Pending IVCF with vascular.  8/13: POD 6 R vert takedown. EVD open at 0cmH2O. Mottled skin on the left lower extremity improving. Started on Bromocriptine 15mg Q8.   8/14: POD 7. EVD open at 0. ENT scoped vocal cords, +R vocal cord paresis, NTD. Started on zosyn empirically.   8/15: POD 8. EVD open at 0. Developed increased work of breathing, unable to clear her secretions, received racemic epi and multiple nebulizer treatments, however, still with stridor. Patient re-intubated at bedside, on full vent support.   8/16: CTH/CTA head/neck/CT chest performed o/n for worsened exam, R gaze preference, sluggish pupils. +worsened uncal herniation, hydrocephalus, vasospasm in b/l PCAs, L vert, basilar arteries. Angio today, restarted on 3% for Na goal 145-150.  8/17: POD 10. EVD open at -5cmH2O. ICPs WNL. Febrile 101F and pancultured.  8/18: POD 11. R vert takedown. POD1 angio IA verpamil. Overnight, Pt noted to have downward gaze to the right and not following commands. Taken for stat head CT- stable. Pupils also noted to be aniscoric left pupil 4mm and brisk and right 2mm brisk. Mannitol 50g given. Ceribell EEG placed for concern of subclinical seizures. Brief seizure to L frontal lobe. Given 2g valproic acid and dose increased to 1g q8. Vimpat 100mg BID started. Patient self-extubated, placed on NRB with mucomyst, 3% inhalation and duonebs. 3% at 50 d/c'd.  8/19: POD 12. Remains on VEEG. Axillary A line placed. Salt tabs d/c'd, florinef decreased to 0.1mg, EVD @ -5cm H2O draining 20cc/hr.   8/20: POD 13. VEEG, no seizures noted.  Febrile, pancultured.   8/22: BD 15: Continued on levophed for BP augmentation. Hypercoagulability profile. EVD 15 mL/h. Amantadine added, free water started for hypernatremia. Fluctuating level of consciousness. Frequent suctioning; empiric ceftriaxone started. Tmax 101.Thick secretions.  8/23: Neuroexam stable. Febrile. Pancultures done.   8/24: Exam has not been BP dependent. Relaxed -220. IJ- carotid stick. Lovenox held  8/25: BD 19. CTA 8/25: Improved vasospasm of proximal basilar artery, persistent vasospasm and moderate narrowing of the distal one half of the basilar artery. Posterior cerebral artery P1 and P2 segments appear patent with multifocal vasospasm, not significantly changed. SBP changed to 140-220. No change in exam. Weaning levophed.   8/26: BD20. Preop for VPS 8/27. EVD clamped.   8/27: BD 21.  shunt placed. Post op CT head stable.   8/28: BD 22. POD 1 s/p VPS. Slightly more lethargic but still follows commands. Later in PM, patient worked with PT; tolerated it well and followed their instructions. Suctioned copious secretions. Weaning pressors. Per night shift, dilated loops on Xray. Started on reglan.   8/29: BD23. POD 2 S/P VPS.     Past Medical History: Status gastric sleeve surgery      ICU Vital Signs Last 24 Hrs  T(C): 37.4 (29 Aug 2021 06:00), Max: 37.7 (28 Aug 2021 21:00)  T(F): 99.3 (29 Aug 2021 06:00), Max: 99.9 (28 Aug 2021 21:00)  HR: 93 (29 Aug 2021 07:00) (70 - 105)  ABP: 122/78 (29 Aug 2021 07:00) (119/89 - 152/85)  ABP(mean): 97 (29 Aug 2021 07:00) (91 - 114)  RR: 20 (29 Aug 2021 07:00) (13 - 25)  SpO2: 97% (29 Aug 2021 07:00) (90% - 100%)      08-28-21 @ 07:01  -  08-29-21 @ 07:00  --------------------------------------------------------  IN: 1455.1 mL / OUT: 650 mL / NET: 805.1 mL    08-29-21 @ 07:01 - 08-29-21 @ 07:29  --------------------------------------------------------  IN: 82.5 mL / OUT: 0 mL / NET: 82.5 mL      PHYSICAL EXAMINATION: 8/29  AOx2 (person, place ) with choices, hypophonic, face symmetric. R gaze preference but can overcome, pupils 3mm on R, 4mm on L.  Squeezes fingers to commands in b/l UE, b/l uppers spontaneous, distally antigravity.  B/L lower extremities spontaneous, follows commands, able to bend knees.   EENT:  Anicteric, NGT  CARDIOVASCULAR: (+) S1 S2, normal rate and regular rhythm  PULMONARY: Decreased  ABDOMEN: Soft, nontender with normoactive bowel sounds  EXTREMITIES: No edema  SKIN: No rash. Left axilla area of induration ~5x 4cm. Non tender, no erythema, non fluctuant. Improving      MEDICATIONS:   acetylcysteine 20%  Inhalation 4 milliLiter(s) Inhalation every 6 hours  albuterol/ipratropium for Nebulization 3 milliLiter(s) Nebulizer every 6 hours  amantadine Syrup 200 milliGRAM(s) Oral every 12 hours  bisacodyl Suppository 10 milliGRAM(s) Rectal daily PRN  bromocriptine Capsule 15 milliGRAM(s) Oral every 8 hours  ceFAZolin   IVPB 2000 milliGRAM(s) IV Intermittent every 8 hours  chlorhexidine 0.12% Liquid 15 milliLiter(s) Oral Mucosa every 12 hours  chlorhexidine 2% Cloths 1 Application(s) Topical <User Schedule>  glucagon  Injectable 1 milliGRAM(s) IntraMuscular once  insulin lispro (ADMELOG) corrective regimen sliding scale   SubCutaneous every 6 hours  lacosamide Solution 100 milliGRAM(s) Oral two times a day  methylphenidate 10 milliGRAM(s) Oral daily  metoclopramide 10 milliGRAM(s) Oral every 6 hours  norepinephrine Infusion 0.05 MICROgram(s)/kG/Min (4.6 mL/Hr) IV Continuous <Continuous>  nystatin    Suspension 095784 Unit(s) Oral four times a day  ondansetron Injectable 4 milliGRAM(s) IV Push every 6 hours PRN  senna 2 Tablet(s) Oral at bedtime  sodium chloride 0.9% Bolus 500 milliLiter(s) IV Bolus once  sodium chloride 0.9% lock flush 10 milliLiter(s) IV Push every 1 hour PRN  sodium chloride 3%  Inhalation 4 milliLiter(s) Inhalation every 6 hours      LABS:                       7.7    9.88  )-----------( 253      ( 29 Aug 2021 05:14 )             26.0     08-29    142  |  105  |  9   ----------------------------<  113<H>  3.5   |  29  |  0.48<L>    Ca    8.8      29 Aug 2021 05:14  Phos  1.9     08-29  Mg     1.9     08-29        CULTURES  Culture - Blood (collected 08-26-21 @ 12:48)  Source: .Blood Blood  Preliminary Report (08-27-21 @ 01:00):    No growth at 12 hours    Culture - Blood (collected 08-26-21 @ 12:48)  Source: .Blood Blood  Preliminary Report (08-27-21 @ 01:00):    No growth at 12 hours    Culture - CSF with Gram Stain (collected 08-26-21 @ 12:26)  Source: .CSF CSF  Gram Stain (08-26-21 @ 12:56):    No organisms seen    Rare White blood cells        Neuroimaging:  CT (08.19.2021) - 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 Stable ischemic changes within the right cerebellum. Right-sided EVD catheter in place with slight increase in ventricular size. Interval improvement in the intraventricular hemorrhage. Evolving areas of acute/subacute ischemia within the right cerebellum.    CTA (08.19.2021) -  Interval improvement in the caliber of the proximal vertebral artery with progression of a vasospasm involving the mid to distal basilar artery. Interval improvement in the vasospasm involving the left vertebral artery. Persistent vasospasm involving the posterior cerebral and superior cerebellar arteries. Interval improvement in the vasospasm involving the right supraclinoid ICA as well as the left A1 and M1 segments. Progression of vasospasm involving the right M1 segment.    Cerebral Angiogram (08.18.2021 - Improvement in vasospasm, IA verapamil to posterior circulation  Cerebral Angiogram (08.17.2021 - Left vertebral artery dissection demonstrates continued moderate to severe cerebral vasospasm of the distal let vert and basilar artery, some what improved caliber of proximal left vert. 15mg IA Verapamil injected over 10 minutes with mild improvement of posterior circulation post treatment. Right ICA injection with continued mild supraclinoid vasospasm, 10mg of IA Verapamil injected.  Cerebral Angiogram (08.16.2021) - Left vertebral injection demonstrates moderate diffuse vasospasm of left vertebral artery, basilar artery and posterior circulation including PCAs. 15mg Verapamil injected via left vert with mild radiographic improvement post treatment. Right ICA injection with mild supraclinoid spasm/narrowing, otherwise no vasospasm appreciated in anterior circulation.      [Code] Full  [Goals] Aggressive  [Disposition] ICU     ==============================================   NEUROCRITICAL CARE ATTENDING NOTE   ==============================================    LUDMILA PRIETO  MRN-[id number]  Summary:  46 y/o F with h/o recent gastric sleeve (08/04 [facility], Dr. Crisostomo ), fibromyalgia, thyroid dysfunction, PTSD, depression, anxiety.  Presented with seizures at home - brought to [place], with 1 more episode of seizure en route.  Intubated, CT showed SAH with IV extension, casted IV, hydrocephalus, given mannitol levetiracetam 6mg ativan, transferred to [facility].      Hospital Course:   8/7: Transferred from [place] for SAH. Intubated. R frontal EVD placed, central line and  [place] placed. 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   8/8: POD 1 s/p angio with R vert takedown, extubated, desatting on aerosol mask, required extensive suctioning, 3% nebs, chest PT, started on low dose precedex drip for restlessness/agitation, ABG stable. 3% stopped for Na 150. Ceribell EEG negative and d/c'ed.   8/9 Overnight, new right pronator drift and right gaze preference that can't cross midline, Repeat CTH demonstrated stable vents, CTA head and neck unremarkable for spasm, hypoattenuation of right cerebellum edema vs. infarct.  8/10: POD3 R vert takedown, EVD open at [id number]. CTH with increased hydro. CTA head/neck with mild basilar spasm.  8/11: POD 4 R vert takedown. EVD at 5cm H2O. Febrile 104F. Depakote increased to q6. HCT stable. EVD lowered to 0.8/12: POD 5 R vert takedown. EVD at 0cm H2O. Pending IVCF with vascular.  8/13: POD 6 R vert takedown. EVD open at 0cmH2O. Mottled skin on the left lower extremity improving. Started on Bromocriptine 15mg Q8.   8/14: POD 7. EVD open at 0. ENT scoped vocal cords, +R vocal cord paresis, NTD. Started on zosyn empirically.   8/15: POD 8. EVD open at 0. Developed increased work of breathing, unable to clear her secretions, received racemic epi and multiple nebulizer treatments, however, still with stridor. Patient re-intubated at bedside, on full vent support.   8/16: CTH/CTA head/neck/CT chest performed o/n for worsened exam, R gaze preference, sluggish pupils. +worsened uncal herniation, hydrocephalus, vasospasm in b/l PCAs, L vert, basilar arteries. Angio today, restarted on 3% for Na goal 145-150.  8/17: POD 10. EVD open at -5cmH2O. ICPs WNL. Febrile 101F and pancultured.  8/18: POD 11. R vert takedown. POD1 angio IA verpamil. Overnight, Pt noted to have downward gaze to the right and not following commands. Taken for stat head CT- stable. Pupils also noted to be aniscoric left pupil 4mm and brisk and right 2mm brisk. Mannitol 50g given. Ceribell EEG placed for concern of subclinical seizures. Brief seizure to L frontal lobe. Given 2g valproic acid and dose increased to 1g q8. Vimpat 100mg BID started. Patient self-extubated, placed on NRB with mucomyst, 3% inhalation and duonebs. 3% at 50 d/c'd.  8/19: POD 12. Remains on VEEG. Axillary A line placed. Salt tabs d/c'd, florinef decreased to 0.1mg, EVD @ -5cm H2O draining 20cc/hr.   8/20: POD 13. VEEG, no seizures noted.  Febrile, pancultured.   8/22: BD 15: Continued on levophed for BP augmentation. Hypercoagulability profile. EVD 15 mL/h. Amantadine added, free water started for hypernatremia. Fluctuating level of consciousness. Frequent suctioning; empiric ceftriaxone started. Tmax 101.Thick secretions.  8/23: Neuroexam stable. Febrile. Pancultures done.   8/24: Exam has not been BP dependent. Relaxed -220. IJ- carotid stick. Lovenox held  8/25: BD 19. CTA 8/25: Improved vasospasm of proximal basilar artery, persistent vasospasm and moderate narrowing of the distal one half of the basilar artery. Posterior cerebral artery P1 and P2 segments appear patent with multifocal vasospasm, not significantly changed. SBP changed to 140-220. No change in exam. Weaning levophed.   8/26: BD20. Preop for VPS 8/27. EVD clamped.   8/27: BD 21.  shunt placed. Post op CT head stable.   8/28: BD 22. POD 1 s/p VPS. Slightly more lethargic but still follows commands. Later in PM, patient worked with PT; tolerated it well and followed their instructions. Suctioned copious secretions. Weaning pressors. Per night shift, dilated loops on Xray. Started on reglan. Lovenox given last tonight.   8/29: BD23. POD 2 S/P VPS.     Past Medical History: Status gastric sleeve surgery      ICU Vital Signs Last 24 Hrs  T(C): 37.4 (29 Aug 2021 06:00), Max: 37.7 (28 Aug 2021 21:00)  T(F): 99.3 (29 Aug 2021 06:00), Max: 99.9 (28 Aug 2021 21:00)  HR: 93 (29 Aug 2021 07:00) (70 - 105)  ABP: 122/78 (29 Aug 2021 07:00) (119/89 - 152/85)  ABP(mean): 97 (29 Aug 2021 07:00) (91 - 114)  RR: 20 (29 Aug 2021 07:00) (13 - 25)  SpO2: 97% (29 Aug 2021 07:00) (90% - 100%)      08-28-21 @ 07:01  -  08-29-21 @ 07:00  --------------------------------------------------------  IN: 1455.1 mL / OUT: 650 mL / NET: 805.1 mL    08-29-21 @ 07:01  -  08-29-21 @ 07:29  --------------------------------------------------------  IN: 82.5 mL / OUT: 0 mL / NET: 82.5 mL      PHYSICAL EXAMINATION: 8/29  AOx2 (person, place ) with choices, hypophonic, face symmetric. R gaze preference but can overcome, pupils 3mm on R, 4mm on L.  Squeezes fingers to commands in b/l UE, b/l uppers spontaneous, distally antigravity.  B/L lower extremities spontaneous, follows commands, able to bend knees.   EENT:  Anicteric, NGT  CARDIOVASCULAR: (+) S1 S2, normal rate and regular rhythm  PULMONARY: Decreased  ABDOMEN: Soft, nontender with normoactive bowel sounds  EXTREMITIES: No edema  SKIN: No rash. Left axilla area of induration ~5x 4cm. Non tender, no erythema, non fluctuant. Improving      MEDICATIONS:   acetylcysteine 20%  Inhalation 4 milliLiter(s) Inhalation every 6 hours  albuterol/ipratropium for Nebulization 3 milliLiter(s) Nebulizer every 6 hours  amantadine Syrup 200 milliGRAM(s) Oral every 12 hours  bisacodyl Suppository 10 milliGRAM(s) Rectal daily PRN  bromocriptine Capsule 15 milliGRAM(s) Oral every 8 hours  ceFAZolin   IVPB 2000 milliGRAM(s) IV Intermittent every 8 hours  chlorhexidine 0.12% Liquid 15 milliLiter(s) Oral Mucosa every 12 hours  chlorhexidine 2% Cloths 1 Application(s) Topical <User Schedule>  glucagon  Injectable 1 milliGRAM(s) IntraMuscular once  insulin lispro (ADMELOG) corrective regimen sliding scale   SubCutaneous every 6 hours  lacosamide Solution 100 milliGRAM(s) Oral two times a day  methylphenidate 10 milliGRAM(s) Oral daily  metoclopramide 10 milliGRAM(s) Oral every 6 hours  norepinephrine Infusion 0.05 MICROgram(s)/kG/Min (4.6 mL/Hr) IV Continuous <Continuous>  nystatin    Suspension 597757 Unit(s) Oral four times a day  ondansetron Injectable 4 milliGRAM(s) IV Push every 6 hours PRN  senna 2 Tablet(s) Oral at bedtime  sodium chloride 0.9% Bolus 500 milliLiter(s) IV Bolus once  sodium chloride 0.9% lock flush 10 milliLiter(s) IV Push every 1 hour PRN  sodium chloride 3%  Inhalation 4 milliLiter(s) Inhalation every 6 hours      LABS:                       7.7    9.88  )-----------( 253      ( 29 Aug 2021 05:14 )             26.0     08-29    142  |  105  |  9   ----------------------------<  113<H>  3.5   |  29  |  0.48<L>    Ca    8.8      29 Aug 2021 05:14  Phos  1.9     08-29  Mg     1.9     08-29      CULTURES  Culture - Blood (collected 08-26-21 @ 12:48)  Source: .Blood Blood  Preliminary Report (08-27-21 @ 01:00):    No growth at 12 hours    Culture - Blood (collected 08-26-21 @ 12:48)  Source: .Blood Blood  Preliminary Report (08-27-21 @ 01:00):    No growth at 12 hours    Culture - CSF with Gram Stain (collected 08-26-21 @ 12:26)  Source: .CSF CSF  Gram Stain (08-26-21 @ 12:56):    No organisms seen    Rare White blood cells        Neuroimaging:  CT (08.19.2021) - 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 Stable ischemic changes within the right cerebellum. Right-sided EVD catheter in place with slight increase in ventricular size. Interval improvement in the intraventricular hemorrhage. Evolving areas of acute/subacute ischemia within the right cerebellum.    CTA (08.19.2021) -  Interval improvement in the caliber of the proximal vertebral artery with progression of a vasospasm involving the mid to distal basilar artery. Interval improvement in the vasospasm involving the left vertebral artery. Persistent vasospasm involving the posterior cerebral and superior cerebellar arteries. Interval improvement in the vasospasm involving the right supraclinoid ICA as well as the left A1 and M1 segments. Progression of vasospasm involving the right M1 segment.    Cerebral Angiogram (08.18.2021 - Improvement in vasospasm, IA verapamil to posterior circulation  Cerebral Angiogram (08.17.2021 - Left vertebral artery dissection demonstrates continued moderate to severe cerebral vasospasm of the distal let vert and basilar artery, some what improved caliber of proximal left vert. 15mg IA Verapamil injected over 10 minutes with mild improvement of posterior circulation post treatment. Right ICA injection with continued mild supraclinoid vasospasm, 10mg of IA Verapamil injected.  Cerebral Angiogram (08.16.2021) - Left vertebral injection demonstrates moderate diffuse vasospasm of left vertebral artery, basilar artery and posterior circulation including PCAs. 15mg Verapamil injected via left vert with mild radiographic improvement post treatment. Right ICA injection with mild supraclinoid spasm/narrowing, otherwise no vasospasm appreciated in anterior circulation.      [Code] Full  [Goals] Aggressive  [Disposition] ICU     ==============================================   NEUROCRITICAL CARE ATTENDING NOTE   ==============================================    LUDMILA PRIETO  MRN-[id number]  Summary:  46 y/o F with h/o recent gastric sleeve (08/04 [facility], Dr. Crisostomo ), fibromyalgia, thyroid dysfunction, PTSD, depression, anxiety.  Presented with seizures at home - brought to [place], with 1 more episode of seizure en route.  Intubated, CT showed SAH with IV extension, casted IV, hydrocephalus, given mannitol levetiracetam 6mg ativan, transferred to [facility].      Hospital Course:   8/7: Transferred from [place] for SAH. Intubated. R frontal EVD placed, central line and  [place] placed. 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   8/8: POD 1 s/p angio with R vert takedown, extubated, desatting on aerosol mask, required extensive suctioning, 3% nebs, chest PT, started on low dose precedex drip for restlessness/agitation, ABG stable. 3% stopped for Na 150. Ceribell EEG negative and d/c'ed.   8/9 Overnight, new right pronator drift and right gaze preference that can't cross midline, Repeat CTH demonstrated stable vents, CTA head and neck unremarkable for spasm, hypoattenuation of right cerebellum edema vs. infarct.  8/10: POD3 R vert takedown, EVD open at [id number]. CTH with increased hydro. CTA head/neck with mild basilar spasm.  8/11: POD 4 R vert takedown. EVD at 5cm H2O. Febrile 104F. Depakote increased to q6. HCT stable. EVD lowered to 0.8/12: POD 5 R vert takedown. EVD at 0cm H2O. Pending IVCF with vascular.  8/13: POD 6 R vert takedown. EVD open at 0cmH2O. Mottled skin on the left lower extremity improving. Started on Bromocriptine 15mg Q8.   8/14: POD 7. EVD open at 0. ENT scoped vocal cords, +R vocal cord paresis, NTD. Started on zosyn empirically.   8/15: POD 8. EVD open at 0. Developed increased work of breathing, unable to clear her secretions, received racemic epi and multiple nebulizer treatments, however, still with stridor. Patient re-intubated at bedside, on full vent support.   8/16: CTH/CTA head/neck/CT chest performed o/n for worsened exam, R gaze preference, sluggish pupils. +worsened uncal herniation, hydrocephalus, vasospasm in b/l PCAs, L vert, basilar arteries. Angio today, restarted on 3% for Na goal 145-150.  8/17: POD 10. EVD open at -5cmH2O. ICPs WNL. Febrile 101F and pancultured.  8/18: POD 11. R vert takedown. POD1 angio IA verpamil. Overnight, Pt noted to have downward gaze to the right and not following commands. Taken for stat head CT- stable. Pupils also noted to be aniscoric left pupil 4mm and brisk and right 2mm brisk. Mannitol 50g given. Ceribell EEG placed for concern of subclinical seizures. Brief seizure to L frontal lobe. Given 2g valproic acid and dose increased to 1g q8. Vimpat 100mg BID started. Patient self-extubated, placed on NRB with mucomyst, 3% inhalation and duonebs. 3% at 50 d/c'd.  8/19: POD 12. Remains on VEEG. Axillary A line placed. Salt tabs d/c'd, florinef decreased to 0.1mg, EVD @ -5cm H2O draining 20cc/hr.   8/20: POD 13. VEEG, no seizures noted.  Febrile, pancultured.   8/22: BD 15: Continued on levophed for BP augmentation. Hypercoagulability profile. EVD 15 mL/h. Amantadine added, free water started for hypernatremia. Fluctuating level of consciousness. Frequent suctioning; empiric ceftriaxone started. Tmax 101.Thick secretions.  8/23: Neuroexam stable. Febrile. Pancultures done.   8/24: Exam has not been BP dependent. Relaxed -220. IJ- carotid stick. Lovenox held  8/25: BD 19. CTA 8/25: Improved vasospasm of proximal basilar artery, persistent vasospasm and moderate narrowing of the distal one half of the basilar artery. Posterior cerebral artery P1 and P2 segments appear patent with multifocal vasospasm, not significantly changed. SBP changed to 140-220. No change in exam. Weaning levophed.   8/26: BD20. Preop for VPS 8/27. EVD clamped.   8/27: BD 21.  shunt placed. Post op CT head stable.   8/28: BD 22. POD 1 s/p VPS. Slightly more lethargic but still follows commands. Later in PM, patient worked with PT; tolerated it well and followed their instructions. Suctioned copious secretions. Weaning pressors. Per night shift, dilated loops on Xray. Started on reglan. Lovenox given last tonight.   8/29: BD23. POD 2 S/P VPS.     Past Medical History: Status gastric sleeve surgery      ICU Vital Signs Last 24 Hrs  T(C): 37.4 (29 Aug 2021 06:00), Max: 37.7 (28 Aug 2021 21:00)  T(F): 99.3 (29 Aug 2021 06:00), Max: 99.9 (28 Aug 2021 21:00)  HR: 93 (29 Aug 2021 07:00) (70 - 105)  ABP: 122/78 (29 Aug 2021 07:00) (119/89 - 152/85)  ABP(mean): 97 (29 Aug 2021 07:00) (91 - 114)  RR: 20 (29 Aug 2021 07:00) (13 - 25)  SpO2: 97% (29 Aug 2021 07:00) (90% - 100%)      08-28-21 @ 07:01  -  08-29-21 @ 07:00  --------------------------------------------------------  IN: 1455.1 mL / OUT: 650 mL / NET: 805.1 mL    08-29-21 @ 07:01  -  08-29-21 @ 07:29  --------------------------------------------------------  IN: 82.5 mL / OUT: 0 mL / NET: 82.5 mL      PHYSICAL EXAMINATION: 8/29  AOx2 (person, place) with choices, hypophonic, face symmetric. R gaze preference but can overcome, pupils 3mm on R, 4mm on L. Follows commands  B/L uppers spontaneous, at least 3/5.  B/L lower extremities spontaneous, able to bend knees. Moves distally antigravity when supported at the knee- able to pick B/L heels off the bed.   EENT:  Anicteric, NGT  CARDIOVASCULAR: (+) S1 S2, normal rate and regular rhythm  PULMONARY: Decreased  ABDOMEN: Soft, nontender with normoactive bowel sounds  EXTREMITIES: No edema  SKIN: No rash. Left axilla area of induration ~5x 4cm. Non tender, no erythema, non fluctuant. Improving      MEDICATIONS:   acetylcysteine 20%  Inhalation 4 milliLiter(s) Inhalation every 6 hours  albuterol/ipratropium for Nebulization 3 milliLiter(s) Nebulizer every 6 hours  amantadine Syrup 200 milliGRAM(s) Oral every 12 hours  bisacodyl Suppository 10 milliGRAM(s) Rectal daily PRN  bromocriptine Capsule 15 milliGRAM(s) Oral every 8 hours  ceFAZolin   IVPB 2000 milliGRAM(s) IV Intermittent every 8 hours  chlorhexidine 0.12% Liquid 15 milliLiter(s) Oral Mucosa every 12 hours  chlorhexidine 2% Cloths 1 Application(s) Topical <User Schedule>  glucagon  Injectable 1 milliGRAM(s) IntraMuscular once  insulin lispro (ADMELOG) corrective regimen sliding scale   SubCutaneous every 6 hours  lacosamide Solution 100 milliGRAM(s) Oral two times a day  methylphenidate 10 milliGRAM(s) Oral daily  metoclopramide 10 milliGRAM(s) Oral every 6 hours  norepinephrine Infusion 0.05 MICROgram(s)/kG/Min (4.6 mL/Hr) IV Continuous <Continuous>  nystatin    Suspension [id number] Unit(s) Oral four times a day  ondansetron Injectable 4 milliGRAM(s) IV Push every 6 hours PRN  senna 2 Tablet(s) Oral at bedtime  sodium chloride 0.9% Bolus 500 milliLiter(s) IV Bolus once  sodium chloride 0.9% lock flush 10 milliLiter(s) IV Push every 1 hour PRN  sodium chloride 3%  Inhalation 4 milliLiter(s) Inhalation every 6 hours      LABS:                       7.7    9.88  )-----------( 253      ( 29 Aug 2021 05:14 )             26.0     08-29    142  |  105  |  9   ----------------------------<  113<H>  3.5   |  29  |  0.48<L>    Ca    8.8      29 Aug 2021 05:14  Phos  1.9     08-29  Mg     1.9     08-29      CULTURES  Culture - Blood (collected 08-26-21 @ 12:48)  Source: .Blood Blood  Preliminary Report (08-27-21 @ 01:00):    No growth at 12 hours    Culture - Blood (collected 08-26-21 @ 12:48)  Source: .Blood Blood  Preliminary Report (08-27-21 @ 01:00):    No growth at 12 hours    Culture - CSF with Gram Stain (collected 08-26-21 @ 12:26)  Source: .CSF CSF  Gram Stain (08-26-21 @ 12:56):    No organisms seen    Rare White blood cells        Neuroimaging:  CT (08.19.2021) - 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 Stable ischemic changes within the right cerebellum. Right-sided EVD catheter in place with slight increase in ventricular size. Interval improvement in the intraventricular hemorrhage. Evolving areas of acute/subacute ischemia within the right cerebellum.    CTA (08.19.2021) -  Interval improvement in the caliber of the proximal vertebral artery with progression of a vasospasm involving the mid to distal basilar artery. Interval improvement in the vasospasm involving the left vertebral artery. Persistent vasospasm involving the posterior cerebral and superior cerebellar arteries. Interval improvement in the vasospasm involving the right supraclinoid ICA as well as the left A1 and M1 segments. Progression of vasospasm involving the right M1 segment.    Cerebral Angiogram (08.18.2021 - Improvement in vasospasm, IA verapamil to posterior circulation  Cerebral Angiogram (08.17.2021 - Left vertebral artery dissection demonstrates continued moderate to severe cerebral vasospasm of the distal let vert and basilar artery, some what improved caliber of proximal left vert. 15mg IA Verapamil injected over 10 minutes with mild improvement of posterior circulation post treatment. Right ICA injection with continued mild supraclinoid vasospasm, 10mg of IA Verapamil injected.  Cerebral Angiogram (08.16.2021) - Left vertebral injection demonstrates moderate diffuse vasospasm of left vertebral artery, basilar artery and posterior circulation including PCAs. 15mg Verapamil injected via left vert with mild radiographic improvement post treatment. Right ICA injection with mild supraclinoid spasm/narrowing, otherwise no vasospasm appreciated in anterior circulation.      [Code] Full  [Goals] Aggressive  [Disposition] ICU

## 2021-08-29 NOTE — PROGRESS NOTE ADULT - ASSESSMENT
44 y/o F with h/o recent gastric sleeve (21 [facility], Dr. Crisostomo ), fibromyalgia, thyroid dysfunction, PTSD, depression, anxiety.  Presented with seizures at home - brought to [place], with 1 more episode of seizure en route.  Intubated, CT showed SAH with IV extension, casted IV, hydrocephalus, given mannitol, levetiracetam 1g,  6mg ativan. Started on Cardene drip for BP goal < 140 and transferred to [facility] NICU for further management. HH5 MF4, BD 1 = . Now s/p cerebral angiogram for R vertebral artery takedown for R vertebral dissecting aneurysm (), and R frontal EVD placement (), now s/p IVC filter placement (). BD 23.    NEURO:   Neurochecks Q1h.  Hydrocephalus: Status post  shunt placed  (certas at 4). CT head post op reviewed. Mild increase in vent size compared to prior. Repeat CT head .   CTH : Decreased size in ventricles, stable.   Vasospasm: Angio demonstrating vasospasm of right ICA, right PCOMM, left distal vert and basilar confluence. Pt received IA verapamil on , , , .   CTA : Spasm improved RMCA. Similar in other territories.  Repeat CTA : Improved vasospasm of proximal basilar artery, persistent vasospasm and moderate narrowing of the distal one half of the basilar artery. Posterior cerebral artery P1 and P2 segments appear patent with multifocal vasospasm, not significantly changed.  DCI prophylaxis: Nimodipine 60 mg PO Q4h- held as on levophed.   Clinical seizures at home. Was on EEG (see below).   EE/18- 10 second event at 6:42 on , that could not be determined if epileptic vs. artifact.   There were non-specific indicators of bilateral temporal dysfunction and epileptic potential.   D/Hammad VPA. Continue only monotherapy with Vimpat 100 mg BID only. Follow clinically.  Cont ASA 81mg (held  for OR)  Centra fevers: Continue Bromocriptine 15mg Q8.  Neurostimulation: Continue Ritalin for neurostimulation.   Continue aggressive PT/OT as tolerated.     PULM: Tracheitis  Extubated   Duonebs and 3% nebs standing  Continue aggressive chest PT as high aspiration risk.   CXR clear , has atelectasis, ?R infiltrate  Aspiration precautions, requires frequent suctioning (Q2 hr)  Currently on Ancef for likely tracheitis: until   CT chest done . Atelectasis, air bronchograms.     CARDIO:    - 150  Levophed gtt to maintain within parameters. WEAN LEVOPHED.   TTE  WNL    PICC line.  Carotid dopplers showed no pseudoaneurysm. CTA neck showed small hematoma -extraluminal. Monitor closely.     ENDO:    Glucose goal 140-180  ISS    GI: Ileus- noted   Nutrition: Restarted mild trickle feeds. Hold for ileus.  Thin liquid diet x3 weeks (as per bariatric) when tolerating.  GI ppx: PPI per bariatric surgeon.  Bowel regimen: receiving. Last bowel movement ~  PEG to be placed by GI Dr. Milner.   LFTs wnl    RENAL: Hypernatremia- induced.   Maintain euvolemia   Na goal 140-150.  Severe hypokalemia 2/2 enteric losses. Replete aggressively. Improved.  Monitor.    HEM/ONC: PE, DVT.  ASA 81mg- on hold post op.   VTE Prophylaxis: SCDs, SQL. Increased lovenox to 60mg bid (due to high VTE burden/risk)  (held for OR ). Resume lovenox at 40mg daily for now  per NSGY   LE Duplex : Acute completely occlusive deep venous thrombosis of the right intramuscular calf vein, s/p IVCF with vascular . Repeat  showed new b/l peroneal DVTs. Scattered segmental pulmonary emboli noted on CTA.   Repeat dopplers : New L calf DVT. Repeat dopplers .   Hypercoagulable w/u- c/w heterozygous prothrombin gene mutation.       ID: Persistent fever, leukocytosis.  Was on empiric Ceftriaxone () for aspiration PNA. Changed to Ancef . WBC trending down.   Repeat cultures  (blood, sputum, CSF, UA). Found MSSA. Changed to Ancef- end date  (7 days total). Other cx NGTD.   Repeated all cultures  per ID including cultures off central line, blood, CSF--> preliminary negative thus far.  Diarrhea- resolved.   Removed R IJ  due to persistent fevers.  Thrush; nystatin swish spit  Left axilla: area of induration ~6x 4cm. Non tender, no erythema, non fluctuant. Doubtful of infection. Warm compresses. Improving. Monitor area of demarcation.   Infectious disease consulted  re: persistent fevers and clearance for surgery. Patient cleared for VPS on .    Disposition: ICU.   Social: Family updated at bedside.   Full code.     Acces:   PICC R basilic  L axillary A line    *****    ATTENDING ATTESTATION:  I was physically present for the key portions of the evaluation and management (E/M) service provided.  I agree with the above history, physical and plan, which I have reviewed and edited where appropriate.    Patient at high risk for neurological deterioration or death due to: Sepsis, aspiration PNA, DVT / PE.  Critical care time:  I have personally provided 45 minutes of critical care time, excluding time spent on separate procedures.      Plan discussed with RN, house staff.       46 y/o F with h/o recent gastric sleeve (21 [facility], Dr. Crisostomo ), fibromyalgia, thyroid dysfunction, PTSD, depression, anxiety.  Presented with seizures at home - brought to [place], with 1 more episode of seizure en route.  Intubated, CT showed SAH with IV extension, casted IV, hydrocephalus, given mannitol, levetiracetam 1g,  6mg ativan. Started on Cardene drip for BP goal < 140 and transferred to [facility] NICU for further management. HH5 MF4, BD 1 = . Now s/p cerebral angiogram for R vertebral artery takedown for R vertebral dissecting aneurysm (), and R frontal EVD placement (), now s/p IVC filter placement (). BD 23.    NEURO:   Neurochecks Q1h.  Hydrocephalus: Status post  shunt placed  (certas at 4). CT head post op reviewed. Mild increase in vent size compared to prior. Repeat CT head .   Vasospasm: Most recent CTA :  Improved vasospasm of proximal basilar artery, persistent vasospasm and moderate narrowing of the distal one half of the basilar artery. Posterior cerebral artery P1 and P2 segments appear patent with multifocal vasospasm, not significantly changed.  S/P IA verapamil on , , , .  DCI prophylaxis: Nimodipine 60 mg PO Q4h- held as on levophed.   Clinical seizures at home. Was on EEG (see below).   EE/18- 10 second event at 6:42 on , that could not be determined if epileptic vs. artifact.   There were non-specific indicators of bilateral temporal dysfunction and epileptic potential.   D/Hammad VPA. Continue only monotherapy with Vimpat 100 mg BID only. Follow clinically.  Cont ASA 81mg (held  for OR)  Centra fevers: Continue Bromocriptine 15mg Q8.  Neurostimulation: Continue Ritalin for neurostimulation.   Continue aggressive PT/OT as tolerated.     PULM: Tracheitis  Extubated   Duonebs and 3% nebs standing  Continue aggressive chest PT as high aspiration risk.   CXR clear , has atelectasis, ?R infiltrate  Aspiration precautions, requires frequent suctioning (Q2 hr)  Currently on Ancef for likely tracheitis: until   CT chest done . Atelectasis, air bronchograms.     CARDIO:    - 150. Will discuss changing BP parameters 110- 160 to get off levophed.  WEAN OFF LEVOPHED    TTE  WNL    PICC line.  Carotid dopplers showed no pseudoaneurysm. CTA neck showed small hematoma -extraluminal. Monitor closely.     ENDO:    Glucose goal 140-180  ISS    GI: Ileus- noted   Nutrition: Restarted mild trickle feeds (Vital)  at 20cc/hr.   Thin liquid diet x3 weeks (as per bariatric) when tolerating.  GI ppx: PPI per bariatric surgeon.  Diarrhea: Has rectal tube. Possible obstructive diarrhea? Has an ileus. Monitor output. Follow up CTabd/pelvis report to ensure no abd pathology.   GI following  PEG to be placed by GI Dr. Milner.   LFTs wnl    RENAL: Hypernatremia- induced.   Maintain euvolemia   Na goal 140-150.  Severe hypokalemia 2/2 enteric losses. Replete aggressively. Improved.  Monitor.    HEM/ONC: PE, DVT.  ASA 81mg- On hold post op.   VTE Prophylaxis: SCDs, SQL. Increased lovenox to 60mg bid (due to high VTE burden/risk)  (held for OR ). Resume lovenox at 40mg daily for now  per NSGY   LE Duplex : Acute completely occlusive deep venous thrombosis of the right intramuscular calf vein, s/p IVCF with vascular . Repeat  showed new b/l peroneal DVTs. Scattered segmental pulmonary emboli noted on CTA.   Repeat dopplers : New L calf DVT. Repeat dopplers .   Hypercoagulable w/u- c/w heterozygous prothrombin gene mutation.   Anemia 8.3--> 7.7; noted . All cell lines decreased slightly. Monitor.       ID: Persistent fever, leukocytosis.  Was on empiric Ceftriaxone () for aspiration PNA. Changed to Ancef . WBC trending down.   Repeat cultures  (blood, sputum, CSF, UA). Found MSSA. Changed to Ancef- end date  (7 days total). Other cx NGTD.   Repeated all cultures  per ID including cultures off central line, blood, CSF--> preliminary negative thus far.  Leukocytosis improving   Thrush; nystatin swish/spit.  Left axilla: area of induration ~6x 4cm. Non tender, no erythema, non fluctuant. Doubtful of infection. Warm compresses.  Monitor area of demarcation.   Infectious disease consulted  re: persistent fevers and clearance for VPS.     Disposition: ICU.   Social: Family updated at bedside.   Full code.     Acces:   PICC R basilic  L axillary A line  (Previous central line from  removed)    *****    ATTENDING ATTESTATION:  I was physically present for the key portions of the evaluation and management (E/M) service provided.  I agree with the above history, physical and plan, which I have reviewed and edited where appropriate.    Patient at high risk for neurological deterioration or death due to: Sepsis, aspiration PNA, DVT / PE.  Critical care time:  I have personally provided 45 minutes of critical care time, excluding time spent on separate procedures.      Plan discussed with RN, house staff.

## 2021-08-29 NOTE — PROGRESS NOTE ADULT - ASSESSMENT
46 y/o F with h/o recent gastric sleeve (21 [facility], Dr. Crisostomo ), fibromyalgia, thyroid dysfunction, PTSD, depression, anxiety.  Presented with seizures at home - brought to [place], with 1 more episode of seizure en route.  Intubated, CT showed SAH with IV extension, casted IV, hydrocephalus, given mannitol, levetiracetam 1g,  6mg ativan. Started on Cardene drip for BP goal < 140 and transferred to [facility] NICU for further management. HH5 MF4, BD 1 = . Now s/p cerebral angiogram for R vertebral artery takedown for R vertebral dissecting aneurysm (), and R frontal EVD placement (), now s/p IVC filter placement (). BD 23.    NEURO:   Neurochecks Q1h.  Hydrocephalus: Status post  shunt placed  (certas at 4). CT head post op reviewed. Mild increase in vent size compared to prior. Repeat CT head .   Vasospasm: Most recent CTA :  Improved vasospasm of proximal basilar artery, persistent vasospasm and moderate narrowing of the distal one half of the basilar artery. Posterior cerebral artery P1 and P2 segments appear patent with multifocal vasospasm, not significantly changed.  S/P IA verapamil on , , , .  DCI prophylaxis: Nimodipine 60 mg PO Q4h- held as on levophed.   Clinical seizures at home. Was on EEG (see below).   EE/18- 10 second event at 6:42 on , that could not be determined if epileptic vs. artifact.   There were non-specific indicators of bilateral temporal dysfunction and epileptic potential.   D/Hammad VPA. Continue only monotherapy with Vimpat 100 mg BID only. Follow clinically.  Cont ASA 81mg (held  for OR)  Centra fevers: Continue Bromocriptine 15mg Q8.  Neurostimulation: Continue Ritalin for neurostimulation.   Continue aggressive PT/OT as tolerated.     PULM: Tracheitis  Extubated   Duonebs and 3% nebs standing  Continue aggressive chest PT as high aspiration risk.   CXR clear , has atelectasis, ?R infiltrate  Aspiration precautions, requires frequent suctioning (Q2 hr)  Currently on Ancef for likely tracheitis: until   CT chest done . Atelectasis, air bronchograms.     CARDIO:    - 150. Norepinephrine infusion D/Hammad.  TTE  WNL    PICC line.  Carotid dopplers showed no pseudoaneurysm. CTA neck showed small hematoma -extraluminal. Monitor closely.     ENDO:    Glucose goal 140-180  ISS    GI: Ileus- noted   Nutrition: Restarted mild trickle feeds (Vital)  at 20cc/hr.   Thin liquid diet x3 weeks (as per bariatric) when tolerating.  GI ppx: PPI per bariatric surgeon.  Diarrhea: Has rectal tube. Possible obstructive diarrhea? Has an ileus. Monitor output. Follow up CTabd/pelvis report to ensure no abd pathology.   GI following  PEG to be placed by GI Dr. Milner.   LFTs wnl    RENAL: Hypernatremia- induced.   Maintain euvolemia   Na goal 140-150.  Severe hypokalemia 2/2 enteric losses. Replete aggressively. Improved.  Monitor.    HEM/ONC: PE, DVT.  ASA 81mg- On hold post op.   VTE Prophylaxis: SCDs, SQL. Increased lovenox to 60mg bid (due to high VTE burden/risk)  (held for OR ). Resume lovenox at 40mg daily for now  per NSGY   LE Duplex : Acute completely occlusive deep venous thrombosis of the right intramuscular calf vein, s/p IVCF with vascular . Repeat  showed new b/l peroneal DVTs. Scattered segmental pulmonary emboli noted on CTA.   Repeat dopplers : New L calf DVT. Repeat dopplers .   Hypercoagulable w/u- c/w heterozygous prothrombin gene mutation.   Anemia 8.3--> 7.7; noted . All cell lines decreased slightly. Monitor.       ID: Persistent fever, leukocytosis.  Was on empiric Ceftriaxone () for aspiration PNA. Changed to Ancef . WBC trending down.   Repeat cultures  (blood, sputum, CSF, UA). Found MSSA. Changed to Ancef- end date  (7 days total). Other cx NGTD.   Repeated all cultures  per ID including cultures off central line, blood, CSF--> preliminary negative thus far.  Leukocytosis improving   Thrush; nystatin swish/spit.  Left axilla: area of induration ~6x 4cm. Non tender, no erythema, non fluctuant. Doubtful of infection. Warm compresses.  Monitor area of demarcation.   Infectious disease consulted  re: persistent fevers and clearance for VPS.     Disposition: ICU.   Social: Family updated at bedside.   Full code.     Acces:   PICC R basilic  L axillary A line  (Previous central line from  removed)    *****    ATTENDING ATTESTATION:  I was physically present for the key portions of the evaluation and management (E/M) service provided.  I agree with the above history, physical and plan, which I have reviewed and edited where appropriate.    Patient at high risk for neurological deterioration or death due to: Sepsis, aspiration PNA, DVT / PE.  Critical care time:  I have personally provided 30 minutes of critical care time, excluding time spent on separate procedures.      Plan discussed with RN, house staff.

## 2021-08-29 NOTE — CHART NOTE - NSCHARTNOTEFT_GEN_A_CORE
GI brief note:    GI was consulted for PEG tube placement  Will plan for tomorrow  Hold tube feeds from midnight  Hold any therapeutic AC or antiplatelet agents  Ensure family in agreement  C/w cefazolin

## 2021-08-29 NOTE — PROGRESS NOTE ADULT - SUBJECTIVE AND OBJECTIVE BOX
===========================================   NEUROCRITICAL CARE ATTENDING NOTE (Sun.08.29.2021 PM)  ===========================================    LUDMILA PRIETO  MRN-[id number]  Summary:  44 y/o F with h/o recent gastric sleeve (08/04 [facility], Dr. Crisostomo ), fibromyalgia, thyroid dysfunction, PTSD, depression, anxiety.  Presented with seizures at home - brought to [place], with 1 more episode of seizure en route.  Intubated, CT showed SAH with IV extension, casted IV, hydrocephalus, given mannitol levetiracetam 6mg ativan, transferred to [facility].    Hospital Course:   8/7: Transferred from [place] for SAH. Intubated. R frontal EVD placed, central line and  [place] placed. 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   8/8: POD 1 s/p angio with R vert takedown, extubated, desatting on aerosol mask, required extensive suctioning, 3% nebs, chest PT, started on low dose precedex drip for restlessness/agitation, ABG stable. 3% stopped for Na 150. Ceribell EEG negative and d/c'ed.   8/9 Overnight, new right pronator drift and right gaze preference that can't cross midline, Repeat CTH demonstrated stable vents, CTA head and neck unremarkable for spasm, hypoattenuation of right cerebellum edema vs. infarct.  8/10: POD3 R vert takedown, EVD open at [id number]. CTH with increased hydro. CTA head/neck with mild basilar spasm.  8/11: POD 4 R vert takedown. EVD at 5cm H2O. Febrile 104F. Depakote increased to q6. HCT stable. EVD lowered to 0.8/12: POD 5 R vert takedown. EVD at 0cm H2O. Pending IVCF with vascular.  8/13: POD 6 R vert takedown. EVD open at 0cmH2O. Mottled skin on the left lower extremity improving. Started on Bromocriptine 15mg Q8.   8/14: POD 7. EVD open at 0. ENT scoped vocal cords, +R vocal cord paresis, NTD. Started on zosyn empirically.   8/15: POD 8. EVD open at 0. Developed increased work of breathing, unable to clear her secretions, received racemic epi and multiple nebulizer treatments, however, still with stridor. Patient re-intubated at bedside, on full vent support.   8/16: CTH/CTA head/neck/CT chest performed o/n for worsened exam, R gaze preference, sluggish pupils. +worsened uncal herniation, hydrocephalus, vasospasm in b/l PCAs, L vert, basilar arteries. Angio today, restarted on 3% for Na goal 145-150.  8/17: POD 10. EVD open at -5cmH2O. ICPs WNL. Febrile 101F and pancultured.  8/18: POD 11. R vert takedown. POD1 angio IA verpamil. Overnight, Pt noted to have downward gaze to the right and not following commands. Taken for stat head CT- stable. Pupils also noted to be aniscoric left pupil 4mm and brisk and right 2mm brisk. Mannitol 50g given. Ceribell EEG placed for concern of subclinical seizures. Brief seizure to L frontal lobe. Given 2g valproic acid and dose increased to 1g q8. Vimpat 100mg BID started. Patient self-extubated, placed on NRB with mucomyst, 3% inhalation and duonebs. 3% at 50 d/c'd.  8/19: POD 12. Remains on VEEG. Axillary A line placed. Salt tabs d/c'd, florinef decreased to 0.1mg, EVD @ -5cm H2O draining 20cc/hr.   8/20: POD 13. VEEG, no seizures noted.  Febrile, pancultured.   8/22: BD 15: Continued on levophed for BP augmentation. Hypercoagulability profile. EVD 15 mL/h. Amantadine added, free water started for hypernatremia. Fluctuating level of consciousness. Frequent suctioning; empiric ceftriaxone started. Tmax 101.Thick secretions.  8/23: Neuroexam stable. Febrile. Pancultures done.   8/24: Exam has not been BP dependent. Relaxed -220. IJ- carotid stick. Lovenox held  8/25: BD 19. CTA 8/25: Improved vasospasm of proximal basilar artery, persistent vasospasm and moderate narrowing of the distal one half of the basilar artery. Posterior cerebral artery P1 and P2 segments appear patent with multifocal vasospasm, not significantly changed. SBP changed to 140-220. No change in exam. Weaning levophed.   8/26: BD20. Preop for VPS 8/27. EVD clamped.   8/27: BD 21.  shunt placed. Post op CT head stable.   8/28: BD 22. POD 1 s/p VPS. Slightly more lethargic but still follows commands. Later in PM, patient worked with PT; tolerated it well and followed their instructions. Suctioned copious secretions. Weaning pressors. Per night shift, dilated loops on Xray. Started on reglan. Lovenox given last tonight.   8/29: BD23. POD 2 S/P VPS.     Past Medical History: Status gastric sleeve surgery  Allergies:  Allergy Status Unknown    Current Meds:  MEDICATIONS  (STANDING):  acetylcysteine 20%  Inhalation 4 milliLiter(s) Inhalation every 6 hours  albuterol/ipratropium for Nebulization 3 milliLiter(s) Nebulizer every 6 hours  amantadine Syrup 200 milliGRAM(s) Oral every 12 hours  bromocriptine Capsule 15 milliGRAM(s) Oral every 8 hours  ceFAZolin   IVPB 2000 milliGRAM(s) IV Intermittent every 8 hours  enoxaparin Injectable 40 milliGRAM(s) SubCutaneous once  insulin lispro (ADMELOG) corrective regimen sliding scale   SubCutaneous every 6 hours  lacosamide Solution 100 milliGRAM(s) Oral two times a day  methylphenidate 10 milliGRAM(s) Oral daily  metoclopramide 10 milliGRAM(s) Oral every 6 hours  norepinephrine Infusion 0.05 MICROgram(s)/kG/Min (4.6 mL/Hr) IV Continuous <Continuous>  nystatin    Suspension [id number] Unit(s) Oral four times a day  pantoprazole   Suspension 40 milliGRAM(s) Oral daily  sodium chloride 0.9% Bolus 500 milliLiter(s) IV Bolus once  sodium chloride 0.9%. 1000 milliLiter(s) (100 mL/Hr) IV Continuous <Continuous>  sodium chloride 3%  Inhalation 4 milliLiter(s) Inhalation every 6 hours    MEDICATIONS  (PRN):  ondansetron Injectable 4 milliGRAM(s) IV Push every 6 hours PRN Nausea and/or Vomiting    PHYSICAL EXAMINATION    ICU Vital Signs Last 24 Hrs  T(C): 37.5 (29 Aug 2021 14:15), Max: 37.7 (28 Aug 2021 21:00)  T(F): 99.5 (29 Aug 2021 14:15), Max: 99.9 (28 Aug 2021 21:00)  HR: 89 (29 Aug 2021 14:30) (70 - 97)  ABP: 126/73 (29 Aug 2021 14:30) (116/66 - 152/85)  ABP(mean): 96 (29 Aug 2021 14:30) (87 - 114)  RR: 15 (29 Aug 2021 14:30) (11 - 25)  SpO2: 100% (29 Aug 2021 14:30) (90% - 100%)    AOx2 (person, place) with choices, hypophonic, face symmetric. R gaze preference but can overcome, pupils 3mm on R, 4mm on L. Follows commands  B/L uppers spontaneous, at least 3/5.  B/L lower extremities spontaneous, able to bend knees. Moves distally antigravity when supported at the knee- able to pick B/L heels off the bed.   EENT:  Anicteric, NGT  CARDIOVASCULAR: (+) S1 S2, normal rate and regular rhythm  PULMONARY: Decreased  ABDOMEN: Soft, nontender with normoactive bowel sounds  EXTREMITIES: No edema  SKIN: No rash. Left axilla area of induration ~5x 4cm. Non tender, no erythema, non fluctuant. Improving    I/O's    08-28-21 @ 07:01  -  08-29-21 @ 07:00  --------------------------------------------------------  IN: 1455.1 mL / OUT: 650 mL / NET: 805.1 mL    08-29-21 @ 07:01  -  08-29-21 @ 17:50  --------------------------------------------------------  IN: 956.5 mL / OUT: 700 mL / NET: 256.5 mL    LABS:                        7.7    9.88  )-----------( 253      ( 29 Aug 2021 05:14 )             26.0     08-29    142  |  105  |  9   ----------------------------<  113<H>  3.5   |  29  |  0.48<L>    Ca    8.8      29 Aug 2021 05:14  Phos  1.9     08-29  Mg     1.9     08-29    CAPILLARY BLOOD GLUCOSE    POCT Blood Glucose.: 104 mg/dL (29 Aug 2021 16:28)  POCT Blood Glucose.: 114 mg/dL (29 Aug 2021 11:37)  POCT Blood Glucose.: 130 mg/dL (29 Aug 2021 05:48)  POCT Blood Glucose.: 118 mg/dL (28 Aug 2021 23:21)    Culture - Catheter (08.26.21 @ 14:27)    Specimen Source: .Catheter Central Catheter Blood    Culture Results:   No growth    Culture - Blood (08.26.21 @ 12:48)    Specimen Source: .Blood Blood X 2    Culture Results:   No growth at 3 days.    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 Stable ischemic changes within the right cerebellum. Right-sided EVD catheter in place with slight increase in ventricular size. Interval improvement in the intraventricular hemorrhage. Evolving areas of acute/subacute ischemia within the right cerebellum.    CTA (08.19.2021) -  Interval improvement in the caliber of the proximal vertebral artery with progression of a vasospasm involving the mid to distal basilar artery. Interval improvement in the vasospasm involving the left vertebral artery. Persistent vasospasm involving the posterior cerebral and superior cerebellar arteries. Interval improvement in the vasospasm involving the right supraclinoid ICA as well as the left A1 and M1 segments. Progression of vasospasm involving the right M1 segment.    Cerebral Angiogram (08.18.2021 - Improvement in vasospasm, IA verapamil to posterior circulation  Cerebral Angiogram (08.17.2021 - Left vertebral artery dissection demonstrates continued moderate to severe cerebral vasospasm of the distal let vert and basilar artery, some what improved caliber of proximal left vert. 15mg IA Verapamil injected over 10 minutes with mild improvement of posterior circulation post treatment. Right ICA injection with continued mild supraclinoid vasospasm, 10mg of IA Verapamil injected.  Cerebral Angiogram (08.16.2021) - Left vertebral injection demonstrates moderate diffuse vasospasm of left vertebral artery, basilar artery and posterior circulation including PCAs. 15mg Verapamil injected via left vert with mild radiographic improvement post treatment. Right ICA injection with mild supraclinoid spasm/narrowing, otherwise no vasospasm appreciated in anterior circulation.    [Code] Full  [Goals] Aggressive  [Disposition] ICU

## 2021-08-29 NOTE — PROGRESS NOTE ADULT - SUBJECTIVE AND OBJECTIVE BOX
HPI:  44y/o F with h/o recent gastric sleeve (08/04/21 [facility], Dr. Crisostomo ), fibromyalgia, thyroid dysfunction, PTSD, depression, anxiety.  Presented with seizures at home - brought to [place], with 1 more episode of seizure en route.  Intubated, CT showed SAH with IV extension, casted IV, hydrocephalus, given mannitol, levetiracetam 1g,  6mg ativan. Started on Cardene drip for BP goal < 140 and transferred to [facility] NICU for further management.     HH5 MF4   NIHSS 26 on arrival  BD 1 = 8/7 (07 Aug 2021 08:08)    OVERNIGHT EVENTS: Neuro stable, liquid stool, TFs held and rectal tube in place. AXR with dilated bowel, started on reglan and magnesium.    HOSPITAL COURSE:  8/7: Tx from [place] for SAH. Intubated. R frontal EVD placed, central line and a line placed. 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   8/8: POD1 s/p angio with R vert takedown, extubated, desatting on aerosol mask, required extensive suctioning, 3% nebs, chest PT, started on low dose precedex drip for restlessness/agitation, ABG stable. NGT placed. TF started with goal 20 pending nutrition recs. 3% stopped for Na 150. Ceribell EEG negative and d/c'ed.   8/9 Overnight, new Right pronator drift and right gaze preference that can't cross midline, Repeat CTH demonstrated stable vents, CTA head and neck unremarkable for spasm, hypoattenuation of right cerebellum edema vs. infarct.  8/10: POD3 R vert takedown, EVD open at 42skM3M. ASHA overnight, neuro stable. CTH with increased hydro, CTA head/neck with mild basilar spasm, but concern for PE. 1/2 am D50 for hypoglycemia. 1L bolus then 100 cc/hr, PM rounds for net negative fluid balance and mild vasospasm on CTA.   8/11: POD4 R vert takedown. EVD at 5cm H2O, ASHA overnight. neuro stable.   Febrile 104. depakote increased to q6. NCHCT stable. EVD lowered to 0. Lasix 20 given for dieuresis, UOP over 2 liters. Sedation given for a-line placement, BP drop needing levo.  8/12: POD5 R vert takedown. EVD at 0cm H2O. ASHA overnight, neuro stable. Precedex being weaned off. Pending IVCF with vascular today.  8/13: POD6 R vert takedown. EVD open at 0cmH2O. ASHA overnight. Neuro exam stable. Mottled skin on the left lower extremity improving. Started on Bromocriptine 15mg Q8.   8/14: POD7. EVD open at 0. 1L bolus given for euvolemia o/n. neuro stable. CTH stable. ENT scoped vocal cords, +R voacl cord paresis, NTD. started on zosyn empirically.   8/15: POD8. EVD open at 0. ASHA o/n, neuro stable. Pt developed increased work of breathing, unable to clear her secretions, received racemic epi and multiple nebulizer treatments, still with stridor. Patient re-intubated at bedside, on full vent support.   8/16: CTH/CTA head/neck/CT chest performed o/n for worsened exam, R gaze preference, sluggish pupils. +worsened uncal herniation, hydro, vasospasm in b/l PCAs, L vert, basilar arteries. preop angio today, restarted on 3% for Na goal 145-150. Angio for vasospasm with IA verapamil.  8/17: POD10. Overnight Pt remains on levophed drip for SBP goal 180-220. Also remains on propofol drip. EVD open at -5cmH2O. ICPs WNL. Febrile 101F and pancultured. CTH today shows slightly smaller ventricles. Angio for vasospasm with IA verapamil.  8/18: POD11 R vert takedown. POD1 angio IA verpamil. Overnight, Pt noted to have downward gaze to the right and not following commands. Taken for stat head CT which is stable. Pupils also noted to be aniscoric left pupil 4mm and brisk and right 2mm brisk. Mannitol 50g given. Ceribell eeg placed for concern of subclinical seizures, so far appears negative for seizures. 1L NS o/n and 1.5L NS bolus in AM for euvolemia. vanc/zosyn stopped. 250cc 3% bolus and 250cc albumin given in AM. Brief seizure to L frontal lobe this AM on EEG, given 2g valproic acid and dose increased to 1g q8. Vimpat 100mg BID started.  Angio with interval improvment in vasospasm, IA verapamil given. In afternoon patient self-extubated, placed on NRB with mucomyst, 3% inhalation and duonebs. 3% at 50 d/c'd.  8/19: POD 12. Remains on VEEG. Axillary A line placed. Salt tabs d/c'd, florinef decreased to 0.1mg, EVD @ -5cm H2O, now draining 20cc/hr. 250 albumin and 500 NS boluses on dayshift, 1 L LR bolus  8/20: POD 13. ASHA. on VEEG, no seizures noted. Pending VPA level. 500 NS, 250 albumin. Febrile, pancultured. EEG d/c'ed.   8/21: BD14, POD14. ASHA overnight, EVD @ -5. s/p 1L bolus for euvolemia  8/22: BD #15: continued on levo, hypercoagulabitly profile pending, EVD @ -5, euvolemia, extubated, amantadine added, free water started for hypernatremia   8/23: BD 17, tmax 101F o/n. Gen Sx consulted for PEG - not a candidate at this time, pancultured for fever, 1L LR given for euvolemia. EVD at -5  8/24: BD18, ASHA o/n, neuro stable, EVD at -5, VPA level therapeutic, SBP goal 160-200, L IJ CVC attempted placement with carotid puncture, no pseudoaneurysm on US. MSSA growing in sputum. Ceftriaxone d/c'd and Ancef 2gq8 started. ID consulted. Recieved 1.5L LR and 500cc albumin.   8/25: POD5 last angio, BD19 ASHA o/n, neuro stable, EVD at -5  8/26: BD 20. ASHA o/n. EVD @ -5   8/27: BD 21, POD 20 Right vert takedown. ASHA overnight. Exam stable. EVD remains open at -5 and to be clamped at 0600 in preparation for right VPS placement today. Possible PEG placement Monday 8/30 8/28: BD22, POD22 Right vert takedown, POD1 R VPS, neuro exam stable. Tube feeds restarted, more lethargic this AM, improved during day, continue to monitor for hydrocephalus. AXR with dilated bowel, started on reglan and magnesium. Liquid BM, TFs held and rectal tube placed.   8/29: BD23, POD23 right vert takedown, POD2 R VPS, ASHA overnight, neuro stable       Vital Signs Last 24 Hrs  T(C): 37.7 (28 Aug 2021 21:00), Max: 37.8 (28 Aug 2021 04:59)  T(F): 99.9 (28 Aug 2021 21:00), Max: 100 (28 Aug 2021 04:59)  HR: 96 (28 Aug 2021 23:00) (90 - 105)  BP: --  BP(mean): --  RR: 15 (28 Aug 2021 23:00) (13 - 24)  SpO2: 100% (28 Aug 2021 23:00) (91% - 100%)    I&O's Summary    27 Aug 2021 07:01  -  28 Aug 2021 07:00  --------------------------------------------------------  IN: 2704.3 mL / OUT: 4069 mL / NET: -1364.7 mL    28 Aug 2021 07:01  -  29 Aug 2021 00:36  --------------------------------------------------------  IN: 1177.8 mL / OUT: 350 mL / NET: 827.8 mL        PHYSICAL EXAM:  General: NAD, pt is comfortably sitting up in bed, on room air  HEENT: CN II-XII grossly intact, Right pupil 4mm briskly reactive, Left 3mm briskly reactive, EOMI b/l, face symmetric, tongue midline, neck FROM, Right gaze preference but can cross to left. +NGT  Cardiovascular: RRR, normal S1 and S2, small Left neck hematoma   Respiratory: lungs CTAB, no wheezing, rhonchi, or crackles   GI: normoactive BS to auscultation, abd soft, NTND, +abdominal incision.  Neuro: A&Ox2 (not orientated to time), hypophonic. Follows commands (squeeze and let go to command, shows 2 fingers, opens and closes eyes to commands, wiggles toes)  CALLE x4 spontaneously moves L >R. SILT throughout   Extremities: distal pulses 2+ x4  Wound/incision: +Craniotomy incision with dressing in place, C/D/I, +abdominal incision with dressing in place, C/D/I    TUBES/LINES:  [] Richey  [] Lumbar Drain  [] Wound Drains  [X] Others +NGT      DIET:  [] NPO  [] Mechanical  [X] Tube feeds - currently held for diarrhea     LABS:                        8.3    12.17 )-----------( 302      ( 28 Aug 2021 05:07 )             28.4     08-28    143  |  105  |  8   ----------------------------<  129<H>  4.0   |  28  |  0.45<L>    Ca    9.3      28 Aug 2021 05:07  Phos  2.7     08-28  Mg     1.8     08-28    TPro  6.8  /  Alb  3.9  /  TBili  0.3  /  DBili  <0.2  /  AST  28  /  ALT  24  /  AlkPhos  80  08-27    PT/INR - ( 27 Aug 2021 03:11 )   PT: 15.2 sec;   INR: 1.28          PTT - ( 27 Aug 2021 03:11 )  PTT:30.6 sec    CARDIAC MARKERS ( 27 Aug 2021 12:02 )  x     / 0.01 ng/mL / x     / x     / x          CAPILLARY BLOOD GLUCOSE      POCT Blood Glucose.: 118 mg/dL (28 Aug 2021 23:21)  POCT Blood Glucose.: 101 mg/dL (28 Aug 2021 17:03)  POCT Blood Glucose.: 137 mg/dL (28 Aug 2021 11:10)  POCT Blood Glucose.: 128 mg/dL (28 Aug 2021 06:07)      Drug Levels: [] N/A  Valproic Acid Level, Serum: 68.7 ug/mL (08-24 @ 13:06)  Valproic Acid Level, Serum: 55.1 ug/mL (08-22 @ 14:40)    CSF Analysis: [] N/A      Allergies    apple (Angioedema)  No Known Drug Allergies  strawberry (Angioedema)    Intolerances    lactose (Stomach Upset)    MEDICATIONS:  Antibiotics:  ceFAZolin   IVPB 2000 milliGRAM(s) IV Intermittent every 8 hours  nystatin    Suspension 821873 Unit(s) Oral four times a day    Neuro:  amantadine Syrup 200 milliGRAM(s) Oral every 12 hours  bromocriptine Capsule 15 milliGRAM(s) Oral every 8 hours  lacosamide Solution 100 milliGRAM(s) Oral two times a day  methylphenidate 10 milliGRAM(s) Oral daily  ondansetron Injectable 4 milliGRAM(s) IV Push every 6 hours PRN    Anticoagulation:    OTHER:  acetylcysteine 20%  Inhalation 4 milliLiter(s) Inhalation every 6 hours  albuterol/ipratropium for Nebulization 3 milliLiter(s) Nebulizer every 6 hours  bisacodyl Suppository 10 milliGRAM(s) Rectal daily PRN  chlorhexidine 0.12% Liquid 15 milliLiter(s) Oral Mucosa every 12 hours  chlorhexidine 2% Cloths 1 Application(s) Topical <User Schedule>  chlorhexidine 2% Cloths 1 Application(s) Topical <User Schedule>  glucagon  Injectable 1 milliGRAM(s) IntraMuscular once  insulin lispro (ADMELOG) corrective regimen sliding scale   SubCutaneous every 6 hours  metoclopramide 10 milliGRAM(s) Oral every 6 hours  norepinephrine Infusion 0.05 MICROgram(s)/kG/Min IV Continuous <Continuous>  senna 2 Tablet(s) Oral at bedtime  sodium chloride 3%  Inhalation 4 milliLiter(s) Inhalation every 6 hours    IVF:  sodium chloride 0.9% Bolus 500 milliLiter(s) IV Bolus once    CULTURES:  Culture Results:   Sputum specimen rejected.  Microscopic examination indicates  oropharyngeal contamination.  Please repeat. (08-27 @ 01:53)  Culture Results:   No growth (08-26 @ 14:27)    RADIOLOGY & ADDITIONAL TESTS:  CT 8/27/2021: IMPRESSION:  1. Since 8/26/2021, there has been interval increase in dilatation of the bilateral ventricles consistent with worsening hydrocephalus.  2. Interval placement of ventriculoperitoneal shunt.    CTAP 8/27/2021: Pending read   CXR 8/28/2021: Wet read dilated loop of bowel    ASSESSMENT:  44y/o F with h/o recent gastric sleeve (08/04/21 [facility], Dr. Crisostomo ), fibromyalgia, thyroid dysfunction, PTSD, depression, anxiety.  Presented with seizures at home - brought to [place], with 1 more episode of seizure en route.  Intubated, CT showed SAH with IV extension, casted IV, hydrocephalus, given mannitol, levetiracetam 1g,  6mg ativan. Started on Cardene drip for BP goal < 140 and transferred to [facility] NICU for further management. HH5 MF4, BD 1 = 8/7. Now s/p cerebral angiogram for R vertebral artery takedown for R vertebral dissecting aneurysm (8/7), and R frontal EVD placement (8/7), now s/p IVC filter placement (8/12,) s/p angio IA verapamil 8/16, 8/17, 8/18, 8/20. 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    Anemia due to acute blood loss    Angiogram, carotid and cerebral arteries    IVC filter placement    Angiogram, carotid and cerebral, bilateral    Angiogram, carotid and cerebral, bilateral    Angiogram, carotid and cerebral, bilateral    Angiogram, carotid and cerebral, bilateral    Placement,  shunt, using frameless stereotaxy    [email or web address]        PLAN:  NEURO:   - neurochecks q1h   - Depakote dc'd   - Vimpat 100mg bid   - EEG no seizures x 2 days , D/c'ed  - CTH 8/15: worsened uncal herniation, worsened hydro, vasospasm in b/l PCA, L vert, basilar arteries  - Bromocriptine 15mg Q8  - Ritalin and Amantadine for neurostim  - Angio demonstrating vasospasm of Right ICA, right PCOMM, Left distal vert and basilar confluence, and received IA verapamil on 8/16. 8/17. 8/18. 8/20.   - CTH/CTA 8/22- stable, R MCA improved spasm   - s/p CTH 8/20: decreased size in ventricles, stable.   - CTH and AP stable post op 8/27, persistent ventriculomegaly     PULM:    - self extubated 8/20  - copious thick secretion, standing, Duonebs, 3% nebs, mucomyst  - chest PT   - CXR - aspiration precautions, requires frequent suctioning  - Ancef for tracheitis    CARDIO:    - -150  - PICC line today, dc central line after PICC established   - levo gtt- attempt to taper off   - TTE 8/12 WNL    - Troponin stable   - EKG normal     ENDO:    - glucose goal 140 -180    GI:    - TFs held for diarrhea   - free H2O 250 q8 dc'd  - needs thin liquid diet x 3 weeks as per bariatric when tolerated   - PPI per bariatric surgeon   - plan for GI Dr. Yaya Milner to do peg Monday  - Pending BM last 8/22 (rectal tube 50ml) - on reglan and magnesium   - Repeat abdominal XR in AM     RENAL:   - Maintain euvolemia  - Na goal normal- downtrending   - albumin bolus yesterday to maintain CVP > 6  - free water 250 q6 dc'd   - straight cath prn     HEM/ONC:   - ASA 81 - held s/p OR and in anticipation of PEG placement   - VTE Prophylaxis: SCDs, SQL 60 BID - held for OR   - Repeat dopplers 8/23, newly visualized acute DVT left intramuscular calf vein, persistent completely occlusive DVT b/l peroneal veins and right intramuscular calf vein  - carotid doppler 8/24: neg for pseudoaneurysm, CTA shows soft tissue small hematoma, non-occlusive L IJ and L basillic vein thrombus   - hypercoagulable w/u: - prothrombin gene mutation - heterozygous   - pending repeat dopplers 8/30  - Factor Xa trend once back on SQL  - Prothrombin gene mutation - Heterozygous   - 8/26 LUE doppler with findings of left IJ non-occlusive DVT and left basilic thrombus    ID:   - MRSA neg 8/12   - Afebrile, mild leukocytosis   - Last Pancx 8/23   - Ancef for MSSA pna coverage x 7 d (until 8/30)  - ID consulted, cleared for VPS, recommends Bcx today   - pending PICC line, will remove central line once PICC is established     Assessment and plan discussed with Dr. Lomax, Dr. Conn and Dr. Sanches     Assessment:  Present when checked    []  GCS  E   V  M     Heart Failure: []Acute, [] acute on chronic , []chronic  Heart Failure:  [] Diastolic (HFpEF), [] Systolic (HFrEF), []Combined (HFpEF and HFrEF), [] RHF, [] Pulm HTN, [] Other    [] TRUDI, [] ATN, [] AIN, [] other  [] CKD1, [] CKD2, [] CKD 3, [] CKD 4, [] CKD 5, []ESRD    Encephalopathy: [] Metabolic, [] Hepatic, [] toxic, [] Neurological, [] Other    Abnormal Nurtitional Status: [] malnurtition (see nutrition note), [ ]underweight: BMI < 19, [] morbid obesity: BMI >40, [] Cachexia    [] Sepsis  [] hypovolemic shock,[] cardiogenic shock, [] hemorrhagic shock, [] neuogenic shock  [] Acute Respiratory Failure  []Cerebral edema, [] Brain compression/ herniation,   [] Functional quadriplegia  [] Acute blood loss anemia

## 2021-08-30 NOTE — CONSULT NOTE ADULT - ASSESSMENT
46y/o F with h/o recent gastric sleeve (08/04/21 [facility], Dr. Crisostomo ), fibromyalgia, thyroid dysfunction, PTSD, depression, anxiety, presenting to [place] with seizures, transferred to [facility] for SAH with IV extension, casted IV, hydrocephalus. S/p cerebral angiogram for R vertebral artery takedown for R vertebral dissecting aneurysm (8/7), and R frontal EVD placement (8/7), s/p IVC filter placement (8/12,) s/p angio IA verapamil 8/16, 8/17, 8/18, 8/20. Now s/p right VPS 8/27. She remains lethargic and weak for which GI was consulted for PEG placement.      #PEG  Plan for placement at bedside later today  Keep NPO  COVID neg 8/29  Discuss plan with family

## 2021-08-30 NOTE — CHART NOTE - NSCHARTNOTEFT_GEN_A_CORE
Admitting Diagnosis:   Patient is a 45y old  Female who presents with a chief complaint of SAH (30 Aug 2021 09:01)    PAST MEDICAL & SURGICAL HISTORY:  gastric sleeve (8/4/21), fibromyalgia, thyroid dysfunction, PTSD, depression, anxiety.    Current Nutrition Order:  NPO diet    PO Intake: Good (%) [   ]  Fair (50-75%) [   ] Poor (<25%) [   ]- N/A    GI Issues:   WDL, last BM 8/29 +fecal incontinence.    No n/v/d/c noted.   No abd distention or discomfort  Plan for PEG placement    Pain:  No pain noted at this time    Skin Integrity;   Surgical incision, david score 13  +1 dependent pitting edema noted  No pressure uclers noted    Labs:   08-30    141  |  106  |  8   ----------------------------<  101<H>  3.6   |  26  |  0.44<L>    Ca    9.2      30 Aug 2021 05:28  Phos  2.4     08-30  Mg     1.8     08-30    CAPILLARY BLOOD GLUCOSE    POCT Blood Glucose.: 102 mg/dL (30 Aug 2021 06:43)  POCT Blood Glucose.: 124 mg/dL (29 Aug 2021 23:20)  POCT Blood Glucose.: 104 mg/dL (29 Aug 2021 16:28)    Medications:  MEDICATIONS  (STANDING):  acetylcysteine 20%  Inhalation 4 milliLiter(s) Inhalation every 6 hours  albuterol/ipratropium for Nebulization 3 milliLiter(s) Nebulizer every 6 hours  amantadine Syrup 200 milliGRAM(s) Oral every 12 hours  bromocriptine Capsule 15 milliGRAM(s) Oral every 8 hours  ceFAZolin   IVPB 2000 milliGRAM(s) IV Intermittent every 8 hours  chlorhexidine 0.12% Liquid 15 milliLiter(s) Oral Mucosa every 12 hours  chlorhexidine 2% Cloths 1 Application(s) Topical <User Schedule>  lacosamide Solution 100 milliGRAM(s) Oral two times a day  methylphenidate 10 milliGRAM(s) Oral daily  midodrine 10 milliGRAM(s) Oral every 8 hours  norepinephrine Infusion 0.05 MICROgram(s)/kG/Min (4.6 mL/Hr) IV Continuous <Continuous>  nystatin    Suspension [id number] Unit(s) Oral four times a day  pantoprazole   Suspension 40 milliGRAM(s) Oral daily  sodium chloride 3%  Inhalation 4 milliLiter(s) Inhalation every 6 hours    MEDICATIONS  (PRN):  ondansetron Injectable 4 milliGRAM(s) IV Push every 6 hours PRN Nausea and/or Vomiting  sodium chloride 0.9% lock flush 10 milliLiter(s) IV Push every 1 hour PRN Pre/post blood products, medications, blood draw, and to maintain line patency    Admitting Anthropometrics:  · Height for BMI (FEET)	5 Feet  · Height for BMI (INCHES)	4 Inch(s)  · Height for BMI (CENTIMETERS)	162.56 Centimeter(s)  · Weight for BMI (lbs)	225.1 lb  · Weight for BMI (kg)	102.1 kg  · Body Mass Index	              38.6 kg/m2  · Ideal Body Weight (lbs)	120  · Ideal Body Weight (kg)	54.4    Weight:  8/7 225lbs    Weight Change:   No new weights obtained this admission. Please obtain new weight when medically feasible and trend weekly.     Nutrition Focused Physical Exam: Completed [   ]  Not Pertinent [ x  ]    Estimated energy needs:   IBW used for calculations as pt >120% of IBW (188%). Needs adjusted for recent bariatric sx, critical care, obesity. **Fluids per team (aim for >64oz per day once medically stable).  Kcal (25-30 kcal/kg): 9062-4808 kcal  Protein (1.5-2.0 g/kg pro): 81-108g pro    Subjective:   45F with h/o recent gastric sleeve (08/04/21 [facility], Dr. Crisostomo ), fibromyalgia, thyroid dysfunction, PTSD, depression, anxiety.  Presented with seizures at home - brought to [place], with 1 more episode of seizure en route.  Intubated, CT showed SAH with IV extension, casted IV, hydrocephalus, given mannitol, levetiracetam 1g,  6mg ativan. Started on Cardene drip for BP goal < 140 and transferred to [facility] NICU for further management. S/p angio with R vert takedown 8/7, pt extubated later that day. Pt remains on fentanyl and Precedex restlessness and agitation. NGT placed for initiation of EN. CTH with increased hydro, CTA head/neck with mild basilar spasm. Pt now weened off fentanyl and precedex 8/12 per disc with RN. S/p IVC placement 8/12. Pt with increased work of breathing with inability to clear secretions, and was reintubated 8/15. CTA shows posterior circulation vasospasm. Started on levophed drip for SBP goal 180-200. Also remains on propofol drip. EVD open at -5cmH2O. Pt noted to be febrile this am (101F) and pancultured 8/17. Pt s/p multiple angiograms 8/16, 8/17, 8/18, 8/20. Pt self extubated 8/18 and transitioned to NC, tolerating well. Pt con ton levo for BP support. SBP goal 160-200. MSSA growing in sputum. Ceftriaxone d/c'd and Ancef 2gq8 started. ID consulted. S/p  shunt placement 8/27. Pt remains lethargic and weak for which GI was consulted for PEG placement, planned for placement 8/30.     On assessment, pt resting in bed nonverbal, very lethargic. Pt made NPO at midnight for possible PEG placement today. When medically feasible to restart nutr regimen, Please see nutr recs below. Pt has been tolerating EN well per disc with team. No reported n/v/d/c. No abd distention or discomfort. No s/sx of aspiration. Education deferred. Pertinent Labs: POCT 102H, Cre 0.44L, Glu 101H, Phos 2.4L- Cont with aggressive repletion of lytes. Please see nutr recs below. RD to follow.     Previous Nutrition Diagnosis: Inadequate Oral Intake RT inability to meet needs via PO AEB NPO, reliant on EN to meet 100% of est needs.     Active [ x  ]  Resolved [   ]    If resolved, new PES:     Goal/Expected Outcome Consistently meet >75% est needs via appropriate route.    Recommendations:  1. NPO  >> When medically feasible, recommend the following diet advancement; BARICLLIQ >> Phase 1 Bariatric Full liquid diet  2. If team wishes to cont EN regimen, recommend increasing; Vital HP @ 46 ml/hr x24hrs via NGT providing 1104 ml TV, 1104 kcal, 96g pro., 923 ml free water.   >>Cont with aspiration precautions at all times  >>FWF per team  3. Cont to monitor lytes and replete prn  4. RD diet edu prn  5. Pain and bowel regimen per team    Education: Deferred    Risk Level: High [ x  ] Moderate [   ] Low [   ].

## 2021-08-30 NOTE — PROGRESS NOTE ADULT - SUBJECTIVE AND OBJECTIVE BOX
Interval Events: Reviewed  Patient seen and examined at bedside.    Patient is a 45y old  Female who presents with a chief complaint of SAH (30 Aug 2021 17:55)  she is lethargic and has a week cough    PAST MEDICAL & SURGICAL HISTORY:      MEDICATIONS:  Pulmonary:  acetylcysteine 20%  Inhalation 4 milliLiter(s) Inhalation every 6 hours  albuterol/ipratropium for Nebulization 3 milliLiter(s) Nebulizer every 6 hours  sodium chloride 3%  Inhalation 4 milliLiter(s) Inhalation every 6 hours    Antimicrobials:  nystatin    Suspension 525818 Unit(s) Oral four times a day    Anticoagulants:    Cardiac:  midodrine 10 milliGRAM(s) Oral every 8 hours  norepinephrine Infusion 0.05 MICROgram(s)/kG/Min IV Continuous <Continuous>      Allergies    apple (Angioedema)  No Known Drug Allergies  strawberry (Angioedema)    Intolerances    lactose (Stomach Upset)      Vital Signs Last 24 Hrs  T(C): 37.7 (30 Aug 2021 22:00), Max: 37.7 (30 Aug 2021 22:00)  T(F): 99.9 (30 Aug 2021 22:00), Max: 99.9 (30 Aug 2021 22:00)  HR: 92 (30 Aug 2021 22:00) (83 - 98)  BP: --  BP(mean): --  RR: 29 (30 Aug 2021 22:00) (15 - 30)  SpO2: 97% (30 Aug 2021 22:00) (93% - 100%)    08-29 @ 07:01  -  08-30 @ 07:00  --------------------------------------------------------  IN: 2059.7 mL / OUT: 1650 mL / NET: 409.7 mL    08-30 @ 07:01  -  08-30 @ 22:17  --------------------------------------------------------  IN: 1788.1 mL / OUT: 3075 mL / NET: -1286.9 mL          Review of Systems:   •	General: negative  •	Skin/Breast: negative  •	Ophthalmologic: negative  •	ENMT: negative  •	Respiratory and Thorax: negative  •	Cardiovascular: negative  •	Gastrointestinal: negative  •	Genitourinary: negative  •	Musculoskeletal: negative  •	Neurological: negative  •	Psychiatric: negative  •	Hematology/Lymphatics: negative  •	Endocrine: negative  •	Allergic/Immunologic: negative    Physical Exam:   • Constitutional:	refer to the dietion /Nutritionist note  • Eyes:	EOMI; PERRL; no drainage or redness  • ENMT:	No oral lesions; no gross abnormalities  • Neck	No bruits; no thyromegaly or nodules  • Breasts:	not examined  • Back:	No deformity or limitation of movement  • Respiratory:	Breath Sounds equal & clear to percussion & rales on auscultation, no accessory muscle use  • Cardiovascular:	Regular rate & rhythm, normal S1, S2; no murmurs, gallops or rubs; no S3, S4  • Gastrointestinal:	Soft, non-tender, no hepatosplenomegaly, normal bowel sounds  • Genitourinary:	not examined  • Rectal: not examined  • Extremities:	No cyanosis, clubbing or edema  • Vascular:	Equal and normal pulses (carotid, femoral, dorsalis pedis)  • Neurologica:l	not examined  • Skin:	No lesions; no rash  • Lymph Nodes:	No lymphadedenopathy  • Musculoskeletal:	No joint pain, swelling or deformity; no limitation of movement        LABS:      CBC Full  -  ( 30 Aug 2021 05:28 )  WBC Count : 10.12 K/uL  RBC Count : 2.90 M/uL  Hemoglobin : 7.5 g/dL  Hematocrit : 25.7 %  Platelet Count - Automated : 242 K/uL  Mean Cell Volume : 88.6 fl  Mean Cell Hemoglobin : 25.9 pg  Mean Cell Hemoglobin Concentration : 29.2 gm/dL  Auto Neutrophil # : x  Auto Lymphocyte # : x  Auto Monocyte # : x  Auto Eosinophil # : x  Auto Basophil # : x  Auto Neutrophil % : x  Auto Lymphocyte % : x  Auto Monocyte % : x  Auto Eosinophil % : x  Auto Basophil % : x    08-30    141  |  106  |  8   ----------------------------<  101<H>  3.6   |  26  |  0.44<L>    Ca    9.2      30 Aug 2021 05:28  Phos  2.4     08-30  Mg     1.8     08-30      PT/INR - ( 30 Aug 2021 05:28 )   PT: 16.4 sec;   INR: 1.39          PTT - ( 30 Aug 2021 05:28 )  PTT:28.3 sec      < from: Xray Chest 1 View- PORTABLE-Routine (Xray Chest 1 View- PORTABLE-Routine in AM.) (08.30.21 @ 04:47) >  EXAM:  XR CHEST PORTABLE ROUTINE 1V                          PROCEDURE DATE:  08/30/2021          INTERPRETATION:  Clinical history/reason for exam: Postop.    Frontal chest/low lung volumes.    COMPARISON: August 28, 2021.    Findings/  impression: Stable positioning of support devices. Bilateral opacities. Heart size within normal limits..    < end of copied text >                RADIOLOGY & ADDITIONAL STUDIES (The following images were personally reviewed):

## 2021-08-30 NOTE — PROGRESS NOTE ADULT - ASSESSMENT
44 y/o F with h/o recent gastric sleeve (21 [facility], Dr. Crisostomo ), fibromyalgia, thyroid dysfunction, PTSD, depression, anxiety.  Presented with seizures at home - brought to [place], with 1 more episode of seizure en route.  Intubated, CT showed SAH with IV extension, casted IV, hydrocephalus, given mannitol, levetiracetam 1g,  6mg ativan. Started on Cardene drip for BP goal < 140 and transferred to [facility] NICU for further management. HH5 MF4, BD 1 = . Now s/p cerebral angiogram for R vertebral artery takedown for R vertebral dissecting aneurysm (), and R frontal EVD placement (), now s/p IVC filter placement (). BD 24.    NEURO:   Neurochecks: Q2h.  Hydrocephalus: Status post  shunt placed  (certas at 4). CT head- improved vent sizes.   Vasospasm: Most recent CTA :  Improved vasospasm of proximal basilar artery, persistent vasospasm and moderate narrowing of the distal one half of the basilar artery. Posterior cerebral artery P1 and P2 segments appear patent with multifocal vasospasm, not significantly changed.  S/P IA verapamil on , , , .  DCI prophylaxis: Nimodipine 60 mg PO Q4h- held as on levophed.   Clinical seizures at home. Was on EEG (see below).   EE/18- 10 second event at 6:42 on , that could not be determined if epileptic vs. artifact.   There were non-specific indicators of bilateral temporal dysfunction and epileptic potential.   D/Hammad VPA. Continue only monotherapy with Vimpat 100 mg BID only. Follow clinically.  Centra fevers: Continue Bromocriptine 15mg Q8.  Neurostimulation: Continue amantadine and methylphenidate for neurostimulation.   ASA 81mg - on hold for PEG  Continue aggressive PT/OT as tolerated.     PULM: Tracheitis  Extubated   Duonebs and 3% nebs standing  Continue aggressive chest PT as high aspiration risk.   CXR : B/L opactities  Aspiration precautions, requires frequent suctioning (Q1-2 hr)  On Ancef for likely tracheitis: until   CT chest done . Atelectasis, air bronchograms.   Pulmonary following    CARDIO:    - 160   On midodrine to wean off low dose levophed.   TTE  wnl  PICC line.  Carotid dopplers showed no pseudoaneurysm. CTA neck showed small hematoma -extraluminal.     ENDO:    Glucose goal 140-180  ISS    GI: Ileus- noted   Nutrition: NPO   Thin liquid diet x3 weeks (as per bariatric) when tolerating. Consider thiamine, folate, MVI as post bariatric.  GI ppx: PPI per bariatric surgeon.  GI following. PEG to be placed by GI .  LFTs wnl.     RENAL: Hypernatremia- induced.   Maintain euvolemia   Na goal 135- 145. Eunatremia.   Severe hypokalemia 2/2 enteric losses. Replete aggressively. Improved.  Monitor.    HEM/ONC: PE, DVT.  ASA 81mg on hold for PEG  VTE Prophylaxis: SCDs, SQL. Lovenox 40mg daily for now per NSGY (was on 40mg bid). Consider increase to 60mg bid once safe to do so given risk factors/gene mutation.   Monitor anti Xa.   LE Duplex : Acute completely occlusive deep venous thrombosis of the right intramuscular calf vein, s/p IVCF with vascular . Repeat  showed new b/l peroneal DVTs. Scattered segmental pulmonary emboli noted on CTA.   Repeat dopplers : New L calf DVT. Repeat dopplers .   Hypercoagulable w/u- c/w heterozygous prothrombin gene mutation.   Anemia 8.3--> 7.7--> 7.4. Transfused 1 unit PRBCs on . Started on IV iron. Monitor H/H.   FOBT negative.       ID: Persistent fever, leukocytosis.  Was on empiric Ceftriaxone () for aspiration PNA. Changed to Ancef . WBC trending down overall.   Repeat cultures  (blood, sputum, CSF, UA). Found MSSA. Changed to Ancef- end date  (7 days total). Other cx NGTD.   Repeated all cultures  per ID including cultures off central line, blood, CSF--> negative thus far.  Thrush; nystatin swish/spit.  Left axilla: area of induration ~6x 4cm. Non tender, no erythema, non fluctuant. Doubtful of infection. Warm compresses.  Monitor area of demarcation.   Infectious disease consulted  re: persistent fevers and clearance for VPS.     Disposition: ICU.   Social: Family updated at bedside.   Full code.     Acces:   PICC R basilic  L axillary A line  (Previous central line from  removed)    *****    ATTENDING ATTESTATION:  I was physically present for the key portions of the evaluation and management (E/M) service provided.  I agree with the above history, physical and plan, which I have reviewed and edited where appropriate.    Plan discussed with RN, house staff.

## 2021-08-30 NOTE — PROGRESS NOTE ADULT - SUBJECTIVE AND OBJECTIVE BOX
HEALTH ISSUES - PROBLEM Dx:  Multiple subsegmental pulmonary emboli without acute cor pulmonale    DVT, lower extremity    Abnormality of lung on CXR    Heterozygous for prothrombin [id number] mutation    Anemia due to acute blood loss            CHEMOTHERAPY REGIMEN:        Day:                          Diet:  Protocol:                                    IVF:      MEDICATIONS  (STANDING):  acetylcysteine 20%  Inhalation 4 milliLiter(s) Inhalation every 6 hours  albuterol/ipratropium for Nebulization 3 milliLiter(s) Nebulizer every 6 hours  amantadine Syrup 200 milliGRAM(s) Oral every 12 hours  bromocriptine Capsule 15 milliGRAM(s) Oral every 8 hours  chlorhexidine 2% Cloths 1 Application(s) Topical <User Schedule>  iron sucrose IVPB 300 milliGRAM(s) IV Intermittent every 24 hours  lacosamide Solution 100 milliGRAM(s) Oral two times a day  midodrine 10 milliGRAM(s) Oral every 8 hours  norepinephrine Infusion 0.05 MICROgram(s)/kG/Min (4.6 mL/Hr) IV Continuous <Continuous>  nystatin    Suspension 975182 Unit(s) Oral four times a day  pantoprazole   Suspension 40 milliGRAM(s) Oral daily  sodium chloride 3%  Inhalation 4 milliLiter(s) Inhalation every 6 hours    MEDICATIONS  (PRN):  ondansetron Injectable 4 milliGRAM(s) IV Push every 6 hours PRN Nausea and/or Vomiting  sodium chloride 0.9% lock flush 10 milliLiter(s) IV Push every 1 hour PRN Pre/post blood products, medications, blood draw, and to maintain line patency      Allergies    apple (Angioedema)  No Known Drug Allergies  strawberry (Angioedema)    Intolerances    lactose (Stomach Upset)      DVT Prophylaxis: [ ] YES [ ] NO      Antibiotics: [ ] YES [ ] NO    Pain Scale (1-10):       Location:    Vital Signs Last 24 Hrs  T(C): 37.5 (30 Aug 2021 14:03), Max: 37.7 (29 Aug 2021 22:00)  T(F): 99.5 (30 Aug 2021 14:03), Max: 99.9 (29 Aug 2021 22:00)  HR: 87 (30 Aug 2021 17:00) (78 - 92)  BP: --  BP(mean): --  RR: 18 (30 Aug 2021 17:00) (10 - 26)  SpO2: 93% (30 Aug 2021 17:00) (93% - 100%)    Drug Dosing Weight  Height (cm): 162.6 (12 Aug 2021 13:25)  Weight (kg): 98.2 (30 Aug 2021 10:01)  BMI (kg/m2): 37.1 (30 Aug 2021 10:01)  BSA (m2): 2.02 (30 Aug 2021 10:01)     Physical Exam:  · Constitutional	detailed exam  · Constitutional Details	well-developed; well-groomed  · Eyes	EOMI; PERRL; no drainage or redness  · ENMT Comments	dry mucous membranes  · Respiratory	detailed exam  · Respiratory Comments	normal breath sounds at the lung bases bilaterally  · Cardiovascular	Regular rate & rhythm, normal S1, S2; no murmurs, gallops or rubs; no S3, S4  · Abd-Soft non tender  ·Ext-no edema, clubbing or cyanosis    URINARY CATHETER: [ ] YES [ ] NO     LABS:  CBC Full  -  ( 30 Aug 2021 05:28 )  WBC Count : 10.12 K/uL  RBC Count : 2.90 M/uL  Hemoglobin : 7.5 g/dL  Hematocrit : 25.7 %  Platelet Count - Automated : 242 K/uL  Mean Cell Volume : 88.6 fl  Mean Cell Hemoglobin : 25.9 pg  Mean Cell Hemoglobin Concentration : 29.2 gm/dL  Auto Neutrophil # : x  Auto Lymphocyte # : x  Auto Monocyte # : x  Auto Eosinophil # : x  Auto Basophil # : x  Auto Neutrophil % : x  Auto Lymphocyte % : x  Auto Monocyte % : x  Auto Eosinophil % : x  Auto Basophil % : x    08-30    141  |  106  |  8   ----------------------------<  101<H>  3.6   |  26  |  0.44<L>    Ca    9.2      30 Aug 2021 05:28  Phos  2.4     08-30  Mg     1.8     08-30      PT/INR - ( 30 Aug 2021 05:28 )   PT: 16.4 sec;   INR: 1.39          PTT - ( 30 Aug 2021 05:28 )  PTT:28.3 sec      CULTURES:    RADIOLOGY & ADDITIONAL STUDIES:

## 2021-08-30 NOTE — PROGRESS NOTE ADULT - SUBJECTIVE AND OBJECTIVE BOX
==============================================   NEUROCRITICAL CARE ATTENDING NOTE   ==============================================    LUDMILA PRIETO  [id number]  Summary:  46 y/o F with h/o recent gastric sleeve (08/04 [facility], Dr. Crisostomo ), fibromyalgia, thyroid dysfunction, PTSD, depression, anxiety.  Presented with seizures at home - brought to [place], with 1 more episode of seizure en route.  Intubated, CT showed SAH with IV extension, casted IV, hydrocephalus, given mannitol levetiracetam 6mg ativan, transferred to [facility].      Hospital Course:   8/7: Transferred from [place] for SAH. Intubated. R frontal EVD placed, central line and  [place] placed. 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   8/8: POD 1 s/p angio with R vert takedown, extubated, desatting on aerosol mask, required extensive suctioning, 3% nebs, chest PT, started on low dose precedex drip for restlessness/agitation, ABG stable. 3% stopped for Na 150. Ceribell EEG negative and d/c'ed.   8/9 Overnight, new right pronator drift and right gaze preference that can't cross midline, Repeat CTH demonstrated stable vents, CTA head and neck unremarkable for spasm, hypoattenuation of right cerebellum edema vs. infarct.  8/10: POD3 R vert takedown, EVD open at 95rxE2H. CTH with increased hydro. CTA head/neck with mild basilar spasm.  8/11: POD 4 R vert takedown. EVD at 5cm H2O. Febrile 104F. Depakote increased to q6. HCT stable. EVD lowered to 0.8/12: POD 5 R vert takedown. EVD at 0cm H2O. Pending IVCF with vascular.  8/13: POD 6 R vert takedown. EVD open at 0cmH2O. Mottled skin on the left lower extremity improving. Started on Bromocriptine 15mg Q8.   8/14: POD 7. EVD open at 0. ENT scoped vocal cords, +R vocal cord paresis, NTD. Started on zosyn empirically.   8/15: POD 8. EVD open at 0. Developed increased work of breathing, unable to clear her secretions, received racemic epi and multiple nebulizer treatments, however, still with stridor. Patient re-intubated at bedside, on full vent support.   8/16: CTH/CTA head/neck/CT chest performed o/n for worsened exam, R gaze preference, sluggish pupils. +worsened uncal herniation, hydrocephalus, vasospasm in b/l PCAs, L vert, basilar arteries. Angio today, restarted on 3% for Na goal 145-150.  8/17: POD 10. EVD open at -5cmH2O. ICPs WNL. Febrile 101F and pancultured.  8/18: POD 11. R vert takedown. POD1 angio IA verpamil. Overnight, Pt noted to have downward gaze to the right and not following commands. Taken for stat head CT- stable. Pupils also noted to be aniscoric left pupil 4mm and brisk and right 2mm brisk. Mannitol 50g given. Ceribell EEG placed for concern of subclinical seizures. Brief seizure to L frontal lobe. Given 2g valproic acid and dose increased to 1g q8. Vimpat 100mg BID started. Patient self-extubated, placed on NRB with mucomyst, 3% inhalation and duonebs. 3% at 50 d/c'd.  8/19: POD 12. Remains on VEEG. Axillary A line placed. Salt tabs d/c'd, florinef decreased to 0.1mg, EVD @ -5cm H2O draining 20cc/hr.   8/20: POD 13. VEEG, no seizures noted.  Febrile, pancultured.   8/22: BD 15: Continued on levophed for BP augmentation. Hypercoagulability profile. EVD 15 mL/h. Amantadine added, free water started for hypernatremia. Fluctuating level of consciousness. Frequent suctioning; empiric ceftriaxone started. Tmax 101.Thick secretions.  8/23: Neuroexam stable. Febrile. Pancultures done.   8/24: Exam has not been BP dependent. Relaxed -220. IJ- carotid stick. Lovenox held  8/25: BD 19. CTA 8/25: Improved vasospasm of proximal basilar artery, persistent vasospasm and moderate narrowing of the distal one half of the basilar artery. Posterior cerebral artery P1 and P2 segments appear patent with multifocal vasospasm, not significantly changed. SBP changed to 140-220. No change in exam. Weaning levophed.   8/26: BD20. Preop for VPS 8/27. EVD clamped.   8/27: BD 21.  shunt placed. Post op CT head stable.   8/28: BD 22. POD 1 s/p VPS. Slightly more lethargic but still follows commands. Later in PM, patient worked with PT; tolerated it well and followed their instructions. Suctioned copious secretions. Weaning pressors. Per night shift, dilated loops on Xray. Started on reglan.   8/29: BD23. POD 2 S/P VPS. Neuroexam stable.   8/30: BD24. POD 2 S/P VPS. Neuroexam stable. Still with frequent suctioning of thick yellow secretions. Transfused 1 unit PRBCs re Hb 7.4. Levophed dose minimal.       ICU Vital Signs Last 24 Hrs  T(C): 37.7 (30 Aug 2021 23:00), Max: 37.7 (30 Aug 2021 22:00)  T(F): 99.9 (30 Aug 2021 23:00), Max: 99.9 (30 Aug 2021 22:00)  HR: 89 (30 Aug 2021 23:00) (83 - 98)  ABP: 110/63 (30 Aug 2021 23:00) (110/59 - 135/77)  ABP(mean): 82 (30 Aug 2021 23:00) (81 - 102)  RR: 17 (30 Aug 2021 23:00) (15 - 30)  SpO2: 97% (30 Aug 2021 23:00) (93% - 100%)      08-29-21 @ 07:01  -  08-30-21 @ 07:00  --------------------------------------------------------  IN: 2059.7 mL / OUT: 1650 mL / NET: 409.7 mL    08-30-21 @ 07:01  -  08-30-21 @ 23:29  --------------------------------------------------------  IN: 1789.1 mL / OUT: 3075 mL / NET: -1285.9 mL      PHYSICAL EXAMINATION:   AOx2 (person, place) with choices, hypophonic, face symmetric. R gaze preference but can overcome, pupils 3mm on R, 4mm on L. Follows commands  B/L uppers spontaneous, at least 3/5.  B/L lower extremities spontaneous, able to bend knees. Moves distally antigravity when supported at the knee- able to pick B/L heels off the bed.   EENT:  Anicteric, NGT  CARDIOVASCULAR: (+) S1 S2, normal rate and regular rhythm  PULMONARY: Diminshed at bases, coarse.   ABDOMEN: Soft, nontender with normoactive bowel sounds  EXTREMITIES: No edema  SKIN: No rash. Left axilla area of induration ~5x 4cm. Non tender, no erythema, non fluctuant. Improving      MEDICATIONS:   acetylcysteine 20%  Inhalation 4 milliLiter(s) Inhalation every 6 hours  albuterol/ipratropium for Nebulization 3 milliLiter(s) Nebulizer every 6 hours  amantadine Syrup 200 milliGRAM(s) Oral every 12 hours  bromocriptine Capsule 15 milliGRAM(s) Oral every 8 hours  chlorhexidine 2% Cloths 1 Application(s) Topical <User Schedule>  iron sucrose IVPB 300 milliGRAM(s) IV Intermittent every 24 hours  lacosamide Solution 100 milliGRAM(s) Oral two times a day  midodrine 10 milliGRAM(s) Oral every 8 hours  norepinephrine Infusion 0.05 MICROgram(s)/kG/Min (4.6 mL/Hr) IV Continuous <Continuous>  nystatin    Suspension 170308 Unit(s) Oral four times a day  ondansetron Injectable 4 milliGRAM(s) IV Push every 6 hours PRN  pantoprazole   Suspension 40 milliGRAM(s) Oral daily  sodium chloride 0.9% lock flush 10 milliLiter(s) IV Push every 1 hour PRN  sodium chloride 3%  Inhalation 4 milliLiter(s) Inhalation every 6 hours             LABS:                    7.5    10.12 )-----------( 242      ( 30 Aug 2021 05:28 )             25.7     08-30    141  |  106  |  8   ----------------------------<  101<H>  3.6   |  26  |  0.44<L>    Ca    9.2      30 Aug 2021 05:28  Phos  2.4     08-30  Mg     1.8     08-30        CULTURES:  Culture - Blood (collected 08-26-21 @ 12:48)  Source: .Blood Blood  Preliminary Report (08-27-21 @ 01:00):    No growth at 12 hours    Culture - Blood (collected 08-26-21 @ 12:48)  Source: .Blood Blood  Preliminary Report (08-27-21 @ 01:00):    No growth at 12 hours    Culture - CSF with Gram Stain (collected 08-26-21 @ 12:26)  Source: .CSF CSF  Gram Stain (08-26-21 @ 12:56):    No organisms seen    Rare White blood cells        Neuroimaging:  CT (08.19.2021) - 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 Stable ischemic changes within the right cerebellum. Right-sided EVD catheter in place with slight increase in ventricular size. Interval improvement in the intraventricular hemorrhage. Evolving areas of acute/subacute ischemia within the right cerebellum.    CTA (08.19.2021) -  Interval improvement in the caliber of the proximal vertebral artery with progression of a vasospasm involving the mid to distal basilar artery. Interval improvement in the vasospasm involving the left vertebral artery. Persistent vasospasm involving the posterior cerebral and superior cerebellar arteries. Interval improvement in the vasospasm involving the right supraclinoid ICA as well as the left A1 and M1 segments. Progression of vasospasm involving the right M1 segment.    Cerebral Angiogram (08.18.2021 - Improvement in vasospasm, IA verapamil to posterior circulation  Cerebral Angiogram (08.17.2021 - Left vertebral artery dissection demonstrates continued moderate to severe cerebral vasospasm of the distal let vert and basilar artery, some what improved caliber of proximal left vert. 15mg IA Verapamil injected over 10 minutes with mild improvement of posterior circulation post treatment. Right ICA injection with continued mild supraclinoid vasospasm, 10mg of IA Verapamil injected.  Cerebral Angiogram (08.16.2021) - Left vertebral injection demonstrates moderate diffuse vasospasm of left vertebral artery, basilar artery and posterior circulation including PCAs. 15mg Verapamil injected via left vert with mild radiographic improvement post treatment. Right ICA injection with mild supraclinoid spasm/narrowing, otherwise no vasospasm appreciated in anterior circulation.      [Code] Full  [Goals] Aggressive  [Disposition] ICU

## 2021-08-30 NOTE — PROGRESS NOTE ADULT - SUBJECTIVE AND OBJECTIVE BOX
===========================================   NEUROCRITICAL CARE ATTENDING NOTE (Sun.08.29.2021 PM)  ===========================================    LUDMILA PRIETO  MRN-[id number]  Summary:  46 y/o F with h/o recent gastric sleeve ([facility], Dr. Crisostomo ), fibromyalgia, thyroid dysfunction, PTSD, depression, anxiety.  Presented with seizures at home - brought to [place], with 1 more episode of seizure en route.  Intubated, CT showed SAH with IV extension, casted IV, hydrocephalus, given mannitol levetiracetam 6mg ativan, transferred to [facility].    Hospital Course:   8/7: Transferred from [place] for SAH. Intubated. R frontal EVD placed, central line and  [place] placed. 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   8/8: POD 1 s/p angio with R vert takedown, extubated, desatting on aerosol mask, required extensive suctioning, 3% nebs, chest PT, started on low dose precedex drip for restlessness/agitation, ABG stable. 3% stopped for Na 150. Ceribell EEG negative and d/c'ed.   8/9 Overnight, new right pronator drift and right gaze preference that can't cross midline, Repeat CTH demonstrated stable vents, CTA head and neck unremarkable for spasm, hypoattenuation of right cerebellum edema vs. infarct.  8/10: POD3 R vert takedown, EVD open at 85moU8C. CTH with increased hydro. CTA head/neck with mild basilar spasm.  8/11: POD 4 R vert takedown. EVD at 5cm H2O. Febrile 104F. Depakote increased to q6. HCT stable. EVD lowered to 0.8/12: POD 5 R vert takedown. EVD at 0cm H2O. Pending IVCF with vascular.  8/13: POD 6 R vert takedown. EVD open at 0cmH2O. Mottled skin on the left lower extremity improving. Started on Bromocriptine 15mg Q8.   8/14: POD 7. EVD open at 0. ENT scoped vocal cords, +R vocal cord paresis, NTD. Started on zosyn empirically.   8/15: POD 8. EVD open at 0. Developed increased work of breathing, unable to clear her secretions, received racemic epi and multiple nebulizer treatments, however, still with stridor. Patient re-intubated at bedside, on full vent support.   8/16: CTH/CTA head/neck/CT chest performed o/n for worsened exam, R gaze preference, sluggish pupils. +worsened uncal herniation, hydrocephalus, vasospasm in b/l PCAs, L vert, basilar arteries. Angio today, restarted on 3% for Na goal 145-150.  8/17: POD 10. EVD open at -5cmH2O. ICPs WNL. Febrile 101F and pancultured.  8/18: POD 11. R vert takedown. POD1 angio IA verpamil. Overnight, Pt noted to have downward gaze to the right and not following commands. Taken for stat head CT- stable. Pupils also noted to be aniscoric left pupil 4mm and brisk and right 2mm brisk. Mannitol 50g given. Ceribell EEG placed for concern of subclinical seizures. Brief seizure to L frontal lobe. Given 2g valproic acid and dose increased to 1g q8. Vimpat 100mg BID started. Patient self-extubated, placed on NRB with mucomyst, 3% inhalation and duonebs. 3% at 50 d/c'd.  8/19: POD 12. Remains on VEEG. Axillary A line placed. Salt tabs d/c'd, florinef decreased to 0.1mg, EVD @ -5cm H2O draining 20cc/hr.   8/20: POD 13. VEEG, no seizures noted.  Febrile, pancultured.   8/22: BD 15: Continued on levophed for BP augmentation. Hypercoagulability profile. EVD 15 mL/h. Amantadine added, free water started for hypernatremia. Fluctuating level of consciousness. Frequent suctioning; empiric ceftriaxone started. Tmax 101.Thick secretions.  8/23: Neuroexam stable. Febrile. Pancultures done.   8/24: Exam has not been BP dependent. Relaxed -220. IJ- carotid stick. Lovenox held  8/25: BD 19. CTA 8/25: Improved vasospasm of proximal basilar artery, persistent vasospasm and moderate narrowing of the distal one half of the basilar artery. Posterior cerebral artery P1 and P2 segments appear patent with multifocal vasospasm, not significantly changed. SBP changed to 140-220. No change in exam. Weaning levophed.   8/26: BD20. Preop for VPS 8/27. EVD clamped.   8/27: BD 21.  shunt placed. Post op CT head stable.   8/28: BD 22. POD 1 s/p VPS. Slightly more lethargic but still follows commands. Later in PM, patient worked with PT; tolerated it well and followed their instructions. Suctioned copious secretions. Weaning pressors. Per night shift, dilated loops on Xray. Started on reglan. Lovenox given last tonight.   8/29: BD23. POD 2 S/P VPS.       PHYSICAL EXAMINATION  T(C): 37.4 (08-30 @ 05:00), Max: 37.7 (08-29 @ 22:00)  HR: 89 (08-30 @ 07:00) (78 - 92)  BP: --  BP(mean): --  ABP:  (104/57 - 140/85)  ABP(mean):  (76 - 108)  RR: 19 (08-30 @ 07:00) (10 - 26)  SpO2: 95% (08-30 @ 07:00) (95% - 100%)  CVP(mm Hg): --      08-29-21 @ 07:01  -  08-30-21 @ 07:00  --------------------------------------------------------  IN:    Enteral Tube Flush: 240 mL    IV PiggyBack: 262.5 mL    Norepinephrine: 16 mL    Norepinephrine: 19.8 mL    sodium chloride 0.9%: 700 mL    Sodium Chloride 0.9% Bolus: 500 mL    Vital High Protein: 320 mL  Total IN: 2058.3 mL    OUT:    Rectal Tube (mL): 150 mL    Voided (mL): 1500 mL  Total OUT: 1650 mL    Total NET: 408.3 mL      08-30-21 @ 07:01  -  08-30-21 @ 07:38  --------------------------------------------------------  IN:    sodium chloride 0.9%: 100 mL  Total IN: 100 mL    OUT:  Total OUT: 0 mL    Total NET: 100 mL                  LABS:  CAPILLARY BLOOD GLUCOSE      POCT Blood Glucose.: 102 mg/dL (30 Aug 2021 06:43)  POCT Blood Glucose.: 124 mg/dL (29 Aug 2021 23:20)  POCT Blood Glucose.: 104 mg/dL (29 Aug 2021 16:28)  POCT Blood Glucose.: 114 mg/dL (29 Aug 2021 11:37)                          7.5    10.12 )-----------( 242      ( 30 Aug 2021 05:28 )             25.7     08-30    141  |  106  |  8   ----------------------------<  101<H>  3.6   |  26  |  0.44<L>    Ca    9.2      30 Aug 2021 05:28  Phos  2.4     08-30  Mg     1.8     08-30            MEDICATIONS:  MEDICATIONS  (STANDING):  acetylcysteine 20%  Inhalation 4 milliLiter(s) Inhalation every 6 hours  albuterol/ipratropium for Nebulization 3 milliLiter(s) Nebulizer every 6 hours  amantadine Syrup 200 milliGRAM(s) Oral every 12 hours  bromocriptine Capsule 15 milliGRAM(s) Oral every 8 hours  ceFAZolin   IVPB 2000 milliGRAM(s) IV Intermittent every 8 hours  chlorhexidine 0.12% Liquid 15 milliLiter(s) Oral Mucosa every 12 hours  chlorhexidine 2% Cloths 1 Application(s) Topical <User Schedule>  glucagon  Injectable 1 milliGRAM(s) IntraMuscular once  insulin lispro (ADMELOG) corrective regimen sliding scale   SubCutaneous every 6 hours  lacosamide Solution 100 milliGRAM(s) Oral two times a day  methylphenidate 10 milliGRAM(s) Oral daily  midodrine 5 milliGRAM(s) Oral every 8 hours  norepinephrine Infusion 0.05 MICROgram(s)/kG/Min (4.6 mL/Hr) IV Continuous <Continuous>  nystatin    Suspension 537235 Unit(s) Oral four times a day  pantoprazole   Suspension 40 milliGRAM(s) Oral daily  potassium chloride  10 mEq/100 mL IVPB 10 milliEquivalent(s) IV Intermittent every 1 hour  potassium phosphate IVPB 15 milliMole(s) IV Intermittent once  sodium chloride 0.9%. 1000 milliLiter(s) (100 mL/Hr) IV Continuous <Continuous>  sodium chloride 3%  Inhalation 4 milliLiter(s) Inhalation every 6 hours    MEDICATIONS  (PRN):  ondansetron Injectable 4 milliGRAM(s) IV Push every 6 hours PRN Nausea and/or Vomiting  sodium chloride 0.9% lock flush 10 milliLiter(s) IV Push every 1 hour PRN Pre/post blood products, medications, blood draw, and to maintain line patency      AOx2 (person, place) with choices, hypophonic, face symmetric. R gaze preference but can overcome, pupils 3mm on R, 4mm on L. Follows commands  B/L uppers spontaneous, at least 3/5.  B/L lower extremities spontaneous, able to bend knees. Moves distally antigravity when supported at the knee- able to pick B/L heels off the bed.   EENT:  Anicteric, NGT  CARDIOVASCULAR: (+) S1 S2, normal rate and regular rhythm  PULMONARY: Decreased  ABDOMEN: Soft, nontender with normoactive bowel sounds  EXTREMITIES: No edema  SKIN: No rash. Left axilla area of induration ~5x 4cm. Non tender, no erythema, non fluctuant. 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 Stable ischemic changes within the right cerebellum. Right-sided EVD catheter in place with slight increase in ventricular size. Interval improvement in the intraventricular hemorrhage. Evolving areas of acute/subacute ischemia within the right cerebellum.    CTA (08.19.2021) -  Interval improvement in the caliber of the proximal vertebral artery with progression of a vasospasm involving the mid to distal basilar artery. Interval improvement in the vasospasm involving the left vertebral artery. Persistent vasospasm involving the posterior cerebral and superior cerebellar arteries. Interval improvement in the vasospasm involving the right supraclinoid ICA as well as the left A1 and M1 segments. Progression of vasospasm involving the right M1 segment.    Cerebral Angiogram (08.18.2021 - Improvement in vasospasm, IA verapamil to posterior circulation  Cerebral Angiogram (08.17.2021 - Left vertebral artery dissection demonstrates continued moderate to severe cerebral vasospasm of the distal let vert and basilar artery, some what improved caliber of proximal left vert. 15mg IA Verapamil injected over 10 minutes with mild improvement of posterior circulation post treatment. Right ICA injection with continued mild supraclinoid vasospasm, 10mg of IA Verapamil injected.  Cerebral Angiogram (08.16.2021) - Left vertebral injection demonstrates moderate diffuse vasospasm of left vertebral artery, basilar artery and posterior circulation including PCAs. 15mg Verapamil injected via left vert with mild radiographic improvement post treatment. Right ICA injection with mild supraclinoid spasm/narrowing, otherwise no vasospasm appreciated in anterior circulation.

## 2021-08-30 NOTE — PROGRESS NOTE ADULT - ASSESSMENT
46 y/o F with h/o recent gastric sleeve (21 [facility], Dr. Crisostomo ), fibromyalgia, thyroid dysfunction, PTSD, depression, anxiety.  Presented with seizures at home - brought to [place], with 1 more episode of seizure en route.  Intubated, CT showed SAH with IV extension, casted IV, hydrocephalus, given mannitol, levetiracetam 1g,  6mg ativan. Started on Cardene drip for BP goal < 140 and transferred to [facility] NICU for further management. HH5 MF4, BD 1 = . Now s/p cerebral angiogram for R vertebral artery takedown for R vertebral dissecting aneurysm (), and R frontal EVD placement (), now s/p IVC filter placement (). BD 23.    NEURO:   Neurochecks Q1h.  Hydrocephalus: Status post  shunt placed  (certas at 4). CT head post op reviewed. Mild increase in vent size compared to prior. Repeat CT head .   Vasospasm: Most recent CTA :  Improved vasospasm of proximal basilar artery, persistent vasospasm and moderate narrowing of the distal one half of the basilar artery. Posterior cerebral artery P1 and P2 segments appear patent with multifocal vasospasm, not significantly changed.  S/P IA verapamil on , , , .  DCI prophylaxis: Nimodipine 60 mg PO Q4h- held as on levophed.   Clinical seizures at home. Was on EEG (see below).   EE/18- 10 second event at 6:42 on , that could not be determined if epileptic vs. artifact.   There were non-specific indicators of bilateral temporal dysfunction and epileptic potential.   D/Hammad VPA. Continue only monotherapy with Vimpat 100 mg BID only. Follow clinically.  Cont ASA 81mg (held  for OR)  Centra fevers: Continue Bromocriptine 15mg Q8.  Neurostimulation: Continue Ritalin for neurostimulation.   Continue aggressive PT/OT as tolerated.     PULM: Tracheitis  Extubated   Duonebs and 3% nebs standing  Continue aggressive chest PT as high aspiration risk.   CXR clear , has atelectasis, ?R infiltrate  Aspiration precautions, requires frequent suctioning (Q2 hr)  Currently on Ancef for likely tracheitis: until   CT chest done . Atelectasis, air bronchograms.     CARDIO:    - 150. Norepinephrine infusion D/Hammad.  TTE  WNL    PICC line.  Carotid dopplers showed no pseudoaneurysm. CTA neck showed small hematoma -extraluminal. Monitor closely.     ENDO:    Glucose goal 140-180  ISS    GI: Ileus- noted   Nutrition: Restarted mild trickle feeds (Vital)  at 20cc/hr.   Thin liquid diet x3 weeks (as per bariatric) when tolerating.  GI ppx: PPI per bariatric surgeon.  Diarrhea: Has rectal tube. Possible obstructive diarrhea? Has an ileus. Monitor output. Follow up CTabd/pelvis report to ensure no abd pathology.   GI following  PEG to be placed by GI Dr. Milner.   LFTs wnl    RENAL: Hypernatremia- induced.   Maintain euvolemia   Na goal 140-150.  Severe hypokalemia 2/2 enteric losses. Replete aggressively. Improved.  Monitor.    HEM/ONC: PE, DVT.  ASA 81mg- On hold post op.   VTE Prophylaxis: SCDs, SQL. Increased lovenox to 60mg bid (due to high VTE burden/risk)  (held for OR ). Resume lovenox at 40mg daily for now  per NSGY   LE Duplex : Acute completely occlusive deep venous thrombosis of the right intramuscular calf vein, s/p IVCF with vascular . Repeat  showed new b/l peroneal DVTs. Scattered segmental pulmonary emboli noted on CTA.   Repeat dopplers : New L calf DVT. Repeat dopplers .   Hypercoagulable w/u- c/w heterozygous prothrombin gene mutation.   Anemia 8.3--> 7.7; noted . All cell lines decreased slightly. Monitor.       ID: Persistent fever, leukocytosis.  Was on empiric Ceftriaxone () for aspiration PNA. Changed to Ancef . WBC trending down.   Repeat cultures  (blood, sputum, CSF, UA). Found MSSA. Changed to Ancef- end date  (7 days total). Other cx NGTD.   Repeated all cultures  per ID including cultures off central line, blood, CSF--> preliminary negative thus far.  Leukocytosis improving   Thrush; nystatin swish/spit.  Left axilla: area of induration ~6x 4cm. Non tender, no erythema, non fluctuant. Doubtful of infection. Warm compresses.  Monitor area of demarcation.   Infectious disease consulted  re: persistent fevers and clearance for VPS.     Disposition: ICU.   Social: Family updated at bedside.   Full code.     Acces:   PICC R basilic  L axillary A line  (Previous central line from  removed)    *****    ATTENDING ATTESTATION:  I was physically present for the key portions of the evaluation and management (E/M) service provided.  I agree with the above history, physical and plan, which I have reviewed and edited where appropriate.    Patient at high risk for neurological deterioration or death due to: Sepsis, aspiration PNA, DVT / PE.  Critical care time:  I have personally provided 30 minutes of critical care time, excluding time spent on separate procedures.      Plan discussed with RN, house staff.

## 2021-08-30 NOTE — CONSULT NOTE ADULT - SUBJECTIVE AND OBJECTIVE BOX
GASTROENTEROLOGY CONSULT NOTE  HPI:  46y/o F with h/o recent gastric sleeve (08/04/21 [facility], Dr. Crisostomo ), fibromyalgia, thyroid dysfunction, PTSD, depression, anxiety, admitted for SAH. She initially presented to [facility] with seizures at home with 1 more episode of seizure en route.  Intubated, CT showed SAH with IV extension, casted IV, hydrocephalus, given mannitol, levetiracetam 1g,  6mg ativan. Started on Cardene drip for BP goal < 140 and transferred to [facility] NICU for further management. S/p cerebral angio 8/7 with R vertebral cutdown for R vertebral dissecting aneurysm. S/p R frontal EVD placement (8/7), s/p IVC filter placement (8/12,) s/p angio IA verapamil 8/16, 8/17, 8/18, 8/20.  shunt placed 8/27. She remains lethargic but follows commands. AOX2.     GI consulted for PEG.    Allergies    apple (Angioedema)  No Known Drug Allergies  strawberry (Angioedema)    Intolerances    lactose (Stomach Upset)    Home Medications:    MEDICATIONS:  MEDICATIONS  (STANDING):  acetylcysteine 20%  Inhalation 4 milliLiter(s) Inhalation every 6 hours  albuterol/ipratropium for Nebulization 3 milliLiter(s) Nebulizer every 6 hours  amantadine Syrup 200 milliGRAM(s) Oral every 12 hours  bromocriptine Capsule 15 milliGRAM(s) Oral every 8 hours  ceFAZolin   IVPB 2000 milliGRAM(s) IV Intermittent every 8 hours  chlorhexidine 0.12% Liquid 15 milliLiter(s) Oral Mucosa every 12 hours  chlorhexidine 2% Cloths 1 Application(s) Topical <User Schedule>  glucagon  Injectable 1 milliGRAM(s) IntraMuscular once  insulin lispro (ADMELOG) corrective regimen sliding scale   SubCutaneous every 6 hours  lacosamide Solution 100 milliGRAM(s) Oral two times a day  methylphenidate 10 milliGRAM(s) Oral daily  midodrine 5 milliGRAM(s) Oral every 8 hours  norepinephrine Infusion 0.05 MICROgram(s)/kG/Min (4.6 mL/Hr) IV Continuous <Continuous>  nystatin    Suspension 816379 Unit(s) Oral four times a day  pantoprazole   Suspension 40 milliGRAM(s) Oral daily  potassium chloride  10 mEq/100 mL IVPB 10 milliEquivalent(s) IV Intermittent every 1 hour  potassium phosphate IVPB 15 milliMole(s) IV Intermittent once  sodium chloride 0.9%. 1000 milliLiter(s) (100 mL/Hr) IV Continuous <Continuous>  sodium chloride 3%  Inhalation 4 milliLiter(s) Inhalation every 6 hours    MEDICATIONS  (PRN):  ondansetron Injectable 4 milliGRAM(s) IV Push every 6 hours PRN Nausea and/or Vomiting  sodium chloride 0.9% lock flush 10 milliLiter(s) IV Push every 1 hour PRN Pre/post blood products, medications, blood draw, and to maintain line patency    PAST MEDICAL & SURGICAL HISTORY:    FAMILY HISTORY:  No pertinent family history in first degree relatives      SOCIAL HISTORY:  Tobacco: [ ] Current, [ ] Former, [ ] Never; Pack Years:  Alcohol:  Illicit Drugs:    REVIEW OF SYSTEMS:  CONSTITUTIONAL: No weakness, fevers or chills  HEENT: No visual changes; No vertigo or throat pain   NECK: No pain or stiffness  RESPIRATORY: No cough, wheezing, hemoptysis; No shortness of breath  CARDIOVASCULAR: No chest pain or palpitations  GASTROINTESTINAL: As above.  GENITOURINARY: No dysuria, frequency or hematuria  NEUROLOGICAL: No numbness or weakness  SKIN: No itching, burning, rashes, or lesions   All other 10 review of systems is negative unless indicated above.    Vital Signs Last 24 Hrs  T(C): 37.4 (30 Aug 2021 05:00), Max: 37.7 (29 Aug 2021 22:00)  T(F): 99.3 (30 Aug 2021 05:00), Max: 99.9 (29 Aug 2021 22:00)  HR: 89 (30 Aug 2021 07:00) (78 - 92)  BP: --  BP(mean): --  RR: 19 (30 Aug 2021 07:00) (10 - 26)  SpO2: 95% (30 Aug 2021 07:00) (95% - 100%)    08-29 @ 07:01  -  08-30 @ 07:00  --------------------------------------------------------  IN: 2059.7 mL / OUT: 1650 mL / NET: 409.7 mL    08-30 @ 07:01  -  08-30 @ 09:04  --------------------------------------------------------  IN: 100 mL / OUT: 0 mL / NET: 100 mL        PHYSICAL EXAM:    General: in no acute distress, lethargic  Eyes: Anicteric sclerae, moist conjunctivae  HENT: Moist mucous membranes  Neck: Trachea midline, supple  Lungs: Normal respiratory effort, no intercostal retractions  Cardiovascular: RRR  Abdomen: Soft, non-tender non-distended; No rebound or guarding  Extremities: Normal range of motion, No clubbing, cyanosis or edema  Neurological: Alert and oriented x2, follows commands  Skin: Warm and dry. No obvious rash    LABS:                        7.5    10.12 )-----------( 242      ( 30 Aug 2021 05:28 )             25.7     08-30    141  |  106  |  8   ----------------------------<  101<H>  3.6   |  26  |  0.44<L>    Ca    9.2      30 Aug 2021 05:28  Phos  2.4     08-30  Mg     1.8     08-30          PT/INR - ( 30 Aug 2021 05:28 )   PT: 16.4 sec;   INR: 1.39          PTT - ( 30 Aug 2021 05:28 )  PTT:28.3 sec    RADIOLOGY & ADDITIONAL STUDIES:     Reviewed

## 2021-08-30 NOTE — PROVIDER CONTACT NOTE (OTHER) - ACTION/TREATMENT ORDERED:
Left ankle and abdomen examined by Dr. Bernard. No further interventions at this time.     Bladder scan, reweigh, and plan to be revised pending results.

## 2021-08-30 NOTE — PROGRESS NOTE ADULT - SUBJECTIVE AND OBJECTIVE BOX
HPI:  46y/o F with h/o recent gastric sleeve (08/04/21 [facility], Dr. Crisostomo ), fibromyalgia, thyroid dysfunction, PTSD, depression, anxiety.  Presented with seizures at home - brought to [place], with 1 more episode of seizure en route.  Intubated, CT showed SAH with IV extension, casted IV, hydrocephalus, given mannitol, levetiracetam 1g,  6mg ativan. Started on Cardene drip for BP goal < 140 and transferred to [facility] NICU for further management.     HH5 MF4   NIHSS 26 on arrival  BD 1 = 8/7 (07 Aug 2021 08:08)    OVERNIGHT EVENTS: ASHA overnight, neuro stable. CTH demonstrating decrease interval size of ventricles in comparison to previous study     HOSPITAL COURSE:  8/7: Tx from [place] for SAH. Intubated. R frontal EVD placed, central line and a line placed. 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   8/8: POD1 s/p angio with R vert takedown, extubated, desatting on aerosol mask, required extensive suctioning, 3% nebs, chest PT, started on low dose precedex drip for restlessness/agitation, ABG stable. NGT placed. TF started with goal 20 pending nutrition recs. 3% stopped for Na 150. Ceribell EEG negative and d/c'ed.   8/9 Overnight, new Right pronator drift and right gaze preference that can't cross midline, Repeat CTH demonstrated stable vents, CTA head and neck unremarkable for spasm, hypoattenuation of right cerebellum edema vs. infarct.  8/10: POD3 R vert takedown, EVD open at [id number]. ASHA overnight, neuro stable. CTH with increased hydro, CTA head/neck with mild basilar spasm, but concern for PE. 1/2 am D50 for hypoglycemia. 1L bolus then 100 cc/hr, PM rounds for net negative fluid balance and mild vasospasm on CTA.   8/11: POD4 R vert takedown. EVD at 5cm H2O, ASHA overnight. neuro stable.   Febrile 104. depakote increased to q6. NCHCT stable. EVD lowered to 0. Lasix 20 given for dieuresis, UOP over 2 liters. Sedation given for a-line placement, BP drop needing levo.  8/12: POD5 R vert takedown. EVD at 0cm H2O. ASHA overnight, neuro stable. Precedex being weaned off. Pending IVCF with vascular today.  8/13: POD6 R vert takedown. EVD open at 0cmH2O. ASHA overnight. Neuro exam stable. Mottled skin on the left lower extremity improving. Started on Bromocriptine 15mg Q8.   8/14: POD7. EVD open at 0. 1L bolus given for euvolemia o/n. neuro stable. CTH stable. ENT scoped vocal cords, +R voacl cord paresis, NTD. started on zosyn empirically.   8/15: POD8. EVD open at 0. ASHA o/n, neuro stable. Pt developed increased work of breathing, unable to clear her secretions, received racemic epi and multiple nebulizer treatments, still with stridor. Patient re-intubated at bedside, on full vent support.   8/16: CTH/CTA head/neck/CT chest performed o/n for worsened exam, R gaze preference, sluggish pupils. +worsened uncal herniation, hydro, vasospasm in b/l PCAs, L vert, basilar arteries. preop angio today, restarted on 3% for Na goal 145-150. Angio for vasospasm with IA verapamil.  8/17: POD10. Overnight Pt remains on levophed drip for SBP goal 180-220. Also remains on propofol drip. EVD open at -5cmH2O. ICPs WNL. Febrile 101F and pancultured. CTH today shows slightly smaller ventricles. Angio for vasospasm with IA verapamil.  8/18: POD11 R vert takedown. POD1 angio IA verpamil. Overnight, Pt noted to have downward gaze to the right and not following commands. Taken for stat head CT which is stable. Pupils also noted to be aniscoric left pupil 4mm and brisk and right 2mm brisk. Mannitol 50g given. Ceribell eeg placed for concern of subclinical seizures, so far appears negative for seizures. 1L NS o/n and 1.5L NS bolus in AM for euvolemia. vanc/zosyn stopped. 250cc 3% bolus and 250cc albumin given in AM. Brief seizure to L frontal lobe this AM on EEG, given 2g valproic acid and dose increased to 1g q8. Vimpat 100mg BID started.  Angio with interval improvment in vasospasm, IA verapamil given. In afternoon patient self-extubated, placed on NRB with mucomyst, 3% inhalation and duonebs. 3% at 50 d/c'd.  8/19: POD 12. Remains on VEEG. Axillary A line placed. Salt tabs d/c'd, florinef decreased to 0.1mg, EVD @ -5cm H2O, now draining 20cc/hr. 250 albumin and 500 NS boluses on dayshift, 1 L LR bolus  8/20: POD 13. ASHA. on VEEG, no seizures noted. Pending VPA level. 500 NS, 250 albumin. Febrile, pancultured. EEG d/c'ed.   8/21: BD14, POD14. ASHA overnight, EVD @ -5. s/p 1L bolus for euvolemia  8/22: BD #15: continued on levo, hypercoagulabitly profile pending, EVD @ -5, euvolemia, extubated, amantadine added, free water started for hypernatremia   8/23: BD 17, tmax 101F o/n. Gen Sx consulted for PEG - not a candidate at this time, pancultured for fever, 1L LR given for euvolemia. EVD at -5  8/24: BD18, ASHA o/n, neuro stable, EVD at -5, VPA level therapeutic, SBP goal 160-200, L IJ CVC attempted placement with carotid puncture, no pseudoaneurysm on US. MSSA growing in sputum. Ceftriaxone d/c'd and Ancef 2gq8 started. ID consulted. Recieved 1.5L LR and 500cc albumin.   8/25: POD5 last angio, BD19 ASHA o/n, neuro stable, EVD at -5  8/26: BD 20. ASHA o/n. EVD @ -5   8/27: BD 21, POD 20 Right vert takedown. ASHA overnight. Exam stable. EVD remains open at -5 and to be clamped at 0600 in preparation for right VPS placement today. Possible PEG placement Monday 8/30 8/28: BD22, POD22 Right vert takedown, POD1 R VPS, neuro exam stable. Tube feeds restarted, more lethargic this AM, improved during day, continue to monitor for hydrocephalus. AXR with dilated bowel, started on reglan and magnesium. Liquid BM, TFs held and rectal tube placed.   8/29: BD23, POD23 right vert takedown, POD2 R VPS, ASHA overnight, neuro stable. TFs decreased for colonic distention seen on XR, GI notified, nothing to do, TFs resumed at 20/hr and will continue will PEG placement tomorrow. CTH today demonstrated interval decrease in size of ventricles. Midodrine added to aid in weening levophed.   8/30: BD24, POD24, pending PEG placement this morning with Dr. Milner       Vital Signs Last 24 Hrs  T(C): 37.5 (29 Aug 2021 23:00), Max: 37.7 (29 Aug 2021 22:00)  T(F): 99.5 (29 Aug 2021 23:00), Max: 99.9 (29 Aug 2021 22:00)  HR: 88 (30 Aug 2021 00:00) (70 - 93)  BP: --  BP(mean): --  RR: 20 (30 Aug 2021 00:00) (10 - 25)  SpO2: 100% (30 Aug 2021 00:00) (90% - 100%)    I&O's Summary    28 Aug 2021 07:01  -  29 Aug 2021 07:00  --------------------------------------------------------  IN: 1455.1 mL / OUT: 650 mL / NET: 805.1 mL    29 Aug 2021 07:01  -  30 Aug 2021 00:12  --------------------------------------------------------  IN: 1242.8 mL / OUT: 1000 mL / NET: 242.8 mL        PHYSICAL EXAM:  General: NAD, pt is comfortably sitting up in bed, on room air  HEENT: CN II-XII grossly intact, Right pupil 4mm briskly reactive, Left 3mm briskly reactive, EOMI b/l, face symmetric, tongue midline, neck FROM, Right gaze preference but can cross to left. +NGT  Cardiovascular: RRR, normal S1 and S2, small Left neck hematoma   Respiratory: lungs CTAB, no wheezing, rhonchi, or crackles   GI: normoactive BS to auscultation, abd soft, NTND, +abdominal incision.  Neuro: A&Ox2 (not orientated to time), hypophonic. Follows commands (squeeze and let go to command, shows 2 fingers, opens and closes eyes to commands, wiggles toes)  CALLE x4 spontaneously moves L >R. SILT throughout   Extremities: distal pulses 2+ x4  Wound/incision: +Craniotomy incision with dressing in place, C/D/I, +abdominal incision with dressing in place, C/D/I    TUBES/LINES:  [] Richey  [] Lumbar Drain  [] Wound Drains  [X] Others +NGT      DIET:  [X] NPO - for procedure   [] Mechanical  [] Tube feeds     LABS:                        7.7    9.88  )-----------( 253      ( 29 Aug 2021 05:14 )             26.0     08-29    142  |  105  |  9   ----------------------------<  113<H>  3.5   |  29  |  0.48<L>    Ca    8.8      29 Aug 2021 05:14  Phos  1.9     08-29  Mg     1.9     08-29              CAPILLARY BLOOD GLUCOSE      POCT Blood Glucose.: 124 mg/dL (29 Aug 2021 23:20)  POCT Blood Glucose.: 104 mg/dL (29 Aug 2021 16:28)  POCT Blood Glucose.: 114 mg/dL (29 Aug 2021 11:37)  POCT Blood Glucose.: 130 mg/dL (29 Aug 2021 05:48)      Drug Levels: [] N/A  Valproic Acid Level, Serum: 68.7 ug/mL (08-24 @ 13:06)    CSF Analysis: [] N/A      Allergies    apple (Angioedema)  No Known Drug Allergies  strawberry (Angioedema)    Intolerances    lactose (Stomach Upset)    MEDICATIONS:  Antibiotics:  ceFAZolin   IVPB 2000 milliGRAM(s) IV Intermittent every 8 hours  nystatin    Suspension 987350 Unit(s) Oral four times a day    Neuro:  amantadine Syrup 200 milliGRAM(s) Oral every 12 hours  bromocriptine Capsule 15 milliGRAM(s) Oral every 8 hours  lacosamide Solution 100 milliGRAM(s) Oral two times a day  methylphenidate 10 milliGRAM(s) Oral daily  ondansetron Injectable 4 milliGRAM(s) IV Push every 6 hours PRN    Anticoagulation:    OTHER:  acetylcysteine 20%  Inhalation 4 milliLiter(s) Inhalation every 6 hours  albuterol/ipratropium for Nebulization 3 milliLiter(s) Nebulizer every 6 hours  chlorhexidine 0.12% Liquid 15 milliLiter(s) Oral Mucosa every 12 hours  chlorhexidine 2% Cloths 1 Application(s) Topical <User Schedule>  glucagon  Injectable 1 milliGRAM(s) IntraMuscular once  insulin lispro (ADMELOG) corrective regimen sliding scale   SubCutaneous every 6 hours  midodrine 5 milliGRAM(s) Oral every 8 hours  norepinephrine Infusion 0.05 MICROgram(s)/kG/Min IV Continuous <Continuous>  pantoprazole   Suspension 40 milliGRAM(s) Oral daily  sodium chloride 3%  Inhalation 4 milliLiter(s) Inhalation every 6 hours    IVF:  sodium chloride 0.9%. 1000 milliLiter(s) IV Continuous <Continuous>    CULTURES:  Culture Results:   Sputum specimen rejected.  Microscopic examination indicates  oropharyngeal contamination.  Please repeat. (08-27 @ 01:53)  Culture Results:   No growth (08-26 @ 14:27)    RADIOLOGY & ADDITIONAL TESTS:  CTH 8/29/2021: IMPRESSION:    With ventricular shunt in place, there is now decreased ventricular size since 08/27/2021.  AXR 8/29/2021: INTERPRETATION: Clinical History: STEMI bowel  Supine film of the abdomen demonstrates mild to moderate colonic distention. Catheter noted overlying lower abdomen pelvis. Tip of Dobbhoff feeding tube noted right upper abdomen. Vena cava umbrella. Surgical clips lower mid abdomen.  IMPRESSION: Mild to moderate colonic distention.    ASSESSMENT:  46y/o F with h/o recent gastric sleeve (08/04/21 [facility], Dr. Crisostomo ), fibromyalgia, thyroid dysfunction, PTSD, depression, anxiety.  Presented with seizures at home - brought to [place], with 1 more episode of seizure en route.  Intubated, CT showed SAH with IV extension, casted IV, hydrocephalus, given mannitol, levetiracetam 1g,  6mg ativan. Started on Cardene drip for BP goal < 140 and transferred to [facility] NICU for further management. HH5 MF4, BD 1 = 8/7. Now s/p cerebral angiogram for R vertebral artery takedown for R vertebral dissecting aneurysm (8/7), and R frontal EVD placement (8/7), now s/p IVC filter placement (8/12,) s/p angio IA verapamil 8/16, 8/17, 8/18, 8/20. 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    Anemia due to acute blood loss    Angiogram, carotid and cerebral arteries    IVC filter placement    Angiogram, carotid and cerebral, bilateral    Angiogram, carotid and cerebral, bilateral    Angiogram, carotid and cerebral, bilateral    Angiogram, carotid and cerebral, bilateral    Placement,  shunt, using frameless stereotaxy    [email or web address]      PLAN:  NEURO:   - neurochecks q1h   - Depakote dc'd   - Vimpat 100mg bid   - EEG no seizures x 2 days , D/c'ed  - CTH 8/15: worsened uncal herniation, worsened hydro, vasospasm in b/l PCA, L vert, basilar arteries  - Bromocriptine 15mg Q8  - Ritalin and Amantadine for neurostim  - Angio demonstrating vasospasm of Right ICA, right PCOMM, Left distal vert and basilar confluence, and received IA verapamil on 8/16. 8/17. 8/18. 8/20.   - CTH/CTA 8/22- stable, R MCA improved spasm   - s/p CTH 8/20: decreased size in ventricles, stable.   - CTH and AP stable post op 8/27, persistent ventriculomegaly   - Repeat CTH 8/29 demonstrated decreased size of ventricles.     PULM:    - self extubated 8/20  - copious thick secretion, standing, Duonebs, 3% nebs, mucomyst  - chest PT   - CXR - aspiration precautions, requires frequent suctioning  - Ancef for tracheitis    CARDIO:    - -150  - PICC line today, dc central line after PICC established   - levo gtt- attempt to taper off   - TTE 8/12 WNL    - Troponin stable   - EKG normal     ENDO:    - glucose goal 140 -180    GI:    - TFs held for diarrhea   - free H2O 250 q8 dc'd  - needs thin liquid diet x 3 weeks as per bariatric when tolerated   - PPI per bariatric surgeon   - plan for GI Dr. Yaya Milner to do peg Monday  - Pending BM last 8/22 (rectal tube 50ml) - on reglan and magnesium   - Repeat abdominal XR in AM     RENAL:   - Maintain euvolemia  - Na goal normal- downtrending   - albumin bolus yesterday to maintain CVP > 6  - free water 250 q6 dc'd   - straight cath prn     HEM/ONC:   - ASA 81 - held s/p OR and in anticipation of PEG placement   - VTE Prophylaxis: SCDs, SQL 60 BID - held for OR   - Repeat dopplers 8/23, newly visualized acute DVT left intramuscular calf vein, persistent completely occlusive DVT b/l peroneal veins and right intramuscular calf vein  - carotid doppler 8/24: neg for pseudoaneurysm, CTA shows soft tissue small hematoma, non-occlusive L IJ and L basillic vein thrombus   - hypercoagulable w/u: - prothrombin gene mutation - heterozygous   - pending repeat dopplers 8/30  - Factor Xa trend once back on SQL  - Prothrombin gene mutation - Heterozygous   - 8/26 LUE doppler with findings of left IJ non-occlusive DVT and left basilic thrombus    ID:   - MRSA neg 8/12   - Afebrile, mild leukocytosis   - Last Pancx 8/23   - Ancef for MSSA pna coverage x 7 d (until 8/30)  - ID consulted, cleared for VPS, recommends Bcx   - PICC line placed 8/26    Assessment and plan discussed with Dr. Lomax, Dr. Conn and Dr. Sanches     Assessment:  Present when checked    []  GCS  E   V  M     Heart Failure: []Acute, [] acute on chronic , []chronic  Heart Failure:  [] Diastolic (HFpEF), [] Systolic (HFrEF), []Combined (HFpEF and HFrEF), [] RHF, [] Pulm HTN, [] Other    [] TRUDI, [] ATN, [] AIN, [] other  [] CKD1, [] CKD2, [] CKD 3, [] CKD 4, [] CKD 5, []ESRD    Encephalopathy: [] Metabolic, [] Hepatic, [] toxic, [] Neurological, [] Other    Abnormal Nurtitional Status: [] malnurtition (see nutrition note), [ ]underweight: BMI < 19, [] morbid obesity: BMI >40, [] Cachexia    [] Sepsis  [] hypovolemic shock,[] cardiogenic shock, [] hemorrhagic shock, [] neuogenic shock  [] Acute Respiratory Failure  []Cerebral edema, [] Brain compression/ herniation,   [] Functional quadriplegia  [] Acute blood loss anemia

## 2021-08-31 NOTE — PROGRESS NOTE ADULT - SUBJECTIVE AND OBJECTIVE BOX
==============================================   NEUROCRITICAL CARE ATTENDING NOTE   ==============================================    LUDMILA PRIETO  MRN-[id number]  Summary:  46 y/o F with h/o recent gastric sleeve (08/04 [facility], Dr. Crisostomo ), fibromyalgia, thyroid dysfunction, PTSD, depression, anxiety.  Presented with seizures at home - brought to [place], with 1 more episode of seizure en route.  Intubated, CT showed SAH with IV extension, casted IV, hydrocephalus, given mannitol levetiracetam 6mg ativan, transferred to [facility].      Hospital Course:   8/7: Transferred from [place] for SAH. Intubated. R frontal EVD placed, central line and  Anita placed. 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   8/8: POD 1 s/p angio with R vert takedown, extubated, desatting on aerosol mask, required extensive suctioning, 3% nebs, chest PT, started on low dose precedex drip for restlessness/agitation, ABG stable. 3% stopped for Na 150. Ceribell EEG negative and d/c'ed.   8/9 Overnight, new right pronator drift and right gaze preference that can't cross midline, Repeat CTH demonstrated stable vents, CTA head and neck unremarkable for spasm, hypoattenuation of right cerebellum edema vs. infarct.  8/10: POD3 R vert takedown, EVD open at 29ncW1W. CTH with increased hydro. CTA head/neck with mild basilar spasm.  8/11: POD 4 R vert takedown. EVD at 5cm H2O. Febrile 104F. Depakote increased to q6. HCT stable. EVD lowered to 0.8/12: POD 5 R vert takedown. EVD at 0cm H2O. Pending IVCF with vascular.  8/13: POD 6 R vert takedown. EVD open at 0cmH2O. Mottled skin on the left lower extremity improving. Started on Bromocriptine 15mg Q8.   8/14: POD 7. EVD open at 0. ENT scoped vocal cords, +R vocal cord paresis, NTD. Started on zosyn empirically.   8/15: POD 8. EVD open at 0. Developed increased work of breathing, unable to clear her secretions, received racemic epi and multiple nebulizer treatments, however, still with stridor. Patient re-intubated at bedside, on full vent support.   8/16: CTH/CTA head/neck/CT chest performed o/n for worsened exam, R gaze preference, sluggish pupils. +worsened uncal herniation, hydrocephalus, vasospasm in b/l PCAs, L vert, basilar arteries. Angio today, restarted on 3% for Na goal 145-150.  8/17: POD 10. EVD open at -5cmH2O. ICPs WNL. Febrile 101F and pancultured.  8/18: POD 11. R vert takedown. POD1 angio IA verpamil. Overnight, Pt noted to have downward gaze to the right and not following commands. Taken for stat head CT- stable. Pupils also noted to be aniscoric left pupil 4mm and brisk and right 2mm brisk. Mannitol 50g given. Ceribell EEG placed for concern of subclinical seizures. Brief seizure to L frontal lobe. Given 2g valproic acid and dose increased to 1g q8. Vimpat 100mg BID started. Patient self-extubated, placed on NRB with mucomyst, 3% inhalation and duonebs. 3% at 50 d/c'd.  8/19: POD 12. Remains on VEEG. Axillary A line placed. Salt tabs d/c'd, florinef decreased to 0.1mg, EVD @ -5cm H2O draining 20cc/hr.   8/20: POD 13. VEEG, no seizures noted.  Febrile, pancultured.   8/22: BD 15: Continued on levophed for BP augmentation. Hypercoagulability profile. EVD 15 mL/h. Amantadine added, free water started for hypernatremia. Fluctuating level of consciousness. Frequent suctioning; empiric ceftriaxone started. Tmax 101.Thick secretions.  8/23: Neuroexam stable. Febrile. Pancultures done.   8/24: Exam has not been BP dependent. Relaxed -220. IJ- carotid stick. Lovenox held  8/25: BD 19. CTA 8/25: Improved vasospasm of proximal basilar artery, persistent vasospasm and moderate narrowing of the distal one half of the basilar artery. Posterior cerebral artery P1 and P2 segments appear patent with multifocal vasospasm, not significantly changed. SBP changed to 140-220. No change in exam. Weaning levophed.   8/26: BD20. Preop for VPS 8/27. EVD clamped.   8/27: BD 21.  shunt placed. Post op CT head stable.   8/28: BD 22. POD 1 s/p VPS. Slightly more lethargic but still follows commands. Later in PM, patient worked with PT; tolerated it well and followed their instructions. Suctioned copious secretions. Weaning pressors. Per night shift, dilated loops on Xray. Started on reglan.   8/29: BD23. POD 2 S/P VPS. Neuroexam stable.   8/30: BD24. POD 2 S/P VPS. Neuroexam stable. Still with frequent suctioning of thick yellow secretions. Transfused 1 unit PRBCs re Hb 7.4. Levophed dose minimal.         PHYSICAL EXAMINATION  T(C): 36.9 (08-31 @ 07:00), Max: 37.7 (08-30 @ 22:00)  HR: 90 (08-31 @ 07:00) (84 - 98)  BP: --  BP(mean): --  ABP:  (108/62 - 134/78)  ABP(mean):  (80 - 102)  RR: 22 (08-31 @ 07:00) (14 - 30)  SpO2: 94% (08-31 @ 07:00) (92% - 100%)  CVP(mm Hg): --      08-30-21 @ 07:01  -  08-31-21 @ 07:00  --------------------------------------------------------  IN:    Enteral Tube Flush: 270 mL    IV PiggyBack: 50 mL    IV PiggyBack: 200 mL    IV PiggyBack: 250 mL    IV PiggyBack: 300 mL    Norepinephrine: 27.1 mL    PRBCs (Packed Red Blood Cells): 400 mL    sodium chloride 0.9%: 300 mL    Vital High Protein: 60 mL  Total IN: 1857.1 mL    OUT:    Intermittent Catheterization - Urethral (mL): 4175 mL    Rectal Tube (mL): 200 mL    Voided (mL): 175 mL  Total OUT: 4550 mL    Total NET: -2692.9 mL          08-30-21 @ 07:01  -  08-31-21 @ 07:00  --------------------------------------------------------  IN: 0 mL/kg/hr / OUT: 1.77 mL/kg/hr / NET: -1.77 mL/kg/hr            LABS:  CAPILLARY BLOOD GLUCOSE                              8.7    7.65  )-----------( 214      ( 31 Aug 2021 05:24 )             29.1     08-31    140  |  104  |  7   ----------------------------<  86  3.5   |  25  |  0.49<L>    Ca    9.2      31 Aug 2021 05:24  Phos  2.2     08-31  Mg     2.0     08-31    TPro  6.3  /  Alb  3.6  /  TBili  0.4  /  DBili  <0.2  /  AST  25  /  ALT  12  /  AlkPhos  86  08-31          MEDICATIONS:  MEDICATIONS  (STANDING):  acetylcysteine 20%  Inhalation 4 milliLiter(s) Inhalation every 6 hours  albuterol/ipratropium for Nebulization 3 milliLiter(s) Nebulizer every 6 hours  amantadine Syrup 200 milliGRAM(s) Oral every 12 hours  bromocriptine Capsule 15 milliGRAM(s) Oral every 8 hours  chlorhexidine 2% Cloths 1 Application(s) Topical <User Schedule>  iron sucrose IVPB 300 milliGRAM(s) IV Intermittent every 24 hours  lacosamide Solution 100 milliGRAM(s) Oral two times a day  methylphenidate 10 milliGRAM(s) Oral daily  midodrine 10 milliGRAM(s) Oral every 8 hours  norepinephrine Infusion 0.05 MICROgram(s)/kG/Min (4.6 mL/Hr) IV Continuous <Continuous>  nystatin    Suspension 988186 Unit(s) Oral four times a day  pantoprazole   Suspension 40 milliGRAM(s) Oral daily  sodium chloride 3%  Inhalation 4 milliLiter(s) Inhalation every 6 hours  sodium phosphate IVPB 15 milliMole(s) IV Intermittent once    MEDICATIONS  (PRN):  ondansetron Injectable 4 milliGRAM(s) IV Push every 6 hours PRN Nausea and/or Vomiting  sodium chloride 0.9% lock flush 10 milliLiter(s) IV Push every 1 hour PRN Pre/post blood products, medications, blood draw, and to maintain line patency      PHYSICAL EXAMINATION:   AOx2 (person, place) with choices, hypophonic, face symmetric. R gaze preference but can overcome, pupils 3mm on R, 4mm on L. Follows commands  B/L uppers spontaneous, at least 3/5.  B/L lower extremities spontaneous, able to bend knees. Moves distally antigravity when supported at the knee- able to pick B/L heels off the bed.   EENT:  Anicteric, NGT  CARDIOVASCULAR: (+) S1 S2, normal rate and regular rhythm  PULMONARY: Diminshed at bases, coarse.   ABDOMEN: Soft, nontender with normoactive bowel sounds  EXTREMITIES: No edema  SKIN: No rash. Left axilla area of induration ~5x 4cm. Non tender, no erythema, non fluctuant. 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 Stable ischemic changes within the right cerebellum. Right-sided EVD catheter in place with slight increase in ventricular size. Interval improvement in the intraventricular hemorrhage. Evolving areas of acute/subacute ischemia within the right cerebellum.    CTA (08.19.2021) -  Interval improvement in the caliber of the proximal vertebral artery with progression of a vasospasm involving the mid to distal basilar artery. Interval improvement in the vasospasm involving the left vertebral artery. Persistent vasospasm involving the posterior cerebral and superior cerebellar arteries. Interval improvement in the vasospasm involving the right supraclinoid ICA as well as the left A1 and M1 segments. Progression of vasospasm involving the right M1 segment.    Cerebral Angiogram (08.18.2021 - Improvement in vasospasm, IA verapamil to posterior circulation  Cerebral Angiogram (08.17.2021 - Left vertebral artery dissection demonstrates continued moderate to severe cerebral vasospasm of the distal let vert and basilar artery, some what improved caliber of proximal left vert. 15mg IA Verapamil injected over 10 minutes with mild improvement of posterior circulation post treatment. Right ICA injection with continued mild supraclinoid vasospasm, 10mg of IA Verapamil injected.  Cerebral Angiogram (08.16.2021) - Left vertebral injection demonstrates moderate diffuse vasospasm of left vertebral artery, basilar artery and posterior circulation including PCAs. 15mg Verapamil injected via left vert with mild radiographic improvement post treatment. Right ICA injection with mild supraclinoid spasm/narrowing, otherwise no vasospasm appreciated in anterior circulation.

## 2021-08-31 NOTE — PROGRESS NOTE ADULT - ASSESSMENT
44 y/o F with h/o recent gastric sleeve (21 [facility], Dr. Crisostomo ), fibromyalgia, thyroid dysfunction, PTSD, depression, anxiety.  Presented with seizures at home - brought to [place], with 1 more episode of seizure en route.  Intubated, CT showed SAH with IV extension, casted IV, hydrocephalus, given mannitol, levetiracetam 1g,  6mg ativan. Started on Cardene drip for BP goal < 140 and transferred to [facility] NICU for further management. HH5 MF4, BD 1 = . Now s/p cerebral angiogram for R vertebral artery takedown for R vertebral dissecting aneurysm (), and R frontal EVD placement (), now s/p IVC filter placement (). BD 25.    NEURO:   Neurochecks: Q2h. Protected sleep time  10pm- 2am to decrease risk of delirium.   Hydrocephalus: Status post  shunt placed  (certas at 4). CT head- improved vent sizes.   Vasospasm: Most recent CTA :  Improved vasospasm of proximal basilar artery, persistent vasospasm and moderate narrowing of the distal one half of the basilar artery. Posterior cerebral artery P1 and P2 segments appear patent with multifocal vasospasm, not significantly changed.  S/P IA verapamil on , , , .  DCI prophylaxis: No longer on nimodipine. BD 25  Clinical seizures at home. Was on EEG (see below).   EE/18- 10 second event at 6:42 on , that could not be determined if epileptic vs. artifact.   There were non-specific indicators of bilateral temporal dysfunction and epileptic potential.   D/Hammad VPA. Continue only monotherapy with Vimpat 100 mg BID only. Follow clinically.  Centra fevers: Continue Bromocriptine 15mg Q8.  Neurostimulation: Continue amantadine and methylphenidate for neurostimulation. Wean if agitated.  ASA 81mg - on hold for PEG  Continue aggressive PT/OT as tolerated.     PULM: Tracheitis  Extubated   Duonebs and 3% nebs standing  Continue aggressive chest PT as high aspiration risk.   CXR : B/L opacities  Aspiration precautions, requires frequent suctioning (Q1-2 hr)  On Ancef for likely tracheitis: until   CT chest done . Atelectasis, air bronchograms.   Pulmonary following    CARDIO:    - 160   On midodrine; weaned off levophed.   TTE  wnl  PICC line.  Carotid dopplers showed no pseudoaneurysm. CTA neck showed small hematoma -extraluminal.     ENDO:    Glucose goal 140-180  ISS    GI: Ileus- noted   Nutrition: NPO for PEG  Thin liquid diet x3 weeks (as per bariatric) when tolerating. Consider thiamine, folate, MVI as post bariatric. D/W bariatric.  GI ppx: PPI per bariatric surgeon.  GI following. PEG unsuccessful on . To be taken to OR by gen surg .   LFTs wnl.     RENAL: Hypernatremia- induced.   Maintain euvolemia   Na goal 135- 145.  Monitor.      HEM/ONC: PE, DVT, elevated INR- nutritionally related.   ASA 81mg on hold for PEG  VTE Prophylaxis: SCDs, SQL. Lovenox 40mg daily for now per NSGY (was on 40mg bid). Consider increase to 60mg bid once safe to do so given risk factors/gene mutation.   Monitor anti Xa.   LE Duplex : Acute completely occlusive deep venous thrombosis of the right intramuscular calf vein, s/p IVCF with vascular . Repeat  showed new b/l peroneal DVTs. Scattered segmental pulmonary emboli noted on CTA.   Repeat dopplers : New L calf DVT. Repeat dopplers : Stable. No new or propagation.   Hypercoagulable w/u- c/w heterozygous prothrombin gene mutation.   Anemia 8.3--> 7.7--> 7.4. Transfused 1 unit PRBCs on . Started on IV iron. Monitor H/H.   FOBT negative.   INR 1.6. S/P PO vitamin K x 5 mg. Will give 5mg IV as preop for PEG.       ID: Persistent fever, leukocytosis.  Was on empiric Ceftriaxone () for aspiration PNA. Changed to Ancef . WBC trending down overall.   Repeat cultures  (blood, sputum, CSF, UA). Found MSSA. Changed to Ancef- end date  (7 days total). Other cx NGTD.   Repeated all cultures  per ID including cultures off central line, blood, CSF--> negative thus far.  Thrush; nystatin swish/spit.  Left axilla: area of induration ~6x 4cm. Non tender, no erythema, non fluctuant. Doubtful of infection. Warm compresses.  Monitor area of demarcation. Has been improving.   Infectious disease consulted  re: persistent fevers and clearance for VPS. Appreciate recommendations.     Disposition: ICU.     Social: Family updated at bedside.     Full code.     Acces:   PICC R basilic  L axillary A line  (Previous central line from  removed)    *****    ATTENDING ATTESTATION:  I was physically present for the key portions of the evaluation and management (E/M) service provided.  I agree with the above history, physical and plan, which I have reviewed and edited where appropriate.    Plan discussed with RN, house staff.

## 2021-08-31 NOTE — PROCEDURE NOTE - PRACTITIONER PERFORMING THE TIME OUT
Ray Howard
Marco Antonio
Sreekanth Bernard
justo dunn
Denis LARA
Denis
Axillary Arterial line placement with US guidance

## 2021-08-31 NOTE — PROGRESS NOTE ADULT - SUBJECTIVE AND OBJECTIVE BOX
==============================================   NEUROCRITICAL CARE ATTENDING NOTE   ==============================================    LUDMILA PRIETO  MRN-[id number]  Summary:  44 y/o F with h/o recent gastric sleeve (08/04 [facility], Dr. Crisostomo ), fibromyalgia, thyroid dysfunction, PTSD, depression, anxiety.  Presented with seizures at home - brought to [place], with 1 more episode of seizure en route.  Intubated, CT showed SAH with IV extension, casted IV, hydrocephalus, given mannitol levetiracetam 6mg ativan, transferred to [facility].      Hospital Course:   8/7: Transferred from [place] for SAH. Intubated. R frontal EVD placed, central line and  Eldon placed. 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   8/8: POD 1 s/p angio with R vert takedown, extubated, desatting on aerosol mask, required extensive suctioning, 3% nebs, chest PT, started on low dose precedex drip for restlessness/agitation, ABG stable. 3% stopped for Na 150. Ceribell EEG negative and d/c'ed.   8/9 Overnight, new right pronator drift and right gaze preference that can't cross midline, Repeat CTH demonstrated stable vents, CTA head and neck unremarkable for spasm, hypoattenuation of right cerebellum edema vs. infarct.  8/10: POD3 R vert takedown, EVD open at 20yaS0R. CTH with increased hydro. CTA head/neck with mild basilar spasm.  8/11: POD 4 R vert takedown. EVD at 5cm H2O. Febrile 104F. Depakote increased to q6. HCT stable. EVD lowered to 0.8/12: POD 5 R vert takedown. EVD at 0cm H2O. Pending IVCF with vascular.  8/13: POD 6 R vert takedown. EVD open at 0cmH2O. Mottled skin on the left lower extremity improving. Started on Bromocriptine 15mg Q8.   8/14: POD 7. EVD open at 0. ENT scoped vocal cords, +R vocal cord paresis, NTD. Started on zosyn empirically.   8/15: POD 8. EVD open at 0. Developed increased work of breathing, unable to clear her secretions, received racemic epi and multiple nebulizer treatments, however, still with stridor. Patient re-intubated at bedside, on full vent support.   8/16: CTH/CTA head/neck/CT chest performed o/n for worsened exam, R gaze preference, sluggish pupils. +worsened uncal herniation, hydrocephalus, vasospasm in b/l PCAs, L vert, basilar arteries. Angio today, restarted on 3% for Na goal 145-150.  8/17: POD 10. EVD open at -5cmH2O. ICPs WNL. Febrile 101F and pancultured.  8/18: POD 11. R vert takedown. POD1 angio IA verpamil. Overnight, Pt noted to have downward gaze to the right and not following commands. Taken for stat head CT- stable. Pupils also noted to be aniscoric left pupil 4mm and brisk and right 2mm brisk. Mannitol 50g given. Ceribell EEG placed for concern of subclinical seizures. Brief seizure to L frontal lobe. Given 2g valproic acid and dose increased to 1g q8. Vimpat 100mg BID started. Patient self-extubated, placed on NRB with mucomyst, 3% inhalation and duonebs. 3% at 50 d/c'd.  8/19: POD 12. Remains on VEEG. Axillary A line placed. Salt tabs d/c'd, florinef decreased to 0.1mg, EVD @ -5cm H2O draining 20cc/hr.   8/20: POD 13. VEEG, no seizures noted.  Febrile, pancultured.   8/22: BD 15: Continued on levophed for BP augmentation. Hypercoagulability profile. EVD 15 mL/h. Amantadine added, free water started for hypernatremia. Fluctuating level of consciousness. Frequent suctioning; empiric ceftriaxone started. Tmax 101.Thick secretions.  8/23: Neuroexam stable. Febrile. Pancultures done.   8/24: Exam has not been BP dependent. Relaxed -220. IJ- carotid stick. Lovenox held  8/25: BD 19. CTA 8/25: Improved vasospasm of proximal basilar artery, persistent vasospasm and moderate narrowing of the distal one half of the basilar artery. Posterior cerebral artery P1 and P2 segments appear patent with multifocal vasospasm, not significantly changed. SBP changed to 140-220. No change in exam. Weaning levophed.   8/26: BD20. Preop for VPS 8/27. EVD clamped.   8/27: BD 21.  shunt placed. Post op CT head stable.   8/28: BD 22. POD 1 s/p VPS. Slightly more lethargic but still follows commands. Later in PM, patient worked with PT; tolerated it well and followed their instructions. Suctioned copious secretions. Weaning pressors. Per night shift, dilated loops on Xray. Started on reglan.   8/29: BD23. POD 2 S/P VPS. Neuroexam stable.   8/30: BD24. POD 3 S/P VPS. Neuroexam stable. Still with frequent suctioning of thick yellow secretions. Transfused 1 unit PRBCs re Hb 7.4. Levophed dose minimal.   8/31: BD 25 POD 4 S/P VPS. Off pressors. PEG attempted at bedside. Unsuccessful. INR 1.6.       ICU Vital Signs Last 24 Hrs  T(C): 37.4 (31 Aug 2021 21:00), Max: 37.7 (30 Aug 2021 22:00)  T(F): 99.3 (31 Aug 2021 21:00), Max: 99.9 (30 Aug 2021 22:00)  HR: 87 (31 Aug 2021 21:00) (79 - 101)  ABP: 120/71 (31 Aug 2021 21:00) (98/72 - 140/105)  ABP(mean): 93 (31 Aug 2021 21:00) (80 - 122)  RR: 14 (31 Aug 2021 21:00) (12 - 33)  SpO2: 99% (31 Aug 2021 21:00) (92% - 100%)      08-30-21 @ 07:01  -  08-31-21 @ 07:00  --------------------------------------------------------  IN: 1857.1 mL / OUT: 4550 mL / NET: -2692.9 mL    08-31-21 @ 07:01  -  08-31-21 @ 21:54  --------------------------------------------------------  IN: 1091.4 mL / OUT: 600 mL / NET: 491.4 mL        PHYSICAL EXAMINATION:   Mental status: Awake, mildly restless but redirectable. Ox3 with choices. Follows commands.  Cranial nerves: R gaze preference but can cross to left easily,  pupils 3mm on R, 4mm on L. Hypophonia is improving, face symmetric.   Motor: B/L uppers spontaneous, at least 3/5.  B/L lower extremities antigravity.   EENT:  Anicteric, NGT  CARDIOVASCULAR: (+) S1 S2, normal rate and regular rhythm  PULMONARY: Diminshed at bases  ABDOMEN: Soft, nontender with normoactive bowel sounds  EXTREMITIES: No edema  SKIN: No rash. Left axilla area of induration ~5x 4cm. Non tender, no erythema, non fluctuant. Improving.      MEDICATIONS:  acetylcysteine 20%  Inhalation 4 milliLiter(s) Inhalation every 6 hours  albuterol/ipratropium for Nebulization 3 milliLiter(s) Nebulizer every 6 hours  amantadine Syrup 200 milliGRAM(s) Oral every 12 hours  bromocriptine Capsule 15 milliGRAM(s) Oral every 8 hours  chlorhexidine 2% Cloths 1 Application(s) Topical <User Schedule>  iron sucrose IVPB 300 milliGRAM(s) IV Intermittent every 24 hours  lacosamide Solution 100 milliGRAM(s) Oral two times a day  methylphenidate 10 milliGRAM(s) Oral daily  midodrine 10 milliGRAM(s) Oral every 8 hours  norepinephrine Infusion 0.05 MICROgram(s)/kG/Min (4.6 mL/Hr) IV Continuous <Continuous>  nystatin    Suspension 536223 Unit(s) Oral four times a day  ondansetron Injectable 4 milliGRAM(s) IV Push every 6 hours PRN  pantoprazole   Suspension 40 milliGRAM(s) Oral daily  phytonadione  IVPB 5 milliGRAM(s) IV Intermittent once  sodium chloride 0.9% lock flush 10 milliLiter(s) IV Push every 1 hour PRN  sodium chloride 3%  Inhalation 4 milliLiter(s) Inhalation every 6 hours               LABS:              8.7    7.65  )-----------( 214      ( 31 Aug 2021 05:24 )             29.1     08-31    140  |  104  |  7   ----------------------------<  86  3.5   |  25  |  0.49<L>    Ca    9.2      31 Aug 2021 05:24  Phos  2.2     08-31  Mg     2.0     08-31    TPro  6.3  /  Alb  3.6  /  TBili  0.4  /  DBili  <0.2  /  AST  25  /  ALT  12  /  AlkPhos  86  08-31    LIVER FUNCTIONS - ( 31 Aug 2021 05:24 )  Alb: 3.6 g/dL / Pro: 6.3 g/dL / ALK PHOS: 86 U/L / ALT: 12 U/L / AST: 25 U/L / GGT: x               CULTURES:  Culture - Blood (collected 08-26-21 @ 12:48)  Source: .Blood Blood  Preliminary Report (08-27-21 @ 01:00):    No growth at 12 hours    Culture - Blood (collected 08-26-21 @ 12:48)  Source: .Blood Blood  Preliminary Report (08-27-21 @ 01:00):    No growth at 12 hours    Culture - CSF with Gram Stain (collected 08-26-21 @ 12:26)  Source: .CSF CSF  Gram Stain (08-26-21 @ 12:56):    No organisms seen    Rare White blood cells        Neuroimaging:  CT (08.19.2021) - 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 Stable ischemic changes within the right cerebellum. Right-sided EVD catheter in place with slight increase in ventricular size. Interval improvement in the intraventricular hemorrhage. Evolving areas of acute/subacute ischemia within the right cerebellum.    CTA (08.19.2021) -  Interval improvement in the caliber of the proximal vertebral artery with progression of a vasospasm involving the mid to distal basilar artery. Interval improvement in the vasospasm involving the left vertebral artery. Persistent vasospasm involving the posterior cerebral and superior cerebellar arteries. Interval improvement in the vasospasm involving the right supraclinoid ICA as well as the left A1 and M1 segments. Progression of vasospasm involving the right M1 segment.    Cerebral Angiogram (08.18.2021 - Improvement in vasospasm, IA verapamil to posterior circulation  Cerebral Angiogram (08.17.2021 - Left vertebral artery dissection demonstrates continued moderate to severe cerebral vasospasm of the distal let vert and basilar artery, some what improved caliber of proximal left vert. 15mg IA Verapamil injected over 10 minutes with mild improvement of posterior circulation post treatment. Right ICA injection with continued mild supraclinoid vasospasm, 10mg of IA Verapamil injected.  Cerebral Angiogram (08.16.2021) - Left vertebral injection demonstrates moderate diffuse vasospasm of left vertebral artery, basilar artery and posterior circulation including PCAs. 15mg Verapamil injected via left vert with mild radiographic improvement post treatment. Right ICA injection with mild supraclinoid spasm/narrowing, otherwise no vasospasm appreciated in anterior circulation.      [Code] Full  [Goals] Aggressive  [Disposition] ICU

## 2021-08-31 NOTE — CHART NOTE - NSCHARTNOTEFT_GEN_A_CORE
Ms Castillo is s/p unsuccessful attempt at PEG/ JEG placement due to patient habitus and s/p gastric sleeve. Multiple attempts for 1-1 where attempted. Given patient adipose, unable to transilluminate from the stomach. Multiple attempts to contact stomach with standard needle as well as elongated needle were unsuccessful. Procedure was aborted.      Recommendations:  - Resume tube feeds  - Consult surgery for surgical placement of JEG.

## 2021-08-31 NOTE — CONSULT NOTE ADULT - SUBJECTIVE AND OBJECTIVE BOX
Surgery Consult Note    HPI:  46y/o F with h/o recent gastric sleeve (08/04/21 [facility], Dr. Crisostomo ), fibromyalgia, thyroid dysfunction, PTSD, depression, anxiety.  Presented with seizures at home - brought to [place], with 1 more episode of seizure en route.  Intubated, CT showed SAH with IV extension, casted IV, hydrocephalus, given mannitol, levetiracetam 1g,  6mg ativan. Started on Cardene drip for BP goal < 140 and transferred to [facility] NICU for further management.     HH5 MF4   NIHSS 26 on arrival  BD 1 = 8/7 (07 Aug 2021 08:08)      Attending:  Mary Jane     Surgery Addendum: 44yo F w above PMx, s/p gastric sleeve (8/4, [facility]), tx from [place], intubated for further management of SAH, now s/p cerebral angio w R vertebral cutdown for R vertebral dissecting aneurysm w placement of EVD on 8/8. Initially extubated 8/8,  IVCF on 8/12 for LE DVT, reintubation 8/15 due to inability to protect airway, self extubated 8/18.  . ID following suspect MSSA PNA, Bariatric surgery service consulted for placement of PEG tube vs feeding J on 8/23, with recommendations to delay placement of PEG/Lap J given recent intraabdominal surgery, continued pressor requirement, and fevers of unknown origin. General surgery service consulted for reevaluation of lap J tube placement on 8/25 and opted to defer placement due recent intraabdominal surgery and continued pressor requirement. Bariatric service reconsulted today for lap J tube placement as fevers have now subsided following 7 course of cefazolin.       PAST MEDICAL & SURGICAL HISTORY:      MEDICATIONS  (STANDING):  acetylcysteine 20%  Inhalation 4 milliLiter(s) Inhalation every 6 hours  albuterol/ipratropium for Nebulization 3 milliLiter(s) Nebulizer every 6 hours  amantadine Syrup 200 milliGRAM(s) Oral every 12 hours  bromocriptine Capsule 15 milliGRAM(s) Oral every 8 hours  chlorhexidine 2% Cloths 1 Application(s) Topical <User Schedule>  iron sucrose IVPB 300 milliGRAM(s) IV Intermittent every 24 hours  lacosamide Solution 100 milliGRAM(s) Oral two times a day  methylphenidate 10 milliGRAM(s) Oral daily  midodrine 10 milliGRAM(s) Oral every 8 hours  norepinephrine Infusion 0.05 MICROgram(s)/kG/Min (4.6 mL/Hr) IV Continuous <Continuous>  nystatin    Suspension 795417 Unit(s) Oral four times a day  pantoprazole   Suspension 40 milliGRAM(s) Oral daily  potassium phosphate IVPB 15 milliMole(s) IV Intermittent once  propofol Infusion 10 MICROgram(s)/kG/Min (5.89 mL/Hr) IV Continuous <Continuous>  sodium chloride 3%  Inhalation 4 milliLiter(s) Inhalation every 6 hours    MEDICATIONS  (PRN):  ondansetron Injectable 4 milliGRAM(s) IV Push every 6 hours PRN Nausea and/or Vomiting  sodium chloride 0.9% lock flush 10 milliLiter(s) IV Push every 1 hour PRN Pre/post blood products, medications, blood draw, and to maintain line patency      Allergies    apple (Angioedema)  No Known Drug Allergies  strawberry (Angioedema)    Intolerances    lactose (Stomach Upset)      SOCIAL HISTORY:    FAMILY HISTORY:  No pertinent family history in first degree relatives        Vital Signs Last 24 Hrs  T(C): 36.8 (31 Aug 2021 14:00), Max: 37.7 (30 Aug 2021 22:00)  T(F): 98.2 (31 Aug 2021 14:00), Max: 99.9 (30 Aug 2021 22:00)  HR: 96 (31 Aug 2021 14:00) (79 - 101)  BP: --  BP(mean): --  RR: 16 (31 Aug 2021 14:00) (14 - 33)  SpO2: 96% (31 Aug 2021 14:00) (92% - 100%)    I&O's Summary    30 Aug 2021 07:01  -  31 Aug 2021 07:00  --------------------------------------------------------  IN: 1857.1 mL / OUT: 4550 mL / NET: -2692.9 mL    31 Aug 2021 07:01  -  31 Aug 2021 16:00  --------------------------------------------------------  IN: 198.7 mL / OUT: 200 mL / NET: -1.3 mL        Physical Exam:  General: NAD, resting comfortably  HEENT: NC/AT, Dobohh in place  Pulmonary: normal resp effort,   Cardiovascular: NSR, n  Abdominal: soft, ND/NT, port sites from prior LSG C/D/I  Extremities: WWP, normal strength, no clubbing/cyanosis/edema  Neuro: A/O x 2 CNs II-XII grossly intact,     LABS:                        8.7    7.65  )-----------( 214      ( 31 Aug 2021 05:24 )             29.1     08-31    140  |  104  |  7   ----------------------------<  86  3.5   |  25  |  0.49<L>    Ca    9.2      31 Aug 2021 05:24  Phos  2.2     08-31  Mg     2.0     08-31    TPro  6.3  /  Alb  3.6  /  TBili  0.4  /  DBili  <0.2  /  AST  25  /  ALT  12  /  AlkPhos  86  08-31    PT/INR - ( 31 Aug 2021 05:24 )   PT: 19.2 sec;   INR: 1.64          PTT - ( 31 Aug 2021 05:24 )  PTT:33.0 sec    CAPILLARY BLOOD GLUCOSE        LIVER FUNCTIONS - ( 31 Aug 2021 05:24 )  Alb: 3.6 g/dL / Pro: 6.3 g/dL / ALK PHOS: 86 U/L / ALT: 12 U/L / AST: 25 U/L / GGT: x             Cultures:      RADIOLOGY & ADDITIONAL STUDIES:

## 2021-08-31 NOTE — PROCEDURE NOTE - NSINFORMCONSENT_GEN_A_CORE
Benefits, risks, and possible complications of procedure explained to patient/caregiver who verbalized understanding and gave written consent.
Benefits, risks, and possible complications of procedure explained to patient/caregiver who verbalized understanding and gave written consent.
This was an emergent procedure.
Benefits, risks, and possible complications of procedure explained to patient/caregiver who verbalized understanding and gave verbal consent.
Benefits, risks, and possible complications of procedure explained to patient/caregiver who verbalized understanding and gave written consent.
Benefits, risks, and possible complications of procedure explained to patient/caregiver who verbalized understanding and gave verbal consent.
by daughter hcp over the phone/Benefits, risks, and possible complications of procedure explained to patient/caregiver who verbalized understanding and gave verbal consent.
Benefits, risks, and possible complications of procedure explained to patient/caregiver who verbalized understanding and gave written consent.

## 2021-08-31 NOTE — PROGRESS NOTE ADULT - SUBJECTIVE AND OBJECTIVE BOX
HPI:  46y/o F with h/o recent gastric sleeve (08/04/21 [facility], Dr. Crisostomo ), fibromyalgia, thyroid dysfunction, PTSD, depression, anxiety.  Presented with seizures at home - brought to [facility], with 1 more episode of seizure en route.  Intubated, CT showed SAH with IV extension, casted IV, hydrocephalus, given mannitol, levetiracetam 1g,  6mg ativan. Started on Cardene drip for BP goal < 140 and transferred to [facility] NICU for further management.     HH5 MF4   NIHSS 26 on arrival  BD 1 = 8/7 (07 Aug 2021 08:08)    OVERNIGHT EVENTS: ASHA overnight, neuro stable     HOSPITAL COURSE:  8/7: Tx from [facility] for SAH. Intubated. R frontal EVD placed, central line and a line placed. 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   8/8: POD1 s/p angio with R vert takedown, extubated, desatting on aerosol mask, required extensive suctioning, 3% nebs, chest PT, started on low dose precedex drip for restlessness/agitation, ABG stable. NGT placed. TF started with goal 20 pending nutrition recs. 3% stopped for Na 150. Ceribell EEG negative and d/c'ed.   8/9 Overnight, new Right pronator drift and right gaze preference that can't cross midline, Repeat CTH demonstrated stable vents, CTA head and neck unremarkable for spasm, hypoattenuation of right cerebellum edema vs. infarct.  8/10: POD3 R vert takedown, EVD open at [id number]. ASHA overnight, neuro stable. CTH with increased hydro, CTA head/neck with mild basilar spasm, but concern for PE. 1/2 am D50 for hypoglycemia. 1L bolus then 100 cc/hr, PM rounds for net negative fluid balance and mild vasospasm on CTA.   8/11: POD4 R vert takedown. EVD at 5cm H2O, ASHA overnight. neuro stable.   Febrile 104. depakote increased to q6. NCHCT stable. EVD lowered to 0. Lasix 20 given for dieuresis, UOP over 2 liters. Sedation given for a-line placement, BP drop needing levo.  8/12: POD5 R vert takedown. EVD at 0cm H2O. ASHA overnight, neuro stable. Precedex being weaned off. Pending IVCF with vascular today.  8/13: POD6 R vert takedown. EVD open at 0cmH2O. ASHA overnight. Neuro exam stable. Mottled skin on the left lower extremity improving. Started on Bromocriptine 15mg Q8.   8/14: POD7. EVD open at 0. 1L bolus given for euvolemia o/n. neuro stable. CTH stable. ENT scoped vocal cords, +R voacl cord paresis, NTD. started on zosyn empirically.   8/15: POD8. EVD open at 0. ASHA o/n, neuro stable. Pt developed increased work of breathing, unable to clear her secretions, received racemic epi and multiple nebulizer treatments, still with stridor. Patient re-intubated at bedside, on full vent support.   8/16: CTH/CTA head/neck/CT chest performed o/n for worsened exam, R gaze preference, sluggish pupils. +worsened uncal herniation, hydro, vasospasm in b/l PCAs, L vert, basilar arteries. preop angio today, restarted on 3% for Na goal 145-150. Angio for vasospasm with IA verapamil.  8/17: POD10. Overnight Pt remains on levophed drip for SBP goal 180-220. Also remains on propofol drip. EVD open at -5cmH2O. ICPs WNL. Febrile 101F and pancultured. CTH today shows slightly smaller ventricles. Angio for vasospasm with IA verapamil.  8/18: POD11 R vert takedown. POD1 angio IA verpamil. Overnight, Pt noted to have downward gaze to the right and not following commands. Taken for stat head CT which is stable. Pupils also noted to be aniscoric left pupil 4mm and brisk and right 2mm brisk. Mannitol 50g given. Ceribell eeg placed for concern of subclinical seizures, so far appears negative for seizures. 1L NS o/n and 1.5L NS bolus in AM for euvolemia. vanc/zosyn stopped. 250cc 3% bolus and 250cc albumin given in AM. Brief seizure to L frontal lobe this AM on EEG, given 2g valproic acid and dose increased to 1g q8. Vimpat 100mg BID started.  Angio with interval improvment in vasospasm, IA verapamil given. In afternoon patient self-extubated, placed on NRB with mucomyst, 3% inhalation and duonebs. 3% at 50 d/c'd.  8/19: POD 12. Remains on VEEG. Axillary A line placed. Salt tabs d/c'd, florinef decreased to 0.1mg, EVD @ -5cm H2O, now draining 20cc/hr. 250 albumin and 500 NS boluses on dayshift, 1 L LR bolus  8/20: POD 13. ASHA. on VEEG, no seizures noted. Pending VPA level. 500 NS, 250 albumin. Febrile, pancultured. EEG d/c'ed.   8/21: BD14, POD14. ASHA overnight, EVD @ -5. s/p 1L bolus for euvolemia  8/22: BD #15: continued on levo, hypercoagulabitly profile pending, EVD @ -5, euvolemia, extubated, amantadine added, free water started for hypernatremia   8/23: BD 17, tmax 101F o/n. Gen Sx consulted for PEG - not a candidate at this time, pancultured for fever, 1L LR given for euvolemia. EVD at -5  8/24: BD18, ASHA o/n, neuro stable, EVD at -5, VPA level therapeutic, SBP goal 160-200, L IJ CVC attempted placement with carotid puncture, no pseudoaneurysm on US. MSSA growing in sputum. Ceftriaxone d/c'd and Ancef 2gq8 started. ID consulted. Recieved 1.5L LR and 500cc albumin.   8/25: POD5 last angio, BD19 ASHA o/n, neuro stable, EVD at -5  8/26: BD 20. ASHA o/n. EVD @ -5   8/27: BD 21, POD 20 Right vert takedown. ASHA overnight. Exam stable. EVD remains open at -5 and to be clamped at 0600 in preparation for right VPS placement today. Possible PEG placement Monday 8/30 8/28: BD22, POD22 Right vert takedown, POD1 R VPS, neuro exam stable. Tube feeds restarted, more lethargic this AM, improved during day, continue to monitor for hydrocephalus. AXR with dilated bowel, started on reglan and magnesium. Liquid BM, TFs held and rectal tube placed.   8/29: BD23, POD23 right vert takedown, POD2 R VPS, ASHA overnight, neuro stable. TFs decreased for colonic distention seen on XR, GI notified, nothing to do, TFs resumed at 20/hr and will continue will PEG placement tomorrow. CTH today demonstrated interval decrease in size of ventricles. Midodrine added to aid in weening levophed.   8/30: BD24, POD24, pending PEG placement this morning with Dr. Milner. Midodrine increased to 10mg q8hrs today to wean off of levo. 1u PRBC for Hb 7.5. FOB negative. 20mg lasix given. Repeat dopplers completed, unchanged from previous. Started on IV iron per hematology recs.   8/31: BD25, POD25, pending PEG placement today, trend Hgb w/ AM labs, slowly weening levophed       Vital Signs Last 24 Hrs  T(C): 37.7 (30 Aug 2021 23:00), Max: 37.7 (30 Aug 2021 22:00)  T(F): 99.9 (30 Aug 2021 23:00), Max: 99.9 (30 Aug 2021 22:00)  HR: 87 (31 Aug 2021 00:00) (83 - 98)  BP: --  BP(mean): --  RR: 17 (31 Aug 2021 00:00) (15 - 30)  SpO2: 97% (31 Aug 2021 00:00) (93% - 100%)    I&O's Summary    29 Aug 2021 07:01  -  30 Aug 2021 07:00  --------------------------------------------------------  IN: 2059.7 mL / OUT: 1650 mL / NET: 409.7 mL    30 Aug 2021 07:01  -  31 Aug 2021 00:13  --------------------------------------------------------  IN: 1790.1 mL / OUT: 3075 mL / NET: -1284.9 mL        PHYSICAL EXAM:  General: NAD, pt is comfortably sitting up in bed, on room air  HEENT: CN II-XII grossly intact, Right pupil 4mm briskly reactive, Left 3mm briskly reactive, EOMI b/l, face symmetric, tongue midline, neck FROM, Right gaze preference but can cross to left. +NGT  Cardiovascular: RRR, normal S1 and S2, small Left neck hematoma   Respiratory: lungs CTAB, no wheezing, rhonchi, or crackles   GI: normoactive BS to auscultation, abd soft, NTND, +abdominal incision.  Neuro: A&Ox2 (not orientated to time), hypophonic. Follows commands (squeeze and let go to command, shows 2 fingers, opens and closes eyes to commands, wiggles toes)  CALLE x4 spontaneously moves L >R. SILT throughout   Extremities: distal pulses 2+ x4  Wound/incision: +Craniotomy incision with dressing in place, C/D/I, +abdominal incision with dressing in place, C/D/I    TUBES/LINES:  [] Richey  [] Lumbar Drain  [] Wound Drains  [X] Others +NGT      DIET:  [X] NPO - for procedure   [] Mechanical  [] Tube feeds       LABS:                        7.5    10.12 )-----------( 242      ( 30 Aug 2021 05:28 )             25.7     08-30    141  |  106  |  8   ----------------------------<  101<H>  3.6   |  26  |  0.44<L>    Ca    9.2      30 Aug 2021 05:28  Phos  2.4     08-30  Mg     1.8     08-30      PT/INR - ( 30 Aug 2021 05:28 )   PT: 16.4 sec;   INR: 1.39          PTT - ( 30 Aug 2021 05:28 )  PTT:28.3 sec        CAPILLARY BLOOD GLUCOSE      POCT Blood Glucose.: 102 mg/dL (30 Aug 2021 06:43)      Drug Levels: [] N/A  Valproic Acid Level, Serum: 68.7 ug/mL (08-24 @ 13:06)    CSF Analysis: [] N/A      Allergies    apple (Angioedema)  No Known Drug Allergies  strawberry (Angioedema)    Intolerances    lactose (Stomach Upset)    MEDICATIONS:  Antibiotics:  nystatin    Suspension 816450 Unit(s) Oral four times a day    Neuro:  amantadine Syrup 200 milliGRAM(s) Oral every 12 hours  bromocriptine Capsule 15 milliGRAM(s) Oral every 8 hours  lacosamide Solution 100 milliGRAM(s) Oral two times a day  methylphenidate 10 milliGRAM(s) Oral daily  ondansetron Injectable 4 milliGRAM(s) IV Push every 6 hours PRN    Anticoagulation:    OTHER:  acetylcysteine 20%  Inhalation 4 milliLiter(s) Inhalation every 6 hours  albuterol/ipratropium for Nebulization 3 milliLiter(s) Nebulizer every 6 hours  chlorhexidine 2% Cloths 1 Application(s) Topical <User Schedule>  midodrine 10 milliGRAM(s) Oral every 8 hours  norepinephrine Infusion 0.05 MICROgram(s)/kG/Min IV Continuous <Continuous>  pantoprazole   Suspension 40 milliGRAM(s) Oral daily  sodium chloride 3%  Inhalation 4 milliLiter(s) Inhalation every 6 hours    IVF:  iron sucrose IVPB 300 milliGRAM(s) IV Intermittent every 24 hours    CULTURES:  Culture Results:   Sputum specimen rejected.  Microscopic examination indicates  oropharyngeal contamination.  Please repeat. (08-27 @ 01:53)  Culture Results:   No growth (08-26 @ 14:27)    RADIOLOGY & ADDITIONAL TESTS:  Lower extremity dopplers 8/30/2021:   IMPRESSION:  Since 8/23/2021, unchanged DVT involving the bilateral peroneal and intramuscular calf veins. No new or propagating DVT.    CXR 8/30/2021: Findings/  impression: Stable positioning of support devices. Bilateral opacities. Heart size within normal limits..      CTH 8/29/2021: IMPRESSION:  With ventricular shunt in place, there is now decreased ventricular size since 08/27/2021.      ASSESSMENT:  46y/o F with h/o recent gastric sleeve (08/04/21 [facility], Dr. Crisostomo ), fibromyalgia, thyroid dysfunction, PTSD, depression, anxiety.  Presented with seizures at home - brought to [facility], with 1 more episode of seizure en route.  Intubated, CT showed SAH with IV extension, casted IV, hydrocephalus, given mannitol, levetiracetam 1g,  6mg ativan. Started on Cardene drip for BP goal < 140 and transferred to [facility] NICU for further management. HH5 MF4, BD 1 = 8/7. Now s/p cerebral angiogram for R vertebral artery takedown for R vertebral dissecting aneurysm (8/7), and R frontal EVD placement (8/7), now s/p IVC filter placement (8/12,) s/p angio IA verapamil 8/16, 8/17, 8/18, 8/20. Now s/p right VPS certas at 4 (8/27).    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    Anemia due to acute blood loss    Angiogram, carotid and cerebral arteries    IVC filter placement    Angiogram, carotid and cerebral, bilateral    Angiogram, carotid and cerebral, bilateral    Angiogram, carotid and cerebral, bilateral    Angiogram, carotid and cerebral, bilateral    Placement,  shunt, using frameless stereotaxy    [email or web address]        PLAN:  NEURO:   - neurochecks q2h   - Depakote dc'd   - Vimpat 100mg bid   - EEG no seizures x 2 days , D/c'ed  - CTH 8/15: worsened uncal herniation, worsened hydro, vasospasm in b/l PCA, L vert, basilar arteries  - Bromocriptine 15mg Q8  - Amantadine for neurostim  - Angio demonstrating vasospasm of Right ICA, right PCOMM, Left distal vert and basilar confluence, and received IA verapamil on 8/16. 8/17. 8/18. 8/20.   - CTH/CTA 8/22- stable, R MCA improved spasm   - s/p CTH 8/20: decreased size in ventricles, stable.   - CTH and AP stable post op 8/27, persistent ventriculomegaly   - Repeat CTH 8/29 demonstrated decreased size of ventricles.     PULM:    - self extubated 8/20  - copious thick secretion, standing, Duonebs, 3% nebs, mucomyst  - chest PT   - CXR - aspiration precautions, requires frequent suc                   tioning  - Ancef for tracheitis    CARDIO:    - -150  - PICC line today, dc central line after PICC established   - levo gtt- attempt to taper off   - TTE 8/12 WNL    - Troponin stable   - EKG normal   - midodrine increased to 10q8    ENDO:    - glucose goal 140 -180    GI:    - npo after midnight  - needs thin liquid diet x 3 weeks as per bariatric when tolerated   - PPI per bariatric surgeon   - plan for GI Dr. Yaya Milner to do peg Monday  - Repeat abdominal XR in AM     RENAL:   - Maintain euvolemia  - Na goal normal- downtrending   - albumin bolus yesterday to maintain CVP > 6  - no IVF while NPO  - straight cath prn     HEM/ONC:   - ASA 81 - held s/p OR and in anticipation of PEG placement   - VTE Prophylaxis: SCDs, SQL 40qd held for PEG  - Repeat dopplers 8/23, newly visualized acute DVT left intramuscular calf vein, persistent completely occlusive DVT b/l peroneal veins and right intramuscular calf vein, 8/30 unchanged DVTs  - carotid doppler 8/24: neg for pseudoaneurysm, CTA shows soft tissue small hematoma, non-occlusive L IJ and L basillic vein thrombus   - hypercoagulable w/u: - prothrombin gene mutation - heterozygous   - Factor Xa trend once back on SQL  - Prothrombin gene mutation - Heterozygous   - 8/26 LUE doppler with findings of left IJ non-occlusive DVT and left basilic thrombus  - s/p 1u PRBC 8/30  - FOB negative 8/30    ID:   - MRSA neg 8/12   - Afebrile, mild leukocytosis   - Last Pancx 8/23   - Ancef for MSSA pna coverage x 7 d completed 8/30  - PICC line placed 8/26    Assessment and plan discussed with Dr. Lomax, Dr. Conn and Dr. Durbin     Assessment:  Present when checked    []  GCS  E   V  M     Heart Failure: []Acute, [] acute on chronic , []chronic  Heart Failure:  [] Diastolic (HFpEF), [] Systolic (HFrEF), []Combined (HFpEF and HFrEF), [] RHF, [] Pulm HTN, [] Other    [] TRUDI, [] ATN, [] AIN, [] other  [] CKD1, [] CKD2, [] CKD 3, [] CKD 4, [] CKD 5, []ESRD    Encephalopathy: [] Metabolic, [] Hepatic, [] toxic, [] Neurological, [] Other    Abnormal Nurtitional Status: [] malnurtition (see nutrition note), [ ]underweight: BMI < 19, [] morbid obesity: BMI >40, [] Cachexia    [] Sepsis  [] hypovolemic shock,[] cardiogenic shock, [] hemorrhagic shock, [] neuogenic shock  [] Acute Respiratory Failure  []Cerebral edema, [] Brain compression/ herniation,   [] Functional quadriplegia  [] Acute blood loss anemia

## 2021-08-31 NOTE — CONSULT NOTE ADULT - ASSESSMENT
46yo F w above PMx, s/p gastric sleeve (8/4, [facility]), tx from [place], intubated for further management of SAH, now s/p cerebral angio w R vertebral cutdown for R vertebral dissecting aneurysm w placement of EVD on 8/8. Initially extubated 8/8,  IVCF on 8/12 for LE DVT, reintubation 8/15 due to inability to protect airway, self extubated 8/18.  . ID following suspect MSSA PNA, Bariatric surgery service consulted for placement of PEG tube vs feeding J on 8/23, with recommendations to delay placement of PEG/Lap J given recent intraabdominal surgery, continued pressor requirement, and fevers of unknown origin. General surgery service consulted for reevaluation of lap J tube placement on 8/25 and opted to defer placement due recent intraabdominal surgery and continued pressor requirement. Bariatric service reconsulted today for lap J tube placement as fevers have now subsided following 7 course of cefazolin.     - NPO past MN  - full set of AM labs to include coags + active T&S  - will add on for lap J tube 9/1, class 4, [place]   - discussed w bariatrics attending + chief resident on call

## 2021-08-31 NOTE — PROGRESS NOTE ADULT - ASSESSMENT
46 y/o F with h/o recent gastric sleeve (21 [facility], Dr. Crisostomo ), fibromyalgia, thyroid dysfunction, PTSD, depression, anxiety.  Presented with seizures at home - brought to [place], with 1 more episode of seizure en route.  Intubated, CT showed SAH with IV extension, casted IV, hydrocephalus, given mannitol, levetiracetam 1g,  6mg ativan. Started on Cardene drip for BP goal < 140 and transferred to [facility] NICU for further management. HH5 MF4, BD 1 = . Now s/p cerebral angiogram for R vertebral artery takedown for R vertebral dissecting aneurysm (), and R frontal EVD placement (), now s/p IVC filter placement (). BD 24.    NEURO:   Neurochecks: Q2h.  Hydrocephalus: Status post  shunt placed  (certas at 4). CT head- improved vent sizes.   Vasospasm: Most recent CTA :  Improved vasospasm of proximal basilar artery, persistent vasospasm and moderate narrowing of the distal one half of the basilar artery. Posterior cerebral artery P1 and P2 segments appear patent with multifocal vasospasm, not significantly changed.  S/P IA verapamil on , , , .  DCI prophylaxis: Nimodipine 60 mg PO Q4h- held as on levophed.   Clinical seizures at home. Was on EEG (see below).   EE/18- 10 second event at 6:42 on , that could not be determined if epileptic vs. artifact.   There were non-specific indicators of bilateral temporal dysfunction and epileptic potential.   D/Hammad VPA. Continue only monotherapy with Vimpat 100 mg BID only. Follow clinically.  Centra fevers: Continue Bromocriptine 15mg Q8.  Neurostimulation: Continue amantadine and methylphenidate for neurostimulation.   ASA 81mg - on hold for PEG  Continue aggressive PT/OT as tolerated.     PULM: Tracheitis  Extubated   Duonebs and 3% nebs standing  Continue aggressive chest PT as high aspiration risk.   CXR : B/L opactities  Aspiration precautions, requires frequent suctioning (Q1-2 hr)  On Ancef for likely tracheitis: until   CT chest done . Atelectasis, air bronchograms.   Pulmonary following    CARDIO:    - 160   On midodrine to wean off low dose levophed.   TTE  wnl  PICC line.  Carotid dopplers showed no pseudoaneurysm. CTA neck showed small hematoma -extraluminal.     ENDO:    Glucose goal 140-180  ISS    GI: Ileus- noted   Nutrition: NPO   Thin liquid diet x3 weeks (as per bariatric) when tolerating. Consider thiamine, folate, MVI as post bariatric.  GI ppx: PPI per bariatric surgeon.  GI following. PEG to be placed by GI .  LFTs wnl.     RENAL: Hypernatremia- induced.   Maintain euvolemia   Na goal 135- 145. Eunatremia.   Severe hypokalemia 2/2 enteric losses. Replete aggressively. Improved.  Monitor.    HEM/ONC: PE, DVT.  ASA 81mg on hold for PEG  VTE Prophylaxis: SCDs, SQL. Lovenox 40mg daily for now per NSGY (was on 40mg bid). Consider increase to 60mg bid once safe to do so given risk factors/gene mutation.   Monitor anti Xa.   LE Duplex : Acute completely occlusive deep venous thrombosis of the right intramuscular calf vein, s/p IVCF with vascular . Repeat  showed new b/l peroneal DVTs. Scattered segmental pulmonary emboli noted on CTA.   Repeat dopplers : New L calf DVT. Repeat dopplers .   Hypercoagulable w/u- c/w heterozygous prothrombin gene mutation.   Anemia 8.3--> 7.7--> 7.4. Transfused 1 unit PRBCs on . Started on IV iron. Monitor H/H.   FOBT negative.       ID: Persistent fever, leukocytosis.  Was on empiric Ceftriaxone () for aspiration PNA. Changed to Ancef . WBC trending down overall.   Repeat cultures  (blood, sputum, CSF, UA). Found MSSA. Changed to Ancef- end date  (7 days total). Other cx NGTD.   Repeated all cultures  per ID including cultures off central line, blood, CSF--> negative thus far.  Thrush; nystatin swish/spit.  Left axilla: area of induration ~6x 4cm. Non tender, no erythema, non fluctuant. Doubtful of infection. Warm compresses.  Monitor area of demarcation.   Infectious disease consulted  re: persistent fevers and clearance for VPS.     Disposition: ICU.   Social: Family updated at bedside.   Full code.     Acces:   PICC R basilic  L axillary A line  (Previous central line from  removed)    Discharge Checklist:    Completed:  @ 07:45    [X] hemodynamically stable – VS WNL and stable x 24hours, UO adequate  off pressors x 24h with stable neuro exam    [X] no new symptoms x 24h (i.e. new fever, new-onset nausea/vomiting)  [X] stable labs: (i.e. WBC not rising, sodium not dropping)  [X] patient not at high risk for aspiration, if high risk then:                  [ ] should have definitive plans for trach/PEG (alternative option is to discharge from ICU to facilty)                  [ ] stepdown to bed close to nurse’s station  [n/a] low suctioning requirements (i.e. q4h or less)  [X] sign-off from primary RN*  [X] drains do not require ICU level of care  [X] if patient previously agitated or with behavioral issues – controlled   [X] pain controlled

## 2021-08-31 NOTE — PROCEDURE NOTE - NSPROCDETAILS_GEN_ALL_CORE
location identified, draped/prepped, sterile technique used, needle inserted/introduced/positive blood return obtained via catheter/connected to a pressurized flush line/sutured in place/hemostasis with direct pressure, dressing applied/Seldinger technique/all materials/supplies accounted for at end of procedure
location identified, draped/prepped, sterile technique used/sterile dressing applied/sterile technique, catheter placed/supine position/ultrasound guidance
patient pre-oxygenated, tube inserted, placement confirmed
patient pre-oxygenated, tube inserted, placement confirmed
guidewire recovered/lumen(s) aspirated and flushed/sterile dressing applied/sterile technique, catheter placed/ultrasound guidance with use of sterile gel and probe cove
location identified, draped/prepped, sterile technique used, needle inserted/introduced/positive blood return obtained via catheter/connected to a pressurized flush line/sutured in place/hemostasis with direct pressure, dressing applied/Seldinger technique/all materials/supplies accounted for at end of procedure
location identified, draped/prepped, sterile technique used, needle inserted/introduced/positive blood return obtained via catheter/connected to a pressurized flush line/sutured in place/hemostasis with direct pressure, dressing applied/Seldinger technique/all materials/supplies accounted for at end of procedure
positive blood return obtained via catheter/connected to a pressurized flush line/sutured in place/all materials/supplies accounted for at end of procedure

## 2021-08-31 NOTE — PROGRESS NOTE ADULT - SUBJECTIVE AND OBJECTIVE BOX
Interval Events: Reviewed  Patient seen and examined at bedside.    Patient is a 45y old  Female who presents with a chief complaint of SAH (31 Aug 2021 21:52)    she is awake and not in distress  PAST MEDICAL & SURGICAL HISTORY:      MEDICATIONS:  Pulmonary:  acetylcysteine 20%  Inhalation 4 milliLiter(s) Inhalation every 6 hours  albuterol/ipratropium for Nebulization 3 milliLiter(s) Nebulizer every 6 hours  sodium chloride 3%  Inhalation 4 milliLiter(s) Inhalation every 6 hours    Antimicrobials:  nystatin    Suspension 717807 Unit(s) Oral four times a day    Anticoagulants:    Cardiac:  midodrine 10 milliGRAM(s) Oral every 8 hours  norepinephrine Infusion 0.05 MICROgram(s)/kG/Min IV Continuous <Continuous>      Allergies    apple (Angioedema)  No Known Drug Allergies  strawberry (Angioedema)    Intolerances    lactose (Stomach Upset)      Vital Signs Last 24 Hrs  T(C): 37.4 (31 Aug 2021 21:00), Max: 37.7 (30 Aug 2021 23:00)  T(F): 99.3 (31 Aug 2021 21:00), Max: 99.9 (30 Aug 2021 23:00)  HR: 87 (31 Aug 2021 21:00) (79 - 101)  BP: --  BP(mean): --  RR: 14 (31 Aug 2021 21:00) (12 - 33)  SpO2: 99% (31 Aug 2021 21:00) (92% - 100%)    08-30 @ 07:01 - 08-31 @ 07:00  --------------------------------------------------------  IN: 1857.1 mL / OUT: 4550 mL / NET: -2692.9 mL    08-31 @ 07:01  -  08-31 @ 22:12  --------------------------------------------------------  IN: 1091.4 mL / OUT: 600 mL / NET: 491.4 mL          Review of Systems:   •	General: negative  •	Skin/Breast: negative  •	Ophthalmologic: negative  •	ENMT: negative  •	Respiratory and Thorax: negative  •	Cardiovascular: negative  •	Gastrointestinal: negative  •	Genitourinary: negative  •	Musculoskeletal: negative  •	Neurological: negative  •	Psychiatric: negative  •	Hematology/Lymphatics: negative  •	Endocrine: negative  •	Allergic/Immunologic: negative    Physical Exam:   • Constitutional:	refer to the dietion /Nutritionist note  • Eyes:	EOMI; PERRL; no drainage or redness  • ENMT:	No oral lesions; no gross abnormalities  • Neck	No bruits; no thyromegaly or nodules  • Breasts:	not examined  • Back:	No deformity or limitation of movement  • Respiratory:	Breath Sounds equal & clear to percussion & auscultation, no accessory muscle use  • Cardiovascular:	Regular rate & rhythm, normal S1, S2; no murmurs, gallops or rubs; no S3, S4  • Gastrointestinal:	Soft, non-tender, no hepatosplenomegaly, normal bowel sounds  • Genitourinary:	not examined  • Rectal: not examined  • Extremities:	No cyanosis, clubbing or edema  • Vascular:	Equal and normal pulses (carotid, femoral, dorsalis pedis)  • Neurologica:l	not examined  • Skin:	No lesions; no rash  • Lymph Nodes:	No lymphadedenopathy  • Musculoskeletal:	No joint pain, swelling or deformity; no limitation of movement        LABS:      CBC Full  -  ( 31 Aug 2021 05:24 )  WBC Count : 7.65 K/uL  RBC Count : 3.34 M/uL  Hemoglobin : 8.7 g/dL  Hematocrit : 29.1 %  Platelet Count - Automated : 214 K/uL  Mean Cell Volume : 87.1 fl  Mean Cell Hemoglobin : 26.0 pg  Mean Cell Hemoglobin Concentration : 29.9 gm/dL  Auto Neutrophil # : x  Auto Lymphocyte # : x  Auto Monocyte # : x  Auto Eosinophil # : x  Auto Basophil # : x  Auto Neutrophil % : x  Auto Lymphocyte % : x  Auto Monocyte % : x  Auto Eosinophil % : x  Auto Basophil % : x    08-31    140  |  104  |  7   ----------------------------<  86  3.5   |  25  |  0.49<L>    Ca    9.2      31 Aug 2021 05:24  Phos  2.2     08-31  Mg     2.0     08-31    TPro  6.3  /  Alb  3.6  /  TBili  0.4  /  DBili  <0.2  /  AST  25  /  ALT  12  /  AlkPhos  86  08-31    PT/INR - ( 31 Aug 2021 19:07 )   PT: 19.0 sec;   INR: 1.62          PTT - ( 31 Aug 2021 19:07 )  PTT:32.3 sec  < from: Xray Chest 1 View-PORTABLE IMMEDIATE (Xray Chest 1 View-PORTABLE IMMEDIATE .) (08.31.21 @ 10:23) >  EXAM:  XR CHEST PORTABLE IMMED 1V                          PROCEDURE DATE:  08/31/2021          INTERPRETATION:  TECHNIQUE: Single portable view of the chest.    COMPARISON:  8/30/2021    CLINICAL HISTORY: s/p intubation    FINDINGS:    Single frontal view of the chest demonstrates right lower lobe segmental atelectasis. The cardiomediastinal silhouette is enlarged. Status post intubation. Line and tubes are otherwise unchanged. No acute osseous abnormalities. Overlying EKG leads and wires are noted. Resolved congestive changes.    IMPRESSION:  Status post intubation. Endotracheal tube tip is above the carl.    < end of copied text >                    RADIOLOGY & ADDITIONAL STUDIES (The following images were personally reviewed):

## 2021-08-31 NOTE — PROCEDURE NOTE - NSINDICATIONS_GEN_A_CORE
feeding tube replacement
critical patient/monitoring purposes
cannulation purposes/critical patient/monitoring purposes
critical illness
on levo/venous access
critical patient/monitoring purposes
sedation requiring moderate sedation in neurologically compromised airway protection
monitoring purposes
critical illness

## 2021-09-01 NOTE — PROGRESS NOTE ADULT - SUBJECTIVE AND OBJECTIVE BOX
==============================================   NEUROCRITICAL CARE ATTENDING NOTE   ==============================================    LUDMILA PRIETO  MRN-[id number]  Summary:  44 y/o F with h/o recent gastric sleeve (08/04 [facility], Dr. Crisostomo ), fibromyalgia, thyroid dysfunction, PTSD, depression, anxiety.  Presented with seizures at home - brought to [place], with 1 more episode of seizure en route.  Intubated, CT showed SAH with IV extension, casted IV, hydrocephalus, given mannitol levetiracetam 6mg ativan, transferred to [facility].      Hospital Course:   8/7: Transferred from [place] for SAH. Intubated. R frontal EVD placed, central line and  [place] placed. 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   8/8: POD 1 s/p angio with R vert takedown, extubated, desatting on aerosol mask, required extensive suctioning, 3% nebs, chest PT, started on low dose precedex drip for restlessness/agitation, ABG stable. 3% stopped for Na 150. Ceribell EEG negative and d/c'ed.   8/9 Overnight, new right pronator drift and right gaze preference that can't cross midline, Repeat CTH demonstrated stable vents, CTA head and neck unremarkable for spasm, hypoattenuation of right cerebellum edema vs. infarct.  8/10: POD3 R vert takedown, EVD open at 44pmY2P. CTH with increased hydro. CTA head/neck with mild basilar spasm.  8/11: POD 4 R vert takedown. EVD at 5cm H2O. Febrile 104F. Depakote increased to q6. HCT stable. EVD lowered to 0.8/12: POD 5 R vert takedown. EVD at 0cm H2O. Pending IVCF with vascular.  8/13: POD 6 R vert takedown. EVD open at 0cmH2O. Mottled skin on the left lower extremity improving. Started on Bromocriptine 15mg Q8.   8/14: POD 7. EVD open at 0. ENT scoped vocal cords, +R vocal cord paresis, NTD. Started on zosyn empirically.   8/15: POD 8. EVD open at 0. Developed increased work of breathing, unable to clear her secretions, received racemic epi and multiple nebulizer treatments, however, still with stridor. Patient re-intubated at bedside, on full vent support.   8/16: CTH/CTA head/neck/CT chest performed o/n for worsened exam, R gaze preference, sluggish pupils. +worsened uncal herniation, hydrocephalus, vasospasm in b/l PCAs, L vert, basilar arteries. Angio today, restarted on 3% for Na goal 145-150.  8/17: POD 10. EVD open at -5cmH2O. ICPs WNL. Febrile 101F and pancultured.    PHYSICAL EXAMINATION  T(C): 36.7 (09-01 @ 11:40), Max: 37.5 (08-31 @ 22:32)  HR: 95 (09-01 @ 11:40) (76 - 101)  BP: 94/52 (09-01 @ 11:40) (94/52 - 94/52)  BP(mean): --  ABP:  (98/72 - 140/105)  ABP(mean):  (85 - 122)  RR: 17 (09-01 @ 11:40) (12 - 33)  SpO2: 96% (09-01 @ 11:40) (90% - 100%)  CVP(mm Hg): --      08-31-21 @ 07:01  -  09-01-21 @ 07:00  --------------------------------------------------------  IN:    Enteral Tube Flush: 230 mL    IV PiggyBack: 50 mL    IV PiggyBack: 300 mL    IV PiggyBack: 250 mL    IV PiggyBack: 300 mL    Norepinephrine: 16.4 mL    Propofol: 85 mL    Vital High Protein: 100 mL  Total IN: 1331.4 mL    OUT:    Intermittent Catheterization - Urethral (mL): 1850 mL    Norepinephrine: 0 mL    Rectal Tube (mL): 0 mL    Voided (mL): 0 mL  Total OUT: 1850 mL    Total NET: -518.6 mL      09-01-21 @ 07:01  -  09-01-21 @ 12:05  --------------------------------------------------------  IN:  Total IN: 0 mL    OUT:    Norepinephrine: 0 mL    Voided (mL): 0 mL  Total OUT: 0 mL    Total NET: 0 mL          08-31-21 @ 07:01  -  09-01-21 @ 07:00  --------------------------------------------------------  IN: 0 mL/kg/hr / OUT: 0.78 mL/kg/hr / NET: -0.78 mL/kg/hr            LABS:  CAPILLARY BLOOD GLUCOSE                              8.9    8.59  )-----------( 200      ( 01 Sep 2021 04:23 )             29.0     09-01    140  |  104  |  7   ----------------------------<  87  3.4<L>   |  25  |  0.46<L>    Ca    9.2      01 Sep 2021 04:23  Phos  2.5     09-01  Mg     1.8     09-01    TPro  6.3  /  Alb  3.6  /  TBili  0.4  /  DBili  <0.2  /  AST  25  /  ALT  12  /  AlkPhos  86  08-31          MEDICATIONS:  MEDICATIONS  (STANDING):  acetylcysteine 20%  Inhalation 4 milliLiter(s) Inhalation every 6 hours  albuterol/ipratropium for Nebulization 3 milliLiter(s) Nebulizer every 6 hours  amantadine Syrup 100 milliGRAM(s) Oral every 12 hours  bromocriptine Capsule 15 milliGRAM(s) Oral every 8 hours  chlorhexidine 2% Cloths 1 Application(s) Topical <User Schedule>  iron sucrose IVPB 300 milliGRAM(s) IV Intermittent every 24 hours  lacosamide Solution 100 milliGRAM(s) Oral two times a day  midodrine 10 milliGRAM(s) Oral every 8 hours  nystatin    Suspension 232227 Unit(s) Oral four times a day  pantoprazole   Suspension 40 milliGRAM(s) Oral daily  sodium chloride 3%  Inhalation 4 milliLiter(s) Inhalation every 6 hours    MEDICATIONS  (PRN):  ondansetron Injectable 4 milliGRAM(s) IV Push every 6 hours PRN Nausea and/or Vomiting  sodium chloride 0.9% lock flush 10 milliLiter(s) IV Push every 1 hour PRN Pre/post blood products, medications, blood draw, and to maintain line patency  8/18: POD 11. R vert takedown. POD1 angio IA verpamil. Overnight, Pt noted to have downward gaze to the right and not following commands. Taken for stat head CT- stable. Pupils also noted to be aniscoric left pupil 4mm and brisk and right 2mm brisk. Mannitol 50g given. Ceribell EEG placed for concern of subclinical seizures. Brief seizure to L frontal lobe. Given 2g valproic acid and dose increased to 1g q8. Vimpat 100mg BID started. Patient self-extubated, placed on NRB with mucomyst, 3% inhalation and duonebs. 3% at 50 d/c'd.  8/19: POD 12. Remains on VEEG. Axillary A line placed. Salt tabs d/c'd, florinef decreased to 0.1mg, EVD @ -5cm H2O draining 20cc/hr.   8/20: POD 13. VEEG, no seizures noted.  Febrile, pancultured.   8/22: BD 15: Continued on levophed for BP augmentation. Hypercoagulability profile. EVD 15 mL/h. Amantadine added, free water started for hypernatremia. Fluctuating level of consciousness. Frequent suctioning; empiric ceftriaxone started. Tmax 101.Thick secretions.  8/23: Neuroexam stable. Febrile. Pancultures done.   8/24: Exam has not been BP dependent. Relaxed -220. IJ- carotid stick. Lovenox held  8/25: BD 19. CTA 8/25: Improved vasospasm of proximal basilar artery, persistent vasospasm and moderate narrowing of the distal one half of the basilar artery. Posterior cerebral artery P1 and P2 segments appear patent with multifocal vasospasm, not significantly changed. SBP changed to 140-220. No change in exam. Weaning levophed.   8/26: BD20. Preop for VPS 8/27. EVD clamped.   8/27: BD 21.  shunt placed. Post op CT head stable.   8/28: BD 22. POD 1 s/p VPS. Slightly more lethargic but still follows commands. Later in PM, patient worked with PT; tolerated it well and followed their instructions. Suctioned copious secretions. Weaning pressors. Per night shift, dilated loops on Xray. Started on reglan.   8/29: BD23. POD 2 S/P VPS. Neuroexam stable.   8/30: BD24. POD 3 S/P VPS. Neuroexam stable. Still with frequent suctioning of thick yellow secretions. Transfused 1 unit PRBCs re Hb 7.4. Levophed dose minimal.   8/31: BD 25 POD 4 S/P VPS. Off pressors. PEG attempted at bedside. Unsuccessful. INR 1.6.             PHYSICAL EXAMINATION:   Mental status: Awake, mildly restless but redirectable. Ox3 with choices. Follows commands.  Cranial nerves: R gaze preference but can cross to left easily,  pupils 3mm on R, 4mm on L. Hypophonia is improving, face symmetric.   Motor: B/L uppers spontaneous, at least 3/5.  B/L lower extremities antigravity.   EENT:  Anicteric, NGT  CARDIOVASCULAR: (+) S1 S2, normal rate and regular rhythm  PULMONARY: Diminshed at bases  ABDOMEN: Soft, nontender with normoactive bowel sounds  EXTREMITIES: No edema  SKIN: No rash. Left axilla area of induration ~5x 4cm. Non tender, no erythema, non fluctuant. Improving.      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 Stable ischemic changes within the right cerebellum. Right-sided EVD catheter in place with slight increase in ventricular size. Interval improvement in the intraventricular hemorrhage. Evolving areas of acute/subacute ischemia within the right cerebellum.    CTA (08.19.2021) -  Interval improvement in the caliber of the proximal vertebral artery with progression of a vasospasm involving the mid to distal basilar artery. Interval improvement in the vasospasm involving the left vertebral artery. Persistent vasospasm involving the posterior cerebral and superior cerebellar arteries. Interval improvement in the vasospasm involving the right supraclinoid ICA as well as the left A1 and M1 segments. Progression of vasospasm involving the right M1 segment.    Cerebral Angiogram (08.18.2021 - Improvement in vasospasm, IA verapamil to posterior circulation  Cerebral Angiogram (08.17.2021 - Left vertebral artery dissection demonstrates continued moderate to severe cerebral vasospasm of the distal let vert and basilar artery, some what improved caliber of proximal left vert. 15mg IA Verapamil injected over 10 minutes with mild improvement of posterior circulation post treatment. Right ICA injection with continued mild supraclinoid vasospasm, 10mg of IA Verapamil injected.  Cerebral Angiogram (08.16.2021) - Left vertebral injection demonstrates moderate diffuse vasospasm of left vertebral artery, basilar artery and posterior circulation including PCAs. 15mg Verapamil injected via left vert with mild radiographic improvement post treatment. Right ICA injection with mild supraclinoid spasm/narrowing, otherwise no vasospasm appreciated in anterior circulation.

## 2021-09-01 NOTE — PROGRESS NOTE ADULT - ASSESSMENT
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 She is scheduled for J tube today. INR today was 1.4 from 1.6 yesterday. We don't have a timing at this time.

## 2021-09-01 NOTE — PROGRESS NOTE ADULT - ASSESSMENT
44 y/o F with h/o recent gastric sleeve (21 [facility], Dr. Crisostomo ), fibromyalgia, thyroid dysfunction, PTSD, depression, anxiety.  Presented with seizures at home - brought to [place], with 1 more episode of seizure en route.  Intubated, CT showed SAH with IV extension, casted IV, hydrocephalus, given mannitol, levetiracetam 1g,  6mg ativan. Started on Cardene drip for BP goal < 140 and transferred to [facility] NICU for further management. HH5 MF4, BD 1 = . Now s/p cerebral angiogram for R vertebral artery takedown for R vertebral dissecting aneurysm (), and R frontal EVD placement (), now s/p IVC filter placement (). BD 26.    NEURO:   Neurochecks: Q2h.  Hydrocephalus: Status post  shunt placed  (certas at 4). CT head- improved vent sizes.   Vasospasm: Most recent CTA :  Improved vasospasm of proximal basilar artery, persistent vasospasm and moderate narrowing of the distal one half of the basilar artery. Posterior cerebral artery P1 and P2 segments appear patent with multifocal vasospasm, not significantly changed.  S/P IA verapamil on , , , .  DCI prophylaxis: No longer on nimodipine. BD 26  Clinical seizures at home. Was on EEG (see below).   EE/18- 10 second event at 6:42 on , that could not be determined if epileptic vs. artifact.   There were non-specific indicators of bilateral temporal dysfunction and epileptic potential.   D/Hammad VPA. Continue only monotherapy with Vimpat 100 mg BID only. Follow clinically.  Centra fevers: Continue Bromocriptine 15mg Q8.  Improving. Taper.   Neurostimulation: Continue amantadine and methylphenidate for neurostimulation. Wean if agitated.  ASA 81mg - on hold for PEG.   Continue aggressive PT/OT as tolerated.     PULM: Tracheitis  Extubated   CT chest . Atelectasis, air bronchograms, patchy consolidation RLL.   Continue aggressive chest PT as high aspiration risk. Duonebs and 3% nebs standing  Aspiration precautions. As of  requires less frequent suctioning (~Q 3hr)  S/P Ancef for tracheitis: completed on   Pulmonary following. Bronch not warranted at this time.     CARDIO:    - 160   On midodrine; weaned off levophed.  TTE  wnl  PICC line.  Carotid dopplers showed no pseudoaneurysm. CTA neck showed small hematoma -extraluminal.     ENDO:    Glucose goal 140-180  ISS    GI: Ileus- Noted   Nutrition: S/P J tube  in OR by gen surg.   No meds/ feeds through J tube until cleared. OK for meds/feeds via NGT.   When able to take PO, will need liquid diet x3 weeks (as per bariatric).   D/W bariatric the need for nutritional supplementation.   GI ppx: PPI per bariatric surgeon.  LFTs wnl.    RENAL: Hypernatremia- induced.   Maintain euvolemia   Na goal 135- 145.  Monitor.      HEM/ONC: PE, DVT, elevated INR- nutritionally related.   ASA 81mg on hold for PEJ  VTE Prophylaxis: SCDs, SQL. Lovenox 40mg daily for now per NSGY (was on 40mg bid). Consider increase to 60mg bid once safe to do so given risk factors/gene mutation/multiple VTEs.   Monitor anti Xa.   LE Duplex : Acute completely occlusive deep venous thrombosis of the right intramuscular calf vein, s/p IVCF with vascular . Repeat  showed new b/l peroneal DVTs. Scattered segmental pulmonary emboli noted on CTA.   Repeat dopplers : New L calf DVT. Repeat dopplers : Stable. No new or propagation.   Hypercoagulable w/u- c/w heterozygous prothrombin gene mutation.   Anemia 8.3--> 7.7--> 7.4. Transfused 1 unit PRBCs on . Started on IV iron. Monitor H/H.   FOBT negative.   INR 1.6. Nutritionally related. S/P PO vitamin K x 5 mg, 5mg IV as preop for PEJ. Improved to 1.4.       ID: Persistent fever, leukocytosis- improving.  S/P ceftriaxone. S/P ancef (completed 7 day course on )  Cultures: MSSA on . Otherwise, negative (blood, CSF, central line catheter tip, urine)  Thrush; nystatin swish/spit.  Left axilla: area of induration ~6x 4cm. Non tender, no erythema, non fluctuant. Doubtful of infection. Warm compresses.  Monitor area of demarcation. Has been improving.   Infectious disease following.    Disposition: Currently on [facility]. Patient is no longer critically ill but is medically complex.     Social: Family updated at bedside.     Full code.     Access/ devices:   PICC R basilic  L axillary A line  (Previous central line from  removed)  Urinary:       *****    ATTENDING ATTESTATION:  I was physically present for the key portions of the evaluation and management (E/M) service provided.  I agree with the above history, physical and plan, which I have reviewed and edited where appropriate.    Plan discussed with RN, house staff.       46 y/o F with h/o recent gastric sleeve (21 [facility], Dr. Crisostomo ), fibromyalgia, thyroid dysfunction, PTSD, depression, anxiety.  Presented with seizures at home - brought to [place], with 1 more episode of seizure en route.  Intubated, CT showed SAH with IV extension, casted IV, hydrocephalus, given mannitol, levetiracetam 1g,  6mg ativan. Started on Cardene drip for BP goal < 140 and transferred to [facility] NICU for further management. HH5 MF4, BD 1 = . Now s/p cerebral angiogram for R vertebral artery takedown for R vertebral dissecting aneurysm (), and R frontal EVD placement (), now s/p IVC filter placement (). BD 26.    NEURO:   Neurochecks: Q4h.  Hydrocephalus: Status post  shunt placed  (certas at 4). CT head- improved vent sizes.   Vasospasm: Most recent CTA :  Improved vasospasm of proximal basilar artery, persistent vasospasm and moderate narrowing of the distal one half of the basilar artery. Posterior cerebral artery P1 and P2 segments appear patent with multifocal vasospasm, not significantly changed.  S/P IA verapamil on , , , .  DCI prophylaxis: No longer on nimodipine. BD 26  Clinical seizures at home. Was on EEG (see below).   EE/18- 10 second event at 6:42 on , that could not be determined if epileptic vs. artifact.   There were non-specific indicators of bilateral temporal dysfunction and epileptic potential.   D/Hammad VPA. Continue only monotherapy with Vimpat 100 mg BID only. Follow clinically.  Central fevers: Continue Bromocriptine 15mg Q8.  Improving temp curve. Taper prn.   Neurostimulation: Continue amantadine.   ASA 81mg - on hold for PEG. Resume once feasible from gen surgery standpoint.   Continue aggressive PT/OT as tolerated.     PULM: Tracheitis  Extubated   CT chest . Atelectasis, air bronchograms, patchy consolidation RLL.   Continue aggressive chest PT as high aspiration risk. Duonebs and 3% nebs standing  Aspiration precautions. As of  requires less frequent suctioning (~Q 3hr)  S/P Ancef for tracheitis: completed on   Pulmonary following. Bronch not warranted at this time.     CARDIO:    - 160   On midodrine; weaned off levophed.  TTE  wnl  PICC line.   Decreased radial pulse on LUE noted .  Able to doppler. LUE official duplex pending. Limb warm with good cap refill. Vascular surgery following.     ENDO:    Glucose goal 140-180  ISS    GI: Ileus- Noted . Resolved.  Nutrition: Vital at 20cc/hr via NGT. Advance as tolerated once OK with gen surg.   S/P J tube  in OR by gen surg. No meds/ feeds through J tube until cleared. OK for meds/feeds via NGT.   When able to take PO, will need liquid diet x3 weeks (as per bariatric).   D/W bariatric the need for nutritional supplementation.   GI ppx: PPI per bariatric surgeon.  LFTs wnl.  Last bowel movement: .       RENAL: Hypernatremia- induced.   Maintain euvolemia   Na goal 135- 145.  Monitor.      HEM/ONC: PE, DVT, elevated INR- nutritionally related.   ASA 81mg on hold for PEJ  VTE Prophylaxis: SCDs, SQL. Lovenox on hold for PEJ. Resume . Consider increase from 40mg bid to 60mg bid once safe to do so given risk factors/gene mutation/multiple VTEs.   Monitor anti Xa.   LE Duplex : Acute completely occlusive deep venous thrombosis of the right intramuscular calf vein, s/p IVCF with vascular . Repeat  showed new b/l peroneal DVTs. Scattered segmental pulmonary emboli noted on CTA.   Repeat dopplers : New L calf DVT. Repeat dopplers : Stable. No new or propagation.   Hypercoagulable w/u- c/w heterozygous prothrombin gene mutation. Heme following.   Anemia 8.3--> 7.7--> 7.4. Transfused 1 unit PRBCs on . Started on IV iron. Hb stable >8.     FOBT negative.   INR 1.6. Nutritionally related. S/P PO vitamin K x 5 mg, 5mg IV as preop for PEJ. Improved to 1.4.       ID: Persistent fever, leukocytosis- improving.  S/P ceftriaxone. S/P ancef (completed 7 day course on )  Cultures: MSSA on . Otherwise, negative (blood, CSF, central line catheter tip, urine)  Thrush; nystatin swish/spit.  Left axilla: area of induration ~6x 4cm. Non tender, no erythema, non fluctuant. Doubtful of infection. Warm compresses.  Monitor area of demarcation. Has been improving.   Infectious disease following.    Disposition: Currently on [facility]. Patient is no longer critically ill but is medically complex.     Social: Family updated at bedside.     Full code.     Access/ devices:   PICC R basilic  L axillary A line  (Previous central line from  removed)  Urinary: Primafit      *****    ATTENDING ATTESTATION:  I was physically present for the key portions of the evaluation and management (E/M) service provided.  I agree with the above history, physical and plan, which I have reviewed and edited where appropriate.    Plan discussed with RN, house staff.

## 2021-09-01 NOTE — BRIEF OPERATIVE NOTE - NSICDXBRIEFPOSTOP_GEN_ALL_CORE_FT
POST-OP DIAGNOSIS:  SAH (subarachnoid hemorrhage) 27-Aug-2021 19:36:29  Attila Motley  Impaired swallowing 01-Sep-2021 16:35:11  Allison Escudero  
POST-OP DIAGNOSIS:  Cerebral artery vasospasm 16-Aug-2021 10:04:36  Ray Howard  
POST-OP DIAGNOSIS:  Cerebral artery vasospasm 16-Aug-2021 10:04:36  Ray Howard  
POST-OP DIAGNOSIS:  SAH (subarachnoid hemorrhage) 27-Aug-2021 19:36:29  Attila Motley  
POST-OP DIAGNOSIS:  Cerebral aneurysm 07-Aug-2021 15:41:43  Melanie Barrios  
POST-OP DIAGNOSIS:  Cerebral artery vasospasm 16-Aug-2021 10:04:36  Ray Howard  
POST-OP DIAGNOSIS:  Cerebral artery vasospasm 16-Aug-2021 10:04:36  Ray Howard

## 2021-09-01 NOTE — CONSULT NOTE ADULT - SUBJECTIVE AND OBJECTIVE BOX
Consult    Vascular surgery  Dr. Sanchez      HPI: 46y/o F with h/o recent gastric sleeve (21 [facility], Dr. Crisostomo ), fibromyalgia, thyroid dysfunction, PTSD, depression, anxiety.  Unfortunately on  she had a seizure and was diagnosed with SAH, Now s/p cerebral angiogram for R vertebral artery takedown for R vertebral dissecting aneurysm (), and R frontal EVD placement (), now s/p IVC filter placement (,) s/p angio IA verapamil , , , . Now s/p right VPS certas at 4 (). During hospitalization patient has been diagnosed with DVT of lower extremities and superficial venous thrombosis of  of basilic vein of the LUE.     Today underwent J-tube placement by general surgery. Post operatively Neurosurgery team noticed her LUE was colder and radial pulse was diminished. No changes in strength or sensation.   Patient had an left axillary arterial line placed on  and removed recently.   On  upper extremity US showed deep vein thrombosis of the left internal jugular vein. 2. Superficial venous thrombosis of the left basilic vein.      Physical exam:   General: patient confused, oriented in person, she is restrained.   Extremities: bilateral cold, delayed capillary refill >2 sec Strength 5/5 bilateral no apparent sensory deficit. No palpable radial or ulnar pulses bilateral. Doppler triphasic right radial pulse and no doppler left pulse.     Imagin/9 Acute completely occlusive deep venous thrombosis of the right intramuscular calf vein.   1. Since 2021, there is new deep venous thrombosis in the bilateral peroneal veins. 2. Redemonstration of deep vein thrombosis in a right intramuscular calf vein.   1. Since 2021, newly visualized acute deep venous thrombosis of the left intramuscular calf vein. 2. Persistent acute completely occlusive deep venous thrombosis of the bilateral peroneal veins. 3. Since 2021, persistent acute completely occlusive deep venous thrombosis of the right intramuscular calf vein.   1. Deep vein thrombosis of the left internal jugular vein. 2. Superficial venous thrombosis of the left basilic vein.

## 2021-09-01 NOTE — BRIEF OPERATIVE NOTE - NSICDXBRIEFOPLAUNCH_GEN_ALL_CORE
<--- Click to Launch ICDx for PreOp, PostOp and Procedure

## 2021-09-01 NOTE — CONSULT NOTE ADULT - ASSESSMENT
44y/o F with h/o recent gastric sleeve (08/04/21 [facility], Dr. Crisostomo ), fibromyalgia, thyroid dysfunction, PTSD, depression, anxiety.  Unfortunately on 8/7 she had a seizure and was diagnosed with SAH, Now s/p cerebral angiogram for R vertebral artery takedown for R vertebral dissecting aneurysm (8/7), and R frontal EVD placement (8/7), now s/p IVC filter placement (8/12,) s/p angio IA verapamil 8/16, 8/17, 8/18, 8/20. Now s/p right VPS certas at 4 (8/27). During hospitalization patient has been diagnosed with DVT of lower extremities and superficial venous thrombosis of  of basilic vein of the LUE.     Today underwent J-tube placement by general surgery. Post operatively Neurosurgery team noticed her LUE was colder and radial pulse was diminished. No changes in strength or sensation.   Patient had an left axillary arterial line placed on 8/19 and removed recently.   On 8/23 upper extremity US showed deep vein thrombosis of the left internal jugular vein. 2. Superficial venous thrombosis of the left basilic vein.    Pending LUE duplex US.       Pending discussion with attending  44y/o F with h/o recent gastric sleeve (08/04/21 [facility], Dr. Crisostomo ), fibromyalgia, thyroid dysfunction, PTSD, depression, anxiety.  Unfortunately on 8/7 she had a seizure and was diagnosed with SAH, Now s/p cerebral angiogram for R vertebral artery takedown for R vertebral dissecting aneurysm (8/7), and R frontal EVD placement (8/7), now s/p IVC filter placement (8/12,) s/p angio IA verapamil 8/16, 8/17, 8/18, 8/20. Now s/p right VPS certas at 4 (8/27). During hospitalization patient has been diagnosed with DVT of lower extremities and superficial venous thrombosis of  of basilic vein of the LUE.     Today underwent J-tube placement by general surgery. Post operatively Neurosurgery team noticed her LUE was colder and radial pulse was diminished. No changes in strength or sensation.   Patient had an left axillary arterial line placed on 8/19 and removed recently.   On 8/23 upper extremity US showed deep vein thrombosis of the left internal jugular vein. 2. Superficial venous thrombosis of the left basilic vein.    -No Vascular intervention Triphasic radial/ulnar palmar arch signals

## 2021-09-01 NOTE — PROGRESS NOTE ADULT - ASSESSMENT
44 y/o F with h/o recent gastric sleeve (21 [facility], Dr. Crisostomo ), fibromyalgia, thyroid dysfunction, PTSD, depression, anxiety.  Presented with seizures at home - brought to [place], with 1 more episode of seizure en route.  Intubated, CT showed SAH with IV extension, casted IV, hydrocephalus, given mannitol, levetiracetam 1g,  6mg ativan. Started on Cardene drip for BP goal < 140 and transferred to [facility] NICU for further management. HH5 MF4, BD 1 = . Now s/p cerebral angiogram for R vertebral artery takedown for R vertebral dissecting aneurysm (), and R frontal EVD placement (), now s/p IVC filter placement (). BD 26    NEURO:   Neurochecks: q 4 h. Protected sleep time  10pm- 6am to decrease risk of delirium.   Hydrocephalus: Status post  shunt placed  (certas at 4). CT head- improved vent sizes.   Vasospasm: Most recent CTA :  Improved vasospasm of proximal basilar artery, persistent vasospasm and moderate narrowing of the distal one half of the basilar artery. Posterior cerebral artery P1 and P2 segments appear patent with multifocal vasospasm, not significantly changed.  S/P IA verapamil on , , , .  DCI prophylaxis: No longer on nimodipine  Clinical seizures at home. Was on EEG (see below).   EE/18- 10 second event at 6:42 on , that could not be determined if epileptic vs. artifact.   There were non-specific indicators of bilateral temporal dysfunction and epileptic potential.   D/Hammad VPA. Continue only monotherapy with Vimpat 100 mg BID only. Follow clinically.  Centra fevers: Continue Bromocriptine 15mg Q8.  Neurostimulation: Continue amantadine and methylphenidate for neurostimulation. Wean if agitated.  ASA 81mg - on hold for PEG  Continue aggressive PT/OT as tolerated.     PULM: Tracheitis  Extubated   Duonebs and 3% nebs standing  Continue aggressive chest PT as high aspiration risk.   CXR : B/L opacities  Aspiration precautions, requires frequent suctioning (Q1-2 hr)  On Ancef for likely tracheitis: until   CT chest done . Atelectasis, air bronchograms.   Pulmonary following    CARDIO:    - 160   On midodrine; weaned off levophed.   TTE  wnl  PICC line.  Carotid dopplers showed no pseudoaneurysm. CTA neck showed small hematoma -extraluminal.     ENDO:    Glucose goal 140-180  ISS    GI: Ileus- noted   Nutrition: NPO for PEG  Thin liquid diet x3 weeks (as per bariatric) when tolerating. Consider thiamine, folate, MVI as post bariatric. D/W bariatric.  GI ppx: PPI per bariatric surgeon.  GI following. PEG unsuccessful on . To be taken to OR by gen surg .   LFTs wnl.     RENAL: Hypernatremia- induced.   Maintain euvolemia   Na goal 135- 145.  Monitor.      HEM/ONC: PE, DVT, elevated INR- nutritionally related.   ASA 81mg on hold for PEG  VTE Prophylaxis: SCDs, SQL. Lovenox 40mg daily for now per NSGY (was on 40mg bid). Consider increase to 60mg bid once safe to do so given risk factors/gene mutation.   Monitor anti Xa.   LE Duplex : Acute completely occlusive deep venous thrombosis of the right intramuscular calf vein, s/p IVCF with vascular . Repeat  showed new b/l peroneal DVTs. Scattered segmental pulmonary emboli noted on CTA.   Repeat dopplers : New L calf DVT. Repeat dopplers : Stable. No new or propagation.   Hypercoagulable w/u- c/w heterozygous prothrombin gene mutation.   Anemia 8.3--> 7.7--> 7.4. Transfused 1 unit PRBCs on . Started on IV iron. Monitor H/H.   FOBT negative.   INR 1.6. S/P PO vitamin K x 5 mg. Will give 5mg IV as preop for PEG.       ID: Persistent fever, leukocytosis.  Was on empiric Ceftriaxone () for aspiration PNA. Changed to Ancef . WBC trending down overall.   Repeat cultures  (blood, sputum, CSF, UA). Found MSSA. Changed to Ancef- end date  (7 days total). Other cx NGTD.   Repeated all cultures  per ID including cultures off central line, blood, CSF--> negative thus far.  Thrush; nystatin swish/spit.  Left axilla: area of induration ~6x 4cm. Non tender, no erythema, non fluctuant. Doubtful of infection. Warm compresses.  Monitor area of demarcation. Has been improving.   Infectious disease consulted  re: persistent fevers and clearance for VPS. Appreciate recommendations.     Disposition: ICU.     Social: Family updated at bedside.     Full code.     Acces:   PICC R basilic  L axillary A line  (Previous central line from  removed)

## 2021-09-01 NOTE — BRIEF OPERATIVE NOTE - ASSISTANT(S)
Ray WHALEY
Ray WHALEY
Ray WHALEY, Dr. Attila Motley
Tolu AKERS
Allison Escudero MD PGY6, Vivien Nash MD PGY 2
[place]
Varun Motley
Ray WHALEY, Attila Motley MD

## 2021-09-01 NOTE — PROGRESS NOTE ADULT - SUBJECTIVE AND OBJECTIVE BOX
Interval Events: Reviewed  Patient seen and examined at bedside.    Patient is a 45y old  Female who presents with a chief complaint of SAH (01 Sep 2021 12:02)    he is more alert and cough is less.    PAST MEDICAL & SURGICAL HISTORY:      MEDICATIONS:  Pulmonary:  acetylcysteine 20%  Inhalation 4 milliLiter(s) Inhalation every 6 hours  albuterol/ipratropium for Nebulization 3 milliLiter(s) Nebulizer every 6 hours  sodium chloride 3%  Inhalation 4 milliLiter(s) Inhalation every 6 hours    Antimicrobials:  nystatin    Suspension 889647 Unit(s) Oral four times a day    Anticoagulants:    Cardiac:  midodrine 10 milliGRAM(s) Oral every 8 hours      Allergies    apple (Angioedema)  No Known Drug Allergies  strawberry (Angioedema)    Intolerances    lactose (Stomach Upset)      Vital Signs Last 24 Hrs  T(C): 36.7 (01 Sep 2021 11:40), Max: 37.5 (31 Aug 2021 22:32)  T(F): 98 (01 Sep 2021 11:40), Max: 99.5 (31 Aug 2021 22:32)  HR: 81 (01 Sep 2021 12:00) (76 - 98)  BP: 94/52 (01 Sep 2021 11:40) (94/52 - 94/52)  BP(mean): --  RR: 17 (01 Sep 2021 12:00) (12 - 33)  SpO2: 96% (01 Sep 2021 12:00) (90% - 100%)    08-31 @ 07:01 - 09-01 @ 07:00  --------------------------------------------------------  IN: 1331.4 mL / OUT: 1850 mL / NET: -518.6 mL    09-01 @ 07:01 - 09-01 @ 12:59  --------------------------------------------------------  IN: 0 mL / OUT: 1200 mL / NET: -1200 mL          Review of Systems:   •	General: negative  •	Skin/Breast: negative  •	Ophthalmologic: negative  •	ENMT: negative  •	Respiratory and Thorax: negative  •	Cardiovascular: negative  •	Gastrointestinal: negative  •	Genitourinary: negative  •	Musculoskeletal: negative  •	Neurological: negative  •	Psychiatric: negative  •	Hematology/Lymphatics: negative  •	Endocrine: negative  •	Allergic/Immunologic: negative    Physical Exam:   • Constitutional:	refer to the dietion /Nutritionist note  • Eyes:	EOMI; PERRL; no drainage or redness  • ENMT:	No oral lesions; no gross abnormalities  • Neck	No bruits; no thyromegaly or nodules  • Breasts:	not examined  • Back:	No deformity or limitation of movement  • Respiratory:	Breath Sounds equal & clear to percussion & auscultation, no accessory muscle use  • Cardiovascular:	Regular rate & rhythm, normal S1, S2; no murmurs, gallops or rubs; no S3, S4  • Gastrointestinal:	Soft, non-tender, no hepatosplenomegaly, normal bowel sounds  • Genitourinary:	not examined  • Rectal: not examined  • Extremities:	No cyanosis, clubbing or edema  • Vascular:	Equal and normal pulses (carotid, femoral, dorsalis pedis)  • Neurologica:l	not examined  • Skin:	No lesions; no rash  • Lymph Nodes:	No lymphadedenopathy  • Musculoskeletal:	No joint pain, swelling or deformity; no limitation of movement        LABS:      CBC Full  -  ( 01 Sep 2021 04:23 )  WBC Count : 8.59 K/uL  RBC Count : 3.33 M/uL  Hemoglobin : 8.9 g/dL  Hematocrit : 29.0 %  Platelet Count - Automated : 200 K/uL  Mean Cell Volume : 87.1 fl  Mean Cell Hemoglobin : 26.7 pg  Mean Cell Hemoglobin Concentration : 30.7 gm/dL  Auto Neutrophil # : x  Auto Lymphocyte # : x  Auto Monocyte # : x  Auto Eosinophil # : x  Auto Basophil # : x  Auto Neutrophil % : x  Auto Lymphocyte % : x  Auto Monocyte % : x  Auto Eosinophil % : x  Auto Basophil % : x    09-01    140  |  104  |  7   ----------------------------<  87  3.4<L>   |  25  |  0.46<L>    Ca    9.2      01 Sep 2021 04:23  Phos  2.5     09-01  Mg     1.8     09-01    TPro  6.3  /  Alb  3.6  /  TBili  0.4  /  DBili  <0.2  /  AST  25  /  ALT  12  /  AlkPhos  86  08-31    PT/INR - ( 01 Sep 2021 04:23 )   PT: 17.3 sec;   INR: 1.47          PTT - ( 01 Sep 2021 04:23 )  PTT:33.9 sec                    RADIOLOGY & ADDITIONAL STUDIES (The following images were personally reviewed):

## 2021-09-01 NOTE — BRIEF OPERATIVE NOTE - NSICDXBRIEFPROCEDURE_GEN_ALL_CORE_FT
PROCEDURES:  IVC filter placement 12-Aug-2021 16:15:00  Estrellita Galarza  
PROCEDURES:  Angiogram, carotid and cerebral arteries 07-Aug-2021 15:40:41 R vertebral takedown for R vertebral dissecting aneurysm Melanie Barrios  
PROCEDURES:  Placement,  shunt, using frameless stereotaxy 27-Aug-2021 19:35:54 certas 4.0  RIGHT FRONTAL SHUNT Attila Motley  
PROCEDURES:  Angiogram, carotid and cerebral, bilateral 16-Aug-2021 10:03:17 IA Verapamil Ray Howard  
PROCEDURES:  Angiogram, carotid and cerebral, bilateral 17-Aug-2021 18:33:57 IA Verapamil aRy Howard  
PROCEDURES:  Laparoscopic insertion of jejunostomy tube 01-Sep-2021 16:34:06  Allison Escudero  
PROCEDURES:  Angiogram, carotid and cerebral, bilateral 20-Aug-2021 14:16:29 IA Verapamil Ray Howard  
PROCEDURES:  Angiogram, carotid and cerebral, bilateral 18-Aug-2021 14:23:16 IA Verapamil Ray Howard

## 2021-09-01 NOTE — PROGRESS NOTE ADULT - SUBJECTIVE AND OBJECTIVE BOX
HPI:  46y/o F with h/o recent gastric sleeve (08/04/21 [facility], Dr. Crisostomo ), fibromyalgia, thyroid dysfunction, PTSD, depression, anxiety.  Presented with seizures at home - brought to [facility], with 1 more episode of seizure en route.  Intubated, CT showed SAH with IV extension, casted IV, hydrocephalus, given mannitol, levetiracetam 1g,  6mg ativan. Started on Cardene drip for BP goal < 140 and transferred to [facility] NICU for further management.     HH5 MF4   NIHSS 26 on arrival  BD 1 = 8/7 (07 Aug 2021 08:08)    OVERNIGHT EVENTS: ASHA o/n neuro stable    Hospital Course:   8/7: Tx from [facility] for SAH. Intubated. R frontal EVD placed, central line and a line placed. 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   8/8: POD1 s/p angio with R vert takedown, extubated, desatting on aerosol mask, required extensive suctioning, 3% nebs, chest PT, started on low dose precedex drip for restlessness/agitation, ABG stable. NGT placed. TF started with goal 20 pending nutrition recs. 3% stopped for Na 150. Ceribell EEG negative and d/c'ed.   8/9 Overnight, new Right pronator drift and right gaze preference that can't cross midline, Repeat CTH demonstrated stable vents, CTA head and neck unremarkable for spasm, hypoattenuation of right cerebellum edema vs. infarct.  8/10: POD3 R vert takedown, EVD open at 25fuD4I. ASHA overnight, neuro stable. CTH with increased hydro, CTA head/neck with mild basilar spasm, but concern for PE. 1/2 am D50 for hypoglycemia. 1L bolus then 100 cc/hr, PM rounds for net negative fluid balance and mild vasospasm on CTA.   8/11: POD4 R vert takedown. EVD at 5cm H2O, ASHA overnight. neuro stable.   Febrile 104. depakote increased to q6. NCHCT stable. EVD lowered to 0. Lasix 20 given for dieuresis, UOP over 2 liters. Sedation given for a-line placement, BP drop needing levo.  8/12: POD5 R vert takedown. EVD at 0cm H2O. ASHA overnight, neuro stable. Precedex being weaned off. Pending IVCF with vascular today.  8/13: POD6 R vert takedown. EVD open at 0cmH2O. ASHA overnight. Neuro exam stable. Mottled skin on the left lower extremity improving. Started on Bromocriptine 15mg Q8.   8/14: POD7. EVD open at 0. 1L bolus given for euvolemia o/n. neuro stable. CTH stable. ENT scoped vocal cords, +R voacl cord paresis, NTD. started on zosyn empirically.   8/15: POD8. EVD open at 0. ASHA o/n, neuro stable. Pt developed increased work of breathing, unable to clear her secretions, received racemic epi and multiple nebulizer treatments, still with stridor. Patient re-intubated at bedside, on full vent support.   8/16: CTH/CTA head/neck/CT chest performed o/n for worsened exam, R gaze preference, sluggish pupils. +worsened uncal herniation, hydro, vasospasm in b/l PCAs, L vert, basilar arteries. preop angio today, restarted on 3% for Na goal 145-150. Angio for vasospasm with IA verapamil.  8/17: POD10. Overnight Pt remains on levophed drip for SBP goal 180-220. Also remains on propofol drip. EVD open at -5cmH2O. ICPs WNL. Febrile 101F and pancultured. CTH today shows slightly smaller ventricles. Angio for vasospasm with IA verapamil.  8/18: POD11 R vert takedown. POD1 angio IA verpamil. Overnight, Pt noted to have downward gaze to the right and not following commands. Taken for stat head CT which is stable. Pupils also noted to be aniscoric left pupil 4mm and brisk and right 2mm brisk. Mannitol 50g given. Ceribell eeg placed for concern of subclinical seizures, so far appears negative for seizures. 1L NS o/n and 1.5L NS bolus in AM for euvolemia. vanc/zosyn stopped. 250cc 3% bolus and 250cc albumin given in AM. Brief seizure to L frontal lobe this AM on EEG, given 2g valproic acid and dose increased to 1g q8. Vimpat 100mg BID started.  Angio with interval improvment in vasospasm, IA verapamil given. In afternoon patient self-extubated, placed on NRB with mucomyst, 3% inhalation and duonebs. 3% at 50 d/c'd.  8/19: POD 12. Remains on VEEG. Axillary A line placed. Salt tabs d/c'd, florinef decreased to 0.1mg, EVD @ -5cm H2O, now draining 20cc/hr. 250 albumin and 500 NS boluses on dayshift, 1 L LR bolus  8/20: POD 13. ASHA. on VEEG, no seizures noted. Pending VPA level. 500 NS, 250 albumin. Febrile, pancultured. EEG d/c'ed.   8/21: BD14, POD14. ASHA overnight, EVD @ -5. s/p 1L bolus for euvolemia  8/22: BD #15: continued on levo, hypercoagulabitly profile pending, EVD @ -5, euvolemia, extubated, amantadine added, free water started for hypernatremia   8/23: BD 17, tmax 101F o/n. Gen Sx consulted for PEG - not a candidate at this time, pancultured for fever, 1L LR given for euvolemia. EVD at -5  8/24: BD18, ASHA o/n, neuro stable, EVD at -5, VPA level therapeutic, SBP goal 160-200, L IJ CVC attempted placement with carotid puncture, no pseudoaneurysm on US. MSSA growing in sputum. Ceftriaxone d/c'd and Ancef 2gq8 started. ID consulted. Recieved 1.5L LR and 500cc albumin.   8/25: POD5 last angio, BD19 ASHA o/n, neuro stable, EVD at -5  8/26: BD 20. ASHA o/n. EVD @ -5   8/27: BD 21, POD 20 Right vert takedown. ASHA overnight. Exam stable. EVD remains open at -5 and to be clamped at 0600 in preparation for right VPS placement today. Possible PEG placement Monday 8/30 8/28: BD22, POD22 Right vert takedown, POD1 R VPS, neuro exam stable. Tube feeds restarted, more lethargic this AM, improved during day, continue to monitor for hydrocephalus. AXR with dilated bowel, started on reglan and magnesium. Liquid BM, TFs held and rectal tube placed.   8/29: BD23, POD23 right vert takedown, POD2 R VPS, ASHA overnight, neuro stable. TFs decreased for colonic distention seen on XR, GI notified, nothing to do, TFs resumed at 20/hr and will continue will PEG placement tomorrow. CTH today demonstrated interval decrease in size of ventricles. Midodrine added to aid in weening levophed.   8/30: BD24, POD24, pending PEG placement this morning with Dr. Milner. Midodrine increased to 10mg q8hrs today to wean off of levo. 1u PRBC for Hb 7.5. FOB negative. 20mg lasix given. Repeat dopplers completed, unchanged from previous. Started on IV iron per hematology recs.  8/31: BD25, POD25, unsuccessful PEG placement by GI, trend Hgb w/ AM labs, slowly weening levophed. 5mg vitamin K given for elevated INR.  9/1: BD26, POD26. preop J tube w/ gen surg today. 5mg vitK IV given o/n for elevated INR. off levophed.     Vital Signs Last 24 Hrs  T(C): 37.5 (31 Aug 2021 22:32), Max: 37.7 (31 Aug 2021 10:30)  T(F): 99.5 (31 Aug 2021 22:32), Max: 99.9 (31 Aug 2021 10:30)  HR: 82 (01 Sep 2021 00:00) (79 - 101)  BP: --  BP(mean): --  RR: 15 (01 Sep 2021 00:00) (12 - 33)  SpO2: 99% (01 Sep 2021 00:00) (90% - 100%)    I&O's Summary    30 Aug 2021 07:01  -  31 Aug 2021 07:00  --------------------------------------------------------  IN: 1857.1 mL / OUT: 4550 mL / NET: -2692.9 mL    31 Aug 2021 07:01  -  01 Sep 2021 00:25  --------------------------------------------------------  IN: 1161.4 mL / OUT: 600 mL / NET: 561.4 mL        PHYSICAL EXAM:  GEN: laying in bed, appears well, NAD  NEURO: AOx3 w/ choices. FC, OE spont, hypophonic, face symmetric. +R gaze preference, can cross midline. pupils 4mm reactive b/l. MAEx4 anti gravity  CV: RRR +S1/S2  PULM: CTAB  GI: Abd soft, NT/ND  EXT: ext warm, dry, nontender    TUBES/LINES:  [] Richey  [] Lumbar Drain  [] Wound Drains  [] Others      DIET:  [] NPO  [] Mechanical  [x] Tube feeds    LABS:                        8.7    7.65  )-----------( 214      ( 31 Aug 2021 05:24 )             29.1     08-31    140  |  104  |  7   ----------------------------<  86  3.5   |  25  |  0.49<L>    Ca    9.2      31 Aug 2021 05:24  Phos  2.2     08-31  Mg     2.0     08-31    TPro  6.3  /  Alb  3.6  /  TBili  0.4  /  DBili  <0.2  /  AST  25  /  ALT  12  /  AlkPhos  86  08-31    PT/INR - ( 31 Aug 2021 19:07 )   PT: 19.0 sec;   INR: 1.62          PTT - ( 31 Aug 2021 19:07 )  PTT:32.3 sec        CAPILLARY BLOOD GLUCOSE          Drug Levels: [] N/A    CSF Analysis: [] N/A      Allergies    apple (Angioedema)  No Known Drug Allergies  strawberry (Angioedema)    Intolerances    lactose (Stomach Upset)    MEDICATIONS:  Antibiotics:  nystatin    Suspension 415406 Unit(s) Oral four times a day    Neuro:  amantadine Syrup 200 milliGRAM(s) Oral every 12 hours  bromocriptine Capsule 15 milliGRAM(s) Oral every 8 hours  lacosamide Solution 100 milliGRAM(s) Oral two times a day  methylphenidate 10 milliGRAM(s) Oral daily  ondansetron Injectable 4 milliGRAM(s) IV Push every 6 hours PRN    Anticoagulation:    OTHER:  acetylcysteine 20%  Inhalation 4 milliLiter(s) Inhalation every 6 hours  albuterol/ipratropium for Nebulization 3 milliLiter(s) Nebulizer every 6 hours  chlorhexidine 2% Cloths 1 Application(s) Topical <User Schedule>  midodrine 10 milliGRAM(s) Oral every 8 hours  norepinephrine Infusion 0.05 MICROgram(s)/kG/Min IV Continuous <Continuous>  pantoprazole   Suspension 40 milliGRAM(s) Oral daily  sodium chloride 3%  Inhalation 4 milliLiter(s) Inhalation every 6 hours    IVF:  iron sucrose IVPB 300 milliGRAM(s) IV Intermittent every 24 hours    CULTURES:  Culture Results:   Sputum specimen rejected.  Microscopic examination indicates  oropharyngeal contamination.  Please repeat. (08-27 @ 01:53)  Culture Results:   No growth (08-26 @ 14:27)    RADIOLOGY & ADDITIONAL TESTS:      ASSESSMENT:  46y/o F with h/o recent gastric sleeve (08/04/21 [facility], Dr. Crisostomo ), fibromyalgia, thyroid dysfunction, PTSD, depression, anxiety.  Presented with seizures at home - brought to [facility], with 1 more episode of seizure en route.  Intubated, CT showed SAH with IV extension, casted IV, hydrocephalus, given mannitol, levetiracetam 1g,  6mg ativan. Started on Cardene drip for BP goal < 140 and transferred to [facility] NICU for further management. HH5 MF4, BD 1 = 8/7. Now s/p cerebral angiogram for R vertebral artery takedown for R vertebral dissecting aneurysm (8/7), and R frontal EVD placement (8/7), now s/p IVC filter placement (8/12,) s/p angio IA verapamil 8/16, 8/17, 8/18, 8/20. Now s/p right VPS certas at 4 (8/27).    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    Anemia due to acute blood loss    Angiogram, carotid and cerebral arteries    IVC filter placement    Angiogram, carotid and cerebral, bilateral    Angiogram, carotid and cerebral, bilateral    Angiogram, carotid and cerebral, bilateral    Angiogram, carotid and cerebral, bilateral    Placement,  shunt, using frameless stereotaxy    [email or web address]        PLAN:  NEURO:   - neuro checks q2h  - Vimpat 100mg bid   - Bromocriptine 15mg Q8  - Amantadine and ritalin  for neurostim  - Angio demonstrating vasospasm of Right ICA, right PCOMM, Left distal vert and basilar confluence, and received IA verapamil on 8/16. 8/17. 8/18. 8/20.   - Repeat CTH 8/29 demonstrated decreased size of ventricles.     PULM:    - copious thick secretion, standing, Duonebs, 3% nebs, mucomyst  - chest PT   - CXR - aspiration precautions, requires frequent suctioning  - Dr. Quan lau, endonasal suctioning, no bronch for now    CARDIO:    - -150  - off levo  - TTE 8/12 WNL    - midodrine 10q8    ENDO:    - glucose goal 140 -180    GI:    - npo after midnight  - PPI per bariatric surgeon   - J-tube tomorrow with gen surg  - Repeat abdominal XR in AM   - +rectal tube, bowel regimen held    RENAL:   - Maintain euvolemia  - normonatremia goal  - straight cath prn     HEM/ONC:   - ASA 81 - held s/p OR and in anticipation of PEG placement   - VTE Prophylaxis: SCDs, SQL held for PEG  - dopplers 8/23, acute DVT left IM calf, persistent completely occlusive DVT b/l peroneal veins and right IM calf , 8/30 unchanged DVTs  - carotid doppler 8/24: neg for pseudoaneurysm, CTA shows soft tissue small hematoma, non-occlusive L IJ and L basillic vein thrombus   - hypercoagulable w/u: - prothrombin gene mutation - heterozygous   - Factor Xa trend once back on SQL  - 8/26 LUE doppler with findings of left IJ non-occlusive DVT and left basilic thrombus  - s/p 1u PRBC 8/30  - Dr. Tiana following, IV iron x 3 days  - s/p vit k 5mg PO, vit k 5mg IV 8/31 for elevated INR    ID:   - MRSA neg 8/12   - Afebrile  - Last Pancx 8/23   - Ancef for MSSA pna coverage x 7 d completed 8/30  - PICC line placed 8/26    Assessment and plan discussed with Dr. Lomax, Dr. Conn and Dr. Bernard      Assessment:  Present when checked    []  GCS  E   V  M     Heart Failure: []Acute, [] acute on chronic , []chronic  Heart Failure:  [] Diastolic (HFpEF), [] Systolic (HFrEF), []Combined (HFpEF and HFrEF), [] RHF, [] Pulm HTN, [] Other    [] TRUDI, [] ATN, [] AIN, [] other  [] CKD1, [] CKD2, [] CKD 3, [] CKD 4, [] CKD 5, []ESRD    Encephalopathy: [] Metabolic, [] Hepatic, [] toxic, [] Neurological, [] Other    Abnormal Nurtitional Status: [] malnurtition (see nutrition note), [ ]underweight: BMI < 19, [] morbid obesity: BMI >40, [] Cachexia    [] Sepsis  [] hypovolemic shock,[] cardiogenic shock, [] hemorrhagic shock, [] neuogenic shock  [] Acute Respiratory Failure  []Cerebral edema, [] Brain compression/ herniation,   [] Functional quadriplegia  [] Acute blood loss anemia

## 2021-09-01 NOTE — PROGRESS NOTE ADULT - SUBJECTIVE AND OBJECTIVE BOX
SUBJECTIVE: Patient seen and examined bedside.    midodrine 10 milliGRAM(s) Oral every 8 hours  norepinephrine Infusion 0.05 MICROgram(s)/kG/Min IV Continuous <Continuous>  nystatin    Suspension 766843 Unit(s) Oral four times a day      Vital Signs Last 24 Hrs  T(C): 37.1 (01 Sep 2021 05:33), Max: 37.7 (31 Aug 2021 10:30)  T(F): 98.8 (01 Sep 2021 05:33), Max: 99.9 (31 Aug 2021 10:30)  HR: 79 (01 Sep 2021 07:00) (79 - 101)  BP: --  BP(mean): --  RR: 14 (01 Sep 2021 07:00) (12 - 33)  SpO2: 100% (01 Sep 2021 07:00) (90% - 100%)  I&O's Detail    31 Aug 2021 07:01  -  01 Sep 2021 07:00  --------------------------------------------------------  IN:    Enteral Tube Flush: 230 mL    IV PiggyBack: 50 mL    IV PiggyBack: 300 mL    IV PiggyBack: 250 mL    IV PiggyBack: 300 mL    Norepinephrine: 16.4 mL    Propofol: 85 mL    Vital High Protein: 100 mL  Total IN: 1331.4 mL    OUT:    Intermittent Catheterization - Urethral (mL): 1850 mL    Norepinephrine: 0 mL    Rectal Tube (mL): 0 mL    Voided (mL): 0 mL  Total OUT: 1850 mL    Total NET: -518.6 mL          General: NAD, resting comfortably in bed  C/V: NSR  Pulm: Nonlabored breathing, no respiratory distress  Abd: soft, NT/ND.  Extrem: WWP, no edema, SCDs in place        LABS:                        8.9    8.59  )-----------( 200      ( 01 Sep 2021 04:23 )             29.0     09-01    140  |  104  |  7   ----------------------------<  87  3.4<L>   |  25  |  0.46<L>    Ca    9.2      01 Sep 2021 04:23  Phos  2.5     09-01  Mg     1.8     09-01    TPro  6.3  /  Alb  3.6  /  TBili  0.4  /  DBili  <0.2  /  AST  25  /  ALT  12  /  AlkPhos  86  08-31    PT/INR - ( 01 Sep 2021 04:23 )   PT: 17.3 sec;   INR: 1.47          PTT - ( 01 Sep 2021 04:23 )  PTT:33.9 sec      RADIOLOGY & ADDITIONAL STUDIES:   SUBJECTIVE: Patient seen and examined bedside.    midodrine 10 milliGRAM(s) Oral every 8 hours  norepinephrine Infusion 0.05 MICROgram(s)/kG/Min IV Continuous <Continuous>  nystatin    Suspension [id number] Unit(s) Oral four times a day      Vital Signs Last 24 Hrs  T(C): 37.1 (01 Sep 2021 05:33), Max: 37.7 (31 Aug 2021 10:30)  T(F): 98.8 (01 Sep 2021 05:33), Max: 99.9 (31 Aug 2021 10:30)  HR: 79 (01 Sep 2021 07:00) (79 - 101)  BP: --  BP(mean): --  RR: 14 (01 Sep 2021 07:00) (12 - 33)  SpO2: 100% (01 Sep 2021 07:00) (90% - 100%)  I&O's Detail    31 Aug 2021 07:01  -  01 Sep 2021 07:00  --------------------------------------------------------  IN:    Enteral Tube Flush: 230 mL    IV PiggyBack: 50 mL    IV PiggyBack: 300 mL    IV PiggyBack: 250 mL    IV PiggyBack: 300 mL    Norepinephrine: 16.4 mL    Propofol: 85 mL    Vital High Protein: 100 mL  Total IN: 1331.4 mL    OUT:    Intermittent Catheterization - Urethral (mL): 1850 mL    Norepinephrine: 0 mL    Rectal Tube (mL): 0 mL    Voided (mL): 0 mL  Total OUT: 1850 mL    Total NET: -518.6 mL          General: NAD, not on any drips  C/V: NSR  Pulm: Nonlabored breathing, no respiratory distress  Abd: soft, multiple recent abdominal scars all of them are c,d,i  Extrem: WWP, no edema, SCDs in place        LABS:                        8.9    8.59  )-----------( 200      ( 01 Sep 2021 04:23 )             29.0     09-01    140  |  104  |  7   ----------------------------<  87  3.4<L>   |  25  |  0.46<L>    Ca    9.2      01 Sep 2021 04:23  Phos  2.5     09-01  Mg     1.8     09-01    TPro  6.3  /  Alb  3.6  /  TBili  0.4  /  DBili  <0.2  /  AST  25  /  ALT  12  /  AlkPhos  86  08-31    PT/INR - ( 01 Sep 2021 04:23 )   PT: 17.3 sec;   INR: 1.47          PTT - ( 01 Sep 2021 04:23 )  PTT:33.9 sec      RADIOLOGY & ADDITIONAL STUDIES:

## 2021-09-01 NOTE — CONSULT NOTE ADULT - ATTENDING COMMENTS
Patient seen and examined.  Arms appear well perfused.  No intervention needed.
Patient seen and examined.  Has DVT and PE.  Unable to be anticoagulated.  Discussed with family need to place IVC filter, and retrieve when she has recovered from the SAH.  Will place filter today.
Patient seen, history reviewed.    s/p sleeve gastrectomy 3 weeks ago. Febrile. On low dose pressors.    Intra-abdominal conditions at this point postoperatively do not favor re-operation, nor does her clinical status.  She is receiving nutrition enterally.   Would re-consult bariatric service in 1-2 weeks for feeding jejunostomy.
Patient with SAH, vertebral artery dissection s/p recent laparoscopic sleeve gastrectomy requiring enteral feeding tube, Patient currently receiving tube feeds through Dobhoff placed intragastric. Will plan on laparoscopic jejunostomy feeding tube as opposed to G-tube due to recent sleeve gastrectomy.
Will plan for PEG tube with jejunal extension tomorrow.  Hold feeds after midnight.
45 year-old woman with h/o recent gastric sleeve (08/04/21 [facility], Dr. Crisostomo ), fibromyalgia, thyroid dysfunction, PTSD, depression, anxiety who was transferred from [facility] on 8/7/21 for seizure.   Etiology c/w  SAH 2/2 R vertebral dissecting aneurysm. Epilepsy consulted for seizure event on 8/18, most likely due to SAH w/ subsequent vasospasms.   Pt on Ceribel overnight. Report in chart. Bitemporal epileptiform activity and slowing detected. Brief seizure possible, but difficult to distinguish from artifact given technical recording.     Recommendations:  - Initiate vEEG monitoring  - Continue Vimpat 100mg Q12hrs  - Continue Depakote 1000mg Q8hrs  - Please obtain trough valproic acid level in AM  - Maintain seizure and fall precautions
45F h/o recent gastric sleeve (8/4/21 at [facility]) p/w seizure at [place], intubated, found to have SAH with IVH and hydrocephalus. She was transferred to [facility], underwent cerebral angio for R vertebral artery takedown for R vertebral dissectomy aneurysm on 8/7 and R frontal EVD placement. Since then, she is intermittently febrile.  Multiple BCx, UCx, sputum Cx and CSF Cx were negative. She got vanc from 8/10-8/12, and zosyn from 8/10-8/18. Currently she is on 3LNC, has increased secretion, increasing WBC to 20. UA neg, procal 0.13, CRP 89. Sputum culture 8/23 grew MSSA. ID was consulted for abx rec.  Clinical presentation c/w MSSA PNA. Cont cefazolin as above. f/u pending cultures.     Team 1 will follow you.  Case d/w primary team.    Louise Pozo MD, MS  Infectious Disease attending  work cell [phone]

## 2021-09-01 NOTE — BRIEF OPERATIVE NOTE - OPERATION/FINDINGS
Insertion of 14 Fr jejunostomy tube    Veress/Visiport access  Bowel run approximately 40cm from ligament of Treitz  Portion of bowel selected that reached abdominal wall without tension  Pursestring sutures x2 on antimesenteric side of jejunum  enterotomy made with cautery  J tube inserted, balloon inflated  Pursestring pulled out and attempt to fix resulted in balloon puncture  Above repeated to replace J tube with working balloon  J tube flushed well

## 2021-09-01 NOTE — BRIEF OPERATIVE NOTE - ANTIBIOTIC PROTOCOL
Ancef/Followed protocol
Followed protocol

## 2021-09-01 NOTE — PROGRESS NOTE ADULT - SUBJECTIVE AND OBJECTIVE BOX
==============================================   NEUROCRITICAL CARE ATTENDING NOTE   ==============================================    LUDMILA PRIETO MRN: [id number]  Summary:  44 y/o F with h/o recent gastric sleeve (08/04 [facility], Dr. Crisostomo ), fibromyalgia, thyroid dysfunction, PTSD, depression, anxiety.  Presented with seizures at home - brought to [place], with 1 more episode of seizure en route.  Intubated, CT showed SAH with IV extension, casted IV, hydrocephalus, given mannitol levetiracetam 6mg ativan, transferred to [facility].      HOSPITAL COURSE:   8/7: Transferred from [place] for SAH. Intubated. R frontal EVD placed, central line and  [place] placed. 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   8/8: POD 1 s/p angio with R vert takedown, extubated, desatting on aerosol mask, required extensive suctioning, 3% nebs, chest PT, started on low dose precedex drip for restlessness/agitation, ABG stable. 3% stopped for Na 150. Ceribell EEG negative and d/c'ed.   8/9 Overnight, new right pronator drift and right gaze preference that can't cross midline, Repeat CTH demonstrated stable vents, CTA head and neck unremarkable for spasm, hypoattenuation of right cerebellum edema vs. infarct.  8/10: POD3 R vert takedown, EVD open at [id number]. CTH with increased hydro. CTA head/neck with mild basilar spasm.  8/11: POD 4 R vert takedown. EVD at 5cm H2O. Febrile 104F. Depakote increased to q6. HCT stable. EVD lowered to 0.8/12: POD 5 R vert takedown. EVD at 0cm H2O. Pending IVCF with vascular.  8/13: POD 6 R vert takedown. EVD open at 0cmH2O. Mottled skin on the left lower extremity improving. Started on Bromocriptine 15mg Q8.   8/14: POD 7. EVD open at 0. ENT scoped vocal cords, +R vocal cord paresis, NTD. Started on zosyn empirically.   8/15: POD 8. EVD open at 0. Developed increased work of breathing, unable to clear her secretions, received racemic epi and multiple nebulizer treatments, however, still with stridor. Patient re-intubated at bedside, on full vent support.   8/16: CTH/CTA head/neck/CT chest performed o/n for worsened exam, R gaze preference, sluggish pupils. +worsened uncal herniation, hydrocephalus, vasospasm in b/l PCAs, L vert, basilar arteries. Angio today, restarted on 3% for Na goal 145-150.  8/17: POD 10. EVD open at -5cmH2O. ICPs WNL. Febrile 101F and pancultured.  8/18: POD 11. R vert takedown. POD1 angio IA verpamil. Overnight, Pt noted to have downward gaze to the right and not following commands. Taken for stat head CT- stable. Pupils also noted to be aniscoric left pupil 4mm and brisk and right 2mm brisk. Mannitol 50g given. Ceribell EEG placed for concern of subclinical seizures. Brief seizure to L frontal lobe. Given 2g valproic acid and dose increased to 1g q8. Vimpat 100mg BID started. Patient self-extubated, placed on NRB with mucomyst, 3% inhalation and duonebs. 3% at 50 d/c'd.  8/19: POD 12. Remains on VEEG. Axillary A line placed. Salt tabs d/c'd, florinef decreased to 0.1mg, EVD @ -5cm H2O draining 20cc/hr.   8/20: POD 13. VEEG, no seizures noted.  Febrile, pancultured.   8/22: BD 15: Continued on levophed for BP augmentation. Hypercoagulability profile. EVD 15 mL/h. Amantadine added, free water started for hypernatremia. Fluctuating level of consciousness. Frequent suctioning; empiric ceftriaxone started. Tmax 101.Thick secretions.  8/23: Neuroexam stable. Febrile. Pancultures done.   8/24: Exam has not been BP dependent. Relaxed -220. IJ- carotid stick. Lovenox held  8/25: BD 19. CTA 8/25: Improved vasospasm of proximal basilar artery, persistent vasospasm and moderate narrowing of the distal one half of the basilar artery. Posterior cerebral artery P1 and P2 segments appear patent with multifocal vasospasm, not significantly changed. SBP changed to 140-220. No change in exam. Weaning levophed.   8/26: BD20. Preop for VPS 8/27. EVD clamped.   8/27: BD 21.  shunt placed. Post op CT head stable.   8/28: BD 22. POD 1 s/p VPS. Slightly more lethargic but still follows commands. Later in PM, patient worked with PT; tolerated it well and followed their instructions. Suctioned copious secretions. Weaning pressors. Per night shift, dilated loops on Xray. Started on reglan.   8/29: BD23. POD 2 S/P VPS. Neuroexam stable.   8/30: BD24. POD 3 S/P VPS. Neuroexam stable. Still with frequent suctioning of thick yellow secretions. Transfused 1 unit PRBCs re Hb 7.4. Levophed dose minimal.   8/31: BD 25. POD 4 S/P VPS. Off pressors. PEG attempted at bedside. Unsuccessful. INR 1.6--> 1.4.   9/1: BD 26. POD 5 S/P VPS. POD 0 s/p J tube placement by gen surgery.      ICU Vital Signs Last 24 Hrs  T(C): 36.7 (01 Sep 2021 17:10), Max: 37.5 (31 Aug 2021 22:32)  T(F): 98 (01 Sep 2021 17:10), Max: 99.5 (31 Aug 2021 22:32)  HR: 88 (01 Sep 2021 19:00) (76 - 95)  BP: 94/52 (01 Sep 2021 11:40) (94/52 - 94/52)  ABP: 116/72 (01 Sep 2021 19:00) (108/67 - 138/82)  ABP(mean): 92 (01 Sep 2021 19:00) (85 - 108)  RR: 17 (01 Sep 2021 19:00) (12 - 33)  SpO2: 96% (01 Sep 2021 19:00) (90% - 100%)      INTAKE/OUTPUT  08-31-21 @ 07:01  -  09-01-21 @ 07:00  --------------------------------------------------------  IN: 1331.4 mL / OUT: 1850 mL / NET: -518.6 mL    09-01-21 @ 07:01  -  09-01-21 @ 19:55  --------------------------------------------------------  IN: 10 mL / OUT: 1325 mL / NET: -1315 mL      PHYSICAL EXAMINATION:   Mental status: Drowsy but easily arousable. Ox3 with choices. Follows commands.  Cranial nerves: R gaze preference but can cross to left easily, pupils 3mm on R, 4mm on L. Face is symmetric. Hypophonia is improving.  Motor: B/L uppers spontaneous, at least 3/5.  B/L lower extremities antigravity.   EENT: Anicteric, NGT  CARDIOVASCULAR: (+) S1 S2, normal rate and regular rhythm  PULMONARY: Diminshed at bases  ABDOMEN: Soft, nontender with normoactive bowel sounds. PEG site C/D/I.   EXTREMITIES: No edema  SKIN: No rash. Left axilla area of induration ~5x 4cm. Non tender, no erythema, non fluctuant. Improving.      MEDICATIONS:   acetylcysteine 20%  Inhalation 4 milliLiter(s) Inhalation every 6 hours  albuterol/ipratropium for Nebulization 3 milliLiter(s) Nebulizer every 6 hours  amantadine Syrup 100 milliGRAM(s) Oral every 12 hours  bromocriptine Capsule 15 milliGRAM(s) Oral every 8 hours  BUpivacaine liposome 1.3% Injectable (no eMAR) 20 milliLiter(s) Local Injection once  chlorhexidine 2% Cloths 1 Application(s) Topical <User Schedule>  iron sucrose IVPB 300 milliGRAM(s) IV Intermittent every 24 hours  lacosamide Solution 100 milliGRAM(s) Oral two times a day  midodrine 10 milliGRAM(s) Oral every 8 hours  nystatin    Suspension 794412 Unit(s) Oral four times a day  ondansetron Injectable 4 milliGRAM(s) IV Push every 6 hours PRN  pantoprazole   Suspension 40 milliGRAM(s) Oral daily  sodium chloride 0.9% lock flush 10 milliLiter(s) IV Push every 1 hour PRN  sodium chloride 3%  Inhalation 4 milliLiter(s) Inhalation every 6 hours                 LABS:                8.9    8.59  )-----------( 200      ( 01 Sep 2021 04:23 )             29.0     09-01    140  |  104  |  7   ----------------------------<  87  3.4<L>   |  25  |  0.46<L>    Ca    9.2      01 Sep 2021 04:23  Phos  2.5     09-01  Mg     1.8     09-01    TPro  6.3  /  Alb  3.6  /  TBili  0.4  /  DBili  <0.2  /  AST  25  /  ALT  12  /  AlkPhos  86  08-31    LIVER FUNCTIONS - ( 31 Aug 2021 05:24 )  Alb: 3.6 g/dL / Pro: 6.3 g/dL / ALK PHOS: 86 U/L / ALT: 12 U/L / AST: 25 U/L / GGT: x               CULTURES:  Culture - Blood (collected 08-26-21 @ 12:48)  Source: .Blood Blood  Preliminary Report (08-27-21 @ 01:00):    No growth at 12 hours    Culture - Blood (collected 08-26-21 @ 12:48)  Source: .Blood Blood  Preliminary Report (08-27-21 @ 01:00):    No growth at 12 hours    Culture - CSF with Gram Stain (collected 08-26-21 @ 12:26)  Source: .CSF CSF  Gram Stain (08-26-21 @ 12:56):    No organisms seen    Rare White blood cells        Neuroimaging:  CT (08.19.2021) - 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 Stable ischemic changes within the right cerebellum. Right-sided EVD catheter in place with slight increase in ventricular size. Interval improvement in the intraventricular hemorrhage. Evolving areas of acute/subacute ischemia within the right cerebellum.    CTA (08.19.2021) -  Interval improvement in the caliber of the proximal vertebral artery with progression of a vasospasm involving the mid to distal basilar artery. Interval improvement in the vasospasm involving the left vertebral artery. Persistent vasospasm involving the posterior cerebral and superior cerebellar arteries. Interval improvement in the vasospasm involving the right supraclinoid ICA as well as the left A1 and M1 segments. Progression of vasospasm involving the right M1 segment.    Cerebral Angiogram (08.18.2021 - Improvement in vasospasm, IA verapamil to posterior circulation  Cerebral Angiogram (08.17.2021 - Left vertebral artery dissection demonstrates continued moderate to severe cerebral vasospasm of the distal let vert and basilar artery, some what improved caliber of proximal left vert. 15mg IA Verapamil injected over 10 minutes with mild improvement of posterior circulation post treatment. Right ICA injection with continued mild supraclinoid vasospasm, 10mg of IA Verapamil injected.  Cerebral Angiogram (08.16.2021) - Left vertebral injection demonstrates moderate diffuse vasospasm of left vertebral artery, basilar artery and posterior circulation including PCAs. 15mg Verapamil injected via left vert with mild radiographic improvement post treatment. Right ICA injection with mild supraclinoid spasm/narrowing, otherwise no vasospasm appreciated in anterior circulation.      [Code] Full  [Goals] Aggressive  [Disposition] ICU     ==============================================   NEUROCRITICAL CARE ATTENDING NOTE   ==============================================    LUDMILA PRIETO MRN: [id number]  Summary:  44 y/o F with h/o recent gastric sleeve (08/04 [facility], Dr. Crisostomo ), fibromyalgia, thyroid dysfunction, PTSD, depression, anxiety.  Presented with seizures at home - brought to [place], with 1 more episode of seizure en route.  Intubated, CT showed SAH with IV extension, casted IV, hydrocephalus, given mannitol levetiracetam 6mg ativan, transferred to [facility].      HOSPITAL COURSE:   8/7: Transferred from [place] for SAH. Intubated. R frontal EVD placed, central line and  [place] placed. 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   8/8: POD 1 s/p angio with R vert takedown, extubated, desatting on aerosol mask, required extensive suctioning, 3% nebs, chest PT, started on low dose precedex drip for restlessness/agitation, ABG stable. 3% stopped for Na 150. Ceribell EEG negative and d/c'ed.   8/9 Overnight, new right pronator drift and right gaze preference that can't cross midline, Repeat CTH demonstrated stable vents, CTA head and neck unremarkable for spasm, hypoattenuation of right cerebellum edema vs. infarct.  8/10: POD3 R vert takedown, EVD open at [id number]. CTH with increased hydro. CTA head/neck with mild basilar spasm.  8/11: POD 4 R vert takedown. EVD at 5cm H2O. Febrile 104F. Depakote increased to q6. HCT stable. EVD lowered to 0.8/12: POD 5 R vert takedown. EVD at 0cm H2O. Pending IVCF with vascular.  8/13: POD 6 R vert takedown. EVD open at 0cmH2O. Mottled skin on the left lower extremity improving. Started on Bromocriptine 15mg Q8.   8/14: POD 7. EVD open at 0. ENT scoped vocal cords, +R vocal cord paresis, NTD. Started on zosyn empirically.   8/15: POD 8. EVD open at 0. Developed increased work of breathing, unable to clear her secretions, received racemic epi and multiple nebulizer treatments, however, still with stridor. Patient re-intubated at bedside, on full vent support.   8/16: CTH/CTA head/neck/CT chest performed o/n for worsened exam, R gaze preference, sluggish pupils. +worsened uncal herniation, hydrocephalus, vasospasm in b/l PCAs, L vert, basilar arteries. Angio today, restarted on 3% for Na goal 145-150.  8/17: POD 10. EVD open at -5cmH2O. ICPs WNL. Febrile 101F and pancultured.  8/18: POD 11. R vert takedown. POD1 angio IA verpamil. Overnight, Pt noted to have downward gaze to the right and not following commands. Taken for stat head CT- stable. Pupils also noted to be aniscoric left pupil 4mm and brisk and right 2mm brisk. Mannitol 50g given. Ceribell EEG placed for concern of subclinical seizures. Brief seizure to L frontal lobe. Given 2g valproic acid and dose increased to 1g q8. Vimpat 100mg BID started. Patient self-extubated, placed on NRB with mucomyst, 3% inhalation and duonebs. 3% at 50 d/c'd.  8/19: POD 12. Remains on VEEG. Axillary A line placed. Salt tabs d/c'd, florinef decreased to 0.1mg, EVD @ -5cm H2O draining 20cc/hr.   8/20: POD 13. VEEG, no seizures noted.  Febrile, pancultured.   8/22: BD 15: Continued on levophed for BP augmentation. Hypercoagulability profile. EVD 15 mL/h. Amantadine added, free water started for hypernatremia. Fluctuating level of consciousness. Frequent suctioning; empiric ceftriaxone started. Tmax 101.Thick secretions.  8/23: Neuroexam stable. Febrile. Pancultures done.   8/24: Exam has not been BP dependent. Relaxed -220. IJ- carotid stick. Lovenox held  8/25: BD 19. CTA 8/25: Improved vasospasm of proximal basilar artery, persistent vasospasm and moderate narrowing of the distal one half of the basilar artery. Posterior cerebral artery P1 and P2 segments appear patent with multifocal vasospasm, not significantly changed. SBP changed to 140-220. No change in exam. Weaning levophed.   8/26: BD20. Preop for VPS 8/27. EVD clamped.   8/27: BD 21.  shunt placed. Post op CT head stable.   8/28: BD 22. POD 1 s/p VPS. Slightly more lethargic but still follows commands. Later in PM, patient worked with PT; tolerated it well and followed their instructions. Suctioned copious secretions. Weaning pressors. Per night shift, dilated loops on Xray. Started on reglan.   8/29: BD23. POD 2 S/P VPS. Neuroexam stable.   8/30: BD24. POD 3 S/P VPS. Neuroexam stable. Still with frequent suctioning of thick yellow secretions. Transfused 1 unit PRBCs re Hb 7.4. Levophed dose minimal.   8/31: BD 25. POD 4 S/P VPS. Off pressors. PEG attempted at bedside. Unsuccessful. INR 1.6--> 1.4.   9/1: BD 26. POD 5 S/P VPS. POD 0 s/p J tube placement by gen surgery.      ICU Vital Signs Last 24 Hrs  T(C): 36.7 (01 Sep 2021 17:10), Max: 37.5 (31 Aug 2021 22:32)  T(F): 98 (01 Sep 2021 17:10), Max: 99.5 (31 Aug 2021 22:32)  HR: 88 (01 Sep 2021 19:00) (76 - 95)  BP: 94/52 (01 Sep 2021 11:40) (94/52 - 94/52)  ABP: 116/72 (01 Sep 2021 19:00) (108/67 - 138/82)  ABP(mean): 92 (01 Sep 2021 19:00) (85 - 108)  RR: 17 (01 Sep 2021 19:00) (12 - 33)  SpO2: 96% (01 Sep 2021 19:00) (90% - 100%)      INTAKE/OUTPUT  08-31-21 @ 07:01  -  09-01-21 @ 07:00  --------------------------------------------------------  IN: 1331.4 mL / OUT: 1850 mL / NET: -518.6 mL    09-01-21 @ 07:01  -  09-01-21 @ 19:55  --------------------------------------------------------  IN: 10 mL / OUT: 1325 mL / NET: -1315 mL      PHYSICAL EXAMINATION:   Mental status: Awake, alert, conversant. oriented x2 (person, place). Follows commands.  Cranial nerves: R gaze preference but can cross to left easily, pupils 3mm on R, 4mm on L. Face is symmetric. Hypophonia is improving significantly. Cough effort is stronger.   Motor: B/L uppers spontaneous, at least 4/5. B/L lower extremities antigravity.   EENT: Anicteric, NGT  CARDIOVASCULAR: (+) S1 S2, normal rate and regular rhythm  PULMONARY: Diminshed at bases.  ABDOMEN: Soft, nontender with normoactive bowel sounds. PEG site C/D/I.   EXTREMITIES: No edema  SKIN: No rash. Left axilla area of induration ~5x 4cm. Non tender, no erythema, non fluctuant. Improving.  PEJ site C/D/I.      MEDICATIONS:   acetylcysteine 20%  Inhalation 4 milliLiter(s) Inhalation every 6 hours  albuterol/ipratropium for Nebulization 3 milliLiter(s) Nebulizer every 6 hours  amantadine Syrup 100 milliGRAM(s) Oral every 12 hours  bromocriptine Capsule 15 milliGRAM(s) Oral every 8 hours  BUpivacaine liposome 1.3% Injectable (no eMAR) 20 milliLiter(s) Local Injection once  chlorhexidine 2% Cloths 1 Application(s) Topical <User Schedule>  iron sucrose IVPB 300 milliGRAM(s) IV Intermittent every 24 hours  lacosamide Solution 100 milliGRAM(s) Oral two times a day  midodrine 10 milliGRAM(s) Oral every 8 hours  nystatin    Suspension [id number] Unit(s) Oral four times a day  ondansetron Injectable 4 milliGRAM(s) IV Push every 6 hours PRN  pantoprazole   Suspension 40 milliGRAM(s) Oral daily  sodium chloride 0.9% lock flush 10 milliLiter(s) IV Push every 1 hour PRN  sodium chloride 3%  Inhalation 4 milliLiter(s) Inhalation every 6 hours                 LABS:                8.9    8.59  )-----------( 200      ( 01 Sep 2021 04:23 )             29.0     09-01    140  |  104  |  7   ----------------------------<  87  3.4<L>   |  25  |  0.46<L>    Ca    9.2      01 Sep 2021 04:23  Phos  2.5     09-01  Mg     1.8     09-01    TPro  6.3  /  Alb  3.6  /  TBili  0.4  /  DBili  <0.2  /  AST  25  /  ALT  12  /  AlkPhos  86  08-31    LIVER FUNCTIONS - ( 31 Aug 2021 05:24 )  Alb: 3.6 g/dL / Pro: 6.3 g/dL / ALK PHOS: 86 U/L / ALT: 12 U/L / AST: 25 U/L / GGT: x               CULTURES:  Culture - Blood (collected 08-26-21 @ 12:48)  Source: .Blood Blood  Preliminary Report (08-27-21 @ 01:00):    No growth at 12 hours    Culture - Blood (collected 08-26-21 @ 12:48)  Source: .Blood Blood  Preliminary Report (08-27-21 @ 01:00):    No growth at 12 hours    Culture - CSF with Gram Stain (collected 08-26-21 @ 12:26)  Source: .CSF CSF  Gram Stain (08-26-21 @ 12:56):    No organisms seen    Rare White blood cells        Neuroimaging:  CT (08.19.2021) - 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 Stable ischemic changes within the right cerebellum. Right-sided EVD catheter in place with slight increase in ventricular size. Interval improvement in the intraventricular hemorrhage. Evolving areas of acute/subacute ischemia within the right cerebellum.    CTA (08.19.2021) -  Interval improvement in the caliber of the proximal vertebral artery with progression of a vasospasm involving the mid to distal basilar artery. Interval improvement in the vasospasm involving the left vertebral artery. Persistent vasospasm involving the posterior cerebral and superior cerebellar arteries. Interval improvement in the vasospasm involving the right supraclinoid ICA as well as the left A1 and M1 segments. Progression of vasospasm involving the right M1 segment.    Cerebral Angiogram (08.18.2021 - Improvement in vasospasm, IA verapamil to posterior circulation  Cerebral Angiogram (08.17.2021 - Left vertebral artery dissection demonstrates continued moderate to severe cerebral vasospasm of the distal let vert and basilar artery, some what improved caliber of proximal left vert. 15mg IA Verapamil injected over 10 minutes with mild improvement of posterior circulation post treatment. Right ICA injection with continued mild supraclinoid vasospasm, 10mg of IA Verapamil injected.  Cerebral Angiogram (08.16.2021) - Left vertebral injection demonstrates moderate diffuse vasospasm of left vertebral artery, basilar artery and posterior circulation including PCAs. 15mg Verapamil injected via left vert with mild radiographic improvement post treatment. Right ICA injection with mild supraclinoid spasm/narrowing, otherwise no vasospasm appreciated in anterior circulation.      [Code] Full  [Goals] Aggressive  [Disposition] ICU

## 2021-09-01 NOTE — BRIEF OPERATIVE NOTE - NSICDXBRIEFPREOP_GEN_ALL_CORE_FT
PRE-OP DIAGNOSIS:  DVT, lower extremity 12-Aug-2021 16:15:10  Estrellita Galarza  Pulmonary embolism 12-Aug-2021 16:16:07  Estrellita Galarza  
PRE-OP DIAGNOSIS:  Cerebral artery vasospasm 16-Aug-2021 10:04:28  Ray Howard  
PRE-OP DIAGNOSIS:  Cerebral artery vasospasm 16-Aug-2021 10:04:28  Ray Howard  
PRE-OP DIAGNOSIS:  Cerebral aneurysm 07-Aug-2021 15:41:24  Melanie Barrios  
PRE-OP DIAGNOSIS:  SAH (subarachnoid hemorrhage) 27-Aug-2021 19:36:21  Attila Motley  
PRE-OP DIAGNOSIS:  Cerebral artery vasospasm 16-Aug-2021 10:04:28  Ray Howard  
PRE-OP DIAGNOSIS:  SAH (subarachnoid hemorrhage) 27-Aug-2021 19:36:21  Attila Motley  Impaired swallowing 01-Sep-2021 16:34:49  Allison Escudero  
PRE-OP DIAGNOSIS:  Cerebral artery vasospasm 16-Aug-2021 10:04:28  Ray Howard

## 2021-09-02 NOTE — PROGRESS NOTE ADULT - ASSESSMENT
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  REHAB:  physical therapy evaluation and management  - defer  EARLY MOB:  HOB elevated    PULM:  pulmo toilette; continue ipratropium / albuterol q6h mucomyst  CARDIO:  SBP goal 100-150mm Hg, on midodrine 10 q8h, wean  ENDO:  Blood sugar goals 140-180 mg/dL, cont insulin sliding scale  GI:  no bowel regimen (diarrhea)  DIET: J-tube to be re-evaluated; 20cc/hr tube feeds  RENAL: maintain euvolemia, accurate Is and Os Na goal 145-150; Free water 200 q8h for hydration  HEM/ONC: no coagulopathy (INR= 1.2), no ASA / Plavix use; hypercoag work-up per heme, IV iron  VTE Prophylaxis: SCDs, SQL on hold? repeat surveillance doppler on 08/30; s/p IVC filter L IJ L basilic thrombus, L arm cold to touch, radial less palpable but doppleratble, f/u arterial dopplers; f/u vascular - pending attending input  ID: febrile downtrending, leukocytosis, completed piperacillin/tazobactam (08/10 to 08/18), vancomycin (08/10-08/12, 08/16 to 08/18); now on ceftriaxone (08/22 - current); continue panculture, ABx streamlined to cefazolin for MSSA, as per ID;' pustules   Social: will update family, Daughter Maggie and Son Kenya and sister (on the phone)   MISC: f/u meds post bariatric surgery    CORE MEASURES  1.  Hunt and Mckeon Score = 5  2.  VTE prophylaxis:  [ ] administered within 24 hours of admission OR [X] reason not done: fresh bleed, unsecured aneurysm.  3.  Dysphagia screening:   [X] performed before any oral meds / liquids / food  4.  Nimodipine treatment:  [X] administered within 24 hours of admission OR [ ] reason not done:    ATTENDING ATTESTATION:  I was physically present for the key portions of the evaluation and management (E/M) service provided.  I agree with the above history, physical and plan, which I have reviewed and edited where appropriate.    Patient at high risk for neurological deterioration or death due to:  ICU delirium, aspiration PNA, DVT / PE.  Critical care time:  I have personally provided 30 minutes of critical care time, excluding time spent on separate procedures.      Plan discussed with RN, house staff.       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  REHAB:  physical therapy evaluation and management  - defer  EARLY MOB:  HOB elevated    PULM:  pulmo toilette; continue ipratropium / albuterol q6h mucomyst, d/c 3%  CARDIO:  SBP goal 100-150mm Hg, on midodrine wean to 5 q8h   ENDO:  Blood sugar goals 140-180 mg/dL, cont insulin sliding scale  GI:  no bowel regimen   DIET: J-tube start trickle feeds, d/c NGT once J tube at goal x 24h  RENAL: maintain euvolemia, accurate Is and Os Na goal 135-145; d/c free water  HEM/ONC: no coagulopathy (INR= 1.2), no ASA / Plavix use; hypercoag work-up per heme, IV iron  VTE Prophylaxis: SCDs, SQL 90 q12h, switch to Eliquis, repeat dopplers on 09/06; s/p IVC filter L IJ L basilic thrombus, L arm cold to touch, radial less palpable but dopplerable, f/u arterial dopplers; f/u vascular - pending attending input  ID: afebrile no leukocytosis, pustules - folliculitis - on ancef, start bacitracin   Social: will update family, Daughter Maggie and Son Kenya and sister (on the phone)   MISC: f/u meds post bariatric surgery    CORE MEASURES  1.  Hunt and Mckeon Score = 5  2.  VTE prophylaxis:  [ ] administered within 24 hours of admission OR [X] reason not done: fresh bleed, unsecured aneurysm.  3.  Dysphagia screening:   [X] performed before any oral meds / liquids / food  4.  Nimodipine treatment:  [X] administered within 24 hours of admission OR [ ] reason not done:    ATTENDING ATTESTATION:  I was physically present for the key portions of the evaluation and management (E/M) service provided.  I agree with the above history, physical and plan, which I have reviewed and edited where appropriate.    Patient at high risk for neurological deterioration or death due to:  ICU delirium, aspiration PNA, DVT / PE.  Critical care time:  I have personally provided 30 minutes of critical care time, excluding time spent on separate procedures.      Plan discussed with RN, house staff.       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  REHAB:  physical therapy evaluation and management  - defer  EARLY MOB:  HOB elevated    PULM:  pulmo toilette; continue ipratropium / albuterol q6h mucomyst, d/c 3%  CARDIO:  SBP goal 100-150mm Hg, on midodrine wean to 5 q8h   ENDO:  Blood sugar goals 140-180 mg/dL, cont insulin sliding scale  GI:  no bowel regimen   DIET: J-tube start trickle feeds, d/c NGT once J tube at goal x 24h  RENAL: maintain euvolemia, accurate Is and Os Na goal 135-145; d/c free water  HEM/ONC: no coagulopathy (INR= 1.2), no ASA / Plavix use; hypercoag work-up per heme, IV iron  VTE Prophylaxis: SCDs, SQL 90 q12h, switch to Eliquis, repeat dopplers on 09/06; s/p IVC filter L IJ L basilic thrombus, L arm cold to touch, radial less palpable but dopplerable, f/u arterial dopplers; f/u vascular - pending attending input  ID: afebrile no leukocytosis, pustules - folliculitis - on ancef, start bacitracin   Social: will update family, Daughter Maggie and Son Kenya and sister (on the phone)   MISC: f/u meds post bariatric surgery    CORE MEASURES  1.  Hunt and Mckeon Score = 5  2.  VTE prophylaxis:  [ ] administered within 24 hours of admission OR [X] reason not done: fresh bleed, unsecured aneurysm.  3.  Dysphagia screening:   [X] performed before any oral meds / liquids / food  4.  Nimodipine treatment:  [X] administered within 24 hours of admission OR [ ] reason not done:    ATTENDING ATTESTATION:  I was physically present for the key portions of the evaluation and management (E/M) service provided.  I agree with the above history, physical and plan which I have reviewed and edited where appropriate.     Patient not at high risk for neurologic deterioration / death.  Time spent on this noncritically ill patient: 45 minutes spent on total encounter, more than 50% of the visit was spent counseling and/or coordinating care by the attending physician.    Plan discussed with RN, house staff.    REVIEW OF SYSTEMS:  No headaches, no nausea or vomiting; 14 -point review of systems otherwise unremarkable.

## 2021-09-02 NOTE — PROGRESS NOTE ADULT - SUBJECTIVE AND OBJECTIVE BOX
HPI:  44y/o F with h/o recent gastric sleeve (08/04/21 [facility], Dr. Crisostomo ), fibromyalgia, thyroid dysfunction, PTSD, depression, anxiety.  Presented with seizures at home - brought to [facility], with 1 more episode of seizure en route.  Intubated, CT showed SAH with IV extension, casted IV, hydrocephalus, given mannitol, levetiracetam 1g,  6mg ativan. Started on Cardene drip for BP goal < 140 and transferred to [facility] NICU for further management.     HH5 MF4   NIHSS 26 on arrival  BD 1 = 8/7 (07 Aug 2021 08:08)    OVERNIGHT EVENTS: ASHA o/n neuro stable    Hospital Course:   8/7: Tx from [facility] for SAH. Intubated. R frontal EVD placed, central line and a line placed. 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   8/8: POD1 s/p angio with R vert takedown, extubated, desatting on aerosol mask, required extensive suctioning, 3% nebs, chest PT, started on low dose precedex drip for restlessness/agitation, ABG stable. NGT placed. TF started with goal 20 pending nutrition recs. 3% stopped for Na 150. Ceribell EEG negative and d/c'ed.   8/9 Overnight, new Right pronator drift and right gaze preference that can't cross midline, Repeat CTH demonstrated stable vents, CTA head and neck unremarkable for spasm, hypoattenuation of right cerebellum edema vs. infarct.  8/10: POD3 R vert takedown, EVD open at 12yvV1S. ASHA overnight, neuro stable. CTH with increased hydro, CTA head/neck with mild basilar spasm, but concern for PE. 1/2 am D50 for hypoglycemia. 1L bolus then 100 cc/hr, PM rounds for net negative fluid balance and mild vasospasm on CTA.   8/11: POD4 R vert takedown. EVD at 5cm H2O, ASHA overnight. neuro stable.   Febrile 104. depakote increased to q6. NCHCT stable. EVD lowered to 0. Lasix 20 given for dieuresis, UOP over 2 liters. Sedation given for a-line placement, BP drop needing levo.  8/12: POD5 R vert takedown. EVD at 0cm H2O. ASHA overnight, neuro stable. Precedex being weaned off. Pending IVCF with vascular today.  8/13: POD6 R vert takedown. EVD open at 0cmH2O. ASHA overnight. Neuro exam stable. Mottled skin on the left lower extremity improving. Started on Bromocriptine 15mg Q8.   8/14: POD7. EVD open at 0. 1L bolus given for euvolemia o/n. neuro stable. CTH stable. ENT scoped vocal cords, +R voacl cord paresis, NTD. started on zosyn empirically.   8/15: POD8. EVD open at 0. ASHA o/n, neuro stable. Pt developed increased work of breathing, unable to clear her secretions, received racemic epi and multiple nebulizer treatments, still with stridor. Patient re-intubated at bedside, on full vent support.   8/16: CTH/CTA head/neck/CT chest performed o/n for worsened exam, R gaze preference, sluggish pupils. +worsened uncal herniation, hydro, vasospasm in b/l PCAs, L vert, basilar arteries. preop angio today, restarted on 3% for Na goal 145-150. Angio for vasospasm with IA verapamil.  8/17: POD10. Overnight Pt remains on levophed drip for SBP goal 180-220. Also remains on propofol drip. EVD open at -5cmH2O. ICPs WNL. Febrile 101F and pancultured. CTH today shows slightly smaller ventricles. Angio for vasospasm with IA verapamil.  8/18: POD11 R vert takedown. POD1 angio IA verpamil. Overnight, Pt noted to have downward gaze to the right and not following commands. Taken for stat head CT which is stable. Pupils also noted to be aniscoric left pupil 4mm and brisk and right 2mm brisk. Mannitol 50g given. Ceribell eeg placed for concern of subclinical seizures, so far appears negative for seizures. 1L NS o/n and 1.5L NS bolus in AM for euvolemia. vanc/zosyn stopped. 250cc 3% bolus and 250cc albumin given in AM. Brief seizure to L frontal lobe this AM on EEG, given 2g valproic acid and dose increased to 1g q8. Vimpat 100mg BID started.  Angio with interval improvment in vasospasm, IA verapamil given. In afternoon patient self-extubated, placed on NRB with mucomyst, 3% inhalation and duonebs. 3% at 50 d/c'd.  8/19: POD 12. Remains on VEEG. Axillary A line placed. Salt tabs d/c'd, florinef decreased to 0.1mg, EVD @ -5cm H2O, now draining 20cc/hr. 250 albumin and 500 NS boluses on dayshift, 1 L LR bolus  8/20: POD 13. ASHA. on VEEG, no seizures noted. Pending VPA level. 500 NS, 250 albumin. Febrile, pancultured. EEG d/c'ed.   8/21: BD14, POD14. ASHA overnight, EVD @ -5. s/p 1L bolus for euvolemia  8/22: BD #15: continued on levo, hypercoagulabitly profile pending, EVD @ -5, euvolemia, extubated, amantadine added, free water started for hypernatremia   8/23: BD 17, tmax 101F o/n. Gen Sx consulted for PEG - not a candidate at this time, pancultured for fever, 1L LR given for euvolemia. EVD at -5  8/24: BD18, ASHA o/n, neuro stable, EVD at -5, VPA level therapeutic, SBP goal 160-200, L IJ CVC attempted placement with carotid puncture, no pseudoaneurysm on US. MSSA growing in sputum. Ceftriaxone d/c'd and Ancef 2gq8 started. ID consulted. Recieved 1.5L LR and 500cc albumin.   8/25: POD5 last angio, BD19 ASHA o/n, neuro stable, EVD at -5  8/26: BD 20. ASHA o/n. EVD @ -5   8/27: BD 21, POD 20 Right vert takedown. ASHA overnight. Exam stable. EVD remains open at -5 and to be clamped at 0600 in preparation for right VPS placement today. Possible PEG placement Monday 8/30 8/28: BD22, POD22 Right vert takedown, POD1 R VPS, neuro exam stable. Tube feeds restarted, more lethargic this AM, improved during day, continue to monitor for hydrocephalus. AXR with dilated bowel, started on reglan and magnesium. Liquid BM, TFs held and rectal tube placed.   8/29: BD23, POD23 right vert takedown, POD2 R VPS, ASHA overnight, neuro stable. TFs decreased for colonic distention seen on XR, GI notified, nothing to do, TFs resumed at 20/hr and will continue will PEG placement tomorrow. CTH today demonstrated interval decrease in size of ventricles. Midodrine added to aid in weening levophed.   8/30: BD24, POD24, pending PEG placement this morning with Dr. Milner. Midodrine increased to 10mg q8hrs today to wean off of levo. 1u PRBC for Hb 7.5. FOB negative. 20mg lasix given. Repeat dopplers completed, unchanged from previous. Started on IV iron per hematology recs.  8/31: BD25, POD25, unsuccessful PEG placement by GI, trend Hgb w/ AM labs, slowly weening levophed. 5mg vitamin K given for elevated INR.  9/1: BD26, POD26. J tube w/ gen surg today. 5mg vitK IV given o/n for elevated INR. off levophed. amantadine decreased 100 BID and ritalin d/c'd.   9/2: BD27, POD27. s/p J tube w/ gen surg. still receiving meds/TF via NGT.  neuro stable    Vital Signs Last 24 Hrs  T(C): 37.1 (01 Sep 2021 21:29), Max: 37.1 (01 Sep 2021 05:33)  T(F): 98.8 (01 Sep 2021 21:29), Max: 98.8 (01 Sep 2021 05:33)  HR: 82 (02 Sep 2021 01:00) (76 - 98)  BP: 94/52 (01 Sep 2021 11:40) (94/52 - 94/52)  BP(mean): --  RR: 18 (02 Sep 2021 01:00) (12 - 33)  SpO2: 95% (02 Sep 2021 01:00) (93% - 100%)    I&O's Summary    31 Aug 2021 07:01  -  01 Sep 2021 07:00  --------------------------------------------------------  IN: 1331.4 mL / OUT: 1850 mL / NET: -518.6 mL    01 Sep 2021 07:01  -  02 Sep 2021 01:55  --------------------------------------------------------  IN: 930 mL / OUT: 2525 mL / NET: -1595 mL        PHYSICAL EXAM:  GEN: laying in bed, appears well, NAD  NEURO: AOx2 (self/place). FC, OE spont, hypophonic, face symmetric. +R gaze preference, can cross midline. pupils 4mm reactive b/l. MAEx4 anti gravity  CV: RRR +S1/S2  PULM: CTAB  GI: Abd soft, NT/ND  EXT: ext warm, dry, nontender    TUBES/LINES:  [] Richey  [] Lumbar Drain  [] Wound Drains  [] Others      DIET:  [] NPO  [] Mechanical  [x] Tube feeds    LABS:                        8.9    8.59  )-----------( 200      ( 01 Sep 2021 04:23 )             29.0     09-01    140  |  104  |  7   ----------------------------<  87  3.4<L>   |  25  |  0.46<L>    Ca    9.2      01 Sep 2021 04:23  Phos  2.5     09-01  Mg     1.8     09-01    TPro  6.3  /  Alb  3.6  /  TBili  0.4  /  DBili  <0.2  /  AST  25  /  ALT  12  /  AlkPhos  86  08-31    PT/INR - ( 01 Sep 2021 04:23 )   PT: 17.3 sec;   INR: 1.47          PTT - ( 01 Sep 2021 04:23 )  PTT:33.9 sec        CAPILLARY BLOOD GLUCOSE          Drug Levels: [] N/A    CSF Analysis: [] N/A      Allergies    apple (Angioedema)  No Known Drug Allergies  strawberry (Angioedema)    Intolerances    lactose (Stomach Upset)    MEDICATIONS:  Antibiotics:  nystatin    Suspension 593509 Unit(s) Oral four times a day    Neuro:  amantadine Syrup 100 milliGRAM(s) Oral every 12 hours  bromocriptine Capsule 15 milliGRAM(s) Oral every 8 hours  lacosamide Solution 100 milliGRAM(s) Oral two times a day  ondansetron Injectable 4 milliGRAM(s) IV Push every 6 hours PRN    Anticoagulation:    OTHER:  acetylcysteine 20%  Inhalation 4 milliLiter(s) Inhalation every 6 hours  albuterol/ipratropium for Nebulization 3 milliLiter(s) Nebulizer every 6 hours  BUpivacaine liposome 1.3% Injectable (no eMAR) 20 milliLiter(s) Local Injection once  chlorhexidine 2% Cloths 1 Application(s) Topical <User Schedule>  midodrine 10 milliGRAM(s) Oral every 8 hours  pantoprazole   Suspension 40 milliGRAM(s) Oral daily  sodium chloride 3%  Inhalation 4 milliLiter(s) Inhalation every 6 hours    IVF:    CULTURES:  Culture Results:   Sputum specimen rejected.  Microscopic examination indicates  oropharyngeal contamination.  Please repeat. (08-27 @ 01:53)  Culture Results:   No growth (08-26 @ 14:27)    RADIOLOGY & ADDITIONAL TESTS:      ASSESSMENT:  44y/o F with h/o recent gastric sleeve (08/04/21 [facility], Dr. Crisostomo ), fibromyalgia, thyroid dysfunction, PTSD, depression, anxiety.  Presented with seizures at home - brought to [facility], with 1 more episode of seizure en route.  Intubated, CT showed SAH with IV extension, casted IV, hydrocephalus, given mannitol, levetiracetam 1g,  6mg ativan. Started on Cardene drip for BP goal < 140 and transferred to [facility] NICU for further management. HH5 MF4, BD 1 = 8/7. Now s/p cerebral angiogram for R vertebral artery takedown for R vertebral dissecting aneurysm (8/7), and R frontal EVD placement (8/7), now s/p IVC filter placement (8/12,) s/p angio IA verapamil 8/16, 8/17, 8/18, 8/20. Now s/p right VPS certas at 4 (8/27).    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    Anemia due to acute blood loss    Angiogram, carotid and cerebral arteries    IVC filter placement    Angiogram, carotid and cerebral, bilateral    Angiogram, carotid and cerebral, bilateral    Angiogram, carotid and cerebral, bilateral    Angiogram, carotid and cerebral, bilateral    Placement,  shunt, using frameless stereotaxy    Laparoscopic insertion of jejunostomy tube    [id number]        PLAN:  NEURO:   - neuro checks q4h/vitals q2  - Vimpat 100mg bid   - Bromocriptine 15mg Q8  - Amantadine decreased and ritalin d/c'ed  - Angio demonstrating vasospasm of Right ICA, right PCOMM, Left distal vert and basilar confluence, and received IA verapamil on 8/16. 8/17. 8/18. 8/20.   - Repeat CTH 8/29 demonstrated decreased size of ventricles.     PULM:    - copious thick secretion, standing, Duonebs, 3% nebs, mucomyst  - chest PT   - CXR - aspiration precautions, requires frequent suctioning  - Dr. Quan lau, endonasal suctioning, no bronch for now    CARDIO:    - -150  - off levo  - TTE 8/12 WNL    - midodrine 10q8    ENDO:    - glucose goal 140 -180    GI:    - npo after midnight  - PPI per bariatric surgeon   - s/p J-tube: okay to use NGT, do not use J-tube until cleared by surgery, do not place anything below clear plate on skin  - Repeat abdominal XR in AM   - +rectal tube, bowel regimen held    RENAL:   - Maintain euvolemia  - normonatremia goal  - straight cath prn - retaining     HEM/ONC:   - ASA 81 - held s/p OR and in anticipation of PEG placement   - VTE Prophylaxis: SCDs, SQL held for PEG  - dopplers 8/23, acute DVT left IM calf, persistent completely occlusive DVT b/l peroneal veins and right IM calf , 8/30 unchanged DVTs  - carotid doppler 8/24: neg for pseudoaneurysm, CTA shows soft tissue small hematoma, non-occlusive L IJ and L basillic vein thrombus   - hypercoagulable w/u: - prothrombin gene mutation - heterozygous   - Factor Xa trend once back on SQL  - 8/26 LUE doppler with findings of left IJ non-occlusive DVT and left basilic thrombus  - f/u vascular re L arm cool  - s/p 1u PRBC 8/30  - Dr. Montiel following, IV iron x 3 days  - s/p vit k 5mg PO, vit k 5mg IV 8/31 for elevated INR    ID:   - MRSA neg 8/12   - Afebrile  - Last Pancx 8/23   - Ancef for MSSA pna coverage x 7 d completed 8/30  - PICC line placed 8/26    Assessment and plan discussed with Dr. Lomax, Dr. Conn and Dr. Bernard      Assessment:  Present when checked    []  GCS  E   V  M     Heart Failure: []Acute, [] acute on chronic , []chronic  Heart Failure:  [] Diastolic (HFpEF), [] Systolic (HFrEF), []Combined (HFpEF and HFrEF), [] RHF, [] Pulm HTN, [] Other    [] TRUDI, [] ATN, [] AIN, [] other  [] CKD1, [] CKD2, [] CKD 3, [] CKD 4, [] CKD 5, []ESRD    Encephalopathy: [] Metabolic, [] Hepatic, [] toxic, [] Neurological, [] Other    Abnormal Nurtitional Status: [] malnurtition (see nutrition note), [ ]underweight: BMI < 19, [] morbid obesity: BMI >40, [] Cachexia    [] Sepsis  [] hypovolemic shock,[] cardiogenic shock, [] hemorrhagic shock, [] neuogenic shock  [] Acute Respiratory Failure  []Cerebral edema, [] Brain compression/ herniation,   [] Functional quadriplegia  [] Acute blood loss anemia

## 2021-09-02 NOTE — CHART NOTE - NSCHARTNOTEFT_GEN_A_CORE
Admitting Diagnosis:   Patient is a 45y old  Female who presents with a chief complaint of SAH (02 Sep 2021 08:10)      PAST MEDICAL & SURGICAL HISTORY:      Current Nutrition Order:  Receiving trickle feeds of Vital High Protien @ 20ml/hr  *currently infusin gat 10ml/hr.     PO Intake: Good (%) [   ]  Fair (50-75%) [   ] Poor (<25%) [   ]- N/A    GI Issues:   Jtube placed 9/1  Trickle feeds started this morning via Jtube. Still has NGT in place.   >>Initial plan for PEG however unable to transilluminate from stoack 2/2 adipose tissue per GI note.   H/o recent sleeve gastrectomy 8/4  Ordered for protonix, zofran  Last BM 9/1    Pain:  No pain reported    Skin Integrity;   Surgical incision  Oli score 16      Labs:   09-02    141  |  104  |  9   ----------------------------<  95  4.1   |  27  |  0.50    Ca    9.5      02 Sep 2021 06:11  Phos  3.1     09-02  Mg     2.1     09-02      CAPILLARY BLOOD GLUCOSE          Medications:  MEDICATIONS  (STANDING):  acetylcysteine 20%  Inhalation 4 milliLiter(s) Inhalation every 6 hours  albuterol/ipratropium for Nebulization 3 milliLiter(s) Nebulizer every 6 hours  BACItracin   Ointment 1 Application(s) Topical two times a day  bromocriptine Capsule 10 milliGRAM(s) Oral every 8 hours  BUpivacaine liposome 1.3% Injectable (no eMAR) 20 milliLiter(s) Local Injection once  ceFAZolin   IVPB      ceFAZolin   IVPB 2000 milliGRAM(s) IV Intermittent every 8 hours  chlorhexidine 2% Cloths 1 Application(s) Topical <User Schedule>  enoxaparin Injectable 90 milliGRAM(s) SubCutaneous every 12 hours  lacosamide Solution 100 milliGRAM(s) Oral two times a day  midodrine 5 milliGRAM(s) Oral every 8 hours  multivitamin/minerals/iron Oral Solution (CENTRUM) 15 milliLiter(s) Oral daily  nystatin    Suspension 501857 Unit(s) Oral four times a day  pantoprazole   Suspension 40 milliGRAM(s) Oral daily  sodium chloride 0.9%. 1000 milliLiter(s) (75 mL/Hr) IV Continuous <Continuous>    MEDICATIONS  (PRN):  ondansetron Injectable 4 milliGRAM(s) IV Push every 6 hours PRN Nausea and/or Vomiting  sodium chloride 0.9% lock flush 10 milliLiter(s) IV Push every 1 hour PRN Pre/post blood products, medications, blood draw, and to maintain line patency      Admitting Anthropometrics:  · Height for BMI (FEET)	5 Feet  · Height for BMI (INCHES)	4 Inch(s)  · Height for BMI (CENTIMETERS)	162.56 Centimeter(s)  · Weight for BMI (lbs)	225.1 lb  · Weight for BMI (kg)	102.1 kg  · Body Mass Index	              38.6 kg/m2  · Ideal Body Weight (lbs)	120  · Ideal Body Weight (kg)	54.4    Weight:  8/7 225lbs    Weight Change:   Reports UBW prior to sleeve gastrectomy was 235lbs. Current B Wis 216lbs. This indicates a 8% wt loss x1 mo.     Nutrition Focused Physical Exam: Completed [   ]  Not Pertinent [ x  ]    Estimated energy needs:   IBW (54.5kg) used for calculations as pt >120% of IBW (188%).   Nutrient needs based on [facility] standards of care for maintenance in adults.   Needs adjusted for recent bariatric sx, critical care, obesity.   Kcal (20-25kcal/kg): 1090-1363kcal/day   Protein (1.5-2.0 g/kg pro): 81-108g pro/day  Fluids per team.     Subjective:   45F with h/o recent gastric sleeve (08/04/21 [facility], Dr. Crisostomo ), fibromyalgia, thyroid dysfunction, PTSD, depression, anxiety.  Presented with seizures at home - brought to [place], with 1 more episode of seizure en route.  Intubated, CT showed SAH with IV extension, casted IV, hydrocephalus, given mannitol, levetiracetam 1g,  6mg ativan. Started on Cardene drip for BP goal < 140 and transferred to [facility] NICU for further management. S/p angio with R vert takedown 8/7, pt extubated later that day. Pt remains on fentanyl and Precedex restlessness and agitation. NGT placed for initiation of EN. CTH with increased hydro, CTA head/neck with mild basilar spasm. Pt now weened off fentanyl and precedex 8/12 per disc with RN. S/p IVC placement 8/12. Pt with increased work of breathing with inability to clear secretions, and was reintubated 8/15. CTA shows posterior circulation vasospasm. Started on levophed drip for SBP goal 180-200. Also remains on propofol drip. EVD open at -5cmH2O. Pt noted to be febrile this am (101F) and pancultured 8/17. Pt s/p multiple angiograms 8/16, 8/17, 8/18, 8/20. Pt self extubated 8/18 and transitioned to NC, tolerating well. Pt con ton levo for BP support. SBP goal 160-200. MSSA growing in sputum. Ceftriaxone d/c'd and Ancef 2gq8 started. ID consulted. S/p  shunt placement 8/27. Attempted PEG placement for enteral feeds however unable to place gastric feeding tube. S/p Jtube 9/1. Trickle feeds running today.     Pt seen in room, resting in bed. Continues to have NGT in place but receiving feeds via Jtube. Feeds at 10ml/hr, ordered to adv. to 20ml as tolerated. Explained to patient gradually advancement in total volume being provided w/ formula to ensure tolerance. Pt very lethargic and minimally communicative. No nutritionally pertinent labs. See EN recs below. RD to follow.     Previous Nutrition Diagnosis: Inadequate Oral Intake RT inability to meet needs via PO AEB NPO, reliant on EN to meet 100% of est needs.     Active [ x  ]  Resolved [   ]    If resolved, new PES:     Goal/Expected Outcome Consistently meet >75% est needs via appropriate route.    Recommendations:  1. NPO  >> When medically feasible, recommend the following diet advancement; BARICLLIQ >> Phase 1 Bariatric Full liquid diet  2. If team wishes to cont EN regimen, recommend increasing; Vital HP @ 46 ml/hr x24hrs via NGT providing 1104 ml TV, 1104 kcal, 96g pro., 923 ml free water.   >>Cont with aspiration precautions at all times  >>FWF per team  3. Cont to monitor lytes and replete prn  4. RD diet edu prn  5. Pain and bowel regimen per team  *d/w team.     Education: Discussed TF advancement     Risk Level: High [ x  ] Moderate [   ] Low [   ].

## 2021-09-02 NOTE — PROGRESS NOTE ADULT - ASSESSMENT
46y/o F with h/o recent gastric sleeve (08/04/21 [facility], Dr. Crisostomo ), fibromyalgia, thyroid dysfunction, PTSD, depression, anxiety.  Unfortunately on 8/7 she had a seizure and was diagnosed with SAH, Now s/p cerebral angiogram for R vertebral artery takedown for R vertebral dissecting aneurysm (8/7), and R frontal EVD placement (8/7), now s/p IVC filter placement (8/12,) s/p angio IA verapamil 8/16, 8/17, 8/18, 8/20. Now s/p right VPS certas at 4 (8/27). During hospitalization patient has been diagnosed with DVT of lower extremities and superficial venous thrombosis of  of basilic vein of the LUE. S/p J-tube placement by general surgery.     Left upper extremity arterial duplex negative for stenosis or occlusion. No acute LUE ischemia. Anticoagulation postsurgery required due to history of DVT and IVC filter. Anticoagulation medication at discretion of primary team.   We will sign off at the moment, please contact us if you have questions or concerns.

## 2021-09-02 NOTE — PROGRESS NOTE ADULT - SUBJECTIVE AND OBJECTIVE BOX
SUBJECTIVE: Patient seen and examined bedside.    ceFAZolin   IVPB      ceFAZolin   IVPB 2000 milliGRAM(s) IV Intermittent every 8 hours  enoxaparin Injectable 90 milliGRAM(s) SubCutaneous every 12 hours  midodrine 5 milliGRAM(s) Oral every 8 hours  nystatin    Suspension 244017 Unit(s) Oral four times a day      Vital Signs Last 24 Hrs  T(C): 36.4 (02 Sep 2021 09:12), Max: 37.1 (01 Sep 2021 21:29)  T(F): 97.6 (02 Sep 2021 09:12), Max: 98.8 (01 Sep 2021 21:29)  HR: 76 (02 Sep 2021 12:00) (70 - 98)  BP: --  BP(mean): --  RR: 16 (02 Sep 2021 12:00) (16 - 21)  SpO2: 100% (02 Sep 2021 12:00) (80% - 100%)  I&O's Detail    01 Sep 2021 07:01  -  02 Sep 2021 07:00  --------------------------------------------------------  IN:    Enteral Tube Flush: 320 mL    IV PiggyBack: 250 mL    Sodium Chloride 0.9% Bolus: 1000 mL    Vital High Protein: 220 mL  Total IN: 1790 mL    OUT:    Indwelling Catheter - Urethral (mL): 450 mL    Intermittent Catheterization - Urethral (mL): 3275 mL    Norepinephrine: 0 mL    Rectal Tube (mL): 0 mL    Voided (mL): 0 mL  Total OUT: 3725 mL    Total NET: -1935 mL      02 Sep 2021 07:01  -  02 Sep 2021 12:50  --------------------------------------------------------  IN:    IV PiggyBack: 50 mL    Vital High Protein: 70 mL  Total IN: 120 mL    OUT:  Total OUT: 0 mL    Total NET: 120 mL          General: NAD, resting comfortably in bed  C/V: NSR  Pulm: Nonlabored breathing, no respiratory distress  Abd: soft, NT/ND.  Extrem: WWP, no edema, SCDs in place        LABS:                        9.3    11.94 )-----------( 241      ( 02 Sep 2021 06:11 )             30.5     09-02    141  |  104  |  9   ----------------------------<  95  4.1   |  27  |  0.50    Ca    9.5      02 Sep 2021 06:11  Phos  3.1     09-02  Mg     2.1     09-02      PT/INR - ( 01 Sep 2021 04:23 )   PT: 17.3 sec;   INR: 1.47          PTT - ( 01 Sep 2021 04:23 )  PTT:33.9 sec      RADIOLOGY & ADDITIONAL STUDIES:    US DPLX [facility] EXT ARTS LTD LT    PROCEDURE DATE: 09/02/2021      INTERPRETATION: Left UPPER EXTREMITY ARTERIAL DUPLEX DOPPLER ULTRASOUND Performed 9/2/2021 11:18 AM    INDICATION: 45 years-old Female with left upper extremity cool, weak pulse.    TECHNIQUE: Duplex Doppler evaluation including gray-scale ultrasound imaging, color flow Doppler imaging, and Doppler spectral analysis of the left upper extremity was performed.    PRIOR STUDIES: None.    FINDINGS:    LEFT UPPER EXTREMITY:    Left subclavian artery: No significant stenosis. Triphasic waveform. PSV: 108 cm/sec.    Left axillary artery: No significant stenosis. Triphasic waveform. PSV: 48 cm/sec. arterial catheter in situ.    Left brachial artery, proximal: No significant stenosis. Triphasic waveform. PSV: 102 cm/sec.    Left brachial artery, mid: No significant stenosis. Triphasic waveform. PSV: 64 cm/sec.    Left brachial artery, distal: No significant stenosis. Triphasic waveform. PSV: 69 cm/sec.    Left radial artery: No significant stenosis. Triphasic waveform. PSV: 28 cm/sec.    Left ulnar artery: No significant stenosis. Triphasic waveform. PSV: 51 cm/sec.    Could not assess left palmar arch due to patient combativeness.    Contralateral right subclavian artery: Triphasic waveform. PSV: 106 cm/sec.      IMPRESSION:  No evidence of significant stenosis or occlusion of the left subclavian, axillary, brachial, radial, and ulnar arteries.     SUBJECTIVE: Patient seen and examined bedside. Patient feels well this morning. No changes in strength of LUE.     ceFAZolin   IVPB      ceFAZolin   IVPB 2000 milliGRAM(s) IV Intermittent every 8 hours  enoxaparin Injectable 90 milliGRAM(s) SubCutaneous every 12 hours  midodrine 5 milliGRAM(s) Oral every 8 hours  nystatin    Suspension 392261 Unit(s) Oral four times a day      Vital Signs Last 24 Hrs  T(C): 36.4 (02 Sep 2021 09:12), Max: 37.1 (01 Sep 2021 21:29)  T(F): 97.6 (02 Sep 2021 09:12), Max: 98.8 (01 Sep 2021 21:29)  HR: 76 (02 Sep 2021 12:00) (70 - 98)  BP: --  BP(mean): --  RR: 16 (02 Sep 2021 12:00) (16 - 21)  SpO2: 100% (02 Sep 2021 12:00) (80% - 100%)  I&O's Detail    01 Sep 2021 07:01  -  02 Sep 2021 07:00  --------------------------------------------------------  IN:    Enteral Tube Flush: 320 mL    IV PiggyBack: 250 mL    Sodium Chloride 0.9% Bolus: 1000 mL    Vital High Protein: 220 mL  Total IN: 1790 mL    OUT:    Indwelling Catheter - Urethral (mL): 450 mL    Intermittent Catheterization - Urethral (mL): 3275 mL    Norepinephrine: 0 mL    Rectal Tube (mL): 0 mL    Voided (mL): 0 mL  Total OUT: 3725 mL    Total NET: -1935 mL      02 Sep 2021 07:01  -  02 Sep 2021 12:50  --------------------------------------------------------  IN:    IV PiggyBack: 50 mL    Vital High Protein: 70 mL  Total IN: 120 mL    OUT:  Total OUT: 0 mL    Total NET: 120 mL      General: NAD, resting comfortably in bed  C/V: NSR  Pulm: Nonlabored breathing, no respiratory distress  Abd: soft, NT/ND.  Extrem: WWP, no edema, SCDs in place. RUE palpable radial and ulnar extremity. LUE palpable ulnar pulse. Strength preserved and symmetric.       LABS:                        9.3    11.94 )-----------( 241      ( 02 Sep 2021 06:11 )             30.5     09-02    141  |  104  |  9   ----------------------------<  95  4.1   |  27  |  0.50    Ca    9.5      02 Sep 2021 06:11  Phos  3.1     09-02  Mg     2.1     09-02      PT/INR - ( 01 Sep 2021 04:23 )   PT: 17.3 sec;   INR: 1.47          PTT - ( 01 Sep 2021 04:23 )  PTT:33.9 sec      RADIOLOGY & ADDITIONAL STUDIES:    US DPLX UPR EXT ARTS LTD LT    PROCEDURE DATE: 09/02/2021      INTERPRETATION: Left UPPER EXTREMITY ARTERIAL DUPLEX DOPPLER ULTRASOUND Performed 9/2/2021 11:18 AM    INDICATION: 45 years-old Female with left upper extremity cool, weak pulse.    TECHNIQUE: Duplex Doppler evaluation including gray-scale ultrasound imaging, color flow Doppler imaging, and Doppler spectral analysis of the left upper extremity was performed.    PRIOR STUDIES: None.    FINDINGS:    LEFT UPPER EXTREMITY:    Left subclavian artery: No significant stenosis. Triphasic waveform. PSV: 108 cm/sec.    Left axillary artery: No significant stenosis. Triphasic waveform. PSV: 48 cm/sec. arterial catheter in situ.    Left brachial artery, proximal: No significant stenosis. Triphasic waveform. PSV: 102 cm/sec.    Left brachial artery, mid: No significant stenosis. Triphasic waveform. PSV: 64 cm/sec.    Left brachial artery, distal: No significant stenosis. Triphasic waveform. PSV: 69 cm/sec.    Left radial artery: No significant stenosis. Triphasic waveform. PSV: 28 cm/sec.    Left ulnar artery: No significant stenosis. Triphasic waveform. PSV: 51 cm/sec.    Could not assess left palmar arch due to patient combativeness.    Contralateral right subclavian artery: Triphasic waveform. PSV: 106 cm/sec.      IMPRESSION:  No evidence of significant stenosis or occlusion of the left subclavian, axillary, brachial, radial, and ulnar arteries.

## 2021-09-02 NOTE — PROGRESS NOTE ADULT - SUBJECTIVE AND OBJECTIVE BOX
===========================================   NEUROCRITICAL CARE ATTENDING NOTE ( PM)  ===========================================    LUDMILA PRIETO   MRN-[id number]  Summary:  45y/F with h/o recent gastric sleeve ( [facility], Dr. Crisostomo ), fibromyalgia, thyroid dysfunction, PTSD, depression, anxiety.  Presented with seizures at home - brought to [place], with 1 more episode of seizure en route.  Intubated, CT showed SAH with IV extension, casted IV, hydrocephalus, given mannitol levetiracetam 6mg ativan, transferred to [facility].    Hospital Course:   : Tx from [place] for SAH. Intubated. R frontal EVD placed, central line and a line placed. 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   : POD1 s/p angio with R vert takedown, extubated, desatting on aerosol mask, required extensive suctioning, 3% nebs, chest PT, started on low dose precedex drip for restlessness/agitation, ABG stable. NGT placed. TF started with goal 20 pending nutrition recs. 3% stopped for Na 150. Ceribell EEG negative and d/c'ed.    Overnight, new Right pronator drift and right gaze preference that can't cross midline, Repeat CTH demonstrated stable vents, CTA head and neck unremarkable for spasm, hypoattenuation of right cerebellum edema vs. infarct.  8/10: POD3 R vert takedown, EVD open at 02tyZ3D. ASHA overnight, neuro stable. CTH with increased hydro, CTA head/neck with mild basilar spasm, but concern for PE. 1/2 am D50 for hypoglycemia. 1L bolus then 100 cc/hr, PM rounds for net negative fluid balance and mild vasospasm on CTA.   : POD4 R vert takedown. EVD at 5cm H2O, ASHA overnight. neuro stable.   Febrile 104. depakote increased to q6. NCHCT stable. EVD lowered to 0. Lasix 20 given for dieuresis, UOP over 2 liters. Sedation given for a-line placement, BP drop needing levo.  : POD5 R vert takedown. EVD at 0cm H2O. ASHA overnight, neuro stable. Precedex being weaned off. Pending IVCF with vascular today.  : POD6 R vert takedown. EVD open at 0cmH2O. ASHA overnight. Neuro exam stable. Mottled skin on the left lower extremity improving. Started on Bromocriptine 15mg Q8.   : POD7. EVD open at 0. 1L bolus given for euvolemia o/n. neuro stable. CTH stable. ENT scoped vocal cords, +R voacl cord paresis, NTD. started on zosyn empirically.   8/15: POD8. EVD open at 0. ASHA o/n, neuro stable. Pt developed increased work of breathing, unable to clear her secretions, received racemic epi and multiple nebulizer treatments, still with stridor. Patient re-intubated at bedside, on full vent support.   : CTH/CTA head/neck/CT chest performed o/n for worsened exam, R gaze preference, sluggish pupils. +worsened uncal herniation, hydro, vasospasm in b/l PCAs, L vert, basilar arteries. preop angio today, restarted on 3% for Na goal 145-150. Angio for vasospasm with IA verapamil.  : POD10. Overnight Pt remains on levophed drip for SBP goal 180-220. Also remains on propofol drip. EVD open at -5cmH2O. ICPs WNL. Febrile 101F and pancultured. CTH today shows slightly smaller ventricles. Angio for vasospasm with IA verapamil.  : POD11 R vert takedown. POD1 angio IA verpamil. Overnight, Pt noted to have downward gaze to the right and not following commands. Taken for stat head CT which is stable. Pupils also noted to be aniscoric left pupil 4mm and brisk and right 2mm brisk. Mannitol 50g given. Ceribell eeg placed for concern of subclinical seizures, so far appears negative for seizures. 1L NS o/n and 1.5L NS bolus in AM for euvolemia. vanc/zosyn stopped. 250cc 3% bolus and 250cc albumin given in AM. Brief seizure to L frontal lobe this AM on EEG, given 2g valproic acid and dose increased to 1g q8. Vimpat 100mg BID started.  Angio with interval improvment in vasospasm, IA verapamil given. In afternoon patient self-extubated, placed on NRB with mucomyst, 3% inhalation and duonebs. 3% at 50 d/c'd.  : POD 12. Remains on VEEG. Axillary A line placed. Salt tabs d/c'd, florinef decreased to 0.1mg, EVD @ -5cm H2O, now draining 20cc/hr. 250 albumin and 500 NS boluses on dayshi, 1 L LR bolus  : POD 13. ASHA. on VEEG, no seizures noted. Pending VPA level. 500 NS, 250 albumin. Febrile, pancultured. EEG d/c'ed.   : BD14, POD14. ASHA overnight, EVD @ -5. s/p 1L bolus for euvolemia  : BD #15: continued on levo, hypercoagulabitly profile pending, EVD @ -5, euvolemia, extubated, amantadine added, free water started for hypernatremia   : BD 17, tmax 101F o/n. Gen Sx consulted for PEG - not a candidate at this time, pancultured for fever, 1L LR given for euvolemia. EVD at -5  : BD18, ASHA o/n, neuro stable, EVD at -5, VPA level therapeutic, SBP goal 160-200, L IJ CVC attempted placement with carotid puncture, no pseudoaneurysm on US. MSSA growing in sputum. Ceftriaxone d/c'd and Ancef 2gq8 started. ID consulted. Recieved 1.5L LR and 500cc albumin.   : POD5 last angio, BD19 ASHA o/n, neuro stable, EVD at -5  : BD 20. ASHA o/n. EVD @ -5   : BD 21, POD 20 Right vert takedown. ASHA overnight. Exam stable. EVD remains open at -5 and to be clamped at 0600 in preparation for right VPS placement today. Possible PEG placement : BD22, POD22 Right vert takedown, POD1 R VPS, neuro exam stable. Tube feeds restarted, more lethargic this AM, improved during day, continue to monitor for hydrocephalus. AXR with dilated bowel, started on reglan and magnesium. Liquid BM, TFs held and rectal tube placed.   : BD23, POD23 right vert takedown, POD2 R VPS, ASHA overnight, neuro stable. TFs decreased for colonic distention seen on XR, GI notified, nothing to do, TFs resumed at 20/hr and will continue will PEG placement tomorrow. CTH today demonstrated interval decrease in size of ventricles. Midodrine added to aid in weening levophed.   : BD24, POD24, pending PEG placement this morning with Dr. Milner. Midodrine increased to 10mg q8hrs today to wean off of levo. 1u PRBC for Hb 7.5. FOB negative. 20mg lasix given. Repeat dopplers completed, unchanged from previous. Started on IV iron per hematology recs.  : BD25, POD25, unsuccessful PEG placement by GI, trend Hgb w/ AM labs, slowly weening levophed. 5mg vitamin K given for elevated INR.  : BD26, POD26. J tube w/ gen surg today. 5mg vitK IV given o/n for elevated INR. off levophed. amantadine decreased 100 BID and ritalin d/c'd.   : BD27, POD27. s/p J tube w/ gen surg. still receiving meds/TF via NGT.  neuro stable    Past Medical History:   Allergies:  Allergy Status Unknown  Home meds:     Current Meds:  MEDICATIONS  (STANDING):  acetylcysteine 20%  Inhalation 4 milliLiter(s) Inhalation every 6 hours  albuterol/ipratropium for Nebulization 3 milliLiter(s) Nebulizer every 6 hours  apixaban 5 milliGRAM(s) Enteral Tube every 12 hours  BACItracin   Ointment 1 Application(s) Topical two times a day  bromocriptine Capsule 10 milliGRAM(s) Oral every 8 hours  BUpivacaine liposome 1.3% Injectable (no eMAR) 20 milliLiter(s) Local Injection once    ceFAZolin   IVPB 2000 milliGRAM(s) IV Intermittent every 8 hours  chlorhexidine 2% Cloths 1 Application(s) Topical <User Schedule>  lacosamide Solution 100 milliGRAM(s) Oral two times a day  metoclopramide Injectable 5 milliGRAM(s) IV Push every 6 hours  midodrine 5 milliGRAM(s) Oral every 8 hours  multivitamin/minerals/iron Oral Solution (CENTRUM) 15 milliLiter(s) Oral daily  nystatin    Suspension 717808 Unit(s) Oral four times a day  pantoprazole   Suspension 40 milliGRAM(s) Oral daily  sodium chloride 0.9%. 1000 milliLiter(s) (75 mL/Hr) IV Continuous <Continuous>    MEDICATIONS  (PRN):  ondansetron Injectable 4 milliGRAM(s) IV Push every 6 hours PRN Nausea and/or Vomiting    PHYSICAL EXAMINATION    ICU Vital Signs Last 24 Hrs  T(C): 36.2 (02 Sep 2021 17:45), Max: 37.1 (01 Sep 2021 21:29)  T(F): 97.1 (02 Sep 2021 17:45), Max: 98.8 (01 Sep 2021 21:29)  HR: 84 (02 Sep 2021 21:00) (68 - 90)  ABP: 128/76 (02 Sep 2021 21:00) (94/54 - 134/78)  ABP(mean): 98 (02 Sep 2021 21:00) (72 - 104)  RR: 16 (02 Sep 2021 21:00) (16 - 21)  SpO2: 97% (02 Sep 2021 21:00) (80% - 100%)    NEUROLOGIC EXAMINATION:  Patient is awake, oriented x 1-2, hypophonic, following commands  pupils reactive, pupils reactive but anisocoric 2mm R 4mm L, moves B UE spontaneously, L>R, moves B LE spontaneously  GENERAL: room air  EENT:  anicteric  CARDIOVASCULAR: (+) S1 S2, normal rate and regular rhythm  PULMONARY: improving secretions  ABDOMEN: soft, nontender with normoactive bowel sounds  EXTREMITIES: no edema  SKIN: no rash, pustules around wound site    I/O's    21 @ 07:01  -  - @ 07:00  --------------------------------------------------------  IN: 1790 mL / OUT: 3725 mL / NET: -1935 mL    21 @ 07:01  -  21 @ 21:16  --------------------------------------------------------  IN: 1140 mL / OUT: 700 mL / NET: 440 mL    LABS:                        9.3    11.94 )-----------( 241      ( 02 Sep 2021 06:11 )             30.5         141  |  104  |  9   ----------------------------<  95  4.1   |  27  |  0.50    Ca    9.5      02 Sep 2021 06:11  Phos  3.1       Mg     2.1         PT/INR - ( 01 Sep 2021 04:23 )   PT: 17.3 sec;   INR: 1.47     PTT - ( 01 Sep 2021 04:23 )  PTT:33.9 sec    CAPILLARY BLOOD GLUCOSE    Bacteriology:   sputum GS: few GPCIClusters and pairs, rare GNR   CSF:  NGTD   Blood CS NG1D x2   sputum: moderate MSSA   sputum: rejected, contaminated   Blood CS NG3D    CSF NGTD   sputum culture: No organisms   Blood CS NG5D x2 (FINAL)   CSF No organisms  08/15 Sputum culture: normal trent   CSF: NGTD    CSF studies:  EEG:  Neuroimagin/17 CT head:  decrease in size of lateral and third ventricles; interval demarcated infarctions within R cerebellar hemisphere  08/15 CTA: CTA HEAD: Agree that there are new/more pronounced areas of irregularity and narrowing involving the distal left vertebral artery, basilar artery, bilateral PCAs, supraclinoid right ICA and right M1 segment. There is mild narrowing of the left A1 and left M1 segments as well as the bilateral A2 segments which is new from prior examination. These findings are suspicious for vasospasm.  Other imagin/23 LE Doppler: new acute DVT L IM calf vein, persistent acute DVT bilateral peroneal veins, persistent acute DVT R IM calf veins   LE Doppler:  new DVT bilateral peroneal veins, DVT in R intramuscular calf vein    IV FLUIDS: IVL  DRIPS:  DIET: Vital   Lines: PICC [place]  Drains:  rectal tube  Wounds:    CODE STATUS:  Full Code                       GOALS OF CARE:  aggressive                      DISPOSITION:  ICU   5 MF 4

## 2021-09-02 NOTE — PROVIDER CONTACT NOTE (OTHER) - SITUATION
Pt left radial pulse unable to palpable and unable to use doppler but finger capillary refill < 2 seconds

## 2021-09-02 NOTE — PROGRESS NOTE ADULT - SUBJECTIVE AND OBJECTIVE BOX
=================================  NEUROCRITICAL CARE ATTENDING NOTE  =================================    LUDMILA PRIETO   MRN-[id number]  Summary:  45y/F with h/o recent gastric sleeve ( [facility], Dr. Crisostomo ), fibromyalgia, thyroid dysfunction, PTSD, depression, anxiety.  Presented with seizures at home - brought to [place], with 1 more episode of seizure en route.  Intubated, CT showed SAH with IV extension, casted IV, hydrocephalus, given mannitol levetiracetam 6mg ativan, transferred to [facility].    COURSE IN THE HOSPITAL:   admitted to [facility], EVD inserted, high pressure, central line lawrence inserted   extubated   Tmax 38.4   Tmax 38.3   Tmax 38.2 attempted L IJ / SC insertion with arterial puncture, otherwise stable    Past Medical History:   Allergies:  Allergy Status Unknown  Home meds:     PHYSICAL EXAMINATION  T(C): 38.2 ( @ 22:00), Max: 38.2 ( @ 22:00) HR: 95 ( @ 23:00) (84 - 105) BP: 191/93 ( @ 23:00) (161/73 - 206/110) RR: 19 ( @ 23:00) (16 - 22) SpO2: 97% ( @ 23:00) (93% - 100%)   NEUROLOGIC EXAMINATION:  Patient oriented x2, hypophonic dysarthric, pupils reactive, oriented x1, pupils reactive but anisocoric 2mm R 4mm L, R gaze preference moves to midline, moves B UE spontaneously, L>R, moves B LE spontaneously  GENERAL: room air  EENT:  anicteric  CARDIOVASCULAR: (+) S1 S2, normal rate and regular rhythm  PULMONARY: improving secretions  ABDOMEN: soft, nontender with normoactive bowel sounds  EXTREMITIES: no edema  SKIN: no rash, pustules around wound site    LABS:   CAPILLARY BLOOD GLUCOSE 136 148 139 119              (21.8)  8.4  (8.9)  20.90 )-----------( 336      ( 24 Aug 2021 15:57 )             28.3     147<H>  |  108  |  9   ----------------------------<  151<H>  3.3<L>   |  29  |  0.49<L>    Ca    9.0      24 Aug 2021 18:55  Phos  2.4       Mg     1.6      @ 07:01  -   @ 07:00  IN: 5431.9 mL / OUT: 5482 mL / NET: -50.1 mL    HbA1C = 5.9 () 5.9 ()  LDL = 84 ()   HDL = 50 ()  TG = 74 ()  TSH = 0.078 ()    Bacteriology:   sputum GS: few GPCIClusters and pairs, rare GNR   CSF:  NGTD   Blood CS NG1D x2   sputum: moderate MSSA   sputum: rejected, contaminated   Blood CS NG3D    CSF NGTD   sputum culture: No organisms   Blood CS NG5D x2 (FINAL)   CSF No organisms  08/15 Sputum culture: normal trent   CSF: NGTD    CSF studies:  EEG:  Neuroimagin/17 CT head:  decrease in size of lateral and third ventricles; interval demarcated infarctions within R cerebellar hemisphere  08/15 CTA: CTA HEAD: Agree that there are new/more pronounced areas of irregularity and narrowing involving the distal left vertebral artery, basilar artery, bilateral PCAs, supraclinoid right ICA and right M1 segment. There is mild narrowing of the left A1 and left M1 segments as well as the bilateral A2 segments which is new from prior examination. These findings are suspicious for vasospasm.  Other imagin/23 LE Doppler: new acute DVT L IM calf vein, persistent acute DVT bilateral peroneal veins, persistent acute DVT R IM calf veins   LE Doppler:  new DVT bilateral peroneal veins, DVT in R intramuscular calf vein    MEDICATIONS:     ·	aspirin  chewable 81 Oral daily  ·	enoxaparin Injectable 40 SubCutaneous every 12 hours  ·	ceFAZolin   IVPB 2000 IV Intermittent every 8 hours  ·	amantadine Syrup 200 Oral every 12 hours  ·	bromocriptine Capsule 15 Oral every 8 hours  ·	lacosamide Solution 100 Oral two times a day  ·	methylphenidate 10 Oral daily  ·	valproate sodium IVPB 1000 IV Intermittent every 8 hours  ·	acetylcysteine 20%  Inhalation 4 Inhalation every 6 hours  ·	albuterol/ipratropium for Nebulization 3 Nebulizer every 6 hours  ·	sodium chloride 3%  Inhalation 4 Inhalation every 6 hours  ·	insulin lispro (ADMELOG) corrective regimen sliding scale  SubCutaneous every 6 hours  ·	acetaminophen    Suspension .. 650 Oral every 6 hours PRN  ·	ondansetron Injectable 4 IV Push every 6 hours PRN    IV FLUIDS:   DRIPS:  ·	norepinephrine Infusion 0.05 MICROgram(s)/kG/Min IV Continuous <Continuous>  DIET: Vital  Lines: Central line [place]  Drains:  [place]   Wounds:    CODE STATUS:  Full Code                       GOALS OF CARE:  aggressive                      DISPOSITION:  ICU   5 MF 4 =================================  NEUROCRITICAL CARE ATTENDING NOTE  =================================    LUDMILA PRIETO   MRN-[id number]  Summary:  45y/F with h/o recent gastric sleeve ( [facility], Dr. Crisostomo ), fibromyalgia, thyroid dysfunction, PTSD, depression, anxiety.  Presented with seizures at home - brought to [place], with 1 more episode of seizure en route.  Intubated, CT showed SAH with IV extension, casted IV, hydrocephalus, given mannitol levetiracetam 6mg ativan, transferred to [facility].    COURSE IN THE HOSPITAL:   admitted to [facility], EVD inserted, high pressure, central line lawrence inserted   extubated   Tmax 38.4   Tmax 38.3   Tmax 38.2 attempted L IJ / SC insertion with arterial puncture, otherwise stable     s/p J tube   No significant events overnight.     Past Medical History:   Allergies:  Allergy Status Unknown  Home meds:     PHYSICAL EXAMINATION  T(C): 36.4 ( @ 09:12), Max: 37.1 ( @ 21:29) HR: 76 ( @ 10:00) (72 - 98) BP: 94/52 ( @ 11:40) (94/52 - 94/52) RR: 17 ( @ 10:00) (16 - 21) SpO2: 93% ( @ 10:00) (92% - 100%)  NEUROLOGIC EXAMINATION:  Patient is awake, oriented x 1-2, hypophonic, following commands  pupils reactive, pupils reactive but anisocoric 2mm R 4mm L, moves B UE spontaneously, L>R, moves B LE spontaneously  GENERAL: room air  EENT:  anicteric  CARDIOVASCULAR: (+) S1 S2, normal rate and regular rhythm  PULMONARY: improving secretions  ABDOMEN: soft, nontender with normoactive bowel sounds  EXTREMITIES: no edema  SKIN: no rash, pustules around wound site    LABS:                9.3    11.94 )-----------( 241      ( 02 Sep 2021 06:11 )             30.5     141  |  104  |  9   ----------------------------<  95  4.1   |  27  |  0.50    Ca    9.5      02 Sep 2021 06:11  Phos  3.1       Mg     2.1      @ 07:01  -   @ 07:00  IN: 1790 mL / OUT: 3725 mL / NET: -1935 mL    HbA1C = 5.9 () 5.9 ()  LDL = 84 ()   HDL = 50 ()  TG = 74 ()  TSH = 0.078 ()    Bacteriology:   sputum GS: few GPCIClusters and pairs, rare GNR   CSF:  NGTD   Blood CS NG1D x2   sputum: moderate MSSA   sputum: rejected, contaminated   Blood CS NG3D    CSF NGTD   sputum culture: No organisms   Blood CS NG5D x2 (FINAL)   CSF No organisms  08/15 Sputum culture: normal trent   CSF: NGTD    CSF studies:  EEG:  Neuroimagin/17 CT head:  decrease in size of lateral and third ventricles; interval demarcated infarctions within R cerebellar hemisphere  08/15 CTA: CTA HEAD: Agree that there are new/more pronounced areas of irregularity and narrowing involving the distal left vertebral artery, basilar artery, bilateral PCAs, supraclinoid right ICA and right M1 segment. There is mild narrowing of the left A1 and left M1 segments as well as the bilateral A2 segments which is new from prior examination. These findings are suspicious for vasospasm.  Other imagin/23 LE Doppler: new acute DVT L IM calf vein, persistent acute DVT bilateral peroneal veins, persistent acute DVT R IM calf veins   LE Doppler:  new DVT bilateral peroneal veins, DVT in R intramuscular calf vein    MEDICATIONS:     ·	enoxaparin Injectable 90 SubCutaneous every 12 hours  ·	ceFAZolin   IVPB 2000 IV Intermittent every 8 hours  ·	nystatin    Suspension 637303 Oral four times a day  ·	amantadine Syrup 100 Oral every 12 hours  ·	bromocriptine Capsule 15 Oral every 8 hours  ·	lacosamide Solution 100 Oral two times a day  ·	midodrine 10 milliGRAM(s) Oral every 8 hours  ·	acetylcysteine 20%  Inhalation 4 Inhalation every 6 hours  ·	albuterol/ipratropium for Nebulization 3 Nebulizer every 6 hours  ·	sodium chloride 3%  Inhalation 4 Inhalation every 6 hours  ·	pantoprazole   Suspension 40 Oral daily  ·	BACItracin   Ointment 1 Topical two times a day  ·	BUpivacaine liposome 1.3% Injectable (no eMAR) 20 Local Injection once  ·	ondansetron Injectable 4 IV Push every 6 hours PRN    IV FLUIDS: IVL  DRIPS:  DIET: Vital   Lines: PICC [place]  Drains:  rectal tube  Wounds:    CODE STATUS:  Full Code                       GOALS OF CARE:  aggressive                      DISPOSITION:  ICU   5 MF 4

## 2021-09-02 NOTE — PROGRESS NOTE ADULT - SUBJECTIVE AND OBJECTIVE BOX
Interval Events: Reviewed  Patient seen and examined at bedside.    Patient is a 45y old  Female who presents with a chief complaint of SAH (02 Sep 2021 12:50)    she is better and coughing  PAST MEDICAL & SURGICAL HISTORY:      MEDICATIONS:  Pulmonary:  acetylcysteine 20%  Inhalation 4 milliLiter(s) Inhalation every 6 hours  albuterol/ipratropium for Nebulization 3 milliLiter(s) Nebulizer every 6 hours    Antimicrobials:  ceFAZolin   IVPB 2000 milliGRAM(s) IV Intermittent every 8 hours  ceFAZolin   IVPB      nystatin    Suspension 436462 Unit(s) Oral four times a day    Anticoagulants:  enoxaparin Injectable 90 milliGRAM(s) SubCutaneous every 12 hours    Cardiac:  midodrine 5 milliGRAM(s) Oral every 8 hours      Allergies    apple (Angioedema)  No Known Drug Allergies  strawberry (Angioedema)    Intolerances    lactose (Stomach Upset)      Vital Signs Last 24 Hrs  T(C): 36.3 (02 Sep 2021 14:00), Max: 37.1 (01 Sep 2021 21:29)  T(F): 97.3 (02 Sep 2021 14:00), Max: 98.8 (01 Sep 2021 21:29)  HR: 72 (02 Sep 2021 14:00) (70 - 98)  BP: --  BP(mean): --  RR: 16 (02 Sep 2021 14:00) (16 - 21)  SpO2: 99% (02 Sep 2021 14:00) (80% - 100%)    09-01 @ 07:01 - 09-02 @ 07:00  --------------------------------------------------------  IN: 1790 mL / OUT: 3725 mL / NET: -1935 mL    09-02 @ 07:01  -  09-02 @ 14:18  --------------------------------------------------------  IN: 365 mL / OUT: 300 mL / NET: 65 mL          Review of Systems:   •	General: negative  •	Skin/Breast: negative  •	Ophthalmologic: negative  •	ENMT: negative  •	Respiratory and Thorax: negative  •	Cardiovascular: negative  •	Gastrointestinal: negative  •	Genitourinary: negative  •	Musculoskeletal: negative  •	Neurological: negative  •	Psychiatric: negative  •	Hematology/Lymphatics: negative  •	Endocrine: negative  •	Allergic/Immunologic: negative    Physical Exam:   • Constitutional:	refer to the dietion /Nutritionist note  • Eyes:	EOMI; PERRL; no drainage or redness  • ENMT:	No oral lesions; no gross abnormalities  • Neck	No bruits; no thyromegaly or nodules  • Breasts:	not examined  • Back:	No deformity or limitation of movement  • Respiratory:	Breath Sounds equal & clear to percussion & auscultation, no accessory muscle use  • Cardiovascular:	Regular rate & rhythm, normal S1, S2; no murmurs, gallops or rubs; no S3, S4  • Gastrointestinal:	Soft, non-tender, no hepatosplenomegaly, normal bowel sounds  • Genitourinary:	not examined  • Rectal: not examined  • Extremities:	No cyanosis, clubbing or edema  • Vascular:	Equal and normal pulses (carotid, femoral, dorsalis pedis)  • Neurologica:l	not examined  • Skin:	No lesions; no rash  • Lymph Nodes:	No lymphadedenopathy  • Musculoskeletal:	No joint pain, swelling or deformity; no limitation of movement        LABS:      CBC Full  -  ( 02 Sep 2021 06:11 )  WBC Count : 11.94 K/uL  RBC Count : 3.49 M/uL  Hemoglobin : 9.3 g/dL  Hematocrit : 30.5 %  Platelet Count - Automated : 241 K/uL  Mean Cell Volume : 87.4 fl  Mean Cell Hemoglobin : 26.6 pg  Mean Cell Hemoglobin Concentration : 30.5 gm/dL  Auto Neutrophil # : x  Auto Lymphocyte # : x  Auto Monocyte # : x  Auto Eosinophil # : x  Auto Basophil # : x  Auto Neutrophil % : x  Auto Lymphocyte % : x  Auto Monocyte % : x  Auto Eosinophil % : x  Auto Basophil % : x    09-02    141  |  104  |  9   ----------------------------<  95  4.1   |  27  |  0.50    Ca    9.5      02 Sep 2021 06:11  Phos  3.1     09-02  Mg     2.1     09-02      PT/INR - ( 01 Sep 2021 04:23 )   PT: 17.3 sec;   INR: 1.47          PTT - ( 01 Sep 2021 04:23 )  PTT:33.9 sec                    RADIOLOGY & ADDITIONAL STUDIES (The following images were personally reviewed):

## 2021-09-02 NOTE — PROGRESS NOTE ADULT - ASSESSMENT
46 yo who is under neurosurg ICU care admitted for SAH. She is POD1 for J tube placement. This morning she looks good from our stand point, vitally stable, abd soft, NT, no nausea or vomiting.   Plan:   -you may start using the J tube for trickle feeds 10cc/hr

## 2021-09-02 NOTE — PROGRESS NOTE ADULT - CRITICAL CARE SERVICES PROVIDED
Critical care services provided

## 2021-09-02 NOTE — PROGRESS NOTE ADULT - ASSESSMENT
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  REHAB:  physical therapy evaluation and management  - defer  EARLY MOB:  HOB elevated    PULM:  pulmo toilette; continue ipratropium / albuterol q6h mucomyst, d/c 3%  CARDIO:  SBP goal 100-150mm Hg, on midodrine wean to 5 q8h   ENDO:  Blood sugar goals 140-180 mg/dL, cont insulin sliding scale  GI:  no bowel regimen   DIET: J-tube start trickle feeds, d/c NGT once J tube at goal x 24h  RENAL: maintain euvolemia, accurate Is and Os Na goal 135-145; d/c free water  HEM/ONC: no coagulopathy (INR= 1.2), no ASA / Plavix use; hypercoag work-up per heme, IV iron  VTE Prophylaxis: SCDs, SQL 90 q12h, switch to Eliquis, repeat dopplers on 09/06; s/p IVC filter L IJ L basilic thrombus, L arm cold to touch, radial less palpable but dopplerable, f/u arterial dopplers; f/u vascular - pending attending input  ID: afebrile no leukocytosis, pustules - folliculitis - on ancef, start bacitracin   Social: will update family, Daughter Maggie and Son Kenya and sister (on the phone)   MISC: f/u meds post bariatric surgery    CORE MEASURES  1.  Hunt and Mckeon Score = 5  2.  VTE prophylaxis:  [ ] administered within 24 hours of admission OR [X] reason not done: fresh bleed, unsecured aneurysm.  3.  Dysphagia screening:   [X] performed before any oral meds / liquids / food  4.  Nimodipine treatment:  [X] administered within 24 hours of admission OR [ ] reason not done:    ATTENDING ATTESTATION:  I was physically present for the key portions of the evaluation and management (E/M) service provided.  I agree with the above history, physical and plan which I have reviewed and edited where appropriate.     Patient not at high risk for neurologic deterioration / death.  Time spent on this noncritically ill patient: 30 minutes spent on total encounter, more than 50% of the visit was spent counseling and/or coordinating care by the attending physician.    Plan discussed with RN, house staff.

## 2021-09-02 NOTE — PROGRESS NOTE ADULT - SUBJECTIVE AND OBJECTIVE BOX
SUBJECTIVE: Patient seen and examined bedside.    midodrine 10 milliGRAM(s) Oral every 8 hours  nystatin    Suspension 899284 Unit(s) Oral four times a day      Vital Signs Last 24 Hrs  T(C): 37 (02 Sep 2021 04:43), Max: 37.1 (01 Sep 2021 21:29)  T(F): 98.6 (02 Sep 2021 04:43), Max: 98.8 (01 Sep 2021 21:29)  HR: 74 (02 Sep 2021 07:00) (72 - 98)  BP: 94/52 (01 Sep 2021 11:40) (94/52 - 94/52)  BP(mean): --  RR: 18 (02 Sep 2021 07:00) (16 - 33)  SpO2: 98% (02 Sep 2021 07:00) (92% - 100%)  I&O's Detail    01 Sep 2021 07:01  -  02 Sep 2021 07:00  --------------------------------------------------------  IN:    Enteral Tube Flush: 320 mL    IV PiggyBack: 250 mL    Sodium Chloride 0.9% Bolus: 1000 mL    Vital High Protein: 220 mL  Total IN: 1790 mL    OUT:    Indwelling Catheter - Urethral (mL): 450 mL    Intermittent Catheterization - Urethral (mL): 3275 mL    Norepinephrine: 0 mL    Rectal Tube (mL): 0 mL    Voided (mL): 0 mL  Total OUT: 3725 mL    Total NET: -1935 mL      02 Sep 2021 07:01  -  02 Sep 2021 08:11  --------------------------------------------------------  IN:    Vital High Protein: 20 mL  Total IN: 20 mL    OUT:  Total OUT: 0 mL    Total NET: 20 mL          General: NAD,  C/V: NSR  Pulm: Nonlabored breathing, no respiratory distress  Abd: soft, NT/ND. J tube secured in place, not yet in use. minimal drainage serosang around the tube  Extrem: WWP,        LABS:                        9.3    11.94 )-----------( 241      ( 02 Sep 2021 06:11 )             30.5     09-02    141  |  104  |  9   ----------------------------<  95  4.1   |  27  |  0.50    Ca    9.5      02 Sep 2021 06:11  Phos  3.1     09-02  Mg     2.1     09-02      PT/INR - ( 01 Sep 2021 04:23 )   PT: 17.3 sec;   INR: 1.47          PTT - ( 01 Sep 2021 04:23 )  PTT:33.9 sec      RADIOLOGY & ADDITIONAL STUDIES:

## 2021-09-03 NOTE — PROVIDER CONTACT NOTE (OTHER) - SITUATION
Pt was very restless and agitated and trying to get out of bed. Pt confused and stated she wanted her coffee from bedside

## 2021-09-03 NOTE — PROCEDURE NOTE - NSPATIENTPOSTION_GEN_A_CORE
supine
(semi) fowlers
Trendelenburg
supine
(semi) fowlers
supine
(semi) fowlers
Trendelenburg

## 2021-09-03 NOTE — PROCEDURE NOTE - GENERAL PROCEDURE NAME
Axillary A line removal
Collection of blood cultures
CSF collection
Collection of CSF
Collection of blood cultures
Collection of cerebrospinal fluid
Blood cultures
blood culturesx2
EVD

## 2021-09-03 NOTE — PROCEDURE NOTE - NSANATOMICLLOCATION_GEN_A_CORE
AC/left
axillary artery/left
right/basilic vein
Right frontal
axillary artery/left
forearm, antecubital/left
left
right/radial artery
left/radial artery

## 2021-09-03 NOTE — PROGRESS NOTE ADULT - ASSESSMENT
46 yo who is under neurosurg ICU care admitted for SAH. She is POD1 for J tube placement. This morning she looks good from our stand point, vitally stable, abd soft, NT, no nausea or vomiting.   Plan:   -continue J tube for trickle feeds 10cc/hr if continues to tolerate  -     44 yo who is under neurosurg ICU care admitted for SAH. She is POD2 for J tube placement. The patient had episodes of emesis yesterday, but she is doing better today. She was advanced to 30cc/ hr this afternoon    -continue J tube for trickle feeds 10cc/hr if continues to tolerate  -     46 yo who is under neurosurg ICU care admitted for SAH. She is POD2 for J tube placement. The patient had episodes of emesis yesterday, but she is doing better today. She was advanced to 30cc/ hr this afternoon which she is tolerating well. Her goal feed rate is 46 cc/hr. We ordered Abd x ray to make sure that her J tube is in the appropriate direction. the final read was not in yet. The wet read was that the J tube is in the jejunum     -continue J tube for trickle feeds and advance as tolerated  - DC Dobhoff tube  - we will f up on AXR final read

## 2021-09-03 NOTE — PROVIDER CONTACT NOTE (OTHER) - ACTION/TREATMENT ORDERED:
MARCO Christopher came to bedside to assess the pt and per order will start Precedex gtt at this time.

## 2021-09-03 NOTE — PROCEDURE NOTE - PROCEDURE DATE TIME, MLM
26-Aug-2021 13:03
26-Aug-2021 13:04
17-Aug-2021 00:38
19-Aug-2021 00:43
23-Aug-2021 15:39
24-Aug-2021 19:24
10-Aug-2021 11:45
11-Aug-2021 19:18
10-Aug-2021 09:44
11-Aug-2021
26-Aug-2021 12:00
03-Sep-2021 08:44
09-Aug-2021 21:22
15-Aug-2021 11:29
31-Aug-2021 09:24
07-Aug-2021 12:37
07-Aug-2021 12:38

## 2021-09-03 NOTE — PROVIDER CONTACT NOTE (MEDICATION) - SITUATION
Patient confused. Refusing duoneb. Keeps pulling mask off and saying that she doesn't want it. No respiratory distress noted. 97% on room air.

## 2021-09-03 NOTE — PROCEDURE NOTE - NSPERFORMEDBY_GEN_A_CORE
Bernard/Attending
Tolu/Fellow
Myself

## 2021-09-03 NOTE — PROGRESS NOTE ADULT - SUBJECTIVE AND OBJECTIVE BOX
HPI:  46y/o F with h/o recent gastric sleeve (08/04/21 [facility], Dr. Crisostomo ), fibromyalgia, thyroid dysfunction, PTSD, depression, anxiety.  Presented with seizures at home - brought to [place], with 1 more episode of seizure en route.  Intubated, CT showed SAH with IV extension, casted IV, hydrocephalus, given mannitol, levetiracetam 1g,  6mg ativan. Started on Cardene drip for BP goal < 140 and transferred to [facility] NICU for further management.     HH5 MF4   NIHSS 26 on arrival  BD 1 = 8/7 (07 Aug 2021 08:08)    Hospital Course:   8/7: Tx from [place] for SAH. Intubated. R frontal EVD placed, central line and a line placed. 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   8/8: POD1 s/p angio with R vert takedown, extubated, desatting on aerosol mask, required extensive suctioning, 3% nebs, chest PT, started on low dose precedex drip for restlessness/agitation, ABG stable. NGT placed. TF started with goal 20 pending nutrition recs. 3% stopped for Na 150. Ceribell EEG negative and d/c'ed.   8/9 Overnight, new Right pronator drift and right gaze preference that can't cross midline, Repeat CTH demonstrated stable vents, CTA head and neck unremarkable for spasm, hypoattenuation of right cerebellum edema vs. infarct.  8/10: POD3 R vert takedown, EVD open at [id number]. ASHA overnight, neuro stable. CTH with increased hydro, CTA head/neck with mild basilar spasm, but concern for PE. 1/2 am D50 for hypoglycemia. 1L bolus then 100 cc/hr, PM rounds for net negative fluid balance and mild vasospasm on CTA.   8/11: POD4 R vert takedown. EVD at 5cm H2O, ASHA overnight. neuro stable.   Febrile 104. depakote increased to q6. NCHCT stable. EVD lowered to 0. Lasix 20 given for dieuresis, UOP over 2 liters. Sedation given for a-line placement, BP drop needing levo.  8/12: POD5 R vert takedown. EVD at 0cm H2O. ASHA overnight, neuro stable. Precedex being weaned off. Pending IVCF with vascular today.  8/13: POD6 R vert takedown. EVD open at 0cmH2O. ASHA overnight. Neuro exam stable. Mottled skin on the left lower extremity improving. Started on Bromocriptine 15mg Q8.   8/14: POD7. EVD open at 0. 1L bolus given for euvolemia o/n. neuro stable. CTH stable. ENT scoped vocal cords, +R voacl cord paresis, NTD. started on zosyn empirically.   8/15: POD8. EVD open at 0. ASHA o/n, neuro stable. Pt developed increased work of breathing, unable to clear her secretions, received racemic epi and multiple nebulizer treatments, still with stridor. Patient re-intubated at bedside, on full vent support.   8/16: CTH/CTA head/neck/CT chest performed o/n for worsened exam, R gaze preference, sluggish pupils. +worsened uncal herniation, hydro, vasospasm in b/l PCAs, L vert, basilar arteries. preop angio today, restarted on 3% for Na goal 145-150. Angio for vasospasm with IA verapamil.  8/17: POD10. Overnight Pt remains on levophed drip for SBP goal 180-220. Also remains on propofol drip. EVD open at -5cmH2O. ICPs WNL. Febrile 101F and pancultured. CTH today shows slightly smaller ventricles. Angio for vasospasm with IA verapamil.  8/18: POD11 R vert takedown. POD1 angio IA verpamil. Overnight, Pt noted to have downward gaze to the right and not following commands. Taken for stat head CT which is stable. Pupils also noted to be aniscoric left pupil 4mm and brisk and right 2mm brisk. Mannitol 50g given. Ceribell eeg placed for concern of subclinical seizures, so far appears negative for seizures. 1L NS o/n and 1.5L NS bolus in AM for euvolemia. vanc/zosyn stopped. 250cc 3% bolus and 250cc albumin given in AM. Brief seizure to L frontal lobe this AM on EEG, given 2g valproic acid and dose increased to 1g q8. Vimpat 100mg BID started.  Angio with interval improvment in vasospasm, IA verapamil given. In afternoon patient self-extubated, placed on NRB with mucomyst, 3% inhalation and duonebs. 3% at 50 d/c'd.  8/19: POD 12. Remains on VEEG. Axillary A line placed. Salt tabs d/c'd, florinef decreased to 0.1mg, EVD @ -5cm H2O, now draining 20cc/hr. 250 albumin and 500 NS boluses on dayshift, 1 L LR bolus  8/20: POD 13. ASHA. on VEEG, no seizures noted. Pending VPA level. 500 NS, 250 albumin. Febrile, pancultured. EEG d/c'ed.   8/21: BD14, POD14. ASHA overnight, EVD @ -5. s/p 1L bolus for euvolemia  8/22: BD #15: continued on levo, hypercoagulabitly profile pending, EVD @ -5, euvolemia, extubated, amantadine added, free water started for hypernatremia   8/23: BD 17, tmax 101F o/n. Gen Sx consulted for PEG - not a candidate at this time, pancultured for fever, 1L LR given for euvolemia. EVD at -5  8/24: BD18, ASHA o/n, neuro stable, EVD at -5, VPA level therapeutic, SBP goal 160-200, L IJ CVC attempted placement with carotid puncture, no pseudoaneurysm on US. MSSA growing in sputum. Ceftriaxone d/c'd and Ancef 2gq8 started. ID consulted. Recieved 1.5L LR and 500cc albumin.   8/25: POD5 last angio, BD19 ASHA o/n, neuro stable, EVD at -5  8/26: BD 20. ASHA o/n. EVD @ -5   8/27: BD 21, POD 20 Right vert takedown. ASHA overnight. Exam stable. EVD remains open at -5 and to be clamped at 0600 in preparation for right VPS placement today. Possible PEG placement Monday 8/30 8/28: BD22, POD22 Right vert takedown, POD1 R VPS, neuro exam stable. Tube feeds restarted, more lethargic this AM, improved during day, continue to monitor for hydrocephalus. AXR with dilated bowel, started on reglan and magnesium. Liquid BM, TFs held and rectal tube placed.   8/29: BD23, POD23 right vert takedown, POD2 R VPS, ASHA overnight, neuro stable. TFs decreased for colonic distention seen on XR, GI notified, nothing to do, TFs resumed at 20/hr and will continue will PEG placement tomorrow. CTH today demonstrated interval decrease in size of ventricles. Midodrine added to aid in weening levophed.   8/30: BD24, POD24, pending PEG placement this morning with Dr. Milner. Midodrine increased to 10mg q8hrs today to wean off of levo. 1u PRBC for Hb 7.5. FOB negative. 20mg lasix given. Repeat dopplers completed, unchanged from previous. Started on IV iron per hematology recs.  8/31: BD25, POD25, unsuccessful PEG placement by GI, trend Hgb w/ AM labs, slowly weening levophed. 5mg vitamin K given for elevated INR.  9/1: BD26, POD26. J tube w/ gen surg today. 5mg vitK IV given o/n for elevated INR. off levophed. amantadine decreased 100 BID and ritalin d/c'd.   9/2: BD27, POD27. s/p J tube w/ gen surg. still receiving meds/TF via NGT.  neuro stable  9/3: BD 28. POD 28. Neuro stable. Restless/agitated, given seroquel       Vital Signs Last 24 Hrs  T(C): 37 (02 Sep 2021 22:33), Max: 37 (02 Sep 2021 04:43)  T(F): 98.6 (02 Sep 2021 22:33), Max: 98.6 (02 Sep 2021 04:43)  HR: 100 (03 Sep 2021 01:00) (68 - 100)  BP: --  BP(mean): --  RR: 18 (03 Sep 2021 01:00) (16 - 20)  SpO2: 99% (03 Sep 2021 01:00) (80% - 100%)    I&O's Detail    01 Sep 2021 07:01  -  02 Sep 2021 07:00  --------------------------------------------------------  IN:    Enteral Tube Flush: 320 mL    IV PiggyBack: 250 mL    Sodium Chloride 0.9% Bolus: 1000 mL    Vital High Protein: 220 mL  Total IN: 1790 mL    OUT:    Indwelling Catheter - Urethral (mL): 450 mL    Intermittent Catheterization - Urethral (mL): 3275 mL    Norepinephrine: 0 mL    Rectal Tube (mL): 0 mL    Voided (mL): 0 mL  Total OUT: 3725 mL    Total NET: -1935 mL      02 Sep 2021 07:01  -  03 Sep 2021 01:44  --------------------------------------------------------  IN:    Enteral Tube Flush: 190 mL    IV PiggyBack: 200 mL    sodium chloride 0.9%: 1125 mL    Vital High Protein: 190 mL  Total IN: 1705 mL    OUT:    Intermittent Catheterization - Urethral (mL): 1300 mL    Rectal Tube (mL): 0 mL  Total OUT: 1300 mL    Total NET: 405 mL        I&O's Summary    01 Sep 2021 07:01  -  02 Sep 2021 07:00  --------------------------------------------------------  IN: 1790 mL / OUT: 3725 mL / NET: -1935 mL    02 Sep 2021 07:01  -  03 Sep 2021 01:44  --------------------------------------------------------  IN: 1705 mL / OUT: 1300 mL / NET: 405 mL        PHYSICAL EXAM:  GEN: laying in bed, appears well, NAD  NEURO: AOx2 (self/place). FC, OE spont, hypophonic, face symmetric. +R gaze preference, can cross midline. pupils 4mm reactive b/l. MAEx4 anti gravity  CV: RRR +S1/S2  PULM: CTAB  GI: Abd soft, NT/ND  EXT: ext warm, dry, nontender      LABS:                        9.3    11.94 )-----------( 241      ( 02 Sep 2021 06:11 )             30.5     09-02    141  |  104  |  9   ----------------------------<  95  4.1   |  27  |  0.50    Ca    9.5      02 Sep 2021 06:11  Phos  3.1     09-02  Mg     2.1     09-02      PT/INR - ( 01 Sep 2021 04:23 )   PT: 17.3 sec;   INR: 1.47          PTT - ( 01 Sep 2021 04:23 )  PTT:33.9 sec        CAPILLARY BLOOD GLUCOSE          Drug Levels: [] N/A    CSF Analysis: [] N/A      Allergies    apple (Angioedema)  No Known Drug Allergies  strawberry (Angioedema)    Intolerances    lactose (Stomach Upset)    MEDICATIONS:  Antibiotics:  ceFAZolin   IVPB 2000 milliGRAM(s) IV Intermittent every 8 hours  ceFAZolin   IVPB      nystatin    Suspension 037320 Unit(s) Oral four times a day    Neuro:  bromocriptine Capsule 10 milliGRAM(s) Oral every 8 hours  lacosamide Solution 100 milliGRAM(s) Oral two times a day  metoclopramide Injectable 5 milliGRAM(s) IV Push every 6 hours  ondansetron Injectable 4 milliGRAM(s) IV Push every 6 hours PRN    Anticoagulation:  apixaban 5 milliGRAM(s) Enteral Tube every 12 hours    OTHER:  acetylcysteine 20%  Inhalation 4 milliLiter(s) Inhalation every 6 hours  albuterol/ipratropium for Nebulization 3 milliLiter(s) Nebulizer every 6 hours  BACItracin   Ointment 1 Application(s) Topical two times a day  BUpivacaine liposome 1.3% Injectable (no eMAR) 20 milliLiter(s) Local Injection once  chlorhexidine 2% Cloths 1 Application(s) Topical <User Schedule>  midodrine 5 milliGRAM(s) Oral <User Schedule>  pantoprazole   Suspension 40 milliGRAM(s) Oral daily    IVF:  multivitamin/minerals/iron Oral Solution (CENTRUM) 15 milliLiter(s) Oral daily  sodium chloride 0.9%. 1000 milliLiter(s) IV Continuous <Continuous>    CULTURES:  Culture Results:   Sputum specimen rejected.  Microscopic examination indicates  oropharyngeal contamination.  Please repeat. (08-27 @ 01:53)  Culture Results:   No growth (08-26 @ 14:27)    RADIOLOGY & ADDITIONAL TESTS:      ASSESSMENT:  46y/o F with h/o recent gastric sleeve (08/04/21 [facility], Dr. Crisostomo ), fibromyalgia, thyroid dysfunction, PTSD, depression, anxiety.  Presented with seizures at home - brought to [place], with 1 more episode of seizure en route.  Intubated, CT showed SAH with IV extension, casted IV, hydrocephalus, given mannitol, levetiracetam 1g,  6mg ativan. Started on Cardene drip for BP goal < 140 and transferred to [facility] NICU for further management. HH5 MF4, BD 1 = 8/7. Now s/p cerebral angiogram for R vertebral artery takedown for R vertebral dissecting aneurysm (8/7), and R frontal EVD placement (8/7), now s/p IVC filter placement (8/12,) s/p angio IA verapamil 8/16, 8/17, 8/18, 8/20. Now s/p right VPS certas at 4 (8/27).    Plan   NEURO:   - neuro checks q4h/vitals q2   - Vimpat 100mg bid   - Bromocriptine 10mg Q8  - Amantadine dc'd 9/2 and ritalin d/c'ed  - Angio demonstrating vasospasm of Right ICA, right PCOMM, Left distal vert and basilar confluence, and received IA verapamil on 8/16. 8/17. 8/18. 8/20.   - Repeat CTH 8/29 demonstrated decreased size of ventricles.     PULM:    - copious thick secretion, standing, Duonebs, 3% nebs, mucomyst  - chest PT   - CXR - aspiration precautions, requires frequent suctioning  - Dr. Quan lau, endonasal suctioning, no bronch for now    CARDIO:    - -150  - TTE 8/12 WNL    - midodrine - weaning     ENDO:    - glucose goal 140 -180    GI:    - Trickle  feeds   - PPI per bariatric surgeon   - s/p J-tube: okay to use NGT, do not place anything below clear plate on skin   - + RT, bowel regimen held     RENAL:   - Maintain euvolemia, NS @ 75  - normonatremia goal  - straight cath prn - retaining     HEM/ONC:   - ASA 81 - held s/p OR and in anticipation of PEG placement   - VTE Prophylaxis: SCDs  - ELiquis 5 BID   - dopplers 8/23, acute DVT left IM calf, persistent completely occlusive DVT b/l peroneal veins and right IM calf , 8/30 unchanged DVTs  - carotid doppler 8/24: neg for pseudoaneurysm, CTA shows soft tissue small hematoma, non-occlusive L IJ and L basillic vein thrombus   - hypercoagulable w/u: - prothrombin gene mutation - heterozygous   - 8/30LUE doppler with findings of left IJ non-occlusive DVT and left basilic thrombus  - vascular re L arm cool- UE dopplers normal   - s/p 1u PRBC 8/30  - Dr. Montiel following, IV iron x 3 days  - s/p vit k 5mg PO, vit k 5mg IV 8/31 for elevated INR    ID:   - MRSA neg 8/12   - Afebrile   - Last Pancx 8/23   - Ancef for MSSA pna coverage x 7 d completed 8/30  - Ancef x 3 d for R scalp incision erythema   - PICC line placed 8/26    Assessment and plan discussed with Dr. Lomax, Dr. Conn and Dr. Bernard

## 2021-09-03 NOTE — PROCEDURE NOTE - NSTOLERANCE_GEN_A_CORE
Patient tolerated procedure well.

## 2021-09-03 NOTE — PROGRESS NOTE ADULT - SUBJECTIVE AND OBJECTIVE BOX
=================================  NEUROCRITICAL CARE ATTENDING NOTE  =================================    LUDMILA PRIETO   MRN-[id number]  Summary:  45y/F with h/o recent gastric sleeve ( [facility], Dr. Crisostomo ), fibromyalgia, thyroid dysfunction, PTSD, depression, anxiety.  Presented with seizures at home - brought to [place], with 1 more episode of seizure en route.  Intubated, CT showed SAH with IV extension, casted IV, hydrocephalus, given mannitol levetiracetam 6mg ativan, transferred to [facility].    COURSE IN THE HOSPITAL:   admitted to [facility], EVD inserted, high pressure, central line lawrence inserted   extubated   Tmax 38.4   Tmax 38.3   Tmax 38.2 attempted L IJ / SC insertion with arterial puncture, otherwise stable     s/p J tube   No significant events overnight.    agitated overnight    Past Medical History:   Allergies:  Allergy Status Unknown  Home meds:     PHYSICAL EXAMINATION  T(C): 36.3 ( @ 04:17), Max: 37 ( @ 22:33) HR: 88 ( @ 07:00) (68 - 100) BP: -- RR: 18 ( @ 07:00) (16 - 20) SpO2: 94% ( @ 07:00) (80% - 100%)  NEUROLOGIC EXAMINATION:  Patient is awake, oriented x 1-2, hypophonic, following commands  pupils reactive, pupils reactive but anisocoric 2mm R 4mm L, moves B UE spontaneously, L>R, moves B LE spontaneously  GENERAL: room air  EENT:  anicteric  CARDIOVASCULAR: (+) S1 S2, normal rate and regular rhythm  PULMONARY: improving secretions  ABDOMEN: soft, nontender with normoactive bowel sounds  EXTREMITIES: no edema  SKIN: no rash, pustules around wound site    LABS:               9.1    9.03  )-----------( 198      ( 03 Sep 2021 05:04 )             30.3     146<H>  |  109<H>  |  10  ----------------------------<  100<H>  3.6   |  25  |  0.57    Ca    9.1      03 Sep 2021 05:04  Phos  2.9     09-03  Mg     1.8      @ 07:01  -   @ 07:00  IN: 2344.8 mL / OUT: 1650 mL / NET: 694.8 mL    HbA1C = 5.9 () 5.9 ()  LDL = 84 ()   HDL = 50 ()  TG = 74 ()  TSH = 0.078 ()    Bacteriology:   sputum GS: few GPCIClusters and pairs, rare GNR   CSF:  NGTD   Blood CS NG1D x2   sputum: moderate MSSA   sputum: rejected, contaminated   Blood CS NG3D    CSF NGTD   sputum culture: No organisms   Blood CS NG5D x2 (FINAL)   CSF No organisms  08/15 Sputum culture: normal trent   CSF: NGTD    CSF studies:  EEG:  Neuroimagin/17 CT head:  decrease in size of lateral and third ventricles; interval demarcated infarctions within R cerebellar hemisphere  08/15 CTA: CTA HEAD: Agree that there are new/more pronounced areas of irregularity and narrowing involving the distal left vertebral artery, basilar artery, bilateral PCAs, supraclinoid right ICA and right M1 segment. There is mild narrowing of the left A1 and left M1 segments as well as the bilateral A2 segments which is new from prior examination. These findings are suspicious for vasospasm.  Other imagin/23 LE Doppler: new acute DVT L IM calf vein, persistent acute DVT bilateral peroneal veins, persistent acute DVT R IM calf veins   LE Doppler:  new DVT bilateral peroneal veins, DVT in R intramuscular calf vein    MEDICATIONS:     ·	apixaban 5 Enteral Tube every 12 hours  ·	ceFAZolin   IVPB 2000 IV Intermittent every 8 hours  ·	nystatin    Suspension 063310 Oral four times a day  ·	bromocriptine Capsule 10 Oral every 8 hours  ·	lacosamide Solution 100 Oral two times a day  ·	metoclopramide Injectable 5 IV Push every 6 hours  ·	midodrine 5 milliGRAM(s) Oral <User Schedule>  ·	acetylcysteine 20%  Inhalation 4 Inhalation every 6 hours  ·	albuterol/ipratropium for Nebulization 3 Nebulizer every 6 hours  ·	pantoprazole   Suspension 40 Oral daily  ·	BACItracin   Ointment 1 Topical two times a day  ·	BUpivacaine liposome 1.3% Injectable (no eMAR) 20 Local Injection once  ·	multivitamin/minerals/iron Oral Solution (CENTRUM) 15 Oral daily  ·	ondansetron Injectable 4 IV Push every 6 hours PRN    IV FLUIDS: IVL  DRIPS:  ·	dexMEDEtomidine Infusion 0.2 IV Continuous <Continuous>  DIET: Vital   Lines: PICC [place]  Drains:  rectal tube  Wounds:    CODE STATUS:  Full Code                       GOALS OF CARE:  aggressive                      DISPOSITION:  ICU   5 MF 4 =================================  NEUROCRITICAL CARE ATTENDING NOTE  =================================    LUDMILA PRIETO   MRN-[id number]  Summary:  45y/F with h/o recent gastric sleeve ( [facility], Dr. Crisostomo ), fibromyalgia, thyroid dysfunction, PTSD, depression, anxiety.  Presented with seizures at home - brought to [place], with 1 more episode of seizure en route.  Intubated, CT showed SAH with IV extension, casted IV, hydrocephalus, given mannitol levetiracetam 6mg ativan, transferred to [facility].    COURSE IN THE HOSPITAL:   admitted to [facility], EVD inserted, high pressure, central line lawrence inserted   extubated   Tmax 38.4   Tmax 38.3   Tmax 38.2 attempted L IJ / SC insertion with arterial puncture, otherwise stable     s/p J tube   No significant events overnight.    agitated overnight, precedex x 40 minutes, also given seroquel    Past Medical History:   Allergies:  Allergy Status Unknown  Home meds:     PHYSICAL EXAMINATION  T(C): 36.3 ( @ 04:17), Max: 37 ( @ 22:33) HR: 88 ( @ 07:00) (68 - 100) BP: -- RR: 18 ( @ 07:00) (16 - 20) SpO2: 94% ( @ 07:00) (80% - 100%)  NEUROLOGIC EXAMINATION:  Patient is awake, oriented x 1-2, hypophonic, following commands  pupils reactive, pupils reactive but anisocoric 2mm R 4mm L, moves B UE spontaneously, L>R, moves B LE spontaneously  GENERAL: room air  EENT:  anicteric  CARDIOVASCULAR: (+) S1 S2, normal rate and regular rhythm  PULMONARY: improving secretions  ABDOMEN: soft, nontender with normoactive bowel sounds  EXTREMITIES: no edema  SKIN: no rash, pustules around wound site    LABS:               9.1    9.03  )-----------( 198      ( 03 Sep 2021 05:04 )             30.3     146<H>  |  109<H>  |  10  ----------------------------<  100<H>  3.6   |  25  |  0.57    Ca    9.1      03 Sep 2021 05:04  Phos  2.9       Mg     1.8      @ 07:01  -   @ 07:00  IN: 2344.8 mL / OUT: 1650 mL / NET: 694.8 mL    HbA1C = 5.9 () 5.9 ()  LDL = 84 ()   HDL = 50 ()  TG = 74 ()  TSH = 0.078 ()    Bacteriology:   sputum GS: few GPCIClusters and pairs, rare GNR   CSF:  NGTD   Blood CS NG1D x2   sputum: moderate MSSA   sputum: rejected, contaminated   Blood CS NG3D    CSF NGTD   sputum culture: No organisms   Blood CS NG5D x2 (FINAL)   CSF No organisms  08/15 Sputum culture: normal trent   CSF: NGTD    CSF studies:  EEG:  Neuroimagin/17 CT head:  decrease in size of lateral and third ventricles; interval demarcated infarctions within R cerebellar hemisphere  08/15 CTA: CTA HEAD: Agree that there are new/more pronounced areas of irregularity and narrowing involving the distal left vertebral artery, basilar artery, bilateral PCAs, supraclinoid right ICA and right M1 segment. There is mild narrowing of the left A1 and left M1 segments as well as the bilateral A2 segments which is new from prior examination. These findings are suspicious for vasospasm.  Other imagin/23 LE Doppler: new acute DVT L IM calf vein, persistent acute DVT bilateral peroneal veins, persistent acute DVT R IM calf veins   LE Doppler:  new DVT bilateral peroneal veins, DVT in R intramuscular calf vein    MEDICATIONS:     ·	apixaban 5 Enteral Tube every 12 hours  ·	ceFAZolin   IVPB 2000 IV Intermittent every 8 hours  ·	nystatin    Suspension [id number] Oral four times a day  ·	bromocriptine Capsule 10 Oral every 8 hours  ·	lacosamide Solution 100 Oral two times a day  ·	metoclopramide Injectable 5 IV Push every 6 hours  ·	midodrine 5 milliGRAM(s) Oral <User Schedule>  ·	acetylcysteine 20%  Inhalation 4 Inhalation every 6 hours  ·	albuterol/ipratropium for Nebulization 3 Nebulizer every 6 hours  ·	pantoprazole   Suspension 40 Oral daily  ·	BACItracin   Ointment 1 Topical two times a day  ·	BUpivacaine liposome 1.3% Injectable (no eMAR) 20 Local Injection once  ·	multivitamin/minerals/iron Oral Solution (CENTRUM) 15 Oral daily  ·	ondansetron Injectable 4 IV Push every 6 hours PRN    IV FLUIDS: NS@75cc/hr  DRIPS:  ·	dexMEDEtomidine Infusion 0.2 IV Continuous <Continuous> - OFF  DIET: Vital   Lines: PICC   Drains:    Wounds:    CODE STATUS:  Full Code                       GOALS OF CARE:  aggressive                      DISPOSITION:  ICU   5 MF 4

## 2021-09-03 NOTE — PROCEDURE NOTE - NSPROCNAME_GEN_A_CORE
General
Central Line Insertion
Feeding Tube Replacement
General
General
Arterial Puncture/Cannulation
General
PICC Line Insertion
Central Line Insertion
Tracheal Intubation
Arterial Puncture/Cannulation
Tracheal Intubation

## 2021-09-03 NOTE — PROGRESS NOTE ADULT - ASSESSMENT
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  REHAB:  physical therapy evaluation and management  - defer  EARLY MOB:  HOB elevated    PULM:  pulmo toilette; continue ipratropium / albuterol q6h mucomyst, d/c 3%  CARDIO:  SBP goal 100-150mm Hg, on midodrine wean to 5 q8h   ENDO:  Blood sugar goals 140-180 mg/dL, cont insulin sliding scale  GI:  no bowel regimen   DIET: J-tube start trickle feeds, d/c NGT once J tube at goal x 24h  RENAL: maintain euvolemia, accurate Is and Os Na goal 135-145; d/c free water  HEM/ONC: no coagulopathy (INR= 1.2), no ASA / Plavix use; hypercoag work-up per heme, IV iron  VTE Prophylaxis: SCDs, SQL 90 q12h, switch to Eliquis, repeat dopplers on 09/06; s/p IVC filter L IJ L basilic thrombus, L arm cold to touch, radial less palpable but dopplerable, f/u arterial dopplers; f/u vascular - pending attending input  ID: afebrile no leukocytosis, pustules - folliculitis - on ancef, start bacitracin   Social: will update family, Daughter Maggie and Son Kenya and sister (on the phone)   MISC: f/u meds post bariatric surgery    CORE MEASURES  1.  Hunt and Mckeon Score = 5  2.  VTE prophylaxis:  [ ] administered within 24 hours of admission OR [X] reason not done: fresh bleed, unsecured aneurysm.  3.  Dysphagia screening:   [X] performed before any oral meds / liquids / food  4.  Nimodipine treatment:  [X] administered within 24 hours of admission OR [ ] reason not done:    ATTENDING ATTESTATION:  I was physically present for the key portions of the evaluation and management (E/M) service provided.  I agree with the above history, physical and plan which I have reviewed and edited where appropriate.     Patient not at high risk for neurologic deterioration / death.  Time spent on this noncritically ill patient: 45 minutes spent on total encounter, more than 50% of the visit was spent counseling and/or coordinating care by the attending physician.    Plan discussed with RN, house staff.    REVIEW OF SYSTEMS:  No headaches, no nausea or vomiting; 14 -point review of systems otherwise unremarkable.           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  hydrocephalus: s/p shunt  Seizure prophylaxis:  cont lacosamide  on bromocriptine - decrease 5 q8h  REHAB:  physical therapy evaluation and management  - defer  EARLY MOB:  HOB elevated    PULM:  pulmo toilette; continue ipratropium / albuterol q6h mucomyst  CARDIO:  SBP goal 100-150mm Hg, on midodrine 5 q8h   ENDO:  Blood sugar goals 140-180 mg/dL, cont insulin sliding scale  GI:  no bowel regimen   DIET: J-tube advance to goal, d/c NGT  RENAL: maintain euvolemia, accurate Is and Os Na goal 135-145; restart free water; decrease NS to 30cc/hr  HEM/ONC: no coagulopathy (INR= 1.2), no ASA / Plavix use; hypercoag work-up per heme, IV iron  VTE Prophylaxis: SCDs, Eliquis, repeat dopplers on 09/06; s/p IVC filter, radial doppler unremarkable  ID: afebrile no leukocytosis, pustules - folliculitis - on ancef, bacitracin   Social: will update family, Daughter Maggie and Son Kenya and sister (on the phone)   MISC: cont MVI     CORE MEASURES  1.  Hunt and Mckeon Score = 5  2.  VTE prophylaxis:  [ ] administered within 24 hours of admission OR [X] reason not done: fresh bleed, unsecured aneurysm.  3.  Dysphagia screening:   [X] performed before any oral meds / liquids / food  4.  Nimodipine treatment:  [X] administered within 24 hours of admission OR [ ] reason not done:    ATTENDING ATTESTATION:  I was physically present for the key portions of the evaluation and management (E/M) service provided.  I agree with the above history, physical and plan which I have reviewed and edited where appropriate.     Patient not at high risk for neurologic deterioration / death.  Time spent on this noncritically ill patient: 45 minutes spent on total encounter, more than 50% of the visit was spent counseling and/or coordinating care by the attending physician.    Plan discussed with RN, house staff.    REVIEW OF SYSTEMS:  No headaches, no nausea or vomiting; 14 -point review of systems otherwise unremarkable.

## 2021-09-03 NOTE — PROGRESS NOTE ADULT - SUBJECTIVE AND OBJECTIVE BOX
SUBJECTIVE: Patient seen and examined bedside.    apixaban 5 milliGRAM(s) Enteral Tube every 12 hours  ceFAZolin   IVPB 2000 milliGRAM(s) IV Intermittent every 8 hours  ceFAZolin   IVPB      midodrine 5 milliGRAM(s) Oral <User Schedule>  nystatin    Suspension [id number] Unit(s) Oral four times a day      Vital Signs Last 24 Hrs  T(C): 36.3 (03 Sep 2021 04:17), Max: 37 (02 Sep 2021 22:33)  T(F): 97.4 (03 Sep 2021 04:17), Max: 98.6 (02 Sep 2021 22:33)  HR: 74 (03 Sep 2021 06:00) (68 - 100)  BP: --  BP(mean): --  RR: 20 (03 Sep 2021 06:00) (16 - 20)  SpO2: 95% (03 Sep 2021 06:00) (80% - 100%)  I&O's Detail    01 Sep 2021 07:01  -  02 Sep 2021 07:00  --------------------------------------------------------  IN:    Enteral Tube Flush: 320 mL    IV PiggyBack: 250 mL    Sodium Chloride 0.9% Bolus: 1000 mL    Vital High Protein: 220 mL  Total IN: 1790 mL    OUT:    Indwelling Catheter - Urethral (mL): 450 mL    Intermittent Catheterization - Urethral (mL): 3275 mL    Norepinephrine: 0 mL    Rectal Tube (mL): 0 mL    Voided (mL): 0 mL  Total OUT: 3725 mL    Total NET: -1935 mL      02 Sep 2021 07:01  -  03 Sep 2021 06:38  --------------------------------------------------------  IN:    Dexmedetomidine: 9.8 mL    Enteral Tube Flush: 240 mL    IV PiggyBack: 200 mL    sodium chloride 0.9%: 1575 mL    Vital High Protein: 280 mL  Total IN: 2304.8 mL    OUT:    Intermittent Catheterization - Urethral (mL): 1650 mL    Rectal Tube (mL): 0 mL  Total OUT: 1650 mL    Total NET: 654.8 mL          General: NAD, slightly restless  C/V: NSR  Pulm: Nonlabored breathing, no respiratory distress  Abd: soft, NT, nontender, Jtube secured in place, incision: c,d,i  Extrem: WWP, no edema        LABS:                        9.1    9.03  )-----------( 198      ( 03 Sep 2021 05:04 )             30.3     09-03    146<H>  |  109<H>  |  10  ----------------------------<  100<H>  3.6   |  25  |  0.57    Ca    9.1      03 Sep 2021 05:04  Phos  2.9     09-03  Mg     1.8     09-03

## 2021-09-03 NOTE — PROCEDURE NOTE - NSSITEPREP_SKIN_A_CORE
chlorhexidine
chlorhexidine/povidone iodine (if allergic to chlorhexidine)/Adherence to aseptic technique: hand hygiene prior to donning barriers (gown, gloves), don cap and mask, sterile drape over patient
chlorhexidine/Adherence to aseptic technique: hand hygiene prior to donning barriers (gown, gloves), don cap and mask, sterile drape over patient
chlorhexidine
Adherence to aseptic technique: hand hygiene prior to donning barriers (gown, gloves), don cap and mask, sterile drape over patient
chlorhexidine

## 2021-09-04 NOTE — CONSULT NOTE ADULT - ASSESSMENT
46y/o F with h/o recent gastric sleeve (08/04/21 [facility], Dr. Crisostomo ), fibromyalgia, thyroid dysfunction, PTSD, depression, anxiety.  Presented with seizures at home - brought to [place], with 1 more episode of seizure en route.  Intubated, CT showed SAH with IV extension, casted IV, hydrocephalus, given mannitol, levetiracetam 1g,  6mg ativan. Started on Cardene drip for BP goal < 140 and transferred to [facility] NICU for further management. HH5 MF4, BD 1 = 8/7. Now s/p cerebral angiogram for R vertebral artery takedown for R vertebral dissecting aneurysm (8/7), and R frontal EVD placement (8/7), now s/p IVC filter placement (8/12,) s/p angio IA verapamil 8/16, 8/17, 8/18, 8/20. Now s/p right VPS certas at 4 (8/27).

## 2021-09-04 NOTE — CONSULT NOTE ADULT - SUBJECTIVE AND OBJECTIVE BOX
Patient is a 45y old  Female who presents with a chief complaint of SAH (04 Sep 2021 07:05)      HPI:  44y/o F with h/o recent gastric sleeve (08/04/21 [facility], Dr. Crisostomo ), fibromyalgia, thyroid dysfunction, PTSD, depression, anxiety.  Presented with seizures at home - brought to [place], with 1 more episode of seizure en route.  Intubated, CT showed SAH with IV extension, casted IV, hydrocephalus, given mannitol, levetiracetam 1g,  6mg ativan. Started on Cardene drip for BP goal < 140 and transferred to [facility] NICU for further management.     HH5 MF4   NIHSS 26 on arrival  BD 1 = 8/7 (07 Aug 2021 08:08)    Subjective:    Allergies    apple (Angioedema)  No Known Drug Allergies  strawberry (Angioedema)    Intolerances    lactose (Stomach Upset)      MEDICATIONS  (STANDING):  acetylcysteine 20%  Inhalation 4 milliLiter(s) Inhalation every 12 hours  albuterol/ipratropium for Nebulization 3 milliLiter(s) Nebulizer every 6 hours  apixaban 5 milliGRAM(s) Enteral Tube every 12 hours  BACItracin   Ointment 1 Application(s) Topical two times a day  bromocriptine Capsule 5 milliGRAM(s) Oral every 8 hours  ceFAZolin   IVPB 2000 milliGRAM(s) IV Intermittent every 8 hours  ceFAZolin   IVPB      chlorhexidine 2% Cloths 1 Application(s) Topical <User Schedule>  ergocalciferol 59199 Unit(s) Oral <User Schedule>  lacosamide Solution 100 milliGRAM(s) Oral two times a day  metoclopramide Injectable 5 milliGRAM(s) IV Push every 6 hours  midodrine 5 milliGRAM(s) Oral <User Schedule>  multivitamin/minerals/iron Oral Solution (CENTRUM) 15 milliLiter(s) Oral daily  nystatin    Suspension 601044 Unit(s) Oral four times a day  pantoprazole   Suspension 40 milliGRAM(s) Oral daily  QUEtiapine 50 milliGRAM(s) Oral at bedtime    MEDICATIONS  (PRN):  ondansetron Injectable 4 milliGRAM(s) IV Push every 6 hours PRN Nausea and/or Vomiting  sodium chloride 0.9% lock flush 10 milliLiter(s) IV Push every 1 hour PRN Pre/post blood products, medications, blood draw, and to maintain line patency      Drug Dosing Weight  Height (cm): 162.6 (01 Sep 2021 11:40)  Weight (kg): 98.2 (01 Sep 2021 11:40)  BMI (kg/m2): 37.1 (01 Sep 2021 11:40)  BSA (m2): 2.02 (01 Sep 2021 11:40)    PAST MEDICAL & SURGICAL HISTORY:      FAMILY HISTORY:  No pertinent family history in first degree relatives        SOCIAL HISTORY:    ADVANCE DIRECTIVES:  Vital Signs Last 24 Hrs  T(C): 36.7 (04 Sep 2021 06:37), Max: 36.9 (03 Sep 2021 18:00)  T(F): 98 (04 Sep 2021 06:37), Max: 98.5 (03 Sep 2021 18:00)  HR: 90 (04 Sep 2021 08:41) (76 - 100)  BP: 142/62 (04 Sep 2021 08:41) (103/58 - 142/62)  BP(mean): 89 (04 Sep 2021 03:59) (75 - 91)  ABP: --  ABP(mean): --  RR: 18 (04 Sep 2021 08:41) (17 - 18)  SpO2: 94% (04 Sep 2021 08:41) (93% - 98%)          I&O's Detail    03 Sep 2021 07:01  -  04 Sep 2021 07:00  --------------------------------------------------------  IN:    Enteral Tube Flush: 600 mL    IV PiggyBack: 200 mL    IV PiggyBack: 100 mL    sodium chloride 0.9%: 450 mL    sodium chloride 0.9%: 225 mL    Vital High Protein: 912 mL  Total IN: 2487 mL    OUT:    Voided (mL): 1525 mL  Total OUT: 1525 mL    Total NET: 962 mL          PHYSICAL EXAM:      Constitutional:    Eyes:    ENMT:    Neck:    Breasts:    Back:    Respiratory:    Cardiovascular:    Gastrointestinal:    Genitourinary:    Rectal:    Extremities:    Vascular:    Neurological:    Skin:    Lymph Nodes:    Musculoskeletal:    Psychiatric:        LABS:  CBC Full  -  ( 04 Sep 2021 08:19 )  WBC Count : 6.78 K/uL  RBC Count : 3.95 M/uL  Hemoglobin : 10.7 g/dL  Hematocrit : 35.0 %  Platelet Count - Automated : 200 K/uL  Mean Cell Volume : 88.6 fl  Mean Cell Hemoglobin : 27.1 pg  Mean Cell Hemoglobin Concentration : 30.6 gm/dL  Auto Neutrophil # : x  Auto Lymphocyte # : x  Auto Monocyte # : x  Auto Eosinophil # : x  Auto Basophil # : x  Auto Neutrophil % : x  Auto Lymphocyte % : x  Auto Monocyte % : x  Auto Eosinophil % : x  Auto Basophil % : x    09-04    143  |  105  |  8   ----------------------------<  118<H>  3.6   |  27  |  0.49<L>    Ca    9.2      04 Sep 2021 08:19  Phos  2.8     09-04  Mg     2.2     09-04      CAPILLARY BLOOD GLUCOSE              EKG:    ECHO, US:    < from: TTE Limited Echo w/o Cont (08.12.21 @ 15:22) >  CONCLUSIONS:     1. Limited study obtained for evaluation of right ventricular function.   2. Probably normal left ventricular systolic function.   3. Normal right ventricular size and systolic function.   4. There was insufficient tricuspid regurgitation detected from which to calculate pulmonary artery systolic pressure.   5. No pericardial effusion.    < end of copied text >      RADIOLOGY:  < from: CT Head No Cont (08.29.21 @ 17:45) >  IMPRESSION:    With ventricular shunt in place, there is now decreased ventricular size since 08/27/2021.    < end of copied text >         Patient is a 45y old  Female who presents with a chief complaint of SAH (04 Sep 2021 07:05)      HPI:  44y/o F with h/o recent gastric sleeve (08/04/21 [facility], Dr. Crisostomo ), fibromyalgia, thyroid dysfunction, PTSD, depression, anxiety.  Presented with seizures at home - brought to [place], with 1 more episode of seizure en route.  Intubated, CT showed SAH with IV extension, casted IV, hydrocephalus, given mannitol, levetiracetam 1g,  6mg ativan. Started on Cardene drip for BP goal < 140 and transferred to [facility] NICU for further management.     HH5 MF4   NIHSS 26 on arrival  BD 1 = 8/7 (07 Aug 2021 08:08)    Subjective:   Pt denies pain. Unable to elicit much history given patient's clinical status.     Allergies    apple (Angioedema)  No Known Drug Allergies  strawberry (Angioedema)    Intolerances    lactose (Stomach Upset)      MEDICATIONS  (STANDING):  acetylcysteine 20%  Inhalation 4 milliLiter(s) Inhalation every 12 hours  albuterol/ipratropium for Nebulization 3 milliLiter(s) Nebulizer every 6 hours  apixaban 5 milliGRAM(s) Enteral Tube every 12 hours  BACItracin   Ointment 1 Application(s) Topical two times a day  bromocriptine Capsule 5 milliGRAM(s) Oral every 8 hours  ceFAZolin   IVPB 2000 milliGRAM(s) IV Intermittent every 8 hours  ceFAZolin   IVPB      chlorhexidine 2% Cloths 1 Application(s) Topical <User Schedule>  ergocalciferol 43909 Unit(s) Oral <User Schedule>  lacosamide Solution 100 milliGRAM(s) Oral two times a day  metoclopramide Injectable 5 milliGRAM(s) IV Push every 6 hours  midodrine 5 milliGRAM(s) Oral <User Schedule>  multivitamin/minerals/iron Oral Solution (CENTRUM) 15 milliLiter(s) Oral daily  nystatin    Suspension 703867 Unit(s) Oral four times a day  pantoprazole   Suspension 40 milliGRAM(s) Oral daily  QUEtiapine 50 milliGRAM(s) Oral at bedtime    MEDICATIONS  (PRN):  ondansetron Injectable 4 milliGRAM(s) IV Push every 6 hours PRN Nausea and/or Vomiting  sodium chloride 0.9% lock flush 10 milliLiter(s) IV Push every 1 hour PRN Pre/post blood products, medications, blood draw, and to maintain line patency      Drug Dosing Weight  Height (cm): 162.6 (01 Sep 2021 11:40)  Weight (kg): 98.2 (01 Sep 2021 11:40)  BMI (kg/m2): 37.1 (01 Sep 2021 11:40)  BSA (m2): 2.02 (01 Sep 2021 11:40)    PAST MEDICAL & SURGICAL HISTORY:      FAMILY HISTORY:  No pertinent family history in first degree relatives        SOCIAL HISTORY: no smoking reported    ADVANCE DIRECTIVES:  Vital Signs Last 24 Hrs  T(C): 36.7 (04 Sep 2021 06:37), Max: 36.9 (03 Sep 2021 18:00)  T(F): 98 (04 Sep 2021 06:37), Max: 98.5 (03 Sep 2021 18:00)  HR: 90 (04 Sep 2021 08:41) (76 - 100)  BP: 142/62 (04 Sep 2021 08:41) (103/58 - 142/62)  BP(mean): 89 (04 Sep 2021 03:59) (75 - 91)  ABP: --  ABP(mean): --  RR: 18 (04 Sep 2021 08:41) (17 - 18)  SpO2: 94% (04 Sep 2021 08:41) (93% - 98%)          I&O's Detail    03 Sep 2021 07:01  -  04 Sep 2021 07:00  --------------------------------------------------------  IN:    Enteral Tube Flush: 600 mL    IV PiggyBack: 200 mL    IV PiggyBack: 100 mL    sodium chloride 0.9%: 450 mL    sodium chloride 0.9%: 225 mL    Vital High Protein: 912 mL  Total IN: 2487 mL    OUT:    Voided (mL): 1525 mL  Total OUT: 1525 mL    Total NET: 962 mL          PHYSICAL EXAM:      Constitutional: NAD  Eyes: PERRLA  ENMT: thrush  Neck: supple  Back: midline  Respiratory: CTA b/l  Cardiovascular: rrr, s1s2, no m/r/g  Gastrointestinal: soft, NTND, + J tube  Extremities: wwp  Vascular: + 2 pules radial  Neurological: AAO x 2, minimally following commands  Skin: no rash  Lymph Nodes: no LAD  Musculoskeletal: no joint swelling  Psychiatric: flat affect        LABS:  CBC Full  -  ( 04 Sep 2021 08:19 )  WBC Count : 6.78 K/uL  RBC Count : 3.95 M/uL  Hemoglobin : 10.7 g/dL  Hematocrit : 35.0 %  Platelet Count - Automated : 200 K/uL  Mean Cell Volume : 88.6 fl  Mean Cell Hemoglobin : 27.1 pg  Mean Cell Hemoglobin Concentration : 30.6 gm/dL  Auto Neutrophil # : x  Auto Lymphocyte # : x  Auto Monocyte # : x  Auto Eosinophil # : x  Auto Basophil # : x  Auto Neutrophil % : x  Auto Lymphocyte % : x  Auto Monocyte % : x  Auto Eosinophil % : x  Auto Basophil % : x    09-04    143  |  105  |  8   ----------------------------<  118<H>  3.6   |  27  |  0.49<L>    Ca    9.2      04 Sep 2021 08:19  Phos  2.8     09-04  Mg     2.2     09-04      CAPILLARY BLOOD GLUCOSE              EKG:    ECHO, US:    < from: TTE Limited Echo w/o Cont (08.12.21 @ 15:22) >  CONCLUSIONS:     1. Limited study obtained for evaluation of right ventricular function.   2. Probably normal left ventricular systolic function.   3. Normal right ventricular size and systolic function.   4. There was insufficient tricuspid regurgitation detected from which to calculate pulmonary artery systolic pressure.   5. No pericardial effusion.    < end of copied text >      RADIOLOGY:  < from: CT Head No Cont (08.29.21 @ 17:45) >  IMPRESSION:    With ventricular shunt in place, there is now decreased ventricular size since 08/27/2021.    < end of copied text >         Bell's palsy  , left eye dropping and left mouth twiching when tired  Diabetes mellitus    History of  delivery  . GDMA2  Uterine polyp     AMA (advanced maternal age) multigravida 35+    Bell's palsy  , left eye dropping and left mouth twiching when tired  Gestational diabetes mellitus    History of  delivery  . GDMA2  Uterine polyp

## 2021-09-04 NOTE — PROGRESS NOTE ADULT - ASSESSMENT
44y/o F with h/o recent gastric sleeve (08/04/21 [facility], Dr. Crisostomo ), fibromyalgia, thyroid dysfunction, PTSD, depression, anxiety.  Presented with seizures at home - brought to [place], with 1 more episode of seizure en route.  Intubated, CT showed SAH with IV extension, casted IV, hydrocephalus, given mannitol, levetiracetam 1g,  6mg ativan. Started on Cardene drip for BP goal < 140 and transferred to [facility] NICU for further management. HH5 MF4, BD 1 = 8/7. 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 Now s/p right VPS certas at 4 (8/27).

## 2021-09-04 NOTE — PROGRESS NOTE ADULT - SUBJECTIVE AND OBJECTIVE BOX
Interval Events: Reviewed  Patient seen and examined at bedside.    Patient is a 45y old  Female who presents with a chief complaint of SAH (04 Sep 2021 02:27)  no acute event overnight, RA 96 %      PAST MEDICAL & SURGICAL HISTORY:      MEDICATIONS:  Pulmonary:  acetylcysteine 20%  Inhalation 4 milliLiter(s) Inhalation every 12 hours  albuterol/ipratropium for Nebulization 3 milliLiter(s) Nebulizer every 6 hours    Antimicrobials:  ceFAZolin   IVPB 2000 milliGRAM(s) IV Intermittent every 8 hours  ceFAZolin   IVPB      nystatin    Suspension 546397 Unit(s) Oral four times a day    Anticoagulants:  apixaban 5 milliGRAM(s) Enteral Tube every 12 hours    Cardiac:  midodrine 5 milliGRAM(s) Oral <User Schedule>      Allergies    apple (Angioedema)  No Known Drug Allergies  strawberry (Angioedema)    Intolerances    lactose (Stomach Upset)      Vital Signs Last 24 Hrs  T(C): 36.7 (04 Sep 2021 06:37), Max: 36.9 (03 Sep 2021 18:00)  T(F): 98 (04 Sep 2021 06:37), Max: 98.5 (03 Sep 2021 18:00)  HR: 88 (04 Sep 2021 03:59) (76 - 100)  BP: 131/64 (04 Sep 2021 03:59) (103/58 - 131/64)  BP(mean): 89 (04 Sep 2021 03:59) (75 - 91)  RR: 18 (04 Sep 2021 03:59) (17 - 18)  SpO2: 96% (04 Sep 2021 03:59) (93% - 98%)    09-03 @ 07:01  -  09-04 @ 07:00  --------------------------------------------------------  IN: 1535 mL / OUT: 1025 mL / NET: 510 mL          Review of Systems:   •	General: negative  •	Skin/Breast: negative  •	Ophthalmologic: negative  •	ENMT: negative  •	Respiratory and Thorax: negative  •	Cardiovascular: negative  •	Gastrointestinal: negative  •	Genitourinary: negative  •	Musculoskeletal: negative  •	Neurological: negative  •	Psychiatric: negative  •	Hematology/Lymphatics: negative  •	Endocrine: negative  •	Allergic/Immunologic: negative    Physical Exam:   • Constitutional:	Well-developed, well nourished  • Eyes:	EOMI; PERRL; no drainage or redness  • ENMT:	No oral lesions; no gross abnormalities  • Neck	no thyromegaly or nodules  • Breasts:	not examined  • Back:	No deformity or limitation of movement  • Respiratory:	Breath Sounds equal & clear to auscultation, no accessory muscle use  • Cardiovascular:	Regular rate & rhythm, normal S1, S2; no murmurs, gallops or rubs; no S3, S4  • Gastrointestinal:	Soft, non-tender, no hepatosplenomegaly, normal bowel sounds  • Genitourinary:	not examined  • Rectal: not examined  • Extremities:	No cyanosis, clubbing or edema  • Vascular:	Equal and normal pulses (dorsalis pedis)  • Neurologica:l	not examined  • Skin:	No lesions; no rash  • Lymph Nodes:	No lymphadedenopathy  • Musculoskeletal:	No joint pain, swelling or deformity; no limitation of movement        LABS:      CBC Full  -  ( 03 Sep 2021 05:04 )  WBC Count : 9.03 K/uL  RBC Count : 3.45 M/uL  Hemoglobin : 9.1 g/dL  Hematocrit : 30.3 %  Platelet Count - Automated : 198 K/uL  Mean Cell Volume : 87.8 fl  Mean Cell Hemoglobin : 26.4 pg  Mean Cell Hemoglobin Concentration : 30.0 gm/dL  Auto Neutrophil # : x  Auto Lymphocyte # : x  Auto Monocyte # : x  Auto Eosinophil # : x  Auto Basophil # : x  Auto Neutrophil % : x  Auto Lymphocyte % : x  Auto Monocyte % : x  Auto Eosinophil % : x  Auto Basophil % : x    09-03    146<H>  |  109<H>  |  10  ----------------------------<  100<H>  3.6   |  25  |  0.57    Ca    9.1      03 Sep 2021 05:04  Phos  2.9     09-03  Mg     1.8     09-03                          RADIOLOGY & ADDITIONAL STUDIES (The following images were personally reviewed):  Richey:                                     No  Urine output:                       adequate  DVT prophylaxis:                 Yes  Flattus:                                  Yes  Bowel movement:              No

## 2021-09-04 NOTE — CONSULT NOTE ADULT - PROBLEM SELECTOR PROBLEM 1
Multiple subsegmental pulmonary emboli without acute cor pulmonale
Multiple subsegmental pulmonary emboli without acute cor pulmonale
SAH (subarachnoid hemorrhage)

## 2021-09-04 NOTE — CONSULT NOTE ADULT - CONSULT REQUESTED DATE/TIME
01-Sep-2021 19:00
11-Aug-2021 21:08
24-Aug-2021 13:50
25-Aug-2021 12:22
26-Aug-2021 09:21
14-Aug-2021 12:39
23-Aug-2021 15:50
11-Aug-2021 21:20
31-Aug-2021 15:59
18-Aug-2021 16:45
30-Aug-2021 09:01
18-Aug-2021
04-Sep-2021 09:48

## 2021-09-04 NOTE — PROGRESS NOTE ADULT - SUBJECTIVE AND OBJECTIVE BOX
HPI:  44y/o F with h/o recent gastric sleeve (08/04/21 [facility], Dr. Crisostomo ), fibromyalgia, thyroid dysfunction, PTSD, depression, anxiety.  Presented with seizures at home - brought to [place], with 1 more episode of seizure en route.  Intubated, CT showed SAH with IV extension, casted IV, hydrocephalus, given mannitol, levetiracetam 1g,  6mg ativan. Started on Cardene drip for BP goal < 140 and transferred to [facility] NICU for further management.     HH5 MF4   NIHSS 26 on arrival  BD 1 = 8/7 (07 Aug 2021 08:08)      Hospital course:   8/7: Tx from [place] for SAH. Intubated. R frontal EVD placed, central line and a line placed. 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   8/8: POD1 s/p angio with R vert takedown, extubated, desatting on aerosol mask, required extensive suctioning, 3% nebs, chest PT, started on low dose precedex drip for restlessness/agitation, ABG stable. NGT placed. TF started with goal 20 pending nutrition recs. 3% stopped for Na 150. Ceribell EEG negative and d/c'ed.   8/9 Overnight, new Right pronator drift and right gaze preference that can't cross midline, Repeat CTH demonstrated stable vents, CTA head and neck unremarkable for spasm, hypoattenuation of right cerebellum edema vs. infarct.  8/10: POD3 R vert takedown, EVD open at [id number]. ASHA overnight, neuro stable. CTH with increased hydro, CTA head/neck with mild basilar spasm, but concern for PE. 1/2 am D50 for hypoglycemia. 1L bolus then 100 cc/hr, PM rounds for net negative fluid balance and mild vasospasm on CTA.   8/11: POD4 R vert takedown. EVD at 5cm H2O, ASHA overnight. neuro stable.   Febrile 104. depakote increased to q6. NCHCT stable. EVD lowered to 0. Lasix 20 given for dieuresis, UOP over 2 liters. Sedation given for a-line placement, BP drop needing levo.  8/12: POD5 R vert takedown. EVD at 0cm H2O. ASHA overnight, neuro stable. Precedex being weaned off. Pending IVCF with vascular today.  8/13: POD6 R vert takedown. EVD open at 0cmH2O. ASHA overnight. Neuro exam stable. Mottled skin on the left lower extremity improving. Started on Bromocriptine 15mg Q8.   8/14: POD7. EVD open at 0. 1L bolus given for euvolemia o/n. neuro stable. CTH stable. ENT scoped vocal cords, +R voacl cord paresis, NTD. started on zosyn empirically.   8/15: POD8. EVD open at 0. ASHA o/n, neuro stable. Pt developed increased work of breathing, unable to clear her secretions, received racemic epi and multiple nebulizer treatments, still with stridor. Patient re-intubated at bedside, on full vent support.   8/16: CTH/CTA head/neck/CT chest performed o/n for worsened exam, R gaze preference, sluggish pupils. +worsened uncal herniation, hydro, vasospasm in b/l PCAs, L vert, basilar arteries. preop angio today, restarted on 3% for Na goal 145-150. Angio for vasospasm with IA verapamil.  8/17: POD10. Overnight Pt remains on levophed drip for SBP goal 180-220. Also remains on propofol drip. EVD open at -5cmH2O. ICPs WNL. Febrile 101F and pancultured. CTH today shows slightly smaller ventricles. Angio for vasospasm with IA verapamil.  8/18: POD11 R vert takedown. POD1 angio IA verpamil. Overnight, Pt noted to have downward gaze to the right and not following commands. Taken for stat head CT which is stable. Pupils also noted to be aniscoric left pupil 4mm and brisk and right 2mm brisk. Mannitol 50g given. Ceribell eeg placed for concern of subclinical seizures, so far appears negative for seizures. 1L NS o/n and 1.5L NS bolus in AM for euvolemia. vanc/zosyn stopped. 250cc 3% bolus and 250cc albumin given in AM. Brief seizure to L frontal lobe this AM on EEG, given 2g valproic acid and dose increased to 1g q8. Vimpat 100mg BID started.  Angio with interval improvment in vasospasm, IA verapamil given. In afternoon patient self-extubated, placed on NRB with mucomyst, 3% inhalation and duonebs. 3% at 50 d/c'd.  8/19: POD 12. Remains on VEEG. Axillary A line placed. Salt tabs d/c'd, florinef decreased to 0.1mg, EVD @ -5cm H2O, now draining 20cc/hr. 250 albumin and 500 NS boluses on dayshift, 1 L LR bolus  8/20: POD 13. ASHA. on VEEG, no seizures noted. Pending VPA level. 500 NS, 250 albumin. Febrile, pancultured. EEG d/c'ed.   8/21: BD14, POD14. ASHA overnight, EVD @ -5. s/p 1L bolus for euvolemia  8/22: BD #15: continued on levo, hypercoagulabitly profile pending, EVD @ -5, euvolemia, extubated, amantadine added, free water started for hypernatremia   8/23: BD 17, tmax 101F o/n. Gen Sx consulted for PEG - not a candidate at this time, pancultured for fever, 1L LR given for euvolemia. EVD at -5  8/24: BD18, ASHA o/n, neuro stable, EVD at -5, VPA level therapeutic, SBP goal 160-200, L IJ CVC attempted placement with carotid puncture, no pseudoaneurysm on US. MSSA growing in sputum. Ceftriaxone d/c'd and Ancef 2gq8 started. ID consulted. Recieved 1.5L LR and 500cc albumin.   8/25: POD5 last angio, BD19 ASHA o/n, neuro stable, EVD at -5  8/26: BD 20. ASHA o/n. EVD @ -5   8/27: BD 21, POD 20 Right vert takedown. ASHA overnight. Exam stable. EVD remains open at -5 and to be clamped at 0600 in preparation for right VPS placement today. Possible PEG placement Monday 8/30 8/28: BD22, POD22 Right vert takedown, POD1 R VPS, neuro exam stable. Tube feeds restarted, more lethargic this AM, improved during day, continue to monitor for hydrocephalus. AXR with dilated bowel, started on reglan and magnesium. Liquid BM, TFs held and rectal tube placed.   8/29: BD23, POD23 right vert takedown, POD2 R VPS, ASHA overnight, neuro stable. TFs decreased for colonic distention seen on XR, GI notified, nothing to do, TFs resumed at 20/hr and will continue will PEG placement tomorrow. CTH today demonstrated interval decrease in size of ventricles. Midodrine added to aid in weening levophed.   8/30: BD24, POD24, pending PEG placement this morning with Dr. Milner. Midodrine increased to 10mg q8hrs today to wean off of levo. 1u PRBC for Hb 7.5. FOB negative. 20mg lasix given. Repeat dopplers completed, unchanged from previous. Started on IV iron per hematology recs.  8/31: BD25, POD25, unsuccessful PEG placement by GI, trend Hgb w/ AM labs, slowly weening levophed. 5mg vitamin K given for elevated INR.  9/1: BD26, POD26. J tube w/ gen surg today. 5mg vitK IV given o/n for elevated INR. off levophed. amantadine decreased 100 BID and ritalin d/c'd.   9/2: BD27, POD27. s/p J tube w/ gen surg. still receiving meds/TF via J tube.  neuro stable  9/4: BD 28, POD28, s/p J tube w/ gen surg. Neuro stable       Vital Signs Last 24 Hrs  T(C): 36.9 (03 Sep 2021 22:01), Max: 36.9 (03 Sep 2021 18:00)  T(F): 98.4 (03 Sep 2021 22:01), Max: 98.5 (03 Sep 2021 18:00)  HR: 76 (04 Sep 2021 00:22) (72 - 100)  BP: 124/69 (04 Sep 2021 00:22) (103/58 - 124/69)  BP(mean): 91 (04 Sep 2021 00:22) (75 - 91)  RR: 17 (04 Sep 2021 00:22) (17 - 20)  SpO2: 97% (04 Sep 2021 00:22) (93% - 98%)    I&O's Summary    02 Sep 2021 07:01  -  03 Sep 2021 07:00  --------------------------------------------------------  IN: 2344.8 mL / OUT: 1650 mL / NET: 694.8 mL    03 Sep 2021 07:01  -  04 Sep 2021 02:28  --------------------------------------------------------  IN: 1535 mL / OUT: 1025 mL / NET: 510 mL        PHYSICAL EXAM:  GEN: laying in bed, appears well, NAD  NEURO: AOx2 (self/place). FC, OE spont, hypophonic, face symmetric. +R gaze preference, can cross midline. pupils 4mm reactive b/l. MAEx4 anti gravity  CV: RRR +S1/S2  PULM: CTAB  GI: Abd soft, NT/ND  EXT: ext warm, dry, nontender      TUBES/LINES:  [] Richey  [] A-line  [] Lumbar Drain  [] Wound Drains  [] NGT   [] EVD   [] CVC  [] Other      DIET:  [] NPO  [] Mechanical  [] Tube feeds    LABS:                        9.1    9.03  )-----------( 198      ( 03 Sep 2021 05:04 )             30.3     09-03    146<H>  |  109<H>  |  10  ----------------------------<  100<H>  3.6   |  25  |  0.57    Ca    9.1      03 Sep 2021 05:04  Phos  2.9     09-03  Mg     1.8     09-03              CAPILLARY BLOOD GLUCOSE          Drug Levels: [] N/A    CSF Analysis: [] N/A      Allergies    apple (Angioedema)  No Known Drug Allergies  strawberry (Angioedema)    Intolerances    lactose (Stomach Upset)      Home Medications:      MEDICATIONS:  MEDICATIONS  (STANDING):  acetylcysteine 20%  Inhalation 4 milliLiter(s) Inhalation every 12 hours  albuterol/ipratropium for Nebulization 3 milliLiter(s) Nebulizer every 6 hours  apixaban 5 milliGRAM(s) Enteral Tube every 12 hours  BACItracin   Ointment 1 Application(s) Topical two times a day  bromocriptine Capsule 5 milliGRAM(s) Oral every 8 hours  ceFAZolin   IVPB      ceFAZolin   IVPB 2000 milliGRAM(s) IV Intermittent every 8 hours  chlorhexidine 2% Cloths 1 Application(s) Topical <User Schedule>  ergocalciferol 14172 Unit(s) Oral <User Schedule>  lacosamide Solution 100 milliGRAM(s) Oral two times a day  metoclopramide Injectable 5 milliGRAM(s) IV Push every 6 hours  midodrine 5 milliGRAM(s) Oral <User Schedule>  multivitamin/minerals/iron Oral Solution (CENTRUM) 15 milliLiter(s) Oral daily  nystatin    Suspension 668669 Unit(s) Oral four times a day  pantoprazole   Suspension 40 milliGRAM(s) Oral daily  QUEtiapine 50 milliGRAM(s) Oral at bedtime    MEDICATIONS  (PRN):  ondansetron Injectable 4 milliGRAM(s) IV Push every 6 hours PRN Nausea and/or Vomiting  sodium chloride 0.9% lock flush 10 milliLiter(s) IV Push every 1 hour PRN Pre/post blood products, medications, blood draw, and to maintain line patency      CULTURES:  Culture Results:   Sputum specimen rejected.  Microscopic examination indicates  oropharyngeal contamination.  Please repeat. (08-27 @ 01:53)  Culture Results:   No growth (08-26 @ 14:27)      RADIOLOGY & ADDITIONAL TESTS:      ASSESSMENT:  44y/o F with h/o recent gastric sleeve (08/04/21 [facility], Dr. Crisostomo ), fibromyalgia, thyroid dysfunction, PTSD, depression, anxiety.  Presented with seizures at home - brought to [place], with 1 more episode of seizure en route.  Intubated, CT showed SAH with IV extension, casted IV, hydrocephalus, given mannitol, levetiracetam 1g,  6mg ativan. Started on Cardene drip for BP goal < 140 and transferred to [facility] NICU for further management. HH5 MF4, BD 1 = 8/7. Now s/p cerebral angiogram for R vertebral artery takedown for R vertebral dissecting aneurysm (8/7), and R frontal EVD placement (8/7), now s/p IVC filter placement (8/12,) s/p angio IA verapamil 8/16, 8/17, 8/18, 8/20. Now s/p right VPS certas at 4 (8/27).    Plan   NEURO:   - neuro checks q4h/vitals q4   - Vimpat 100mg bid   - Bromocriptine 10mg Q8  - Amantadine dc'd 9/2 and ritalin d/c'ed  - Angio demonstrating vasospasm of Right ICA, right PCOMM, Left distal vert and basilar confluence, and received IA verapamil on 8/16. 8/17. 8/18. 8/20.   - Repeat CTH 8/29 demonstrated decreased size of ventricles.   - CTH pending 9/6; consider restarting ASA s/p R vert takedown after scan    PULM:    - copious thick secretion, standing, Duonebs, 3% nebs, mucomyst  - chest PT   - CXR - aspiration precautions, requires frequent suctioning  - Dr. Quan lau, endonasal suctioning, no bronch for now    CARDIO:    - -150  - TTE 8/12 WNL    - midodrine - weaning     ENDO:    - glucose goal 140 -180    GI:    - Advancing TF via J tube, NGT d/c 9/3  - PPI per bariatric surgeon   - bowel regimen held   - On Vit D and multivitamin s/p bariatric surgery    RENAL:   - Maintain euvolemia, NS @ 50, FW 200q8  - normonatremia goal  - straight cath prn - retaining     HEM/ONC:   - ASA 81 - held s/p OR and in anticipation of PEG placement   - VTE Prophylaxis: SCDs  - ELiquis 5 BID   - dopplers 8/23, acute DVT left IM calf, persistent completely occlusive DVT b/l peroneal veins and right IM calf , 8/30 unchanged DVTs  - carotid doppler 8/24: neg for pseudoaneurysm, CTA shows soft tissue small hematoma, non-occlusive L IJ and L basillic vein thrombus   - hypercoagulable w/u: - prothrombin gene mutation - heterozygous   - 8/30LUE doppler with findings of left IJ non-occlusive DVT and left basilic thrombus  - vascular re L arm cool- UE dopplers normal   - s/p 1u PRBC 8/30  - Dr. Montiel following, IV iron x 3 days  - s/p vit k 5mg PO, vit k 5mg IV 8/31 for elevated INR    ID:   - MRSA neg 8/12   - Afebrile   - Last Pancx 8/23   - Ancef for MSSA pna coverage x 7 d completed 8/30  - Ancef x 3 d for R scalp incision erythema   - PICC line placed 8/26    Assessment and plan discussed with Dr. Lomax, Dr. Conn and Dr. Bernadr    Assessment: present when checked     [] GCS   E   V   M     Heart Failure: [] Acute, [] acute on chronic, [] chronic   Heart Failure: [] Diastolic (HFpEF), [] Systolic (HRrEF), [] Combined (HFpEF and HFrEF), [] RHF, [] Pulm HTN, [] Other     [] TRUDI, [] ATN, [] AIN, [] other   [] CKD1, [] CKD2, [] CKD3, [] CKD4, [] CKD5, [] ESRD     Encephalopathy: [] Metabolic, [] Hepatic, [] Toxic, [] Neurological, [] Other     Abnormal Nutritional Status: [] malnutrition (see nutrition note), []underweight: BMI <19, [] morbid obesity: BMI >40, [] Cachexia     [] Sepsis   [] Hypovolemic shock, [] Cardiogenic shock, [] Hemorrhagic shock, [] Neurogenic shock   [] Acute respiratory failure   [] Cerebral edema, [] Brain compression / herniation   [] Functional quadriplegia   [] Acute blood loss anemia

## 2021-09-04 NOTE — CONSULT NOTE ADULT - PROVIDER SPECIALTY LIST ADULT
Surgery
Pulmonology
Epilepsy
Gastroenterology
Infectious Disease
ENT
Surgery
Surgery
Vascular Surgery
Intervent Radiology
Vascular Surgery
Heme/Onc
Hospitalist

## 2021-09-04 NOTE — CONSULT NOTE ADULT - CONSULT REQUESTED BY NAME
Eran Mcginnis
MARGO
[facility]
NS
Dr. Oates
Twila
NSICU
Primary
MARGO
Neurosurgery service
alex
MARGO
Dr. Lomax

## 2021-09-05 NOTE — PROVIDER CONTACT NOTE (CHANGE IN STATUS NOTIFICATION) - DATE AND TIME:
11-Aug-2021 18:30
11-Aug-2021 08:40
14-Aug-2021 06:00
04-Sep-2021 11:03
08-Aug-2021 22:30
05-Sep-2021 01:00

## 2021-09-05 NOTE — PROGRESS NOTE ADULT - SUBJECTIVE AND OBJECTIVE BOX
HPI:  44y/o F with h/o recent gastric sleeve (08/04/21 [facility], Dr. Crisostomo ), fibromyalgia, thyroid dysfunction, PTSD, depression, anxiety.  Presented with seizures at home - brought to [place], with 1 more episode of seizure en route.  Intubated, CT showed SAH with IV extension, casted IV, hydrocephalus, given mannitol, levetiracetam 1g,  6mg ativan. Started on Cardene drip for BP goal < 140 and transferred to [facility] NICU for further management.     HH5 MF4   NIHSS 26 on arrival  BD 1 = 8/7 (07 Aug 2021 08:08)    HOSPITAL COURSE:   8/7: Tx from [place] for SAH. Intubated. R frontal EVD placed, central line and a line placed. 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   8/8: POD1 s/p angio with R vert takedown, extubated, desatting on aerosol mask, required extensive suctioning, 3% nebs, chest PT, started on low dose precedex drip for restlessness/agitation, ABG stable. NGT placed. TF started with goal 20 pending nutrition recs. 3% stopped for Na 150. Ceribell EEG negative and d/c'ed.   8/9 Overnight, new Right pronator drift and right gaze preference that can't cross midline, Repeat CTH demonstrated stable vents, CTA head and neck unremarkable for spasm, hypoattenuation of right cerebellum edema vs. infarct.  8/10: POD3 R vert takedown, EVD open at [id number]. ASHA overnight, neuro stable. CTH with increased hydro, CTA head/neck with mild basilar spasm, but concern for PE. 1/2 am D50 for hypoglycemia. 1L bolus then 100 cc/hr, PM rounds for net negative fluid balance and mild vasospasm on CTA.   8/11: POD4 R vert takedown. EVD at 5cm H2O, ASHA overnight. neuro stable.   Febrile 104. depakote increased to q6. NCHCT stable. EVD lowered to 0. Lasix 20 given for dieuresis, UOP over 2 liters. Sedation given for a-line placement, BP drop needing levo.  8/12: POD5 R vert takedown. EVD at 0cm H2O. ASHA overnight, neuro stable. Precedex being weaned off. Pending IVCF with vascular today.  8/13: POD6 R vert takedown. EVD open at 0cmH2O. ASHA overnight. Neuro exam stable. Mottled skin on the left lower extremity improving. Started on Bromocriptine 15mg Q8.   8/14: POD7. EVD open at 0. 1L bolus given for euvolemia o/n. neuro stable. CTH stable. ENT scoped vocal cords, +R voacl cord paresis, NTD. started on zosyn empirically.   8/15: POD8. EVD open at 0. ASHA o/n, neuro stable. Pt developed increased work of breathing, unable to clear her secretions, received racemic epi and multiple nebulizer treatments, still with stridor. Patient re-intubated at bedside, on full vent support.   8/16: CTH/CTA head/neck/CT chest performed o/n for worsened exam, R gaze preference, sluggish pupils. +worsened uncal herniation, hydro, vasospasm in b/l PCAs, L vert, basilar arteries. preop angio today, restarted on 3% for Na goal 145-150. Angio for vasospasm with IA verapamil.  8/17: POD10. Overnight Pt remains on levophed drip for SBP goal 180-220. Also remains on propofol drip. EVD open at -5cmH2O. ICPs WNL. Febrile 101F and pancultured. CTH today shows slightly smaller ventricles. Angio for vasospasm with IA verapamil.  8/18: POD11 R vert takedown. POD1 angio IA verpamil. Overnight, Pt noted to have downward gaze to the right and not following commands. Taken for stat head CT which is stable. Pupils also noted to be aniscoric left pupil 4mm and brisk and right 2mm brisk. Mannitol 50g given. Ceribell eeg placed for concern of subclinical seizures, so far appears negative for seizures. 1L NS o/n and 1.5L NS bolus in AM for euvolemia. vanc/zosyn stopped. 250cc 3% bolus and 250cc albumin given in AM. Brief seizure to L frontal lobe this AM on EEG, given 2g valproic acid and dose increased to 1g q8. Vimpat 100mg BID started.  Angio with interval improvment in vasospasm, IA verapamil given. In afternoon patient self-extubated, placed on NRB with mucomyst, 3% inhalation and duonebs. 3% at 50 d/c'd.  8/19: POD 12. Remains on VEEG. Axillary A line placed. Salt tabs d/c'd, florinef decreased to 0.1mg, EVD @ -5cm H2O, now draining 20cc/hr. 250 albumin and 500 NS boluses on dayshift, 1 L LR bolus  8/20: POD 13. ASHA. on VEEG, no seizures noted. Pending VPA level. 500 NS, 250 albumin. Febrile, pancultured. EEG d/c'ed.   8/21: BD14, POD14. ASHA overnight, EVD @ -5. s/p 1L bolus for euvolemia  8/22: BD #15: continued on levo, hypercoagulabitly profile pending, EVD @ -5, euvolemia, extubated, amantadine added, free water started for hypernatremia   8/23: BD 17, tmax 101F o/n. Gen Sx consulted for PEG - not a candidate at this time, pancultured for fever, 1L LR given for euvolemia. EVD at -5  8/24: BD18, ASHA o/n, neuro stable, EVD at -5, VPA level therapeutic, SBP goal 160-200, L IJ CVC attempted placement with carotid puncture, no pseudoaneurysm on US. MSSA growing in sputum. Ceftriaxone d/c'd and Ancef 2gq8 started. ID consulted. Recieved 1.5L LR and 500cc albumin.   8/25: POD5 last angio, BD19 ASHA o/n, neuro stable, EVD at -5  8/26: BD 20. ASHA o/n. EVD @ -5   8/27: BD 21, POD 20 Right vert takedown. ASHA overnight. Exam stable. EVD remains open at -5 and to be clamped at 0600 in preparation for right VPS placement today. Possible PEG placement Monday 8/30 8/28: BD22, POD22 Right vert takedown, POD1 R VPS, neuro exam stable. Tube feeds restarted, more lethargic this AM, improved during day, continue to monitor for hydrocephalus. AXR with dilated bowel, started on reglan and magnesium. Liquid BM, TFs held and rectal tube placed.   8/29: BD23, POD23 right vert takedown, POD2 R VPS, ASHA overnight, neuro stable. TFs decreased for colonic distention seen on XR, GI notified, nothing to do, TFs resumed at 20/hr and will continue will PEG placement tomorrow. CTH today demonstrated interval decrease in size of ventricles. Midodrine added to aid in weening levophed.   8/30: BD24, POD24, pending PEG placement this morning with Dr. Milner. Midodrine increased to 10mg q8hrs today to wean off of levo. 1u PRBC for Hb 7.5. FOB negative. 20mg lasix given. Repeat dopplers completed, unchanged from previous. Started on IV iron per hematology recs.  8/31: BD25, POD25, unsuccessful PEG placement by GI, trend Hgb w/ AM labs, slowly weening levophed. 5mg vitamin K given for elevated INR.  9/1: BD26, POD26. J tube w/ gen surg today. 5mg vitK IV given o/n for elevated INR. off levophed. amantadine decreased 100 BID and ritalin d/c'd.   9/2: BD27, POD27. s/p J tube w/ gen surg. still receiving meds/TF via J tube.  neuro stable  9/4: BD 28, POD28, s/p J tube w/ gen surg. Neuro stable CTH performed, changed shunt to Certas @ 3 from 4.   9/5: BD 29, POD29, s/p J tube w/ gen surg. VPS certas from 4 to 3 now. Required 1mg IV haldol overnight for agitation/delirium, patient not responding to sitter and attempting to get out of bed numerous times. Cont Eliquis 5 BID for b/l LE DVTs.         OVERNIGHT EVENTS:  Vital Signs Last 24 Hrs  T(C): 36.4 (04 Sep 2021 22:00), Max: 36.8 (04 Sep 2021 18:00)  T(F): 97.5 (04 Sep 2021 22:00), Max: 98.3 (04 Sep 2021 18:00)  HR: 108 (05 Sep 2021 00:00) (80 - 108)  BP: 124/58 (05 Sep 2021 00:00) (118/74 - 151/84)  BP(mean): 84 (05 Sep 2021 00:00) (84 - 106)  RR: 18 (05 Sep 2021 00:00) (18 - 18)  SpO2: 94% (05 Sep 2021 00:00) (92% - 96%)    I&O's Summary    03 Sep 2021 07:01  -  04 Sep 2021 07:00  --------------------------------------------------------  IN: 2487 mL / OUT: 1525 mL / NET: 962 mL    04 Sep 2021 07:01  -  05 Sep 2021 03:29  --------------------------------------------------------  IN: 418 mL / OUT: 500 mL / NET: -82 mL        PHYSICAL EXAM:  General: NAD, pt is comfortably sitting up in bed, A&O x2 (self and place), on RA  HEENT: CN II-XII grossly intact, PERRL 4mm, +R gaze preference, face symmetric, tongue midline, hypophonic speech, neck FROM  Cardiovascular: RRR, normal S1 and S2   Respiratory: lungs CTAB, no wheezing, rhonchi, or crackles   GI: normoactive BS to auscultation, abd soft, NTND   Neuro: no aphasia, speech clear, no dysmetria, no pronator drift  CALLE anti-gravity, symmetric   Extremities: distal pulses 2+ x4   Wound/incision: abdominal and R crani VPS incision with staples C/D/I     TUBES/LINES:  [] Richey  [] A-line  [] Lumbar Drain  [] Wound Drains  [] NGT   [] EVD   [] CVC  [X] Other - J tube, VPS certas @3       DIET:  [] NPO  [] Mechanical  [X] Tube feeds    LABS:                        10.7   6.78  )-----------( 200      ( 04 Sep 2021 08:19 )             35.0     09-04    143  |  105  |  8   ----------------------------<  118<H>  3.6   |  27  |  0.49<L>    Ca    9.2      04 Sep 2021 08:19  Phos  2.8     09-04  Mg     2.2     09-04              CAPILLARY BLOOD GLUCOSE          Drug Levels: [] N/A    CSF Analysis: [] N/A      Allergies    apple (Angioedema)  No Known Drug Allergies  strawberry (Angioedema)    Intolerances    lactose (Stomach Upset)    MEDICATIONS:  Antibiotics:  ceFAZolin   IVPB 2000 milliGRAM(s) IV Intermittent every 8 hours  ceFAZolin   IVPB      nystatin    Suspension 312891 Unit(s) Oral four times a day    Neuro:  bromocriptine Capsule 5 milliGRAM(s) Oral every 8 hours  lacosamide Solution 100 milliGRAM(s) Oral two times a day  ondansetron Injectable 4 milliGRAM(s) IV Push every 6 hours PRN  QUEtiapine 50 milliGRAM(s) Oral at bedtime    Anticoagulation:  apixaban 5 milliGRAM(s) Enteral Tube every 12 hours    OTHER:  acetylcysteine 20%  Inhalation 4 milliLiter(s) Inhalation every 12 hours  albuterol/ipratropium for Nebulization 3 milliLiter(s) Nebulizer every 6 hours  BACItracin   Ointment 1 Application(s) Topical two times a day  chlorhexidine 2% Cloths 1 Application(s) Topical <User Schedule>  midodrine 5 milliGRAM(s) Oral <User Schedule>  pantoprazole   Suspension 40 milliGRAM(s) Oral daily  senna 2 Tablet(s) Oral at bedtime    IVF:  ergocalciferol 74399 Unit(s) Oral <User Schedule>  multivitamin/minerals/iron Oral Solution (CENTRUM) 15 milliLiter(s) Oral daily    CULTURES:  Culture Results:   Sputum specimen rejected.  Microscopic examination indicates  oropharyngeal contamination.  Please repeat. (08-27 @ 01:53)  Culture Results:   No growth (08-26 @ 14:27)    RADIOLOGY & ADDITIONAL TESTS:      ASSESSMENT:  44y/o F with h/o recent gastric sleeve (08/04/21 [facility], Dr. Crisostomo ), fibromyalgia, thyroid dysfunction, PTSD, depression, anxiety.  Presented with seizures at home - brought to [place], with 1 more episode of seizure en route.  Intubated, CT showed SAH with IV extension, casted IV, hydrocephalus, given mannitol, levetiracetam 1g,  6mg ativan. Started on Cardene drip for BP goal < 140 and transferred to [facility] NICU for further management. HH5 MF4, BD 1 = 8/7. Now s/p cerebral angiogram for R vertebral artery takedown for R vertebral dissecting aneurysm (8/7), and R frontal EVD placement (8/7), now s/p IVC filter placement (8/12,) s/p angio IA verapamil 8/16, 8/17, 8/18, 8/20. Now s/p right VPS certas at 3 (8/27).      HEAD BLEED    No pertinent family history in first degree relatives    Handoff    MEWS Score    Cerebral aneurysm    Cerebral artery vasospasm    SAH (subarachnoid hemorrhage)    Impaired swallowing    Cerebral aneurysm    DVT, lower extremity    Pulmonary embolism    Cerebral artery vasospasm    SAH (subarachnoid hemorrhage)    Impaired swallowing    Multiple subsegmental pulmonary emboli without acute cor pulmonale    DVT, lower extremity    Abnormality of lung on CXR    Heterozygous for prothrombin [id number] mutation    Anemia due to acute blood loss    SAH (subarachnoid hemorrhage)    Aneurysm    Angiogram, carotid and cerebral arteries    IVC filter placement    Angiogram, carotid and cerebral, bilateral    Angiogram, carotid and cerebral, bilateral    Angiogram, carotid and cerebral, bilateral    Angiogram, carotid and cerebral, bilateral    Placement,  shunt, using frameless stereotaxy    Laparoscopic insertion of jejunostomy tube    [id number]        PLAN:  NEURO:   - neuro checks q4h/vitals q4   - Vimpat 100mg bid   - Bromocriptine 5mg Q8  - Amantadine dc'd 9/2 and ritalin d/c'ed  - Angio demonstrating vasospasm of Right ICA, right PCOMM, Left distal vert and basilar confluence, and received IA verapamil on 8/16. 8/17. 8/18. 8/20.   - Repeat CTH 9/4 demonstrated stable ventricles.     PULM:    - copious thick secretion, standing, Duonebs, 3% nebs, mucomyst  - chest PT   - CXR - aspiration precautions, requires frequent suctioning  - Dr. Quan following, endonasal suctioning, no bronch for now    CARDIO:    - -150  - TTE 8/12 WNL    - midodrine - weaning     ENDO:    - glucose goal 140 -180    GI:    - Advancing TF via J tube, NGT d/c 9/3  - PPI per bariatric surgeon   - bowel regimen held   - On Vit D and multivitamin s/p bariatric surgery    RENAL:   - Maintain euvolemia, FW 200q8  - normonatremia goal  - straight cath prn - retaining     HEM/ONC:   - ASA 81 - held s/p OR and in anticipation of PEG placement   - VTE Prophylaxis: SCDs  - Eliquis 5 BID   - dopplers 8/23, acute DVT left IM calf, persistent completely occlusive DVT b/l peroneal veins and right IM calf , 8/30 unchanged DVTs  - carotid doppler 8/24: neg for pseudoaneurysm, CTA shows soft tissue small hematoma, non-occlusive L IJ and L basillic vein thrombus   - hypercoagulable w/u: - prothrombin gene mutation - heterozygous   - 8/30LUE doppler with findings of left IJ non-occlusive DVT and left basilic thrombus  - vascular re L arm cool- UE dopplers normal   - s/p 1u PRBC 8/30  - s/p vit k 5mg PO, vit k 5mg IV 8/31 for elevated INR    ID:   - MRSA neg 8/12   - Afebrile   - Last Pancx 8/23   - Ancef for MSSA pna coverage x 7 d completed 8/30  - Ancef x 3 d for R scalp incision erythema   - PICC line placed 8/26    Assessment and plan discussed with Dr. Lomax, Dr. Conn and Dr. Bernard.

## 2021-09-05 NOTE — PROGRESS NOTE ADULT - ASSESSMENT
44y/o F with h/o recent gastric sleeve (08/04/21 [facility], Dr. Crisostomo ), fibromyalgia, thyroid dysfunction, PTSD, depression, anxiety.  Presented with seizures at home - brought to [place], with 1 more episode of seizure en route.  Intubated, CT showed SAH with IV extension, casted IV, hydrocephalus, given mannitol, levetiracetam 1g,  6mg ativan. Started on Cardene drip for BP goal < 140 and transferred to [facility] NICU for further management. HH5 MF4, BD 1 = 8/7. 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 Now s/p right VPS certas at 4 (8/27).

## 2021-09-05 NOTE — PROGRESS NOTE ADULT - ASSESSMENT
46y/o F with h/o recent gastric sleeve (08/04/21 [facility], Dr. Crisostomo ), fibromyalgia, thyroid dysfunction, PTSD, depression, anxiety.  Presented with seizures at home - brought to [place], with 1 more episode of seizure en route.  Intubated, CT showed SAH with IV extension, casted IV, hydrocephalus, given mannitol, levetiracetam 1g,  6mg ativan. Started on Cardene drip for BP goal < 140 and transferred to [facility] NICU for further management. HH5 MF4, BD 1 = 8/7. Now s/p cerebral angiogram for R vertebral artery takedown for R vertebral dissecting aneurysm (8/7), and R frontal EVD placement (8/7), now s/p IVC filter placement (8/12,) s/p angio IA verapamil 8/16, 8/17, 8/18, 8/20. Now s/p right VPS certas at 4 (8/27).

## 2021-09-05 NOTE — PROVIDER CONTACT NOTE (CHANGE IN STATUS NOTIFICATION) - NAME OF MD/NP/PA/DO NOTIFIED:
Tim LARA
MD Bernard
MD Bernard
HR ranging from 108 to 118. Pt also restless.
Chelsie Zaragoza, PA
Hr dipped down to 39.

## 2021-09-05 NOTE — PROGRESS NOTE ADULT - SUBJECTIVE AND OBJECTIVE BOX
SUBJECTIVE: Pt seen and examined at bedside this am by surgery team.  MEDICATIONS  (STANDING):  acetylcysteine 20%  Inhalation 4 milliLiter(s) Inhalation every 12 hours  albuterol/ipratropium for Nebulization 3 milliLiter(s) Nebulizer every 6 hours  apixaban 5 milliGRAM(s) Enteral Tube every 12 hours  BACItracin   Ointment 1 Application(s) Topical two times a day  bromocriptine Capsule 5 milliGRAM(s) Oral every 8 hours  chlorhexidine 2% Cloths 1 Application(s) Topical <User Schedule>  ergocalciferol 59267 Unit(s) Oral <User Schedule>  lacosamide Solution 100 milliGRAM(s) Oral two times a day  midodrine 5 milliGRAM(s) Oral <User Schedule>  multivitamin/minerals/iron Oral Solution (CENTRUM) 15 milliLiter(s) Oral daily  nystatin    Suspension 519106 Unit(s) Oral four times a day  pantoprazole   Suspension 40 milliGRAM(s) Oral daily  polyethylene glycol 3350 17 Gram(s) Oral daily  QUEtiapine 50 milliGRAM(s) Oral two times a day  senna 2 Tablet(s) Oral at bedtime    MEDICATIONS  (PRN):  bisacodyl Suppository 10 milliGRAM(s) Rectal daily PRN Constipation  ondansetron Injectable 4 milliGRAM(s) IV Push every 6 hours PRN Nausea and/or Vomiting  sodium chloride 0.9% lock flush 10 milliLiter(s) IV Push every 1 hour PRN Pre/post blood products, medications, blood draw, and to maintain line patency      Vital Signs Last 24 Hrs  T(C): 36.9 (05 Sep 2021 16:54), Max: 37.2 (05 Sep 2021 13:00)  T(F): 98.4 (05 Sep 2021 16:54), Max: 98.9 (05 Sep 2021 13:00)  HR: 108 (05 Sep 2021 11:35) (88 - 108)  BP: 136/84 (05 Sep 2021 11:35) (116/54 - 136/84)  BP(mean): 103 (05 Sep 2021 11:35) (78 - 103)  RR: 18 (05 Sep 2021 11:35) (18 - 18)  SpO2: 98% (05 Sep 2021 11:35) (92% - 98%)    Physical Exam  General: NAD, resting comfortably in bed  Pulmonary: Nonlabored breathing, no respiratory distress  CV: NSR  Abd: soft, NT/ND, no guarding, incisions c/d/i, Jtube secured in place  Extremities: (-) enema, warm, well-perfused      I&O's Detail    04 Sep 2021 07:01  -  05 Sep 2021 07:00  --------------------------------------------------------  IN:    Enteral Tube Flush: 50 mL    Vital High Protein: 552 mL  Total IN: 602 mL    OUT:    Voided (mL): 850 mL  Total OUT: 850 mL    Total NET: -248 mL      05 Sep 2021 07:01  -  05 Sep 2021 19:35  --------------------------------------------------------  IN:    Enteral Tube Flush: 200 mL    Vital High Protein: 552 mL  Total IN: 752 mL    OUT:    Intermittent Catheterization - Urethral (mL): 400 mL    Voided (mL): 400 mL  Total OUT: 800 mL    Total NET: -48 mL          LABS:                        12.7   7.07  )-----------( 196      ( 05 Sep 2021 07:44 )             41.6     09-05    142  |  104  |  9   ----------------------------<  109<H>  3.8   |  26  |  0.52    Ca    10.4      05 Sep 2021 07:44  Phos  3.2     09-05  Mg     2.1     09-05            RADIOLOGY & ADDITIONAL STUDIES:

## 2021-09-05 NOTE — PROGRESS NOTE ADULT - SUBJECTIVE AND OBJECTIVE BOX
Interval Events: Reviewed  Patient seen and examined at bedside.    Patient is a 45y old  Female who presents with a chief complaint of SAH (05 Sep 2021 03:29)  restless overnight, haldol given.       PAST MEDICAL & SURGICAL HISTORY:      MEDICATIONS:  Pulmonary:  acetylcysteine 20%  Inhalation 4 milliLiter(s) Inhalation every 12 hours  albuterol/ipratropium for Nebulization 3 milliLiter(s) Nebulizer every 6 hours    Antimicrobials:  ceFAZolin   IVPB 2000 milliGRAM(s) IV Intermittent every 8 hours  ceFAZolin   IVPB      nystatin    Suspension 587851 Unit(s) Oral four times a day    Anticoagulants:  apixaban 5 milliGRAM(s) Enteral Tube every 12 hours    Cardiac:  midodrine 5 milliGRAM(s) Oral <User Schedule>      Allergies    apple (Angioedema)  No Known Drug Allergies  strawberry (Angioedema)    Intolerances    lactose (Stomach Upset)      Vital Signs Last 24 Hrs  T(C): 36.9 (05 Sep 2021 06:12), Max: 36.9 (05 Sep 2021 06:12)  T(F): 98.5 (05 Sep 2021 06:12), Max: 98.5 (05 Sep 2021 06:12)  HR: 90 (05 Sep 2021 05:35) (80 - 108)  BP: 116/54 (05 Sep 2021 05:35) (116/54 - 151/84)  BP(mean): 78 (05 Sep 2021 05:35) (78 - 106)  RR: 18 (05 Sep 2021 05:35) (18 - 18)  SpO2: 94% (05 Sep 2021 05:35) (92% - 95%)    09-03 @ 07:01 - 09-04 @ 07:00  --------------------------------------------------------  IN: 2487 mL / OUT: 1525 mL / NET: 962 mL    09-04 @ 07:01  -  09-05 @ 06:45  --------------------------------------------------------  IN: 556 mL / OUT: 750 mL / NET: -194 mL          Review of Systems:   •	General: negative  •	Skin/Breast: negative  •	Ophthalmologic: negative  •	ENMT: negative  •	Respiratory and Thorax: negative  •	Cardiovascular: negative  •	Gastrointestinal: negative  •	Genitourinary: negative  •	Musculoskeletal: negative  •	Neurological: negative  •	Psychiatric: negative  •	Hematology/Lymphatics: negative  •	Endocrine: negative  •	Allergic/Immunologic: negative    Physical Exam:   • Constitutional:	Well-developed, well nourished  • Eyes:	EOMI; PERRL; no drainage or redness  • ENMT:	No oral lesions; no gross abnormalities  • Neck	no thyromegaly or nodules  • Breasts:	not examined  • Back:	No deformity or limitation of movement  • Respiratory:	Breath Sounds equal & clear to auscultation, no accessory muscle use  • Cardiovascular:	Regular rate & rhythm, normal S1, S2; no murmurs, gallops or rubs; no S3, S4  • Gastrointestinal:	Soft, non-tender, no hepatosplenomegaly, normal bowel sounds  • Genitourinary:	not examined  • Rectal: not examined  • Extremities:	No cyanosis, clubbing or edema  • Vascular:	Equal and normal pulses (dorsalis pedis)  • Neurologica:l	not examined  • Skin:	No lesions; no rash  • Lymph Nodes:	No lymphadedenopathy  • Musculoskeletal:	No joint pain, swelling or deformity; no limitation of movement        LABS:      CBC Full  -  ( 04 Sep 2021 08:19 )  WBC Count : 6.78 K/uL  RBC Count : 3.95 M/uL  Hemoglobin : 10.7 g/dL  Hematocrit : 35.0 %  Platelet Count - Automated : 200 K/uL  Mean Cell Volume : 88.6 fl  Mean Cell Hemoglobin : 27.1 pg  Mean Cell Hemoglobin Concentration : 30.6 gm/dL  Auto Neutrophil # : x  Auto Lymphocyte # : x  Auto Monocyte # : x  Auto Eosinophil # : x  Auto Basophil # : x  Auto Neutrophil % : x  Auto Lymphocyte % : x  Auto Monocyte % : x  Auto Eosinophil % : x  Auto Basophil % : x    09-04    143  |  105  |  8   ----------------------------<  118<H>  3.6   |  27  |  0.49<L>    Ca    9.2      04 Sep 2021 08:19  Phos  2.8     09-04  Mg     2.2     09-04                          RADIOLOGY & ADDITIONAL STUDIES (The following images were personally reviewed):  Richey:                                     No  Urine output:                       adequate  DVT prophylaxis:                 Yes  Flattus:                                  Yes  Bowel movement:              No

## 2021-09-05 NOTE — PROVIDER CONTACT NOTE (CHANGE IN STATUS NOTIFICATION) - ACTION/TREATMENT ORDERED:
tylenol to be given, cooling blanket applied. awaiting ABG from MARCO Howard since arterial line not working (MD Bernard aware). EVD output greater than 30cc to be notification per MD Bernard, not concerned with 20cc/hr. ICP 5. will continue to monitor.
500ml NS Bolus and CTH
MARCO Howard and MARCO López repositioned tube, but remains in incorrect position. MD Bernard notified that patient did not receive 3pm nimodipine and other PO meds required such as PO Tylenol for fever. Informed of delay between proper placement of NGT and awaiting xrays. current temp 100.4 with cooling blanket.
MARCO Mcgregor advised to continue to monitor the patient.
Jimmy Mcgregor came to bedside to assess the patient. PA ordered 1mg of Haldol for the patient and advised to continue to monitor the pt.
EVD clamped for 1 hour.  Will continue to monitor.

## 2021-09-05 NOTE — PROGRESS NOTE ADULT - SUBJECTIVE AND OBJECTIVE BOX
Patient is a 45y old  Female who presents with a chief complaint of SAH (04 Sep 2021 07:05)      HPI:  44y/o F with h/o recent gastric sleeve (08/04/21 [facility], Dr. Crisostomo ), fibromyalgia, thyroid dysfunction, PTSD, depression, anxiety.  Presented with seizures at home - brought to [place], with 1 more episode of seizure en route.  Intubated, CT showed SAH with IV extension, casted IV, hydrocephalus, given mannitol, levetiracetam 1g,  6mg ativan. Started on Cardene drip for BP goal < 140 and transferred to [facility] NICU for further management.     HH5 MF4   NIHSS 26 on arrival  BD 1 = 8/7 (07 Aug 2021 08:08)    O/N and interval events: Free water d/bolivar    Subjective:   Pt denies pain. Unable to elicit much history given patient's clinical status.     Allergies    apple (Angioedema)  No Known Drug Allergies  strawberry (Angioedema)    Intolerances    lactose (Stomach Upset)        MEDICATIONS  (STANDING):  acetylcysteine 20%  Inhalation 4 milliLiter(s) Inhalation every 12 hours  albuterol/ipratropium for Nebulization 3 milliLiter(s) Nebulizer every 6 hours  apixaban 5 milliGRAM(s) Enteral Tube every 12 hours  BACItracin   Ointment 1 Application(s) Topical two times a day  bromocriptine Capsule 5 milliGRAM(s) Oral every 8 hours  ceFAZolin   IVPB      ceFAZolin   IVPB 2000 milliGRAM(s) IV Intermittent every 8 hours  chlorhexidine 2% Cloths 1 Application(s) Topical <User Schedule>  ergocalciferol 61442 Unit(s) Oral <User Schedule>  lacosamide Solution 100 milliGRAM(s) Oral two times a day  midodrine 5 milliGRAM(s) Oral <User Schedule>  multivitamin/minerals/iron Oral Solution (CENTRUM) 15 milliLiter(s) Oral daily  nystatin    Suspension 313409 Unit(s) Oral four times a day  pantoprazole   Suspension 40 milliGRAM(s) Oral daily  QUEtiapine 50 milliGRAM(s) Oral at bedtime  senna 2 Tablet(s) Oral at bedtime    MEDICATIONS  (PRN):  ondansetron Injectable 4 milliGRAM(s) IV Push every 6 hours PRN Nausea and/or Vomiting  sodium chloride 0.9% lock flush 10 milliLiter(s) IV Push every 1 hour PRN Pre/post blood products, medications, blood draw, and to maintain line patency          Drug Dosing Weight  Height (cm): 162.6 (01 Sep 2021 11:40)  Weight (kg): 98.2 (01 Sep 2021 11:40)  BMI (kg/m2): 37.1 (01 Sep 2021 11:40)  BSA (m2): 2.02 (01 Sep 2021 11:40)    PAST MEDICAL & SURGICAL HISTORY:      FAMILY HISTORY:  No pertinent family history in first degree relatives        SOCIAL HISTORY: no smoking reported        Vital Signs Last 24 Hrs  T(C): 36.9 (05 Sep 2021 06:12), Max: 36.9 (05 Sep 2021 06:12)  T(F): 98.5 (05 Sep 2021 06:12), Max: 98.5 (05 Sep 2021 06:12)  HR: 94 (05 Sep 2021 08:05) (80 - 108)  BP: 119/70 (05 Sep 2021 08:05) (116/54 - 151/84)  BP(mean): 88 (05 Sep 2021 08:05) (78 - 106)  RR: 18 (05 Sep 2021 08:05) (18 - 18)  SpO2: 98% (05 Sep 2021 08:05) (92% - 98%)          PHYSICAL EXAM:      Constitutional: NAD  Eyes: PERRLA  ENMT: thrush  Neck: supple  Back: midline  Respiratory: CTA b/l  Cardiovascular: rrr, s1s2, no m/r/g  Gastrointestinal: soft, NTND, + J tube  Extremities: wwp  Vascular: + 2 pules radial  Neurological: AAO x 2, minimally following commands  Skin: no rash  Lymph Nodes: no LAD  Musculoskeletal: no joint swelling  Psychiatric: flat affect        LABS:                            12.7   7.07  )-----------( 196      ( 05 Sep 2021 07:44 )             41.6       09-05    142  |  104  |  9   ----------------------------<  109<H>  3.8   |  26  |  0.52    Ca    10.4      05 Sep 2021 07:44  Phos  3.2     09-05  Mg     2.1     09-05          EKG:    ECHO, US:    < from: TTE Limited Echo w/o Cont (08.12.21 @ 15:22) >  CONCLUSIONS:     1. Limited study obtained for evaluation of right ventricular function.   2. Probably normal left ventricular systolic function.   3. Normal right ventricular size and systolic function.   4. There was insufficient tricuspid regurgitation detected from which to calculate pulmonary artery systolic pressure.   5. No pericardial effusion.    < end of copied text >      RADIOLOGY:  < from: CT Head No Cont (08.29.21 @ 17:45) >  IMPRESSION:    With ventricular shunt in place, there is now decreased ventricular size since 08/27/2021.    < end of copied text >    < from: CT Head No Cont (09.04.21 @ 11:29) >    IMPRESSION:    No acute intracranial hemorrhage or mass effect compared to prior imaging from 08/29/2021, now with the patient placed on anticoagulation therapy.    Stable ventricular size with  shunt in place, mild hydrocephalus persists.    A couple small and subacute appearing infarcts are now visible in the right cerebellum.    --- End of Report ---    < end of copied text >       Patient is a 45y old  Female who presents with a chief complaint of SAH (04 Sep 2021 07:05)      HPI:  46y/o F with h/o recent gastric sleeve (08/04/21 [facility], Dr. Crisostomo ), fibromyalgia, thyroid dysfunction, PTSD, depression, anxiety.  Presented with seizures at home - brought to [place], with 1 more episode of seizure en route.  Intubated, CT showed SAH with IV extension, casted IV, hydrocephalus, given mannitol, levetiracetam 1g,  6mg ativan. Started on Cardene drip for BP goal < 140 and transferred to [facility] NICU for further management.     HH5 MF4   NIHSS 26 on arrival  BD 1 = 8/7 (07 Aug 2021 08:08)    O/N and interval events: Free water d/bolivar, remains agitated - required 1 mg of Haldol    Subjective:   Pt denies pain. Unable to elicit much history given patient's clinical status.     Allergies    apple (Angioedema)  No Known Drug Allergies  strawberry (Angioedema)    Intolerances    lactose (Stomach Upset)        MEDICATIONS  (STANDING):  acetylcysteine 20%  Inhalation 4 milliLiter(s) Inhalation every 12 hours  albuterol/ipratropium for Nebulization 3 milliLiter(s) Nebulizer every 6 hours  apixaban 5 milliGRAM(s) Enteral Tube every 12 hours  BACItracin   Ointment 1 Application(s) Topical two times a day  bromocriptine Capsule 5 milliGRAM(s) Oral every 8 hours  ceFAZolin   IVPB      ceFAZolin   IVPB 2000 milliGRAM(s) IV Intermittent every 8 hours  chlorhexidine 2% Cloths 1 Application(s) Topical <User Schedule>  ergocalciferol 21337 Unit(s) Oral <User Schedule>  lacosamide Solution 100 milliGRAM(s) Oral two times a day  midodrine 5 milliGRAM(s) Oral <User Schedule>  multivitamin/minerals/iron Oral Solution (CENTRUM) 15 milliLiter(s) Oral daily  nystatin    Suspension 593963 Unit(s) Oral four times a day  pantoprazole   Suspension 40 milliGRAM(s) Oral daily  QUEtiapine 50 milliGRAM(s) Oral at bedtime  senna 2 Tablet(s) Oral at bedtime    MEDICATIONS  (PRN):  ondansetron Injectable 4 milliGRAM(s) IV Push every 6 hours PRN Nausea and/or Vomiting  sodium chloride 0.9% lock flush 10 milliLiter(s) IV Push every 1 hour PRN Pre/post blood products, medications, blood draw, and to maintain line patency          Drug Dosing Weight  Height (cm): 162.6 (01 Sep 2021 11:40)  Weight (kg): 98.2 (01 Sep 2021 11:40)  BMI (kg/m2): 37.1 (01 Sep 2021 11:40)  BSA (m2): 2.02 (01 Sep 2021 11:40)    PAST MEDICAL & SURGICAL HISTORY:      FAMILY HISTORY:  No pertinent family history in first degree relatives        SOCIAL HISTORY: no smoking reported        Vital Signs Last 24 Hrs  T(C): 36.9 (05 Sep 2021 06:12), Max: 36.9 (05 Sep 2021 06:12)  T(F): 98.5 (05 Sep 2021 06:12), Max: 98.5 (05 Sep 2021 06:12)  HR: 94 (05 Sep 2021 08:05) (80 - 108)  BP: 119/70 (05 Sep 2021 08:05) (116/54 - 151/84)  BP(mean): 88 (05 Sep 2021 08:05) (78 - 106)  RR: 18 (05 Sep 2021 08:05) (18 - 18)  SpO2: 98% (05 Sep 2021 08:05) (92% - 98%)          PHYSICAL EXAM:      Constitutional: NAD  Eyes: PERRLA  ENMT: thrush  Neck: supple  Back: midline  Respiratory: CTA b/l  Cardiovascular: rrr, s1s2, no m/r/g  Gastrointestinal: soft, NTND, + J tube  Extremities: wwp  Vascular: + 2 pules radial  Neurological: AAO x 2, minimally following commands  Skin: no rash  Lymph Nodes: no LAD  Musculoskeletal: no joint swelling  Psychiatric: flat affect        LABS:                            12.7   7.07  )-----------( 196      ( 05 Sep 2021 07:44 )             41.6       09-05    142  |  104  |  9   ----------------------------<  109<H>  3.8   |  26  |  0.52    Ca    10.4      05 Sep 2021 07:44  Phos  3.2     09-05  Mg     2.1     09-05          EKG:    ECHO, US:    < from: TTE Limited Echo w/o Cont (08.12.21 @ 15:22) >  CONCLUSIONS:     1. Limited study obtained for evaluation of right ventricular function.   2. Probably normal left ventricular systolic function.   3. Normal right ventricular size and systolic function.   4. There was insufficient tricuspid regurgitation detected from which to calculate pulmonary artery systolic pressure.   5. No pericardial effusion.    < end of copied text >      RADIOLOGY:  < from: CT Head No Cont (08.29.21 @ 17:45) >  IMPRESSION:    With ventricular shunt in place, there is now decreased ventricular size since 08/27/2021.    < end of copied text >    < from: CT Head No Cont (09.04.21 @ 11:29) >    IMPRESSION:    No acute intracranial hemorrhage or mass effect compared to prior imaging from 08/29/2021, now with the patient placed on anticoagulation therapy.    Stable ventricular size with  shunt in place, mild hydrocephalus persists.    A couple small and subacute appearing infarcts are now visible in the right cerebellum.    --- End of Report ---    < end of copied text >

## 2021-09-05 NOTE — PROVIDER CONTACT NOTE (CHANGE IN STATUS NOTIFICATION) - ASSESSMENT
ICP 2, Neuro exam at baseline.
Patient was found on her right side. Patient is on constant observation because she is restless and confused. Patient denies chest pain or difficulty breathing.
Pt remains alert and oriented to self and place. pupils 3mm and brisk. Drift on RUE. No Drift on LUE and b/l LE.
patient has right sided gaze, not following commands, only moaning with occasional incomprehensible speech. rectal temp 104.2 (cooling blanket now being applied), also using accessory muscles to breathe, tachypneic. EtCO2 measurement 29-30. also EVD output between 8A & 0840 20cc.
Pt denies chest pain or SOB. Pt found on her right side in bed. Patient was restless and is on constant observation by a PCA.

## 2021-09-05 NOTE — PROVIDER CONTACT NOTE (CHANGE IN STATUS NOTIFICATION) - RECOMMENDATIONS
CTH
Continue to monitor the patient.
Haldol for the restlessness of the patient. Continue to monitor the patient for any changes with her HR.

## 2021-09-05 NOTE — PROGRESS NOTE ADULT - ASSESSMENT
46 yo who is under neurosurg ICU care admitted for SAH. She is POD2 for J tube placement. The patient had episodes of emesis yesterday, but she is doing better today. She was advanced to 30cc/ hr this afternoon which she is tolerating well. Her goal feed rate is 46 cc/hr. We ordered Abd x ray to make sure that her J tube is in the appropriate direction. the final read was not in yet. The wet read was that the J tube is in the jejunum     -continue J tube feeds and advance as tolerated  - AXR final read (9/3) - Nonspecific bowel gas pattern. No evidence of obstruction  - rest of care per primary team

## 2021-09-05 NOTE — PROVIDER CONTACT NOTE (CHANGE IN STATUS NOTIFICATION) - SITUATION
HR ranging from 108 to 118. Pt also restless.
EVD output 30cc from 5-6am.
Hr dipped down to 39 for about 5 seconds as per tele. HR was not sustained. Hr went back up to high 50's
Received report that pt's baseline has no drift on all extremities, noticed pt has drift on right upper extremities.

## 2021-09-05 NOTE — PROVIDER CONTACT NOTE (CHANGE IN STATUS NOTIFICATION) - BACKGROUND
Pt is usually NSR to Sinus Tachy. Parameter for anything greater than 100 to call the team.
Patient's dobhoff has required proper placement verification via xray since patient pulled out dobhoff this afternoon.
pt admitted on 8/7/21 for SAH. s/p cerebral angio for right vertebral artery takedown and dissecting aneurysm on 8/7.
Pt typically sinus tachy

## 2021-09-06 NOTE — PROGRESS NOTE ADULT - SUBJECTIVE AND OBJECTIVE BOX
INTERVAL HPI/OVERNIGHT EVENTS: BD 29, POD29, s/p J tube w/ gen surg. VPS certas from 4 to 3 now. Required 1mg IV haldol overnight for agitation/delirium, patient not responding to sitter and attempting to get out of bed numerous times. Cont Eliquis 5 BID for b/l LE DVTs.        SUBJECTIVE:   Patient seen and evaluated. Patient not responsive to verbal stimuli. Appears to be in no acute distress.     apixaban 5 milliGRAM(s) Enteral Tube every 12 hours  midodrine 5 milliGRAM(s) Oral <User Schedule>  nystatin    Suspension 020283 Unit(s) Oral four times a day      Vital Signs Last 24 Hrs  T(C): 36.3 (06 Sep 2021 09:24), Max: 37.3 (06 Sep 2021 01:09)  T(F): 97.3 (06 Sep 2021 09:24), Max: 99.1 (06 Sep 2021 01:09)  HR: 90 (06 Sep 2021 12:00) (86 - 96)  BP: 110/52 (06 Sep 2021 12:00) (98/55 - 132/77)  BP(mean): 73 (06 Sep 2021 12:00) (73 - 101)  RR: 18 (06 Sep 2021 12:00) (17 - 20)  SpO2: 99% (06 Sep 2021 12:00) (99% - 100%)  I&O's Detail    05 Sep 2021 07:01  -  06 Sep 2021 07:00  --------------------------------------------------------  IN:    Enteral Tube Flush: 200 mL    Vital High Protein: 1104 mL  Total IN: 1304 mL    OUT:    Intermittent Catheterization - Urethral (mL): 900 mL    Voided (mL): 700 mL  Total OUT: 1600 mL    Total NET: -296 mL          General: NAD, resting in bed. No response to verbal stimula, mild response to painful stimuli.   C/V: Normal rate.   Pulm: Nonlabored breathing, no respiratory distress.  Abd: soft, minimal abdominal distention; equivocal given body habitus. Surgical incision sites c/d/i; no erythema, purulence, or focal edema; appropriately TTP. J tube in place with no cassidy-insertional erythema or purulence. Primafite in place with straw colored urine collected. Mild grimacing with palpation of the LLQ.   Extrem: WWP, no edema      LABS:                        11.4   5.03  )-----------( 188      ( 06 Sep 2021 06:29 )             37.7     09-06    141  |  103  |  15  ----------------------------<  114<H>  3.7   |  25  |  0.53    Ca    9.6      06 Sep 2021 06:29  Phos  2.9     09-06  Mg     2.0     09-06

## 2021-09-06 NOTE — PROGRESS NOTE ADULT - ASSESSMENT
pt with heterozygous Prothrombin Gene mutation...pt developed a new DVT while on Eliquis...unable to take Pradaxa (since it cannot be crushed)...pt to be started back on Lovenox 1mg/kg q12h...

## 2021-09-06 NOTE — PROGRESS NOTE ADULT - SUBJECTIVE AND OBJECTIVE BOX
Patient is a 45y old  Female who presents with a chief complaint of SAH (04 Sep 2021 07:05)      HPI:  44y/o F with h/o recent gastric sleeve (08/04/21 [facility], Dr. Crisostomo ), fibromyalgia, thyroid dysfunction, PTSD, depression, anxiety.  Presented with seizures at home - brought to [place], with 1 more episode of seizure en route.  Intubated, CT showed SAH with IV extension, casted IV, hydrocephalus, given mannitol, levetiracetam 1g,  6mg ativan. Started on Cardene drip for BP goal < 140 and transferred to [facility] NICU for further management.     HH5 MF4   NIHSS 26 on arrival  BD 1 = 8/7 (07 Aug 2021 08:08)    O/N and interval events: Free water d/bolivar, remains agitated - required 1 mg of Haldol    Subjective:   Pt denies pain. Unable to elicit much history given patient's clinical status.     Allergies    apple (Angioedema)  No Known Drug Allergies  strawberry (Angioedema)    Intolerances    lactose (Stomach Upset)        MEDICATIONS  (STANDING):  acetylcysteine 20%  Inhalation 4 milliLiter(s) Inhalation every 12 hours  albuterol/ipratropium for Nebulization 3 milliLiter(s) Nebulizer every 6 hours  apixaban 5 milliGRAM(s) Enteral Tube every 12 hours  BACItracin   Ointment 1 Application(s) Topical two times a day  bromocriptine Capsule 5 milliGRAM(s) Oral every 8 hours  chlorhexidine 2% Cloths 1 Application(s) Topical <User Schedule>  ergocalciferol 30137 Unit(s) Oral <User Schedule>  lacosamide Solution 100 milliGRAM(s) Oral two times a day  midodrine 5 milliGRAM(s) Oral <User Schedule>  multivitamin/minerals/iron Oral Solution (CENTRUM) 15 milliLiter(s) Oral daily  nystatin    Suspension 184848 Unit(s) Oral four times a day  pantoprazole   Suspension 40 milliGRAM(s) Oral daily  polyethylene glycol 3350 17 Gram(s) Oral daily  potassium chloride    Tablet ER 20 milliEquivalent(s) Oral every 2 hours  QUEtiapine 50 milliGRAM(s) Oral two times a day  senna 2 Tablet(s) Oral at bedtime    MEDICATIONS  (PRN):  bisacodyl Suppository 10 milliGRAM(s) Rectal daily PRN Constipation  ondansetron Injectable 4 milliGRAM(s) IV Push every 6 hours PRN Nausea and/or Vomiting  sodium chloride 0.9% lock flush 10 milliLiter(s) IV Push every 1 hour PRN Pre/post blood products, medications, blood draw, and to maintain line patency            Drug Dosing Weight  Height (cm): 162.6 (01 Sep 2021 11:40)  Weight (kg): 98.2 (01 Sep 2021 11:40)  BMI (kg/m2): 37.1 (01 Sep 2021 11:40)  BSA (m2): 2.02 (01 Sep 2021 11:40)    PAST MEDICAL & SURGICAL HISTORY:      FAMILY HISTORY:  No pertinent family history in first degree relatives        SOCIAL HISTORY: no smoking reported        Vital Signs Last 24 Hrs  T(C): 37.3 (06 Sep 2021 01:09), Max: 37.3 (06 Sep 2021 01:09)  T(F): 99.1 (06 Sep 2021 01:09), Max: 99.1 (06 Sep 2021 01:09)  HR: 90 (06 Sep 2021 08:24) (86 - 108)  BP: 132/77 (06 Sep 2021 08:24) (98/55 - 136/84)  BP(mean): 101 (06 Sep 2021 08:24) (75 - 103)  RR: 18 (06 Sep 2021 08:24) (17 - 20)  SpO2: 99% (06 Sep 2021 08:24) (98% - 100%)          PHYSICAL EXAM:      Constitutional: NAD  Eyes: PERRLA  ENMT: thrush  Neck: supple  Back: midline  Respiratory: CTA b/l  Cardiovascular: rrr, s1s2, no m/r/g  Gastrointestinal: soft, NTND, + J tube  Extremities: wwp  Vascular: + 2 pules radial  Neurological: AAO x 2, minimally following commands  Skin: no rash  Lymph Nodes: no LAD  Musculoskeletal: no joint swelling  Psychiatric: flat affect        LABS:                            11.4   5.03  )-----------( 188      ( 06 Sep 2021 06:29 )             37.7   09-06    141  |  103  |  15  ----------------------------<  114<H>  3.7   |  25  |  0.53    Ca    9.6      06 Sep 2021 06:29  Phos  2.9     09-06  Mg     2.0     09-06            EKG:    ECHO, US:    < from: TTE Limited Echo w/o Cont (08.12.21 @ 15:22) >  CONCLUSIONS:     1. Limited study obtained for evaluation of right ventricular function.   2. Probably normal left ventricular systolic function.   3. Normal right ventricular size and systolic function.   4. There was insufficient tricuspid regurgitation detected from which to calculate pulmonary artery systolic pressure.   5. No pericardial effusion.    < end of copied text >      RADIOLOGY:  < from: CT Head No Cont (08.29.21 @ 17:45) >  IMPRESSION:    With ventricular shunt in place, there is now decreased ventricular size since 08/27/2021.    < end of copied text >    < from: CT Head No Cont (09.04.21 @ 11:29) >    IMPRESSION:    No acute intracranial hemorrhage or mass effect compared to prior imaging from 08/29/2021, now with the patient placed on anticoagulation therapy.    Stable ventricular size with  shunt in place, mild hydrocephalus persists.    A couple small and subacute appearing infarcts are now visible in the right cerebellum.    --- End of Report ---    < end of copied text >

## 2021-09-06 NOTE — PROVIDER CONTACT NOTE (OTHER) - BACKGROUND
46 y/o F PMH of fibromyalgia, thyroid dysfunction, PTSD, depression, anxiety, came in with seizures, here for SAH and hydrocephalus

## 2021-09-06 NOTE — PROGRESS NOTE ADULT - PROBLEM SELECTOR PLAN 4
As above  -S/p IVF filter placement As above  -S/p IVF filter placement    -Pt now has new DVT on repeat doppler despite being on NoAC  -D/w Heme whether should be switched to a different agent

## 2021-09-06 NOTE — PROGRESS NOTE ADULT - ASSESSMENT
46y/o F with h/o recent gastric sleeve (08/04/21 [facility], Dr. Crisostomo ), fibromyalgia, thyroid dysfunction, PTSD, depression, anxiety.  Presented with seizures at home - brought to [place], with 1 more episode of seizure en route.  Intubated, CT showed SAH with IV extension, casted IV, hydrocephalus, given mannitol, levetiracetam 1g,  6mg ativan. Started on Cardene drip for BP goal < 140 and transferred to [facility] NICU for further management. HH5 MF4, BD 1 = 8/7. Now s/p cerebral angiogram for R vertebral artery takedown for R vertebral dissecting aneurysm (8/7), and R frontal EVD placement (8/7), now s/p IVC filter placement (8/12,) s/p angio IA verapamil 8/16, 8/17, 8/18, 8/20. Now s/p right VPS certas at 4 (8/27).

## 2021-09-06 NOTE — PROGRESS NOTE ADULT - ASSESSMENT
46 yo who is under neurosurg ICU care admitted for SAH. She is POD2 for J tube placement. Patient doing well today. She was advanced to 46cc/hr which she appears to be tolerating.     - continue J tube feeds at goal rate (46cc/hr)  - AXR final read (9/3) - Nonspecific bowel gas pattern. No evidence of obstruction  - Remaining care at discretion of primary team

## 2021-09-06 NOTE — PROGRESS NOTE ADULT - SUBJECTIVE AND OBJECTIVE BOX
HPI:  46y/o F with h/o recent gastric sleeve (08/04/21 [facility], Dr. Crisostomo ), fibromyalgia, thyroid dysfunction, PTSD, depression, anxiety.  Presented with seizures at home - brought to [place], with 1 more episode of seizure en route.  Intubated, CT showed SAH with IV extension, casted IV, hydrocephalus, given mannitol, levetiracetam 1g,  6mg ativan. Started on Cardene drip for BP goal < 140 and transferred to [facility] NICU for further management.     HH5 MF4   NIHSS 26 on arrival  BD 1 = 8/7 (07 Aug 2021 08:08)    OVERNIGHT EVENTS: ASHA overnight, neuro stable.    Hospital Course:  8/7: Tx from [place] for SAH. Intubated. R frontal EVD placed, central line and a line placed. 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   8/8: POD1 s/p angio with R vert takedown, extubated, desatting on aerosol mask, required extensive suctioning, 3% nebs, chest PT, started on low dose precedex drip for restlessness/agitation, ABG stable. NGT placed. TF started with goal 20 pending nutrition recs. 3% stopped for Na 150. Ceribell EEG negative and d/c'ed.   8/9 Overnight, new Right pronator drift and right gaze preference that can't cross midline, Repeat CTH demonstrated stable vents, CTA head and neck unremarkable for spasm, hypoattenuation of right cerebellum edema vs. infarct.  8/10: POD3 R vert takedown, EVD open at [id number]. ASHA overnight, neuro stable. CTH with increased hydro, CTA head/neck with mild basilar spasm, but concern for PE. 1/2 am D50 for hypoglycemia. 1L bolus then 100 cc/hr, PM rounds for net negative fluid balance and mild vasospasm on CTA.   8/11: POD4 R vert takedown. EVD at 5cm H2O, ASHA overnight. neuro stable.   Febrile 104. depakote increased to q6. NCHCT stable. EVD lowered to 0. Lasix 20 given for dieuresis, UOP over 2 liters. Sedation given for a-line placement, BP drop needing levo.  8/12: POD5 R vert takedown. EVD at 0cm H2O. ASHA overnight, neuro stable. Precedex being weaned off. Pending IVCF with vascular today.  8/13: POD6 R vert takedown. EVD open at 0cmH2O. ASHA overnight. Neuro exam stable. Mottled skin on the left lower extremity improving. Started on Bromocriptine 15mg Q8.   8/14: POD7. EVD open at 0. 1L bolus given for euvolemia o/n. neuro stable. CTH stable. ENT scoped vocal cords, +R voacl cord paresis, NTD. started on zosyn empirically.   8/15: POD8. EVD open at 0. ASHA o/n, neuro stable. Pt developed increased work of breathing, unable to clear her secretions, received racemic epi and multiple nebulizer treatments, still with stridor. Patient re-intubated at bedside, on full vent support.   8/16: CTH/CTA head/neck/CT chest performed o/n for worsened exam, R gaze preference, sluggish pupils. +worsened uncal herniation, hydro, vasospasm in b/l PCAs, L vert, basilar arteries. preop angio today, restarted on 3% for Na goal 145-150. Angio for vasospasm with IA verapamil.  8/17: POD10. Overnight Pt remains on levophed drip for SBP goal 180-220. Also remains on propofol drip. EVD open at -5cmH2O. ICPs WNL. Febrile 101F and pancultured. CTH today shows slightly smaller ventricles. Angio for vasospasm with IA verapamil.  8/18: POD11 R vert takedown. POD1 angio IA verpamil. Overnight, Pt noted to have downward gaze to the right and not following commands. Taken for stat head CT which is stable. Pupils also noted to be aniscoric left pupil 4mm and brisk and right 2mm brisk. Mannitol 50g given. Ceribell eeg placed for concern of subclinical seizures, so far appears negative for seizures. 1L NS o/n and 1.5L NS bolus in AM for euvolemia. vanc/zosyn stopped. 250cc 3% bolus and 250cc albumin given in AM. Brief seizure to L frontal lobe this AM on EEG, given 2g valproic acid and dose increased to 1g q8. Vimpat 100mg BID started.  Angio with interval improvment in vasospasm, IA verapamil given. In afternoon patient self-extubated, placed on NRB with mucomyst, 3% inhalation and duonebs. 3% at 50 d/c'd.  8/19: POD 12. Remains on VEEG. Axillary A line placed. Salt tabs d/c'd, florinef decreased to 0.1mg, EVD @ -5cm H2O, now draining 20cc/hr. 250 albumin and 500 NS boluses on dayshift, 1 L LR bolus  8/20: POD 13. ASHA. on VEEG, no seizures noted. Pending VPA level. 500 NS, 250 albumin. Febrile, pancultured. EEG d/c'ed.   8/21: BD14, POD14. ASHA overnight, EVD @ -5. s/p 1L bolus for euvolemia  8/22: BD #15: continued on levo, hypercoagulabitly profile pending, EVD @ -5, euvolemia, extubated, amantadine added, free water started for hypernatremia   8/23: BD 17, tmax 101F o/n. Gen Sx consulted for PEG - not a candidate at this time, pancultured for fever, 1L LR given for euvolemia. EVD at -5  8/24: BD18, ASHA o/n, neuro stable, EVD at -5, VPA level therapeutic, SBP goal 160-200, L IJ CVC attempted placement with carotid puncture, no pseudoaneurysm on US. MSSA growing in sputum. Ceftriaxone d/c'd and Ancef 2gq8 started. ID consulted. Recieved 1.5L LR and 500cc albumin.   8/25: POD5 last angio, BD19 ASHA o/n, neuro stable, EVD at -5  8/26: BD 20. ASHA o/n. EVD @ -5   8/27: BD 21, POD 20 Right vert takedown. ASHA overnight. Exam stable. EVD remains open at -5 and to be clamped at 0600 in preparation for right VPS placement today. Possible PEG placement Monday 8/30 8/28: BD22, POD22 Right vert takedown, POD1 R VPS, neuro exam stable. Tube feeds restarted, more lethargic this AM, improved during day, continue to monitor for hydrocephalus. AXR with dilated bowel, started on reglan and magnesium. Liquid BM, TFs held and rectal tube placed.   8/29: BD23, POD23 right vert takedown, POD2 R VPS, ASHA overnight, neuro stable. TFs decreased for colonic distention seen on XR, GI notified, nothing to do, TFs resumed at 20/hr and will continue will PEG placement tomorrow. CTH today demonstrated interval decrease in size of ventricles. Midodrine added to aid in weening levophed.   8/30: BD24, POD24, pending PEG placement this morning with Dr. Milner. Midodrine increased to 10mg q8hrs today to wean off of levo. 1u PRBC for Hb 7.5. FOB negative. 20mg lasix given. Repeat dopplers completed, unchanged from previous. Started on IV iron per hematology recs.  8/31: BD25, POD25, unsuccessful PEG placement by GI, trend Hgb w/ AM labs, slowly weening levophed. 5mg vitamin K given for elevated INR.  9/1: BD26, POD26. J tube w/ gen surg today. 5mg vitK IV given o/n for elevated INR. off levophed. amantadine decreased 100 BID and ritalin d/c'd.   9/2: BD27, POD27. s/p J tube w/ gen surg. still receiving meds/TF via J tube.  neuro stable  9/4: BD 28, POD28, s/p J tube w/ gen surg. Neuro stable CTH performed, changed shunt to Certas @ 3 from 4.   9/5: BD 29, POD29, s/p J tube w/ gen surg. VPS certas from 4 to 3 now. Required 1mg IV haldol overnight for agitation/delirium, patient not responding to sitter and attempting to get out of bed numerous times. Cont Eliquis 5 BID for b/l LE DVTs.  	  9/6: VPS Certas @3. BD30. ASHA overnight, neuro stable.     Vital Signs Last 24 Hrs  T(C): 37.3 (06 Sep 2021 01:09), Max: 37.3 (06 Sep 2021 01:09)  T(F): 99.1 (06 Sep 2021 01:09), Max: 99.1 (06 Sep 2021 01:09)  HR: 94 (05 Sep 2021 23:55) (90 - 108)  BP: 110/58 (05 Sep 2021 23:55) (101/63 - 136/84)  BP(mean): 76 (05 Sep 2021 23:55) (76 - 103)  RR: 17 (05 Sep 2021 23:55) (17 - 18)  SpO2: 100% (05 Sep 2021 23:55) (94% - 100%)    I&O's Summary    04 Sep 2021 07:01  -  05 Sep 2021 07:00  --------------------------------------------------------  IN: 602 mL / OUT: 850 mL / NET: -248 mL    05 Sep 2021 07:01  -  06 Sep 2021 03:03  --------------------------------------------------------  IN: 1074 mL / OUT: 800 mL / NET: 274 mL    PHYSICAL EXAM:  General: NAD, pt is comfortably sitting up in bed, A&O x2 (self and place), on RA  HEENT: CN II-XII grossly intact, PERRL 4mm, +R gaze preference, face symmetric, tongue midline, dysarthric speech, neck FROM  Cardiovascular: RRR, normal S1 and S2   Respiratory: lungs CTAB, no wheezing, rhonchi, or crackles   GI: normoactive BS to auscultation, abd soft, NTND   Neuro: no aphasia, speech clear, no dysmetria, no pronator drift  CALLE anti-gravity, symmetric   Extremities: distal pulses 2+ x4   Wound/incision: abdominal and R crani VPS incision with staples C/D/I     TUBES/LINES:  [] Richey  [] Lumbar Drain  [] Wound Drains  [] Others      DIET:  [] NPO  [] Mechanical  [x] Tube feeds via J tube    LABS:                        12.7   7.07  )-----------( 196      ( 05 Sep 2021 07:44 )             41.6     09-05    142  |  104  |  9   ----------------------------<  109<H>  3.8   |  26  |  0.52    Ca    10.4      05 Sep 2021 07:44  Phos  3.2     09-05  Mg     2.1     09-05              CAPILLARY BLOOD GLUCOSE          Drug Levels: [] N/A    CSF Analysis: [] N/A      Allergies    apple (Angioedema)  No Known Drug Allergies  strawberry (Angioedema)    Intolerances    lactose (Stomach Upset)    MEDICATIONS:  Antibiotics:  nystatin    Suspension 527013 Unit(s) Oral four times a day    Neuro:  bromocriptine Capsule 5 milliGRAM(s) Oral every 8 hours  lacosamide Solution 100 milliGRAM(s) Oral two times a day  ondansetron Injectable 4 milliGRAM(s) IV Push every 6 hours PRN  QUEtiapine 50 milliGRAM(s) Oral two times a day    Anticoagulation:  apixaban 5 milliGRAM(s) Enteral Tube every 12 hours    OTHER:  acetylcysteine 20%  Inhalation 4 milliLiter(s) Inhalation every 12 hours  albuterol/ipratropium for Nebulization 3 milliLiter(s) Nebulizer every 6 hours  BACItracin   Ointment 1 Application(s) Topical two times a day  bisacodyl Suppository 10 milliGRAM(s) Rectal daily PRN  chlorhexidine 2% Cloths 1 Application(s) Topical <User Schedule>  midodrine 5 milliGRAM(s) Oral <User Schedule>  pantoprazole   Suspension 40 milliGRAM(s) Oral daily  polyethylene glycol 3350 17 Gram(s) Oral daily  senna 2 Tablet(s) Oral at bedtime    IVF:  ergocalciferol 33457 Unit(s) Oral <User Schedule>  multivitamin/minerals/iron Oral Solution (CENTRUM) 15 milliLiter(s) Oral daily    CULTURES:  Culture Results:   Sputum specimen rejected.  Microscopic examination indicates  oropharyngeal contamination.  Please repeat. (08-27 @ 01:53)  Culture Results:   No growth (08-26 @ 14:27)    RADIOLOGY & ADDITIONAL TESTS:      ASSESSMENT:  46y/o F with h/o recent gastric sleeve (08/04/21 [facility], Dr. Crisostomo ), fibromyalgia, thyroid dysfunction, PTSD, depression, anxiety.  Presented with seizures at home - brought to [place], with 1 more episode of seizure en route.  Intubated, CT showed SAH with IV extension, casted IV, hydrocephalus, given mannitol, levetiracetam 1g,  6mg ativan. Started on Cardene drip for BP goal < 140 and transferred to [facility] NICU for further management. HH5 MF4, BD 1 = 8/7. Now s/p cerebral angiogram for R vertebral artery takedown for R vertebral dissecting aneurysm (8/7), and R frontal EVD placement (8/7), now s/p IVC filter placement (8/12,) s/p angio IA verapamil 8/16, 8/17, 8/18, 8/20. Now s/p right VPS certas at 3 (8/27).    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 mutation    Anemia due to acute blood loss    SAH (subarachnoid hemorrhage)    Aneurysm    Delirium    Angiogram, carotid and cerebral arteries    IVC filter placement    Angiogram, carotid and cerebral, bilateral    Angiogram, carotid and cerebral, bilateral    Angiogram, carotid and cerebral, bilateral    Angiogram, carotid and cerebral, bilateral    Placement,  shunt, using frameless stereotaxy    Laparoscopic insertion of jejunostomy tube    [id number]      Plan   NEURO:   - neuro checks q4h/vitals q4   - Vimpat 100mg bid   - Bromocriptine 5mg Q8  - Amantadine dc'd 9/2 and ritalin d/c'ed  - Angio demonstrating vasospasm of Right ICA, right PCOMM, Left distal vert and basilar confluence, and received IA verapamil on 8/16. 8/17. 8/18. 8/20.   - Repeat CTH 9/4 demonstrated stable ventricles.     PULM:    - copious thick secretion, standing, Duonebs, 3% nebs, mucomyst  - chest PT   - CXR - aspiration precautions, requires frequent suctioning  - Dr. Quan lau, endonasal suctioning, no bronch for now    CARDIO:    - -150  - TTE 8/12 WNL    - midodrine - weaning     ENDO:    - glucose goal 140 -180    GI:    - Advancing TF via J tube, NGT d/c 9/3  - PPI per bariatric surgeon   - bowel regimen held   - On Vit D and multivitamin s/p bariatric surgery    RENAL:   - Maintain euvolemia, FW 200q8  - normonatremia goal  - straight cath prn - retaining     HEM/ONC:   - ASA 81 - held s/p OR and in anticipation of PEG placement   - VTE Prophylaxis: SCDs  - Eliquis 5 BID   - dopplers 8/23, acute DVT left IM calf, persistent completely occlusive DVT b/l peroneal veins and right IM calf , 8/30 unchanged DVTs  - carotid doppler 8/24: neg for pseudoaneurysm, CTA shows soft tissue small hematoma, non-occlusive L IJ and L basillic vein thrombus   - hypercoagulable w/u: - prothrombin gene mutation - heterozygous   - 8/30LUE doppler with findings of left IJ non-occlusive DVT and left basilic thrombus  - vascular re L arm cool- UE dopplers normal   - s/p 1u PRBC 8/30  - s/p vit k 5mg PO, vit k 5mg IV 8/31 for elevated INR    ID:   - MRSA neg 8/12   - Afebrile   - Last Pancx 8/23   - Ancef for MSSA pna coverage x 7 d completed 8/30  - Ancef x 3 d for R scalp incision erythema   - PICC line placed 8/26    Assessment and plan discussed with Dr. Lomax, Dr. Conn       Assessment:  Present when checked    []  GCS  E   V  M     Heart Failure: []Acute, [] acute on chronic , []chronic  Heart Failure:  [] Diastolic (HFpEF), [] Systolic (HFrEF), []Combined (HFpEF and HFrEF), [] RHF, [] Pulm HTN, [] Other    [] TRUDI, [] ATN, [] AIN, [] other  [] CKD1, [] CKD2, [] CKD 3, [] CKD 4, [] CKD 5, []ESRD    Encephalopathy: [] Metabolic, [] Hepatic, [] toxic, [] Neurological, [] Other    Abnormal Nurtitional Status: [] malnurtition (see nutrition note), [ ]underweight: BMI < 19, [] morbid obesity: BMI >40, [] Cachexia    [] Sepsis  [] hypovolemic shock,[] cardiogenic shock, [] hemorrhagic shock, [] neuogenic shock  [] Acute Respiratory Failure  []Cerebral edema, [] Brain compression/ herniation,   [] Functional quadriplegia  [] Acute blood loss anemia

## 2021-09-06 NOTE — PROGRESS NOTE ADULT - SUBJECTIVE AND OBJECTIVE BOX
HEALTH ISSUES - PROBLEM Dx:  Multiple subsegmental pulmonary emboli without acute cor pulmonale    DVT, lower extremity    Abnormality of lung on CXR    Heterozygous for prothrombin [id number] mutation    Anemia due to acute blood loss    SAH (subarachnoid hemorrhage)    Aneurysm    Delirium            CHEMOTHERAPY REGIMEN:        Day:                          Diet:  Protocol:                                    IVF:      MEDICATIONS  (STANDING):  acetylcysteine 20%  Inhalation 4 milliLiter(s) Inhalation every 12 hours  albuterol/ipratropium for Nebulization 3 milliLiter(s) Nebulizer every 6 hours  BACItracin   Ointment 1 Application(s) Topical two times a day  bromocriptine Capsule 5 milliGRAM(s) Oral every 8 hours  chlorhexidine 2% Cloths 1 Application(s) Topical <User Schedule>  enoxaparin Injectable 90 milliGRAM(s) SubCutaneous every 12 hours  ergocalciferol 65704 Unit(s) Oral <User Schedule>  lacosamide Solution 100 milliGRAM(s) Oral two times a day  midodrine 5 milliGRAM(s) Oral <User Schedule>  multivitamin/minerals/iron Oral Solution (CENTRUM) 15 milliLiter(s) Oral daily  nystatin    Suspension 994617 Unit(s) Oral four times a day  pantoprazole   Suspension 40 milliGRAM(s) Oral daily  polyethylene glycol 3350 17 Gram(s) Oral daily  QUEtiapine 50 milliGRAM(s) Oral two times a day  senna 2 Tablet(s) Oral at bedtime    MEDICATIONS  (PRN):  bisacodyl Suppository 10 milliGRAM(s) Rectal daily PRN Constipation  ondansetron Injectable 4 milliGRAM(s) IV Push every 6 hours PRN Nausea and/or Vomiting  sodium chloride 0.9% lock flush 10 milliLiter(s) IV Push every 1 hour PRN Pre/post blood products, medications, blood draw, and to maintain line patency      Allergies    apple (Angioedema)  No Known Drug Allergies  strawberry (Angioedema)    Intolerances    lactose (Stomach Upset)      DVT Prophylaxis: [ ] YES [ ] NO      Antibiotics: [ ] YES [ ] NO    Pain Scale (1-10):       Location:    Vital Signs Last 24 Hrs  T(C): 36.4 (06 Sep 2021 17:26), Max: 37.3 (06 Sep 2021 01:09)  T(F): 97.6 (06 Sep 2021 17:26), Max: 99.1 (06 Sep 2021 01:09)  HR: 92 (06 Sep 2021 20:44) (86 - 96)  BP: 106/66 (06 Sep 2021 20:44) (85/51 - 132/77)  BP(mean): 81 (06 Sep 2021 20:44) (60 - 101)  RR: 19 (06 Sep 2021 20:44) (17 - 20)  SpO2: 97% (06 Sep 2021 20:44) (91% - 100%)    Drug Dosing Weight  Height (cm): 162.6 (01 Sep 2021 11:40)  Weight (kg): 98.2 (01 Sep 2021 11:40)  BMI (kg/m2): 37.1 (01 Sep 2021 11:40)  BSA (m2): 2.02 (01 Sep 2021 11:40)     Physical Exam:  · Constitutional	detailed exam  · Constitutional Details	well-developed; well-groomed  · Eyes	EOMI; PERRL; no drainage or redness  · ENMT Comments	dry mucous membranes  · Respiratory	detailed exam  · Respiratory Comments	normal breath sounds at the lung bases bilaterally  · Cardiovascular	Regular rate & rhythm, normal S1, S2; no murmurs, gallops or rubs; no S3, S4  · Abd-Soft non tender  ·Ext-no edema, clubbing or cyanosis    URINARY CATHETER: [ ] YES [ ] NO     LABS:  CBC Full  -  ( 06 Sep 2021 06:29 )  WBC Count : 5.03 K/uL  RBC Count : 4.16 M/uL  Hemoglobin : 11.4 g/dL  Hematocrit : 37.7 %  Platelet Count - Automated : 188 K/uL  Mean Cell Volume : 90.6 fl  Mean Cell Hemoglobin : 27.4 pg  Mean Cell Hemoglobin Concentration : 30.2 gm/dL  Auto Neutrophil # : x  Auto Lymphocyte # : x  Auto Monocyte # : x  Auto Eosinophil # : x  Auto Basophil # : x  Auto Neutrophil % : x  Auto Lymphocyte % : x  Auto Monocyte % : x  Auto Eosinophil % : x  Auto Basophil % : x    09-06    141  |  103  |  15  ----------------------------<  114<H>  3.7   |  25  |  0.53    Ca    9.6      06 Sep 2021 06:29  Phos  2.9     09-06  Mg     2.0     09-06            CULTURES:    RADIOLOGY & ADDITIONAL STUDIES:

## 2021-09-07 NOTE — CHART NOTE - NSCHARTNOTEFT_GEN_A_CORE
Admitting Diagnosis:   Patient is a 45y old  Female who presents with a chief complaint of SAH (07 Sep 2021 06:48)      PAST MEDICAL & SURGICAL HISTORY:      Current Nutrition Order:   NPO with tube feeds via J tube: Vital HP @ 46 ml/hr x24hrs via NGT providing 1104 ml TV, 1104 kcal, 96g pro., 923 ml free water.     PO Intake: Good (%) [   ]  Fair (50-75%) [   ] Poor (<25%) [   ] -- N/A    GI Issues: No reported issues, n/v/d/c    Pain: No reported pain    Skin Integrity: Oli 15, surgical incision noted    Labs:   09-07    143  |  107  |  14  ----------------------------<  114<H>  3.8   |  25  |  0.51    Ca    9.5      07 Sep 2021 08:13  Phos  2.8     09-07  Mg     1.7     09-07      CAPILLARY BLOOD GLUCOSE          Medications:  MEDICATIONS  (STANDING):  acetylcysteine 20%  Inhalation 4 milliLiter(s) Inhalation every 12 hours  albuterol/ipratropium for Nebulization 3 milliLiter(s) Nebulizer every 6 hours  aspirin  chewable 81 milliGRAM(s) Oral daily  BACItracin   Ointment 1 Application(s) Topical two times a day  bromocriptine Capsule 5 milliGRAM(s) Oral every 8 hours  chlorhexidine 2% Cloths 1 Application(s) Topical <User Schedule>  enoxaparin Injectable 90 milliGRAM(s) SubCutaneous every 12 hours  ergocalciferol 63999 Unit(s) Oral <User Schedule>  lacosamide Solution 100 milliGRAM(s) Oral two times a day  midodrine 5 milliGRAM(s) Oral <User Schedule>  multivitamin/minerals/iron Oral Solution (CENTRUM) 15 milliLiter(s) Oral daily  nystatin    Suspension 327271 Unit(s) Oral four times a day  pantoprazole   Suspension 40 milliGRAM(s) Oral daily  polyethylene glycol 3350 17 Gram(s) Oral daily  potassium phosphate IVPB 15 milliMole(s) IV Intermittent once  QUEtiapine 75 milliGRAM(s) Oral two times a day  senna 2 Tablet(s) Oral at bedtime    MEDICATIONS  (PRN):  bisacodyl Suppository 10 milliGRAM(s) Rectal daily PRN Constipation  ondansetron Injectable 4 milliGRAM(s) IV Push every 6 hours PRN Nausea and/or Vomiting  sodium chloride 0.9% lock flush 10 milliLiter(s) IV Push every 1 hour PRN Pre/post blood products, medications, blood draw, and to maintain line patency    Adm Anthropometrics:  · Height for BMI (FEET)	5 Feet  · Height for BMI (INCHES)	4 Inch(s)  · Height for BMI (CENTIMETERS)	162.56 Centimeter(s)  · Weight for BMI (lbs)	225.1 lb  · Weight for BMI (kg)	102.1 kg  · Body Mass Index	              38.6 kg/m2  · Ideal Body Weight (lbs)	120  · Ideal Body Weight (kg)	54.4    Weight Change: No new wts in EMR. Please obtain weekly wts to assess for changes    Nutrition Focused Physical Exam: Completed [   ]  Not Pertinent [   ]  Muscle Wasting- Temporal [   ]  Clavicle/Pectoral [   ]  Shoulder/Deltoid [   ]  Scapula [   ]  Interosseous [   ]  Quadriceps [   ]  Gastrocnemius [   ]  Fat Wasting- Orbital [   ]  Buccal [   ]  Triceps [   ]  Rib [   ]  Suspect [PCM] 2/2 to physical assessment, [poor intake], and [wt loss]; please see malnutrition chart note.    Estimated energy needs:   IBW (54.5kg) used for calculations as pt >120% of IBW (188%).   Nutrient needs based on [facility] standards of care for maintenance in adults.   Needs adjusted for recent bariatric sx, critical care, obesity.   Kcal (20-25kcal/kg): 1090-1363kcal/day   Protein (1.5-2.0 g/kg pro): 81-108g pro/day  Fluids per team.     Subjective:   45F with h/o recent gastric sleeve (08/04/21 [facility], Dr. Crisostomo ), fibromyalgia, thyroid dysfunction, PTSD, depression, anxiety.  Presented with seizures at home - brought to [place], with 1 more episode of seizure en route.  Intubated, CT showed SAH with IV extension, casted IV, hydrocephalus, given mannitol, levetiracetam 1g,  6mg ativan. Started on Cardene drip for BP goal < 140 and transferred to [facility] NICU for further management. S/p angio with R vert takedown 8/7, pt extubated later that day. Pt remains on fentanyl and Precedex restlessness and agitation. NGT placed for initiation of EN. CTH with increased hydro, CTA head/neck with mild basilar spasm. Pt now weened off fentanyl and precedex 8/12 per disc with RN. S/p IVC placement 8/12. Pt with increased work of breathing with inability to clear secretions, and was reintubated 8/15. CTA shows posterior circulation vasospasm. Started on levophed drip for SBP goal 180-200. Also remains on propofol drip. EVD open at -5cmH2O. Pt noted to be febrile this am (101F) and pancultured 8/17. Pt s/p multiple angiograms 8/16, 8/17, 8/18, 8/20. Pt self extubated 8/18 and transitioned to NC, tolerating well. Pt con ton levo for BP support. SBP goal 160-200. MSSA growing in sputum. Ceftriaxone d/c'd and Ancef 2gq8 started. ID consulted. S/p  shunt placement 8/27. Attempted PEG placement for enteral feeds however unable to place gastric feeding tube. S/p Jtube 9/1. Started on tube feeds via J tube, continues on tube feeds.    Pt seen at bedside upon follow up assessment. Pt sleeping soundly. Continues on tube feeds via J tube, now at Goal rate of 46 ml/hr. PCA by bedside in room, spoke to PCA, no reported GI distress, denies any s/s of intolerance. Reports pt to be confused and disoriented, as noted in flowsheet. Noted labs 9/7: Glu 114 mg/dl. RD to continue to follow per protocol.     Previous Nutrition Diagnosis:  Inadequate Oral Intake RT inability to meet needs via PO AEB NPO, reliant on EN to meet 100% of est needs.     Active [  x ]  Resolved [   ]    Goal: Consistently meet >75% est needs via appropriate route.    Recommendations:  1. NPO status  2. Continue with tube feeds, Vital HP @ 46 ml/hr x24hrs, provides 1104 ml TV, 1104 kcal, 96g pro., 923 ml free water  3. If medically feasible, rec diet adv to BARICLLIQ >> Phase 1 Bariatric Full liquid diet  4. Maintain aspiration precaution at all times  5. FWF per team  6. Pain and BM regimen per team  7. Monitor lytes, replete prn    Education: Not appropriate at this time, pt confused/disoriented    Risk Level: High [ x ] Moderate [   ] Low [   ] Admitting Diagnosis:   Patient is a 45y old  Female who presents with a chief complaint of SAH (07 Sep 2021 06:48)      PAST MEDICAL & SURGICAL HISTORY:      Current Nutrition Order:   NPO with tube feeds via J tube: Vital HP @ 46 ml/hr x24hrs via NGT providing 1104 ml TV, 1104 kcal, 96g pro., 923 ml free water.     PO Intake: Good (%) [   ]  Fair (50-75%) [   ] Poor (<25%) [   ] -- N/A    GI Issues: No reported issues, n/v/d/c    Pain: No reported pain    Skin Integrity: Oli 15, surgical incision noted    Labs:   09-07    143  |  107  |  14  ----------------------------<  114<H>  3.8   |  25  |  0.51    Ca    9.5      07 Sep 2021 08:13  Phos  2.8     09-07  Mg     1.7     09-07      CAPILLARY BLOOD GLUCOSE          Medications:  MEDICATIONS  (STANDING):  acetylcysteine 20%  Inhalation 4 milliLiter(s) Inhalation every 12 hours  albuterol/ipratropium for Nebulization 3 milliLiter(s) Nebulizer every 6 hours  aspirin  chewable 81 milliGRAM(s) Oral daily  BACItracin   Ointment 1 Application(s) Topical two times a day  bromocriptine Capsule 5 milliGRAM(s) Oral every 8 hours  chlorhexidine 2% Cloths 1 Application(s) Topical <User Schedule>  enoxaparin Injectable 90 milliGRAM(s) SubCutaneous every 12 hours  ergocalciferol 99536 Unit(s) Oral <User Schedule>  lacosamide Solution 100 milliGRAM(s) Oral two times a day  midodrine 5 milliGRAM(s) Oral <User Schedule>  multivitamin/minerals/iron Oral Solution (CENTRUM) 15 milliLiter(s) Oral daily  nystatin    Suspension 891735 Unit(s) Oral four times a day  pantoprazole   Suspension 40 milliGRAM(s) Oral daily  polyethylene glycol 3350 17 Gram(s) Oral daily  potassium phosphate IVPB 15 milliMole(s) IV Intermittent once  QUEtiapine 75 milliGRAM(s) Oral two times a day  senna 2 Tablet(s) Oral at bedtime    MEDICATIONS  (PRN):  bisacodyl Suppository 10 milliGRAM(s) Rectal daily PRN Constipation  ondansetron Injectable 4 milliGRAM(s) IV Push every 6 hours PRN Nausea and/or Vomiting  sodium chloride 0.9% lock flush 10 milliLiter(s) IV Push every 1 hour PRN Pre/post blood products, medications, blood draw, and to maintain line patency    Adm Anthropometrics:  · Height for BMI (FEET)	5 Feet  · Height for BMI (INCHES)	4 Inch(s)  · Height for BMI (CENTIMETERS)	162.56 Centimeter(s)  · Weight for BMI (lbs)	225.1 lb  · Weight for BMI (kg)	102.1 kg  · Body Mass Index	              38.6 kg/m2  · Ideal Body Weight (lbs)	120  · Ideal Body Weight (kg)	54.4    Weight Change: No new wts in EMR. Please obtain weekly wts to assess for changes    Estimated energy needs:   IBW (54.5kg) used for calculations as pt >120% of IBW (188%).   Nutrient needs based on [facility] standards of care for maintenance in adults.   Needs adjusted for recent bariatric sx, critical care, obesity.   Kcal (20-25kcal/kg): 1090-1363kcal/day   Protein (1.5-2.0 g/kg pro): 81-108g pro/day  Fluids per team.     Subjective:   45F with h/o recent gastric sleeve (08/04/21 [facility], Dr. Crisostomo ), fibromyalgia, thyroid dysfunction, PTSD, depression, anxiety.  Presented with seizures at home - brought to [place], with 1 more episode of seizure en route.  Intubated, CT showed SAH with IV extension, casted IV, hydrocephalus, given mannitol, levetiracetam 1g,  6mg ativan. Started on Cardene drip for BP goal < 140 and transferred to [facility] NICU for further management. S/p angio with R vert takedown 8/7, pt extubated later that day. Pt remains on fentanyl and Precedex restlessness and agitation. NGT placed for initiation of EN. CTH with increased hydro, CTA head/neck with mild basilar spasm. Pt now weened off fentanyl and precedex 8/12 per disc with RN. S/p IVC placement 8/12. Pt with increased work of breathing with inability to clear secretions, and was reintubated 8/15. CTA shows posterior circulation vasospasm. Started on levophed drip for SBP goal 180-200. Also remains on propofol drip. EVD open at -5cmH2O. Pt noted to be febrile this am (101F) and pancultured 8/17. Pt s/p multiple angiograms 8/16, 8/17, 8/18, 8/20. Pt self extubated 8/18 and transitioned to NC, tolerating well. Pt con ton levo for BP support. SBP goal 160-200. MSSA growing in sputum. Ceftriaxone d/c'd and Ancef 2gq8 started. ID consulted. S/p  shunt placement 8/27. Attempted PEG placement for enteral feeds however unable to place gastric feeding tube. S/p Jtube 9/1. Started on tube feeds via J tube, continues on tube feeds.    Pt seen at bedside upon follow up assessment. Pt sleeping soundly. Continues on tube feeds via J tube, now at Goal rate of 46 ml/hr. PCA by bedside in room, spoke to PCA, no reported GI distress, denies any s/s of intolerance. Reports pt to be confused and disoriented, as noted in flowsheet. Noted labs 9/7: Glu 114 mg/dl. RD to continue to follow per protocol.     Previous Nutrition Diagnosis:  Inadequate Oral Intake RT inability to meet needs via PO AEB NPO, reliant on EN to meet 100% of est needs.     Active [  x ]  Resolved [   ]    Goal: Consistently meet >75% est needs via appropriate route.    Recommendations:  1. NPO status  2. Continue with tube feeds, Vital HP @ 46 ml/hr x24hrs, provides 1104 ml TV, 1104 kcal, 96g pro., 923 ml free water  3. If medically feasible, rec diet adv to BARICLLIQ >> Phase 1 Bariatric Full liquid diet  4. Maintain aspiration precaution at all times  5. FWF per team  6. Pain and BM regimen per team  7. Monitor lytes, replete prn    Education: Not appropriate at this time, pt confused/disoriented    Risk Level: High [ x ] Moderate [   ] Low [   ]

## 2021-09-07 NOTE — PROGRESS NOTE ADULT - ASSESSMENT
46 yo who is under neurosurg ICU care admitted for SAH. She is POD6 for J tube placement. Patient doing well today. She is now at goal feeds of 46cc/hr which well tolerated.  The J tube was loosened slightly by cutting two peripheral sutures. However the tube is still secured by one other peripheral suture and central tie.     - continue J tube feeds at goal rate (46cc/hr)  - We will sign off at this time  - please reach out to team 2C with any questions or concerns

## 2021-09-07 NOTE — PROGRESS NOTE ADULT - SUBJECTIVE AND OBJECTIVE BOX
Patient is a 45y old  Female who presents with a chief complaint of SAH (04 Sep 2021 07:05)      HPI:  46y/o F with h/o recent gastric sleeve (08/04/21 [facility], Dr. Crisostomo ), fibromyalgia, thyroid dysfunction, PTSD, depression, anxiety.  Presented with seizures at home - brought to [place], with 1 more episode of seizure en route.  Intubated, CT showed SAH with IV extension, casted IV, hydrocephalus, given mannitol, levetiracetam 1g,  6mg ativan. Started on Cardene drip for BP goal < 140 and transferred to [facility] NICU for further management.     HH5 MF4   NIHSS 26 on arrival  BD 1 = 8/7 (07 Aug 2021 08:08)    O/N and interval events: Free water d/bolivar, remains agitated - required 1 mg of Haldol    Subjective:   Pt denies pain. Unable to elicit much history given patient's clinical status.     Allergies    apple (Angioedema)  No Known Drug Allergies  strawberry (Angioedema)    Intolerances    lactose (Stomach Upset)        MEDICATIONS  (STANDING):  acetylcysteine 20%  Inhalation 4 milliLiter(s) Inhalation every 12 hours  albuterol/ipratropium for Nebulization 3 milliLiter(s) Nebulizer every 6 hours  aspirin  chewable 81 milliGRAM(s) Oral daily  BACItracin   Ointment 1 Application(s) Topical two times a day  bromocriptine Capsule 5 milliGRAM(s) Oral every 8 hours  chlorhexidine 2% Cloths 1 Application(s) Topical <User Schedule>  enoxaparin Injectable 100 milliGRAM(s) SubCutaneous every 12 hours  ergocalciferol 25174 Unit(s) Oral <User Schedule>  lacosamide Solution 100 milliGRAM(s) Oral two times a day  midodrine 5 milliGRAM(s) Oral <User Schedule>  multivitamin/minerals/iron Oral Solution (CENTRUM) 15 milliLiter(s) Oral daily  nystatin    Suspension 214657 Unit(s) Oral four times a day  pantoprazole   Suspension 40 milliGRAM(s) Oral daily  polyethylene glycol 3350 17 Gram(s) Oral daily  QUEtiapine 75 milliGRAM(s) Oral two times a day  senna 2 Tablet(s) Oral at bedtime    MEDICATIONS  (PRN):  bisacodyl Suppository 10 milliGRAM(s) Rectal daily PRN Constipation  ondansetron Injectable 4 milliGRAM(s) IV Push every 6 hours PRN Nausea and/or Vomiting  sodium chloride 0.9% lock flush 10 milliLiter(s) IV Push every 1 hour PRN Pre/post blood products, medications, blood draw, and to maintain line patency              Drug Dosing Weight  Height (cm): 162.6 (01 Sep 2021 11:40)  Weight (kg): 98.2 (01 Sep 2021 11:40)  BMI (kg/m2): 37.1 (01 Sep 2021 11:40)  BSA (m2): 2.02 (01 Sep 2021 11:40)    PAST MEDICAL & SURGICAL HISTORY:      FAMILY HISTORY:  No pertinent family history in first degree relatives        SOCIAL HISTORY: no smoking reported        Vital Signs Last 24 Hrs  T(C): 36.7 (07 Sep 2021 14:00), Max: 36.9 (06 Sep 2021 22:15)  T(F): 98 (07 Sep 2021 14:00), Max: 98.4 (06 Sep 2021 22:15)  HR: 88 (07 Sep 2021 09:30) (88 - 94)  BP: 106/60 (07 Sep 2021 09:30) (85/51 - 111/57)  BP(mean): 76 (07 Sep 2021 09:30) (60 - 81)  RR: 18 (07 Sep 2021 08:45) (18 - 20)  SpO2: 92% (07 Sep 2021 08:45) (91% - 97%)        PHYSICAL EXAM:      Constitutional: NAD  Eyes: PERRLA  ENMT: thrush  Neck: supple  Back: midline  Respiratory: CTA b/l  Cardiovascular: rrr, s1s2, no m/r/g  Gastrointestinal: soft, NTND, + J tube  Extremities: wwp  Vascular: + 2 pules radial  Neurological: AAO x 2, minimally following commands  Skin: no rash  Lymph Nodes: no LAD  Musculoskeletal: no joint swelling  Psychiatric: flat affect        LABS:                            10.7   4.27  )-----------( 157      ( 07 Sep 2021 08:13 )             35.7   09-07    143  |  107  |  14  ----------------------------<  114<H>  3.8   |  25  |  0.51    Ca    9.5      07 Sep 2021 08:13  Phos  2.8     09-07  Mg     1.7     09-07                EKG:    ECHO, US:    < from: TTE Limited Echo w/o Cont (08.12.21 @ 15:22) >  CONCLUSIONS:     1. Limited study obtained for evaluation of right ventricular function.   2. Probably normal left ventricular systolic function.   3. Normal right ventricular size and systolic function.   4. There was insufficient tricuspid regurgitation detected from which to calculate pulmonary artery systolic pressure.   5. No pericardial effusion.    < end of copied text >      RADIOLOGY:  < from: CT Head No Cont (08.29.21 @ 17:45) >  IMPRESSION:    With ventricular shunt in place, there is now decreased ventricular size since 08/27/2021.    < end of copied text >    < from: CT Head No Cont (09.04.21 @ 11:29) >    IMPRESSION:    No acute intracranial hemorrhage or mass effect compared to prior imaging from 08/29/2021, now with the patient placed on anticoagulation therapy.    Stable ventricular size with  shunt in place, mild hydrocephalus persists.    A couple small and subacute appearing infarcts are now visible in the right cerebellum.    --- End of Report ---    < end of copied text >

## 2021-09-07 NOTE — PROGRESS NOTE ADULT - SUBJECTIVE AND OBJECTIVE BOX
HPI:  44y/o F with h/o recent gastric sleeve (08/04/21 [facility], Dr. Crisostomo ), fibromyalgia, thyroid dysfunction, PTSD, depression, anxiety.  Presented with seizures at home - brought to [place], with 1 more episode of seizure en route.  Intubated, CT showed SAH with IV extension, casted IV, hydrocephalus, given mannitol, levetiracetam 1g,  6mg ativan. Started on Cardene drip for BP goal < 140 and transferred to [facility] for further management.     HH5 MF4   NIHSS 26 on arrival  BD 1 = 8/7 (07 Aug 2021 08:08)    OVERNIGHT EVENTS: ASHA overnight, neuro stable.    Hospital Course:  8/7: Tx from [place] for SAH. Intubated. R frontal EVD placed, central line and a line placed. 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   8/8: POD1 s/p angio with R vert takedown, extubated, desatting on aerosol mask, required extensive suctioning, 3% nebs, chest PT, started on low dose precedex drip for restlessness/agitation, ABG stable. NGT placed. TF started with goal 20 pending nutrition recs. 3% stopped for Na 150. Ceribell EEG negative and d/c'ed.   8/9 Overnight, new Right pronator drift and right gaze preference that can't cross midline, Repeat CTH demonstrated stable vents, CTA head and neck unremarkable for spasm, hypoattenuation of right cerebellum edema vs. infarct.  8/10: POD3 R vert takedown, EVD open at [id number]. ASHA overnight, neuro stable. CTH with increased hydro, CTA head/neck with mild basilar spasm, but concern for PE. 1/2 am D50 for hypoglycemia. 1L bolus then 100 cc/hr, PM rounds for net negative fluid balance and mild vasospasm on CTA.   8/11: POD4 R vert takedown. EVD at 5cm H2O, ASHA overnight. neuro stable.   Febrile 104. depakote increased to q6. NCHCT stable. EVD lowered to 0. Lasix 20 given for dieuresis, UOP over 2 liters. Sedation given for a-line placement, BP drop needing levo.  8/12: POD5 R vert takedown. EVD at 0cm H2O. ASHA overnight, neuro stable. Precedex being weaned off. Pending IVCF with vascular today.  8/13: POD6 R vert takedown. EVD open at 0cmH2O. ASHA overnight. Neuro exam stable. Mottled skin on the left lower extremity improving. Started on Bromocriptine 15mg Q8.   8/14: POD7. EVD open at 0. 1L bolus given for euvolemia o/n. neuro stable. CTH stable. ENT scoped vocal cords, +R voacl cord paresis, NTD. started on zosyn empirically.   8/15: POD8. EVD open at 0. ASHA o/n, neuro stable. Pt developed increased work of breathing, unable to clear her secretions, received racemic epi and multiple nebulizer treatments, still with stridor. Patient re-intubated at bedside, on full vent support.   8/16: CTH/CTA head/neck/CT chest performed o/n for worsened exam, R gaze preference, sluggish pupils. +worsened uncal herniation, hydro, vasospasm in b/l PCAs, L vert, basilar arteries. preop angio today, restarted on 3% for Na goal 145-150. Angio for vasospasm with IA verapamil.  8/17: POD10. Overnight Pt remains on levophed drip for SBP goal 180-220. Also remains on propofol drip. EVD open at -5cmH2O. ICPs WNL. Febrile 101F and pancultured. CTH today shows slightly smaller ventricles. Angio for vasospasm with IA verapamil.  8/18: POD11 R vert takedown. POD1 angio IA verpamil. Overnight, Pt noted to have downward gaze to the right and not following commands. Taken for stat head CT which is stable. Pupils also noted to be aniscoric left pupil 4mm and brisk and right 2mm brisk. Mannitol 50g given. Ceribell eeg placed for concern of subclinical seizures, so far appears negative for seizures. 1L NS o/n and 1.5L NS bolus in AM for euvolemia. vanc/zosyn stopped. 250cc 3% bolus and 250cc albumin given in AM. Brief seizure to L frontal lobe this AM on EEG, given 2g valproic acid and dose increased to 1g q8. Vimpat 100mg BID started.  Angio with interval improvment in vasospasm, IA verapamil given. In afternoon patient self-extubated, placed on NRB with mucomyst, 3% inhalation and duonebs. 3% at 50 d/c'd.  8/19: POD 12. Remains on VEEG. Axillary A line placed. Salt tabs d/c'd, florinef decreased to 0.1mg, EVD @ -5cm H2O, now draining 20cc/hr. 250 albumin and 500 NS boluses on dayshift, 1 L LR bolus  8/20: POD 13. ASHA. on VEEG, no seizures noted. Pending VPA level. 500 NS, 250 albumin. Febrile, pancultured. EEG d/c'ed.   8/21: BD14, POD14. ASHA overnight, EVD @ -5. s/p 1L bolus for euvolemia  8/22: BD #15: continued on levo, hypercoagulabitly profile pending, EVD @ -5, euvolemia, extubated, amantadine added, free water started for hypernatremia   8/23: BD 17, tmax 101F o/n. Gen Sx consulted for PEG - not a candidate at this time, pancultured for fever, 1L LR given for euvolemia. EVD at -5  8/24: BD18, ASHA o/n, neuro stable, EVD at -5, VPA level therapeutic, SBP goal 160-200, L IJ CVC attempted placement with carotid puncture, no pseudoaneurysm on US. MSSA growing in sputum. Ceftriaxone d/c'd and Ancef 2gq8 started. ID consulted. Recieved 1.5L LR and 500cc albumin.   8/25: POD5 last angio, BD19 ASHA o/n, neuro stable, EVD at -5  8/26: BD 20. ASHA o/n. EVD @ -5   8/27: BD 21, POD 20 Right vert takedown. ASHA overnight. Exam stable. EVD remains open at -5 and to be clamped at 0600 in preparation for right VPS placement today. Possible PEG placement Monday 8/30 8/28: BD22, POD22 Right vert takedown, POD1 R VPS, neuro exam stable. Tube feeds restarted, more lethargic this AM, improved during day, continue to monitor for hydrocephalus. AXR with dilated bowel, started on reglan and magnesium. Liquid BM, TFs held and rectal tube placed.   8/29: BD23, POD23 right vert takedown, POD2 R VPS, ASHA overnight, neuro stable. TFs decreased for colonic distention seen on XR, GI notified, nothing to do, TFs resumed at 20/hr and will continue will PEG placement tomorrow. CTH today demonstrated interval decrease in size of ventricles. Midodrine added to aid in weening levophed.   8/30: BD24, POD24, pending PEG placement this morning with Dr. Milner. Midodrine increased to 10mg q8hrs today to wean off of levo. 1u PRBC for Hb 7.5. FOB negative. 20mg lasix given. Repeat dopplers completed, unchanged from previous. Started on IV iron per hematology recs.  8/31: BD25, POD25, unsuccessful PEG placement by GI, trend Hgb w/ AM labs, slowly weening levophed. 5mg vitamin K given for elevated INR.  9/1: BD26, POD26. J tube w/ gen surg today. 5mg vitK IV given o/n for elevated INR. off levophed. amantadine decreased 100 BID and ritalin d/c'd.   9/2: BD27, POD27. s/p J tube w/ gen surg. still receiving meds/TF via J tube.  neuro stable  9/4: BD 28, POD28, s/p J tube w/ gen surg. Neuro stable CTH performed, changed shunt to Certas @ 3 from 4.   9/5: BD 29, POD29, s/p J tube w/ gen surg. VPS certas from 4 to 3 now. Required 1mg IV haldol overnight for agitation/delirium, patient not responding to sitter and attempting to get out of bed numerous times. Cont Eliquis 5 BID for b/l LE DVTs.  	  9/6: VPS Certas @3. BD30. ASHA overnight, neuro stable.   9/7: Certas@3, BD31. ASHA overnight, neuro stable.    Vital Signs Last 24 Hrs  T(C): 36.9 (06 Sep 2021 22:15), Max: 36.9 (06 Sep 2021 22:15)  T(F): 98.4 (06 Sep 2021 22:15), Max: 98.4 (06 Sep 2021 22:15)  HR: 88 (07 Sep 2021 00:15) (86 - 96)  BP: 104/58 (07 Sep 2021 00:15) (85/51 - 132/77)  BP(mean): 77 (07 Sep 2021 00:15) (60 - 101)  RR: 20 (07 Sep 2021 00:15) (18 - 20)  SpO2: 97% (07 Sep 2021 00:15) (91% - 100%)    I&O's Summary    05 Sep 2021 07:01  -  06 Sep 2021 07:00  --------------------------------------------------------  IN: 1304 mL / OUT: 1600 mL / NET: -296 mL    06 Sep 2021 07:01  -  07 Sep 2021 01:20  --------------------------------------------------------  IN: 1328 mL / OUT: 950 mL / NET: 378 mL        PHYSICAL EXAM:  General: NAD, pt is comfortably sitting up in bed, A&O x2 (self and place), on RA  HEENT: CN II-XII grossly intact, PERRL 4mm, +R gaze preference, face symmetric, tongue midline, dysarthric speech, neck FROM  Cardiovascular: RRR, normal S1 and S2   Respiratory: lungs CTAB, no wheezing, rhonchi, or crackles   GI: normoactive BS to auscultation, abd soft, NTND, J tube in place  Neuro: no aphasia, speech clear, no dysmetria, no pronator drift  CALLE anti-gravity, symmetric   Extremities: distal pulses 2+ x4   Wound/incision: abdominal and R crani VPS incision with staples C/D/I     TUBES/LINES:  [] Richey  [] Lumbar Drain  [] Wound Drains  [] Others      DIET:  [] NPO  [] Mechanical  [x] Tube feeds via J tube      LABS:                        11.4   5.03  )-----------( 188      ( 06 Sep 2021 06:29 )             37.7     09-06    141  |  103  |  15  ----------------------------<  114<H>  3.7   |  25  |  0.53    Ca    9.6      06 Sep 2021 06:29  Phos  2.9     09-06  Mg     2.0     09-06              CAPILLARY BLOOD GLUCOSE          Drug Levels: [] N/A    CSF Analysis: [] N/A      Allergies    apple (Angioedema)  No Known Drug Allergies  strawberry (Angioedema)    Intolerances    lactose (Stomach Upset)    MEDICATIONS:  Antibiotics:  nystatin    Suspension 545487 Unit(s) Oral four times a day    Neuro:  bromocriptine Capsule 5 milliGRAM(s) Oral every 8 hours  lacosamide Solution 100 milliGRAM(s) Oral two times a day  ondansetron Injectable 4 milliGRAM(s) IV Push every 6 hours PRN  QUEtiapine 50 milliGRAM(s) Oral two times a day    Anticoagulation:  enoxaparin Injectable 90 milliGRAM(s) SubCutaneous every 12 hours    OTHER:  acetylcysteine 20%  Inhalation 4 milliLiter(s) Inhalation every 12 hours  albuterol/ipratropium for Nebulization 3 milliLiter(s) Nebulizer every 6 hours  BACItracin   Ointment 1 Application(s) Topical two times a day  bisacodyl Suppository 10 milliGRAM(s) Rectal daily PRN  chlorhexidine 2% Cloths 1 Application(s) Topical <User Schedule>  midodrine 5 milliGRAM(s) Oral <User Schedule>  pantoprazole   Suspension 40 milliGRAM(s) Oral daily  polyethylene glycol 3350 17 Gram(s) Oral daily  senna 2 Tablet(s) Oral at bedtime    IVF:  ergocalciferol 88007 Unit(s) Oral <User Schedule>  multivitamin/minerals/iron Oral Solution (CENTRUM) 15 milliLiter(s) Oral daily    CULTURES:  Culture Results:   Sputum specimen rejected.  Microscopic examination indicates  oropharyngeal contamination.  Please repeat. (08-27 @ 01:53)  Culture Results:   No growth (08-26 @ 14:27)    RADIOLOGY & ADDITIONAL TESTS:      ASSESSMENT:  44y/o F with h/o recent gastric sleeve (08/04/21 [facility], Dr. Crisostomo ), fibromyalgia, thyroid dysfunction, PTSD, depression, anxiety.  Presented with seizures at home - brought to [place], with 1 more episode of seizure en route.  Intubated, CT showed SAH with IV extension, casted IV, hydrocephalus, given mannitol, levetiracetam 1g,  6mg ativan. Started on Cardene drip for BP goal < 140 and transferred to [facility] for further management. HH5 MF4, BD 1 = 8/7. Now s/p cerebral angiogram for R vertebral artery takedown for R vertebral dissecting aneurysm (8/7), and R frontal EVD placement (8/7), now s/p IVC filter placement (8/12,) s/p angio IA verapamil 8/16, 8/17, 8/18, 8/20. Now s/p right VPS certas at 3 (8/27).    HEAD BLEED    No pertinent family history in first degree relatives    Handoff    MEWS Score    Cerebral aneurysm    Cerebral artery vasospasm    SAH (subarachnoid hemorrhage)    Impaired swallowing    Cerebral aneurysm    DVT, lower extremity    Pulmonary embolism    Cerebral artery vasospasm    SAH (subarachnoid hemorrhage)    Impaired swallowing    Multiple subsegmental pulmonary emboli without acute cor pulmonale    DVT, lower extremity    Abnormality of lung on CXR    Heterozygous for prothrombin [id number] mutation    Anemia due to acute blood loss    SAH (subarachnoid hemorrhage)    Aneurysm    Delirium    Angiogram, carotid and cerebral arteries    IVC filter placement    Angiogram, carotid and cerebral, bilateral    Angiogram, carotid and cerebral, bilateral    Angiogram, carotid and cerebral, bilateral    Angiogram, carotid and cerebral, bilateral    Placement,  shunt, using frameless stereotaxy    Laparoscopic insertion of jejunostomy tube    [id number]          Plan   NEURO:   - neuro checks q4h/vitals q4   - Vimpat 100mg bid   - Bromocriptine 5mg Q8  - Amantadine dc'd 9/2 and ritalin d/c'ed  - Angio demonstrating vasospasm of Right ICA, right PCOMM, Left distal vert and basilar confluence, and received IA verapamil on 8/16. 8/17. 8/18. 8/20.   - Repeat CTH 9/4 demonstrated stable ventricles.   - Seroquel 50 BID for agitation     PULM:    - copious thick secretion, standing, Duonebs, 3% nebs, mucomyst  - chest PT   - CXR - aspiration precautions, requires frequent suctioning  - Dr. Quan lau, endonasal suctioning, no bronch for now    CARDIO:    - -150  - TTE 8/12 WNL    - QTC 9/6 PM: 490  - midodrine 5 BID - weaning     ENDO:    - glucose goal 140 -180    GI:    - TF via J tube, NGT d/c 9/3  - PPI per bariatric surgeon   - bowel regimen held   - On Vit D and multivitamin s/p bariatric surgery    RENAL:   - Maintain euvolemia, FW 200q8  - normonatremia goal  - straight cath prn - retaining     HEM/ONC:   - ASA 81 - held s/p OR and in anticipation of PEG placement   - VTE Prophylaxis: SCDs, SQL 90 BID  - dopplers 8/23, acute DVT left IM calf, persistent completely occlusive DVT b/l peroneal veins and right IM calf , 8/30 unchanged DVTs  - carotid doppler 8/24: neg for pseudoaneurysm, CTA shows soft tissue small hematoma, non-occlusive L IJ and L basillic vein thrombus   - hypercoagulable w/u: - prothrombin gene mutation - heterozygous   - 8/30 LUE doppler with findings of left IJ non-occlusive DVT and left basilic thrombus  - 9/6 dopplers: new L posterior tibial DVT: Eliquis d/c'ed switched to SQL 90 BID      ID:   - MRSA neg 8/12   - Afebrile   - Last Pancx 8/23   - Ancef for MSSA pna coverage x 7 d completed 8/30  - Ancef x 3 d for R scalp incision erythema complete 9/5  - PICC line placed 8/26    Assessment and plan discussed with Dr. Lomax, Dr. Conn       Assessment:  Present when checked    []  GCS  E   V  M     Heart Failure: []Acute, [] acute on chronic , []chronic  Heart Failure:  [] Diastolic (HFpEF), [] Systolic (HFrEF), []Combined (HFpEF and HFrEF), [] RHF, [] Pulm HTN, [] Other    [] TRUDI, [] ATN, [] AIN, [] other  [] CKD1, [] CKD2, [] CKD 3, [] CKD 4, [] CKD 5, []ESRD    Encephalopathy: [] Metabolic, [] Hepatic, [] toxic, [] Neurological, [] Other    Abnormal Nurtitional Status: [] malnurtition (see nutrition note), [ ]underweight: BMI < 19, [] morbid obesity: BMI >40, [] Cachexia    [] Sepsis  [] hypovolemic shock,[] cardiogenic shock, [] hemorrhagic shock, [] neuogenic shock  [] Acute Respiratory Failure  []Cerebral edema, [] Brain compression/ herniation,   [] Functional quadriplegia  [] Acute blood loss anemia

## 2021-09-07 NOTE — PROGRESS NOTE ADULT - PROBLEM SELECTOR PLAN 4
As above  -S/p IVF filter placement    -Pt now has new DVT on repeat doppler despite being on NoAC  -D/w Heme - pt switched to Lovenox 100 mg BID

## 2021-09-07 NOTE — PROGRESS NOTE ADULT - ASSESSMENT
46y/o F with h/o recent gastric sleeve (08/04/21 [facility], Dr. Crisostomo ), fibromyalgia, thyroid dysfunction, PTSD, depression, anxiety.  Presented with seizures at home - brought to [place], with 1 more episode of seizure en route.  Intubated, CT showed SAH with IV extension, casted IV, hydrocephalus, given mannitol, levetiracetam 1g,  6mg ativan. Started on Cardene drip for BP goal < 140 and transferred to [facility]ICU for further management. HH5 MF4, BD 1 = 8/7. Now s/p cerebral angiogram for R vertebral artery takedown for R vertebral dissecting aneurysm (8/7), and R frontal EVD placement (8/7), now s/p IVC filter placement (8/12,) s/p angio IA verapamil 8/16, 8/17, 8/18, 8/20. Now s/p right VPS certas at 4 (8/27).

## 2021-09-07 NOTE — PROGRESS NOTE ADULT - SUBJECTIVE AND OBJECTIVE BOX
SUBJECTIVE: Patient seen and examined bedside.    enoxaparin Injectable 90 milliGRAM(s) SubCutaneous every 12 hours  midodrine 5 milliGRAM(s) Oral <User Schedule>  nystatin    Suspension 229138 Unit(s) Oral four times a day      Vital Signs Last 24 Hrs  T(C): 36.4 (07 Sep 2021 05:33), Max: 36.9 (06 Sep 2021 22:15)  T(F): 97.6 (07 Sep 2021 05:33), Max: 98.4 (06 Sep 2021 22:15)  HR: 92 (07 Sep 2021 04:52) (88 - 94)  BP: 111/57 (07 Sep 2021 04:52) (85/51 - 132/77)  BP(mean): 76 (07 Sep 2021 04:52) (60 - 101)  RR: 18 (07 Sep 2021 04:52) (18 - 20)  SpO2: 91% (07 Sep 2021 04:52) (91% - 99%)  I&O's Detail    05 Sep 2021 07:01  -  06 Sep 2021 07:00  --------------------------------------------------------  IN:    Enteral Tube Flush: 200 mL    Vital High Protein: 1104 mL  Total IN: 1304 mL    OUT:    Intermittent Catheterization - Urethral (mL): 900 mL    Voided (mL): 700 mL  Total OUT: 1600 mL    Total NET: -296 mL      06 Sep 2021 07:01  -  07 Sep 2021 06:48  --------------------------------------------------------  IN:    Sodium Chloride 0.9% Bolus: 500 mL    Vital High Protein: 920 mL  Total IN: 1420 mL    OUT:    Ureteral Catheter (mL): 800 mL    Voided (mL): 150 mL  Total OUT: 950 mL    Total NET: 470 mL          General: NAD, resting comfortably in bed  C/V: NSR  Pulm: Nonlabored breathing, no respiratory distress  Abd: soft, NT/ND. J tube secured in place: mild erythema around one of the suture sites (removed at bedside). Incisions; c,d,i  Extrem: WWP, no edema, SCDs in place        LABS:                        11.4   5.03  )-----------( 188      ( 06 Sep 2021 06:29 )             37.7     09-06    141  |  103  |  15  ----------------------------<  114<H>  3.7   |  25  |  0.53    Ca    9.6      06 Sep 2021 06:29  Phos  2.9     09-06  Mg     2.0     09-06

## 2021-09-08 NOTE — PROGRESS NOTE ADULT - SUBJECTIVE AND OBJECTIVE BOX
HPI:  46y/o F with h/o recent gastric sleeve (08/04/21 [facility], Dr. Crisostomo ), fibromyalgia, thyroid dysfunction, PTSD, depression, anxiety.  Presented with seizures at home - brought to [place], with 1 more episode of seizure en route.  Intubated, CT showed SAH with IV extension, casted IV, hydrocephalus, given mannitol, levetiracetam 1g,  6mg ativan. Started on Cardene drip for BP goal < 140 and transferred to [facility] NICU for further management.     HH5 MF4   NIHSS 26 on arrival  BD 1 = 8/7 (07 Aug 2021 08:08)    OVERNIGHT EVENTS: ASHA overnight, neuro stable.    Hospital Course:  8/7: Tx from [place] for SAH. Intubated. R frontal EVD placed, central line and a line placed. 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   8/8: POD1 s/p angio with R vert takedown, extubated, desatting on aerosol mask, required extensive suctioning, 3% nebs, chest PT, started on low dose precedex drip for restlessness/agitation, ABG stable. NGT placed. TF started with goal 20 pending nutrition recs. 3% stopped for Na 150. Ceribell EEG negative and d/c'ed.   8/9 Overnight, new Right pronator drift and right gaze preference that can't cross midline, Repeat CTH demonstrated stable vents, CTA head and neck unremarkable for spasm, hypoattenuation of right cerebellum edema vs. infarct.  8/10: POD3 R vert takedown, EVD open at [id number]. ASHA overnight, neuro stable. CTH with increased hydro, CTA head/neck with mild basilar spasm, but concern for PE. 1/2 am D50 for hypoglycemia. 1L bolus then 100 cc/hr, PM rounds for net negative fluid balance and mild vasospasm on CTA.   8/11: POD4 R vert takedown. EVD at 5cm H2O, ASHA overnight. neuro stable.   Febrile 104. depakote increased to q6. NCHCT stable. EVD lowered to 0. Lasix 20 given for dieuresis, UOP over 2 liters. Sedation given for a-line placement, BP drop needing levo.  8/12: POD5 R vert takedown. EVD at 0cm H2O. ASHA overnight, neuro stable. Precedex being weaned off. Pending IVCF with vascular today.  8/13: POD6 R vert takedown. EVD open at 0cmH2O. ASHA overnight. Neuro exam stable. Mottled skin on the left lower extremity improving. Started on Bromocriptine 15mg Q8.   8/14: POD7. EVD open at 0. 1L bolus given for euvolemia o/n. neuro stable. CTH stable. ENT scoped vocal cords, +R voacl cord paresis, NTD. started on zosyn empirically.   8/15: POD8. EVD open at 0. ASHA o/n, neuro stable. Pt developed increased work of breathing, unable to clear her secretions, received racemic epi and multiple nebulizer treatments, still with stridor. Patient re-intubated at bedside, on full vent support.   8/16: CTH/CTA head/neck/CT chest performed o/n for worsened exam, R gaze preference, sluggish pupils. +worsened uncal herniation, hydro, vasospasm in b/l PCAs, L vert, basilar arteries. preop angio today, restarted on 3% for Na goal 145-150. Angio for vasospasm with IA verapamil.  8/17: POD10. Overnight Pt remains on levophed drip for SBP goal 180-220. Also remains on propofol drip. EVD open at -5cmH2O. ICPs WNL. Febrile 101F and pancultured. CTH today shows slightly smaller ventricles. Angio for vasospasm with IA verapamil.  8/18: POD11 R vert takedown. POD1 angio IA verpamil. Overnight, Pt noted to have downward gaze to the right and not following commands. Taken for stat head CT which is stable. Pupils also noted to be aniscoric left pupil 4mm and brisk and right 2mm brisk. Mannitol 50g given. Ceribell eeg placed for concern of subclinical seizures, so far appears negative for seizures. 1L NS o/n and 1.5L NS bolus in AM for euvolemia. vanc/zosyn stopped. 250cc 3% bolus and 250cc albumin given in AM. Brief seizure to L frontal lobe this AM on EEG, given 2g valproic acid and dose increased to 1g q8. Vimpat 100mg BID started.  Angio with interval improvment in vasospasm, IA verapamil given. In afternoon patient self-extubated, placed on NRB with mucomyst, 3% inhalation and duonebs. 3% at 50 d/c'd.  8/19: POD 12. Remains on VEEG. Axillary A line placed. Salt tabs d/c'd, florinef decreased to 0.1mg, EVD @ -5cm H2O, now draining 20cc/hr. 250 albumin and 500 NS boluses on dayshift, 1 L LR bolus  8/20: POD 13. ASHA. on VEEG, no seizures noted. Pending VPA level. 500 NS, 250 albumin. Febrile, pancultured. EEG d/c'ed.   8/21: BD14, POD14. ASHA overnight, EVD @ -5. s/p 1L bolus for euvolemia  8/22: BD #15: continued on levo, hypercoagulabitly profile pending, EVD @ -5, euvolemia, extubated, amantadine added, free water started for hypernatremia   8/23: BD 17, tmax 101F o/n. Gen Sx consulted for PEG - not a candidate at this time, pancultured for fever, 1L LR given for euvolemia. EVD at -5  8/24: BD18, ASHA o/n, neuro stable, EVD at -5, VPA level therapeutic, SBP goal 160-200, L IJ CVC attempted placement with carotid puncture, no pseudoaneurysm on US. MSSA growing in sputum. Ceftriaxone d/c'd and Ancef 2gq8 started. ID consulted. Recieved 1.5L LR and 500cc albumin.   8/25: POD5 last angio, BD19 ASHA o/n, neuro stable, EVD at -5  8/26: BD 20. ASHA o/n. EVD @ -5   8/27: BD 21, POD 20 Right vert takedown. ASHA overnight. Exam stable. EVD remains open at -5 and to be clamped at 0600 in preparation for right VPS placement today. Possible PEG placement Monday 8/30 8/28: BD22, POD22 Right vert takedown, POD1 R VPS, neuro exam stable. Tube feeds restarted, more lethargic this AM, improved during day, continue to monitor for hydrocephalus. AXR with dilated bowel, started on reglan and magnesium. Liquid BM, TFs held and rectal tube placed.   8/29: BD23, POD23 right vert takedown, POD2 R VPS, ASHA overnight, neuro stable. TFs decreased for colonic distention seen on XR, GI notified, nothing to do, TFs resumed at 20/hr and will continue will PEG placement tomorrow. CTH today demonstrated interval decrease in size of ventricles. Midodrine added to aid in weening levophed.   8/30: BD24, POD24, pending PEG placement this morning with Dr. Milner. Midodrine increased to 10mg q8hrs today to wean off of levo. 1u PRBC for Hb 7.5. FOB negative. 20mg lasix given. Repeat dopplers completed, unchanged from previous. Started on IV iron per hematology recs.  8/31: BD25, POD25, unsuccessful PEG placement by GI, trend Hgb w/ AM labs, slowly weening levophed. 5mg vitamin K given for elevated INR.  9/1: BD26, POD26. J tube w/ gen surg today. 5mg vitK IV given o/n for elevated INR. off levophed. amantadine decreased 100 BID and ritalin d/c'd.   9/2: BD27, POD27. s/p J tube w/ gen surg. still receiving meds/TF via J tube.  neuro stable  9/4: BD 28, POD28, s/p J tube w/ gen surg. Neuro stable CTH performed, changed shunt to Certas @ 3 from 4.   9/5: BD 29, POD29, s/p J tube w/ gen surg. VPS certas from 4 to 3 now. Required 1mg IV haldol overnight for agitation/delirium, patient not responding to sitter and attempting to get out of bed numerous times. Cont Eliquis 5 BID for b/l LE DVTs.  	  9/6: VPS Certas @3. BD30. ASHA overnight, neuro stable.   9/7: Certas@3, BD31. ASHA overnight, neuro stable.  9/8: Certas@3 BD 32. ASHA overnight, neuro stable.    Vital Signs Last 24 Hrs  T(C): 36.6 (07 Sep 2021 21:23), Max: 36.7 (07 Sep 2021 14:00)  T(F): 97.8 (07 Sep 2021 21:23), Max: 98 (07 Sep 2021 14:00)  HR: 84 (07 Sep 2021 21:20) (80 - 92)  BP: 107/64 (07 Sep 2021 21:20) (100/70 - 122/59)  BP(mean): 79 (07 Sep 2021 21:20) (76 - 81)  RR: 17 (07 Sep 2021 21:20) (16 - 18)  SpO2: 95% (07 Sep 2021 21:20) (91% - 98%)    I&O's Summary    06 Sep 2021 07:01  -  07 Sep 2021 07:00  --------------------------------------------------------  IN: 1604 mL / OUT: 950 mL / NET: 654 mL    07 Sep 2021 07:01  -  08 Sep 2021 00:40  --------------------------------------------------------  IN: 690 mL / OUT: 1600 mL / NET: -910 mL        PHYSICAL EXAM:  General: NAD, pt is comfortably sitting up in bed, A&O x2 (self and place), on RA  HEENT: CN II-XII grossly intact, PERRL 4mm, +R gaze preference, face symmetric, tongue midline, dysarthric speech, neck FROM  Cardiovascular: RRR, normal S1 and S2   Respiratory: lungs CTAB, no wheezing, rhonchi, or crackles   GI: normoactive BS to auscultation, abd soft, NTND, J tube in place  Neuro: no aphasia, speech clear, no dysmetria, no pronator drift  CALLE anti-gravity, symmetric   Extremities: distal pulses 2+ x4   Wound/incision: abdominal and R crani VPS incision with staples C/D/I     TUBES/LINES:  [] Richey  [] Lumbar Drain  [] Wound Drains  [] Others      DIET:  [] NPO  [] Mechanical  [x] Tube feeds via J tube      LABS:                        10.7   4.27  )-----------( 157      ( 07 Sep 2021 08:13 )             35.7     09-07    143  |  107  |  14  ----------------------------<  114<H>  3.8   |  25  |  0.51    Ca    9.5      07 Sep 2021 08:13  Phos  2.8     09-07  Mg     1.7     09-07              CAPILLARY BLOOD GLUCOSE          Drug Levels: [] N/A    CSF Analysis: [] N/A      Allergies    apple (Angioedema)  No Known Drug Allergies  strawberry (Angioedema)    Intolerances    lactose (Stomach Upset)    MEDICATIONS:  Antibiotics:  nystatin    Suspension 988136 Unit(s) Oral four times a day    Neuro:  bromocriptine Capsule 5 milliGRAM(s) Oral every 8 hours  lacosamide Solution 100 milliGRAM(s) Oral two times a day  ondansetron Injectable 4 milliGRAM(s) IV Push every 6 hours PRN  QUEtiapine 75 milliGRAM(s) Oral two times a day    Anticoagulation:  aspirin  chewable 81 milliGRAM(s) Oral daily  enoxaparin Injectable 100 milliGRAM(s) SubCutaneous every 12 hours    OTHER:  acetylcysteine 20%  Inhalation 4 milliLiter(s) Inhalation every 12 hours  albuterol/ipratropium for Nebulization 3 milliLiter(s) Nebulizer every 6 hours  BACItracin   Ointment 1 Application(s) Topical two times a day  bisacodyl Suppository 10 milliGRAM(s) Rectal daily PRN  chlorhexidine 2% Cloths 1 Application(s) Topical <User Schedule>  midodrine 5 milliGRAM(s) Oral <User Schedule>  pantoprazole   Suspension 40 milliGRAM(s) Oral daily  polyethylene glycol 3350 17 Gram(s) Oral daily  senna 2 Tablet(s) Oral at bedtime    IVF:  ergocalciferol 28482 Unit(s) Oral <User Schedule>  multivitamin/minerals/iron Oral Solution (CENTRUM) 15 milliLiter(s) Oral daily    CULTURES:  Culture Results:   Sputum specimen rejected.  Microscopic examination indicates  oropharyngeal contamination.  Please repeat. (08-27 @ 01:53)  Culture Results:   No growth (08-26 @ 14:27)    RADIOLOGY & ADDITIONAL TESTS:      46y/o F with h/o recent gastric sleeve (08/04/21 [facility], Dr. Crisostomo ), fibromyalgia, thyroid dysfunction, PTSD, depression, anxiety.  Presented with seizures at home - brought to [place], with 1 more episode of seizure en route.  Intubated, CT showed SAH with IV extension, casted IV, hydrocephalus, given mannitol, levetiracetam 1g,  6mg ativan. Started on Cardene drip for BP goal < 140 and transferred to [facility] NICU for further management. HH5 MF4, BD 1 = 8/7. Now s/p cerebral angiogram for R vertebral artery takedown for R vertebral dissecting aneurysm (8/7), and R frontal EVD placement (8/7), now s/p IVC filter placement (8/12,) s/p angio IA verapamil 8/16, 8/17, 8/18, 8/20. Now s/p right VPS certas at 3 (8/27).    Plan   NEURO:   - neuro checks q4h/vitals q4   - Vimpat 100mg bid   - Bromocriptine 5mg Q8  - Amantadine dc'd 9/2 and ritalin d/c'ed  - Angio demonstrating vasospasm of Right ICA, right PCOMM, Left distal vert and basilar confluence, and received IA verapamil on 8/16. 8/17. 8/18. 8/20.   - Repeat CTH 9/4 demonstrated stable ventricles. CTH 9/7 pending  - Seroquel 75 BID for agitation   - Restart ASA 81 on 9/7 s/p R vert takedown     PULM:    - copious thick secretion, standing, Duonebs, 3% nebs, mucomyst  - chest PT   - CXR - aspiration precautions, requires frequent suctioning  - Dr. Quan lau, endonasal suctioning, no bronch for now    CARDIO:    - -150  - TTE 8/12 WNL    - QTC 9/6 PM: 490, f/u in AM   - midodrine 5 BID    GI:    - TF via J tube, NGT d/c 9/3  - PPI per bariatric surgeon   - bowel regimen held   - On Vit D and multivitamin s/p bariatric surgery    RENAL:   - Maintain euvolemia, FW 200q8  - normonatremia goal  - straight cath prn - retaining     HEM/ONC:   - ASA 81 - held s/p OR and in anticipation of PEG placement   - VTE Prophylaxis: SCDs, SQL 90 BID  - dopplers 8/23, acute DVT left IM calf, persistent completely occlusive DVT b/l peroneal veins and right IM calf , 8/30 unchanged DVTs  - carotid doppler 8/24: neg for pseudoaneurysm, CTA shows soft tissue small hematoma, non-occlusive L IJ and L basillic vein thrombus   - hypercoagulable w/u: - prothrombin gene mutation - heterozygous   - 8/30 LUE doppler with findings of left IJ non-occlusive DVT and left basilic thrombus  - 9/6 dopplers: new L posterior tibial DVT: Eliquis d/c'ed switched to SQL 90 BID      ID:   - MRSA neg 8/12   - Afebrile   - Last Pancx 8/23   - Ancef for MSSA pna coverage x 7 d completed 8/30  - Ancef x 3 d for R scalp incision erythema complete 9/5  - PICC line placed 8/26    ENDO:    - glucose goal 140 -180    DISPO:  Assessment and plan discussed with Dr. Lomax      []  GCS  E   V  M     Heart Failure: []Acute, [] acute on chronic , []chronic  Heart Failure:  [] Diastolic (HFpEF), [] Systolic (HFrEF), []Combined (HFpEF and HFrEF), [] RHF, [] Pulm HTN, [] Other    [] TRUDI, [] ATN, [] AIN, [] other  [] CKD1, [] CKD2, [] CKD 3, [] CKD 4, [] CKD 5, []ESRD    Encephalopathy: [] Metabolic, [] Hepatic, [] toxic, [] Neurological, [] Other    Abnormal Nurtitional Status: [] malnurtition (see nutrition note), [ ]underweight: BMI < 19, [] morbid obesity: BMI >40, [] Cachexia    [] Sepsis  [] hypovolemic shock,[] cardiogenic shock, [] hemorrhagic shock, [] neuogenic shock  [] Acute Respiratory Failure  []Cerebral edema, [] Brain compression/ herniation,   [] Functional quadriplegia  [] Acute blood loss anemia

## 2021-09-08 NOTE — PROGRESS NOTE ADULT - SUBJECTIVE AND OBJECTIVE BOX
Interval Events: Reviewed  Patient seen and examined at bedside.    Patient is a 45y old  Female who presents with a chief complaint of SAH (08 Sep 2021 08:52)  she is sleepy    PAST MEDICAL & SURGICAL HISTORY:      MEDICATIONS:  Pulmonary:  acetylcysteine 20%  Inhalation 4 milliLiter(s) Inhalation every 12 hours  albuterol/ipratropium for Nebulization 3 milliLiter(s) Nebulizer every 6 hours    Antimicrobials:  nystatin    Suspension 569948 Unit(s) Oral four times a day    Anticoagulants:  aspirin  chewable 81 milliGRAM(s) Oral daily  enoxaparin Injectable 100 milliGRAM(s) SubCutaneous every 12 hours    Cardiac:  midodrine 5 milliGRAM(s) Oral <User Schedule>      Allergies    apple (Angioedema)  No Known Drug Allergies  strawberry (Angioedema)    Intolerances    lactose (Stomach Upset)      Vital Signs Last 24 Hrs  T(C): 36.8 (08 Sep 2021 13:12), Max: 36.8 (08 Sep 2021 13:12)  T(F): 98.3 (08 Sep 2021 13:12), Max: 98.3 (08 Sep 2021 13:12)  HR: 80 (08 Sep 2021 12:30) (72 - 84)  BP: 109/54 (08 Sep 2021 12:30) (105/56 - 122/59)  BP(mean): 77 (08 Sep 2021 12:30) (74 - 81)  RR: 16 (08 Sep 2021 12:30) (16 - 18)  SpO2: 97% (08 Sep 2021 12:30) (93% - 99%)    09-07 @ 07:01  -  09-08 @ 07:00  --------------------------------------------------------  IN: 1104 mL / OUT: 1600 mL / NET: -496 mL    09-08 @ 07:01  -  09-08 @ 14:02  --------------------------------------------------------  IN: 184 mL / OUT: 0 mL / NET: 184 mL          Review of Systems:   •	General: negative  •	Skin/Breast: negative  •	Ophthalmologic: negative  •	ENMT: negative  •	Respiratory and Thorax: negative  •	Cardiovascular: negative  •	Gastrointestinal: negative  •	Genitourinary: negative  •	Musculoskeletal: negative  •	Neurological: negative  •	Psychiatric: negative  •	Hematology/Lymphatics: negative  •	Endocrine: negative  •	Allergic/Immunologic: negative    Physical Exam:   • Constitutional:	refer to the dietion /Nutritionist note  • Eyes:	EOMI; PERRL; no drainage or redness  • ENMT:	No oral lesions; no gross abnormalities  • Neck	No bruits; no thyromegaly or nodules  • Breasts:	not examined  • Back:	No deformity or limitation of movement  • Respiratory:	Breath Sounds equal & clear to percussion & auscultation, no accessory muscle use  • Cardiovascular:	Regular rate & rhythm, normal S1, S2; no murmurs, gallops or rubs; no S3, S4  • Gastrointestinal:	Soft, non-tender, no hepatosplenomegaly, normal bowel sounds  • Genitourinary:	not examined  • Rectal: not examined  • Extremities:	No cyanosis, clubbing or edema  • Vascular:	Equal and normal pulses (carotid, femoral, dorsalis pedis)  • Neurologica:l	not examined  • Skin:	No lesions; no rash  • Lymph Nodes:	No lymphadedenopathy  • Musculoskeletal:	No joint pain, swelling or deformity; no limitation of movement        LABS:      CBC Full  -  ( 08 Sep 2021 06:50 )  WBC Count : 4.50 K/uL  RBC Count : 3.94 M/uL  Hemoglobin : 11.0 g/dL  Hematocrit : 36.7 %  Platelet Count - Automated : 149 K/uL  Mean Cell Volume : 93.1 fl  Mean Cell Hemoglobin : 27.9 pg  Mean Cell Hemoglobin Concentration : 30.0 gm/dL  Auto Neutrophil # : x  Auto Lymphocyte # : x  Auto Monocyte # : x  Auto Eosinophil # : x  Auto Basophil # : x  Auto Neutrophil % : x  Auto Lymphocyte % : x  Auto Monocyte % : x  Auto Eosinophil % : x  Auto Basophil % : x    09-08    144  |  107  |  12  ----------------------------<  118<H>  4.1   |  27  |  0.50    Ca    9.8      08 Sep 2021 06:50  Phos  3.2     09-08  Mg     2.0     09-08    < from: US Duplex Venous Lower Ext Complete, Bilateral (09.06.21 @ 13:08) >    EXAM:  US DPLX LWR EXT VEINS COMPL BI                          PROCEDURE DATE:  09/06/2021          INTERPRETATION:  VENOUS DUPLEX DOPPLER OF BOTH LOWER EXTREMITIES dated 9/6/2021 1:08 PM    INDICATION: Evaluate for DVTs. Surveillance of prior DVT.    TECHNIQUE: Duplex Doppler evaluation including gray-scale ultrasound imaging, color flow Doppler imaging, and Doppler spectral analysis of the veins of both lower extremities was performed.    COMPARISON: Most recent lower extremity venous Dopplerdated 8/30/2021 and multiple prior studies dating back to 8/9/2021.    FINDINGS:    Thigh veins: The common femoral, femoral, popliteal, proximal greater saphenous, and proximal deep femoral veins are patent and free of thrombus bilaterally. The veins are normally compressible and have normal phasic flow and augmentation response.    Calf veins: Since 8/23/2021 persistent deep venous thrombosis of the bilateral peroneal and intramuscular calf veins.    Interval new acute deep venous thrombosis ofthe left posterior tibial vein.    The right posterior tibial vein is patent and free of thrombus.    IMPRESSION:  1.  Since 8/23/2021 persistent deep venous thrombosis of the bilateral peroneal and intramuscular calf veins.  2.  Interval new acute deep venous thrombosis of the left posterior tibial vein.    < end of copied text >                        RADIOLOGY & ADDITIONAL STUDIES (The following images were personally reviewed):

## 2021-09-08 NOTE — PROVIDER CONTACT NOTE (OTHER) - DATE AND TIME:
02-Sep-2021 21:01
07-Aug-2021 16:00
08-Sep-2021 07:48
22-Aug-2021 20:30
23-Aug-2021 04:00
03-Sep-2021 01:48
06-Sep-2021 16:00
10-Aug-2021 03:00
13-Aug-2021 09:05
15-Aug-2021 11:45
26-Aug-2021 10:05
07-Aug-2021 23:30
12-Aug-2021 22:00
13-Aug-2021 05:06
13-Aug-2021 19:43
16-Aug-2021 03:15
18-Aug-2021 16:05
22-Aug-2021 08:30
22-Aug-2021 15:00
02-Sep-2021 23:18
03-Sep-2021 02:26
12-Aug-2021 17:00
15-Aug-2021 09:00
23-Aug-2021 01:30
24-Aug-2021 07:00
24-Aug-2021 16:00
30-Aug-2021 09:25
12-Aug-2021 18:40
12-Aug-2021 20:15
23-Aug-2021 01:30
07-Aug-2021 15:00
09-Aug-2021 20:30
12-Aug-2021 15:30
12-Aug-2021 21:30
13-Aug-2021 17:00
10-Aug-2021 09:30

## 2021-09-08 NOTE — PROVIDER CONTACT NOTE (OTHER) - ASSESSMENT
AOx2 to self and location.    L pupil 4mm, brisk. R pupil 2mm, brisk.    LUE (-) drift, RUE (+) drift  BLE some effort to gravity
AOx2 to self and situation.    Demarcated areas of mottling now erythematous and warm.
Patient easily arousable, remains disoriented, restless
Pt tachycardic. SBP WDL per vitals flowsheet. O2 sat 98% on RA, denies difficulty breathing.  No changes to neurological exam.  MD Keshav at bedside from vascular to assess LLE.  Mottling spread to parts of abdomen and b/l breast.  Seen by MARCO Hill.
Pupils 2, sluggish. No movement noted to all four extremities. EVD leveled 10 cm H20 above tragus. Normal waveform. Output 30 mL, ICP 10.
pt complained of shortness of breath, vitals stable, pt placed on nc , des, neuro pa aware, cxray ordered, pt has enhanced supervision
Left foot cooler to touch but pulses palpable, strong and unchanged.  Pt. reports sensation to LLE on exam when seen by Dr. Bernard and MARCO Ugalde.  States that shes had numbness to right fingertips in the past. Mottling to leg marked by MARCO ugalde.  Pt turned and examined, some mottling noted to back, marked by PA.
Pt is on aerosol mask 40 % desaturating at times to 89%.    HR 98 /89
Pt neuro status remains unchanged. ICP -4  and output 5cc. HR 90 /98, RR22 O2 sat 98% with 3L/min NC.
Pt noted to be draining 15 and above in her EVD @ 8:30 pm.
ICP noted to be -4 to -5
Mottling ongoing to left leg as per previous provider contact note.  Pt reporting pain to left leg.  Left thigh cooler to touch.
Neuro exam unchanged. Pupils remains 3mm and brisk, Drift on RUE, No Drift on LUE, and b/l LE.
Patient's potassium results from AM Labs 2.9 - patient currently not exhibiting any s/s of hypokalemia on EKG
Pt noted with hematoma and ecchymosis on R. groin site
Pt. noted to have scabbed area on left ankle. Site with scab with pus center; area around scab reddened and boggy.  Upon palpation of lower abdomen, patient wincing and attempting to withdraw. Area feels hardened.
move all extremities
AOx2.     3mm brisk bilaterally.    (+) drift to all extremities.
Neuro status unchange.
Patient's neurologic exam remains unchanged, denies pain
Pt laying in bed asleep and arousable. Neuro @baseline, able to state her name and where she is currently. VS within parameter. Pt denies CP, SOB
Pt only orientated to herself and place and unable to follow command and move all extremities restlessly
Waveform dampened. No redness, no warmth noted. Positive blood return. L radial + 2 Doppler.
Patient's left thigh noted to be mottled.  Awaiting OR for IVC for clot to RLE.  Left leg warm to touch, pulses strong and palpable, no changes to sensation per patient, able to move extremity.  Right groin surgical site intact. No changes to neurological exam.
Pt feels cool to touch, temp 99.8F HR 97 /102, RR 22 O2 100% with FiO2 30%
pt move all extremities
withdrawal from pain on all 4 extremities. b/l pupils brisk and 4 mm. pt's speech also incomprehensible. pt lethargic but arousable.
Pt SBP persistently <180 mm Hg, despite increasing Levophed gtt to 0.55 mcg/kg/min. T 99.3 (rectal probe)  /73 /66 RR 20 O2Sat 93% on 3L NC. Neuro exam remains at baseline, following commands on all four extremities. +PERRLA.
Pupils 2mm, sluggish. No movement noted to all four extremities despite painful stimuli. EVD patent and draining, levelled 10 cm H20 above tragus. ICP WDL. Normal waveform noted. HR 96 /67 RR 12 O2Sat 100% on vent with 50% FiO2.
Neuro at baseline
Pt's afternoon vitals were BP 85/51, HR 94, RR 18, SpO2 91% on RA, pt is asymptomatic, MARCO Cifuentes notified
pupil size Right:2mm Left:4mm  aox2. v/s stable

## 2021-09-08 NOTE — PROGRESS NOTE ADULT - ASSESSMENT
46y/o F with h/o recent gastric sleeve (08/04/21 [facility], Dr. Crisostomo ), fibromyalgia, thyroid dysfunction, PTSD, depression, anxiety.  Presented with seizures at home - brought to [place], with 1 more episode of seizure en route.  Intubated, CT showed SAH with IV extension, casted IV, hydrocephalus, given mannitol, levetiracetam 1g,  6mg ativan. Started on Cardene drip for BP goal < 140 and transferred to [facility] NICU for further management. HH5 MF4, BD 1 = 8/7. Now s/p cerebral angiogram for R vertebral artery takedown for R vertebral dissecting aneurysm (8/7), and R frontal EVD placement (8/7), now s/p IVC filter placement (8/12,) s/p angio IA verapamil 8/16, 8/17, 8/18, 8/20. Now s/p right VPS certas at 4 (8/27).

## 2021-09-08 NOTE — SWALLOW BEDSIDE ASSESSMENT ADULT - SLP PERTINENT HISTORY OF CURRENT PROBLEM
h/o recent gastric sleeve, ibromyalgia, thyroid dysfunction, PTSD, depression, anxiety.  Presented with seizures at home. Intubated. CT showed SAH with IV extension, casted IV, hydrocephalus. Now s/p cerebral angiogram for R vertebral artery takedown for R vertebral dissecting aneurysm (8/7), and R frontal EVD placement (8/7), now s/p IVC filter placement (8/12,) s/p angio IA verapamil 8/16, 8/17, 8/18, 8/20. Now s/p right VPS certas at 4 (8/27).

## 2021-09-08 NOTE — PROVIDER CONTACT NOTE (OTHER) - NAME OF MD/NP/PA/DO NOTIFIED:
Dr. Bernard
Dr. Jackson, Vascular Sx
MARCO Blank
MARCO Cabrera
MARCO Christopher
MARCO Zee
Neuro MARCO Christopher
Neuro Team
dr dickerson
sher garner
sher garner, aydin kennedy
MARCO Ellison
MARCO Hill
MD Sánchez
MD Sánchez
Esau LARA
MARCO Granados
MARCO Padron
DO Bernard
MARCO Blank
MARCO Blank
MARCO Zee
MD Sanches
MD Sanches and MARCO Christopher
galileo kennedy
DON
Dr. Bernard and MARCO Zee
MARCO Julian
MARCO Kramer
Melanie LARA
MARCO Lares
MARCO Schwarz
Tere Sy
DO Marco Antonio Gallo
MARCO Kramer
MD Durbin

## 2021-09-08 NOTE — PROGRESS NOTE ADULT - SUBJECTIVE AND OBJECTIVE BOX
Patient is a 45y old  Female who presents with a chief complaint of SAH (04 Sep 2021 07:05)      HPI:  44y/o F with h/o recent gastric sleeve (08/04/21 [facility], Dr. Crisostomo ), fibromyalgia, thyroid dysfunction, PTSD, depression, anxiety.  Presented with seizures at home - brought to [place], with 1 more episode of seizure en route.  Intubated, CT showed SAH with IV extension, casted IV, hydrocephalus, given mannitol, levetiracetam 1g,  6mg ativan. Started on Cardene drip for BP goal < 140 and transferred to [facility] NICU for further management.     HH5 MF4   NIHSS 26 on arrival  BD 1 = 8/7 (07 Aug 2021 08:08)    O/N and interval events: None    Subjective:   Pt denies pain. Unable to elicit much history given patient's clinical status.     Allergies    apple (Angioedema)  No Known Drug Allergies  strawberry (Angioedema)    Intolerances    lactose (Stomach Upset)        MEDICATIONS  (STANDING):  acetylcysteine 20%  Inhalation 4 milliLiter(s) Inhalation every 12 hours  albuterol/ipratropium for Nebulization 3 milliLiter(s) Nebulizer every 6 hours  aspirin  chewable 81 milliGRAM(s) Oral daily  BACItracin   Ointment 1 Application(s) Topical two times a day  bromocriptine Capsule 5 milliGRAM(s) Oral every 8 hours  chlorhexidine 2% Cloths 1 Application(s) Topical <User Schedule>  enoxaparin Injectable 100 milliGRAM(s) SubCutaneous every 12 hours  ergocalciferol 12286 Unit(s) Oral <User Schedule>  lacosamide Solution 100 milliGRAM(s) Oral two times a day  midodrine 5 milliGRAM(s) Oral <User Schedule>  multivitamin/minerals/iron Oral Solution (CENTRUM) 15 milliLiter(s) Oral daily  nystatin    Suspension 583252 Unit(s) Oral four times a day  pantoprazole   Suspension 40 milliGRAM(s) Oral daily  polyethylene glycol 3350 17 Gram(s) Oral daily  QUEtiapine 75 milliGRAM(s) Oral two times a day  senna 2 Tablet(s) Oral at bedtime    MEDICATIONS  (PRN):  bisacodyl Suppository 10 milliGRAM(s) Rectal daily PRN Constipation  ondansetron Injectable 4 milliGRAM(s) IV Push every 6 hours PRN Nausea and/or Vomiting  sodium chloride 0.9% lock flush 10 milliLiter(s) IV Push every 1 hour PRN Pre/post blood products, medications, blood draw, and to maintain line patency                Drug Dosing Weight  Height (cm): 162.6 (01 Sep 2021 11:40)  Weight (kg): 98.2 (01 Sep 2021 11:40)  BMI (kg/m2): 37.1 (01 Sep 2021 11:40)  BSA (m2): 2.02 (01 Sep 2021 11:40)    PAST MEDICAL & SURGICAL HISTORY:      FAMILY HISTORY:  No pertinent family history in first degree relatives        SOCIAL HISTORY: no smoking reported      Vital Signs Last 24 Hrs  T(C): 36.5 (08 Sep 2021 04:52), Max: 36.7 (07 Sep 2021 14:00)  T(F): 97.7 (08 Sep 2021 04:52), Max: 98 (07 Sep 2021 14:00)  HR: 84 (08 Sep 2021 04:25) (72 - 88)  BP: 119/56 (08 Sep 2021 04:25) (105/66 - 122/59)  BP(mean): 80 (08 Sep 2021 04:25) (76 - 81)  RR: 18 (08 Sep 2021 04:25) (16 - 18)  SpO2: 93% (08 Sep 2021 04:25) (93% - 99%)        PHYSICAL EXAM:      Constitutional: NAD  Eyes: PERRLA  ENMT: thrush  Neck: supple  Back: midline  Respiratory: CTA b/l  Cardiovascular: rrr, s1s2, no m/r/g  Gastrointestinal: soft, NTND, + J tube  Extremities: wwp  Vascular: + 2 pules radial  Neurological: AAO x 2, minimally following commands  Skin: no rash  Lymph Nodes: no LAD  Musculoskeletal: no joint swelling  Psychiatric: flat affect        LABS:                            11.0   4.50  )-----------( 149      ( 08 Sep 2021 06:50 )             36.7   09-08    144  |  107  |  12  ----------------------------<  118<H>  4.1   |  27  |  0.50    Ca    9.8      08 Sep 2021 06:50  Phos  3.2     09-08  Mg     2.0     09-08                  EKG:    ECHO, US:    < from: TTE Limited Echo w/o Cont (08.12.21 @ 15:22) >  CONCLUSIONS:     1. Limited study obtained for evaluation of right ventricular function.   2. Probably normal left ventricular systolic function.   3. Normal right ventricular size and systolic function.   4. There was insufficient tricuspid regurgitation detected from which to calculate pulmonary artery systolic pressure.   5. No pericardial effusion.    < end of copied text >      RADIOLOGY:  < from: CT Head No Cont (08.29.21 @ 17:45) >  IMPRESSION:    With ventricular shunt in place, there is now decreased ventricular size since 08/27/2021.    < end of copied text >    < from: CT Head No Cont (09.04.21 @ 11:29) >    IMPRESSION:    No acute intracranial hemorrhage or mass effect compared to prior imaging from 08/29/2021, now with the patient placed on anticoagulation therapy.    Stable ventricular size with  shunt in place, mild hydrocephalus persists.    A couple small and subacute appearing infarcts are now visible in the right cerebellum.    --- End of Report ---    < end of copied text >

## 2021-09-08 NOTE — SWALLOW BEDSIDE ASSESSMENT ADULT - COMMENTS
Initially evaluated by our services on 8/13. At that time, pt p/w at least a mod oropharyngeal dysphagia characterized by reduced bolus acceptance in the oral stage & +signs of aspiration with thin & NTL in the pharyngeal stage. PO diet was considered premature at that time. When our services attempted to f/u, Initially evaluated by our services on 8/13. At that time, pt p/w at least a mod oropharyngeal dysphagia characterized by reduced bolus acceptance in the oral stage & +signs of aspiration with thin & NTL in the pharyngeal stage. PO diet was considered premature at that time. When our services attempted to f/u on 8/15, pt had been reintubated. She was extubated on 8/31. We were now reconsulted to determine readiness for a PO diet.

## 2021-09-08 NOTE — PROVIDER CONTACT NOTE (OTHER) - RECOMMENDATIONS
Mottling ongoing to left leg as per previous provider contact note.  Pt reporting pain to left leg.  Left thigh cooler to touch.  Per MARCO Hill and Dr. Bernadr, notify vascular surgery.
Questioned whether RN should clamp drain for next hour
Ice packs
3% saline neb treatment  and Lasix 20mg given. Chest PT initiated.
ct, tylenol
MD to assess pt
collect blood, sputum, urine, csf cultures. hydralazine 10mg iv for SBP>180.
recommended provider to assess patient.
500ml NS bolus ordered
EKG
MD to assess pt.
Monitor
Per MARCO Bell, reached out to vascular surgery, still OK to go to OR.  Examined by Dr. Sanchez at bedside pre-op.
Provider to assess
Recommended imaging placement of NG tube during scheduled chest x-ray to rule out misplacement/dislodgement/perforation; Recommended reviewing UA results for possible source of infection.
Patient to receive additional tylenol.

## 2021-09-08 NOTE — PROGRESS NOTE ADULT - ASSESSMENT
46y/o F with h/o recent gastric sleeve (08/04/21 [facility], Dr. Crisostomo ), fibromyalgia, thyroid dysfunction, PTSD, depression, anxiety.  Presented with seizures at home - brought to [place], with 1 more episode of seizure en route.  Intubated, CT showed SAH with IV extension, casted IV, hydrocephalus, given mannitol, levetiracetam 1g,  6mg ativan. Started on Cardene drip for BP goal < 140 and transferred to [facility] NICU for further management. HH5 MF4, BD 1 = 8/7. 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 Now s/p right VPS certas at 4 (8/27).

## 2021-09-08 NOTE — PROVIDER CONTACT NOTE (OTHER) - REASON
-7 icp
ICP noted to be in the negatives
Increased levophed needs
Pt has intermittent shaking, ICP  -3
Pt restless
Pt unable to cross midline
T: 102.4 and SBP>180
self extubation
Left ankle scabbed area and tender lower abdomen
neuro exam deteriorating and respiratory distress
No movement to all extremities
Patient's left thigh mottled
pt sinus horace to 37 for 7 sec
o2sat 87% on ra
EVD output 27ml
Elevated EVD output
ICP -4 EV output 5cc
Pt is hypotensive
Pt left radial pulse
Hematoma noted at R. groin
ICP draining >15
LLE now warm and erythematous
Left thigh still mottled, foot cooler to touch, pt reports numbness
Low Potassium Value
Pt restless
Pt restless and trying to get out of bed
Pupils unequal but brisk bilaterally
Questioned whether patient's fever is 'central fever'
Swelling to a-line
Large amounts of secretions and Agitated
Patient tachycardic 120s
Pt reports pain to LLE
T101.1; however, per day shift PA no cooling blanket
ams
change in pupil size Right:2mm Left:4mm
Patient's EVD output 26mL for last hour

## 2021-09-09 NOTE — DISCHARGE NOTE PROVIDER - INSTRUCTIONS
NPO with Tube feeds via jejunostomy tube:  Vital High Protein 46mL per hour   Total volume 1104 in 24 hours Dysphagia 1 Pureed-Thing Liquid diet with Tube feeds via jejunostomy tube:  Vital High Protein 46mL per hour   Total volume 1104 in 24 hours

## 2021-09-09 NOTE — DISCHARGE NOTE PROVIDER - NSDCCPTREATMENT_GEN_ALL_CORE_FT
PRINCIPAL PROCEDURE  Procedure: Angiogram, carotid and cerebral arteries  Findings and Treatment: R vertebral takedown for R vertebral dissecting aneurysm      SECONDARY PROCEDURE  Procedure: Angiogram, carotid and cerebral, bilateral  Findings and Treatment: IA Verapamil    Procedure: Placement,  shunt, using frameless stereotaxy  Findings and Treatment: certas 4.0  RIGHT FRONTAL SHUNT    Procedure: Laparoscopic insertion of jejunostomy tube  Findings and Treatment:     Procedure: IVC filter placement  Findings and Treatment:

## 2021-09-09 NOTE — PROGRESS NOTE ADULT - SUBJECTIVE AND OBJECTIVE BOX
HPI:  44y/o F with h/o recent gastric sleeve (08/04/21 [facility], Dr. Crisostomo ), fibromyalgia, thyroid dysfunction, PTSD, depression, anxiety.  Presented with seizures at home - brought to [facility], with 1 more episode of seizure en route.  Intubated, CT showed SAH with IV extension, casted IV, hydrocephalus, given mannitol, levetiracetam 1g,  6mg ativan. Started on Cardene drip for BP goal < 140 and transferred to [facility] NICU for further management.     HH5 MF4   NIHSS 26 on arrival  BD 1 = 8/7 (07 Aug 2021 08:08)    Hospital Course:  8/7: Tx from [facility] for SAH. Intubated. R frontal EVD placed, central line and a line placed. 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   8/8: POD1 s/p angio with R vert takedown, extubated, desatting on aerosol mask, required extensive suctioning, 3% nebs, chest PT, started on low dose precedex drip for restlessness/agitation, ABG stable. NGT placed. TF started with goal 20 pending nutrition recs. 3% stopped for Na 150. Ceribell EEG negative and d/c'ed.   8/9 Overnight, new Right pronator drift and right gaze preference that can't cross midline, Repeat CTH demonstrated stable vents, CTA head and neck unremarkable for spasm, hypoattenuation of right cerebellum edema vs. infarct.  8/10: POD3 R vert takedown, EVD open at [id number]. ASHA overnight, neuro stable. CTH with increased hydro, CTA head/neck with mild basilar spasm, but concern for PE. 1/2 am D50 for hypoglycemia. 1L bolus then 100 cc/hr, PM rounds for net negative fluid balance and mild vasospasm on CTA.   8/11: POD4 R vert takedown. EVD at 5cm H2O, ASHA overnight. neuro stable.   Febrile 104. depakote increased to q6. NCHCT stable. EVD lowered to 0. Lasix 20 given for dieuresis, UOP over 2 liters. Sedation given for a-line placement, BP drop needing levo.  8/12: POD5 R vert takedown. EVD at 0cm H2O. ASHA overnight, neuro stable. Precedex being weaned off. Pending IVCF with vascular today.  8/13: POD6 R vert takedown. EVD open at 0cmH2O. ASHA overnight. Neuro exam stable. Mottled skin on the left lower extremity improving. Started on Bromocriptine 15mg Q8.   8/14: POD7. EVD open at 0. 1L bolus given for euvolemia o/n. neuro stable. CTH stable. ENT scoped vocal cords, +R voacl cord paresis, NTD. started on zosyn empirically.   8/15: POD8. EVD open at 0. ASHA o/n, neuro stable. Pt developed increased work of breathing, unable to clear her secretions, received racemic epi and multiple nebulizer treatments, still with stridor. Patient re-intubated at bedside, on full vent support.   8/16: CTH/CTA head/neck/CT chest performed o/n for worsened exam, R gaze preference, sluggish pupils. +worsened uncal herniation, hydro, vasospasm in b/l PCAs, L vert, basilar arteries. preop angio today, restarted on 3% for Na goal 145-150. Angio for vasospasm with IA verapamil.  8/17: POD10. Overnight Pt remains on levophed drip for SBP goal 180-220. Also remains on propofol drip. EVD open at -5cmH2O. ICPs WNL. Febrile 101F and pancultured. CTH today shows slightly smaller ventricles. Angio for vasospasm with IA verapamil.  8/18: POD11 R vert takedown. POD1 angio IA verpamil. Overnight, Pt noted to have downward gaze to the right and not following commands. Taken for stat head CT which is stable. Pupils also noted to be aniscoric left pupil 4mm and brisk and right 2mm brisk. Mannitol 50g given. Ceribell eeg placed for concern of subclinical seizures, so far appears negative for seizures. 1L NS o/n and 1.5L NS bolus in AM for euvolemia. vanc/zosyn stopped. 250cc 3% bolus and 250cc albumin given in AM. Brief seizure to L frontal lobe this AM on EEG, given 2g valproic acid and dose increased to 1g q8. Vimpat 100mg BID started.  Angio with interval improvment in vasospasm, IA verapamil given. In afternoon patient self-extubated, placed on NRB with mucomyst, 3% inhalation and duonebs. 3% at 50 d/c'd.  8/19: POD 12. Remains on VEEG. Axillary A line placed. Salt tabs d/c'd, florinef decreased to 0.1mg, EVD @ -5cm H2O, now draining 20cc/hr. 250 albumin and 500 NS boluses on dayshift, 1 L LR bolus  8/20: POD 13. ASHA. on VEEG, no seizures noted. Pending VPA level. 500 NS, 250 albumin. Febrile, pancultured. EEG d/c'ed.   8/21: BD14, POD14. ASHA overnight, EVD @ -5. s/p 1L bolus for euvolemia  8/22: BD #15: continued on levo, hypercoagulabitly profile pending, EVD @ -5, euvolemia, extubated, amantadine added, free water started for hypernatremia   8/23: BD 17, tmax 101F o/n. Gen Sx consulted for PEG - not a candidate at this time, pancultured for fever, 1L LR given for euvolemia. EVD at -5  8/24: BD18, ASHA o/n, neuro stable, EVD at -5, VPA level therapeutic, SBP goal 160-200, L IJ CVC attempted placement with carotid puncture, no pseudoaneurysm on US. MSSA growing in sputum. Ceftriaxone d/c'd and Ancef 2gq8 started. ID consulted. Recieved 1.5L LR and 500cc albumin.   8/25: POD5 last angio, BD19 ASHA o/n, neuro stable, EVD at -5  8/26: BD 20. ASHA o/n. EVD @ -5   8/27: BD 21, POD 20 Right vert takedown. ASHA overnight. Exam stable. EVD remains open at -5 and to be clamped at 0600 in preparation for right VPS placement today. Possible PEG placement Monday 8/30 8/28: BD22, POD22 Right vert takedown, POD1 R VPS, neuro exam stable. Tube feeds restarted, more lethargic this AM, improved during day, continue to monitor for hydrocephalus. AXR with dilated bowel, started on reglan and magnesium. Liquid BM, TFs held and rectal tube placed.   8/29: BD23, POD23 right vert takedown, POD2 R VPS, ASHA overnight, neuro stable. TFs decreased for colonic distention seen on XR, GI notified, nothing to do, TFs resumed at 20/hr and will continue will PEG placement tomorrow. CTH today demonstrated interval decrease in size of ventricles. Midodrine added to aid in weening levophed.   8/30: BD24, POD24, pending PEG placement this morning with Dr. Milner. Midodrine increased to 10mg q8hrs today to wean off of levo. 1u PRBC for Hb 7.5. FOB negative. 20mg lasix given. Repeat dopplers completed, unchanged from previous. Started on IV iron per hematology recs.  8/31: BD25, POD25, unsuccessful PEG placement by GI, trend Hgb w/ AM labs, slowly weening levophed. 5mg vitamin K given for elevated INR.  9/1: BD26, POD26. J tube w/ gen surg today. 5mg vitK IV given o/n for elevated INR. off levophed. amantadine decreased 100 BID and ritalin d/c'd.   9/2: BD27, POD27. s/p J tube w/ gen surg. still receiving meds/TF via J tube.  neuro stable  9/4: BD 28, POD28, s/p J tube w/ gen surg. Neuro stable CTH performed, changed shunt to Certas @ 3 from 4.   9/5: BD 29, POD29, s/p J tube w/ gen surg. VPS certas from 4 to 3 now. Required 1mg IV haldol overnight for agitation/delirium, patient not responding to sitter and attempting to get out of bed numerous times. Cont Eliquis 5 BID for b/l LE DVTs.  	  9/6: VPS Certas @3. BD30. ASHA overnight, neuro stable.   9/7: Certas@3, BD31. ASHA overnight, neuro stable.  9/8: Certas@3 BD 32. ASHA overnight, neuro stable.  9/9: POD#33  shunt, Certas at 3. Neurologically stable . Requires high dose of Seroquel for agitation.  Tolerating J-tube feeding at goal. Failed swallow evaluation.    OVERNIGHT EVENTS: none  Vital Signs Last 24 Hrs  T(C): 36.9 (08 Sep 2021 21:51), Max: 36.9 (08 Sep 2021 21:51)  T(F): 98.4 (08 Sep 2021 21:51), Max: 98.4 (08 Sep 2021 21:51)  HR: 86 (09 Sep 2021 00:00) (76 - 86)  BP: 135/71 (09 Sep 2021 00:00) (105/56 - 135/71)  BP(mean): 96 (09 Sep 2021 00:00) (74 - 96)  RR: 17 (09 Sep 2021 00:00) (16 - 18)  SpO2: 98% (09 Sep 2021 00:00) (93% - 98%)    I&O's Summary    07 Sep 2021 07:01  -  08 Sep 2021 07:00  --------------------------------------------------------  IN: 1104 mL / OUT: 1600 mL / NET: -496 mL    08 Sep 2021 07:01  -  09 Sep 2021 01:46  --------------------------------------------------------  IN: 552 mL / OUT: 650 mL / NET: -98 mL        PHYSICAL EXAM:  General: NAD, pt is comfortably sitting up in bed, A&O x2 (self and place), on RA  HEENT: CN II-XII grossly intact, PERRL 4mm, +R gaze preference, face symmetric, tongue midline, dysarthric speech, neck FROM  Cardiovascular: RRR, normal S1 and S2   Respiratory: lungs CTAB, no wheezing, rhonchi, or crackles   GI: normoactive BS to auscultation, abd soft, NTND, J tube in place  Neuro: no aphasia, speech clear, no dysmetria, no pronator drift  CALLE anti-gravity, symmetric   Extremities: distal pulses 2+ x4   Wound/incision: abdominal and R crani VPS incision with staples C/D/I     TUBES/LINES:  - J-tube  _ PICC line    DIET:  _ J_tube feeds at goal   LABS:                        11.0   4.50  )-----------( 149      ( 08 Sep 2021 06:50 )             36.7     09-08    144  |  107  |  12  ----------------------------<  118<H>  4.1   |  27  |  0.50    Ca    9.8      08 Sep 2021 06:50  Phos  3.2     09-08  Mg     2.0     09-08              CAPILLARY BLOOD GLUCOSE          Drug Levels: [] N/A    CSF Analysis: [] N/A      Allergies    apple (Angioedema)  No Known Drug Allergies  strawberry (Angioedema)    Intolerances    lactose (Stomach Upset)    MEDICATIONS:  Antibiotics:  nystatin    Suspension 766328 Unit(s) Oral four times a day    Neuro:  bromocriptine Capsule 5 milliGRAM(s) Oral every 8 hours  lacosamide Solution 100 milliGRAM(s) Oral two times a day  ondansetron Injectable 4 milliGRAM(s) IV Push every 6 hours PRN  QUEtiapine 50 milliGRAM(s) Oral every 24 hours  QUEtiapine 75 milliGRAM(s) Oral every 24 hours    Anticoagulation:  aspirin  chewable 81 milliGRAM(s) Oral daily  enoxaparin Injectable 100 milliGRAM(s) SubCutaneous every 12 hours    OTHER:  acetylcysteine 20%  Inhalation 4 milliLiter(s) Inhalation every 12 hours  albuterol/ipratropium for Nebulization 3 milliLiter(s) Nebulizer every 6 hours  BACItracin   Ointment 1 Application(s) Topical two times a day  bisacodyl Suppository 10 milliGRAM(s) Rectal daily PRN  chlorhexidine 2% Cloths 1 Application(s) Topical <User Schedule>  midodrine 5 milliGRAM(s) Oral <User Schedule>  pantoprazole   Suspension 40 milliGRAM(s) Oral daily  polyethylene glycol 3350 17 Gram(s) Oral daily  senna 2 Tablet(s) Oral at bedtime    IVF:  ergocalciferol 45416 Unit(s) Oral <User Schedule>  multivitamin/minerals/iron Oral Solution (CENTRUM) 15 milliLiter(s) Oral daily    CULTURES:  Culture Results:   Sputum specimen rejected.  Microscopic examination indicates  oropharyngeal contamination.  Please repeat. (08-27 @ 01:53)  Culture Results:   No growth (08-26 @ 14:27)    RADIOLOGY & ADDITIONAL TESTS:      ASSESSMENT:  45y Female with h/o recent gastric sleeve (08/04/21 [facility], Dr. Crisostomo ), fibromyalgia, thyroid dysfunction, PTSD, depression, anxiety.  Presented with seizures at home - brought to [place], with 1 more episode of seizure en route.  Intubated, CT showed SAH with IV extension, casted IV, hydrocephalus, given mannitol, levetiracetam 1g,  6mg ativan. Started on Cardene drip for BP goal < 140 and transferred to [facility] NICU for further management. 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    Anemia due to acute blood loss    SAH (subarachnoid hemorrhage)    Aneurysm    Delirium    Thrombocytopenic    Angiogram, carotid and cerebral arteries    IVC filter placement    Angiogram, carotid and cerebral, bilateral    Angiogram, carotid and cerebral, bilateral    Angiogram, carotid and cerebral, bilateral    Angiogram, carotid and cerebral, bilateral    Placement,  shunt, using frameless stereotaxy    Laparoscopic insertion of jejunostomy tube    [email or web address]        PLAN:  NEURO:   - neuro checks q4h/vitals q4   - Vimpat 100mg bid   - Bromocriptine 5mg Q8  - Amantadine dc'd 9/2 and ritalin d/c'ed  - Angio demonstrating vasospasm of Right ICA, right PCOMM, Left distal vert and basilar confluence, and received IA verapamil on 8/16. 8/17. 8/18. 8/20.   - Repeat CTH 9/4 demonstrated stable ventricles. CTH 9/7 pending  - Seroquel 75 at bed time and 50 in AM for agitation   - Restart ASA 81 on 9/7 s/p R vert takedown     PULM:    - copious thick secretion, standing, Duonebs, 3% nebs, mucomyst  - chest PT   - CXR - aspiration precautions, requires frequent suctioning  - Dr. Quan lau, endonasal suctioning, no bronch for now    CARDIO:    - -150  - TTE 8/12 WNL    - QTC 9/6 PM: 490, f/u in AM   - midodrine 5 BID    GI:    - TF via J tube, NGT d/c 9/3  - PPI per bariatric surgeon   - bowel regimen held   - On Vit D and multivitamin s/p bariatric surgery  - Modified barium swallow in AM.    RENAL:   - Maintain euvolemia, FW 200q8  - normonatremia goal  - straight cath prn - retaining     HEM/ONC:   - ASA 81 - held s/p OR and in anticipation of PEG placement   - VTE Prophylaxis: SCDs, SQL 90 BID  - dopplers 8/23, acute DVT left IM calf, persistent completely occlusive DVT b/l peroneal veins and right IM calf , 8/30 unchanged DVTs  - carotid doppler 8/24: neg for pseudoaneurysm, CTA shows soft tissue small hematoma, non-occlusive L IJ and L basillic vein thrombus   - hypercoagulable w/u: - prothrombin gene mutation - heterozygous   - 8/30 LUE doppler with findings of left IJ non-occlusive DVT and left basilic thrombus  - 9/6 dopplers: new L posterior tibial DVT: Eliquis d/c'ed switched to SQL 90 BID      ID:   - MRSA neg 8/12   - Afebrile   - Last Pancx 8/23   - Ancef for MSSA pna coverage x 7 d completed 8/30  - Ancef x 3 d for R scalp incision erythema complete 9/5    DVT PROPHYLAXIS:  [] Venodynes  - none                            [x] Heparin/Lovenox    DISPOSITION:   PEnding  EMILIO  Assessment:  Present when checked    []  GCS  E   V  M     Heart Failure: []Acute, [] acute on chronic , []chronic  Heart Failure:  [] Diastolic (HFpEF), [] Systolic (HFrEF), []Combined (HFpEF and HFrEF), [] RHF, [] Pulm HTN, [] Other    [] TRUDI, [] ATN, [] AIN, [] other  [] CKD1, [] CKD2, [] CKD 3, [] CKD 4, [] CKD 5, []ESRD    Encephalopathy: [] Metabolic, [] Hepatic, [] toxic, [] Neurological, [] Other    Abnormal Nurtitional Status: [] malnurtition (see nutrition note), [ ]underweight: BMI < 19, [] morbid obesity: BMI >40, [] Cachexia    [] Sepsis  [] hypovolemic shock,[] cardiogenic shock, [] hemorrhagic shock, [] neuogenic shock  [] Acute Respiratory Failure  []Cerebral edema, [] Brain compression/ herniation,   [] Functional quadriplegia  [] Acute blood loss anemia

## 2021-09-09 NOTE — DISCHARGE NOTE PROVIDER - NSDCMRMEDTOKEN_GEN_ALL_CORE_FT
acetylcysteine 20% inhalation solution: 4 milliliter(s) inhaled every 12 hours  bisacodyl 10 mg rectal suppository: 1 suppository(ies) rectal once a day, As needed, Constipation  ipratropium-albuterol 0.5 mg-2.5 mg/3 mL inhalation solution: 3 milliliter(s) inhaled every 6 hours  polyethylene glycol 3350 oral powder for reconstitution: 17 gram(s) orally once a day  senna oral tablet: 2 tab(s) orally once a day (at bedtime)   acetylcysteine 20% inhalation solution: 4 milliliter(s) inhaled every 12 hours  aspirin 81 mg oral tablet, chewable: 1 tab(s) orally once a day  bacitracin 500 units/g topical ointment: 1 application topically 2 times a day  bisacodyl 10 mg rectal suppository: 1 suppository(ies) rectal once a day, As needed, Constipation  bromocriptine 5 mg oral capsule: 1 cap(s) orally every 8 hours  enoxaparin: 100 milligram(s) subcutaneous 2 times a day  ergocalciferol: 65229 unit(s) by PEG tube once a week  ipratropium-albuterol 0.5 mg-2.5 mg/3 mL inhalation solution: 3 milliliter(s) inhaled every 6 hours  lacosamide 10 mg/mL oral solution: 10 milliliter(s) orally 2 times a day  Multiple Vitamins with Minerals oral liquid:  orally   pantoprazole 40 mg intravenous injection: 40 milligram(s) intravenous once a day  polyethylene glycol 3350 oral powder for reconstitution: 17 gram(s) orally once a day  QUEtiapine 25 mg oral tablet: 3 tab(s) orally once a day (at bedtime)  QUEtiapine 50 mg oral tablet: 1 tab(s) orally once a day in the morning  senna oral tablet: 2 tab(s) orally once a day (at bedtime)   acetylcysteine 20% inhalation solution: 4 milliliter(s) inhaled every 12 hours  aspirin 81 mg oral tablet, chewable: 1 tab(s) orally once a day  bacitracin 500 units/g topical ointment: 1 application topically 2 times a day  bisacodyl 10 mg rectal suppository: 1 suppository(ies) rectal once a day, As needed, Constipation  bromocriptine 5 mg oral capsule: 1 cap(s) orally every 8 hours  enoxaparin: 100 milligram(s) subcutaneous 2 times a day  ergocalciferol: 58339 unit(s) by PEG tube once a week  ipratropium-albuterol 0.5 mg-2.5 mg/3 mL inhalation solution: 3 milliliter(s) inhaled every 6 hours  lacosamide 10 mg/mL oral solution: 100 milliliter(s) orally 2 times a day  Multiple Vitamins with Minerals oral liquid:  orally   pantoprazole 40 mg intravenous injection: 40 milligram(s) intravenous once a day  polyethylene glycol 3350 oral powder for reconstitution: 17 gram(s) orally once a day  QUEtiapine 25 mg oral tablet: 3 tab(s) orally once a day (at bedtime)  QUEtiapine 50 mg oral tablet: 1 tab(s) orally once a day in the morning  senna oral tablet: 2 tab(s) orally once a day (at bedtime)   acetaminophen 325 mg oral tablet: 2 tab(s) orally every 6 hours, As needed, Temp greater or equal to 38C (100.4F), Mild Pain (1 - 3)  acetylcysteine 20% inhalation solution: 4 milliliter(s) inhaled every 12 hours  aspirin 81 mg oral tablet, chewable: 1 tab(s) orally once a day  bacitracin 500 units/g topical ointment: 1 application topically 2 times a day  bisacodyl 10 mg rectal suppository: 1 suppository(ies) rectal once a day, As needed, Constipation  bromocriptine 5 mg oral capsule: 1 cap(s) orally every 8 hours  enoxaparin: 100 milligram(s) subcutaneous 2 times a day  ergocalciferol: 08593 unit(s) by PEG tube once a week  hydrocortisone 1% topical cream: 1 application topically every 12 hours  ipratropium-albuterol 0.5 mg-2.5 mg/3 mL inhalation solution: 3 milliliter(s) inhaled every 6 hours  lacosamide 10 mg/mL oral solution: 100 milliliter(s) orally 2 times a day  melatonin 3 mg oral tablet: 1 tab(s) orally once a day (at bedtime), As needed, Sleep  methocarbamol 500 mg oral tablet: 1 tab(s) orally 3 times a day, As needed, Muscle Spasm  Multiple Vitamins with Minerals oral liquid:  orally   pantoprazole 40 mg oral granule, delayed release: 40 milligram(s) orally once a day  petrolatum topical ointment: 1 application topically 2 times a day  polyethylene glycol 3350 oral powder for reconstitution: 17 gram(s) orally once a day  QUEtiapine: 87.5 milligram(s) by jejunostomy tube once a day (at bedtime)  QUEtiapine 50 mg oral tablet: 1 tab(s) orally once a day in the morning  senna oral tablet: 2 tab(s) orally once a day (at bedtime)

## 2021-09-09 NOTE — DISCHARGE NOTE PROVIDER - CARE PROVIDERS DIRECT ADDRESSES
,[email or web address]

## 2021-09-09 NOTE — PROGRESS NOTE ADULT - SUBJECTIVE AND OBJECTIVE BOX
Interval Events: Reviewed  Patient seen and examined at bedside.    Patient is a 45y old  Female who presents with a chief complaint of SAH (09 Sep 2021 11:42)    she is doing ok but sleepy  PAST MEDICAL & SURGICAL HISTORY:      MEDICATIONS:  Pulmonary:  acetylcysteine 20%  Inhalation 4 milliLiter(s) Inhalation every 12 hours  albuterol/ipratropium for Nebulization 3 milliLiter(s) Nebulizer every 6 hours    Antimicrobials:    Anticoagulants:  aspirin  chewable 81 milliGRAM(s) Oral daily  enoxaparin Injectable 100 milliGRAM(s) SubCutaneous every 12 hours    Cardiac:  midodrine 5 milliGRAM(s) Oral <User Schedule>      Allergies    apple (Angioedema)  No Known Drug Allergies  strawberry (Angioedema)    Intolerances    lactose (Stomach Upset)      Vital Signs Last 24 Hrs  T(C): 37.1 (09 Sep 2021 18:01), Max: 37.1 (09 Sep 2021 18:01)  T(F): 98.7 (09 Sep 2021 18:01), Max: 98.7 (09 Sep 2021 18:01)  HR: 88 (09 Sep 2021 15:20) (74 - 90)  BP: 115/81 (09 Sep 2021 15:20) (102/59 - 135/71)  BP(mean): 93 (09 Sep 2021 15:20) (77 - 96)  RR: 18 (09 Sep 2021 15:20) (17 - 18)  SpO2: 97% (09 Sep 2021 15:20) (97% - 98%)    09-08 @ 07:01  -  09-09 @ 07:00  --------------------------------------------------------  IN: 828 mL / OUT: 650 mL / NET: 178 mL    09-09 @ 07:01  -  09-09 @ 22:00  --------------------------------------------------------  IN: 460 mL / OUT: 1000 mL / NET: -540 mL          Review of Systems:   •	General: negative  •	Skin/Breast: negative  •	Ophthalmologic: negative  •	ENMT: negative  •	Respiratory and Thorax: negative  •	Cardiovascular: negative  •	Gastrointestinal: negative  •	Genitourinary: negative  •	Musculoskeletal: negative  •	Neurological: negative  •	Psychiatric: negative  •	Hematology/Lymphatics: negative  •	Endocrine: negative  •	Allergic/Immunologic: negative    Physical Exam:   • Constitutional:	refer to the dietion /Nutritionist note  • Eyes:	EOMI; PERRL; no drainage or redness  • ENMT:	No oral lesions; no gross abnormalities  • Neck	No bruits; no thyromegaly or nodules  • Breasts:	not examined  • Back:	No deformity or limitation of movement  • Respiratory:	Breath Sounds equal & clear to percussion & auscultation, no accessory muscle use  • Cardiovascular:	Regular rate & rhythm, normal S1, S2; no murmurs, gallops or rubs; no S3, S4  • Gastrointestinal:	Soft, non-tender, no hepatosplenomegaly, normal bowel sounds  • Genitourinary:	not examined  • Rectal: not examined  • Extremities:	No cyanosis, clubbing or edema  • Vascular:	Equal and normal pulses (carotid, femoral, dorsalis pedis)  • Neurologica:l	not examined  • Skin:	No lesions; no rash  • Lymph Nodes:	No lymphadedenopathy  • Musculoskeletal:	No joint pain, swelling or deformity; no limitation of movement        LABS:      CBC Full  -  ( 09 Sep 2021 08:20 )  WBC Count : 5.63 K/uL  RBC Count : 3.88 M/uL  Hemoglobin : 10.6 g/dL  Hematocrit : 36.0 %  Platelet Count - Automated : 160 K/uL  Mean Cell Volume : 92.8 fl  Mean Cell Hemoglobin : 27.3 pg  Mean Cell Hemoglobin Concentration : 29.4 gm/dL  Auto Neutrophil # : x  Auto Lymphocyte # : x  Auto Monocyte # : x  Auto Eosinophil # : x  Auto Basophil # : x  Auto Neutrophil % : x  Auto Lymphocyte % : x  Auto Monocyte % : x  Auto Eosinophil % : x  Auto Basophil % : x    09-09    144  |  106  |  11  ----------------------------<  95  3.8   |  29  |  0.57    Ca    9.5      09 Sep 2021 08:20  Phos  3.5     09-09  Mg     2.0     09-09                          RADIOLOGY & ADDITIONAL STUDIES (The following images were personally reviewed):

## 2021-09-09 NOTE — DISCHARGE NOTE PROVIDER - NSDCFUADDINST_GEN_ALL_CORE_FT
Neurosurgery follow up:  - are staples/sutures in place?  - what day should staples/sutures be removed (POD 10-14)?  - please call the office to confirm appointment: [phone]     Wound Care:  - you may shower  - please leave incision open to air    Activity:  - fatigue is common after surgery, rest if you feel tired   - no bending, lifting, twisting or heavy lifting   - walking is recommended, ambulate as tolerated  - you may shower when you get home, keep your incision dry  - no bathing   - no driving within 24 hours of anesthesia or while taking prescription pain medications   - keep hydrated, drink plenty of water     Inpatient consults:  Please follow up with Dr. Sanchez from Vascular Surgery for management of IVC filter    Please follow up with Dr. Hinton from General Surgery for management of Jejunostomy tube    Please follow up with Dr. Montiel from Hematology for management of your anticoagulation    Please follow up with Dr. Glass from Pulmonology outpatient for management of your DVT/PE    Please also follow up with your primary care doctor.     Pain Expectations:  - pain after surgery is expected  - please take pain meds as prescribed     Medications:  - changes to home meds (ex. AED's)?  - new meds?  - pain meds?    - pain medications can cause constipation, you should eat a high fiber diet and may take a stool softener while on pain meds   - Avoid taking Advil (ibuprofen), Motrin (naproxen), or Aspirin for pain as they can cause bleeding     Call the office or come to ED if:  - wound has drainage or bleeding, increased redness or pain at incision site, neurological change, fever (>101), chills, night sweats, syncope, nausea/vomiting      Playback:  - please see [facility] for a copy of your discharge instructions     WITHIN 24 HOURS OF DISCHARGE, PLEASE CONTACT NEURO PA  WITH ANY QUESTIONS OR CONCERNS: [phone]   OTHERWISE, PLEASE CALL THE OFFICE WITH ANY QUESTIONS OR CONCERNS: [phone] Neurosurgery follow up:  - There are no sutures or staples in place.   - please call the office to confirm appointment: [phone]     Wound Care:  - you may shower  - please leave incision open to air    Activity:  - fatigue is common after surgery, rest if you feel tired   - no bending, lifting, twisting or heavy lifting   - walking is recommended, ambulate as tolerated  - you may shower when you get home, keep your incision dry  - no bathing   - no driving within 24 hours of anesthesia or while taking prescription pain medications   - keep hydrated, drink plenty of water     Inpatient consults:  Please follow up with Dr. Sanchez from Vascular Surgery for management of IVC filter    Please follow up with Dr. Hinton from General Surgery for management of Jejunostomy tube    Please follow up with Dr. Montiel from Hematology for management of your anticoagulation    Please follow up with Dr. Glass from Pulmonology outpatient for management of your DVT/PE    Please also follow up with your primary care doctor.     Pain Expectations:  - pain after surgery is expected  - please take pain meds as prescribed     Medications:  - You are being discharged on the following new medications. Please continue taking all as prescribed until your office follow up appointment:  -Aspirin 81 for the vertebral artery takedown  -Lovenox 100 twice a day for management of your DVTs  -Seroquel for agitation  -Bromocriptine for central fever management  -Vimpat for seizure prophylaxis     - pain medications can cause constipation, you should eat a high fiber diet and may take a stool softener while on pain meds   - Avoid taking Advil (ibuprofen), Motrin (naproxen), or Aspirin for pain as they can cause bleeding     Call the office or come to ED if:  - wound has drainage or bleeding, increased redness or pain at incision site, neurological change, fever (>101), chills, night sweats, syncope, nausea/vomiting      Playback:  - please see [facility] for a copy of your discharge instructions     WITHIN 24 HOURS OF DISCHARGE, PLEASE CONTACT NEURO PA  WITH ANY QUESTIONS OR CONCERNS: [phone]   OTHERWISE, PLEASE CALL THE OFFICE WITH ANY QUESTIONS OR CONCERNS: [phone] Neurosurgery follow up:  - There are no sutures or staples in place.   - please call the office to confirm appointment: [phone]   - You have a follow up lower extremity doppler on 9/20/21. A script was provided at discharge.    Wound Care:  - you may shower  - please leave incision open to air    Activity:  - fatigue is common after surgery, rest if you feel tired   - no bending, lifting, twisting or heavy lifting   - walking is recommended, ambulate as tolerated  - you may shower when you get home, keep your incision dry  - no bathing   - no driving within 24 hours of anesthesia or while taking prescription pain medications   - keep hydrated, drink plenty of water     Inpatient consults:  Please follow up with Dr. Sanchez from Vascular Surgery for management of IVC filter    Please follow up with Dr. Hinton from General Surgery for management of Jejunostomy tube    Please follow up with Dr. Montiel from Hematology for management of your anticoagulation    Please follow up with Dr. Glass from Pulmonology outpatient for management of your DVT/PE    Please also follow up with your primary care doctor.     Pain Expectations:  - pain after surgery is expected  - please take pain meds as prescribed     Medications:  - You are being discharged on the following new medications. Please continue taking all as prescribed until your office follow up appointment:  -Aspirin 81 for the vertebral artery takedown  -Lovenox 100 twice a day for management of your DVTs  -Seroquel for agitation  -Bromocriptine for central fever management  -Vimpat for seizure prophylaxis     - pain medications can cause constipation, you should eat a high fiber diet and may take a stool softener while on pain meds   - Avoid taking Advil (ibuprofen), Motrin (naproxen), or Aspirin for pain as they can cause bleeding     Call the office or come to ED if:  - wound has drainage or bleeding, increased redness or pain at incision site, neurological change, fever (>101), chills, night sweats, syncope, nausea/vomiting      Playback:  - please see [facility] for a copy of your discharge instructions     WITHIN 24 HOURS OF DISCHARGE, PLEASE CONTACT NEURO PA  WITH ANY QUESTIONS OR CONCERNS: [phone]   OTHERWISE, PLEASE CALL THE OFFICE WITH ANY QUESTIONS OR CONCERNS: [phone] Neurosurgery follow up:  - There are no sutures or staples in place.   - please call the office to confirm appointment: [phone].    Wound Care:  - you may shower  - please leave incision open to air    Activity:  - fatigue is common after surgery, rest if you feel tired   - no bending, lifting, twisting or heavy lifting   - walking is recommended, ambulate as tolerated  - you may shower when you get home, keep your incision dry  - no bathing   - no driving within 24 hours of anesthesia or while taking prescription pain medications   - keep hydrated, drink plenty of water     Inpatient consults:  Please follow up with Dr. Sanchez from Vascular Surgery for management of IVC filter    Please follow up with Dr. Hinton from General Surgery for management of Jejunostomy tube    Please follow up with Dr. Montiel from Hematology for management of your anticoagulation    Please follow up with Dr. Glass from Pulmonology outpatient for management of your DVT/PE    Please also follow up with your primary care doctor.     Pain Expectations:  - pain after surgery is expected  - please take pain meds as prescribed     Medications:  - You are being discharged on the following new medications. Please continue taking all as prescribed until your office follow up appointment:  -Aspirin 81 for the vertebral artery takedown  -Lovenox 100 twice a day for management of your DVTs  -Seroquel for agitation  -Bromocriptine for central fever management  -Vimpat for seizure prophylaxis     - pain medications can cause constipation, you should eat a high fiber diet and may take a stool softener while on pain meds   - Avoid taking Advil (ibuprofen), Motrin (naproxen), or Aspirin for pain as they can cause bleeding     Call the office or come to ED if:  - wound has drainage or bleeding, increased redness or pain at incision site, neurological change, fever (>101), chills, night sweats, syncope, nausea/vomiting      Playback:  - please see [facility] for a copy of your discharge instructions     WITHIN 24 HOURS OF DISCHARGE, PLEASE CONTACT NEURO PA  WITH ANY QUESTIONS OR CONCERNS: [phone]   OTHERWISE, PLEASE CALL THE OFFICE WITH ANY QUESTIONS OR CONCERNS: [phone] Neurosurgery follow up:  - There are no sutures or staples in place.   - please call the office to confirm appointment: [phone].    Wound Care:  - you may shower  - please leave incision open to air    Activity:  - fatigue is common after surgery, rest if you feel tired   - no bending, lifting, twisting or heavy lifting   - walking is recommended, ambulate as tolerated  - you may shower when you get home, keep your incision dry  - no bathing   - no driving within 24 hours of anesthesia or while taking prescription pain medications   - keep hydrated, drink plenty of water     Inpatient consults:  Please follow up with Dr. Sanchez from Vascular Surgery for management of IVC filter    Please follow up with Dr. Hinton from General Surgery for management of Jejunostomy tube    Please follow up with Dr. Montiel from Hematology for management of your anticoagulation    Please follow up with Dr. Glass from Pulmonology outpatient for management of your DVT/PE. Please obtain weekly dopplers to assess for DVTs.    Please also follow up with your primary care doctor.     Pain Expectations:  - pain after surgery is expected  - please take pain meds as prescribed     Medications:  - You are being discharged on the following new medications. Please continue taking all as prescribed until your office follow up appointment:  -Aspirin 81 for the vertebral artery takedown  -Lovenox 100 twice a day for management of your DVTs  -Seroquel for agitation  -Bromocriptine for central fever management  -Vimpat for seizure prophylaxis     - pain medications can cause constipation, you should eat a high fiber diet and may take a stool softener while on pain meds   - Avoid taking Advil (ibuprofen), Motrin (naproxen), or Aspirin for pain as they can cause bleeding     Call the office or come to ED if:  - wound has drainage or bleeding, increased redness or pain at incision site, neurological change, fever (>101), chills, night sweats, syncope, nausea/vomiting      Playback:  - please see [facility] for a copy of your discharge instructions     WITHIN 24 HOURS OF DISCHARGE, PLEASE CONTACT NEURO PA  WITH ANY QUESTIONS OR CONCERNS: [phone]   OTHERWISE, PLEASE CALL THE OFFICE WITH ANY QUESTIONS OR CONCERNS: [phone] Neurosurgery follow up:  - please call the office to make/confirm follow up appointment: [phone].    Wound Care:  - staples have been removed prior to discharge   - you may shower  - please leave incision open to air    Activity:  - fatigue is common after surgery, rest if you feel tired   - no bending, lifting, twisting or heavy lifting   - walking is recommended, ambulate as tolerated  - you may shower when you get home, keep your incision dry  - no bathing   - no driving within 24 hours of anesthesia or while taking prescription pain medications   - keep hydrated, drink plenty of water     Inpatient consults:  Please follow up with Dr. Sanchez from Vascular Surgery for management of IVC filter    Please follow up with Dr. Hinton from General Surgery for management of Jejunostomy tube    Please follow up with Dr. Montiel from Hematology for management of your anticoagulation. You may switch from  BID to oral Pradaxa 150mg BID when able to swallow pills consistently     Please follow up with Dr. Glass from Pulmonology outpatient for management of your DVT/PE. Please obtain weekly dopplers to assess for DVTs.    Please also follow up with your primary care doctor.     Pain Expectations:  - pain after surgery is expected  - please take pain meds as prescribed     Medications:  - You are being discharged on the following new medications. Please continue taking all as prescribed until your office follow up appointment:  -Aspirin 81 for the vertebral artery takedown  -Lovenox 100 twice a day for management of your DVTs. You may switch to Pradaxa 150mg BID when swallowing pills consistently   -Seroquel for agitation  -Bromocriptine for central fever management  -Vimpat for seizure prophylaxis     - pain medications can cause constipation, you should eat a high fiber diet and may take a stool softener while on pain meds   - Avoid taking Advil (ibuprofen), Motrin (naproxen), or Aspirin for pain as they can cause bleeding     Call the office or come to ED if:  - wound has drainage or bleeding, increased redness or pain at incision site, neurological change, fever (>101), chills, night sweats, syncope, nausea/vomiting      Playback:  - please see [facility] for a copy of your discharge instructions     WITHIN 24 HOURS OF DISCHARGE, PLEASE CONTACT NEURO PA  WITH ANY QUESTIONS OR CONCERNS: [phone]   OTHERWISE, PLEASE CALL THE OFFICE WITH ANY QUESTIONS OR CONCERNS: [phone]

## 2021-09-09 NOTE — PROGRESS NOTE ADULT - SUBJECTIVE AND OBJECTIVE BOX
Patient is a 45y old  Female who presents with a chief complaint of SAH (04 Sep 2021 07:05)      HPI:  46y/o F with h/o recent gastric sleeve (08/04/21 [facility], Dr. Crisostomo ), fibromyalgia, thyroid dysfunction, PTSD, depression, anxiety.  Presented with seizures at home - brought to [facility], with 1 more episode of seizure en route.  Intubated, CT showed SAH with IV extension, casted IV, hydrocephalus, given mannitol, levetiracetam 1g,  6mg ativan. Started on Cardene drip for BP goal < 140 and transferred to [facility] NICU for further management.     HH5 MF4   NIHSS 26 on arrival  BD 1 = 8/7 (07 Aug 2021 08:08)    O/N and interval events: None    Subjective:   Pt denies pain. Unable to elicit much history given patient's clinical status.     Allergies    apple (Angioedema)  No Known Drug Allergies  strawberry (Angioedema)    Intolerances    lactose (Stomach Upset)        MEDICATIONS  (STANDING):  acetylcysteine 20%  Inhalation 4 milliLiter(s) Inhalation every 12 hours  albuterol/ipratropium for Nebulization 3 milliLiter(s) Nebulizer every 6 hours  aspirin  chewable 81 milliGRAM(s) Oral daily  BACItracin   Ointment 1 Application(s) Topical two times a day  bromocriptine Capsule 5 milliGRAM(s) Oral every 8 hours  chlorhexidine 2% Cloths 1 Application(s) Topical <User Schedule>  enoxaparin Injectable 100 milliGRAM(s) SubCutaneous every 12 hours  ergocalciferol 42489 Unit(s) Oral <User Schedule>  lacosamide Solution 100 milliGRAM(s) Oral two times a day  midodrine 5 milliGRAM(s) Oral <User Schedule>  multivitamin/minerals/iron Oral Solution (CENTRUM) 15 milliLiter(s) Oral daily  nystatin    Suspension 649051 Unit(s) Oral four times a day  pantoprazole   Suspension 40 milliGRAM(s) Oral daily  polyethylene glycol 3350 17 Gram(s) Oral daily  QUEtiapine 50 milliGRAM(s) Oral every 24 hours  QUEtiapine 75 milliGRAM(s) Oral every 24 hours  senna 2 Tablet(s) Oral at bedtime    MEDICATIONS  (PRN):  bisacodyl Suppository 10 milliGRAM(s) Rectal daily PRN Constipation  ondansetron Injectable 4 milliGRAM(s) IV Push every 6 hours PRN Nausea and/or Vomiting  sodium chloride 0.9% lock flush 10 milliLiter(s) IV Push every 1 hour PRN Pre/post blood products, medications, blood draw, and to maintain line patency                  Drug Dosing Weight  Height (cm): 162.6 (01 Sep 2021 11:40)  Weight (kg): 98.2 (01 Sep 2021 11:40)  BMI (kg/m2): 37.1 (01 Sep 2021 11:40)  BSA (m2): 2.02 (01 Sep 2021 11:40)    PAST MEDICAL & SURGICAL HISTORY:      FAMILY HISTORY:  No pertinent family history in first degree relatives        SOCIAL HISTORY: no smoking reported      Vital Signs Last 24 Hrs  T(C): 37 (09 Sep 2021 09:07), Max: 37 (09 Sep 2021 09:07)  T(F): 98.6 (09 Sep 2021 09:07), Max: 98.6 (09 Sep 2021 09:07)  HR: 86 (09 Sep 2021 08:19) (74 - 90)  BP: 115/75 (09 Sep 2021 08:19) (106/72 - 135/71)  BP(mean): 88 (09 Sep 2021 08:19) (77 - 96)  RR: 18 (09 Sep 2021 08:19) (16 - 18)  SpO2: 98% (09 Sep 2021 08:19) (94% - 98%)        PHYSICAL EXAM:      Constitutional: NAD  Eyes: PERRLA  ENMT: thrush  Neck: supple  Back: midline  Respiratory: CTA b/l  Cardiovascular: rrr, s1s2, no m/r/g  Gastrointestinal: soft, NTND, + J tube  Extremities: wwp  Vascular: + 2 pules radial  Neurological: AAO x 2, minimally following commands  Skin: no rash  Lymph Nodes: no LAD  Musculoskeletal: no joint swelling  Psychiatric: flat affect        LABS:                          10.6   5.63  )-----------( 160      ( 09 Sep 2021 08:20 )             36.0   09-09    144  |  106  |  11  ----------------------------<  95  3.8   |  29  |  0.57    Ca    9.5      09 Sep 2021 08:20  Phos  3.5     09-09  Mg     2.0     09-09                  EKG:    ECHO, US:    < from: TTE Limited Echo w/o Cont (08.12.21 @ 15:22) >  CONCLUSIONS:     1. Limited study obtained for evaluation of right ventricular function.   2. Probably normal left ventricular systolic function.   3. Normal right ventricular size and systolic function.   4. There was insufficient tricuspid regurgitation detected from which to calculate pulmonary artery systolic pressure.   5. No pericardial effusion.    < end of copied text >      RADIOLOGY:  < from: CT Head No Cont (08.29.21 @ 17:45) >  IMPRESSION:    With ventricular shunt in place, there is now decreased ventricular size since 08/27/2021.    < end of copied text >    < from: CT Head No Cont (09.04.21 @ 11:29) >    IMPRESSION:    No acute intracranial hemorrhage or mass effect compared to prior imaging from 08/29/2021, now with the patient placed on anticoagulation therapy.    Stable ventricular size with  shunt in place, mild hydrocephalus persists.    A couple small and subacute appearing infarcts are now visible in the right cerebellum.    --- End of Report ---    < end of copied text >

## 2021-09-09 NOTE — DISCHARGE NOTE PROVIDER - NSDCCPCAREPLAN_GEN_ALL_CORE_FT
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  Assessment and Plan of Treatment:     Diagnosis: Anemia due to acute blood loss  Assessment and Plan of Treatment:     Diagnosis: Multiple subsegmental pulmonary emboli without acute cor pulmonale  Assessment and Plan of Treatment:      PRINCIPAL DISCHARGE DIAGNOSIS  Diagnosis: SAH (subarachnoid hemorrhage)  Assessment and Plan of Treatment:       SECONDARY DISCHARGE DIAGNOSES  Diagnosis: DVT, lower extremity  Assessment and Plan of Treatment:     Diagnosis: Abnormality of lung on CXR  Assessment and Plan of Treatment:     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

## 2021-09-09 NOTE — DISCHARGE NOTE PROVIDER - NPI NUMBER (FOR [employer] USE ONLY) :
[[id number]],[[id number]] [[id number]]

## 2021-09-09 NOTE — DISCHARGE NOTE PROVIDER - PROVIDER TOKENS
PROVIDER:[TOKEN:[[id number]]],PROVIDER:[TOKEN:[[id number]]],PROVIDER:[TOKEN:[[id number]]] PROVIDER:[TOKEN:[[id number]]],PROVIDER:[TOKEN:[[id number]]],PROVIDER:[TOKEN:[[id number]]],PROVIDER:[TOKEN:[[id number]]],PROVIDER:[TOKEN:[[id number]]]

## 2021-09-09 NOTE — DISCHARGE NOTE PROVIDER - NSDCFUADDAPPT_GEN_ALL_CORE_FT
Please follow up with Dr. Lomax outpatient    Please follow up with Dr. Sanchez from Vascular Surgery for management of IVC filter    Please follow up with Dr. Hinton from General Surgery for management of Jejunostomy tube    Please follow up with Dr. Montiel from Hematology for management of your anticoagulation    Please follow up with Dr. Glass from Pulmonology outpatient for management of your DVT/PE    Please also follow up with your primary care doctor outpatient     Please follow up with Dr. Lomax outpatient. Your follow up appointment is 9/27/21 at 11:00 AM    Please follow up with Dr. Sanchez from Vascular Surgery for management of IVC filter    Please follow up with Dr. Hinton from General Surgery for management of Jejunostomy tube    Please follow up with Dr. Montiel from Hematology for management of your anticoagulation    Please follow up with Dr. Glass from Pulmonology outpatient for management of your DVT/PE    Please also follow up with your primary care doctor outpatient     Please follow up with Dr. Lomax outpatient. Your follow up appointment is 9/27/21 at 11:00 AM    Please follow up with Dr. Sanchez from Vascular Surgery for management of IVC filter.     Please follow up with Dr. Hinton from General Surgery for management of Jejunostomy tube    Please follow up with Dr. Montiel from Hematology for management of your anticoagulation    Please follow up with Dr. Glass from Pulmonology outpatient for management of your DVT/PE. You will need a lower extremity doppler on 9/18/21.      Please also follow up with your primary care doctor outpatient     Please follow up with Dr. Lomax outpatient. Your follow up appointment is 9/27/21 at 11:00 AM    Please follow up with Dr. Sanchez from Vascular Surgery for management of IVC filter.     Please follow up with Dr. Hinton from General Surgery for management of Jejunostomy tube    Please follow up with Dr. Montiel from Hematology for management of your anticoagulation    Please follow up with Dr. Glass from Pulmonology outpatient for management of your DVT/PE. You will need a lower extremity doppler on 9/20/21.      Please also follow up with your primary care doctor outpatient     Please follow up with Dr. Lomax outpatient. Your follow up appointment is 9/27/21 at 11:00 AM    Please follow up with Dr. Sanchez from Vascular Surgery for management of IVC filter.     Please follow up with Dr. Hinton from General Surgery for management of Jejunostomy tube    Please follow up with Dr. Montiel from Hematology for management of your anticoagulation    Please follow up with Dr. Glass from Pulmonology outpatient for management of your DVT/PE. You will need weekly bilateral lower extremity dopplers to assess DVTs.    Please also follow up with your primary care doctor outpatient

## 2021-09-09 NOTE — CHART NOTE - NSCHARTNOTEFT_GEN_A_CORE
Admitting Diagnosis:   Patient is a 45y old  Female who presents with a chief complaint of SAH (09 Sep 2021 09:22)      PAST MEDICAL & SURGICAL HISTORY:      Current Nutrition Order:  Vital HP @ 46 ml/hr x24hrs via NGT providing 1104 ml TV, 1104 kcal, 96g pro., 923 ml free water.   *infusing at ordered goal  Per pt care order, pt can have ice chips on spoon    PO Intake: Good (%) [   ]  Fair (50-75%) [   ] Poor (<25%) [   ]- N/A    GI Issues:   Jtube placed 9/1; toleratin gfeeds well.   >>Initial plan for PEG however unable to transilluminate from stomach 2/2 adipose tissue per GI note.   H/o recent sleeve gastrectomy 8/4/21  Ordered for protonix, zofran  Last BM 9/7    Pain:  No pain reported    Skin Integrity;   Surgical incision  Oli score 16    Skin Integrity:    Labs:   09-09    144  |  106  |  11  ----------------------------<  95  3.8   |  29  |  0.57    Ca    9.5      09 Sep 2021 08:20  Phos  3.5     09-09  Mg     2.0     09-09      CAPILLARY BLOOD GLUCOSE          Medications:  MEDICATIONS  (STANDING):  acetylcysteine 20%  Inhalation 4 milliLiter(s) Inhalation every 12 hours  albuterol/ipratropium for Nebulization 3 milliLiter(s) Nebulizer every 6 hours  aspirin  chewable 81 milliGRAM(s) Oral daily  BACItracin   Ointment 1 Application(s) Topical two times a day  bromocriptine Capsule 5 milliGRAM(s) Oral every 8 hours  chlorhexidine 2% Cloths 1 Application(s) Topical <User Schedule>  enoxaparin Injectable 100 milliGRAM(s) SubCutaneous every 12 hours  ergocalciferol 75750 Unit(s) Oral <User Schedule>  lacosamide Solution 100 milliGRAM(s) Oral two times a day  midodrine 5 milliGRAM(s) Oral <User Schedule>  multivitamin/minerals/iron Oral Solution (CENTRUM) 15 milliLiter(s) Oral daily  nystatin    Suspension 294869 Unit(s) Oral four times a day  pantoprazole   Suspension 40 milliGRAM(s) Oral daily  polyethylene glycol 3350 17 Gram(s) Oral daily  QUEtiapine 50 milliGRAM(s) Oral every 24 hours  QUEtiapine 75 milliGRAM(s) Oral every 24 hours  senna 2 Tablet(s) Oral at bedtime    MEDICATIONS  (PRN):  bisacodyl Suppository 10 milliGRAM(s) Rectal daily PRN Constipation  ondansetron Injectable 4 milliGRAM(s) IV Push every 6 hours PRN Nausea and/or Vomiting  sodium chloride 0.9% lock flush 10 milliLiter(s) IV Push every 1 hour PRN Pre/post blood products, medications, blood draw, and to maintain line patency      Admitting Anthropometrics:  · Height for BMI (FEET)	5 Feet  · Height for BMI (INCHES)	4 Inch(s)  · Height for BMI (CENTIMETERS)	162.56 Centimeter(s)  · Weight for BMI (lbs)	225.1 lb  · Weight for BMI (kg)	102.1 kg  · Body Mass Index	              38.6 kg/m2  · Ideal Body Weight (lbs)	120  · Ideal Body Weight (kg)	54.4    Weight:  8/7 225lbs  9/9 217lbs (bedscale weight)    Weight Change:   Reports UBW prior to sleeve gastrectomy was 235lbs. Current B Wis 216lbs. This indicates a 8% wt loss x1 mo.     Nutrition Focused Physical Exam: Completed [   ]  Not Pertinent [ x  ]    Estimated energy needs:   IBW (54.5kg) used for calculations as pt >120% of IBW (188%).   Nutrient needs based on [facility] standards of care for maintenance in adults.   Needs adjusted for recent bariatric sx, critical care, obesity.   Kcal (20-25kcal/kg): 1090-1363kcal/day   Protein (1.5-2.0 g/kg pro): 81-108g pro/day  Fluids per team.     Subjective:   45F with h/o recent gastric sleeve ([facility] Dr. Crisostomo ), fibromyalgia, thyroid dysfunction, PTSD, depression, anxiety.  Presented with seizures at home - brought to [place], with 1 more episode of seizure en route.  Intubated, CT showed SAH with IV extension, casted IV, hydrocephalus, given mannitol, levetiracetam 1g,  6mg ativan. Started on Cardene drip for BP goal < 140 and transferred to [facility] NICU for further management. S/p angio with R vert takedown 8/7, pt extubated later that day. Pt remains on fentanyl and Precedex restlessness and agitation. NGT placed for initiation of EN. CTH with increased hydro, CTA head/neck with mild basilar spasm. Pt now weened off fentanyl and precedex 8/12 per disc with RN. S/p IVC placement 8/12. Pt with increased work of breathing with inability to clear secretions, and was reintubated 8/15. CTA shows posterior circulation vasospasm. Started on levophed drip for SBP goal 180-200. Also remains on propofol drip. EVD open at -5cmH2O. Pt noted to be febrile this am (101F) and pancultured 8/17. Pt s/p multiple angiograms 8/16, 8/17, 8/18, 8/20. Pt self extubated 8/18 and transitioned to NC, tolerating well. Pt con ton levo for BP support. SBP goal 160-200. MSSA growing in sputum. Ceftriaxone d/c'd and Ancef 2gq8 started. ID consulted. S/p  shunt placement 8/27. Attempted PEG placement for enteral feeds however unable to place gastric feeding tube. S/p Jtube 9/1 and now running at ordered goal and adequately meeting needs. Plan for possible barium swallow and removal of VPS stables prior to d/c.     Pt seen in room, resting in bed. Feeds infusing at ordered goal via jtube. Tolerating well, denies any N/V, or discomofort w/ feeds. last BM was 2 days ago 9/7. Understanding of meeting needs this way for time being. Expressed desire to take PO. Per pt care order, pt can have ice chips by spoon. Continue w/ vitamin D supplementation. RD to follow.     Previous Nutrition Diagnosis: Inadequate Oral Intake RT inability to meet needs via PO AEB NPO, reliant on EN to meet 100% of est needs.     Active [ x  ]  Resolved [   ]    If resolved, new PES:     Goal/Expected Outcome Consistently meet >75% est needs via appropriate route.    Recommendations:  1. NPO  >> When medically feasible, recommend the following diet advancement; BARICLLIQ >> Phase 1 Bariatric Full liquid diet  2. If team wishes to cont EN regimen, recommend increasing; Vital HP @ 46 ml/hr x24hrs via NGT providing 1104 ml TV, 1104 kcal, 96g pro., 923 ml free water.   >>Cont with aspiration precautions at all times  >>FWF per team  3. Cont to monitor lytes and replete prn  4. RD diet edu prn  5. Pain and bowel regimen per team  *d/w team.     Education: Discussed TF     Risk Level: High [ x  ] Moderate [   ] Low [   ].

## 2021-09-09 NOTE — PROGRESS NOTE ADULT - ASSESSMENT
44y/o F with h/o recent gastric sleeve (08/04/21 [facility], Dr. Crisostomo ), fibromyalgia, thyroid dysfunction, PTSD, depression, anxiety.  Presented with seizures at home - brought to [place], with 1 more episode of seizure en route.  Intubated, CT showed SAH with IV extension, casted IV, hydrocephalus, given mannitol, levetiracetam 1g,  6mg ativan. Started on Cardene drip for BP goal < 140 and transferred to [facility] NICU for further management. HH5 MF4, BD 1 = 8/7. 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 Now s/p right VPS certas at 4 (8/27).

## 2021-09-09 NOTE — PROGRESS NOTE ADULT - ASSESSMENT
44y/o F with h/o recent gastric sleeve (08/04/21 [facility], Dr. Crisostomo ), fibromyalgia, thyroid dysfunction, PTSD, depression, anxiety.  Presented with seizures at home - brought to [place], with 1 more episode of seizure en route.  Intubated, CT showed SAH with IV extension, casted IV, hydrocephalus, given mannitol, levetiracetam 1g,  6mg ativan. Started on Cardene drip for BP goal < 140 and transferred to [facility] NICU for further management. HH5 MF4, BD 1 = 8/7. Now s/p cerebral angiogram for R vertebral artery takedown for R vertebral dissecting aneurysm (8/7), and R frontal EVD placement (8/7), now s/p IVC filter placement (8/12,) s/p angio IA verapamil 8/16, 8/17, 8/18, 8/20. Now s/p right VPS certas at 4 (8/27).

## 2021-09-09 NOTE — DISCHARGE NOTE PROVIDER - HOSPITAL COURSE
HPI:  46y/o F with h/o recent gastric sleeve (08/04/21 [facility], Dr. Crisostomo ), fibromyalgia, thyroid dysfunction, PTSD, depression, anxiety.  Presented with seizures at home - brought to [place], with 1 more episode of seizure en route.  Intubated, CT showed SAH with IV extension, casted IV, hydrocephalus, given mannitol, levetiracetam 1g,  6mg ativan. Started on Cardene drip for BP goal < 140 and transferred to [facility] NICU for further management.     Hospital Course:  8/7: Tx from [place] for SAH. Intubated. R frontal EVD placed, central line and a line placed. 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   8/8: POD1 s/p angio with R vert takedown, extubated, desatting on aerosol mask, required extensive suctioning, 3% nebs, chest PT, started on low dose precedex drip for restlessness/agitation, ABG stable. NGT placed. TF started with goal 20 pending nutrition recs. 3% stopped for Na 150. Ceribell EEG negative and d/c'ed.   8/9 Overnight, new Right pronator drift and right gaze preference that can't cross midline, Repeat CTH demonstrated stable vents, CTA head and neck unremarkable for spasm, hypoattenuation of right cerebellum edema vs. infarct.  8/10: POD3 R vert takedown, EVD open at [id number]. ASHA overnight, neuro stable. CTH with increased hydro, CTA head/neck with mild basilar spasm, but concern for PE. 1/2 am D50 for hypoglycemia. 1L bolus then 100 cc/hr, PM rounds for net negative fluid balance and mild vasospasm on CTA.   8/11: POD4 R vert takedown. EVD at 5cm H2O, ASHA overnight. neuro stable.   Febrile 104. depakote increased to q6. NCHCT stable. EVD lowered to 0. Lasix 20 given for dieuresis, UOP over 2 liters. Sedation given for a-line placement, BP drop needing levo.  8/12: POD5 R vert takedown. EVD at 0cm H2O. ASHA overnight, neuro stable. Precedex being weaned off. Pending IVCF with vascular today.  8/13: POD6 R vert takedown. EVD open at 0cmH2O. ASHA overnight. Neuro exam stable. Mottled skin on the left lower extremity improving. Started on Bromocriptine 15mg Q8.   8/14: POD7. EVD open at 0. 1L bolus given for euvolemia o/n. neuro stable. CTH stable. ENT scoped vocal cords, +R voacl cord paresis, NTD. started on zosyn empirically.   8/15: POD8. EVD open at 0. ASHA o/n, neuro stable. Pt developed increased work of breathing, unable to clear her secretions, received racemic epi and multiple nebulizer treatments, still with stridor. Patient re-intubated at bedside, on full vent support.   8/16: CTH/CTA head/neck/CT chest performed o/n for worsened exam, R gaze preference, sluggish pupils. +worsened uncal herniation, hydro, vasospasm in b/l PCAs, L vert, basilar arteries. preop angio today, restarted on 3% for Na goal 145-150. Angio for vasospasm with IA verapamil.  8/17: POD10. Overnight Pt remains on levophed drip for SBP goal 180-220. Also remains on propofol drip. EVD open at -5cmH2O. ICPs WNL. Febrile 101F and pancultured. CTH today shows slightly smaller ventricles. Angio for vasospasm with IA verapamil.  8/18: POD11 R vert takedown. POD1 angio IA verpamil. Overnight, Pt noted to have downward gaze to the right and not following commands. Taken for stat head CT which is stable. Pupils also noted to be aniscoric left pupil 4mm and brisk and right 2mm brisk. Mannitol 50g given. Ceribell eeg placed for concern of subclinical seizures, so far appears negative for seizures. 1L NS o/n and 1.5L NS bolus in AM for euvolemia. vanc/zosyn stopped. 250cc 3% bolus and 250cc albumin given in AM. Brief seizure to L frontal lobe this AM on EEG, given 2g valproic acid and dose increased to 1g q8. Vimpat 100mg BID started.  Angio with interval improvment in vasospasm, IA verapamil given. In afternoon patient self-extubated, placed on NRB with mucomyst, 3% inhalation and duonebs. 3% at 50 d/c'd.  8/19: POD 12. Remains on VEEG. Axillary A line placed. Salt tabs d/c'd, florinef decreased to 0.1mg, EVD @ -5cm H2O, now draining 20cc/hr. 250 albumin and 500 NS boluses on dayshift, 1 L LR bolus  8/20: POD 13. ASHA. on VEEG, no seizures noted. Pending VPA level. 500 NS, 250 albumin. Febrile, pancultured. EEG d/c'ed.   8/21: BD14, POD14. ASHA overnight, EVD @ -5. s/p 1L bolus for euvolemia  8/22: BD #15: continued on levo, hypercoagulabitly profile pending, EVD @ -5, euvolemia, extubated, amantadine added, free water started for hypernatremia   8/23: BD 17, tmax 101F o/n. Gen Sx consulted for PEG - not a candidate at this time, pancultured for fever, 1L LR given for euvolemia. EVD at -5  8/24: BD18, ASHA o/n, neuro stable, EVD at -5, VPA level therapeutic, SBP goal 160-200, L IJ CVC attempted placement with carotid puncture, no pseudoaneurysm on US. MSSA growing in sputum. Ceftriaxone d/c'd and Ancef 2gq8 started. ID consulted. Recieved 1.5L LR and 500cc albumin.   8/25: POD5 last angio, BD19 ASHA o/n, neuro stable, EVD at -5  8/26: BD 20. ASHA o/n. EVD @ -5   8/27: BD 21, POD 20 Right vert takedown. ASHA overnight. Exam stable. EVD remains open at -5 and to be clamped at 0600 in preparation for right VPS placement today. Possible PEG placement Monday 8/30 8/28: BD22, POD22 Right vert takedown, POD1 R VPS, neuro exam stable. Tube feeds restarted, more lethargic this AM, improved during day, continue to monitor for hydrocephalus. AXR with dilated bowel, started on reglan and magnesium. Liquid BM, TFs held and rectal tube placed.   8/29: BD23, POD23 right vert takedown, POD2 R VPS, ASHA overnight, neuro stable. TFs decreased for colonic distention seen on XR, GI notified, nothing to do, TFs resumed at 20/hr and will continue will PEG placement tomorrow. CTH today demonstrated interval decrease in size of ventricles. Midodrine added to aid in weening levophed.   8/30: BD24, POD24, pending PEG placement this morning with Dr. Milner. Midodrine increased to 10mg q8hrs today to wean off of levo. 1u PRBC for Hb 7.5. FOB negative. 20mg lasix given. Repeat dopplers completed,  9/7: Certas@3, BD31. ASHA overnight, neuro stable.  9/8: Certas@3 BD 32. ASHA overnight, neuro stable.  9/9: POD#33  shunt, Certas at 3. Neurologically stable . Requires high dose of Seroquel for agitation.  Tolerating J-tube feeding at goal. Failed swallow evaluation.  8/7: Tx from [place] for SAH. Intubated. R frontal EVD placed, central line and a line placed. 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   8/8: POD1 s/p angio with R vert takedown, extubated, desatting on aerosol mask, required extensive suctioning, 3% nebs, chest PT, started on low dose precedex drip for restlessness/agitation, ABG stable. NGT placed. TF started with goal 20 pending nutrition recs. 3% stopped for Na 150. Ceribell EEG negative and d/c'ed.   8/9 Overnight, new Right pronator drift and right gaze preference that can't cross midline, Repeat CTH demonstrated stable vents, CTA head and neck unremarkable for spasm, hypoattenuation of right cerebellum edema vs. infarct.  8/10: POD3 R vert takedown, EVD open at [id number]. ASHA overnight, neuro stable. CTH with increased hydro, CTA head/neck with mild basilar spasm, but concern for PE. 1/2 am D50 for hypoglycemia. 1L bolus then 100 cc/hr, PM rounds for net negative fluid balance and mild vasospasm on CTA.   8/11: POD4 R vert takedown. EVD at 5cm H2O, ASHA overnight. neuro stable.   Febrile 104. depakote increased to q6. NCHCT stable. EVD lowered to 0. Lasix 20 given for dieuresis, UOP over 2 liters. Sedation given for a-line placement, BP drop needing levo.  8/12: POD5 R vert takedown. EVD at 0cm H2O. ASHA overnight, neuro stable. Precedex being weaned off. Pending IVCF with vascular today.  8/13: POD6 R vert takedown. EVD open at 0cmH2O. ASHA overnight. Neuro exam stable. Mottled skin on the left lower extremity improving. Started on Bromocriptine 15mg Q8.   8/14: POD7. EVD open at 0. 1L bolus given for euvolemia o/n. neuro stable. CTH stable. ENT scoped vocal cords, +R voacl cord paresis, NTD. started on zosyn empirically.   8/15: POD8. EVD open at 0. ASHA o/n, neuro stable. Pt developed increased work of breathing, unable to clear her secretions, received racemic epi and multiple nebulizer treatments, still with stridor. Patient re-intubated at bedside, on full vent support.   8/16: CTH/CTA head/neck/CT chest performed o/n for worsened exam, R gaze preference, sluggish pupils. +worsened uncal herniation, hydro, vasospasm in b/l PCAs, L vert, basilar arteries. preop angio today, restarted on 3% for Na goal 145-150. Angio for vasospasm with IA verapamil.  8/17: POD10. Overnight Pt remains on levophed drip for SBP goal 180-220. Also remains on propofol drip. EVD open at -5cmH2O. ICPs WNL. Febrile 101F and pancultured. CTH today shows slightly smaller ventricles. Angio for vasospasm with IA verapamil.  8/18: POD11 R vert takedown. POD1 angio IA verpamil. Overnight, Pt noted to have downward gaze to the right and not following commands. Taken for stat head CT which is stable. Pupils also noted to be aniscoric left pupil 4mm and brisk and right 2mm brisk. Mannitol 50g given. Ceribell eeg placed for concern of subclinical seizures, so far appears negative for seizures. 1L NS o/n and 1.5L NS bolus in AM for euvolemia. vanc/zosyn stopped. 250cc 3% bolus and 250cc albumin given in AM. Brief seizure to L frontal lobe this AM on EEG, given 2g valproic acid and dose increased to 1g q8. Vimpat 100mg BID started.  Angio with interval improvment in vasospasm, IA verapamil given. In afternoon patient self-extubated, placed on NRB with mucomyst, 3% inhalation and duonebs. 3% at 50 d/c'd.  8/19: POD 12. Remains on VEEG. Axillary A line placed. Salt tabs d/c'd, florinef decreased to 0.1mg, EVD @ -5cm H2O, now draining 20cc/hr. 250 albumin and 500 NS boluses on dayshift, 1 L LR bolus  8/20: POD 13. ASHA. on VEEG, no seizures noted. Pending VPA level. 500 NS, 250 albumin. Febrile, pancultured. EEG d/c'ed.   8/21: BD14, POD14. ASHA overnight, EVD @ -5. s/p 1L bolus for euvolemia  8/22: BD #15: continued on levo, hypercoagulabitly profile pending, EVD @ -5, euvolemia, extubated, amantadine added, free water started for hypernatremia   8/23: BD 17, tmax 101F o/n. Gen Sx consulted for PEG - not a candidate at this time, pancultured for fever, 1L LR given for euvolemia. EVD at -5  8/24: BD18, ASHA o/n, neuro stable, EVD at -5, VPA level therapeutic, SBP goal 160-200, L IJ CVC attempted placement with carotid puncture, no pseudoaneurysm on US. MSSA growing in sputum. Ceftriaxone d/c'd and Ancef 2gq8 started. ID consulted. Recieved 1.5L LR and 500cc albumin.   8/25: POD5 last angio, BD19 ASHA o/n, neuro stable, EVD at -5  8/26: BD 20. ASHA o/n. EVD @ -5   8/27: BD 21, POD 20 Right vert takedown. ASHA overnight. Exam stable. EVD remains open at -5 and to be clamped at 0600 in preparation for right VPS placement today. Possible PEG placement Monday 8/30      Patient evaluated by PT/OT who recommended: Acute Rehab  Patient is going home? rehab? hospice? Facility Name:     Hospital course c/b: Hydrocephalus (s/p VPS), DVT/PE (on  BID), agitation (on seroquel)     Exam on day of discharge:    Checklist:   - Obtained follow up appointment from NP  - Reviewed final recommendations of inpatient consults  - review discharge planning on provider handoff  - post op imaging completed  - Neurologically stable for discharge  - Vitals stable for discharge   - Afebrile for discharge  - WBC is stable  - Sodium level is normal  - Pain is adequately controlled  - Pt has PICC/walker/brace/collar        HPI:  46y/o F with h/o recent gastric sleeve (08/04/21 [facility], Dr. Crisostomo ), fibromyalgia, thyroid dysfunction, PTSD, depression, anxiety.  Presented with seizures at home - brought to [place], with 1 more episode of seizure en route.  Intubated, CT showed SAH with IV extension, casted IV, hydrocephalus, given mannitol, levetiracetam 1g,  6mg ativan. Started on Cardene drip for BP goal < 140 and transferred to [facility] NICU for further management.     Hospital Course:  8/7: Tx from [place] for SAH. Intubated. R frontal EVD placed, central line and a line placed. 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   8/8: POD1 s/p angio with R vert takedown, extubated, desatting on aerosol mask, required extensive suctioning, 3% nebs, chest PT, started on low dose precedex drip for restlessness/agitation, ABG stable. NGT placed. TF started with goal 20 pending nutrition recs. 3% stopped for Na 150. Ceribell EEG negative and d/c'ed.   8/9 Overnight, new Right pronator drift and right gaze preference that can't cross midline, Repeat CTH demonstrated stable vents, CTA head and neck unremarkable for spasm, hypoattenuation of right cerebellum edema vs. infarct.  8/10: POD3 R vert takedown, EVD open at [id number]. ASHA overnight, neuro stable. CTH with increased hydro, CTA head/neck with mild basilar spasm, but concern for PE. 1/2 am D50 for hypoglycemia. 1L bolus then 100 cc/hr, PM rounds for net negative fluid balance and mild vasospasm on CTA.   8/11: POD4 R vert takedown. EVD at 5cm H2O, ASHA overnight. neuro stable.   Febrile 104. depakote increased to q6. NCHCT stable. EVD lowered to 0. Lasix 20 given for dieuresis, UOP over 2 liters. Sedation given for a-line placement, BP drop needing levo.  8/12: POD5 R vert takedown. EVD at 0cm H2O. ASHA overnight, neuro stable. Precedex being weaned off. Pending IVCF with vascular today.  8/13: POD6 R vert takedown. EVD open at 0cmH2O. ASHA overnight. Neuro exam stable. Mottled skin on the left lower extremity improving. Started on Bromocriptine 15mg Q8.   8/14: POD7. EVD open at 0. 1L bolus given for euvolemia o/n. neuro stable. CTH stable. ENT scoped vocal cords, +R voacl cord paresis, NTD. started on zosyn empirically.   8/15: POD8. EVD open at 0. ASHA o/n, neuro stable. Pt developed increased work of breathing, unable to clear her secretions, received racemic epi and multiple nebulizer treatments, still with stridor. Patient re-intubated at bedside, on full vent support.   8/16: CTH/CTA head/neck/CT chest performed o/n for worsened exam, R gaze preference, sluggish pupils. +worsened uncal herniation, hydro, vasospasm in b/l PCAs, L vert, basilar arteries. preop angio today, restarted on 3% for Na goal 145-150. Angio for vasospasm with IA verapamil.  8/17: POD10. Overnight Pt remains on levophed drip for SBP goal 180-220. Also remains on propofol drip. EVD open at -5cmH2O. ICPs WNL. Febrile 101F and pancultured. CTH today shows slightly smaller ventricles. Angio for vasospasm with IA verapamil.  8/18: POD11 R vert takedown. POD1 angio IA verpamil. Overnight, Pt noted to have downward gaze to the right and not following commands. Taken for stat head CT which is stable. Pupils also noted to be aniscoric left pupil 4mm and brisk and right 2mm brisk. Mannitol 50g given. Ceribell eeg placed for concern of subclinical seizures, so far appears negative for seizures. 1L NS o/n and 1.5L NS bolus in AM for euvolemia. vanc/zosyn stopped. 250cc 3% bolus and 250cc albumin given in AM. Brief seizure to L frontal lobe this AM on EEG, given 2g valproic acid and dose increased to 1g q8. Vimpat 100mg BID started.  Angio with interval improvment in vasospasm, IA verapamil given. In afternoon patient self-extubated, placed on NRB with mucomyst, 3% inhalation and duonebs. 3% at 50 d/c'd.  8/19: POD 12. Remains on VEEG. Axillary A line placed. Salt tabs d/c'd, florinef decreased to 0.1mg, EVD @ -5cm H2O, now draining 20cc/hr. 250 albumin and 500 NS boluses on dayshift, 1 L LR bolus  8/20: POD 13. ASHA. on VEEG, no seizures noted. Pending VPA level. 500 NS, 250 albumin. Febrile, pancultured. EEG d/c'ed.   8/21: BD14, POD14. ASHA overnight, EVD @ -5. s/p 1L bolus for euvolemia  8/22: BD #15: continued on levo, hypercoagulabitly profile pending, EVD @ -5, euvolemia, extubated, amantadine added, free water started for hypernatremia   8/23: BD 17, tmax 101F o/n. Gen Sx consulted for PEG - not a candidate at this time, pancultured for fever, 1L LR given for euvolemia. EVD at -5  8/24: BD18, ASHA o/n, neuro stable, EVD at -5, VPA level therapeutic, SBP goal 160-200, L IJ CVC attempted placement with carotid puncture, no pseudoaneurysm on US. MSSA growing in sputum. Ceftriaxone d/c'd and Ancef 2gq8 started. ID consulted. Recieved 1.5L LR and 500cc albumin.   8/25: POD5 last angio, BD19 ASHA o/n, neuro stable, EVD at -5  8/26: BD 20. ASHA o/n. EVD @ -5   8/27: BD 21, POD 20 Right vert takedown. ASHA overnight. Exam stable. EVD remains open at -5 and to be clamped at 0600 in preparation for right VPS placement today. Possible PEG placement Monday 8/30 8/28: BD22, POD22 Right vert takedown, POD1 R VPS, neuro exam stable. Tube feeds restarted, more lethargic this AM, improved during day, continue to monitor for hydrocephalus. AXR with dilated bowel, started on reglan and magnesium. Liquid BM, TFs held and rectal tube placed.   8/29: BD23, POD23 right vert takedown, POD2 R VPS, ASHA overnight, neuro stable. TFs decreased for colonic distention seen on XR, GI notified, nothing to do, TFs resumed at 20/hr and will continue will PEG placement tomorrow. CTH today demonstrated interval decrease in size of ventricles. Midodrine added to aid in weening levophed.   8/30: BD24, POD24, pending PEG placement this morning with Dr. Milner. Midodrine increased to 10mg q8hrs today to wean off of levo. 1u PRBC for Hb 7.5. FOB negative. 20mg lasix given. Repeat dopplers completed,  9/7: Certas@3, BD31. ASHA overnight, neuro stable.  9/8: Certas@3 BD 32. ASHA overnight, neuro stable.  9/9: POD#33  shunt, Certas at 3. Neurologically stable . Requires high dose of Seroquel for agitation.  Tolerating J-tube feeding at goal. Failed swallow evaluation.  8/7: Tx from [place] for SAH. Intubated. R frontal EVD placed, central line and a line placed. 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   8/8: POD1 s/p angio with R vert takedown, extubated, desatting on aerosol mask, required extensive suctioning, 3% nebs, chest PT, started on low dose precedex drip for restlessness/agitation, ABG stable. NGT placed. TF started with goal 20 pending nutrition recs. 3% stopped for Na 150. Ceribell EEG negative and d/c'ed.   8/9 Overnight, new Right pronator drift and right gaze preference that can't cross midline, Repeat CTH demonstrated stable vents, CTA head and neck unremarkable for spasm, hypoattenuation of right cerebellum edema vs. infarct.  8/10: POD3 R vert takedown, EVD open at [id number]. ASHA overnight, neuro stable. CTH with increased hydro, CTA head/neck with mild basilar spasm, but concern for PE. 1/2 am D50 for hypoglycemia. 1L bolus then 100 cc/hr, PM rounds for net negative fluid balance and mild vasospasm on CTA.   8/11: POD4 R vert takedown. EVD at 5cm H2O, ASHA overnight. neuro stable.   Febrile 104. depakote increased to q6. NCHCT stable. EVD lowered to 0. Lasix 20 given for dieuresis, UOP over 2 liters. Sedation given for a-line placement, BP drop needing levo.  8/12: POD5 R vert takedown. EVD at 0cm H2O. ASHA overnight, neuro stable. Precedex being weaned off. Pending IVCF with vascular today.  8/13: POD6 R vert takedown. EVD open at 0cmH2O. ASHA overnight. Neuro exam stable. Mottled skin on the left lower extremity improving. Started on Bromocriptine 15mg Q8.   8/14: POD7. EVD open at 0. 1L bolus given for euvolemia o/n. neuro stable. CTH stable. ENT scoped vocal cords, +R voacl cord paresis, NTD. started on zosyn empirically.   8/15: POD8. EVD open at 0. ASHA o/n, neuro stable. Pt developed increased work of breathing, unable to clear her secretions, received racemic epi and multiple nebulizer treatments, still with stridor. Patient re-intubated at bedside, on full vent support.   8/16: CTH/CTA head/neck/CT chest performed o/n for worsened exam, R gaze preference, sluggish pupils. +worsened uncal herniation, hydro, vasospasm in b/l PCAs, L vert, basilar arteries. preop angio today, restarted on 3% for Na goal 145-150. Angio for vasospasm with IA verapamil.  8/17: POD10. Overnight Pt remains on levophed drip for SBP goal 180-220. Also remains on propofol drip. EVD open at -5cmH2O. ICPs WNL. Febrile 101F and pancultured. CTH today shows slightly smaller ventricles. Angio for vasospasm with IA verapamil.  8/18: POD11 R vert takedown. POD1 angio IA verpamil. Overnight, Pt noted to have downward gaze to the right and not following commands. Taken for stat head CT which is stable. Pupils also noted to be aniscoric left pupil 4mm and brisk and right 2mm brisk. Mannitol 50g given. Ceribell eeg placed for concern of subclinical seizures, so far appears negative for seizures. 1L NS o/n and 1.5L NS bolus in AM for euvolemia. vanc/zosyn stopped. 250cc 3% bolus and 250cc albumin given in AM. Brief seizure to L frontal lobe this AM on EEG, given 2g valproic acid and dose increased to 1g q8. Vimpat 100mg BID started.  Angio with interval improvment in vasospasm, IA verapamil given. In afternoon patient self-extubated, placed on NRB with mucomyst, 3% inhalation and duonebs. 3% at 50 d/c'd.  8/19: POD 12. Remains on VEEG. Axillary A line placed. Salt tabs d/c'd, florinef decreased to 0.1mg, EVD @ -5cm H2O, now draining 20cc/hr. 250 albumin and 500 NS boluses on dayshift, 1 L LR bolus  8/20: POD 13. ASHA. on VEEG, no seizures noted. Pending VPA level. 500 NS, 250 albumin. Febrile, pancultured. EEG d/c'ed.   8/21: BD14, POD14. ASHA overnight, EVD @ -5. s/p 1L bolus for euvolemia  8/22: BD #15: continued on levo, hypercoagulabitly profile pending, EVD @ -5, euvolemia, extubated, amantadine added, free water started for hypernatremia   8/23: BD 17, tmax 101F o/n. Gen Sx consulted for PEG - not a candidate at this time, pancultured for fever, 1L LR given for euvolemia. EVD at -5  8/24: BD18, ASHA o/n, neuro stable, EVD at -5, VPA level therapeutic, SBP goal 160-200, L IJ CVC attempted placement with carotid puncture, no pseudoaneurysm on US. MSSA growing in sputum. Ceftriaxone d/c'd and Ancef 2gq8 started. ID consulted. Recieved 1.5L LR and 500cc albumin.   8/25: POD5 last angio, BD19 ASHA o/n, neuro stable, EVD at -5  8/26: BD 20. ASHA o/n. EVD @ -5   8/27: BD 21, POD 20 Right vert takedown. ASHA overnight. Exam stable. EVD remains open at -5 and to be clamped at 0600 in preparation for right VPS placement today. Possible PEG placement Monday 8/30      Patient evaluated by PT/OT who recommended: Acute Rehab  ________ ***    Hospital course c/b: Hydrocephalus (s/p VPS), DVT/PE (on  BID), agitation (on seroquel)     Exam on day of discharge:    Checklist:   - Obtained follow up appointment from NP  - Reviewed final recommendations of inpatient consults  - review discharge planning on provider handoff  - post op imaging completed  - Neurologically stable for discharge  - Vitals stable for discharge   - Afebrile for discharge  - WBC is stable  - Sodium level is normal  - Pain is adequately controlled  - Pt has PICC/walker/brace/collar        HPI:  44y/o F with h/o recent gastric sleeve (08/04/21 [facility], Dr. Crisostomo ), fibromyalgia, thyroid dysfunction, PTSD, depression, anxiety.  Presented with seizures at home - brought to [place], with 1 more episode of seizure en route.  Intubated, CT showed SAH with IV extension, casted IV, hydrocephalus, given mannitol, levetiracetam 1g,  6mg ativan. Started on Cardene drip for BP goal < 140 and transferred to [facility] NICU for further management.     Hospital Course:  8/7: Tx from [place] for SAH. Intubated. R frontal EVD placed, central line and a line placed. 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   8/8: POD1 s/p angio with R vert takedown, extubated, desatting on aerosol mask, required extensive suctioning, 3% nebs, chest PT, started on low dose precedex drip for restlessness/agitation, ABG stable. NGT placed. TF started with goal 20 pending nutrition recs. 3% stopped for Na 150. Ceribell EEG negative and d/c'ed.   8/9 Overnight, new Right pronator drift and right gaze preference that can't cross midline, Repeat CTH demonstrated stable vents, CTA head and neck unremarkable for spasm, hypoattenuation of right cerebellum edema vs. infarct.  8/10: POD3 R vert takedown, EVD open at [id number]. ASHA overnight, neuro stable. CTH with increased hydro, CTA head/neck with mild basilar spasm, but concern for PE. 1/2 am D50 for hypoglycemia. 1L bolus then 100 cc/hr, PM rounds for net negative fluid balance and mild vasospasm on CTA.   8/11: POD4 R vert takedown. EVD at 5cm H2O, ASHA overnight. neuro stable.   Febrile 104. depakote increased to q6. NCHCT stable. EVD lowered to 0. Lasix 20 given for dieuresis, UOP over 2 liters. Sedation given for a-line placement, BP drop needing levo.  8/12: POD5 R vert takedown. EVD at 0cm H2O. ASHA overnight, neuro stable. Precedex being weaned off. Pending IVCF with vascular today.  8/13: POD6 R vert takedown. EVD open at 0cmH2O. ASHA overnight. Neuro exam stable. Mottled skin on the left lower extremity improving. Started on Bromocriptine 15mg Q8.   8/14: POD7. EVD open at 0. 1L bolus given for euvolemia o/n. neuro stable. CTH stable. ENT scoped vocal cords, +R voacl cord paresis, NTD. started on zosyn empirically.   8/15: POD8. EVD open at 0. ASHA o/n, neuro stable. Pt developed increased work of breathing, unable to clear her secretions, received racemic epi and multiple nebulizer treatments, still with stridor. Patient re-intubated at bedside, on full vent support.   8/16: CTH/CTA head/neck/CT chest performed o/n for worsened exam, R gaze preference, sluggish pupils. +worsened uncal herniation, hydro, vasospasm in b/l PCAs, L vert, basilar arteries. preop angio today, restarted on 3% for Na goal 145-150. Angio for vasospasm with IA verapamil.  8/17: POD10. Overnight Pt remains on levophed drip for SBP goal 180-220. Also remains on propofol drip. EVD open at -5cmH2O. ICPs WNL. Febrile 101F and pancultured. CTH today shows slightly smaller ventricles. Angio for vasospasm with IA verapamil.  8/18: POD11 R vert takedown. POD1 angio IA verpamil. Overnight, Pt noted to have downward gaze to the right and not following commands. Taken for stat head CT which is stable. Pupils also noted to be aniscoric left pupil 4mm and brisk and right 2mm brisk. Mannitol 50g given. Ceribell eeg placed for concern of subclinical seizures, so far appears negative for seizures. 1L NS o/n and 1.5L NS bolus in AM for euvolemia. vanc/zosyn stopped. 250cc 3% bolus and 250cc albumin given in AM. Brief seizure to L frontal lobe this AM on EEG, given 2g valproic acid and dose increased to 1g q8. Vimpat 100mg BID started.  Angio with interval improvment in vasospasm, IA verapamil given. In afternoon patient self-extubated, placed on NRB with mucomyst, 3% inhalation and duonebs. 3% at 50 d/c'd.  8/19: POD 12. Remains on VEEG. Axillary A line placed. Salt tabs d/c'd, florinef decreased to 0.1mg, EVD @ -5cm H2O, now draining 20cc/hr. 250 albumin and 500 NS boluses on dayshift, 1 L LR bolus  8/20: POD 13. ASHA. on VEEG, no seizures noted. Pending VPA level. 500 NS, 250 albumin. Febrile, pancultured. EEG d/c'ed.   8/21: BD14, POD14. ASHA overnight, EVD @ -5. s/p 1L bolus for euvolemia  8/22: BD #15: continued on levo, hypercoagulabitly profile pending, EVD @ -5, euvolemia, extubated, amantadine added, free water started for hypernatremia   8/23: BD 17, tmax 101F o/n. Gen Sx consulted for PEG - not a candidate at this time, pancultured for fever, 1L LR given for euvolemia. EVD at -5  8/24: BD18, ASHA o/n, neuro stable, EVD at -5, VPA level therapeutic, SBP goal 160-200, L IJ CVC attempted placement with carotid puncture, no pseudoaneurysm on US. MSSA growing in sputum. Ceftriaxone d/c'd and Ancef 2gq8 started. ID consulted. Recieved 1.5L LR and 500cc albumin.   8/25: POD5 last angio, BD19 ASHA o/n, neuro stable, EVD at -5  8/26: BD 20. ASHA o/n. EVD @ -5   8/27: BD 21, POD 20 Right vert takedown. ASHA overnight. Exam stable. EVD remains open at -5 and to be clamped at 0600 in preparation for right VPS placement today. Possible PEG placement Monday 8/30 8/28: BD22, POD22 Right vert takedown, POD1 R VPS, neuro exam stable. Tube feeds restarted, more lethargic this AM, improved during day, continue to monitor for hydrocephalus. AXR with dilated bowel, started on reglan and magnesium. Liquid BM, TFs held and rectal tube placed.   8/29: BD23, POD23 right vert takedown, POD2 R VPS, ASHA overnight, neuro stable. TFs decreased for colonic distention seen on XR, GI notified, nothing to do, TFs resumed at 20/hr and will continue will PEG placement tomorrow. CTH today demonstrated interval decrease in size of ventricles. Midodrine added to aid in weening levophed.   8/30: BD24, POD24, pending PEG placement this morning with Dr. Milner. Midodrine increased to 10mg q8hrs today to wean off of levo. 1u PRBC for Hb 7.5. FOB negative. 20mg lasix given. Repeat dopplers completed,  8/31: BD25, POD25, unsuccessful PEG placement by GI, trend Hgb w/ AM labs, slowly weening levophed. 5mg vitamin K given for elevated INR.  9/1: BD26, POD26. J tube w/ gen surg today. 5mg vitK IV given o/n for elevated INR. off levophed. amantadine decreased 100 BID and ritalin d/c'd.   9/2: BD27, POD27. s/p J tube w/ gen surg. still receiving meds/TF via J tube.  neuro stable  9/4: BD 28, POD28, s/p J tube w/ gen surg. Neuro stable CTH performed, changed shunt to Certas @ 3 from 4.   9/5: BD 29, POD29, s/p J tube w/ gen surg. VPS certas from 4 to 3 now. Required 1mg IV haldol overnight for agitation/delirium, patient not responding to sitter and attempting to get out of bed numerous times. Cont Eliquis 5 BID for b/l LE DVTs.    9/6: VPS Certas @3. BD30. ASHA overnight, neuro stable.   9/7: Certas@3, BD31. ASHA overnight, neuro stable.  9/8: Certas@3 BD 32. ASHA overnight, neuro stable.  9/9: Certas@3 BD 33, ASHA overnight, neuro stable  9/10. Certas@3 BD 34, ASHA overnight, neuro stable, pulled out PICC line, tolerating tube feeds via J tube pending rehab   9/11: certas@3, BD35. 2mg haldol given overnight for agitation. neuro stable. on TF via J tube w/ puree thin liquid diet  9/12: BD36. 1mg IV ativan o/n for agitation. neuro stable. 1mg PO ativan added for bedtime.  9/13: BD37, 1mg ativan PO + 0.5 iv for agitation, neuro stable , pending discharge      Patient evaluated by PT/OT who recommended: Acute Rehab  [facility], [place], operated by [facility] course c/b: Hydrocephalus (s/p VPS), DVT/PE (on  BID), agitation (on seroquel)     Exam on day of discharge:  GEN: laying in bed, appears well, NAD  NEURO: AOx3 w/ choices. FC, OE spont, hypophonic, face symmetric. CNII-XII intact. PERRL, EOMI. No pronator drift. MAEx4. 5/5 strength throughout. SILT  CV: RRR +S1/S2  PULM: CTAB  GI: Abd soft, NT/ND  EXT: ext warm, dry, nontender  WOUND: crani site c/d/i.    Patient is neuro stable, vitals stable, afebrile, medically ready for discharge today.        HPI:  44y/o F with h/o recent gastric sleeve (08/04/21 [facility], Dr. Crisostomo ), fibromyalgia, thyroid dysfunction, PTSD, depression, anxiety.  Presented with seizures at home - brought to [facility], with 1 more episode of seizure en route.  Intubated, CT showed SAH with IV extension, casted IV, hydrocephalus, given mannitol, levetiracetam 1g,  6mg ativan. Started on Cardene drip for BP goal < 140 and transferred to [facility] NICU for further management.     Hospital Course:  8/7: Tx from [facility] for SAH. Intubated. R frontal EVD placed, central line and a line placed. 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   8/8: POD1 s/p angio with R vert takedown, extubated, desatting on aerosol mask, required extensive suctioning, 3% nebs, chest PT, started on low dose precedex drip for restlessness/agitation, ABG stable. NGT placed. TF started with goal 20 pending nutrition recs. 3% stopped for Na 150. Ceribell EEG negative and d/c'ed.   8/9 Overnight, new Right pronator drift and right gaze preference that can't cross midline, Repeat CTH demonstrated stable vents, CTA head and neck unremarkable for spasm, hypoattenuation of right cerebellum edema vs. infarct.  8/10: POD3 R vert takedown, EVD open at [id number]. ASHA overnight, neuro stable. CTH with increased hydro, CTA head/neck with mild basilar spasm, but concern for PE. 1/2 am D50 for hypoglycemia. 1L bolus then 100 cc/hr, PM rounds for net negative fluid balance and mild vasospasm on CTA.   8/11: POD4 R vert takedown. EVD at 5cm H2O, ASHA overnight. neuro stable.   Febrile 104. depakote increased to q6. NCHCT stable. EVD lowered to 0. Lasix 20 given for dieuresis, UOP over 2 liters. Sedation given for a-line placement, BP drop needing levo.  8/12: POD5 R vert takedown. EVD at 0cm H2O. ASHA overnight, neuro stable. Precedex being weaned off. Pending IVCF with vascular today.  8/13: POD6 R vert takedown. EVD open at 0cmH2O. ASHA overnight. Neuro exam stable. Mottled skin on the left lower extremity improving. Started on Bromocriptine 15mg Q8.   8/14: POD7. EVD open at 0. 1L bolus given for euvolemia o/n. neuro stable. CTH stable. ENT scoped vocal cords, +R voacl cord paresis, NTD. started on zosyn empirically.   8/15: POD8. EVD open at 0. ASHA o/n, neuro stable. Pt developed increased work of breathing, unable to clear her secretions, received racemic epi and multiple nebulizer treatments, still with stridor. Patient re-intubated at bedside, on full vent support.   8/16: CTH/CTA head/neck/CT chest performed o/n for worsened exam, R gaze preference, sluggish pupils. +worsened uncal herniation, hydro, vasospasm in b/l PCAs, L vert, basilar arteries. preop angio today, restarted on 3% for Na goal 145-150. Angio for vasospasm with IA verapamil.  8/17: POD10. Overnight Pt remains on levophed drip for SBP goal 180-220. Also remains on propofol drip. EVD open at -5cmH2O. ICPs WNL. Febrile 101F and pancultured. CTH today shows slightly smaller ventricles. Angio for vasospasm with IA verapamil.  8/18: POD11 R vert takedown. POD1 angio IA verpamil. Overnight, Pt noted to have downward gaze to the right and not following commands. Taken for stat head CT which is stable. Pupils also noted to be aniscoric left pupil 4mm and brisk and right 2mm brisk. Mannitol 50g given. Ceribell eeg placed for concern of subclinical seizures, so far appears negative for seizures. 1L NS o/n and 1.5L NS bolus in AM for euvolemia. vanc/zosyn stopped. 250cc 3% bolus and 250cc albumin given in AM. Brief seizure to L frontal lobe this AM on EEG, given 2g valproic acid and dose increased to 1g q8. Vimpat 100mg BID started.  Angio with interval improvment in vasospasm, IA verapamil given. In afternoon patient self-extubated, placed on NRB with mucomyst, 3% inhalation and duonebs. 3% at 50 d/c'd.  8/19: POD 12. Remains on VEEG. Axillary A line placed. Salt tabs d/c'd, florinef decreased to 0.1mg, EVD @ -5cm H2O, now draining 20cc/hr. 250 albumin and 500 NS boluses on dayshift, 1 L LR bolus  8/20: POD 13. ASHA. on VEEG, no seizures noted. Pending VPA level. 500 NS, 250 albumin. Febrile, pancultured. EEG d/c'ed.   8/21: BD14, POD14. ASHA overnight, EVD @ -5. s/p 1L bolus for euvolemia  8/22: BD #15: continued on levo, hypercoagulabitly profile pending, EVD @ -5, euvolemia, extubated, amantadine added, free water started for hypernatremia   8/23: BD 17, tmax 101F o/n. Gen Sx consulted for PEG - not a candidate at this time, pancultured for fever, 1L LR given for euvolemia. EVD at -5  8/24: BD18, ASHA o/n, neuro stable, EVD at -5, VPA level therapeutic, SBP goal 160-200, L IJ CVC attempted placement with carotid puncture, no pseudoaneurysm on US. MSSA growing in sputum. Ceftriaxone d/c'd and Ancef 2gq8 started. ID consulted. Recieved 1.5L LR and 500cc albumin.   8/25: POD5 last angio, BD19 ASHA o/n, neuro stable, EVD at -5  8/26: BD 20. ASHA o/n. EVD @ -5   8/27: BD 21, POD 20 Right vert takedown. ASHA overnight. Exam stable. EVD remains open at -5 and to be clamped at 0600 in preparation for right VPS placement today. Possible PEG placement Monday 8/30 8/28: BD22, POD22 Right vert takedown, POD1 R VPS, neuro exam stable. Tube feeds restarted, more lethargic this AM, improved during day, continue to monitor for hydrocephalus. AXR with dilated bowel, started on reglan and magnesium. Liquid BM, TFs held and rectal tube placed.   8/29: BD23, POD23 right vert takedown, POD2 R VPS, ASHA overnight, neuro stable. TFs decreased for colonic distention seen on XR, GI notified, nothing to do, TFs resumed at 20/hr and will continue will PEG placement tomorrow. CTH today demonstrated interval decrease in size of ventricles. Midodrine added to aid in weening levophed.   8/30: BD24, POD24, pending PEG placement this morning with Dr. Milner. Midodrine increased to 10mg q8hrs today to wean off of levo. 1u PRBC for Hb 7.5. FOB negative. 20mg lasix given. Repeat dopplers completed,  8/31: BD25, POD25, unsuccessful PEG placement by GI, trend Hgb w/ AM labs, slowly weening levophed. 5mg vitamin K given for elevated INR.  9/1: BD26, POD26. J tube w/ gen surg today. 5mg vitK IV given o/n for elevated INR. off levophed. amantadine decreased 100 BID and ritalin d/c'd.   9/2: BD27, POD27. s/p J tube w/ gen surg. still receiving meds/TF via J tube.  neuro stable  9/4: BD 28, POD28, s/p J tube w/ gen surg. Neuro stable CTH performed, changed shunt to Certas @ 3 from 4.   9/5: BD 29, POD29, s/p J tube w/ gen surg. VPS certas from 4 to 3 now. Required 1mg IV haldol overnight for agitation/delirium, patient not responding to sitter and attempting to get out of bed numerous times. Cont Eliquis 5 BID for b/l LE DVTs.    9/6: VPS Certas @3. BD30. ASHA overnight, neuro stable.   9/7: Certas@3, BD31. ASHA overnight, neuro stable.  9/8: Certas@3 BD 32. ASHA overnight, neuro stable.  9/9: Certas@3 BD 33, ASHA overnight, neuro stable  9/10. Certas@3 BD 34, ASHA overnight, neuro stable, pulled out PICC line, tolerating tube feeds via J tube pending rehab   9/11: certas@3, BD35. 2mg haldol given overnight for agitation. neuro stable. on TF via J tube w/ puree thin liquid diet  9/12: BD36. 1mg IV ativan o/n for agitation. neuro stable. 1mg PO ativan added for bedtime.  9/13: BD37, 1mg ativan PO + 0.5 iv for agitation, neuro stable , pending discharge  9/14: BD 38, seroquel PO for agitation, neuro stable pending discharge   9/15: BD39. ASHA o/n neuro stable. pending rehab. qtc 452  9/16: BD 40. ASHA. no agitation overnight. Exam stable  9/17: BD 41. ASHA. No agitation.   9/18: ASHA o/n. No agitation. Surveillance dopplers.   9/19: ASHA overnight, no agitation. Pending dispo  9/20: ASHA overnight, neuro stable, plan for rehab today    Patient evaluated by PT/OT who recommended: Acute Rehab  [facility] course c/b: Hydrocephalus (s/p VPS), DVT/PE (on  BID), agitation (on seroquel)     Exam on day of discharge:  GEN: laying in bed, appears well, NAD  NEURO: AOx3 w/ choices. FC, OE spont, hypophonic, face symmetric. CNII-XII intact. PERRL, EOMI. No pronator drift. MAEx4. 5/5 strength throughout. SILT  CV: RRR +S1/S2  PULM: CTAB  GI: Abd soft, NT/ND  EXT: ext warm, dry, nontender  WOUND: crani site c/d/i.    Patient is neuro stable, vitals stable, afebrile, medically ready for discharge today.

## 2021-09-10 NOTE — SWALLOW VFSS/MBS ASSESSMENT ADULT - PHARYNGEAL PHASE COMMENTS
Swallow trigger was delayed with liquids spilling to the pyriform sinuses prior to the swallow. Reduced tongue base retraction and pharyngeal squeeze resulted in mild vallecular residue and a trace amount along the tongue base, PPW and within the pyriform sinus residue with purees and solids. Epiglottic movement resulted in complete inversion. Hyolaryngeal excursion was WFL. PES opening yielded complete distension and duration with no obstruction of flow. Pt adequately protected her airway across trials.

## 2021-09-10 NOTE — PROGRESS NOTE ADULT - SUBJECTIVE AND OBJECTIVE BOX
Patient is a 45y old  Female who presents with a chief complaint of SAH (04 Sep 2021 07:05)      HPI:  46y/o F with h/o recent gastric sleeve (08/04/21 [facility], Dr. Crisostomo ), fibromyalgia, thyroid dysfunction, PTSD, depression, anxiety.  Presented with seizures at home - brought to [place], with 1 more episode of seizure en route.  Intubated, CT showed SAH with IV extension, casted IV, hydrocephalus, given mannitol, levetiracetam 1g,  6mg ativan. Started on Cardene drip for BP goal < 140 and transferred to [facility] NICU for further management.     HH5 MF4   NIHSS 26 on arrival  BD 1 = 8/7 (07 Aug 2021 08:08)    O/N and interval events: None    Subjective:   Pt denies pain. Unable to elicit much history given patient's clinical status.     Allergies    apple (Angioedema)  No Known Drug Allergies  strawberry (Angioedema)    Intolerances    lactose (Stomach Upset)        MEDICATIONS  (STANDING):  acetylcysteine 20%  Inhalation 4 milliLiter(s) Inhalation every 12 hours  albuterol/ipratropium for Nebulization 3 milliLiter(s) Nebulizer every 6 hours  aspirin  chewable 81 milliGRAM(s) Oral daily  BACItracin   Ointment 1 Application(s) Topical two times a day  bromocriptine Capsule 5 milliGRAM(s) Oral every 8 hours  chlorhexidine 2% Cloths 1 Application(s) Topical <User Schedule>  enoxaparin Injectable 100 milliGRAM(s) SubCutaneous every 12 hours  ergocalciferol 94294 Unit(s) Oral <User Schedule>  lacosamide Solution 100 milliGRAM(s) Oral two times a day  midodrine 5 milliGRAM(s) Oral <User Schedule>  multivitamin/minerals/iron Oral Solution (CENTRUM) 15 milliLiter(s) Oral daily  pantoprazole   Suspension 40 milliGRAM(s) Oral daily  polyethylene glycol 3350 17 Gram(s) Oral daily  QUEtiapine 50 milliGRAM(s) Oral every 24 hours  QUEtiapine 75 milliGRAM(s) Oral every 24 hours  senna 2 Tablet(s) Oral at bedtime    MEDICATIONS  (PRN):  bisacodyl Suppository 10 milliGRAM(s) Rectal daily PRN Constipation  ondansetron Injectable 4 milliGRAM(s) IV Push every 6 hours PRN Nausea and/or Vomiting  sodium chloride 0.9% lock flush 10 milliLiter(s) IV Push every 1 hour PRN Pre/post blood products, medications, blood draw, and to maintain line patency                  Drug Dosing Weight  Height (cm): 162.6 (01 Sep 2021 11:40)  Weight (kg): 98.2 (01 Sep 2021 11:40)  BMI (kg/m2): 37.1 (01 Sep 2021 11:40)  BSA (m2): 2.02 (01 Sep 2021 11:40)    PAST MEDICAL & SURGICAL HISTORY:      FAMILY HISTORY:  No pertinent family history in first degree relatives        SOCIAL HISTORY: no smoking reported    Vital Signs Last 24 Hrs  T(C): 36.5 (10 Sep 2021 17:10), Max: 37.2 (09 Sep 2021 22:06)  T(F): 97.7 (10 Sep 2021 17:10), Max: 98.9 (09 Sep 2021 22:06)  HR: 72 (10 Sep 2021 20:15) (72 - 90)  BP: 114/57 (10 Sep 2021 20:15) (111/67 - 138/67)  BP(mean): 82 (10 Sep 2021 20:15) (82 - 90)  RR: 18 (10 Sep 2021 20:15) (18 - 18)  SpO2: 98% (10 Sep 2021 20:15) (90% - 100%)        PHYSICAL EXAM:      Constitutional: NAD  Eyes: PERRLA  ENMT: thrush  Neck: supple  Back: midline  Respiratory: CTA b/l  Cardiovascular: rrr, s1s2, no m/r/g  Gastrointestinal: soft, NTND, + J tube  Extremities: wwp  Vascular: + 2 pules radial  Neurological: AAO x 2, minimally following commands  Skin: no rash  Lymph Nodes: no LAD  Musculoskeletal: no joint swelling  Psychiatric: flat affect        LABS:                          11.8   4.59  )-----------( 146      ( 10 Sep 2021 08:06 )             40.1   09-10    142  |  107  |  12  ----------------------------<  109<H>  3.8   |  27  |  0.56    Ca    9.4      10 Sep 2021 12:08  Phos  3.9     09-10  Mg     2.1     09-10                    EKG:    ECHO, US:    < from: TTE Limited Echo w/o Cont (08.12.21 @ 15:22) >  CONCLUSIONS:     1. Limited study obtained for evaluation of right ventricular function.   2. Probably normal left ventricular systolic function.   3. Normal right ventricular size and systolic function.   4. There was insufficient tricuspid regurgitation detected from which to calculate pulmonary artery systolic pressure.   5. No pericardial effusion.    < end of copied text >      RADIOLOGY:  < from: CT Head No Cont (08.29.21 @ 17:45) >  IMPRESSION:    With ventricular shunt in place, there is now decreased ventricular size since 08/27/2021.    < end of copied text >    < from: CT Head No Cont (09.04.21 @ 11:29) >    IMPRESSION:    No acute intracranial hemorrhage or mass effect compared to prior imaging from 08/29/2021, now with the patient placed on anticoagulation therapy.    Stable ventricular size with  shunt in place, mild hydrocephalus persists.    A couple small and subacute appearing infarcts are now visible in the right cerebellum.    --- End of Report ---    < end of copied text >

## 2021-09-10 NOTE — PROGRESS NOTE ADULT - ASSESSMENT
46y/o F with h/o recent gastric sleeve (08/04/21 [facility], Dr. Crisostomo ), fibromyalgia, thyroid dysfunction, PTSD, depression, anxiety.  Presented with seizures at home - brought to [place], with 1 more episode of seizure en route.  Intubated, CT showed SAH with IV extension, casted IV, hydrocephalus, given mannitol, levetiracetam 1g,  6mg ativan. Started on Cardene drip for BP goal < 140 and transferred to [facility] NICU for further management. HH5 MF4, BD 1 = 8/7. Now s/p cerebral angiogram for R vertebral artery takedown for R vertebral dissecting aneurysm (8/7), and R frontal EVD placement (8/7), now s/p IVC filter placement (8/12,) s/p angio IA verapamil 8/16, 8/17, 8/18, 8/20. Now s/p right VPS certas at 4 (8/27).

## 2021-09-10 NOTE — SWALLOW VFSS/MBS ASSESSMENT ADULT - RECOMMENDED FEEDING/EATING TECHNIQUES
allow for swallow between intakes/check mouth frequently for oral residue/pocketing/maintain upright posture during/after eating for 30 mins/oral hygiene/small sips/bites

## 2021-09-10 NOTE — SWALLOW VFSS/MBS ASSESSMENT ADULT - ORAL PREP COMMENTS
Bolus processing was delayed (at times), slow, and prolonged. Repetitive/rocking lingual movement during bolus transit. Posterior bolus movement appeared effortful. During a puree trial, bolus remained along the tongue base for a prolonged period of time. Liquids were introduced to expedite A-P bolus transit.

## 2021-09-10 NOTE — PROGRESS NOTE ADULT - SUBJECTIVE AND OBJECTIVE BOX
HPI:  44y/o F with h/o recent gastric sleeve (08/04/21 [facility], Dr. Crisostomo ), fibromyalgia, thyroid dysfunction, PTSD, depression, anxiety.  Presented with seizures at home - brought to [place], with 1 more episode of seizure en route.  Intubated, CT showed SAH with IV extension, casted IV, hydrocephalus, given mannitol, levetiracetam 1g,  6mg ativan. Started on Cardene drip for BP goal < 140 and transferred to [facility] NICU for further management.     HH5 MF4   NIHSS 26 on arrival  BD 1 = 8/7 (07 Aug 2021 08:08)    OVERNIGHT EVENTS: ASHA overnight, neuro stable     HOSPITAL COURSE:  8/7: Tx from [place] for SAH. Intubated. R frontal EVD placed, central line and a line placed. 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   8/8: POD1 s/p angio with R vert takedown, extubated, desatting on aerosol mask, required extensive suctioning, 3% nebs, chest PT, started on low dose precedex drip for restlessness/agitation, ABG stable. NGT placed. TF started with goal 20 pending nutrition recs. 3% stopped for Na 150. Ceribell EEG negative and d/c'ed.   8/9 Overnight, new Right pronator drift and right gaze preference that can't cross midline, Repeat CTH demonstrated stable vents, CTA head and neck unremarkable for spasm, hypoattenuation of right cerebellum edema vs. infarct.  8/10: POD3 R vert takedown, EVD open at 40rdH3N. ASHA overnight, neuro stable. CTH with increased hydro, CTA head/neck with mild basilar spasm, but concern for PE. 1/2 am D50 for hypoglycemia. 1L bolus then 100 cc/hr, PM rounds for net negative fluid balance and mild vasospasm on CTA.   8/11: POD4 R vert takedown. EVD at 5cm H2O, ASHA overnight. neuro stable.   Febrile 104. depakote increased to q6. NCHCT stable. EVD lowered to 0. Lasix 20 given for dieuresis, UOP over 2 liters. Sedation given for a-line placement, BP drop needing levo.  8/12: POD5 R vert takedown. EVD at 0cm H2O. ASHA overnight, neuro stable. Precedex being weaned off. Pending IVCF with vascular today.  8/13: POD6 R vert takedown. EVD open at 0cmH2O. ASHA overnight. Neuro exam stable. Mottled skin on the left lower extremity improving. Started on Bromocriptine 15mg Q8.   8/14: POD7. EVD open at 0. 1L bolus given for euvolemia o/n. neuro stable. CTH stable. ENT scoped vocal cords, +R voacl cord paresis, NTD. started on zosyn empirically.   8/15: POD8. EVD open at 0. ASHA o/n, neuro stable. Pt developed increased work of breathing, unable to clear her secretions, received racemic epi and multiple nebulizer treatments, still with stridor. Patient re-intubated at bedside, on full vent support.   8/16: CTH/CTA head/neck/CT chest performed o/n for worsened exam, R gaze preference, sluggish pupils. +worsened uncal herniation, hydro, vasospasm in b/l PCAs, L vert, basilar arteries. preop angio today, restarted on 3% for Na goal 145-150. Angio for vasospasm with IA verapamil.  8/17: POD10. Overnight Pt remains on levophed drip for SBP goal 180-220. Also remains on propofol drip. EVD open at -5cmH2O. ICPs WNL. Febrile 101F and pancultured. CTH today shows slightly smaller ventricles. Angio for vasospasm with IA verapamil.  8/18: POD11 R vert takedown. POD1 angio IA verpamil. Overnight, Pt noted to have downward gaze to the right and not following commands. Taken for stat head CT which is stable. Pupils also noted to be aniscoric left pupil 4mm and brisk and right 2mm brisk. Mannitol 50g given. Ceribell eeg placed for concern of subclinical seizures, so far appears negative for seizures. 1L NS o/n and 1.5L NS bolus in AM for euvolemia. vanc/zosyn stopped. 250cc 3% bolus and 250cc albumin given in AM. Brief seizure to L frontal lobe this AM on EEG, given 2g valproic acid and dose increased to 1g q8. Vimpat 100mg BID started.  Angio with interval improvment in vasospasm, IA verapamil given. In afternoon patient self-extubated, placed on NRB with mucomyst, 3% inhalation and duonebs. 3% at 50 d/c'd.  8/19: POD 12. Remains on VEEG. Axillary A line placed. Salt tabs d/c'd, florinef decreased to 0.1mg, EVD @ -5cm H2O, now draining 20cc/hr. 250 albumin and 500 NS boluses on dayshift, 1 L LR bolus  8/20: POD 13. ASHA. on VEEG, no seizures noted. Pending VPA level. 500 NS, 250 albumin. Febrile, pancultured. EEG d/c'ed.   8/21: BD14, POD14. ASHA overnight, EVD @ -5. s/p 1L bolus for euvolemia  8/22: BD #15: continued on levo, hypercoagulabitly profile pending, EVD @ -5, euvolemia, extubated, amantadine added, free water started for hypernatremia   8/23: BD 17, tmax 101F o/n. Gen Sx consulted for PEG - not a candidate at this time, pancultured for fever, 1L LR given for euvolemia. EVD at -5  8/24: BD18, ASHA o/n, neuro stable, EVD at -5, VPA level therapeutic, SBP goal 160-200, L IJ CVC attempted placement with carotid puncture, no pseudoaneurysm on US. MSSA growing in sputum. Ceftriaxone d/c'd and Ancef 2gq8 started. ID consulted. Recieved 1.5L LR and 500cc albumin.   8/25: POD5 last angio, BD19 ASHA o/n, neuro stable, EVD at -5  8/26: BD 20. ASHA o/n. EVD @ -5   8/27: BD 21, POD 20 Right vert takedown. ASHA overnight. Exam stable. EVD remains open at -5 and to be clamped at 0600 in preparation for right VPS placement today. Possible PEG placement Monday 8/30 8/28: BD22, POD22 Right vert takedown, POD1 R VPS, neuro exam stable. Tube feeds restarted, more lethargic this AM, improved during day, continue to monitor for hydrocephalus. AXR with dilated bowel, started on reglan and magnesium. Liquid BM, TFs held and rectal tube placed.   8/29: BD23, POD23 right vert takedown, POD2 R VPS, ASHA overnight, neuro stable. TFs decreased for colonic distention seen on XR, GI notified, nothing to do, TFs resumed at 20/hr and will continue will PEG placement tomorrow. CTH today demonstrated interval decrease in size of ventricles. Midodrine added to aid in weening levophed.   8/30: BD24, POD24, pending PEG placement this morning with Dr. Milner. Midodrine increased to 10mg q8hrs today to wean off of levo. 1u PRBC for Hb 7.5. FOB negative. 20mg lasix given. Repeat dopplers completed, unchanged from previous. Started on IV iron per hematology recs.  8/31: BD25, POD25, unsuccessful PEG placement by GI, trend Hgb w/ AM labs, slowly weening levophed. 5mg vitamin K given for elevated INR.  9/1: BD26, POD26. J tube w/ gen surg today. 5mg vitK IV given o/n for elevated INR. off levophed. amantadine decreased 100 BID and ritalin d/c'd.   9/2: BD27, POD27. s/p J tube w/ gen surg. still receiving meds/TF via J tube.  neuro stable  9/4: BD 28, POD28, s/p J tube w/ gen surg. Neuro stable CTH performed, changed shunt to Certas @ 3 from 4.   9/5: BD 29, POD29, s/p J tube w/ gen surg. VPS certas from 4 to 3 now. Required 1mg IV haldol overnight for agitation/delirium, patient not responding to sitter and attempting to get out of bed numerous times. Cont Eliquis 5 BID for b/l LE DVTs.  	  9/6: VPS Certas @3. BD30. ASHA overnight, neuro stable.   9/7: Certas@3, BD31. ASHA overnight, neuro stable.  9/8: Certas@3 BD 32. ASHA overnight, neuro stable.  9/9: Certas@3 BD 33, ASHA overnight, neuro stable  9/10. Certas@3 BD 34, ASHA overnight, neuro stable, pulled out PICC line, tolerating tube feeds via J tube pending rehab       Vital Signs Last 24 Hrs  T(C): 37.2 (09 Sep 2021 22:06), Max: 37.2 (09 Sep 2021 22:06)  T(F): 98.9 (09 Sep 2021 22:06), Max: 98.9 (09 Sep 2021 22:06)  HR: 90 (10 Sep 2021 00:03) (74 - 90)  BP: 122/71 (10 Sep 2021 00:03) (102/59 - 126/57)  BP(mean): 88 (10 Sep 2021 00:03) (77 - 93)  RR: 18 (10 Sep 2021 00:03) (17 - 18)  SpO2: 98% (10 Sep 2021 00:03) (97% - 98%)    I&O's Summary    08 Sep 2021 07:01  -  09 Sep 2021 07:00  --------------------------------------------------------  IN: 828 mL / OUT: 650 mL / NET: 178 mL    09 Sep 2021 07:01  -  10 Sep 2021 00:42  --------------------------------------------------------  IN: 460 mL / OUT: 1000 mL / NET: -540 mL        PHYSICAL EXAM:  General: NAD, pt is comfortably sitting up in bed, on room air  HEENT: CN II-XII grossly intact, PERRL 4mm briskly reactive, right gaze preference. EOMI b/l, face symmetric, tongue midline, neck FROM  Cardiovascular: RRR, normal S1 and S2   Respiratory: lungs CTAB, no wheezing, rhonchi, or crackles   GI: normoactive BS to auscultation, abd soft, NTND, +J-tube  Neuro: A&Ox2 (not oriented to year), hypophonic, slow low speech. Follows commands.   CALLE x4 spontaneously, antigravity and symmetrically. SILT throughout   Extremities: distal pulses 2+ x4  Wound/incision: +Abdominal and Right craniotomy VPS with staples in place, C/D/I       TUBES/LINES:  [] Richey  [] Lumbar Drain  [] Wound Drains  [X] Others - J tube       DIET:  [] NPO  [] Mechanical  [X] Tube feeds    LABS:                        10.6   5.63  )-----------( 160      ( 09 Sep 2021 08:20 )             36.0     09-09    144  |  106  |  11  ----------------------------<  95  3.8   |  29  |  0.57    Ca    9.5      09 Sep 2021 08:20  Phos  3.5     09-09  Mg     2.0     09-09              CAPILLARY BLOOD GLUCOSE          Drug Levels: [] N/A    CSF Analysis: [] N/A      Allergies    apple (Angioedema)  No Known Drug Allergies  strawberry (Angioedema)    Intolerances    lactose (Stomach Upset)    MEDICATIONS:  Antibiotics:    Neuro:  bromocriptine Capsule 5 milliGRAM(s) Oral every 8 hours  lacosamide Solution 100 milliGRAM(s) Oral two times a day  ondansetron Injectable 4 milliGRAM(s) IV Push every 6 hours PRN  QUEtiapine 50 milliGRAM(s) Oral every 24 hours  QUEtiapine 75 milliGRAM(s) Oral every 24 hours    Anticoagulation:  aspirin  chewable 81 milliGRAM(s) Oral daily  enoxaparin Injectable 100 milliGRAM(s) SubCutaneous every 12 hours    OTHER:  acetylcysteine 20%  Inhalation 4 milliLiter(s) Inhalation every 12 hours  albuterol/ipratropium for Nebulization 3 milliLiter(s) Nebulizer every 6 hours  BACItracin   Ointment 1 Application(s) Topical two times a day  bisacodyl Suppository 10 milliGRAM(s) Rectal daily PRN  chlorhexidine 2% Cloths 1 Application(s) Topical <User Schedule>  midodrine 5 milliGRAM(s) Oral <User Schedule>  pantoprazole   Suspension 40 milliGRAM(s) Oral daily  polyethylene glycol 3350 17 Gram(s) Oral daily  senna 2 Tablet(s) Oral at bedtime    IVF:  ergocalciferol 05191 Unit(s) Oral <User Schedule>  multivitamin/minerals/iron Oral Solution (CENTRUM) 15 milliLiter(s) Oral daily    CULTURES:  Culture Results:   Sputum specimen rejected.  Microscopic examination indicates  oropharyngeal contamination.  Please repeat. (08-27 @ 01:53)    RADIOLOGY & ADDITIONAL TESTS:  9/7/2021: CTH IMPRESSION:  shunt in place with stable hydrocephalus.      ASSESSMENT:  44y/o F with h/o recent gastric sleeve (08/04/21 [facility], Dr. Crisostomo ), fibromyalgia, thyroid dysfunction, PTSD, depression, anxiety.  Presented with seizures at home - brought to [place], with 1 more episode of seizure en route.  Intubated, CT showed SAH with IV extension, casted IV, hydrocephalus, given mannitol, levetiracetam 1g,  6mg ativan. Started on Cardene drip for BP goal < 140 and transferred to [facility] NICU for further management. HH5 MF4, BD 1 = 8/7. Now s/p cerebral angiogram for R vertebral artery takedown for R vertebral dissecting aneurysm (8/7), and R frontal EVD placement (8/7), now s/p IVC filter placement (8/12,) s/p angio IA verapamil 8/16, 8/17, 8/18, 8/20. Now s/p right VPS certas at 3 (8/27).    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    Anemia due to acute blood loss    SAH (subarachnoid hemorrhage)    Aneurysm    Delirium    Thrombocytopenic    Angiogram, carotid and cerebral arteries    IVC filter placement    Angiogram, carotid and cerebral, bilateral    Angiogram, carotid and cerebral, bilateral    Angiogram, carotid and cerebral, bilateral    Angiogram, carotid and cerebral, bilateral    Placement,  shunt, using frameless stereotaxy    Laparoscopic insertion of jejunostomy tube    IVC filter placement    Angiogram, carotid and cerebral, bilateral    Placement,  shunt, using frameless stereotaxy    Laparoscopic insertion of jejunostomy tube    Multiple subsegmental pulmonary emboli without acute cor pulmonale    DVT, lower extremity    Abnormality of lung on CXR    Heterozygous for prothrombin [id number] mutation    Anemia due to acute blood loss    [email or web address]        PLAN:  NEURO:   - neuro checks q4h/vitals q4   - Vimpat 100mg bid   - Bromocriptine 5mg Q8  - Amantadine dc'd 9/2 and ritalin d/c'ed  - Angio demonstrating vasospasm of Right ICA, right PCOMM, Left distal vert and basilar confluence, and received IA verapamil on 8/16. 8/17. 8/18. 8/20.   - CTH 9/7 stable  - Seroquel 50 BID for agitation   - Restart ASA 81 on 9/7 s/p R vert takedown     PULM:    - Secretions improved. standing duonebs, 3% nebs, mucomyst  - chest PT     CARDIO:    - -150  - TTE 8/12 WNL    - QTC 9/6 PM: 490, f/u in AM   - midodrine 5 BID - weaning   - 2 second pause on tele 9/9, EKG wnl     GI:    - TF via J tube, NGT d/c 9/3  - pending modified barium swallow?  - PPI per bariatric surgeon   - bowel regimen held   - On Vit D and multivitamin s/p bariatric surgery    RENAL:   - Maintain euvolemia, FW 200q8  - normonatremia goal  - straight cath prn - retaining     HEM/ONC:   - ASA 81 - held s/p OR and in anticipation of PEG placement   - VTE Prophylaxis: SCDs, SQL 90 BID (pradaxa when PO)   - dopplers 8/23, acute DVT left IM calf, persistent completely occlusive DVT b/l peroneal veins and right IM calf , 8/30 unchanged DVTs  - carotid doppler 8/24: neg for pseudoaneurysm, CTA shows soft tissue small hematoma, non-occlusive L IJ and L basillic vein thrombus   - hypercoagulable w/u: - prothrombin gene mutation - heterozygous   - 8/30 LUE doppler with findings of left IJ non-occlusive DVT and left basilic thrombus  - 9/6 dopplers: new L posterior tibial DVT: Eliquis d/c'ed switched to  BID    ID:   - MRSA neg 8/12   - Afebrile   - Last Pancx 8/23   - Ancef for MSSA pna coverage x 7 d completed 8/30  - Ancef x 3 d for R scalp incision erythema complete 9/5  - PICC line placed 8/26, patient removed 9/9    ENDO:    - glucose goal 140 -180    DISPO:  Assessment and plan discussed with Dr. Lomax    Assessment:  Present when checked    []  GCS  E   V  M     Heart Failure: []Acute, [] acute on chronic , []chronic  Heart Failure:  [] Diastolic (HFpEF), [] Systolic (HFrEF), []Combined (HFpEF and HFrEF), [] RHF, [] Pulm HTN, [] Other    [] TRUDI, [] ATN, [] AIN, [] other  [] CKD1, [] CKD2, [] CKD 3, [] CKD 4, [] CKD 5, []ESRD    Encephalopathy: [] Metabolic, [] Hepatic, [] toxic, [] Neurological, [] Other    Abnormal Nurtitional Status: [] malnurtition (see nutrition note), [ ]underweight: BMI < 19, [] morbid obesity: BMI >40, [] Cachexia    [] Sepsis  [] hypovolemic shock,[] cardiogenic shock, [] hemorrhagic shock, [] neuogenic shock  [] Acute Respiratory Failure  []Cerebral edema, [] Brain compression/ herniation,   [] Functional quadriplegia  [] Acute blood loss anemia

## 2021-09-10 NOTE — SWALLOW VFSS/MBS ASSESSMENT ADULT - ADDITIONAL RECOMMENDATIONS
Consider calorie count prior to d/c tube feeds d/t concerns for malnutrition/dehydration given pt's lethargy.    Our services will continue to f/u

## 2021-09-11 NOTE — PROGRESS NOTE ADULT - SUBJECTIVE AND OBJECTIVE BOX
Interval Events: Reviewed  Patient seen and examined at bedside.    Patient is a 45y old  Female who presents with a chief complaint of SAH (11 Sep 2021 00:15)  restless overnight, received haldol 2mg     PAST MEDICAL & SURGICAL HISTORY:      MEDICATIONS:  Pulmonary:  acetylcysteine 20%  Inhalation 4 milliLiter(s) Inhalation every 12 hours  albuterol/ipratropium for Nebulization 3 milliLiter(s) Nebulizer every 6 hours    Antimicrobials:    Anticoagulants:  aspirin  chewable 81 milliGRAM(s) Oral daily  enoxaparin Injectable 100 milliGRAM(s) SubCutaneous every 12 hours    Cardiac:  midodrine 5 milliGRAM(s) Oral <User Schedule>      Allergies    apple (Angioedema)  No Known Drug Allergies  strawberry (Angioedema)    Intolerances    lactose (Stomach Upset)      Vital Signs Last 24 Hrs  T(C): 36.9 (11 Sep 2021 04:37), Max: 37.2 (10 Sep 2021 13:02)  T(F): 98.4 (11 Sep 2021 04:37), Max: 98.9 (10 Sep 2021 13:02)  HR: 60 (11 Sep 2021 03:50) (60 - 88)  BP: 115/64 (11 Sep 2021 03:50) (111/67 - 127/66)  BP(mean): 81 (11 Sep 2021 03:50) (81 - 90)  RR: 18 (11 Sep 2021 03:50) (18 - 18)  SpO2: 98% (11 Sep 2021 03:50) (93% - 100%)    09-10 @ 07:01  -  09-11 @ 07:00  --------------------------------------------------------  IN: 736 mL / OUT: 1100 mL / NET: -364 mL          Review of Systems:   •	General: negative  •	Skin/Breast: negative  •	Ophthalmologic: negative  •	ENMT: negative  •	Respiratory and Thorax: negative  •	Cardiovascular: negative  •	Gastrointestinal: negative  •	Genitourinary: negative  •	Musculoskeletal: negative  •	Neurological: negative  •	Psychiatric: negative  •	Hematology/Lymphatics: negative  •	Endocrine: negative  •	Allergic/Immunologic: negative    Physical Exam:   • Constitutional:	Well-developed, well nourished  • Eyes:	EOMI; PERRL; no drainage or redness  • ENMT:	No oral lesions; no gross abnormalities  • Neck	no thyromegaly or nodules  • Breasts:	not examined  • Back:	No deformity or limitation of movement  • Respiratory:	Breath Sounds equal & clear to auscultation, no accessory muscle use  • Cardiovascular:	Regular rate & rhythm, normal S1, S2; no murmurs, gallops or rubs; no S3, S4  • Gastrointestinal:	Soft, non-tender, no hepatosplenomegaly, normal bowel sounds, J tube intact  • Genitourinary:	not examined  • Rectal: not examined  • Extremities:	No cyanosis, clubbing or edema  • Vascular:	Equal and normal pulses (dorsalis pedis)  • Neurologica:l	not examined  • Skin:	No lesions; no rash  • Lymph Nodes:	No lymphadedenopathy  • Musculoskeletal:	No joint pain, swelling or deformity; no limitation of movement        LABS:      CBC Full  -  ( 11 Sep 2021 06:24 )  WBC Count : 5.04 K/uL  RBC Count : 4.14 M/uL  Hemoglobin : 11.7 g/dL  Hematocrit : 38.7 %  Platelet Count - Automated : 193 K/uL  Mean Cell Volume : 93.5 fl  Mean Cell Hemoglobin : 28.3 pg  Mean Cell Hemoglobin Concentration : 30.2 gm/dL  Auto Neutrophil # : x  Auto Lymphocyte # : x  Auto Monocyte # : x  Auto Eosinophil # : x  Auto Basophil # : x  Auto Neutrophil % : x  Auto Lymphocyte % : x  Auto Monocyte % : x  Auto Eosinophil % : x  Auto Basophil % : x    09-11    143  |  105  |  10  ----------------------------<  105<H>  3.6   |  27  |  0.50    Ca    9.8      11 Sep 2021 06:24  Phos  4.3     09-11  Mg     2.3     09-11                          RADIOLOGY & ADDITIONAL STUDIES (The following images were personally reviewed):  Richey:                                     No  Urine output:                       adequate  DVT prophylaxis:                 Yes  Flattus:                                  Yes  Bowel movement:              No

## 2021-09-11 NOTE — PROGRESS NOTE ADULT - SUBJECTIVE AND OBJECTIVE BOX
HPI:  46y/o F with h/o recent gastric sleeve (08/04/21 [facility], Dr. Crisostomo ), fibromyalgia, thyroid dysfunction, PTSD, depression, anxiety.  Presented with seizures at home - brought to [place], with 1 more episode of seizure en route.  Intubated, CT showed SAH with IV extension, casted IV, hydrocephalus, given mannitol, levetiracetam 1g,  6mg ativan. Started on Cardene drip for BP goal < 140 and transferred to [facility] NICU for further management.     HH5 MF4   NIHSS 26 on arrival  BD 1 = 8/7 (07 Aug 2021 08:08)    OVERNIGHT EVENTS: 2mg haldol given for agitation    Hospital Course:   8/7: Tx from [place] for SAH. Intubated. R frontal EVD placed, central line and a line placed. 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   8/8: POD1 s/p angio with R vert takedown, extubated, desatting on aerosol mask, required extensive suctioning, 3% nebs, chest PT, started on low dose precedex drip for restlessness/agitation, ABG stable. NGT placed. TF started with goal 20 pending nutrition recs. 3% stopped for Na 150. Ceribell EEG negative and d/c'ed.   8/9 Overnight, new Right pronator drift and right gaze preference that can't cross midline, Repeat CTH demonstrated stable vents, CTA head and neck unremarkable for spasm, hypoattenuation of right cerebellum edema vs. infarct.  8/10: POD3 R vert takedown, EVD open at [id number]. ASHA overnight, neuro stable. CTH with increased hydro, CTA head/neck with mild basilar spasm, but concern for PE. 1/2 am D50 for hypoglycemia. 1L bolus then 100 cc/hr, PM rounds for net negative fluid balance and mild vasospasm on CTA.   8/11: POD4 R vert takedown. EVD at 5cm H2O, ASHA overnight. neuro stable.   Febrile 104. depakote increased to q6. NCHCT stable. EVD lowered to 0. Lasix 20 given for dieuresis, UOP over 2 liters. Sedation given for a-line placement, BP drop needing levo.  8/12: POD5 R vert takedown. EVD at 0cm H2O. ASHA overnight, neuro stable. Precedex being weaned off. Pending IVCF with vascular today.  8/13: POD6 R vert takedown. EVD open at 0cmH2O. ASHA overnight. Neuro exam stable. Mottled skin on the left lower extremity improving. Started on Bromocriptine 15mg Q8.   8/14: POD7. EVD open at 0. 1L bolus given for euvolemia o/n. neuro stable. CTH stable. ENT scoped vocal cords, +R voacl cord paresis, NTD. started on zosyn empirically.   8/15: POD8. EVD open at 0. ASHA o/n, neuro stable. Pt developed increased work of breathing, unable to clear her secretions, received racemic epi and multiple nebulizer treatments, still with stridor. Patient re-intubated at bedside, on full vent support.   8/16: CTH/CTA head/neck/CT chest performed o/n for worsened exam, R gaze preference, sluggish pupils. +worsened uncal herniation, hydro, vasospasm in b/l PCAs, L vert, basilar arteries. preop angio today, restarted on 3% for Na goal 145-150. Angio for vasospasm with IA verapamil.  8/17: POD10. Overnight Pt remains on levophed drip for SBP goal 180-220. Also remains on propofol drip. EVD open at -5cmH2O. ICPs WNL. Febrile 101F and pancultured. CTH today shows slightly smaller ventricles. Angio for vasospasm with IA verapamil.  8/18: POD11 R vert takedown. POD1 angio IA verpamil. Overnight, Pt noted to have downward gaze to the right and not following commands. Taken for stat head CT which is stable. Pupils also noted to be aniscoric left pupil 4mm and brisk and right 2mm brisk. Mannitol 50g given. Ceribell eeg placed for concern of subclinical seizures, so far appears negative for seizures. 1L NS o/n and 1.5L NS bolus in AM for euvolemia. vanc/zosyn stopped. 250cc 3% bolus and 250cc albumin given in AM. Brief seizure to L frontal lobe this AM on EEG, given 2g valproic acid and dose increased to 1g q8. Vimpat 100mg BID started.  Angio with interval improvment in vasospasm, IA verapamil given. In afternoon patient self-extubated, placed on NRB with mucomyst, 3% inhalation and duonebs. 3% at 50 d/c'd.  8/19: POD 12. Remains on VEEG. Axillary A line placed. Salt tabs d/c'd, florinef decreased to 0.1mg, EVD @ -5cm H2O, now draining 20cc/hr. 250 albumin and 500 NS boluses on dayshift, 1 L LR bolus  8/20: POD 13. ASHA. on VEEG, no seizures noted. Pending VPA level. 500 NS, 250 albumin. Febrile, pancultured. EEG d/c'ed.   8/21: BD14, POD14. ASHA overnight, EVD @ -5. s/p 1L bolus for euvolemia  8/22: BD #15: continued on levo, hypercoagulabitly profile pending, EVD @ -5, euvolemia, extubated, amantadine added, free water started for hypernatremia   8/23: BD 17, tmax 101F o/n. Gen Sx consulted for PEG - not a candidate at this time, pancultured for fever, 1L LR given for euvolemia. EVD at -5  8/24: BD18, ASHA o/n, neuro stable, EVD at -5, VPA level therapeutic, SBP goal 160-200, L IJ CVC attempted placement with carotid puncture, no pseudoaneurysm on US. MSSA growing in sputum. Ceftriaxone d/c'd and Ancef 2gq8 started. ID consulted. Recieved 1.5L LR and 500cc albumin.   8/25: POD5 last angio, BD19 ASHA o/n, neuro stable, EVD at -5  8/26: BD 20. ASHA o/n. EVD @ -5   8/27: BD 21, POD 20 Right vert takedown. ASHA overnight. Exam stable. EVD remains open at -5 and to be clamped at 0600 in preparation for right VPS placement today. Possible PEG placement Monday 8/30 8/28: BD22, POD22 Right vert takedown, POD1 R VPS, neuro exam stable. Tube feeds restarted, more lethargic this AM, improved during day, continue to monitor for hydrocephalus. AXR with dilated bowel, started on reglan and magnesium. Liquid BM, TFs held and rectal tube placed.   8/29: BD23, POD23 right vert takedown, POD2 R VPS, ASHA overnight, neuro stable. TFs decreased for colonic distention seen on XR, GI notified, nothing to do, TFs resumed at 20/hr and will continue will PEG placement tomorrow. CTH today demonstrated interval decrease in size of ventricles. Midodrine added to aid in weening levophed.   8/30: BD24, POD24, pending PEG placement this morning with Dr. Milner. Midodrine increased to 10mg q8hrs today to wean off of levo. 1u PRBC for Hb 7.5. FOB negative. 20mg lasix given. Repeat dopplers completed, unchanged from previous. Started on IV iron per hematology recs.  8/31: BD25, POD25, unsuccessful PEG placement by GI, trend Hgb w/ AM labs, slowly weening levophed. 5mg vitamin K given for elevated INR.  9/1: BD26, POD26. J tube w/ gen surg today. 5mg vitK IV given o/n for elevated INR. off levophed. amantadine decreased 100 BID and ritalin d/c'd.   9/2: BD27, POD27. s/p J tube w/ gen surg. still receiving meds/TF via J tube.  neuro stable  9/4: BD 28, POD28, s/p J tube w/ gen surg. Neuro stable CTH performed, changed shunt to Certas @ 3 from 4.   9/5: BD 29, POD29, s/p J tube w/ gen surg. VPS certas from 4 to 3 now. Required 1mg IV haldol overnight for agitation/delirium, patient not responding to sitter and attempting to get out of bed numerous times. Cont Eliquis 5 BID for b/l LE DVTs.  	  9/6: VPS Certas @3. BD30. ASHA overnight, neuro stable.   9/7: Certas@3, BD31. ASHA overnight, neuro stable.  9/8: Certas@3 BD 32. ASHA overnight, neuro stable.  9/9: Certas@3 BD 33, ASHA overnight, neuro stable  9/10. Certas@3 BD 34, ASHA overnight, neuro stable, pulled out PICC line, tolerating tube feeds via J tube pending rehab   9/11: certas@3, BD35. 2mg haldol given overnight for agitation. neuro stable. on TF via J tube w/ puree thin liquid diet    Vital Signs Last 24 Hrs  T(C): 36.5 (10 Sep 2021 17:10), Max: 37.2 (10 Sep 2021 13:02)  T(F): 97.7 (10 Sep 2021 17:10), Max: 98.9 (10 Sep 2021 13:02)  HR: 72 (10 Sep 2021 20:15) (72 - 88)  BP: 114/57 (10 Sep 2021 20:15) (111/67 - 138/67)  BP(mean): 82 (10 Sep 2021 20:15) (82 - 90)  RR: 18 (10 Sep 2021 20:15) (18 - 18)  SpO2: 98% (10 Sep 2021 20:15) (90% - 100%)    I&O's Summary    09 Sep 2021 07:01  -  10 Sep 2021 07:00  --------------------------------------------------------  IN: 828 mL / OUT: 1500 mL / NET: -672 mL    10 Sep 2021 07:01  -  11 Sep 2021 00:16  --------------------------------------------------------  IN: 552 mL / OUT: 700 mL / NET: -148 mL        PHYSICAL EXAM:  GEN: laying in bed, appears well, NAD  NEURO: AOx3 w/ choices. FC, OE spont, hypophonic, face symmetric. CNII-XII intact. PERRL, EOMI. No pronator drift. MAEx4. 5/5 strength throughout. SILT  CV: RRR +S1/S2  PULM: CTAB  GI: Abd soft, NT/ND  EXT: ext warm, dry, nontender  WOUND: crani site c/d/i.    TUBES/LINES:  [] Richey  [] Lumbar Drain  [] Wound Drains  [] Others      DIET:  [] NPO  [x] Mechanical  [x] Tube feeds    LABS:                        11.8   4.59  )-----------( 146      ( 10 Sep 2021 08:06 )             40.1     09-10    142  |  107  |  12  ----------------------------<  109<H>  3.8   |  27  |  0.56    Ca    9.4      10 Sep 2021 12:08  Phos  3.9     09-10  Mg     2.1     09-10              CAPILLARY BLOOD GLUCOSE          Drug Levels: [] N/A    CSF Analysis: [] N/A      Allergies    apple (Angioedema)  No Known Drug Allergies  strawberry (Angioedema)    Intolerances    lactose (Stomach Upset)    MEDICATIONS:  Antibiotics:    Neuro:  bromocriptine Capsule 5 milliGRAM(s) Oral every 8 hours  lacosamide Solution 100 milliGRAM(s) Oral two times a day  ondansetron Injectable 4 milliGRAM(s) IV Push every 6 hours PRN  QUEtiapine 50 milliGRAM(s) Oral every 24 hours  QUEtiapine 75 milliGRAM(s) Oral every 24 hours    Anticoagulation:  aspirin  chewable 81 milliGRAM(s) Oral daily  enoxaparin Injectable 100 milliGRAM(s) SubCutaneous every 12 hours    OTHER:  acetylcysteine 20%  Inhalation 4 milliLiter(s) Inhalation every 12 hours  albuterol/ipratropium for Nebulization 3 milliLiter(s) Nebulizer every 6 hours  BACItracin   Ointment 1 Application(s) Topical two times a day  bisacodyl Suppository 10 milliGRAM(s) Rectal daily PRN  chlorhexidine 2% Cloths 1 Application(s) Topical <User Schedule>  midodrine 5 milliGRAM(s) Oral <User Schedule>  pantoprazole   Suspension 40 milliGRAM(s) Oral daily  polyethylene glycol 3350 17 Gram(s) Oral daily  senna 2 Tablet(s) Oral at bedtime    IVF:  ergocalciferol 13719 Unit(s) Oral <User Schedule>  multivitamin/minerals/iron Oral Solution (CENTRUM) 15 milliLiter(s) Oral daily    CULTURES:    RADIOLOGY & ADDITIONAL TESTS:      ASSESSMENT:  46y/o F with h/o recent gastric sleeve (08/04/21 [facility], Dr. Crisostomo ), fibromyalgia, thyroid dysfunction, PTSD, depression, anxiety.  Presented with seizures at home - brought to [place], with 1 more episode of seizure en route.  Intubated, CT showed SAH with IV extension, casted IV, hydrocephalus, given mannitol, levetiracetam 1g,  6mg ativan. Started on Cardene drip for BP goal < 140 and transferred to [facility] NICU for further management. HH5 MF4, BD 1 = 8/7. Now s/p cerebral angiogram for R vertebral artery takedown for R vertebral dissecting aneurysm (8/7), and R frontal EVD placement (8/7), now s/p IVC filter placement (8/12,) s/p angio IA verapamil 8/16, 8/17, 8/18, 8/20. Now s/p right VPS certas at 3 (8/27).    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    Anemia due to acute blood loss    SAH (subarachnoid hemorrhage)    Aneurysm    Delirium    Thrombocytopenic    Functional quadriplegia    Angiogram, carotid and cerebral arteries    IVC filter placement    Angiogram, carotid and cerebral, bilateral    Angiogram, carotid and cerebral, bilateral    Angiogram, carotid and cerebral, bilateral    Angiogram, carotid and cerebral, bilateral    Placement,  shunt, using frameless stereotaxy    Laparoscopic insertion of jejunostomy tube    IVC filter placement    Angiogram, carotid and cerebral, bilateral    Placement,  shunt, using frameless stereotaxy    Laparoscopic insertion of jejunostomy tube    Multiple subsegmental pulmonary emboli without acute cor pulmonale    DVT, lower extremity    Abnormality of lung on CXR    Heterozygous for prothrombin [id number] mutation    Anemia due to acute blood loss    [email or web address]        PLAN:  NEURO:   - neuro checks q4h/vitals q4   - Vimpat 100mg bid   - Bromocriptine 5mg Q8  - Angio demonstrating vasospasm of Right ICA, right PCOMM, Left distal vert and basilar confluence, and received IA verapamil on 8/16. 8/17. 8/18. 8/20.   - CTH 9/7 stable  - Seroquel 50 BID for agitation, haldol prn  - ASA 81 on 9/7 s/p R vert takedown     PULM:    - Secretions improved. standing duonebs, 3% nebs, mucomyst  - chest PT     CARDIO:    - -150  - TTE 8/12 WNL    - QTC 9/6 PM: 490, f/u in AM   - midodrine 5 BID - weaning   - 2 second pause on tele 9/9, EKG wnl     GI:    - TF via J tube w/ puree thin liquid diet  - PPI per bariatric surgeon   - bowel regimen held   - On Vit D and multivitamin s/p bariatric surgery    RENAL:   - normonatremia goal  - straight cath prn - retaining     HEM/ONC:   - ASA 81   - VTE Prophylaxis: SCDs,  BID (pradaxa when PO)   - dopplers 8/23, acute DVT left IM calf, persistent completely occlusive DVT b/l peroneal veins and right IM calf , 8/30 unchanged DVTs  - carotid doppler 8/24: neg for pseudoaneurysm, CTA shows soft tissue small hematoma, non-occlusive L IJ and L basillic vein thrombus   - hypercoagulable w/u: - prothrombin gene mutation - heterozygous   - 8/30 LUE doppler with findings of left IJ non-occlusive DVT and left basilic thrombus  - 9/6 dopplers: new L posterior tibial DVT: Eliquis d/c'ed switched to  BID    ID:   - Afebrile   - Ancef for MSSA pna coverage x 7 d completed 8/30  - Ancef x 3 d for R scalp incision erythema complete 9/5  - PICC line placed 8/26, patient removed 9/9    ENDO:    - glucose goal 140 -180    DISPO:  Assessment and plan discussed with Dr. Lomax      Assessment:  Present when checked    []  GCS  E   V  M     Heart Failure: []Acute, [] acute on chronic , []chronic  Heart Failure:  [] Diastolic (HFpEF), [] Systolic (HFrEF), []Combined (HFpEF and HFrEF), [] RHF, [] Pulm HTN, [] Other    [] TRUDI, [] ATN, [] AIN, [] other  [] CKD1, [] CKD2, [] CKD 3, [] CKD 4, [] CKD 5, []ESRD    Encephalopathy: [] Metabolic, [] Hepatic, [] toxic, [] Neurological, [] Other    Abnormal Nurtitional Status: [] malnurtition (see nutrition note), [ ]underweight: BMI < 19, [] morbid obesity: BMI >40, [] Cachexia    [] Sepsis  [] hypovolemic shock,[] cardiogenic shock, [] hemorrhagic shock, [] neuogenic shock  [] Acute Respiratory Failure  []Cerebral edema, [] Brain compression/ herniation,   [] Functional quadriplegia  [] Acute blood loss anemia

## 2021-09-11 NOTE — PROGRESS NOTE ADULT - ASSESSMENT
44y/o F with h/o recent gastric sleeve (08/04/21 [facility], Dr. Crisostomo ), fibromyalgia, thyroid dysfunction, PTSD, depression, anxiety.  Presented with seizures at home - brought to [place], with 1 more episode of seizure en route.  Intubated, CT showed SAH with IV extension, casted IV, hydrocephalus, given mannitol, levetiracetam 1g,  6mg ativan. Started on Cardene drip for BP goal < 140 and transferred to [facility] NICU for further management. HH5 MF4, BD 1 = 8/7. 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 s/p right VPS certas at 4 (8/27). Certas at 3, 9/5.

## 2021-09-12 NOTE — PROGRESS NOTE ADULT - SUBJECTIVE AND OBJECTIVE BOX
INTERNAL MEDICINE PROGRESS NOTE    INTERVAL HPI/OVERNIGHT EVENTS:  Patient seen and examined at bedside. Patient is tired and non-cooperative with answering questions. Denies being in any pain. No other complaints.     VITAL SIGNS:  T(F): 97.7 (09-12-21 @ 13:51)  HR: 78 (09-12-21 @ 12:18)  BP: 117/62 (09-12-21 @ 12:18)  RR: 18 (09-12-21 @ 12:18)  SpO2: 95% (09-12-21 @ 12:18)  Wt(kg): --    PHYSICAL EXAM:  Constitutional: NAD, comfortable in bed.  HEENT: NC/AT, MMM  Neck: Supple, no JVD  Respiratory: Normal rate, rhythm, depth, effort. CTAB. No w/r/r.   Cardiovascular: RRR, normal S1 and S2, no m/r/g.   Gastrointestinal: +BS, soft NTND  Extremities: wwp; no cyanosis, clubbing or edema.   Vascular: Pulses equal and strong throughout.   Neurological: Not cooperating with orientation questions. Minimally following commands  Skin: No gross skin abnormalities or rashes        MEDICATIONS  (STANDING):  acetylcysteine 20%  Inhalation 4 milliLiter(s) Inhalation every 12 hours  albuterol/ipratropium for Nebulization 3 milliLiter(s) Nebulizer every 6 hours  aspirin  chewable 81 milliGRAM(s) Oral daily  BACItracin   Ointment 1 Application(s) Topical two times a day  bromocriptine Capsule 5 milliGRAM(s) Oral every 8 hours  chlorhexidine 2% Cloths 1 Application(s) Topical <User Schedule>  enoxaparin Injectable 100 milliGRAM(s) SubCutaneous every 12 hours  ergocalciferol 75007 Unit(s) Oral <User Schedule>  lacosamide Solution 100 milliGRAM(s) Oral two times a day  LORazepam     Tablet 1 milliGRAM(s) Oral at bedtime  midodrine 2.5 milliGRAM(s) Oral once  multivitamin/minerals/iron Oral Solution (CENTRUM) 15 milliLiter(s) Oral daily  pantoprazole  Injectable 40 milliGRAM(s) IV Push daily  polyethylene glycol 3350 17 Gram(s) Oral daily  QUEtiapine 50 milliGRAM(s) Oral every 24 hours  QUEtiapine 75 milliGRAM(s) Oral every 24 hours  senna 2 Tablet(s) Oral at bedtime    MEDICATIONS  (PRN):  bisacodyl Suppository 10 milliGRAM(s) Rectal daily PRN Constipation  ondansetron Injectable 4 milliGRAM(s) IV Push every 6 hours PRN Nausea and/or Vomiting  sodium chloride 0.9% lock flush 10 milliLiter(s) IV Push every 1 hour PRN Pre/post blood products, medications, blood draw, and to maintain line patency      Allergies    apple (Angioedema)  No Known Drug Allergies  strawberry (Angioedema)    Intolerances    lactose (Stomach Upset)      LABS:                        11.9   5.21  )-----------( 231      ( 12 Sep 2021 06:37 )             39.8     09-12    143  |  106  |  12  ----------------------------<  100<H>  3.8   |  28  |  0.56    Ca    10.3      12 Sep 2021 06:37  Phos  4.4     09-12  Mg     2.2     09-12      RADIOLOGY & ADDITIONAL TESTS: Reviewed  < from: CT Head No Cont (09.07.21 @ 09:09) >  FINDINGS:  INTRA-AXIAL: No intracranial mass effect, acute hemorrhage or midline shift. There are lacunar infarcts within the cerebellar hemispheres bilaterally.  EXTRA-AXIAL AND VENTRICLES/SULCI: Right-sided ventricular shunt catheter remains in place, with tip in the frontal horn of the right lateral ventricle. There is mild hypodensity along the course of the shunt catheter. Ventricles remain enlarged, however stable compared to prior 9/4/2021.  VISUALIZED SINUSES: No air-fluid levels are identified.  VISUALIZED MASTOIDS: Clear.  CALVARIUM: No fracture.    IMPRESSION:  shunt in place with stable hydrocephalus.    < end of copied text >

## 2021-09-12 NOTE — PROGRESS NOTE ADULT - SUBJECTIVE AND OBJECTIVE BOX
Interval Events: Reviewed  Patient seen and examined at bedside.    Patient is a 45y old  Female who presents with a chief complaint of SAH (12 Sep 2021 00:07)  agitated overnight, required soft restraint plus ativan 0.5mg , RA 97%      PAST MEDICAL & SURGICAL HISTORY:      MEDICATIONS:  Pulmonary:  acetylcysteine 20%  Inhalation 4 milliLiter(s) Inhalation every 12 hours  albuterol/ipratropium for Nebulization 3 milliLiter(s) Nebulizer every 6 hours    Antimicrobials:    Anticoagulants:  aspirin  chewable 81 milliGRAM(s) Oral daily  enoxaparin Injectable 100 milliGRAM(s) SubCutaneous every 12 hours    Cardiac:  midodrine 2.5 milliGRAM(s) Oral <User Schedule>      Allergies    apple (Angioedema)  No Known Drug Allergies  strawberry (Angioedema)    Intolerances    lactose (Stomach Upset)      Vital Signs Last 24 Hrs  T(C): 36.7 (12 Sep 2021 06:12), Max: 36.8 (11 Sep 2021 10:00)  T(F): 98 (12 Sep 2021 06:12), Max: 98.3 (11 Sep 2021 10:00)  HR: 98 (12 Sep 2021 04:20) (76 - 100)  BP: 137/66 (12 Sep 2021 04:20) (108/58 - 137/66)  BP(mean): 93 (12 Sep 2021 04:20) (80 - 93)  RR: 18 (12 Sep 2021 04:20) (17 - 18)  SpO2: 98% (12 Sep 2021 04:20) (95% - 98%)    09-10 @ 07:01  -  09-11 @ 07:00  --------------------------------------------------------  IN: 1140 mL / OUT: 1100 mL / NET: 40 mL    09-11 @ 07:01  -  09-12 @ 06:51  --------------------------------------------------------  IN: 474 mL / OUT: 2400 mL / NET: -1926 mL          Review of Systems:   •	General: negative  •	Skin/Breast: negative  •	Ophthalmologic: negative  •	ENMT: negative  •	Respiratory and Thorax: negative  •	Cardiovascular: negative  •	Gastrointestinal: negative  •	Genitourinary: negative  •	Musculoskeletal: negative  •	Neurological: negative  •	Psychiatric: negative  •	Hematology/Lymphatics: negative  •	Endocrine: negative  •	Allergic/Immunologic: negative    Physical Exam:   • Constitutional:	Well-developed, well nourished  • Eyes:	EOMI; PERRL; no drainage or redness  • ENMT:	No oral lesions; no gross abnormalities  • Neck	no thyromegaly or nodules  • Breasts:	not examined  • Back:	No deformity or limitation of movement  • Respiratory:	Breath Sounds equal & clear to auscultation, no accessory muscle use  • Cardiovascular:	Regular rate & rhythm, normal S1, S2; no murmurs, gallops or rubs; no S3, S4  • Gastrointestinal:	Soft, non-tender, no hepatosplenomegaly, normal bowel sounds  • Genitourinary:	not examined  • Rectal: not examined  • Extremities:	No cyanosis, clubbing or edema  • Vascular:	Equal and normal pulses (dorsalis pedis)  • Neurologica:l	not examined  • Skin:	No lesions; no rash  • Lymph Nodes:	No lymphadedenopathy  • Musculoskeletal:	No joint pain, swelling or deformity; no limitation of movement        LABS:      CBC Full  -  ( 12 Sep 2021 06:37 )  WBC Count : 5.21 K/uL  RBC Count : 4.24 M/uL  Hemoglobin : 11.9 g/dL  Hematocrit : 39.8 %  Platelet Count - Automated : 231 K/uL  Mean Cell Volume : 93.9 fl  Mean Cell Hemoglobin : 28.1 pg  Mean Cell Hemoglobin Concentration : 29.9 gm/dL  Auto Neutrophil # : x  Auto Lymphocyte # : x  Auto Monocyte # : x  Auto Eosinophil # : x  Auto Basophil # : x  Auto Neutrophil % : x  Auto Lymphocyte % : x  Auto Monocyte % : x  Auto Eosinophil % : x  Auto Basophil % : x    09-11    143  |  105  |  10  ----------------------------<  105<H>  3.6   |  27  |  0.50    Ca    9.8      11 Sep 2021 06:24  Phos  4.3     09-11  Mg     2.3     09-11                          RADIOLOGY & ADDITIONAL STUDIES (The following images were personally reviewed):  Richey:                                     No  Urine output:                       adequate  DVT prophylaxis:                 Yes  Flattus:                                  Yes  Bowel movement:              No

## 2021-09-12 NOTE — PROGRESS NOTE ADULT - SUBJECTIVE AND OBJECTIVE BOX
HPI:  44y/o F with h/o recent gastric sleeve (08/04/21 [facility], Dr. Crisostomo ), fibromyalgia, thyroid dysfunction, PTSD, depression, anxiety.  Presented with seizures at home - brought to [place], with 1 more episode of seizure en route.  Intubated, CT showed SAH with IV extension, casted IV, hydrocephalus, given mannitol, levetiracetam 1g,  6mg ativan. Started on Cardene drip for BP goal < 140 and transferred to [facility] NICU for further management.     HH5 MF4   NIHSS 26 on arrival  BD 1 = 8/7 (07 Aug 2021 08:08)    OVERNIGHT EVENTS: ativan prn for agitation. neuro stable    Hospital Course:   8/7: Tx from [place] for SAH. Intubated. R frontal EVD placed, central line and a line placed. 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   8/8: POD1 s/p angio with R vert takedown, extubated, desatting on aerosol mask, required extensive suctioning, 3% nebs, chest PT, started on low dose precedex drip for restlessness/agitation, ABG stable. NGT placed. TF started with goal 20 pending nutrition recs. 3% stopped for Na 150. Ceribell EEG negative and d/c'ed.   8/9 Overnight, new Right pronator drift and right gaze preference that can't cross midline, Repeat CTH demonstrated stable vents, CTA head and neck unremarkable for spasm, hypoattenuation of right cerebellum edema vs. infarct.  8/10: POD3 R vert takedown, EVD open at [id number]. ASHA overnight, neuro stable. CTH with increased hydro, CTA head/neck with mild basilar spasm, but concern for PE. 1/2 am D50 for hypoglycemia. 1L bolus then 100 cc/hr, PM rounds for net negative fluid balance and mild vasospasm on CTA.   8/11: POD4 R vert takedown. EVD at 5cm H2O, ASHA overnight. neuro stable.   Febrile 104. depakote increased to q6. NCHCT stable. EVD lowered to 0. Lasix 20 given for dieuresis, UOP over 2 liters. Sedation given for a-line placement, BP drop needing levo.  8/12: POD5 R vert takedown. EVD at 0cm H2O. ASHA overnight, neuro stable. Precedex being weaned off. Pending IVCF with vascular today.  8/13: POD6 R vert takedown. EVD open at 0cmH2O. ASHA overnight. Neuro exam stable. Mottled skin on the left lower extremity improving. Started on Bromocriptine 15mg Q8.   8/14: POD7. EVD open at 0. 1L bolus given for euvolemia o/n. neuro stable. CTH stable. ENT scoped vocal cords, +R voacl cord paresis, NTD. started on zosyn empirically.   8/15: POD8. EVD open at 0. ASHA o/n, neuro stable. Pt developed increased work of breathing, unable to clear her secretions, received racemic epi and multiple nebulizer treatments, still with stridor. Patient re-intubated at bedside, on full vent support.   8/16: CTH/CTA head/neck/CT chest performed o/n for worsened exam, R gaze preference, sluggish pupils. +worsened uncal herniation, hydro, vasospasm in b/l PCAs, L vert, basilar arteries. preop angio today, restarted on 3% for Na goal 145-150. Angio for vasospasm with IA verapamil.  8/17: POD10. Overnight Pt remains on levophed drip for SBP goal 180-220. Also remains on propofol drip. EVD open at -5cmH2O. ICPs WNL. Febrile 101F and pancultured. CTH today shows slightly smaller ventricles. Angio for vasospasm with IA verapamil.  8/18: POD11 R vert takedown. POD1 angio IA verpamil. Overnight, Pt noted to have downward gaze to the right and not following commands. Taken for stat head CT which is stable. Pupils also noted to be aniscoric left pupil 4mm and brisk and right 2mm brisk. Mannitol 50g given. Ceribell eeg placed for concern of subclinical seizures, so far appears negative for seizures. 1L NS o/n and 1.5L NS bolus in AM for euvolemia. vanc/zosyn stopped. 250cc 3% bolus and 250cc albumin given in AM. Brief seizure to L frontal lobe this AM on EEG, given 2g valproic acid and dose increased to 1g q8. Vimpat 100mg BID started.  Angio with interval improvment in vasospasm, IA verapamil given. In afternoon patient self-extubated, placed on NRB with mucomyst, 3% inhalation and duonebs. 3% at 50 d/c'd.  8/19: POD 12. Remains on VEEG. Axillary A line placed. Salt tabs d/c'd, florinef decreased to 0.1mg, EVD @ -5cm H2O, now draining 20cc/hr. 250 albumin and 500 NS boluses on dayshift, 1 L LR bolus  8/20: POD 13. ASHA. on VEEG, no seizures noted. Pending VPA level. 500 NS, 250 albumin. Febrile, pancultured. EEG d/c'ed.   8/21: BD14, POD14. ASHA overnight, EVD @ -5. s/p 1L bolus for euvolemia  8/22: BD #15: continued on levo, hypercoagulabitly profile pending, EVD @ -5, euvolemia, extubated, amantadine added, free water started for hypernatremia   8/23: BD 17, tmax 101F o/n. Gen Sx consulted for PEG - not a candidate at this time, pancultured for fever, 1L LR given for euvolemia. EVD at -5  8/24: BD18, ASHA o/n, neuro stable, EVD at -5, VPA level therapeutic, SBP goal 160-200, L IJ CVC attempted placement with carotid puncture, no pseudoaneurysm on US. MSSA growing in sputum. Ceftriaxone d/c'd and Ancef 2gq8 started. ID consulted. Recieved 1.5L LR and 500cc albumin.   8/25: POD5 last angio, BD19 ASHA o/n, neuro stable, EVD at -5  8/26: BD 20. ASHA o/n. EVD @ -5   8/27: BD 21, POD 20 Right vert takedown. ASHA overnight. Exam stable. EVD remains open at -5 and to be clamped at 0600 in preparation for right VPS placement today. Possible PEG placement Monday 8/30 8/28: BD22, POD22 Right vert takedown, POD1 R VPS, neuro exam stable. Tube feeds restarted, more lethargic this AM, improved during day, continue to monitor for hydrocephalus. AXR with dilated bowel, started on reglan and magnesium. Liquid BM, TFs held and rectal tube placed.   8/29: BD23, POD23 right vert takedown, POD2 R VPS, ASHA overnight, neuro stable. TFs decreased for colonic distention seen on XR, GI notified, nothing to do, TFs resumed at 20/hr and will continue will PEG placement tomorrow. CTH today demonstrated interval decrease in size of ventricles. Midodrine added to aid in weening levophed.   8/30: BD24, POD24, pending PEG placement this morning with Dr. Milner. Midodrine increased to 10mg q8hrs today to wean off of levo. 1u PRBC for Hb 7.5. FOB negative. 20mg lasix given. Repeat dopplers completed, unchanged from previous. Started on IV iron per hematology recs.  8/31: BD25, POD25, unsuccessful PEG placement by GI, trend Hgb w/ AM labs, slowly weening levophed. 5mg vitamin K given for elevated INR.  9/1: BD26, POD26. J tube w/ gen surg today. 5mg vitK IV given o/n for elevated INR. off levophed. amantadine decreased 100 BID and ritalin d/c'd.   9/2: BD27, POD27. s/p J tube w/ gen surg. still receiving meds/TF via J tube.  neuro stable  9/4: BD 28, POD28, s/p J tube w/ gen surg. Neuro stable CTH performed, changed shunt to Certas @ 3 from 4.   9/5: BD 29, POD29, s/p J tube w/ gen surg. VPS certas from 4 to 3 now. Required 1mg IV haldol overnight for agitation/delirium, patient not responding to sitter and attempting to get out of bed numerous times. Cont Eliquis 5 BID for b/l LE DVTs.  	  9/6: VPS Certas @3. BD30. ASHA overnight, neuro stable.   9/7: Certas@3, BD31. ASHA overnight, neuro stable.  9/8: Certas@3 BD 32. ASHA overnight, neuro stable.  9/9: Certas@3 BD 33, ASHA overnight, neuro stable  9/10. Certas@3 BD 34, ASHA overnight, neuro stable, pulled out PICC line, tolerating tube feeds via J tube pending rehab   9/11: certas@3, BD35. 2mg haldol given overnight for agitation. neuro stable. on TF via J tube w/ puree thin liquid diet  9/12: BD36. 0.5 ativan o/n for agitation. neuro stable.     Vital Signs Last 24 Hrs  T(C): 36.1 (11 Sep 2021 21:51), Max: 36.9 (11 Sep 2021 04:37)  T(F): 97 (11 Sep 2021 21:51), Max: 98.4 (11 Sep 2021 04:37)  HR: 100 (11 Sep 2021 22:55) (60 - 100)  BP: 109/76 (11 Sep 2021 22:55) (108/58 - 129/65)  BP(mean): 89 (11 Sep 2021 22:55) (80 - 93)  RR: 18 (11 Sep 2021 22:55) (18 - 18)  SpO2: 96% (11 Sep 2021 22:55) (95% - 98%)    I&O's Summary    10 Sep 2021 07:01  -  11 Sep 2021 07:00  --------------------------------------------------------  IN: 1140 mL / OUT: 1100 mL / NET: 40 mL    11 Sep 2021 07:01  -  12 Sep 2021 00:07  --------------------------------------------------------  IN: 0 mL / OUT: 1500 mL / NET: -1500 mL        PHYSICAL EXAM:  GEN: laying in bed, appears well, NAD  NEURO: AOx3 w/ choices. FC, OE spont, hypophonic, face symmetric. CNII-XII intact. PERRL, EOMI. No pronator drift. MAEx4. 5/5 strength throughout. SILT  CV: RRR +S1/S2  PULM: CTAB  GI: Abd soft, NT/ND  EXT: ext warm, dry, nontender  WOUND: crani site c/d/i.    TUBES/LINES:  [] Richey  [] Lumbar Drain  [] Wound Drains  [] Others      DIET:  [] NPO  [x] Mechanical  [x] Tube feeds    LABS:                        11.7   5.04  )-----------( 193      ( 11 Sep 2021 06:24 )             38.7     09-11    143  |  105  |  10  ----------------------------<  105<H>  3.6   |  27  |  0.50    Ca    9.8      11 Sep 2021 06:24  Phos  4.3     09-11  Mg     2.3     09-11              CAPILLARY BLOOD GLUCOSE          Drug Levels: [] N/A    CSF Analysis: [] N/A      Allergies    apple (Angioedema)  No Known Drug Allergies  strawberry (Angioedema)    Intolerances    lactose (Stomach Upset)    MEDICATIONS:  Antibiotics:    Neuro:  bromocriptine Capsule 5 milliGRAM(s) Oral every 8 hours  lacosamide Solution 100 milliGRAM(s) Oral two times a day  LORazepam   Injectable 0.5 milliGRAM(s) IV Push every 3 hours PRN  ondansetron Injectable 4 milliGRAM(s) IV Push every 6 hours PRN  QUEtiapine 50 milliGRAM(s) Oral every 24 hours  QUEtiapine 75 milliGRAM(s) Oral every 24 hours    Anticoagulation:  aspirin  chewable 81 milliGRAM(s) Oral daily  enoxaparin Injectable 100 milliGRAM(s) SubCutaneous every 12 hours    OTHER:  acetylcysteine 20%  Inhalation 4 milliLiter(s) Inhalation every 12 hours  albuterol/ipratropium for Nebulization 3 milliLiter(s) Nebulizer every 6 hours  BACItracin   Ointment 1 Application(s) Topical two times a day  bisacodyl Suppository 10 milliGRAM(s) Rectal daily PRN  chlorhexidine 2% Cloths 1 Application(s) Topical <User Schedule>  midodrine 2.5 milliGRAM(s) Oral <User Schedule>  pantoprazole   Suspension 40 milliGRAM(s) Oral daily  polyethylene glycol 3350 17 Gram(s) Oral daily  senna 2 Tablet(s) Oral at bedtime    IVF:  ergocalciferol 07839 Unit(s) Oral <User Schedule>  multivitamin/minerals/iron Oral Solution (CENTRUM) 15 milliLiter(s) Oral daily    CULTURES:    RADIOLOGY & ADDITIONAL TESTS:      ASSESSMENT:  44y/o F with h/o recent gastric sleeve (08/04/21 [facility], Dr. Crisostomo ), fibromyalgia, thyroid dysfunction, PTSD, depression, anxiety.  Presented with seizures at home - brought to [place], with 1 more episode of seizure en route.  Intubated, CT showed SAH with IV extension, casted IV, hydrocephalus, given mannitol, levetiracetam 1g,  6mg ativan. Started on Cardene drip for BP goal < 140 and transferred to [facility] NICU for further management. HH5 MF4, BD 1 = 8/7. Now s/p cerebral angiogram for R vertebral artery takedown for R vertebral dissecting aneurysm (8/7), and R frontal EVD placement (8/7), now s/p IVC filter placement (8/12,) s/p angio IA verapamil 8/16, 8/17, 8/18, 8/20. Now s/p right VPS certas at 3 (8/27).    HEAD BLEED    No pertinent family history in first degree relatives    Handoff    MEWS Score    Cerebral aneurysm    Cerebral artery vasospasm    SAH (subarachnoid hemorrhage)    Impaired swallowing    Cerebral aneurysm    DVT, lower extremity    Pulmonary embolism    Cerebral artery vasospasm    SAH (subarachnoid hemorrhage)    Impaired swallowing    Angiogram, carotid and cerebral arteries    SAH (subarachnoid hemorrhage)    Multiple subsegmental pulmonary emboli without acute cor pulmonale    DVT, lower extremity    Abnormality of lung on CXR    Heterozygous for prothrombin [id number] mutation    Anemia due to acute blood loss    SAH (subarachnoid hemorrhage)    Aneurysm    Delirium    Thrombocytopenic    Functional quadriplegia    Angiogram, carotid and cerebral arteries    IVC filter placement    Angiogram, carotid and cerebral, bilateral    Angiogram, carotid and cerebral, bilateral    Angiogram, carotid and cerebral, bilateral    Angiogram, carotid and cerebral, bilateral    Placement,  shunt, using frameless stereotaxy    Laparoscopic insertion of jejunostomy tube    IVC filter placement    Angiogram, carotid and cerebral, bilateral    Placement,  shunt, using frameless stereotaxy    Laparoscopic insertion of jejunostomy tube    Multiple subsegmental pulmonary emboli without acute cor pulmonale    DVT, lower extremity    Abnormality of lung on CXR    Heterozygous for prothrombin [id number] mutation    Anemia due to acute blood loss    [email or web address]        PLAN:  8/7: Tx from [place] for SAH. Intubated. R frontal EVD placed, central line and a line placed. 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   8/8: POD1 s/p angio with R vert takedown, extubated, desatting on aerosol mask, required extensive suctioning, 3% nebs, chest PT, started on low dose precedex drip for restlessness/agitation, ABG stable. NGT placed. TF started with goal 20 pending nutrition recs. 3% stopped for Na 150. Ceribell EEG negative and d/c'ed.   8/9 Overnight, new Right pronator drift and right gaze preference that can't cross midline, Repeat CTH demonstrated stable vents, CTA head and neck unremarkable for spasm, hypoattenuation of right cerebellum edema vs. infarct.  8/10: POD3 R vert takedown, EVD open at [id number]. ASHA overnight, neuro stable. CTH with increased hydro, CTA head/neck with mild basilar spasm, but concern for PE. 1/2 am D50 for hypoglycemia. 1L bolus then 100 cc/hr, PM rounds for net negative fluid balance and mild vasospasm on CTA.   8/11: POD4 R vert takedown. EVD at 5cm H2O, ASHA overnight. neuro stable.   Febrile 104. depakote increased to q6. NCHCT stable. EVD lowered to 0. Lasix 20 given for dieuresis, UOP over 2 liters. Sedation given for a-line placement, BP drop needing levo.  8/12: POD5 R vert takedown. EVD at 0cm H2O. ASHA overnight, neuro stable. Precedex being weaned off. Pending IVCF with vascular today.  8/13: POD6 R vert takedown. EVD open at 0cmH2O. ASHA overnight. Neuro exam stable. Mottled skin on the left lower extremity improving. Started on Bromocriptine 15mg Q8.   8/14: POD7. EVD open at 0. 1L bolus given for euvolemia o/n. neuro stable. CTH stable. ENT scoped vocal cords, +R voacl cord paresis, NTD. started on zosyn empirically.   8/15: POD8. EVD open at 0. ASHA o/n, neuro stable. Pt developed increased work of breathing, unable to clear her secretions, received racemic epi and multiple nebulizer treatments, still with stridor. Patient re-intubated at bedside, on full vent support.   8/16: CTH/CTA head/neck/CT chest performed o/n for worsened exam, R gaze preference, sluggish pupils. +worsened uncal herniation, hydro, vasospasm in b/l PCAs, L vert, basilar arteries. preop angio today, restarted on 3% for Na goal 145-150. Angio for vasospasm with IA verapamil.  8/17: POD10. Overnight Pt remains on levophed drip for SBP goal 180-220. Also remains on propofol drip. EVD open at -5cmH2O. ICPs WNL. Febrile 101F and pancultured. CTH today shows slightly smaller ventricles. Angio for vasospasm with IA verapamil.  8/18: POD11 R vert takedown. POD1 angio IA verpamil. Overnight, Pt noted to have downward gaze to the right and not following commands. Taken for stat head CT which is stable. Pupils also noted to be aniscoric left pupil 4mm and brisk and right 2mm brisk. Mannitol 50g given. Ceribell eeg placed for concern of subclinical seizures, so far appears negative for seizures. 1L NS o/n and 1.5L NS bolus in AM for euvolemia. vanc/zosyn stopped. 250cc 3% bolus and 250cc albumin given in AM. Brief seizure to L frontal lobe this AM on EEG, given 2g valproic acid and dose increased to 1g q8. Vimpat 100mg BID started.  Angio with interval improvment in vasospasm, IA verapamil given. In afternoon patient self-extubated, placed on NRB with mucomyst, 3% inhalation and duonebs. 3% at 50 d/c'd.  8/19: POD 12. Remains on VEEG. Axillary A line placed. Salt tabs d/c'd, florinef decreased to 0.1mg, EVD @ -5cm H2O, now draining 20cc/hr. 250 albumin and 500 NS boluses on dayshift, 1 L LR bolus  8/20: POD 13. ASHA. on VEEG, no seizures noted. Pending VPA level. 500 NS, 250 albumin. Febrile, pancultured. EEG d/c'ed.   8/21: BD14, POD14. ASHA overnight, EVD @ -5. s/p 1L bolus for euvolemia  8/22: BD #15: continued on levo, hypercoagulabitly profile pending, EVD @ -5, euvolemia, extubated, amantadine added, free water started for hypernatremia   8/23: BD 17, tmax 101F o/n. Gen Sx consulted for PEG - not a candidate at this time, pancultured for fever, 1L LR given for euvolemia. EVD at -5  8/24: BD18, ASHA o/n, neuro stable, EVD at -5, VPA level therapeutic, SBP goal 160-200, L IJ CVC attempted placement with carotid puncture, no pseudoaneurysm on US. MSSA growing in sputum. Ceftriaxone d/c'd and Ancef 2gq8 started. ID consulted. Recieved 1.5L LR and 500cc albumin.   8/25: POD5 last angio, BD19 ASHA o/n, neuro stable, EVD at -5  8/26: BD 20. ASHA o/n. EVD @ -5   8/27: BD 21, POD 20 Right vert takedown. ASHA overnight. Exam stable. EVD remains open at -5 and to be clamped at 0600 in preparation for right VPS placement today. Possible PEG placement Monday 8/30 8/28: BD22, POD22 Right vert takedown, POD1 R VPS, neuro exam stable. Tube feeds restarted, more lethargic this AM, improved during day, continue to monitor for hydrocephalus. AXR with dilated bowel, started on reglan and magnesium. Liquid BM, TFs held and rectal tube placed.   8/29: BD23, POD23 right vert takedown, POD2 R VPS, ASHA overnight, neuro stable. TFs decreased for colonic distention seen on XR, GI notified, nothing to do, TFs resumed at 20/hr and will continue will PEG placement tomorrow. CTH today demonstrated interval decrease in size of ventricles. Midodrine added to aid in weening levophed.   8/30: BD24, POD24, pending PEG placement this morning with Dr. Milner. Midodrine increased to 10mg q8hrs today to wean off of levo. 1u PRBC for Hb 7.5. FOB negative. 20mg lasix given. Repeat dopplers completed, unchanged from previous. Started on IV iron per hematology recs.  8/31: BD25, POD25, unsuccessful PEG placement by GI, trend Hgb w/ AM labs, slowly weening levophed. 5mg vitamin K given for elevated INR.  9/1: BD26, POD26. J tube w/ gen surg today. 5mg vitK IV given o/n for elevated INR. off levophed. amantadine decreased 100 BID and ritalin d/c'd.   9/2: BD27, POD27. s/p J tube w/ gen surg. still receiving meds/TF via J tube.  neuro stable  9/4: BD 28, POD28, s/p J tube w/ gen surg. Neuro stable CTH performed, changed shunt to Certas @ 3 from 4.   9/5: BD 29, POD29, s/p J tube w/ gen surg. VPS certas from 4 to 3 now. Required 1mg IV haldol overnight for agitation/delirium, patient not responding to sitter and attempting to get out of bed numerous times. Cont Eliquis 5 BID for b/l LE DVTs.  	  9/6: VPS Certas @3. BD30. ASHA overnight, neuro stable.   9/7: Certas@3, BD31. ASHA overnight, neuro stable.  9/8: Certas@3 BD 32. ASHA overnight, neuro stable.  9/9: Certas@3 BD 33, ASHA overnight, neuro stable  9/10. Certas@3 BD 34, ASHA overnight, neuro stable, pulled out PICC line, tolerating tube feeds via J tube pending rehab   9/11: certas@3, BD35. 2mg haldol given overnight for agitation. neuro stable. on TF via J tube w/ puree thin liquid diet  9/12: BD36. 0.5 ativan o/n for agitation. neuro stable.       Assessment:  Present when checked    []  GCS  E   V  M     Heart Failure: []Acute, [] acute on chronic , []chronic  Heart Failure:  [] Diastolic (HFpEF), [] Systolic (HFrEF), []Combined (HFpEF and HFrEF), [] RHF, [] Pulm HTN, [] Other    [] TRUDI, [] ATN, [] AIN, [] other  [] CKD1, [] CKD2, [] CKD 3, [] CKD 4, [] CKD 5, []ESRD    Encephalopathy: [] Metabolic, [] Hepatic, [] toxic, [] Neurological, [] Other    Abnormal Nurtitional Status: [] malnurtition (see nutrition note), [ ]underweight: BMI < 19, [] morbid obesity: BMI >40, [] Cachexia    [] Sepsis  [] hypovolemic shock,[] cardiogenic shock, [] hemorrhagic shock, [] neuogenic shock  [] Acute Respiratory Failure  []Cerebral edema, [] Brain compression/ herniation,   [] Functional quadriplegia  [] Acute blood loss anemia

## 2021-09-12 NOTE — PROGRESS NOTE ADULT - ASSESSMENT
44y/o F with h/o recent gastric sleeve (08/04/21 [facility], Dr. Crisostomo ), fibromyalgia, thyroid dysfunction, PTSD, depression, anxiety.  Presented with seizures at [facility] - [facility], with 1 more episode of seizure en route.  Intubated, CT showed SAH with IV extension, casted IV, hydrocephalus, given mannitol, levetiracetam 1g,  6mg ativan. Started on Cardene drip for BP goal < 140 and transferred to [facility] NICU for further management. HH5 MF4, BD 1 = 8/7. 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 s/p right VPS certas at 4 (8/27). Certas at 3, 9/5.

## 2021-09-12 NOTE — PROGRESS NOTE ADULT - ASSESSMENT
44y/o F with h/o recent gastric sleeve (08/04/21 [facility], Dr. Crisostomo ), fibromyalgia, thyroid dysfunction, PTSD, depression, anxiety.  Presented with seizures at home - brought to [place], with 1 more episode of seizure en route.  Intubated, CT showed SAH with IV extension, casted IV, hydrocephalus, given mannitol, levetiracetam 1g,  6mg ativan. Started on Cardene drip for BP goal < 140 and transferred to [facility] NICU for further management. HH5 MF4, BD 1 = 8/7. Now s/p cerebral angiogram for R vertebral artery takedown for R vertebral dissecting aneurysm (8/7), and R frontal EVD placement (8/7), now s/p IVC filter placement (8/12,) s/p angio IA verapamil 8/16, 8/17, 8/18, 8/20. Now s/p right VPS certas at 4 (8/27).

## 2021-09-13 NOTE — PROGRESS NOTE ADULT - ASSESSMENT
46y/o F with h/o recent gastric sleeve (08/04/21 [facility], Dr. Crisostomo ), fibromyalgia, thyroid dysfunction, PTSD, depression, anxiety.  Presented with seizures at home - brought to [place], with 1 more episode of seizure en route.  Intubated, CT showed SAH with IV extension, casted IV, hydrocephalus, given mannitol, levetiracetam 1g,  6mg ativan. Started on Cardene drip for BP goal < 140 and transferred to [facility] NICU for further management. HH5 MF4, BD 1 = 8/7. Now s/p cerebral angiogram for R vertebral artery takedown for R vertebral dissecting aneurysm (8/7), and R frontal EVD placement (8/7), now s/p IVC filter placement (8/12,) s/p angio IA verapamil 8/16, 8/17, 8/18, 8/20. Now s/p right VPS certas at 4 (8/27).

## 2021-09-13 NOTE — PROGRESS NOTE ADULT - SUBJECTIVE AND OBJECTIVE BOX
HPI:  46y/o F with h/o recent gastric sleeve (08/04/21 [facility], Dr. Crisostomo ), fibromyalgia, thyroid dysfunction, PTSD, depression, anxiety.  Presented with seizures at home - brought to [place], with 1 more episode of seizure en route.  Intubated, CT showed SAH with IV extension, casted IV, hydrocephalus, given mannitol, levetiracetam 1g,  6mg ativan. Started on Cardene drip for BP goal < 140 and transferred to [facility] for further management.     HH5 MF4   NIHSS 26 on arrival  BD 1 = 8/7 (07 Aug 2021 08:08)    8/7: Tx from [place] for SAH. Intubated. R frontal EVD placed, central line and a line placed. 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   8/8: POD1 s/p angio with R vert takedown, extubated, desatting on aerosol mask, required extensive suctioning, 3% nebs, chest PT, started on low dose precedex drip for restlessness/agitation, ABG stable. NGT placed. TF started with goal 20 pending nutrition recs. 3% stopped for Na 150. Ceribell EEG negative and d/c'ed.   8/9 Overnight, new Right pronator drift and right gaze preference that can't cross midline, Repeat CTH demonstrated stable vents, CTA head and neck unremarkable for spasm, hypoattenuation of right cerebellum edema vs. infarct.  8/10: POD3 R vert takedown, EVD open at 36ysC1B. ASHA overnight, neuro stable. CTH with increased hydro, CTA head/neck with mild basilar spasm, but concern for PE. 1/2 am D50 for hypoglycemia. 1L bolus then 100 cc/hr, PM rounds for net negative fluid balance and mild vasospasm on CTA.   8/11: POD4 R vert takedown. EVD at 5cm H2O, ASHA overnight. neuro stable.   Febrile 104. depakote increased to q6. NCHCT stable. EVD lowered to 0. Lasix 20 given for dieuresis, UOP over 2 liters. Sedation given for a-line placement, BP drop needing levo.  8/12: POD5 R vert takedown. EVD at 0cm H2O. ASHA overnight, neuro stable. Precedex being weaned off. Pending IVCF with vascular today.  8/13: POD6 R vert takedown. EVD open at 0cmH2O. ASHA overnight. Neuro exam stable. Mottled skin on the left lower extremity improving. Started on Bromocriptine 15mg Q8.   8/14: POD7. EVD open at 0. 1L bolus given for euvolemia o/n. neuro stable. CTH stable. ENT scoped vocal cords, +R voacl cord paresis, NTD. started on zosyn empirically.   8/15: POD8. EVD open at 0. ASHA o/n, neuro stable. Pt developed increased work of breathing, unable to clear her secretions, received racemic epi and multiple nebulizer treatments, still with stridor. Patient re-intubated at bedside, on full vent support.   8/16: CTH/CTA head/neck/CT chest performed o/n for worsened exam, R gaze preference, sluggish pupils. +worsened uncal herniation, hydro, vasospasm in b/l PCAs, L vert, basilar arteries. preop angio today, restarted on 3% for Na goal 145-150. Angio for vasospasm with IA verapamil.  8/17: POD10. Overnight Pt remains on levophed drip for SBP goal 180-220. Also remains on propofol drip. EVD open at -5cmH2O. ICPs WNL. Febrile 101F and pancultured. CTH today shows slightly smaller ventricles. Angio for vasospasm with IA verapamil.  8/18: POD11 R vert takedown. POD1 angio IA verpamil. Overnight, Pt noted to have downward gaze to the right and not following commands. Taken for stat head CT which is stable. Pupils also noted to be aniscoric left pupil 4mm and brisk and right 2mm brisk. Mannitol 50g given. Ceribell eeg placed for concern of subclinical seizures, so far appears negative for seizures. 1L NS o/n and 1.5L NS bolus in AM for euvolemia. vanc/zosyn stopped. 250cc 3% bolus and 250cc albumin given in AM. Brief seizure to L frontal lobe this AM on EEG, given 2g valproic acid and dose increased to 1g q8. Vimpat 100mg BID started.  Angio with interval improvment in vasospasm, IA verapamil given. In afternoon patient self-extubated, placed on NRB with mucomyst, 3% inhalation and duonebs. 3% at 50 d/c'd.  8/19: POD 12. Remains on VEEG. Axillary A line placed. Salt tabs d/c'd, florinef decreased to 0.1mg, EVD @ -5cm H2O, now draining 20cc/hr. 250 albumin and 500 NS boluses on dayshift, 1 L LR bolus  8/20: POD 13. ASHA. on VEEG, no seizures noted. Pending VPA level. 500 NS, 250 albumin. Febrile, pancultured. EEG d/c'ed.   8/21: BD14, POD14. ASHA overnight, EVD @ -5. s/p 1L bolus for euvolemia  8/22: BD #15: continued on levo, hypercoagulabitly profile pending, EVD @ -5, euvolemia, extubated, amantadine added, free water started for hypernatremia   8/23: BD 17, tmax 101F o/n. Gen Sx consulted for PEG - not a candidate at this time, pancultured for fever, 1L LR given for euvolemia. EVD at -5  8/24: BD18, ASHA o/n, neuro stable, EVD at -5, VPA level therapeutic, SBP goal 160-200, L IJ CVC attempted placement with carotid puncture, no pseudoaneurysm on US. MSSA growing in sputum. Ceftriaxone d/c'd and Ancef 2gq8 started. ID consulted. Recieved 1.5L LR and 500cc albumin.   8/25: POD5 last angio, BD19 ASHA o/n, neuro stable, EVD at -5  8/26: BD 20. ASHA o/n. EVD @ -5   8/27: BD 21, POD 20 Right vert takedown. ASHA overnight. Exam stable. EVD remains open at -5 and to be clamped at 0600 in preparation for right VPS placement today. Possible PEG placement Monday 8/30 8/28: BD22, POD22 Right vert takedown, POD1 R VPS, neuro exam stable. Tube feeds restarted, more lethargic this AM, improved during day, continue to monitor for hydrocephalus. AXR with dilated bowel, started on reglan and magnesium. Liquid BM, TFs held and rectal tube placed.   8/29: BD23, POD23 right vert takedown, POD2 R VPS, ASHA overnight, neuro stable. TFs decreased for colonic distention seen on XR, GI notified, nothing to do, TFs resumed at 20/hr and will continue will PEG placement tomorrow. CTH today demonstrated interval decrease in size of ventricles. Midodrine added to aid in weening levophed.   8/30: BD24, POD24, pending PEG placement this morning with Dr. Milner. Midodrine increased to 10mg q8hrs today to wean off of levo. 1u PRBC for Hb 7.5. FOB negative. 20mg lasix given. Repeat dopplers completed, unchanged from previous. Started on IV iron per hematology recs.  8/31: BD25, POD25, unsuccessful PEG placement by GI, trend Hgb w/ AM labs, slowly weening levophed. 5mg vitamin K given for elevated INR.  9/1: BD26, POD26. J tube w/ gen surg today. 5mg vitK IV given o/n for elevated INR. off levophed. amantadine decreased 100 BID and ritalin d/c'd.   9/2: BD27, POD27. s/p J tube w/ gen surg. still receiving meds/TF via J tube.  neuro stable  9/4: BD 28, POD28, s/p J tube w/ gen surg. Neuro stable CTH performed, changed shunt to Certas @ 3 from 4.   9/5: BD 29, POD29, s/p J tube w/ gen surg. VPS certas from 4 to 3 now. Required 1mg IV haldol overnight for agitation/delirium, patient not responding to sitter and attempting to get out of bed numerous times. Cont Eliquis 5 BID for b/l LE DVTs.  	  9/6: VPS Certas @3. BD30. ASHA overnight, neuro stable.   9/7: Certas@3, BD31. ASHA overnight, neuro stable.  9/8: Certas@3 BD 32. ASHA overnight, neuro stable.  9/9: Certas@3 BD 33, ASHA overnight, neuro stable  9/10. Certas@3 BD 34, ASHA overnight, neuro stable, pulled out PICC line, tolerating tube feeds via J tube pending rehab   9/11: certas@3, BD35. 2mg haldol given overnight for agitation. neuro stable. on TF via J tube w/ puree thin liquid diet  9/12: BD36. 1mg IV ativan o/n for agitation. neuro stable. 1mg PO ativan added for bedtime.  9/13: BD37, 1mg ativan PO + 0.5 iv for agitation, neuro stable       OVERNIGHT EVENTS:  Vital Signs Last 24 Hrs  T(C): 36.5 (12 Sep 2021 22:13), Max: 36.7 (12 Sep 2021 06:12)  T(F): 97.7 (12 Sep 2021 22:13), Max: 98 (12 Sep 2021 06:12)  HR: 92 (12 Sep 2021 23:13) (78 - 100)  BP: 120/66 (12 Sep 2021 23:13) (117/62 - 137/66)  BP(mean): 87 (12 Sep 2021 23:13) (78 - 94)  RR: 18 (12 Sep 2021 23:13) (17 - 18)  SpO2: 96% (12 Sep 2021 23:13) (95% - 98%)      I&O's Summary    11 Sep 2021 07:01  -  12 Sep 2021 07:00  --------------------------------------------------------  IN: 474 mL / OUT: 2400 mL / NET: -1926 mL    12 Sep 2021 07:01  -  13 Sep 2021 00:57  --------------------------------------------------------  IN: 506 mL / OUT: 700 mL / NET: -194 mL      PHYSICAL EXAM:  GEN: laying in bed, appears well, NAD  NEURO: AOx3 w/ choices. FC, OE spont, hypophonic, face symmetric. CNII-XII intact. PERRL, EOMI. No pronator drift. MAEx4. 5/5 strength throughout. SILT  CV: RRR +S1/S2  PULM: CTAB  GI: Abd soft, NT/ND  EXT: ext warm, dry, nontender  WOUND: crani site c/d/i.    LABS:                        11.9   5.21  )-----------( 231      ( 12 Sep 2021 06:37 )             39.8     09-12    143  |  106  |  12  ----------------------------<  100<H>  3.8   |  28  |  0.56    Ca    10.3      12 Sep 2021 06:37  Phos  4.4     09-12  Mg     2.2     09-12              CAPILLARY BLOOD GLUCOSE          Drug Levels: [] N/A    CSF Analysis: [] N/A      Allergies    apple (Angioedema)  No Known Drug Allergies  strawberry (Angioedema)    Intolerances    lactose (Stomach Upset)    MEDICATIONS:  Antibiotics:    Neuro:  bromocriptine Capsule 5 milliGRAM(s) Oral every 8 hours  lacosamide Solution 100 milliGRAM(s) Oral two times a day  LORazepam     Tablet 1 milliGRAM(s) Oral at bedtime  ondansetron Injectable 4 milliGRAM(s) IV Push every 6 hours PRN  QUEtiapine 50 milliGRAM(s) Oral every 24 hours  QUEtiapine 75 milliGRAM(s) Oral every 24 hours    Anticoagulation:  aspirin  chewable 81 milliGRAM(s) Oral daily  enoxaparin Injectable 100 milliGRAM(s) SubCutaneous every 12 hours    OTHER:  acetylcysteine 20%  Inhalation 4 milliLiter(s) Inhalation every 12 hours  albuterol/ipratropium for Nebulization 3 milliLiter(s) Nebulizer every 6 hours  BACItracin   Ointment 1 Application(s) Topical two times a day  bisacodyl Suppository 10 milliGRAM(s) Rectal daily PRN  chlorhexidine 2% Cloths 1 Application(s) Topical <User Schedule>  pantoprazole  Injectable 40 milliGRAM(s) IV Push daily  polyethylene glycol 3350 17 Gram(s) Oral daily  senna 2 Tablet(s) Oral at bedtime    IVF:  ergocalciferol 54313 Unit(s) Oral <User Schedule>  multivitamin/minerals/iron Oral Solution (CENTRUM) 15 milliLiter(s) Oral daily    CULTURES:    RADIOLOGY & ADDITIONAL TESTS:      46y/o F with h/o recent gastric sleeve (08/04/21 [facility], Dr. Crisostomo ), fibromyalgia, thyroid dysfunction, PTSD, depression, anxiety.  Presented with seizures at home - brought to [place], with 1 more episode of seizure en route.  Intubated, CT showed SAH with IV extension, casted IV, hydrocephalus, given mannitol, levetiracetam 1g,  6mg ativan. Started on Cardene drip for BP goal < 140 and transferred to [facility] for further management. HH5 MF4, BD 1 = 8/7. Now s/p cerebral angiogram for R vertebral artery takedown for R vertebral dissecting aneurysm (8/7), and R frontal EVD placement (8/7), now s/p IVC filter placement (8/12,) s/p angio IA verapamil 8/16, 8/17, 8/18, 8/20. Now s/p right VPS certas at 3 (8/27).    Plan   NEURO:   - neuro checks q4h/vitals q4   - Vimpat 100mg bid   - Bromocriptine 5mg Q8  - Angio demonstrating vasospasm of Right ICA, right PCOMM, Left distal vert and basilar confluence, and received IA verapamil on 8/16. 8/17. 8/18. 8/20.   - CTH 9/7 stable  - Seroquel 50/75 for agitation, ativan 1mg PO QHS,  ativan prn  - ASA 81 on 9/7 s/p R vert takedown     PULM:    - Secretions improved. standing duonebs, 3% nebs, mucomyst  - chest PT   - pulm following for PE, recs appreciated    CARDIO:    - -150  - TTE 8/12 WNL    - QTC 9/12: 433, f/u in AM   - midodrine 2.5 BID - last day 9/12    GI:    - TF via J tube w/ puree thin liquid diet  - PPI per bariatric surgeon, made IV - suspension clogged J tube  - bowel regimen held   - On Vit D and multivitamin s/p bariatric surgery    RENAL:   - normonatremia goal  - straight cath prn - retaining     HEM/ONC:   - ASA 81   - VTE Prophylaxis: SCDs,  BID (pradaxa when PO)   - dopplers 8/23, acute DVT left IM calf, persistent completely occlusive DVT b/l peroneal veins and right IM calf , 8/30 unchanged DVTs  - carotid doppler 8/24: neg for pseudoaneurysm, CTA shows soft tissue small hematoma, non-occlusive L IJ and L basillic vein thrombus   - hypercoagulable w/u: - prothrombin gene mutation - heterozygous   - 8/30 LUE doppler with findings of left IJ non-occlusive DVT and left basilic thrombus  - 9/6 dopplers: new L posterior tibial DVT: Eliquis d/c'ed switched to  BID    ID:   - Afebrile   - Ancef for MSSA pna coverage x 7 d completed 8/30  - Ancef x 3 d for R scalp incision erythema complete 9/5  - PICC line placed 8/26, patient removed 9/9  - COVID negative 9/12    ENDO:    - glucose goal 140 -180    DISPO:  Assessment and plan discussed with Dr. Lomax

## 2021-09-13 NOTE — PROGRESS NOTE ADULT - SUBJECTIVE AND OBJECTIVE BOX
Interval Events: Reviewed  Patient seen and examined at bedside.    Patient is a 45y old  Female who presents with a chief complaint of SAH (13 Sep 2021 09:23)    she is doing OK  PAST MEDICAL & SURGICAL HISTORY:      MEDICATIONS:  Pulmonary:  acetylcysteine 20%  Inhalation 4 milliLiter(s) Inhalation every 12 hours  albuterol/ipratropium for Nebulization 3 milliLiter(s) Nebulizer every 6 hours    Antimicrobials:    Anticoagulants:  aspirin  chewable 81 milliGRAM(s) Oral daily  enoxaparin Injectable 100 milliGRAM(s) SubCutaneous every 12 hours    Cardiac:      Allergies    apple (Angioedema)  No Known Drug Allergies  strawberry (Angioedema)    Intolerances    lactose (Stomach Upset)      Vital Signs Last 24 Hrs  T(C): 36.9 (13 Sep 2021 10:03), Max: 36.9 (13 Sep 2021 10:03)  T(F): 98.4 (13 Sep 2021 10:03), Max: 98.4 (13 Sep 2021 10:03)  HR: 90 (13 Sep 2021 08:49) (82 - 92)  BP: 117/71 (13 Sep 2021 08:49) (100/57 - 122/77)  BP(mean): 86 (13 Sep 2021 08:49) (73 - 92)  RR: 18 (13 Sep 2021 08:49) (18 - 18)  SpO2: 96% (13 Sep 2021 08:49) (95% - 96%)    09-12 @ 07:01  -  09-13 @ 07:00  --------------------------------------------------------  IN: 828 mL / OUT: 1100 mL / NET: -272 mL          Review of Systems:   •	General: negative  •	Skin/Breast: negative  •	Ophthalmologic: negative  •	ENMT: negative  •	Respiratory and Thorax: negative  •	Cardiovascular: negative  •	Gastrointestinal: negative  •	Genitourinary: negative  •	Musculoskeletal: negative  •	Neurological: negative  •	Psychiatric: negative  •	Hematology/Lymphatics: negative  •	Endocrine: negative  •	Allergic/Immunologic: negative    Physical Exam:   • Constitutional:	refer to the dietion /Nutritionist note  • Eyes:	EOMI; PERRL; no drainage or redness  • ENMT:	No oral lesions; no gross abnormalities  • Neck	No bruits; no thyromegaly or nodules  • Breasts:	not examined  • Back:	No deformity or limitation of movement  • Respiratory:	Breath Sounds equal & clear to percussion & auscultation, no accessory muscle use  • Cardiovascular:	Regular rate & rhythm, normal S1, S2; no murmurs, gallops or rubs; no S3, S4  • Gastrointestinal:	Soft, non-tender, no hepatosplenomegaly, normal bowel sounds  • Genitourinary:	not examined  • Rectal: not examined  • Extremities:	No cyanosis, clubbing or edema  • Vascular:	Equal and normal pulses (carotid, femoral, dorsalis pedis)  • Neurologica:l	not examined  • Skin:	No lesions; no rash  • Lymph Nodes:	No lymphadedenopathy  • Musculoskeletal:	No joint pain, swelling or deformity; no limitation of movement        LABS:      CBC Full  -  ( 13 Sep 2021 07:24 )  WBC Count : 4.56 K/uL  RBC Count : 4.21 M/uL  Hemoglobin : 11.7 g/dL  Hematocrit : 39.4 %  Platelet Count - Automated : 248 K/uL  Mean Cell Volume : 93.6 fl  Mean Cell Hemoglobin : 27.8 pg  Mean Cell Hemoglobin Concentration : 29.7 gm/dL  Auto Neutrophil # : x  Auto Lymphocyte # : x  Auto Monocyte # : x  Auto Eosinophil # : x  Auto Basophil # : x  Auto Neutrophil % : x  Auto Lymphocyte % : x  Auto Monocyte % : x  Auto Eosinophil % : x  Auto Basophil % : x    09-13    142  |  103  |  16  ----------------------------<  111<H>  3.7   |  27  |  0.62    Ca    9.6      13 Sep 2021 07:24  Phos  4.9     09-13  Mg     2.2     09-13                          RADIOLOGY & ADDITIONAL STUDIES (The following images were personally reviewed):

## 2021-09-13 NOTE — PROGRESS NOTE ADULT - ASSESSMENT
46y/o F with h/o recent gastric sleeve (08/04/21 [facility], Dr. Crisostomo ), fibromyalgia, thyroid dysfunction, PTSD, depression, anxiety.  Presented with seizures at home - brought to [place], with 1 more episode of seizure en route.  Intubated, CT showed SAH with IV extension, casted IV, hydrocephalus, given mannitol, levetiracetam 1g,  6mg ativan. Started on Cardene drip for BP goal < 140 and transferred to [facility] for further management. HH5 MF4, BD 1 = 8/7. 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 s/p right VPS certas at 4 (8/27). Certas at 3, 9/5.

## 2021-09-13 NOTE — PROGRESS NOTE ADULT - SUBJECTIVE AND OBJECTIVE BOX
Patient is a 45y old  Female who presents with a chief complaint of SAH (04 Sep 2021 07:05)      HPI:  44y/o F with h/o recent gastric sleeve (08/04/21 [facility], Dr. Crisostomo ), fibromyalgia, thyroid dysfunction, PTSD, depression, anxiety.  Presented with seizures at home - brought to [place], with 1 more episode of seizure en route.  Intubated, CT showed SAH with IV extension, casted IV, hydrocephalus, given mannitol, levetiracetam 1g,  6mg ativan. Started on Cardene drip for BP goal < 140 and transferred to [facility] for further management.     HH5 MF4   NIHSS 26 on arrival  BD 1 = 8/7 (07 Aug 2021 08:08)    O/N and interval events: Pt started on ativan overnight for delirium    Subjective:   Pt denies pain. Unable to elicit much history given patient's clinical status.     Allergies    apple (Angioedema)  No Known Drug Allergies  strawberry (Angioedema)    Intolerances    lactose (Stomach Upset)        MEDICATIONS  (STANDING):  acetylcysteine 20%  Inhalation 4 milliLiter(s) Inhalation every 12 hours  albuterol/ipratropium for Nebulization 3 milliLiter(s) Nebulizer every 6 hours  aspirin  chewable 81 milliGRAM(s) Oral daily  BACItracin   Ointment 1 Application(s) Topical two times a day  bromocriptine Capsule 5 milliGRAM(s) Oral every 8 hours  chlorhexidine 2% Cloths 1 Application(s) Topical <User Schedule>  enoxaparin Injectable 100 milliGRAM(s) SubCutaneous every 12 hours  ergocalciferol 04000 Unit(s) Oral <User Schedule>  lacosamide Solution 100 milliGRAM(s) Oral two times a day  LORazepam     Tablet 1 milliGRAM(s) Oral at bedtime  multivitamin/minerals/iron Oral Solution (CENTRUM) 15 milliLiter(s) Oral daily  pantoprazole  Injectable 40 milliGRAM(s) IV Push daily  polyethylene glycol 3350 17 Gram(s) Oral daily  QUEtiapine 50 milliGRAM(s) Oral every 24 hours  QUEtiapine 75 milliGRAM(s) Oral every 24 hours  senna 2 Tablet(s) Oral at bedtime    MEDICATIONS  (PRN):  bisacodyl Suppository 10 milliGRAM(s) Rectal daily PRN Constipation  ondansetron Injectable 4 milliGRAM(s) IV Push every 6 hours PRN Nausea and/or Vomiting  sodium chloride 0.9% lock flush 10 milliLiter(s) IV Push every 1 hour PRN Pre/post blood products, medications, blood draw, and to maintain line patency            Drug Dosing Weight  Height (cm): 162.6 (01 Sep 2021 11:40)  Weight (kg): 98.2 (01 Sep 2021 11:40)  BMI (kg/m2): 37.1 (01 Sep 2021 11:40)  BSA (m2): 2.02 (01 Sep 2021 11:40)    PAST MEDICAL & SURGICAL HISTORY:      FAMILY HISTORY:  No pertinent family history in first degree relatives        SOCIAL HISTORY: no smoking reported      Vital Signs Last 24 Hrs  T(C): 36.7 (13 Sep 2021 04:42), Max: 36.7 (12 Sep 2021 09:43)  T(F): 98.1 (13 Sep 2021 04:42), Max: 98.1 (13 Sep 2021 04:42)  HR: 90 (13 Sep 2021 08:49) (78 - 92)  BP: 117/71 (13 Sep 2021 08:49) (100/57 - 122/77)  BP(mean): 86 (13 Sep 2021 08:49) (73 - 92)  RR: 18 (13 Sep 2021 08:49) (18 - 18)  SpO2: 96% (13 Sep 2021 08:49) (95% - 96%)      PHYSICAL EXAM:      Constitutional: NAD  Eyes: PERRLA  ENMT: thrush  Neck: supple  Back: midline  Respiratory: CTA b/l  Cardiovascular: rrr, s1s2, no m/r/g  Gastrointestinal: soft, NTND, + J tube  Extremities: wwp  Vascular: + 2 pules radial  Neurological: AAO x 2, minimally following commands  Skin: no rash  Lymph Nodes: no LAD  Musculoskeletal: no joint swelling  Psychiatric: flat affect        LABS:                          11.7   4.56  )-----------( 248      ( 13 Sep 2021 07:24 )             39.4   09-13    142  |  103  |  16  ----------------------------<  111<H>  3.7   |  27  |  0.62    Ca    9.6      13 Sep 2021 07:24  Phos  4.9     09-13  Mg     2.2     09-13              EKG:    ECHO, US:    < from: TTE Limited Echo w/o Cont (08.12.21 @ 15:22) >  CONCLUSIONS:     1. Limited study obtained for evaluation of right ventricular function.   2. Probably normal left ventricular systolic function.   3. Normal right ventricular size and systolic function.   4. There was insufficient tricuspid regurgitation detected from which to calculate pulmonary artery systolic pressure.   5. No pericardial effusion.    < end of copied text >      RADIOLOGY:  < from: CT Head No Cont (08.29.21 @ 17:45) >  IMPRESSION:    With ventricular shunt in place, there is now decreased ventricular size since 08/27/2021.    < end of copied text >    < from: CT Head No Cont (09.04.21 @ 11:29) >    IMPRESSION:    No acute intracranial hemorrhage or mass effect compared to prior imaging from 08/29/2021, now with the patient placed on anticoagulation therapy.    Stable ventricular size with  shunt in place, mild hydrocephalus persists.    A couple small and subacute appearing infarcts are now visible in the right cerebellum.    --- End of Report ---    < end of copied text >

## 2021-09-14 NOTE — PROGRESS NOTE ADULT - SUBJECTIVE AND OBJECTIVE BOX
HPI:  44y/o F with h/o recent gastric sleeve (08/04/21 [facility], Dr. Crisostomo ), fibromyalgia, thyroid dysfunction, PTSD, depression, anxiety.  Presented with seizures at home - brought to [place], with 1 more episode of seizure en route.  Intubated, CT showed SAH with IV extension, casted IV, hydrocephalus, given mannitol, levetiracetam 1g,  6mg ativan. Started on Cardene drip for BP goal < 140 and transferred to [facility] NICU for further management.     HH5 MF4   NIHSS 26 on arrival  BD 1 = 8/7 (07 Aug 2021 08:08)  8/7: Tx from [place] for SAH. Intubated. R frontal EVD placed, central line and a line placed. 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   8/8: POD1 s/p angio with R vert takedown, extubated, desatting on aerosol mask, required extensive suctioning, 3% nebs, chest PT, started on low dose precedex drip for restlessness/agitation, ABG stable. NGT placed. TF started with goal 20 pending nutrition recs. 3% stopped for Na 150. Ceribell EEG negative and d/c'ed.   8/9 Overnight, new Right pronator drift and right gaze preference that can't cross midline, Repeat CTH demonstrated stable vents, CTA head and neck unremarkable for spasm, hypoattenuation of right cerebellum edema vs. infarct.  8/10: POD3 R vert takedown, EVD open at [id number]. ASHA overnight, neuro stable. CTH with increased hydro, CTA head/neck with mild basilar spasm, but concern for PE. 1/2 am D50 for hypoglycemia. 1L bolus then 100 cc/hr, PM rounds for net negative fluid balance and mild vasospasm on CTA.   8/11: POD4 R vert takedown. EVD at 5cm H2O, ASHA overnight. neuro stable.   Febrile 104. depakote increased to q6. NCHCT stable. EVD lowered to 0. Lasix 20 given for dieuresis, UOP over 2 liters. Sedation given for a-line placement, BP drop needing levo.  8/12: POD5 R vert takedown. EVD at 0cm H2O. SAHA overnight, neuro stable. Precedex being weaned off. Pending IVCF with vascular today.  8/13: POD6 R vert takedown. EVD open at 0cmH2O. ASHA overnight. Neuro exam stable. Mottled skin on the left lower extremity improving. Started on Bromocriptine 15mg Q8.   8/14: POD7. EVD open at 0. 1L bolus given for euvolemia o/n. neuro stable. CTH stable. ENT scoped vocal cords, +R voacl cord paresis, NTD. started on zosyn empirically.   8/15: POD8. EVD open at 0. ASHA o/n, neuro stable. Pt developed increased work of breathing, unable to clear her secretions, received racemic epi and multiple nebulizer treatments, still with stridor. Patient re-intubated at bedside, on full vent support.   8/16: CTH/CTA head/neck/CT chest performed o/n for worsened exam, R gaze preference, sluggish pupils. +worsened uncal herniation, hydro, vasospasm in b/l PCAs, L vert, basilar arteries. preop angio today, restarted on 3% for Na goal 145-150. Angio for vasospasm with IA verapamil.  8/17: POD10. Overnight Pt remains on levophed drip for SBP goal 180-220. Also remains on propofol drip. EVD open at -5cmH2O. ICPs WNL. Febrile 101F and pancultured. CTH today shows slightly smaller ventricles. Angio for vasospasm with IA verapamil.  8/18: POD11 R vert takedown. POD1 angio IA verpamil. Overnight, Pt noted to have downward gaze to the right and not following commands. Taken for stat head CT which is stable. Pupils also noted to be aniscoric left pupil 4mm and brisk and right 2mm brisk. Mannitol 50g given. Ceribell eeg placed for concern of subclinical seizures, so far appears negative for seizures. 1L NS o/n and 1.5L NS bolus in AM for euvolemia. vanc/zosyn stopped. 250cc 3% bolus and 250cc albumin given in AM. Brief seizure to L frontal lobe this AM on EEG, given 2g valproic acid and dose increased to 1g q8. Vimpat 100mg BID started.  Angio with interval improvment in vasospasm, IA verapamil given. In afternoon patient self-extubated, placed on NRB with mucomyst, 3% inhalation and duonebs. 3% at 50 d/c'd.  8/19: POD 12. Remains on VEEG. Axillary A line placed. Salt tabs d/c'd, florinef decreased to 0.1mg, EVD @ -5cm H2O, now draining 20cc/hr. 250 albumin and 500 NS boluses on dayshift, 1 L LR bolus  8/20: POD 13. ASHA. on VEEG, no seizures noted. Pending VPA level. 500 NS, 250 albumin. Febrile, pancultured. EEG d/c'ed.   8/21: BD14, POD14. ASHA overnight, EVD @ -5. s/p 1L bolus for euvolemia  8/22: BD #15: continued on levo, hypercoagulabitly profile pending, EVD @ -5, euvolemia, extubated, amantadine added, free water started for hypernatremia   8/23: BD 17, tmax 101F o/n. Gen Sx consulted for PEG - not a candidate at this time, pancultured for fever, 1L LR given for euvolemia. EVD at -5  8/24: BD18, ASHA o/n, neuro stable, EVD at -5, VPA level therapeutic, SBP goal 160-200, L IJ CVC attempted placement with carotid puncture, no pseudoaneurysm on US. MSSA growing in sputum. Ceftriaxone d/c'd and Ancef 2gq8 started. ID consulted. Recieved 1.5L LR and 500cc albumin.   8/25: POD5 last angio, BD19 ASHA o/n, neuro stable, EVD at -5  8/26: BD 20. ASHA o/n. EVD @ -5   8/27: BD 21, POD 20 Right vert takedown. ASHA overnight. Exam stable. EVD remains open at -5 and to be clamped at 0600 in preparation for right VPS placement today. Possible PEG placement Monday 8/30 8/28: BD22, POD22 Right vert takedown, POD1 R VPS, neuro exam stable. Tube feeds restarted, more lethargic this AM, improved during day, continue to monitor for hydrocephalus. AXR with dilated bowel, started on reglan and magnesium. Liquid BM, TFs held and rectal tube placed.   8/29: BD23, POD23 right vert takedown, POD2 R VPS, ASHA overnight, neuro stable. TFs decreased for colonic distention seen on XR, GI notified, nothing to do, TFs resumed at 20/hr and will continue will PEG placement tomorrow. CTH today demonstrated interval decrease in size of ventricles. Midodrine added to aid in weening levophed.   8/30: BD24, POD24, pending PEG placement this morning with Dr. Milner. Midodrine increased to 10mg q8hrs today to wean off of levo. 1u PRBC for Hb 7.5. FOB negative. 20mg lasix given. Repeat dopplers completed, unchanged from previous. Started on IV iron per hematology recs.  8/31: BD25, POD25, unsuccessful PEG placement by GI, trend Hgb w/ AM labs, slowly weening levophed. 5mg vitamin K given for elevated INR.  9/1: BD26, POD26. J tube w/ gen surg today. 5mg vitK IV given o/n for elevated INR. off levophed. amantadine decreased 100 BID and ritalin d/c'd.   9/2: BD27, POD27. s/p J tube w/ gen surg. still receiving meds/TF via J tube.  neuro stable  9/4: BD 28, POD28, s/p J tube w/ gen surg. Neuro stable CTH performed, changed shunt to Certas @ 3 from 4.   9/5: BD 29, POD29, s/p J tube w/ gen surg. VPS certas from 4 to 3 now. Required 1mg IV haldol overnight for agitation/delirium, patient not responding to sitter and attempting to get out of bed numerous times. Cont Eliquis 5 BID for b/l LE DVTs.  	  9/6: VPS Certas @3. BD30. ASHA overnight, neuro stable.   9/7: Certas@3, BD31. ASHA overnight, neuro stable.  9/8: Certas@3 BD 32. ASHA overnight, neuro stable.  9/9: Certas@3 BD 33, ASHA overnight, neuro stable  9/10. Certas@3 BD 34, ASHA overnight, neuro stable, pulled out PICC line, tolerating tube feeds via J tube pending rehab   9/11: certas@3, BD35. 2mg haldol given overnight for agitation. neuro stable. on TF via J tube w/ puree thin liquid diet  9/12: BD36. 1mg IV ativan o/n for agitation. neuro stable. 1mg PO ativan added for bedtime.  9/13: BD37, 1mg ativan PO + 0.5 iv for agitation, neuro stable , pending discharge  9/14: BD 38, seroquel PO for agitation, neuro stable pending discharge       OVERNIGHT EVENTS: agitated, got additional seroquel x 1   Vital Signs Last 24 Hrs  T(C): 36.8 (13 Sep 2021 21:14), Max: 37 (13 Sep 2021 14:10)  T(F): 98.2 (13 Sep 2021 21:14), Max: 98.6 (13 Sep 2021 14:10)  HR: 94 (14 Sep 2021 01:10) (82 - 94)  BP: 109/67 (14 Sep 2021 01:10) (100/57 - 130/74)  BP(mean): 82 (14 Sep 2021 01:10) (73 - 93)  RR: 18 (14 Sep 2021 01:10) (18 - 18)  SpO2: 96% (14 Sep 2021 01:10) (96% - 96%)    I&O's Summary    12 Sep 2021 07:01  -  13 Sep 2021 07:00  --------------------------------------------------------  IN: 828 mL / OUT: 1100 mL / NET: -272 mL    13 Sep 2021 07:01  -  14 Sep 2021 03:31  --------------------------------------------------------  IN: 0 mL / OUT: 1 mL / NET: -1 mL        PHYSICAL EXAM:  GEN: laying in bed, appears well, NAD  NEURO: AOx3 w/ choices. FC, OE spont, hypophonic, face symmetric. CNII-XII intact. PERRL, EOMI. No pronator drift. MAEx4. 5/5 strength throughout. SILT  CV: RRR +S1/S2  PULM: CTAB  GI: Abd soft, NT/ND  EXT: ext warm, dry, nontender  WOUND: crani site c/d/i.    Labs:                         11.7   4.56  )-----------( 248      ( 13 Sep 2021 07:24 )             39.4     09-13    142  |  103  |  16  ----------------------------<  111<H>  3.7   |  27  |  0.62    Ca    9.6      13 Sep 2021 07:24  Phos  4.9     09-13  Mg     2.2     09-13              CAPILLARY BLOOD GLUCOSE          Drug Levels: [] N/A    CSF Analysis: [] N/A      Allergies    apple (Angioedema)  No Known Drug Allergies  strawberry (Angioedema)    Intolerances    lactose (Stomach Upset)    MEDICATIONS:  Antibiotics:    Neuro:  bromocriptine Capsule 5 milliGRAM(s) Oral every 8 hours  lacosamide Solution 100 milliGRAM(s) Oral two times a day  ondansetron Injectable 4 milliGRAM(s) IV Push every 6 hours PRN  QUEtiapine 50 milliGRAM(s) Oral every 24 hours  QUEtiapine 75 milliGRAM(s) Oral every 24 hours  QUEtiapine 25 milliGRAM(s) Oral once    Anticoagulation:  aspirin  chewable 81 milliGRAM(s) Oral daily  enoxaparin Injectable 100 milliGRAM(s) SubCutaneous every 12 hours    OTHER:  acetylcysteine 20%  Inhalation 4 milliLiter(s) Inhalation every 12 hours  albuterol/ipratropium for Nebulization 3 milliLiter(s) Nebulizer every 6 hours  BACItracin   Ointment 1 Application(s) Topical two times a day  bisacodyl Suppository 10 milliGRAM(s) Rectal daily PRN  chlorhexidine 2% Cloths 1 Application(s) Topical <User Schedule>  pantoprazole  Injectable 40 milliGRAM(s) IV Push daily  polyethylene glycol 3350 17 Gram(s) Oral daily  senna 2 Tablet(s) Oral at bedtime    IVF:  ergocalciferol 83379 Unit(s) Oral <User Schedule>  multivitamin/minerals/iron Oral Solution (CENTRUM) 15 milliLiter(s) Oral daily    CULTURES:    RADIOLOGY & ADDITIONAL TESTS:    44y/o F with h/o recent gastric sleeve (08/04/21 [facility], Dr. Crisostomo ), fibromyalgia, thyroid dysfunction, PTSD, depression, anxiety.  Presented with seizures at home - brought to [place], with 1 more episode of seizure en route.  Intubated, CT showed SAH with IV extension, casted IV, hydrocephalus, given mannitol, levetiracetam 1g,  6mg ativan. Started on Cardene drip for BP goal < 140 and transferred to [facility] NICU for further management. HH5 MF4, BD 1 = 8/7. Now s/p cerebral angiogram for R vertebral artery takedown for R vertebral dissecting aneurysm (8/7), and R frontal EVD placement (8/7), now s/p IVC filter placement (8/12,) s/p angio IA verapamil 8/16, 8/17, 8/18, 8/20. Now s/p right VPS certas at 3 (8/27).    Plan   NEURO:   - neuro checks q4h/vitals q4   - Vimpat 100mg bid   - Bromocriptine 5mg Q8  - Angio demonstrating vasospasm of Right ICA, right PCOMM, Left distal vert and basilar confluence, and received IA verapamil on 8/16. 8/17. 8/18. 8/20.   - CTH 9/7 stable  - Seroquel 50/75 for agitation, ativan 1mg PO QHS,  ativan prn  - ASA 81 on 9/7 s/p R vert takedown     PULM:    - Secretions improved. standing duonebs, 3% nebs, mucomyst  - chest PT   - pulm following for PE, recs appreciated    CARDIO:    - -150  - TTE 8/12 WNL    - QTC 9/12: 433, f/u in AM     GI:    - TF via J tube w/ puree thin liquid diet  - PPI per bariatric surgeon, made IV - suspension clogged J tube  - bowel regimen held   - On Vit D and multivitamin s/p bariatric surgery    RENAL:   - normonatremia goal  - straight cath prn - retaining     HEM/ONC:   - ASA 81   - VTE Prophylaxis: SCDs,  BID (pradaxa when PO)   - dopplers 8/23, acute DVT left IM calf, persistent completely occlusive DVT b/l peroneal veins and right IM calf , 8/30 unchanged DVTs  - carotid doppler 8/24: neg for pseudoaneurysm, CTA shows soft tissue small hematoma, non-occlusive L IJ and L basillic vein thrombus   - hypercoagulable w/u: - prothrombin gene mutation - heterozygous   - 8/30 LUE doppler with findings of left IJ non-occlusive DVT and left basilic thrombus  - 9/6 dopplers: new L posterior tibial DVT: Eliquis d/c'ed switched to  BID  - Repeat weekly dopplers at rehab     ID:   - Afebrile   - Ancef for MSSA pna coverage x 7 d completed 8/30  - Ancef x 3 d for R scalp incision erythema complete 9/5  - PICC line placed 8/26, patient removed 9/9  - COVID negative 9/12    ENDO:    - glucose goal 140 -180    DISPO:  Assessment and plan discussed with Dr. Lomax

## 2021-09-14 NOTE — PROGRESS NOTE ADULT - SUBJECTIVE AND OBJECTIVE BOX
Patient is a 45y old  Female who presents with a chief complaint of SAH (04 Sep 2021 07:05)      HPI:  46y/o F with h/o recent gastric sleeve (08/04/21 [facility], Dr. Crisostomo ), fibromyalgia, thyroid dysfunction, PTSD, depression, anxiety.  Presented with seizures at home - brought to [place], with 1 more episode of seizure en route.  Intubated, CT showed SAH with IV extension, casted IV, hydrocephalus, given mannitol, levetiracetam 1g,  6mg ativan. Started on Cardene drip for BP goal < 140 and transferred to [facility] NICU for further management.     HH5 MF4   NIHSS 26 on arrival  BD 1 = 8/7 (07 Aug 2021 08:08)    O/N and interval events: Pt given Seroquel 25 x 1    Subjective:   Pt denies pain. Unable to elicit much history given patient's clinical status.     Allergies    apple (Angioedema)  No Known Drug Allergies  strawberry (Angioedema)    Intolerances    lactose (Stomach Upset)        MEDICATIONS  (STANDING):  acetylcysteine 20%  Inhalation 4 milliLiter(s) Inhalation every 12 hours  albuterol/ipratropium for Nebulization 3 milliLiter(s) Nebulizer every 6 hours  aspirin  chewable 81 milliGRAM(s) Oral daily  BACItracin   Ointment 1 Application(s) Topical two times a day  bromocriptine Capsule 5 milliGRAM(s) Oral every 8 hours  chlorhexidine 2% Cloths 1 Application(s) Topical <User Schedule>  enoxaparin Injectable 100 milliGRAM(s) SubCutaneous every 12 hours  ergocalciferol 28955 Unit(s) Oral <User Schedule>  lacosamide Solution 100 milliGRAM(s) Oral two times a day  multivitamin/minerals/iron Oral Solution (CENTRUM) 15 milliLiter(s) Oral daily  pantoprazole  Injectable 40 milliGRAM(s) IV Push daily  polyethylene glycol 3350 17 Gram(s) Oral daily  potassium chloride   Solution 40 milliEquivalent(s) Oral once  QUEtiapine 50 milliGRAM(s) Oral every 24 hours  QUEtiapine 75 milliGRAM(s) Oral every 24 hours  senna 2 Tablet(s) Oral at bedtime    MEDICATIONS  (PRN):  bisacodyl Suppository 10 milliGRAM(s) Rectal daily PRN Constipation  ondansetron Injectable 4 milliGRAM(s) IV Push every 6 hours PRN Nausea and/or Vomiting  sodium chloride 0.9% lock flush 10 milliLiter(s) IV Push every 1 hour PRN Pre/post blood products, medications, blood draw, and to maintain line patency            Drug Dosing Weight  Height (cm): 162.6 (01 Sep 2021 11:40)  Weight (kg): 98.2 (01 Sep 2021 11:40)  BMI (kg/m2): 37.1 (01 Sep 2021 11:40)  BSA (m2): 2.02 (01 Sep 2021 11:40)    PAST MEDICAL & SURGICAL HISTORY:      FAMILY HISTORY:  No pertinent family history in first degree relatives        SOCIAL HISTORY: no smoking reported      Vital Signs Last 24 Hrs  T(C): 36.4 (14 Sep 2021 04:49), Max: 37 (13 Sep 2021 14:10)  T(F): 97.5 (14 Sep 2021 04:49), Max: 98.6 (13 Sep 2021 14:10)  HR: 98 (14 Sep 2021 03:27) (94 - 98)  BP: 112/55 (14 Sep 2021 03:27) (109/67 - 130/74)  BP(mean): 77 (14 Sep 2021 03:27) (77 - 93)  RR: 18 (14 Sep 2021 03:27) (18 - 18)  SpO2: 96% (14 Sep 2021 03:27) (96% - 96%)      PHYSICAL EXAM:      Constitutional: NAD  Eyes: PERRLA  ENMT: thrush  Neck: supple  Back: midline  Respiratory: CTA b/l  Cardiovascular: rrr, s1s2, no m/r/g  Gastrointestinal: soft, NTND, + J tube  Extremities: wwp  Vascular: + 2 pules radial  Neurological: AAO x 2, minimally following commands  Skin: no rash  Lymph Nodes: no LAD  Musculoskeletal: no joint swelling  Psychiatric: flat affect        LABS:                          12.5   5.66  )-----------( 284      ( 14 Sep 2021 06:54 )             41.2   09-14    141  |  103  |  12  ----------------------------<  105<H>  3.9   |  28  |  0.63    Ca    10.1      14 Sep 2021 06:54  Phos  3.8     09-14  Mg     2.1     09-14                EKG:    ECHO, US:    < from: TTE Limited Echo w/o Cont (08.12.21 @ 15:22) >  CONCLUSIONS:     1. Limited study obtained for evaluation of right ventricular function.   2. Probably normal left ventricular systolic function.   3. Normal right ventricular size and systolic function.   4. There was insufficient tricuspid regurgitation detected from which to calculate pulmonary artery systolic pressure.   5. No pericardial effusion.    < end of copied text >      RADIOLOGY:  < from: CT Head No Cont (08.29.21 @ 17:45) >  IMPRESSION:    With ventricular shunt in place, there is now decreased ventricular size since 08/27/2021.    < end of copied text >    < from: CT Head No Cont (09.04.21 @ 11:29) >    IMPRESSION:    No acute intracranial hemorrhage or mass effect compared to prior imaging from 08/29/2021, now with the patient placed on anticoagulation therapy.    Stable ventricular size with  shunt in place, mild hydrocephalus persists.    A couple small and subacute appearing infarcts are now visible in the right cerebellum.    --- End of Report ---    < end of copied text >

## 2021-09-14 NOTE — PROGRESS NOTE ADULT - ASSESSMENT
46y/o F with h/o recent gastric sleeve (08/04/21 [facility], Dr. Crisostomo ), fibromyalgia, thyroid dysfunction, PTSD, depression, anxiety.  Presented with seizures at home - brought to [place], with 1 more episode of seizure en route.  Intubated, CT showed SAH with IV extension, casted IV, hydrocephalus, given mannitol, levetiracetam 1g,  6mg ativan. Started on Cardene drip for BP goal < 140 and transferred to [facility] NICU for further management. HH5 MF4, BD 1 = 8/7. Now s/p cerebral angiogram for R vertebral artery takedown for R vertebral dissecting aneurysm (8/7), and R frontal EVD placement (8/7), now s/p IVC filter placement (8/12,) s/p angio IA verapamil 8/16, 8/17, 8/18, 8/20. Now s/p right VPS certas at 4 (8/27).

## 2021-09-14 NOTE — CHART NOTE - NSCHARTNOTEFT_GEN_A_CORE
Admitting Diagnosis:   Patient is a 45y old  Female who presents with a chief complaint of SAH (14 Sep 2021 09:03)      PAST MEDICAL & SURGICAL HISTORY:        Current Nutrition Order:  Dysphagia Pureed  Vital HP @ 46 ml/hr x24hrs via NGT providing 1104 ml TV, 1104 kcal, 96g pro, 923 ml free water.     PO Intake: Good (%) [   ]  Fair (50-75%) [   ] Poor (<25%) [  x ]    GI Issues:   Jtube placed 9/1; tolerating feeds well.   >>Initial plan for PEG however unable to transilluminate from stomach 2/2 adipose tissue per GI note.   H/o recent sleeve gastrectomy 8/4/21  Ordered for protonix, zofran  Last BM 9/13    Pain:  No pain reported    Skin Integrity;   Surgical incision  Oli score 16    Skin Integrity:  surgical incision      Labs:   09-14    141  |  103  |  12  ----------------------------<  105<H>  3.9   |  28  |  0.63    Ca    10.1      14 Sep 2021 06:54  Phos  3.8     09-14  Mg     2.1     09-14      CAPILLARY BLOOD GLUCOSE          Medications:  MEDICATIONS  (STANDING):  acetylcysteine 20%  Inhalation 4 milliLiter(s) Inhalation every 12 hours  albuterol/ipratropium for Nebulization 3 milliLiter(s) Nebulizer every 6 hours  aspirin  chewable 81 milliGRAM(s) Oral daily  BACItracin   Ointment 1 Application(s) Topical two times a day  bromocriptine Capsule 5 milliGRAM(s) Oral every 8 hours  chlorhexidine 2% Cloths 1 Application(s) Topical <User Schedule>  enoxaparin Injectable 100 milliGRAM(s) SubCutaneous every 12 hours  ergocalciferol 21558 Unit(s) Oral <User Schedule>  hydrocortisone 1% Cream 1 Application(s) Topical every 12 hours  lacosamide Solution 100 milliGRAM(s) Oral two times a day  multivitamin/minerals/iron Oral Solution (CENTRUM) 15 milliLiter(s) Oral daily  pantoprazole  Injectable 40 milliGRAM(s) IV Push daily  polyethylene glycol 3350 17 Gram(s) Oral daily  QUEtiapine 87.5 milliGRAM(s) Oral every 24 hours  QUEtiapine 50 milliGRAM(s) Oral every 24 hours  senna 2 Tablet(s) Oral at bedtime    MEDICATIONS  (PRN):  bisacodyl Suppository 10 milliGRAM(s) Rectal daily PRN Constipation  ondansetron Injectable 4 milliGRAM(s) IV Push every 6 hours PRN Nausea and/or Vomiting  sodium chloride 0.9% lock flush 10 milliLiter(s) IV Push every 1 hour PRN Pre/post blood products, medications, blood draw, and to maintain line patency        Admitting Anthropometrics:  · Height for BMI (FEET)	5 Feet  · Height for BMI (INCHES)	4 Inch(s)  · Height for BMI (CENTIMETERS)	162.56 Centimeter(s)  · Weight for BMI (lbs)	225.1 lb  · Weight for BMI (kg)	102.1 kg  · Body Mass Index	              38.6 kg/m2  · Ideal Body Weight (lbs)	120  · Ideal Body Weight (kg)	54.4    Weight:  8/7 225lbs  9/9 217lbs (bedscale weight)    Weight Change:   Reports UBW prior to sleeve gastrectomy was 235lbs. Current B Wis 216lbs. This indicates a 8% wt loss x1 mo.     Nutrition Focused Physical Exam: Completed [   ]  Not Pertinent [ x  ]    Estimated energy needs:   IBW (54.5kg) used for calculations as pt >120% of IBW (188%).   Nutrient needs based on [facility] standards of care for maintenance in adults.   Needs adjusted for recent bariatric sx, critical care, obesity.   Kcal (20-25kcal/kg): 1090-1363kcal/day   Protein (1.5-2.0 g/kg pro): 81-108g pro/day  Fluids per team.     Subjective:   45F with h/o recent gastric sleeve (08/04/21 [facility], Dr. Crisostomo ), fibromyalgia, thyroid dysfunction, PTSD, depression, anxiety.  Presented with seizures at home - brought to [place], with 1 more episode of seizure en route.  Intubated, CT showed SAH with IV extension, casted IV, hydrocephalus, given mannitol, levetiracetam 1g,  6mg ativan. Started on Cardene drip for BP goal < 140 and transferred to [facility] NICU for further management. S/p angio with R vert takedown 8/7, pt extubated later that day. Pt remains on fentanyl and Precedex restlessness and agitation. NGT placed for initiation of EN. CTH with increased hydro, CTA head/neck with mild basilar spasm. Pt now weened off fentanyl and precedex 8/12 per disc with RN. S/p IVC placement 8/12. Pt with increased work of breathing with inability to clear secretions, and was reintubated 8/15. CTA shows posterior circulation vasospasm. Started on levophed drip for SBP goal 180-200. Also remains on propofol drip. EVD open at -5cmH2O. Pt noted to be febrile this am (101F) and pancultured 8/17. Pt s/p multiple angiograms 8/16, 8/17, 8/18, 8/20. Pt self extubated 8/18 and transitioned to NC, tolerating well. Pt con ton levo for BP support. SBP goal 160-200. MSSA growing in sputum. Ceftriaxone d/c'd and Ancef 2gq8 started. ID consulted. S/p  shunt placement 8/27. Attempted PEG placement for enteral feeds however unable to place gastric feeding tube. S/p Jtube 9/1 and now running at ordered goal and adequately meeting needs. Advanced to dysphagia pureed 9/10. Plan to d/c to rehab.    Pt seen in room, resting in bed. Feeds infusing at ordered goal via jtube. Tolerating well, denies any N/V, or discomofort w/ feeds. Having small amounts of pureed food.  last BM was 9/13. Understanding of meeting needs this way for time being. Per pt care order, pt can have ice chips by spoon. Continue w/ vitamin D supplementation. RD to follow.     Previous Nutrition Diagnosis: Inadequate Oral Intake RT inability to meet needs via PO AEB NPO, reliant on EN to meet 100% of est needs.     Active [ x  ]  Resolved [   ]    If resolved, new PES:     Goal/Expected Outcome Consistently meet >75% est needs via appropriate route.    Recommendations:  1. NPO  >> When medically feasible, recommend the following diet advancement; BARICLLIQ >> Phase 1 Bariatric Full liquid diet >> phase 2 pureed/soft  2. If team wishes to cont EN regimen, recommend increasing; Vital HP @ 46 ml/hr x24hrs via NGT providing 1104 ml TV, 1104 kcal, 96g pro., 923 ml free water.   >>Cont with aspiration precautions at all times  >>FWF per team  3. Cont to monitor lytes and replete prn  4. RD diet edu prn  5. Pain and bowel regimen per team  *d/w team.     Education: Discussed TF     Risk Level: High [ x  ] Moderate [   ] Low [   ].

## 2021-09-15 NOTE — PROGRESS NOTE ADULT - ASSESSMENT
46y/o F with h/o recent gastric sleeve (08/04/21 [facility], Dr. Crisostomo ), fibromyalgia, thyroid dysfunction, PTSD, depression, anxiety.  Presented with seizures at home - brought to [place], with 1 more episode of seizure en route.  Intubated, CT showed SAH with IV extension, casted IV, hydrocephalus, given mannitol, levetiracetam 1g,  6mg ativan. Started on Cardene drip for BP goal < 140 and transferred to [facility] NICU for further management. HH5 MF4, BD 1 = 8/7. Now s/p cerebral angiogram for R vertebral artery takedown for R vertebral dissecting aneurysm (8/7), and R frontal EVD placement (8/7), now s/p IVC filter placement (8/12,) s/p angio IA verapamil 8/16, 8/17, 8/18, 8/20. Now s/p right VPS certas at 4 (8/27).

## 2021-09-15 NOTE — PROGRESS NOTE ADULT - SUBJECTIVE AND OBJECTIVE BOX
Patient is a 45y old  Female who presents with a chief complaint of SAH (04 Sep 2021 07:05)      HPI:  46y/o F with h/o recent gastric sleeve (08/04/21 [facility], Dr. Crisostomo ), fibromyalgia, thyroid dysfunction, PTSD, depression, anxiety.  Presented with seizures at home - brought to [place], with 1 more episode of seizure en route.  Intubated, CT showed SAH with IV extension, casted IV, hydrocephalus, given mannitol, levetiracetam 1g,  6mg ativan. Started on Cardene drip for BP goal < 140 and transferred to [facility] NICU for further management.     HH5 MF4   NIHSS 26 on arrival  BD 1 = 8/7 (07 Aug 2021 08:08)    O/N and interval events: Pt given Seroquel 25 x 1    Subjective:   Pt denies pain. Unable to elicit much history given patient's clinical status.     Allergies    apple (Angioedema)  No Known Drug Allergies  strawberry (Angioedema)    Intolerances    lactose (Stomach Upset)        MEDICATIONS  (STANDING):  acetylcysteine 20%  Inhalation 4 milliLiter(s) Inhalation every 12 hours  albuterol/ipratropium for Nebulization 3 milliLiter(s) Nebulizer every 6 hours  aspirin  chewable 81 milliGRAM(s) Oral daily  BACItracin   Ointment 1 Application(s) Topical two times a day  bromocriptine Capsule 5 milliGRAM(s) Oral every 8 hours  chlorhexidine 2% Cloths 1 Application(s) Topical <User Schedule>  enoxaparin Injectable 100 milliGRAM(s) SubCutaneous every 12 hours  ergocalciferol 79965 Unit(s) Oral <User Schedule>  hydrocortisone 1% Cream 1 Application(s) Topical every 12 hours  lacosamide Solution 100 milliGRAM(s) Oral two times a day  multivitamin/minerals/iron Oral Solution (CENTRUM) 15 milliLiter(s) Oral daily  pantoprazole  Injectable 40 milliGRAM(s) IV Push daily  polyethylene glycol 3350 17 Gram(s) Oral daily  QUEtiapine 50 milliGRAM(s) Oral every 24 hours  QUEtiapine 87.5 milliGRAM(s) Oral every 24 hours  senna 2 Tablet(s) Oral at bedtime    MEDICATIONS  (PRN):  bisacodyl Suppository 10 milliGRAM(s) Rectal daily PRN Constipation  ondansetron Injectable 4 milliGRAM(s) IV Push every 6 hours PRN Nausea and/or Vomiting  sodium chloride 0.9% lock flush 10 milliLiter(s) IV Push every 1 hour PRN Pre/post blood products, medications, blood draw, and to maintain line patency              Drug Dosing Weight  Height (cm): 162.6 (01 Sep 2021 11:40)  Weight (kg): 98.2 (01 Sep 2021 11:40)  BMI (kg/m2): 37.1 (01 Sep 2021 11:40)  BSA (m2): 2.02 (01 Sep 2021 11:40)    PAST MEDICAL & SURGICAL HISTORY:      FAMILY HISTORY:  No pertinent family history in first degree relatives        SOCIAL HISTORY: no smoking reported      Vital Signs Last 24 Hrs  T(C): 36.8 (15 Sep 2021 05:07), Max: 36.8 (15 Sep 2021 05:07)  T(F): 98.3 (15 Sep 2021 05:07), Max: 98.3 (15 Sep 2021 05:07)  HR: 94 (15 Sep 2021 04:00) (94 - 112)  BP: 121/59 (15 Sep 2021 04:00) (113/70 - 127/70)  BP(mean): 82 (15 Sep 2021 04:00) (82 - 91)  RR: 18 (15 Sep 2021 04:00) (18 - 18)  SpO2: 94% (15 Sep 2021 04:00) (94% - 96%)      PHYSICAL EXAM:      Constitutional: NAD  Eyes: PERRLA  ENMT: thrush  Neck: supple  Back: midline  Respiratory: CTA b/l  Cardiovascular: rrr, s1s2, no m/r/g  Gastrointestinal: soft, NTND, + J tube  Extremities: wwp  Vascular: + 2 pules radial  Neurological: AAO x 2, minimally following commands  Skin: no rash  Lymph Nodes: no LAD  Musculoskeletal: no joint swelling  Psychiatric: flat affect        LABS:                          12.5   5.66  )-----------( 284      ( 14 Sep 2021 06:54 )             41.2   09-14    141  |  103  |  12  ----------------------------<  105<H>  3.9   |  28  |  0.63    Ca    10.1      14 Sep 2021 06:54  Phos  3.8     09-14  Mg     2.1     09-14                  EKG:    ECHO, US:    < from: TTE Limited Echo w/o Cont (08.12.21 @ 15:22) >  CONCLUSIONS:     1. Limited study obtained for evaluation of right ventricular function.   2. Probably normal left ventricular systolic function.   3. Normal right ventricular size and systolic function.   4. There was insufficient tricuspid regurgitation detected from which to calculate pulmonary artery systolic pressure.   5. No pericardial effusion.    < end of copied text >      RADIOLOGY:  < from: CT Head No Cont (08.29.21 @ 17:45) >  IMPRESSION:    With ventricular shunt in place, there is now decreased ventricular size since 08/27/2021.    < end of copied text >    < from: CT Head No Cont (09.04.21 @ 11:29) >    IMPRESSION:    No acute intracranial hemorrhage or mass effect compared to prior imaging from 08/29/2021, now with the patient placed on anticoagulation therapy.    Stable ventricular size with  shunt in place, mild hydrocephalus persists.    A couple small and subacute appearing infarcts are now visible in the right cerebellum.    --- End of Report ---    < end of copied text >

## 2021-09-15 NOTE — PROGRESS NOTE ADULT - SUBJECTIVE AND OBJECTIVE BOX
HPI:  46y/o F with h/o recent gastric sleeve (08/04/21 [facility], Dr. Crisostomo ), fibromyalgia, thyroid dysfunction, PTSD, depression, anxiety.  Presented with seizures at home - brought to [place], with 1 more episode of seizure en route.  Intubated, CT showed SAH with IV extension, casted IV, hydrocephalus, given mannitol, levetiracetam 1g,  6mg ativan. Started on Cardene drip for BP goal < 140 and transferred to [facility] NICU for further management.     8/7: Tx from [place] for SAH. Intubated. R frontal EVD placed, central line and a line placed. 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   8/8: POD1 s/p angio with R vert takedown, extubated, desatting on aerosol mask, required extensive suctioning, 3% nebs, chest PT, started on low dose precedex drip for restlessness/agitation, ABG stable. NGT placed. TF started with goal 20 pending nutrition recs. 3% stopped for Na 150. Ceribell EEG negative and d/c'ed.   8/9 Overnight, new Right pronator drift and right gaze preference that can't cross midline, Repeat CTH demonstrated stable vents, CTA head and neck unremarkable for spasm, hypoattenuation of right cerebellum edema vs. infarct.  8/10: POD3 R vert takedown, EVD open at [id number]. ASHA overnight, neuro stable. CTH with increased hydro, CTA head/neck with mild basilar spasm, but concern for PE. 1/2 am D50 for hypoglycemia. 1L bolus then 100 cc/hr, PM rounds for net negative fluid balance and mild vasospasm on CTA.   8/11: POD4 R vert takedown. EVD at 5cm H2O, ASHA overnight. neuro stable.   Febrile 104. depakote increased to q6. NCHCT stable. EVD lowered to 0. Lasix 20 given for dieuresis, UOP over 2 liters. Sedation given for a-line placement, BP drop needing levo.  8/12: POD5 R vert takedown. EVD at 0cm H2O. ASHA overnight, neuro stable. Precedex being weaned off. Pending IVCF with vascular today.  8/13: POD6 R vert takedown. EVD open at 0cmH2O. ASHA overnight. Neuro exam stable. Mottled skin on the left lower extremity improving. Started on Bromocriptine 15mg Q8.   8/14: POD7. EVD open at 0. 1L bolus given for euvolemia o/n. neuro stable. CTH stable. ENT scoped vocal cords, +R voacl cord paresis, NTD. started on zosyn empirically.   8/15: POD8. EVD open at 0. ASHA o/n, neuro stable. Pt developed increased work of breathing, unable to clear her secretions, received racemic epi and multiple nebulizer treatments, still with stridor. Patient re-intubated at bedside, on full vent support.   8/16: CTH/CTA head/neck/CT chest performed o/n for worsened exam, R gaze preference, sluggish pupils. +worsened uncal herniation, hydro, vasospasm in b/l PCAs, L vert, basilar arteries. preop angio today, restarted on 3% for Na goal 145-150. Angio for vasospasm with IA verapamil.  8/17: POD10. Overnight Pt remains on levophed drip for SBP goal 180-220. Also remains on propofol drip. EVD open at -5cmH2O. ICPs WNL. Febrile 101F and pancultured. CTH today shows slightly smaller ventricles. Angio for vasospasm with IA verapamil.  8/18: POD11 R vert takedown. POD1 angio IA verpamil. Overnight, Pt noted to have downward gaze to the right and not following commands. Taken for stat head CT which is stable. Pupils also noted to be aniscoric left pupil 4mm and brisk and right 2mm brisk. Mannitol 50g given. Ceribell eeg placed for concern of subclinical seizures, so far appears negative for seizures. 1L NS o/n and 1.5L NS bolus in AM for euvolemia. vanc/zosyn stopped. 250cc 3% bolus and 250cc albumin given in AM. Brief seizure to L frontal lobe this AM on EEG, given 2g valproic acid and dose increased to 1g q8. Vimpat 100mg BID started.  Angio with interval improvment in vasospasm, IA verapamil given. In afternoon patient self-extubated, placed on NRB with mucomyst, 3% inhalation and duonebs. 3% at 50 d/c'd.  8/19: POD 12. Remains on VEEG. Axillary A line placed. Salt tabs d/c'd, florinef decreased to 0.1mg, EVD @ -5cm H2O, now draining 20cc/hr. 250 albumin and 500 NS boluses on dayshift, 1 L LR bolus  8/20: POD 13. ASHA. on VEEG, no seizures noted. Pending VPA level. 500 NS, 250 albumin. Febrile, pancultured. EEG d/c'ed.   8/21: BD14, POD14. ASHA overnight, EVD @ -5. s/p 1L bolus for euvolemia  8/22: BD #15: continued on levo, hypercoagulabitly profile pending, EVD @ -5, euvolemia, extubated, amantadine added, free water started for hypernatremia   8/23: BD 17, tmax 101F o/n. Gen Sx consulted for PEG - not a candidate at this time, pancultured for fever, 1L LR given for euvolemia. EVD at -5  8/24: BD18, ASHA o/n, neuro stable, EVD at -5, VPA level therapeutic, SBP goal 160-200, L IJ CVC attempted placement with carotid puncture, no pseudoaneurysm on US. MSSA growing in sputum. Ceftriaxone d/c'd and Ancef 2gq8 started. ID consulted. Recieved 1.5L LR and 500cc albumin.   8/25: POD5 last angio, BD19 ASHA o/n, neuro stable, EVD at -5  8/26: BD 20. ASHA o/n. EVD @ -5   8/27: BD 21, POD 20 Right vert takedown. ASHA overnight. Exam stable. EVD remains open at -5 and to be clamped at 0600 in preparation for right VPS placement today. Possible PEG placement Monday 8/30 8/28: BD22, POD22 Right vert takedown, POD1 R VPS, neuro exam stable. Tube feeds restarted, more lethargic this AM, improved during day, continue to monitor for hydrocephalus. AXR with dilated bowel, started on reglan and magnesium. Liquid BM, TFs held and rectal tube placed.   8/29: BD23, POD23 right vert takedown, POD2 R VPS, ASHA overnight, neuro stable. TFs decreased for colonic distention seen on XR, GI notified, nothing to do, TFs resumed at 20/hr and will continue will PEG placement tomorrow. CTH today demonstrated interval decrease in size of ventricles. Midodrine added to aid in weening levophed.   8/30: BD24, POD24, pending PEG placement this morning with Dr. Milner. Midodrine increased to 10mg q8hrs today to wean off of levo. 1u PRBC for Hb 7.5. FOB negative. 20mg lasix given. Repeat dopplers completed, unchanged from previous. Started on IV iron per hematology recs.  8/31: BD25, POD25, unsuccessful PEG placement by GI, trend Hgb w/ AM labs, slowly weening levophed. 5mg vitamin K given for elevated INR.  9/1: BD26, POD26. J tube w/ gen surg today. 5mg vitK IV given o/n for elevated INR. off levophed. amantadine decreased 100 BID and ritalin d/c'd.   9/2: BD27, POD27. s/p J tube w/ gen surg. still receiving meds/TF via J tube.  neuro stable  9/4: BD 28, POD28, s/p J tube w/ gen surg. Neuro stable CTH performed, changed shunt to Certas @ 3 from 4.   9/5: BD 29, POD29, s/p J tube w/ gen surg. VPS certas from 4 to 3 now. Required 1mg IV haldol overnight for agitation/delirium, patient not responding to sitter and attempting to get out of bed numerous times. Cont Eliquis 5 BID for b/l LE DVTs.  	  9/6: VPS Certas @3. BD30. ASHA overnight, neuro stable.   9/7: Certas@3, BD31. ASHA overnight, neuro stable.  9/8: Certas@3 BD 32. ASHA overnight, neuro stable.  9/9: Certas@3 BD 33, ASHA overnight, neuro stable  9/10. Certas@3 BD 34, ASHA overnight, neuro stable, pulled out PICC line, tolerating tube feeds via J tube pending rehab   9/11: certas@3, BD35. 2mg haldol given overnight for agitation. neuro stable. on TF via J tube w/ puree thin liquid diet  9/12: BD36. 1mg IV ativan o/n for agitation. neuro stable. 1mg PO ativan added for bedtime.  9/13: BD37, 1mg ativan PO + 0.5 iv for agitation, neuro stable , pending discharge  9/14: BD 38, seroquel PO for agitation, neuro stable pending discharge       HH5 MF4   NIHSS 26 on arrival  BD 1 = 8/7 (07 Aug 2021 08:08)    OVERNIGHT EVENTS: agitation + additional seroquel 25  Vital Signs Last 24 Hrs  T(C): 36.7 (14 Sep 2021 22:26), Max: 36.7 (14 Sep 2021 22:26)  T(F): 98.1 (14 Sep 2021 22:26), Max: 98.1 (14 Sep 2021 22:26)  HR: 100 (14 Sep 2021 23:20) (94 - 112)  BP: 113/70 (14 Sep 2021 23:20) (109/67 - 127/70)  BP(mean): 85 (14 Sep 2021 23:20) (77 - 91)  RR: 18 (14 Sep 2021 23:20) (18 - 18)  SpO2: 94% (14 Sep 2021 23:20) (94% - 96%)    I&O's Summary    13 Sep 2021 07:01  -  14 Sep 2021 07:00  --------------------------------------------------------  IN: 240 mL / OUT: 501 mL / NET: -261 mL    14 Sep 2021 07:01  -  15 Sep 2021 00:23  --------------------------------------------------------  IN: 240 mL / OUT: 500 mL / NET: -260 mL        PHYSICAL EXAM:  GEN: laying in bed, appears well, NAD  NEURO: AOx3 w/ choices. FC, OE spont, hypophonic, face symmetric. CNII-XII intact. PERRL, EOMI. No pronator drift. MAEx4. 5/5 strength throughout. SILT  CV: RRR +S1/S2  PULM: CTAB  GI: Abd soft, NT/ND  EXT: ext warm, dry, nontender  WOUND: crani site c/d/i.      LABS:                        12.5   5.66  )-----------( 284      ( 14 Sep 2021 06:54 )             41.2     09-14    141  |  103  |  12  ----------------------------<  105<H>  3.9   |  28  |  0.63    Ca    10.1      14 Sep 2021 06:54  Phos  3.8     09-14  Mg     2.1     09-14              CAPILLARY BLOOD GLUCOSE          Drug Levels: [] N/A    CSF Analysis: [] N/A      Allergies    apple (Angioedema)  No Known Drug Allergies  strawberry (Angioedema)    Intolerances    lactose (Stomach Upset)    MEDICATIONS:  Antibiotics:    Neuro:  bromocriptine Capsule 5 milliGRAM(s) Oral every 8 hours  lacosamide Solution 100 milliGRAM(s) Oral two times a day  ondansetron Injectable 4 milliGRAM(s) IV Push every 6 hours PRN  QUEtiapine 50 milliGRAM(s) Oral every 24 hours  QUEtiapine 87.5 milliGRAM(s) Oral every 24 hours    Anticoagulation:  aspirin  chewable 81 milliGRAM(s) Oral daily  enoxaparin Injectable 100 milliGRAM(s) SubCutaneous every 12 hours    OTHER:  acetylcysteine 20%  Inhalation 4 milliLiter(s) Inhalation every 12 hours  albuterol/ipratropium for Nebulization 3 milliLiter(s) Nebulizer every 6 hours  BACItracin   Ointment 1 Application(s) Topical two times a day  bisacodyl Suppository 10 milliGRAM(s) Rectal daily PRN  chlorhexidine 2% Cloths 1 Application(s) Topical <User Schedule>  hydrocortisone 1% Cream 1 Application(s) Topical every 12 hours  pantoprazole  Injectable 40 milliGRAM(s) IV Push daily  polyethylene glycol 3350 17 Gram(s) Oral daily  senna 2 Tablet(s) Oral at bedtime    IVF:  ergocalciferol 76561 Unit(s) Oral <User Schedule>  multivitamin/minerals/iron Oral Solution (CENTRUM) 15 milliLiter(s) Oral daily    CULTURES:    RADIOLOGY & ADDITIONAL TESTS:      46y/o F with h/o recent gastric sleeve (08/04/21 [facility], Dr. Crisostomo ), fibromyalgia, thyroid dysfunction, PTSD, depression, anxiety.  Presented with seizures at home - brought to [place], with 1 more episode of seizure en route.  Intubated, CT showed SAH with IV extension, casted IV, hydrocephalus, given mannitol, levetiracetam 1g,  6mg ativan. Started on Cardene drip for BP goal < 140 and transferred to [facility] NICU for further management. HH5 MF4, BD 1 = 8/7. Now s/p cerebral angiogram for R vertebral artery takedown for R vertebral dissecting aneurysm (8/7), and R frontal EVD placement (8/7), now s/p IVC filter placement (8/12,) s/p angio IA verapamil 8/16, 8/17, 8/18, 8/20. Now s/p right VPS certas at 3 (8/27).    Plan   NEURO:   - neuro checks q4h/vitals q4   - Vimpat 100mg bid   - Bromocriptine 5mg Q8  - Angio demonstrating vasospasm of Right ICA, right PCOMM, Left distal vert and basilar confluence, and received IA verapamil on 8/16. 8/17. 8/18. 8/20.   - CTH 9/7 stable  - Seroquel 50/75 for agitation, ativan 1mg PO QHS,  ativan prn  - ASA 81 on 9/7 s/p R vert takedown     PULM:    - Secretions improved. standing duonebs, 3% nebs, mucomyst  - chest PT   - pulm following for PE, recs appreciated    CARDIO:    - -150  - TTE 8/12 WNL    - QTC 9/12: 433, f/u in AM     GI:    - TF via J tube w/ puree thin liquid diet  - PPI per bariatric surgeon, made IV - suspension clogged J tube  - bowel regimen held   - On Vit D and multivitamin s/p bariatric surgery    RENAL:   - normonatremia goal  - straight cath prn - retaining     HEM/ONC:   - ASA 81   - VTE Prophylaxis: SCDs,  BID (pradaxa when PO)   - dopplers 8/23, acute DVT left IM calf, persistent completely occlusive DVT b/l peroneal veins and right IM calf , 8/30 unchanged DVTs  - carotid doppler 8/24: neg for pseudoaneurysm, CTA shows soft tissue small hematoma, non-occlusive L IJ and L basillic vein thrombus   - hypercoagulable w/u: - prothrombin gene mutation - heterozygous   - 8/30 LUE doppler with findings of left IJ non-occlusive DVT and left basilic thrombus  - 9/6 dopplers: new L posterior tibial DVT: Eliquis d/c'ed switched to  BID  - Repeat weekly dopplers at rehab     ID:   - Afebrile   - Ancef for MSSA pna coverage x 7 d completed 8/30  - Ancef x 3 d for R scalp incision erythema complete 9/5  - PICC line placed 8/26, patient removed 9/9  - COVID negative 9/12    ENDO:    - glucose goal 140 -180    DISPO: AR   Assessment and plan discussed with Dr. Lomax

## 2021-09-15 NOTE — CHART NOTE - NSCHARTNOTEFT_GEN_A_CORE
Neurosurgery called general surgery team this AM given RN concern for tight skin bumper and concern for early skin erosion/pressure ulcer. Asked to see patient prior to discharge to confirm Jtube in correct location. Spoke with patient at bedside and family member who deny pain, N/V, cough, regurgitation, SOB, aspiration, fever, chills. Deny pain at the Jtube site and tolerating TF at goal without reflux.    Jtube inspected and found to be somewhat tight at the skin with minimal underlying erythema. Cleaned area, cut single suture holding bumper in place, loosened bumper slightly, and placed clean dry guaze and abdominal binder over site. Educated family and patient at bedside.     Spoke with primary team and surgical chief resident.     Plan discussed with attending and chief resident.   ____________________________________________________  KAM Castillo - Resident   Surgery

## 2021-09-16 NOTE — CHART NOTE - NSCHARTNOTEFT_GEN_A_CORE
Patient has been neurologically stable/improving and she has had no behavioral issues in over 48 hours. A head CT is not clinically warranted. Her most recent cat scans 9/7 and 9/4 are stable. Patient has been neurologically stable/improving and she has had no behavioral issues on her current dosing of seroquel. A head CT is not clinically warranted. Her most recent cat scans 9/7 and 9/4 are stable.

## 2021-09-16 NOTE — PROGRESS NOTE ADULT - ASSESSMENT
44y/o F with h/o recent gastric sleeve (08/04/21 [facility], Dr. Crisostomo ), fibromyalgia, thyroid dysfunction, PTSD, depression, anxiety.  Presented with seizures at home - brought to [place], with 1 more episode of seizure en route.  Intubated, CT showed SAH with IV extension, casted IV, hydrocephalus, given mannitol, levetiracetam 1g,  6mg ativan. Started on Cardene drip for BP goal < 140 and transferred to [facility] NICU for further management. HH5 MF4, BD 1 = 8/7. Now s/p cerebral angiogram for R vertebral artery takedown for R vertebral dissecting aneurysm (8/7), and R frontal EVD placement (8/7), now s/p IVC filter placement (8/12,) s/p angio IA verapamil 8/16, 8/17, 8/18, 8/20. Now s/p right VPS certas at 4 (8/27).

## 2021-09-16 NOTE — PROGRESS NOTE ADULT - SUBJECTIVE AND OBJECTIVE BOX
HPI:  44y/o F with h/o recent gastric sleeve (08/04/21 [facility], Dr. Crisostomo ), fibromyalgia, thyroid dysfunction, PTSD, depression, anxiety.  Presented with seizures at home - brought to [place], with 1 more episode of seizure en route.  Intubated, CT showed SAH with IV extension, casted IV, hydrocephalus, given mannitol, levetiracetam 1g,  6mg ativan. Started on Cardene drip for BP goal < 140 and transferred to [facility] NICU for further management.     HH5 MF4   NIHSS 26 on arrival  BD 1 = 8/7 (07 Aug 2021 08:08)    INTERVAL EVENTS:  ASHA. Patient states she is comfortable. She is inquiring about her hospital course and her surgeries during admission.    HOSPITAL COURSE:  8/7: Tx from [place] for SAH. Intubated. R frontal EVD placed, central line and a line placed. 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   8/8: POD1 s/p angio with R vert takedown, extubated, desatting on aerosol mask, required extensive suctioning, 3% nebs, chest PT, started on low dose precedex drip for restlessness/agitation, ABG stable. NGT placed. TF started with goal 20 pending nutrition recs. 3% stopped for Na 150. Ceribell EEG negative and d/c'ed.   8/9 Overnight, new Right pronator drift and right gaze preference that can't cross midline, Repeat CTH demonstrated stable vents, CTA head and neck unremarkable for spasm, hypoattenuation of right cerebellum edema vs. infarct.  8/10: POD3 R vert takedown, EVD open at [id number]. ASHA overnight, neuro stable. CTH with increased hydro, CTA head/neck with mild basilar spasm, but concern for PE. 1/2 am D50 for hypoglycemia. 1L bolus then 100 cc/hr, PM rounds for net negative fluid balance and mild vasospasm on CTA.   8/11: POD4 R vert takedown. EVD at 5cm H2O, ASHA overnight. neuro stable.   Febrile 104. depakote increased to q6. NCHCT stable. EVD lowered to 0. Lasix 20 given for dieuresis, UOP over 2 liters. Sedation given for a-line placement, BP drop needing levo.  8/12: POD5 R vert takedown. EVD at 0cm H2O. ASHA overnight, neuro stable. Precedex being weaned off. Pending IVCF with vascular today.  8/13: POD6 R vert takedown. EVD open at 0cmH2O. ASHA overnight. Neuro exam stable. Mottled skin on the left lower extremity improving. Started on Bromocriptine 15mg Q8.   8/14: POD7. EVD open at 0. 1L bolus given for euvolemia o/n. neuro stable. CTH stable. ENT scoped vocal cords, +R voacl cord paresis, NTD. started on zosyn empirically.   8/15: POD8. EVD open at 0. ASHA o/n, neuro stable. Pt developed increased work of breathing, unable to clear her secretions, received racemic epi and multiple nebulizer treatments, still with stridor. Patient re-intubated at bedside, on full vent support.   8/16: CTH/CTA head/neck/CT chest performed o/n for worsened exam, R gaze preference, sluggish pupils. +worsened uncal herniation, hydro, vasospasm in b/l PCAs, L vert, basilar arteries. preop angio today, restarted on 3% for Na goal 145-150. Angio for vasospasm with IA verapamil.  8/17: POD10. Overnight Pt remains on levophed drip for SBP goal 180-220. Also remains on propofol drip. EVD open at -5cmH2O. ICPs WNL. Febrile 101F and pancultured. CTH today shows slightly smaller ventricles. Angio for vasospasm with IA verapamil.  8/18: POD11 R vert takedown. POD1 angio IA verpamil. Overnight, Pt noted to have downward gaze to the right and not following commands. Taken for stat head CT which is stable. Pupils also noted to be aniscoric left pupil 4mm and brisk and right 2mm brisk. Mannitol 50g given. Ceribell eeg placed for concern of subclinical seizures, so far appears negative for seizures. 1L NS o/n and 1.5L NS bolus in AM for euvolemia. vanc/zosyn stopped. 250cc 3% bolus and 250cc albumin given in AM. Brief seizure to L frontal lobe this AM on EEG, given 2g valproic acid and dose increased to 1g q8. Vimpat 100mg BID started.  Angio with interval improvment in vasospasm, IA verapamil given. In afternoon patient self-extubated, placed on NRB with mucomyst, 3% inhalation and duonebs. 3% at 50 d/c'd.  8/19: POD 12. Remains on VEEG. Axillary A line placed. Salt tabs d/c'd, florinef decreased to 0.1mg, EVD @ -5cm H2O, now draining 20cc/hr. 250 albumin and 500 NS boluses on dayshift, 1 L LR bolus  8/20: POD 13. ASHA. on VEEG, no seizures noted. Pending VPA level. 500 NS, 250 albumin. Febrile, pancultured. EEG d/c'ed.   8/21: BD14, POD14. ASHA overnight, EVD @ -5. s/p 1L bolus for euvolemia  8/22: BD #15: continued on levo, hypercoagulabitly profile pending, EVD @ -5, euvolemia, extubated, amantadine added, free water started for hypernatremia   8/23: BD 17, tmax 101F o/n. Gen Sx consulted for PEG - not a candidate at this time, pancultured for fever, 1L LR given for euvolemia. EVD at -5  8/24: BD18, ASHA o/n, neuro stable, EVD at -5, VPA level therapeutic, SBP goal 160-200, L IJ CVC attempted placement with carotid puncture, no pseudoaneurysm on US. MSSA growing in sputum. Ceftriaxone d/c'd and Ancef 2gq8 started. ID consulted. Recieved 1.5L LR and 500cc albumin.   8/25: POD5 last angio, BD19 ASHA o/n, neuro stable, EVD at -5  8/26: BD 20. ASHA o/n. EVD @ -5   8/27: BD 21, POD 20 Right vert takedown. ASHA overnight. Exam stable. EVD remains open at -5 and to be clamped at 0600 in preparation for right VPS placement today. Possible PEG placement Monday 8/30 8/28: BD22, POD22 Right vert takedown, POD1 R VPS, neuro exam stable. Tube feeds restarted, more lethargic this AM, improved during day, continue to monitor for hydrocephalus. AXR with dilated bowel, started on reglan and magnesium. Liquid BM, TFs held and rectal tube placed.   8/29: BD23, POD23 right vert takedown, POD2 R VPS, ASHA overnight, neuro stable. TFs decreased for colonic distention seen on XR, GI notified, nothing to do, TFs resumed at 20/hr and will continue will PEG placement tomorrow. CTH today demonstrated interval decrease in size of ventricles. Midodrine added to aid in weening levophed.   8/30: BD24, POD24, pending PEG placement this morning with Dr. Milner. Midodrine increased to 10mg q8hrs today to wean off of levo. 1u PRBC for Hb 7.5. FOB negative. 20mg lasix given. Repeat dopplers completed, unchanged from previous. Started on IV iron per hematology recs.  8/31: BD25, POD25, unsuccessful PEG placement by GI, trend Hgb w/ AM labs, slowly weening levophed. 5mg vitamin K given for elevated INR.  9/1: BD26, POD26. J tube w/ gen surg today. 5mg vitK IV given o/n for elevated INR. off levophed. amantadine decreased 100 BID and ritalin d/c'd.   9/2: BD27, POD27. s/p J tube w/ gen surg. still receiving meds/TF via J tube.  neuro stable  9/4: BD 28, POD28, s/p J tube w/ gen surg. Neuro stable CTH performed, changed shunt to Certas @ 3 from 4.   9/5: BD 29, POD29, s/p J tube w/ gen surg. VPS certas from 4 to 3 now. Required 1mg IV haldol overnight for agitation/delirium, patient not responding to sitter and attempting to get out of bed numerous times. Cont Eliquis 5 BID for b/l LE DVTs.  	  9/6: VPS Certas @3. BD30. ASHA overnight, neuro stable.   9/7: Certas@3, BD31. ASHA overnight, neuro stable.  9/8: Certas@3 BD 32. ASHA overnight, neuro stable.  9/9: Certas@3 BD 33, ASHA overnight, neuro stable  9/10. Certas@3 BD 34, ASHA overnight, neuro stable, pulled out PICC line, tolerating tube feeds via J tube pending rehab   9/11: certas@3, BD35. 2mg haldol given overnight for agitation. neuro stable. on TF via J tube w/ puree thin liquid diet  9/12: BD36. 1mg IV ativan o/n for agitation. neuro stable. 1mg PO ativan added for bedtime.  9/13: BD37, 1mg ativan PO + 0.5 iv for agitation, neuro stable , pending discharge  9/14: BD 38, seroquel PO for agitation, neuro stable pending discharge   9/15: BD39. ASHA o/n neuro stable. pending rehab. qtc 452  9/16: BD 40. ASHA. no agitation overnight. Exam stable    Vital Signs Last 24 Hrs  T(C): 36.2 (15 Sep 2021 21:36), Max: 36.8 (15 Sep 2021 05:07)  T(F): 97.2 (15 Sep 2021 21:36), Max: 98.3 (15 Sep 2021 05:07)  HR: 94 (15 Sep 2021 20:49) (78 - 94)  BP: 111/63 (15 Sep 2021 20:49) (98/54 - 121/59)  BP(mean): 81 (15 Sep 2021 20:49) (68 - 82)  RR: 18 (15 Sep 2021 20:49) (18 - 18)  SpO2: 95% (15 Sep 2021 20:49) (94% - 100%)    I&O's Summary    14 Sep 2021 07:01  -  15 Sep 2021 07:00  --------------------------------------------------------  IN: 240 mL / OUT: 1400 mL / NET: -1160 mL    15 Sep 2021 07:01  -  16 Sep 2021 00:20  --------------------------------------------------------  IN: 654 mL / OUT: 151 mL / NET: 503 mL        PHYSICAL EXAM:  GEN: laying in bed, appears well, NAD  NEURO: AOx2. FC, OE spont, hypophonic, face symmetric. CNII-XII intact. PERRL, EOMI. No pronator drift. MAEx4. 5/5 strength throughout. Sensation intact throughout.  CV: RRR +S1/S2  PULM: CTAB  GI: Abd soft, NT/ND  EXT: ext warm, dry, nontender  WOUND: crani and abd incisions healing; c/d/i.      TUBES/LINES:  [] Richey  [] Trach  [] NGT  [x] PEJ  [] Wound Drains  [] Others    DIET:  [] NPO  [x] Mechanical  [x] Tube feeds    LABS:                        11.8   4.92  )-----------( 301      ( 15 Sep 2021 11:04 )             40.2     09-15    142  |  104  |  13  ----------------------------<  91  4.2   |  26  |  0.64    Ca    10.0      15 Sep 2021 11:04  Phos  4.5     09-15  Mg     2.1     09-15              CAPILLARY BLOOD GLUCOSE          Drug Levels: [] N/A    CSF Analysis: [] N/A      Allergies    apple (Angioedema)  No Known Drug Allergies  strawberry (Angioedema)    Intolerances    lactose (Stomach Upset)    MEDICATIONS:  Antibiotics:    Neuro:  bromocriptine Capsule 5 milliGRAM(s) Oral every 8 hours  lacosamide Solution 100 milliGRAM(s) Oral two times a day  ondansetron Injectable 4 milliGRAM(s) IV Push every 6 hours PRN  QUEtiapine 50 milliGRAM(s) Oral every 24 hours  QUEtiapine 87.5 milliGRAM(s) Oral every 24 hours    Anticoagulation:  aspirin  chewable 81 milliGRAM(s) Oral daily  enoxaparin Injectable 100 milliGRAM(s) SubCutaneous every 12 hours    OTHER:  acetylcysteine 20%  Inhalation 4 milliLiter(s) Inhalation every 12 hours  albuterol/ipratropium for Nebulization 3 milliLiter(s) Nebulizer every 6 hours  BACItracin   Ointment 1 Application(s) Topical two times a day  bisacodyl Suppository 10 milliGRAM(s) Rectal daily PRN  chlorhexidine 2% Cloths 1 Application(s) Topical <User Schedule>  hydrocortisone 1% Cream 1 Application(s) Topical every 12 hours  pantoprazole  Injectable 40 milliGRAM(s) IV Push daily  polyethylene glycol 3350 17 Gram(s) Oral daily  senna 2 Tablet(s) Oral at bedtime    IVF:  ergocalciferol 44056 Unit(s) Oral <User Schedule>  multivitamin/minerals/iron Oral Solution (CENTRUM) 15 milliLiter(s) Oral daily    CULTURES:    RADIOLOGY & ADDITIONAL TESTS:      ASSESSMENT:  44y/o F with h/o recent gastric sleeve (08/04/21 [facility], Dr. Crisostomo ), fibromyalgia, thyroid dysfunction, PTSD, depression, anxiety.  Presented with seizures at home - brought to [place], with 1 more episode of seizure en route.  Intubated, CT showed SAH with IV extension, casted IV, hydrocephalus, given mannitol, levetiracetam 1g,  6mg ativan. Started on Cardene drip for BP goal < 140 and transferred to [facility] NICU for further management. HH5 MF4, BD 1 = 8/7. Now s/p cerebral angiogram for R vertebral artery takedown for R vertebral dissecting aneurysm (8/7), and R frontal EVD placement (8/7), now s/p IVC filter placement (8/12,) s/p angio IA verapamil 8/16, 8/17, 8/18, 8/20. Now s/p right VPS certas at 3 (8/27). S/p J tube placement.    Plan   NEURO:   - neuro checks q4h/vitals q4   - Vimpat 100mg bid   - Bromocriptine 5mg Q8  - CTH 9/7 stable  - Seroquel 50 mg in AM / 87.5 in PM for agitation   - ASA 81 on 9/7 s/p R vert takedown     PULM:    - Secretions improved. standing duonebs, 3% nebs, mucomyst  - chest PT   - pulm following for PE, recs appreciated    CARDIO:    - -150  - TTE 8/12 WNL    - QTC 9/15: 452, f/u in AM     GI:    - TF via J tube w/ puree thin liquid diet  - PPI per bariatric surgeon, made IV - suspension clogged J tube  - bowel regimen held   - On Vit D and multivitamin s/p bariatric surgery    RENAL:   - normonatremia goal  - straight cath prn - retaining     HEM/ONC:   - ASA 81   - VTE Prophylaxis: SCDs,  BID (consider pradaxa when taking PO)   - dopplers 8/23, acute DVT left IM calf, persistent completely occlusive DVT b/l peroneal veins and right IM calf , 8/30 unchanged DVTs  - carotid doppler 8/24: neg for pseudoaneurysm, CTA shows soft tissue small hematoma, non-occlusive L IJ and L basillic vein thrombus   - hypercoagulable w/u: - prothrombin gene mutation - heterozygous   - 8/30 LUE doppler with findings of left IJ non-occlusive DVT and left basilic thrombus  - 9/6 dopplers: new L posterior tibial DVT: Eliquis d/c'ed switched to  BID  - Repeat weekly dopplers at rehab     ID:   - Afebrile   - COVID negative 9/12    ENDO:    - glucose goal 140 -180    DISPO: acute rehab / full code  Assessment and plan discussed with Dr. Lomax

## 2021-09-16 NOTE — PROGRESS NOTE ADULT - SUBJECTIVE AND OBJECTIVE BOX
Pt seen and examined by me at bedside this AM  overnight event note reviewed from surgery RE: J-tube  pt c/o RUQ pain, no nausea.     VSS, SBP 90-100s  comfortable  answers to questions appropriately  +bs/+J-tube site, clean, no drainage  NT, +bs  +permacath    labs 9/15 reviewed

## 2021-09-17 NOTE — PROGRESS NOTE ADULT - SUBJECTIVE AND OBJECTIVE BOX
HPI:  44y/o F with h/o recent gastric sleeve (08/04/21 [facility], Dr. Crisostomo ), fibromyalgia, thyroid dysfunction, PTSD, depression, anxiety.  Presented with seizures at home - brought to [place], with 1 more episode of seizure en route.  Intubated, CT showed SAH with IV extension, casted IV, hydrocephalus, given mannitol, levetiracetam 1g,  6mg ativan. Started on Cardene drip for BP goal < 140 and transferred to [facility] NICU for further management.     HH5 MF4   NIHSS 26 on arrival  BD 1 = 8/7 (07 Aug 2021 08:08)    INTERVAL EVENTS:  ASHA. Calm and conversive. Pending rehab.    HOSPITAL COURSE:  8/7: Tx from [place] for SAH. Intubated. R frontal EVD placed, central line and a line placed. 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   8/8: POD1 s/p angio with R vert takedown, extubated, desatting on aerosol mask, required extensive suctioning, 3% nebs, chest PT, started on low dose precedex drip for restlessness/agitation, ABG stable. NGT placed. TF started with goal 20 pending nutrition recs. 3% stopped for Na 150. Ceribell EEG negative and d/c'ed.   8/9 Overnight, new Right pronator drift and right gaze preference that can't cross midline, Repeat CTH demonstrated stable vents, CTA head and neck unremarkable for spasm, hypoattenuation of right cerebellum edema vs. infarct.  8/10: POD3 R vert takedown, EVD open at [id number]. ASHA overnight, neuro stable. CTH with increased hydro, CTA head/neck with mild basilar spasm, but concern for PE. 1/2 am D50 for hypoglycemia. 1L bolus then 100 cc/hr, PM rounds for net negative fluid balance and mild vasospasm on CTA.   8/11: POD4 R vert takedown. EVD at 5cm H2O, ASHA overnight. neuro stable.   Febrile 104. depakote increased to q6. NCHCT stable. EVD lowered to 0. Lasix 20 given for dieuresis, UOP over 2 liters. Sedation given for a-line placement, BP drop needing levo.  8/12: POD5 R vert takedown. EVD at 0cm H2O. ASHA overnight, neuro stable. Precedex being weaned off. Pending IVCF with vascular today.  8/13: POD6 R vert takedown. EVD open at 0cmH2O. ASHA overnight. Neuro exam stable. Mottled skin on the left lower extremity improving. Started on Bromocriptine 15mg Q8.   8/14: POD7. EVD open at 0. 1L bolus given for euvolemia o/n. neuro stable. CTH stable. ENT scoped vocal cords, +R voacl cord paresis, NTD. started on zosyn empirically.   8/15: POD8. EVD open at 0. ASHA o/n, neuro stable. Pt developed increased work of breathing, unable to clear her secretions, received racemic epi and multiple nebulizer treatments, still with stridor. Patient re-intubated at bedside, on full vent support.   8/16: CTH/CTA head/neck/CT chest performed o/n for worsened exam, R gaze preference, sluggish pupils. +worsened uncal herniation, hydro, vasospasm in b/l PCAs, L vert, basilar arteries. preop angio today, restarted on 3% for Na goal 145-150. Angio for vasospasm with IA verapamil.  8/17: POD10. Overnight Pt remains on levophed drip for SBP goal 180-220. Also remains on propofol drip. EVD open at -5cmH2O. ICPs WNL. Febrile 101F and pancultured. CTH today shows slightly smaller ventricles. Angio for vasospasm with IA verapamil.  8/18: POD11 R vert takedown. POD1 angio IA verpamil. Overnight, Pt noted to have downward gaze to the right and not following commands. Taken for stat head CT which is stable. Pupils also noted to be aniscoric left pupil 4mm and brisk and right 2mm brisk. Mannitol 50g given. Ceribell eeg placed for concern of subclinical seizures, so far appears negative for seizures. 1L NS o/n and 1.5L NS bolus in AM for euvolemia. vanc/zosyn stopped. 250cc 3% bolus and 250cc albumin given in AM. Brief seizure to L frontal lobe this AM on EEG, given 2g valproic acid and dose increased to 1g q8. Vimpat 100mg BID started.  Angio with interval improvment in vasospasm, IA verapamil given. In afternoon patient self-extubated, placed on NRB with mucomyst, 3% inhalation and duonebs. 3% at 50 d/c'd.  8/19: POD 12. Remains on VEEG. Axillary A line placed. Salt tabs d/c'd, florinef decreased to 0.1mg, EVD @ -5cm H2O, now draining 20cc/hr. 250 albumin and 500 NS boluses on dayshift, 1 L LR bolus  8/20: POD 13. ASHA. on VEEG, no seizures noted. Pending VPA level. 500 NS, 250 albumin. Febrile, pancultured. EEG d/c'ed.   8/21: BD14, POD14. ASHA overnight, EVD @ -5. s/p 1L bolus for euvolemia  8/22: BD #15: continued on levo, hypercoagulabitly profile pending, EVD @ -5, euvolemia, extubated, amantadine added, free water started for hypernatremia   8/23: BD 17, tmax 101F o/n. Gen Sx consulted for PEG - not a candidate at this time, pancultured for fever, 1L LR given for euvolemia. EVD at -5  8/24: BD18, ASHA o/n, neuro stable, EVD at -5, VPA level therapeutic, SBP goal 160-200, L IJ CVC attempted placement with carotid puncture, no pseudoaneurysm on US. MSSA growing in sputum. Ceftriaxone d/c'd and Ancef 2gq8 started. ID consulted. Recieved 1.5L LR and 500cc albumin.   8/25: POD5 last angio, BD19 ASHA o/n, neuro stable, EVD at -5  8/26: BD 20. ASHA o/n. EVD @ -5   8/27: BD 21, POD 20 Right vert takedown. ASHA overnight. Exam stable. EVD remains open at -5 and to be clamped at 0600 in preparation for right VPS placement today. Possible PEG placement Monday 8/30 8/28: BD22, POD22 Right vert takedown, POD1 R VPS, neuro exam stable. Tube feeds restarted, more lethargic this AM, improved during day, continue to monitor for hydrocephalus. AXR with dilated bowel, started on reglan and magnesium. Liquid BM, TFs held and rectal tube placed.   8/29: BD23, POD23 right vert takedown, POD2 R VPS, ASHA overnight, neuro stable. TFs decreased for colonic distention seen on XR, GI notified, nothing to do, TFs resumed at 20/hr and will continue will PEG placement tomorrow. CTH today demonstrated interval decrease in size of ventricles. Midodrine added to aid in weening levophed.   8/30: BD24, POD24, pending PEG placement this morning with Dr. Milner. Midodrine increased to 10mg q8hrs today to wean off of levo. 1u PRBC for Hb 7.5. FOB negative. 20mg lasix given. Repeat dopplers completed, unchanged from previous. Started on IV iron per hematology recs.  8/31: BD25, POD25, unsuccessful PEG placement by GI, trend Hgb w/ AM labs, slowly weening levophed. 5mg vitamin K given for elevated INR.  9/1: BD26, POD26. J tube w/ gen surg today. 5mg vitK IV given o/n for elevated INR. off levophed. amantadine decreased 100 BID and ritalin d/c'd.   9/2: BD27, POD27. s/p J tube w/ gen surg. still receiving meds/TF via J tube.  neuro stable  9/4: BD 28, POD28, s/p J tube w/ gen surg. Neuro stable CTH performed, changed shunt to Certas @ 3 from 4.   9/5: BD 29, POD29, s/p J tube w/ gen surg. VPS certas from 4 to 3 now. Required 1mg IV haldol overnight for agitation/delirium, patient not responding to sitter and attempting to get out of bed numerous times. Cont Eliquis 5 BID for b/l LE DVTs.  	  9/6: VPS Certas @3. BD30. ASHA overnight, neuro stable.   9/7: Certas@3, BD31. ASHA overnight, neuro stable.  9/8: Certas@3 BD 32. ASHA overnight, neuro stable.  9/9: Certas@3 BD 33, ASHA overnight, neuro stable  9/10. Certas@3 BD 34, ASHA overnight, neuro stable, pulled out PICC line, tolerating tube feeds via J tube pending rehab   9/11: certas@3, BD35. 2mg haldol given overnight for agitation. neuro stable. on TF via J tube w/ puree thin liquid diet  9/12: BD36. 1mg IV ativan o/n for agitation. neuro stable. 1mg PO ativan added for bedtime.  9/13: BD37, 1mg ativan PO + 0.5 iv for agitation, neuro stable , pending discharge  9/14: BD 38, seroquel PO for agitation, neuro stable pending discharge   9/15: BD39. ASHA o/n neuro stable. pending rehab. qtc 452  9/16: BD 40. ASHA. no agitation overnight. Exam stable  9/17: BD 41. ASHA. No agitation.     Vital Signs Last 24 Hrs  T(C): 37.1 (16 Sep 2021 22:17), Max: 37.1 (16 Sep 2021 22:17)  T(F): 98.8 (16 Sep 2021 22:17), Max: 98.8 (16 Sep 2021 22:17)  HR: 82 (16 Sep 2021 23:40) (80 - 110)  BP: 95/50 (16 Sep 2021 23:40) (87/51 - 118/69)  BP(mean): 68 (16 Sep 2021 23:40) (64 - 88)  RR: 17 (16 Sep 2021 23:40) (17 - 20)  SpO2: 98% (16 Sep 2021 23:40) (93% - 99%)    I&O's Summary    15 Sep 2021 07:01  -  16 Sep 2021 07:00  --------------------------------------------------------  IN: 1206 mL / OUT: 301 mL / NET: 905 mL    16 Sep 2021 07:01  -  17 Sep 2021 00:31  --------------------------------------------------------  IN: 1942 mL / OUT: 350 mL / NET: 1592 mL        PHYSICAL EXAM:  GEN: laying in bed, appears well, NAD  NEURO: AOx2-3. FC, OE spont, hypophonic, face symmetric. CNII-XII intact. PERRL, EOMI. No pronator drift. MAEx4. 5/5 strength throughout. Sensation intact throughout.  CV: RRR +S1/S2  PULM: CTAB  GI: Abd soft, NT/ND  EXT: ext warm, dry, nontender  WOUND: crani and abd incisions healing; c/d/i.      TUBES/LINES:  [] Richey  [] Trach  [] NGT  [x] PEJ  [] Wound Drains  [] Others    DIET:  [] NPO  [x] Mechanical  [x] Tube feeds    LABS:                        11.8   4.92  )-----------( 301      ( 15 Sep 2021 11:04 )             40.2     09-15    142  |  104  |  13  ----------------------------<  91  4.2   |  26  |  0.64    Ca    10.0      15 Sep 2021 11:04  Phos  4.5     09-15  Mg     2.1     09-15              CAPILLARY BLOOD GLUCOSE          Drug Levels: [] N/A    CSF Analysis: [] N/A      Allergies    apple (Angioedema)  No Known Drug Allergies  strawberry (Angioedema)    Intolerances    lactose (Stomach Upset)    MEDICATIONS:  Antibiotics:    Neuro:  acetaminophen    Suspension .. 650 milliGRAM(s) Oral every 6 hours PRN  bromocriptine Capsule 5 milliGRAM(s) Oral every 8 hours  lacosamide Solution 100 milliGRAM(s) Oral two times a day  ondansetron Injectable 4 milliGRAM(s) IV Push every 6 hours PRN  QUEtiapine 50 milliGRAM(s) Oral every 24 hours  QUEtiapine 87.5 milliGRAM(s) Oral every 24 hours    Anticoagulation:  aspirin  chewable 81 milliGRAM(s) Oral daily  enoxaparin Injectable 100 milliGRAM(s) SubCutaneous every 12 hours    OTHER:  acetylcysteine 20%  Inhalation 4 milliLiter(s) Inhalation every 12 hours  albuterol/ipratropium for Nebulization 3 milliLiter(s) Nebulizer every 6 hours  BACItracin   Ointment 1 Application(s) Topical two times a day  bisacodyl Suppository 10 milliGRAM(s) Rectal daily PRN  chlorhexidine 2% Cloths 1 Application(s) Topical <User Schedule>  hydrocortisone 1% Cream 1 Application(s) Topical every 12 hours  pantoprazole  Injectable 40 milliGRAM(s) IV Push daily  polyethylene glycol 3350 17 Gram(s) Oral daily  senna 2 Tablet(s) Oral at bedtime    IVF:  ergocalciferol 10917 Unit(s) Oral <User Schedule>  multivitamin/minerals/iron Oral Solution (CENTRUM) 15 milliLiter(s) Oral daily    CULTURES:    RADIOLOGY & ADDITIONAL TESTS:      ASSESSMENT:  44y/o F with h/o recent gastric sleeve (08/04/21 [facility], Dr. Crisostomo ), fibromyalgia, thyroid dysfunction, PTSD, depression, anxiety.  Presented with seizures at home - brought to [place], with 1 more episode of seizure en route.  Intubated, CT showed SAH with IV extension, casted IV, hydrocephalus, given mannitol, levetiracetam 1g,  6mg ativan. Started on Cardene drip for BP goal < 140 and transferred to [facility] NICU for further management. HH5 MF4, BD 1 = 8/7. Now s/p cerebral angiogram for R vertebral artery takedown for R vertebral dissecting aneurysm (8/7), and R frontal EVD placement (8/7), now s/p IVC filter placement (8/12,) s/p angio IA verapamil 8/16, 8/17, 8/18, 8/20. Now s/p right VPS certas at 3 (8/27). S/p J tube placement.      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    Anemia due to acute blood loss    SAH (subarachnoid hemorrhage)    Aneurysm    Delirium    Thrombocytopenic    Functional quadriplegia    Angiogram, carotid and cerebral arteries    IVC filter placement    Angiogram, carotid and cerebral, bilateral    Angiogram, carotid and cerebral, bilateral    Angiogram, carotid and cerebral, bilateral    Angiogram, carotid and cerebral, bilateral    Placement,  shunt, using frameless stereotaxy    Laparoscopic insertion of jejunostomy tube    IVC filter placement    Angiogram, carotid and cerebral, bilateral    Placement,  shunt, using frameless stereotaxy    Laparoscopic insertion of jejunostomy tube    Multiple subsegmental pulmonary emboli without acute cor pulmonale    DVT, lower extremity    Abnormality of lung on CXR    Heterozygous for prothrombin [id number] mutation    Anemia due to acute blood loss    [email or web address]        PLAN:  NEURO:   - neuro checks q4h/vitals q4   - Vimpat 100mg bid   - Bromocriptine 5mg Q8  - CTH 9/7 stable  - Seroquel 50 mg in AM / 87.5 in PM for agitation   - ASA 81 on 9/7 s/p R vert takedown     PULM:    - Secretions improved. standing duonebs, mucomyst  - chest PT   - hx PE, now on full AC with IVCF in place    CARDIO:    - -150  - TTE 8/12 WNL    - QTC 9/15: 452, f/u in AM     GI:    - TF via J tube w/ puree thin liquid diet  - PPI per bariatric surgeon, made IV - suspension clogged J tube  - bowel regimen   - On Vit D and multivitamin s/p bariatric surgery    RENAL:   - normonatremia goal  - straight cath prn - retaining     HEM/ONC:   - ASA 81   - VTE Prophylaxis: SCDs,  BID (consider pradaxa when taking PO)   - dopplers 8/23, acute DVT left IM calf, persistent completely occlusive DVT b/l peroneal veins and right IM calf , 8/30 unchanged DVTs  - carotid doppler 8/24: neg for pseudoaneurysm, CTA shows soft tissue small hematoma, non-occlusive L IJ and L basillic vein thrombus   - hypercoagulable w/u: - prothrombin gene mutation - heterozygous   - 8/30 LUE doppler with findings of left IJ non-occlusive DVT and left basilic thrombus  - 9/6 dopplers: new L posterior tibial DVT: Eliquis d/c'ed switched to  BID  - Repeat weekly dopplers at rehab     ID:   - Afebrile   - COVID negative 9/12    ENDO:    - glucose goal 140 -180    DISPO: acute rehab / full code  Assessment and plan discussed with Dr. Lomax and hospitalist

## 2021-09-17 NOTE — SWALLOW BEDSIDE ASSESSMENT ADULT - CONSISTENCIES ADMINISTERED
4 cup sips of thin liq, 1 tsp of puree, 2  tsp of mech soft solids
thin liquid/nectar thick
thin liquid/puree

## 2021-09-17 NOTE — SWALLOW BEDSIDE ASSESSMENT ADULT - ADDITIONAL RECOMMENDATIONS
This svc will f/u in 2-3 days.
1) Given degree of oral dysphagia, pt would likely benefit from meds crushed in puree, rather than attempting to swallow whole pills.   2) Will continue to f/u while pt is in-house, but per Neurosurgery team, pt is scheduled for d/c from [facility] to rehab facility. Pls reconsult as needed.

## 2021-09-17 NOTE — CHART NOTE - NSCHARTNOTEFT_GEN_A_CORE
Admitting Diagnosis:   Patient is a 45y old  Female who presents with a chief complaint of SAH (17 Sep 2021 08:46)      PAST MEDICAL & SURGICAL HISTORY:      Current Nutrition Order:  Dysphagia Pureed  Vital HP @ 46 ml/hr x24hrs via NGT providing 1104 ml TV, 1104 kcal, 96g pro, 923 ml free water.     PO Intake: Good (%) [   ]  Fair (50-75%) [x  ] Poor (<25%) [   ]    GI Issues:   Jtube placed 9/1; tolerating feeds well.   >>Initial plan for PEG however unable to transilluminate from stomach 2/2 adipose tissue per GI note.   H/o recent sleeve gastrectomy 8/4/21  Ordered for protonix, zofran  Last BM 9/16    Pain:  No pain reported    Skin Integrity;   Surgical incision  Oli score 16    Skin Integrity:  surgical incision    Labs:   09-15    142  |  104  |  13  ----------------------------<  91  4.2   |  26  |  0.64    Ca    10.0      15 Sep 2021 11:04  Phos  4.5     09-15  Mg     2.1     09-15      CAPILLARY BLOOD GLUCOSE          Medications:  MEDICATIONS  (STANDING):  acetylcysteine 20%  Inhalation 4 milliLiter(s) Inhalation every 12 hours  albuterol/ipratropium for Nebulization 3 milliLiter(s) Nebulizer every 6 hours  aspirin  chewable 81 milliGRAM(s) Oral daily  BACItracin   Ointment 1 Application(s) Topical two times a day  bromocriptine Capsule 5 milliGRAM(s) Oral every 8 hours  chlorhexidine 2% Cloths 1 Application(s) Topical <User Schedule>  enoxaparin Injectable 100 milliGRAM(s) SubCutaneous every 12 hours  ergocalciferol 31277 Unit(s) Oral <User Schedule>  hydrocortisone 1% Cream 1 Application(s) Topical every 12 hours  lacosamide Solution 100 milliGRAM(s) Oral two times a day  multivitamin/minerals/iron Oral Solution (CENTRUM) 15 milliLiter(s) Oral daily  pantoprazole  Injectable 40 milliGRAM(s) IV Push daily  polyethylene glycol 3350 17 Gram(s) Oral daily  QUEtiapine 50 milliGRAM(s) Oral every 24 hours  QUEtiapine 87.5 milliGRAM(s) Oral every 24 hours  senna 2 Tablet(s) Oral at bedtime    MEDICATIONS  (PRN):  acetaminophen    Suspension .. 650 milliGRAM(s) Oral every 6 hours PRN Temp greater or equal to 38C (100.4F), Mild Pain (1 - 3)  bisacodyl Suppository 10 milliGRAM(s) Rectal daily PRN Constipation  ondansetron Injectable 4 milliGRAM(s) IV Push every 6 hours PRN Nausea and/or Vomiting  sodium chloride 0.9% lock flush 10 milliLiter(s) IV Push every 1 hour PRN Pre/post blood products, medications, blood draw, and to maintain line patency        Admitting Anthropometrics:  · Height for BMI (FEET)	5 Feet  · Height for BMI (INCHES)	4 Inch(s)  · Height for BMI (CENTIMETERS)	162.56 Centimeter(s)  · Weight for BMI (lbs)	225.1 lb  · Weight for BMI (kg)	102.1 kg  · Body Mass Index	              38.6 kg/m2  · Ideal Body Weight (lbs)	120  · Ideal Body Weight (kg)	54.4    Weight:  8/7 225lbs  9/9 217lbs (bedscale weight)    Weight Change:   Reports UBW prior to sleeve gastrectomy was 235lbs. Current B Wis 216lbs. This indicates a 8% wt loss x1 mo.     Nutrition Focused Physical Exam: Completed [   ]  Not Pertinent [ x  ]    Estimated energy needs:   IBW (54.5kg) used for calculations as pt >120% of IBW (188%).   Nutrient needs based on [facility] standards of care for maintenance in adults.   Needs adjusted for recent bariatric sx, critical care, obesity.   Kcal (20-25kcal/kg): 1090-1363kcal/day   Protein (1.5-2.0 g/kg pro): 81-108g pro/day  Fluids per team.     Subjective:   45F with h/o recent gastric sleeve (08/04/21 [facility], Dr. Crisostomo ), fibromyalgia, thyroid dysfunction, PTSD, depression, anxiety.  Presented with seizures at home - brought to [place], with 1 more episode of seizure en route.  Intubated, CT showed SAH with IV extension, casted IV, hydrocephalus, given mannitol, levetiracetam 1g,  6mg ativan. Started on Cardene drip for BP goal < 140 and transferred to [facility] NICU for further management. S/p angio with R vert takedown 8/7, pt extubated later that day. Pt remains on fentanyl and Precedex restlessness and agitation. NGT placed for initiation of EN. CTH with increased hydro, CTA head/neck with mild basilar spasm. Pt now weened off fentanyl and precedex 8/12 per disc with RN. S/p IVC placement 8/12. Pt with increased work of breathing with inability to clear secretions, and was reintubated 8/15. CTA shows posterior circulation vasospasm. Started on levophed drip for SBP goal 180-200. Also remains on propofol drip. EVD open at -5cmH2O. Pt noted to be febrile this am (101F) and pancultured 8/17. Pt s/p multiple angiograms 8/16, 8/17, 8/18, 8/20. Pt self extubated 8/18 and transitioned to NC, tolerating well. Pt con ton levo for BP support. SBP goal 160-200. MSSA growing in sputum. Ceftriaxone d/c'd and Ancef 2gq8 started. ID consulted. S/p  shunt placement 8/27. Attempted PEG placement for enteral feeds however unable to place gastric feeding tube. S/p Jtube 9/1 and now running at ordered goal and adequately meeting needs. Advanced to dysphagia pureed 9/10. Plan to d/c to rehab.    Pt seen in room, resting in bed. Feeds infusing at ordered goal via jtube. Tolerating well. Consuming pureed foods w/ good tolerance as well. Consumed orange juice, icees and cream of wheat. Denies any N/V, or discomofort w/ feeds.  last BM was 9/16. Understanding of meeting needs this way for time being. Expressed desire to leave hospital. Recommend continuing w/ vitamin D supplementation. RD to follow.     Previous Nutrition Diagnosis: Inadequate Oral Intake RT inability to meet needs via PO AEB NPO, reliant on EN to meet 100% of est needs.     Active [ x  ]  Resolved [   ]    If resolved, new PES:     Goal/Expected Outcome Consistently meet >75% est needs via appropriate route.    Recommendations:  1. Bariatric phase 2 pureed/soft  2. If team wishes to cont EN regimen, recommend increasing; Vital HP @ 46 ml/hr x24hrs via NGT providing 1104 ml TV, 1104 kcal, 96g pro., 923 ml free water.   >>Cont with aspiration precautions at all times  >>FWF per team  3. Cont to monitor lytes and replete prn  4. RD diet edu prn  5. Pain and bowel regimen per team  6. Vitamin D supplementation.  *d/w team.     Education: Discussed Po diet    Risk Level: High [  ] Moderate [  x ] Low [   ].

## 2021-09-17 NOTE — PROGRESS NOTE ADULT - ASSESSMENT
46y/o F with h/o recent gastric sleeve (08/04/21 [facility], Dr. Crisostomo ), fibromyalgia, thyroid dysfunction, PTSD, depression, anxiety.  Presented with seizures at home - brought to [place], with 1 more episode of seizure en route.  Intubated, CT showed SAH with IV extension, casted IV, hydrocephalus, given mannitol, levetiracetam 1g,  6mg ativan. Started on Cardene drip for BP goal < 140 and transferred to [facility] NICU for further management. HH5 MF4, BD 1 = 8/7. Now s/p cerebral angiogram for R vertebral artery takedown for R vertebral dissecting aneurysm (8/7), and R frontal EVD placement (8/7), now s/p IVC filter placement (8/12,) s/p angio IA verapamil 8/16, 8/17, 8/18, 8/20. Now s/p right VPS certas at 4 (8/27).

## 2021-09-17 NOTE — CHART NOTE - NSCHARTNOTESELECT_GEN_ALL_CORE
Event Note
Event Note
Follow Up Nutrition Assessment/Nutrition Services
Follow Up Nutrition Assessment/Nutrition Services
self extubation/Event Note
Follow Up Nutrition Assessment/Nutrition Services
Follow up/Nutrition Services
GI brief note
GI- PEG attempt
Nutrition Services
VTE/Event Note
seizure activity/Event Note

## 2021-09-17 NOTE — SWALLOW BEDSIDE ASSESSMENT ADULT - ORAL PHASE
suspect mildly prolonged oral transit
Bolus holding w thin liquids. Mildly prolonged processing w puree. Prolonged mastication with soft solids.

## 2021-09-17 NOTE — SWALLOW BEDSIDE ASSESSMENT ADULT - SPECIFY REASON(S)
Consulted to determine readiness for a PO diet.
To assess for possible diet advancement
Assess for safety of PO intake

## 2021-09-17 NOTE — SWALLOW BEDSIDE ASSESSMENT ADULT - SWALLOW EVAL: DIAGNOSIS
Pt continues to p/w at least moderate oral dysphagia on this clinical exam, characterized by inefficient bolus manipulation/processing resulting in bolus holding (only w thin liquids as pt prepares to initiate a swallow) and prolonged oral phase. Pt also self reported that managing chewable solids was too effortful and she would prefer to remain on the current diet of puree w thin liquids.
Pt p/w at least a mod oropharyngeal dysphagia characterized by reduced bolus acceptance in the oral stage & +signs of aspiration with thin & NTL in the pharyngeal stage.
Pt presents with at least mild oral phase deficits. Pharyngeal phase was clinically judged to be WFL. However, unable to r/o silent aspiration at bedside. Pt would benefit from a VFSS piror to initiating a PO diet given prolonged endotracheal intubation, prolonged NPO status of ~1 mo., and fluctuating arousal.

## 2021-09-17 NOTE — SWALLOW BEDSIDE ASSESSMENT ADULT - PHARYNGEAL PHASE
Laryngeal elevation palpated. No overt s/s of airway protection deficits were observed.
Hyolaryngeal excursion palpated during swallow trigger, appears sluggish & with reduced movement. +throat clear on 3/3 trials suggestive of aspiration. Pt appears not to have strong cough therefore suspect gross aspiration with reduced sensory response.

## 2021-09-17 NOTE — SWALLOW BEDSIDE ASSESSMENT ADULT - NS SPL SWALLOW CLINIC TRIAL FT
Prolonged oral manipulation of bolus with rare verbal prompts to elicit A-P transit d/t brief bolus holding. Swallow trigger was consistently palpated. No clinical overt s/s of aspiration appreciated. Note, solids deferred d/t increasing lethargy.
Pt appeared fatigued after the 2 mech soft trials. She refused any additional trials of PO and reported preference for purees at this time 2/2 high degree of effort needed for mastication.

## 2021-09-17 NOTE — SWALLOW BEDSIDE ASSESSMENT ADULT - SWALLOW EVAL: RECOMMENDED FEEDING/EATING TECHNIQUES
Maintain aspiration precautions given known dysphagia and difficulty w self positioning/check mouth frequently for oral residue/pocketing/maintain upright posture during/after eating for 30 mins/small sips/bites

## 2021-09-17 NOTE — SWALLOW BEDSIDE ASSESSMENT ADULT - SWALLOW EVAL: RECOMMENDED DIET
recommend pt remain NPO + J tube except for ice chips when fully awake pending VFSS
Lizzy w thin liquids
NPO. Continue NGT

## 2021-09-17 NOTE — SWALLOW BEDSIDE ASSESSMENT ADULT - MODE OF PRESENTATION
feeding assistance/cup/spoon
with hand-over-hand support for cup stability/self fed
2x 1/2 tsp thin, 1x tsp NTL/spoon/fed by clinician

## 2021-09-17 NOTE — SWALLOW BEDSIDE ASSESSMENT ADULT - SLP GENERAL OBSERVATIONS
Pt was received sitting in semi-reclined position in bed watching TV. She reported diet tolerance and stated that she would like to attempt chewable solids.
Pt received awake but drowsy in bed. 1:1 sitter at bedside who reported this is the most alert pt had been all morning d/t previously administered sedation (last night). Pt was oriented, at least x3, but restless.

## 2021-09-17 NOTE — PROGRESS NOTE ADULT - SUBJECTIVE AND OBJECTIVE BOX
Patient is a 45y old  Female who presents with a chief complaint of SAH (04 Sep 2021 07:05)      HPI:  46y/o F with h/o recent gastric sleeve (08/04/21 [facility], Dr. Crisostomo ), fibromyalgia, thyroid dysfunction, PTSD, depression, anxiety.  Presented with seizures at home - brought to [place], with 1 more episode of seizure en route.  Intubated, CT showed SAH with IV extension, casted IV, hydrocephalus, given mannitol, levetiracetam 1g,  6mg ativan. Started on Cardene drip for BP goal < 140 and transferred to [facility] NICU for further management.     HH5 MF4   NIHSS 26 on arrival  BD 1 = 8/7 (07 Aug 2021 08:08)    O/N and interval events: Pt given Seroquel 25 x 1    Subjective:   Pt denies pain. Unable to elicit much history given patient's clinical status.     Allergies    apple (Angioedema)  No Known Drug Allergies  strawberry (Angioedema)    Intolerances    lactose (Stomach Upset)        MEDICATIONS  (STANDING):  acetylcysteine 20%  Inhalation 4 milliLiter(s) Inhalation every 12 hours  albuterol/ipratropium for Nebulization 3 milliLiter(s) Nebulizer every 6 hours  aspirin  chewable 81 milliGRAM(s) Oral daily  BACItracin   Ointment 1 Application(s) Topical two times a day  bromocriptine Capsule 5 milliGRAM(s) Oral every 8 hours  chlorhexidine 2% Cloths 1 Application(s) Topical <User Schedule>  enoxaparin Injectable 100 milliGRAM(s) SubCutaneous every 12 hours  ergocalciferol 50894 Unit(s) Oral <User Schedule>  hydrocortisone 1% Cream 1 Application(s) Topical every 12 hours  lacosamide Solution 100 milliGRAM(s) Oral two times a day  multivitamin/minerals/iron Oral Solution (CENTRUM) 15 milliLiter(s) Oral daily  pantoprazole  Injectable 40 milliGRAM(s) IV Push daily  polyethylene glycol 3350 17 Gram(s) Oral daily  QUEtiapine 50 milliGRAM(s) Oral every 24 hours  QUEtiapine 87.5 milliGRAM(s) Oral every 24 hours  senna 2 Tablet(s) Oral at bedtime    MEDICATIONS  (PRN):  acetaminophen    Suspension .. 650 milliGRAM(s) Oral every 6 hours PRN Temp greater or equal to 38C (100.4F), Mild Pain (1 - 3)  bisacodyl Suppository 10 milliGRAM(s) Rectal daily PRN Constipation  ondansetron Injectable 4 milliGRAM(s) IV Push every 6 hours PRN Nausea and/or Vomiting  sodium chloride 0.9% lock flush 10 milliLiter(s) IV Push every 1 hour PRN Pre/post blood products, medications, blood draw, and to maintain line patency                Drug Dosing Weight  Height (cm): 162.6 (01 Sep 2021 11:40)  Weight (kg): 98.2 (01 Sep 2021 11:40)  BMI (kg/m2): 37.1 (01 Sep 2021 11:40)  BSA (m2): 2.02 (01 Sep 2021 11:40)    PAST MEDICAL & SURGICAL HISTORY:      FAMILY HISTORY:  No pertinent family history in first degree relatives        SOCIAL HISTORY: no smoking reported      Vital Signs Last 24 Hrs  T(C): 37 (17 Sep 2021 04:49), Max: 37.1 (16 Sep 2021 22:17)  T(F): 98.6 (17 Sep 2021 04:49), Max: 98.8 (16 Sep 2021 22:17)  HR: 100 (17 Sep 2021 04:00) (80 - 110)  BP: 109/55 (17 Sep 2021 04:00) (87/51 - 118/69)  BP(mean): 76 (17 Sep 2021 04:00) (64 - 88)  RR: 17 (17 Sep 2021 04:00) (17 - 20)  SpO2: 98% (17 Sep 2021 04:00) (93% - 99%)      PHYSICAL EXAM:      Constitutional: NAD  Eyes: PERRLA  ENMT: thrush  Neck: supple  Back: midline  Respiratory: CTA b/l  Cardiovascular: rrr, s1s2, no m/r/g  Gastrointestinal: soft, NTND, + J tube  Extremities: wwp  Vascular: + 2 pules radial  Neurological: AAO x 2, minimally following commands  Skin: no rash  Lymph Nodes: no LAD  Musculoskeletal: no joint swelling  Psychiatric: flat affect        LABS:                          11.8   4.92  )-----------( 301      ( 15 Sep 2021 11:04 )             40.2   09-15    142  |  104  |  13  ----------------------------<  91  4.2   |  26  |  0.64    Ca    10.0      15 Sep 2021 11:04  Phos  4.5     09-15  Mg     2.1     09-15                      EKG:    ECHO, US:    < from: TTE Limited Echo w/o Cont (08.12.21 @ 15:22) >  CONCLUSIONS:     1. Limited study obtained for evaluation of right ventricular function.   2. Probably normal left ventricular systolic function.   3. Normal right ventricular size and systolic function.   4. There was insufficient tricuspid regurgitation detected from which to calculate pulmonary artery systolic pressure.   5. No pericardial effusion.    < end of copied text >      RADIOLOGY:  < from: CT Head No Cont (08.29.21 @ 17:45) >  IMPRESSION:    With ventricular shunt in place, there is now decreased ventricular size since 08/27/2021.    < end of copied text >    < from: CT Head No Cont (09.04.21 @ 11:29) >    IMPRESSION:    No acute intracranial hemorrhage or mass effect compared to prior imaging from 08/29/2021, now with the patient placed on anticoagulation therapy.    Stable ventricular size with  shunt in place, mild hydrocephalus persists.    A couple small and subacute appearing infarcts are now visible in the right cerebellum.    --- End of Report ---    < end of copied text >

## 2021-09-17 NOTE — SWALLOW BEDSIDE ASSESSMENT ADULT - COMMENTS
Per 9/10/21 Modified Barium Swallow Study (MBS/VFSS): "Pt presents with moderate oral and mild pharyngeal dysphagia in setting of SAH and prolonged endotracheal intubation. Deficits marked by slow prolonged bolus processing, effortful and delayed A-P bolus transit, reduced bolus clearance within the oral cavity, delayed swallow trigger, and mildly reduced pharyngeal swallow efficiency. Airway protection was preserved." recommendation was made for puree/thin liq. Per Neurosurgery team, pt is tolerating her diet and receiving supplemental TFs.

## 2021-09-17 NOTE — PROGRESS NOTE ADULT - SUBJECTIVE AND OBJECTIVE BOX
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    Anemia due to acute blood loss    SAH (subarachnoid hemorrhage)    Aneurysm    Delirium    Thrombocytopenic    Functional quadriplegia            CHEMOTHERAPY REGIMEN:        Day:                          Diet:  Protocol:                                    IVF:      MEDICATIONS  (STANDING):  acetylcysteine 20%  Inhalation 4 milliLiter(s) Inhalation every 12 hours  albuterol/ipratropium for Nebulization 3 milliLiter(s) Nebulizer every 6 hours  aspirin  chewable 81 milliGRAM(s) Enteral Tube daily  BACItracin   Ointment 1 Application(s) Topical two times a day  bromocriptine Capsule 5 milliGRAM(s) Oral every 8 hours  chlorhexidine 2% Cloths 1 Application(s) Topical <User Schedule>  enoxaparin Injectable 100 milliGRAM(s) SubCutaneous every 12 hours  ergocalciferol 18783 Unit(s) Oral <User Schedule>  hydrocortisone 1% Cream 1 Application(s) Topical every 12 hours  lacosamide Solution 100 milliGRAM(s) Oral two times a day  multivitamin/minerals/iron Oral Solution (CENTRUM) 15 milliLiter(s) Oral daily  pantoprazole  Injectable 40 milliGRAM(s) IV Push daily  polyethylene glycol 3350 17 Gram(s) Oral daily  QUEtiapine 50 milliGRAM(s) Oral every 24 hours  QUEtiapine 87.5 milliGRAM(s) Oral every 24 hours  senna 2 Tablet(s) Oral at bedtime    MEDICATIONS  (PRN):  acetaminophen   Tablet .. 650 milliGRAM(s) Oral every 6 hours PRN Temp greater or equal to 38C (100.4F), Mild Pain (1 - 3)  bisacodyl Suppository 10 milliGRAM(s) Rectal daily PRN Constipation  ondansetron Injectable 4 milliGRAM(s) IV Push every 6 hours PRN Nausea and/or Vomiting  sodium chloride 0.9% lock flush 10 milliLiter(s) IV Push every 1 hour PRN Pre/post blood products, medications, blood draw, and to maintain line patency      Allergies    apple (Angioedema)  No Known Drug Allergies  strawberry (Angioedema)    Intolerances    lactose (Stomach Upset)      DVT Prophylaxis: [ ] YES [ ] NO      Antibiotics: [ ] YES [ ] NO    Pain Scale (1-10):       Location:    Vital Signs Last 24 Hrs  T(C): 36.6 (17 Sep 2021 09:39), Max: 37.1 (16 Sep 2021 22:17)  T(F): 97.9 (17 Sep 2021 09:39), Max: 98.8 (16 Sep 2021 22:17)  HR: 104 (17 Sep 2021 12:00) (80 - 110)  BP: 123/75 (17 Sep 2021 12:00) (95/50 - 123/75)  BP(mean): 87 (17 Sep 2021 12:00) (68 - 88)  RR: 18 (17 Sep 2021 12:00) (17 - 20)  SpO2: 96% (17 Sep 2021 12:00) (93% - 99%)    Drug Dosing Weight  Height (cm): 162.6 (01 Sep 2021 11:40)  Weight (kg): 98.2 (01 Sep 2021 11:40)  BMI (kg/m2): 37.1 (01 Sep 2021 11:40)  BSA (m2): 2.02 (01 Sep 2021 11:40)     Physical Exam:  · Constitutional	detailed exam  · Constitutional Details	well-developed; well-groomed  · Eyes	EOMI; PERRL; no drainage or redness  · ENMT Comments	dry mucous membranes  · Respiratory	detailed exam  · Respiratory Comments	normal breath sounds at the lung bases bilaterally  · Cardiovascular	Regular rate & rhythm, normal S1, S2; no murmurs, gallops or rubs; no S3, S4  · Abd-Soft non tender  ·Ext-no edema, clubbing or cyanosis    URINARY CATHETER: [ ] YES [ ] NO     LABS:                CULTURES:    RADIOLOGY & ADDITIONAL STUDIES:

## 2021-09-18 NOTE — PROGRESS NOTE ADULT - SUBJECTIVE AND OBJECTIVE BOX
HPI:  46y/o F with h/o recent gastric sleeve (08/04/21 [facility], Dr. Crisostomo ), fibromyalgia, thyroid dysfunction, PTSD, depression, anxiety.  Presented with seizures at home - brought to [facility], with 1 more episode of seizure en route.  Intubated, CT showed SAH with IV extension, casted IV, hydrocephalus, given mannitol, levetiracetam 1g,  6mg ativan. Started on Cardene drip for BP goal < 140 and transferred to [facility] NICU for further management.     HH5 MF4   NIHSS 26 on arrival  BD 1 = 8/7 (07 Aug 2021 08:08)    HOSPITAL COURSE:  8/7: Tx from [facility] for SAH. Intubated. R frontal EVD placed, central line and a line placed. 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   8/8: POD1 s/p angio with R vert takedown, extubated, desatting on aerosol mask, required extensive suctioning, 3% nebs, chest PT, started on low dose precedex drip for restlessness/agitation, ABG stable. NGT placed. TF started with goal 20 pending nutrition recs. 3% stopped for Na 150. Ceribell EEG negative and d/c'ed.   8/9 Overnight, new Right pronator drift and right gaze preference that can't cross midline, Repeat CTH demonstrated stable vents, CTA head and neck unremarkable for spasm, hypoattenuation of right cerebellum edema vs. infarct.  8/10: POD3 R vert takedown, EVD open at [id number]. ASHA overnight, neuro stable. CTH with increased hydro, CTA head/neck with mild basilar spasm, but concern for PE. 1/2 am D50 for hypoglycemia. 1L bolus then 100 cc/hr, PM rounds for net negative fluid balance and mild vasospasm on CTA.   8/11: POD4 R vert takedown. EVD at 5cm H2O, ASHA overnight. neuro stable.   Febrile 104. depakote increased to q6. NCHCT stable. EVD lowered to 0. Lasix 20 given for dieuresis, UOP over 2 liters. Sedation given for a-line placement, BP drop needing levo.  8/12: POD5 R vert takedown. EVD at 0cm H2O. ASHA overnight, neuro stable. Precedex being weaned off. Pending IVCF with vascular today.  8/13: POD6 R vert takedown. EVD open at 0cmH2O. ASHA overnight. Neuro exam stable. Mottled skin on the left lower extremity improving. Started on Bromocriptine 15mg Q8.   8/14: POD7. EVD open at 0. 1L bolus given for euvolemia o/n. neuro stable. CTH stable. ENT scoped vocal cords, +R voacl cord paresis, NTD. started on zosyn empirically.   8/15: POD8. EVD open at 0. ASHA o/n, neuro stable. Pt developed increased work of breathing, unable to clear her secretions, received racemic epi and multiple nebulizer treatments, still with stridor. Patient re-intubated at bedside, on full vent support.   8/16: CTH/CTA head/neck/CT chest performed o/n for worsened exam, R gaze preference, sluggish pupils. +worsened uncal herniation, hydro, vasospasm in b/l PCAs, L vert, basilar arteries. preop angio today, restarted on 3% for Na goal 145-150. Angio for vasospasm with IA verapamil.  8/17: POD10. Overnight Pt remains on levophed drip for SBP goal 180-220. Also remains on propofol drip. EVD open at -5cmH2O. ICPs WNL. Febrile 101F and pancultured. CTH today shows slightly smaller ventricles. Angio for vasospasm with IA verapamil.  8/18: POD11 R vert takedown. POD1 angio IA verpamil. Overnight, Pt noted to have downward gaze to the right and not following commands. Taken for stat head CT which is stable. Pupils also noted to be aniscoric left pupil 4mm and brisk and right 2mm brisk. Mannitol 50g given. Ceribell eeg placed for concern of subclinical seizures, so far appears negative for seizures. 1L NS o/n and 1.5L NS bolus in AM for euvolemia. vanc/zosyn stopped. 250cc 3% bolus and 250cc albumin given in AM. Brief seizure to L frontal lobe this AM on EEG, given 2g valproic acid and dose increased to 1g q8. Vimpat 100mg BID started.  Angio with interval improvment in vasospasm, IA verapamil given. In afternoon patient self-extubated, placed on NRB with mucomyst, 3% inhalation and duonebs. 3% at 50 d/c'd.  8/19: POD 12. Remains on VEEG. Axillary A line placed. Salt tabs d/c'd, florinef decreased to 0.1mg, EVD @ -5cm H2O, now draining 20cc/hr. 250 albumin and 500 NS boluses on dayshift, 1 L LR bolus  8/20: POD 13. ASHA. on VEEG, no seizures noted. Pending VPA level. 500 NS, 250 albumin. Febrile, pancultured. EEG d/c'ed.   8/21: BD14, POD14. ASHA overnight, EVD @ -5. s/p 1L bolus for euvolemia  8/22: BD #15: continued on levo, hypercoagulabitly profile pending, EVD @ -5, euvolemia, extubated, amantadine added, free water started for hypernatremia   8/23: BD 17, tmax 101F o/n. Gen Sx consulted for PEG - not a candidate at this time, pancultured for fever, 1L LR given for euvolemia. EVD at -5  8/24: BD18, ASHA o/n, neuro stable, EVD at -5, VPA level therapeutic, SBP goal 160-200, L IJ CVC attempted placement with carotid puncture, no pseudoaneurysm on US. MSSA growing in sputum. Ceftriaxone d/c'd and Ancef 2gq8 started. ID consulted. Recieved 1.5L LR and 500cc albumin.   8/25: POD5 last angio, BD19 ASHA o/n, neuro stable, EVD at -5  8/26: BD 20. ASHA o/n. EVD @ -5   8/27: BD 21, POD 20 Right vert takedown. ASHA overnight. Exam stable. EVD remains open at -5 and to be clamped at 0600 in preparation for right VPS placement today. Possible PEG placement Monday 8/30 8/28: BD22, POD22 Right vert takedown, POD1 R VPS, neuro exam stable. Tube feeds restarted, more lethargic this AM, improved during day, continue to monitor for hydrocephalus. AXR with dilated bowel, started on reglan and magnesium. Liquid BM, TFs held and rectal tube placed.   8/29: BD23, POD23 right vert takedown, POD2 R VPS, ASHA overnight, neuro stable. TFs decreased for colonic distention seen on XR, GI notified, nothing to do, TFs resumed at 20/hr and will continue will PEG placement tomorrow. CTH today demonstrated interval decrease in size of ventricles. Midodrine added to aid in weening levophed.   8/30: BD24, POD24, pending PEG placement this morning with Dr. Milner. Midodrine increased to 10mg q8hrs today to wean off of levo. 1u PRBC for Hb 7.5. FOB negative. 20mg lasix given. Repeat dopplers completed, unchanged from previous. Started on IV iron per hematology recs.  8/31: BD25, POD25, unsuccessful PEG placement by GI, trend Hgb w/ AM labs, slowly weening levophed. 5mg vitamin K given for elevated INR.  9/1: BD26, POD26. J tube w/ gen surg today. 5mg vitK IV given o/n for elevated INR. off levophed. amantadine decreased 100 BID and ritalin d/c'd.   9/2: BD27, POD27. s/p J tube w/ gen surg. still receiving meds/TF via J tube.  neuro stable  9/4: BD 28, POD28, s/p J tube w/ gen surg. Neuro stable CTH performed, changed shunt to Certas @ 3 from 4.   9/5: BD 29, POD29, s/p J tube w/ gen surg. VPS certas from 4 to 3 now. Required 1mg IV haldol overnight for agitation/delirium, patient not responding to sitter and attempting to get out of bed numerous times. Cont Eliquis 5 BID for b/l LE DVTs.  	  9/6: VPS Certas @3. BD30. ASHA overnight, neuro stable.   9/7: Certas@3, BD31. ASHA overnight, neuro stable.  9/8: Certas@3 BD 32. ASHA overnight, neuro stable.  9/9: Certas@3 BD 33, ASHA overnight, neuro stable  9/10. Certas@3 BD 34, ASHA overnight, neuro stable, pulled out PICC line, tolerating tube feeds via J tube pending rehab   9/11: certas@3, BD35. 2mg haldol given overnight for agitation. neuro stable. on TF via J tube w/ puree thin liquid diet  9/12: BD36. 1mg IV ativan o/n for agitation. neuro stable. 1mg PO ativan added for bedtime.  9/13: BD37, 1mg ativan PO + 0.5 iv for agitation, neuro stable , pending discharge  9/14: BD 38, seroquel PO for agitation, neuro stable pending discharge   9/15: BD39. ASHA o/n neuro stable. pending rehab. qtc 452  9/16: BD 40. ASHA. no agitation overnight. Exam stable  9/17: BD 41. ASHA. No agitation.   9/18: ASHA o/n. No agitation. Surveillance dopplers.       Vital Signs Last 24 Hrs  T(C): 37.1 (17 Sep 2021 20:26), Max: 37.1 (17 Sep 2021 20:26)  T(F): 98.7 (17 Sep 2021 20:26), Max: 98.7 (17 Sep 2021 20:26)  HR: 94 (17 Sep 2021 23:40) (80 - 102)  BP: 102/52 (17 Sep 2021 23:40) (91/52 - 123/75)  BP(mean): 73 (17 Sep 2021 23:40) (69 - 95)  RR: 17 (17 Sep 2021 23:40) (17 - 18)  SpO2: 95% (17 Sep 2021 23:40) (93% - 98%)    I&O's Detail    16 Sep 2021 07:01  -  17 Sep 2021 07:00  --------------------------------------------------------  IN:    Enteral Tube Flush: 480 mL    Oral Fluid: 340 mL    Sodium Chloride 0.9% Bolus: 500 mL    Vital High Protein: 1104 mL  Total IN: 2424 mL    OUT:    Voided (mL): 650 mL  Total OUT: 650 mL    Total NET: 1774 mL      17 Sep 2021 07:01  -  18 Sep 2021 00:18  --------------------------------------------------------  IN:    Enteral Tube Flush: 60 mL    Oral Fluid: 680 mL    Vital High Protein: 782 mL  Total IN: 1522 mL    OUT:    Voided (mL): 600 mL  Total OUT: 600 mL    Total NET: 922 mL        I&O's Summary    16 Sep 2021 07:01  -  17 Sep 2021 07:00  --------------------------------------------------------  IN: 2424 mL / OUT: 650 mL / NET: 1774 mL    17 Sep 2021 07:01  -  18 Sep 2021 00:18  --------------------------------------------------------  IN: 1522 mL / OUT: 600 mL / NET: 922 mL        PHYSICAL EXAM:  GEN: laying in bed, appears well, NAD  NEURO: AOx2-3. FC, OE spont, hypophonic, face symmetric. CNII-XII intact. PERRL, EOMI. No pronator drift. MAEx4. 5/5 strength throughout. Sensation intact throughout.  CV: RRR +S1/S2  PULM: CTAB  GI: Abd soft, NT/ND  EXT: ext warm, dry, nontender  WOUND: crani and abd incisions healing; c/d/i.    TUBES/LINES:  [] Richey  [] Trach  [] NGT  [x] PEJ  [] Wound Drains  [] Others    DIET:  [] NPO  [x] Mechanical  [x] Tube feeds      LABS:      CAPILLARY BLOOD GLUCOSE      Drug Levels: [] N/A    CSF Analysis: [] N/A      Allergies    apple (Angioedema)  No Known Drug Allergies  strawberry (Angioedema)    Intolerances    lactose (Stomach Upset)    MEDICATIONS:  Antibiotics:    Neuro:  acetaminophen   Tablet .. 650 milliGRAM(s) Oral every 6 hours PRN  bromocriptine Capsule 5 milliGRAM(s) Oral every 8 hours  lacosamide Solution 100 milliGRAM(s) Oral two times a day  melatonin 3 milliGRAM(s) Oral at bedtime PRN  ondansetron Injectable 4 milliGRAM(s) IV Push every 6 hours PRN  QUEtiapine 50 milliGRAM(s) Oral every 24 hours  QUEtiapine 87.5 milliGRAM(s) Oral every 24 hours    Anticoagulation:  aspirin  chewable 81 milliGRAM(s) Enteral Tube daily  enoxaparin Injectable 100 milliGRAM(s) SubCutaneous every 12 hours    OTHER:  acetylcysteine 20%  Inhalation 4 milliLiter(s) Inhalation every 12 hours  albuterol/ipratropium for Nebulization 3 milliLiter(s) Nebulizer every 6 hours  BACItracin   Ointment 1 Application(s) Topical two times a day  bisacodyl Suppository 10 milliGRAM(s) Rectal daily PRN  chlorhexidine 2% Cloths 1 Application(s) Topical <User Schedule>  hydrocortisone 1% Cream 1 Application(s) Topical every 12 hours  pantoprazole  Injectable 40 milliGRAM(s) IV Push daily  polyethylene glycol 3350 17 Gram(s) Oral daily  senna 2 Tablet(s) Oral at bedtime    IVF:  ergocalciferol 66350 Unit(s) Oral <User Schedule>  multivitamin/minerals/iron Oral Solution (CENTRUM) 15 milliLiter(s) Oral daily    CULTURES:    RADIOLOGY & ADDITIONAL TESTS:      ASSESSMENT:  46y/o F with h/o recent gastric sleeve (08/04/21 [facility], Dr. Crisostomo ), fibromyalgia, thyroid dysfunction, PTSD, depression, anxiety.  Presented with seizures at home - brought to [facility], with 1 more episode of seizure en route.  Intubated, CT showed SAH with IV extension, casted IV, hydrocephalus, given mannitol, levetiracetam 1g,  6mg ativan. Started on Cardene drip for BP goal < 140 and transferred to [facility] NICU for further management. HH5 MF4, BD 1 = 8/7. Now s/p cerebral angiogram for R vertebral artery takedown for R vertebral dissecting aneurysm (8/7), and R frontal EVD placement (8/7), now s/p IVC filter placement (8/12,) s/p angio IA verapamil 8/16, 8/17, 8/18, 8/20. Now s/p right VPS certas at 3 (8/27). S/p J tube placement. Pending rehab placement.       Plan   NEURO:   - neuro checks q4h/vitals q4   - Vimpat 100mg bid   - Bromocriptine 5mg Q8  - CTH 9/7 stable  - Seroquel 50 mg in AM / 87.5 in PM for agitation   - ASA 81 on 9/7 s/p R vert takedown     PULM:    - Secretions improved. standing duonebs, mucomyst  - chest PT   - hx PE, now on full AC with IVCF in place    CARDIO:    - -150  - TTE 8/12 WNL   - Daily EKG for QTc    GI:    - TF via J tube w/ puree thin liquid diet  - PPI per bariatric surgeon, made IV - suspension clogged J tube  - bowel regimen   - On Vit D and multivitamin s/p bariatric surgery    RENAL:   - normonatremia goal  - straight cath prn - retaining     HEM/ONC:   - ASA 81   - VTE Prophylaxis: SCDs,  BID (consider pradaxa when taking PO)   - carotid doppler 8/24: neg for pseudoaneurysm, CTA shows soft tissue small hematoma, non-occlusive L IJ and L basillic vein thrombus   - hypercoagulable w/u: - prothrombin gene mutation - heterozygous   - 9/6 dopplers: new L posterior tibial DVT: Eliquis d/c'ed switched to  BID  - Repeat weekly dopplers at rehab     ID:   - Afebrile   - COVID negative 9/12    ENDO:    - glucose goal 140 -180    DISPO: acute rehab / full code  Assessment and plan discussed with Dr. Lomax and hospitalist

## 2021-09-18 NOTE — PROGRESS NOTE ADULT - ASSESSMENT
46y/o F with h/o recent gastric sleeve (08/04/21 [facility], Dr. Crisostomo ), fibromyalgia, thyroid dysfunction, PTSD, depression, anxiety.  Presented with seizures at home - brought to [place], with 1 more episode of seizure en route.  Intubated, CT showed SAH with IV extension, casted IV, hydrocephalus, given mannitol, levetiracetam 1g,  6mg ativan. Started on Cardene drip for BP goal < 140 and transferred to [facility] NICU for further management. HH5 MF4, BD 1 = 8/7. Now s/p cerebral angiogram for R vertebral artery takedown for R vertebral dissecting aneurysm (8/7), and R frontal EVD placement (8/7), now s/p IVC filter placement (8/12,) s/p angio IA verapamil 8/16, 8/17, 8/18, 8/20. Now s/p right VPS certas at 4 (8/27).

## 2021-09-18 NOTE — PROGRESS NOTE ADULT - SUBJECTIVE AND OBJECTIVE BOX
INTERVAL HPI/OVERNIGHT EVENTS: ASHA o/n. ROS difficult to obtain. no episodes of agitation reported o/n.     VITAL SIGNS:  T(F): 99.1 (09-18-21 @ 09:30)  HR: 98 (09-18-21 @ 08:40)  BP: 120/72 (09-18-21 @ 08:40)  RR: 18 (09-18-21 @ 08:40)  SpO2: 95% (09-18-21 @ 08:40)  Wt(kg): --    PHYSICAL EXAM:      Constitutional: NAD  HEENT: crani incision c/d/i  Respiratory: CTAB  Cardiovascular: S1 and S2, RRR, no M/G/R  Gastrointestinal: BS+, soft, NT/ND  Extremities: No peripheral edema  Vascular: 2+ peripheral pulses  Neurological: A/O x 2   Musculoskeletal: 5/5 strength b/l upper and lower extremities        MEDICATIONS  (STANDING):  acetylcysteine 20%  Inhalation 4 milliLiter(s) Inhalation every 12 hours  albuterol/ipratropium for Nebulization 3 milliLiter(s) Nebulizer every 6 hours  aspirin  chewable 81 milliGRAM(s) Enteral Tube daily  BACItracin   Ointment 1 Application(s) Topical two times a day  bromocriptine Capsule 5 milliGRAM(s) Oral every 8 hours  chlorhexidine 2% Cloths 1 Application(s) Topical <User Schedule>  enoxaparin Injectable 100 milliGRAM(s) SubCutaneous every 12 hours  ergocalciferol 91491 Unit(s) Oral <User Schedule>  hydrocortisone 1% Cream 1 Application(s) Topical every 12 hours  lacosamide Solution 100 milliGRAM(s) Oral two times a day  multivitamin/minerals/iron Oral Solution (CENTRUM) 15 milliLiter(s) Oral daily  pantoprazole  Injectable 40 milliGRAM(s) IV Push daily  polyethylene glycol 3350 17 Gram(s) Oral daily  QUEtiapine 50 milliGRAM(s) Oral every 24 hours  QUEtiapine 87.5 milliGRAM(s) Oral every 24 hours  senna 2 Tablet(s) Oral at bedtime    MEDICATIONS  (PRN):  acetaminophen   Tablet .. 650 milliGRAM(s) Oral every 6 hours PRN Temp greater or equal to 38C (100.4F), Mild Pain (1 - 3)  bisacodyl Suppository 10 milliGRAM(s) Rectal daily PRN Constipation  melatonin 3 milliGRAM(s) Oral at bedtime PRN Sleep  ondansetron Injectable 4 milliGRAM(s) IV Push every 6 hours PRN Nausea and/or Vomiting  sodium chloride 0.9% lock flush 10 milliLiter(s) IV Push every 1 hour PRN Pre/post blood products, medications, blood draw, and to maintain line patency      Allergies    apple (Angioedema)  No Known Drug Allergies  strawberry (Angioedema)    Intolerances    lactose (Stomach Upset)      LABS:                RADIOLOGY & ADDITIONAL TESTS:

## 2021-09-19 NOTE — PROGRESS NOTE ADULT - SUBJECTIVE AND OBJECTIVE BOX
HPI:  46y/o F with h/o recent gastric sleeve (08/04/21 [facility], Dr. Crisostomo ), fibromyalgia, thyroid dysfunction, PTSD, depression, anxiety.  Presented with seizures at home - brought to [place], with 1 more episode of seizure en route.  Intubated, CT showed SAH with IV extension, casted IV, hydrocephalus, given mannitol, levetiracetam 1g,  6mg ativan. Started on Cardene drip for BP goal < 140 and transferred to [facility] NICU for further management.     HH5 MF4   NIHSS 26 on arrival  BD 1 = 8/7 (07 Aug 2021 08:08)    OVERNIGHT EVENTS: ASHA overnight, no agitation. Neuro stable.    Hospital Course:  8/7: Tx from [place] for SAH. Intubated. R frontal EVD placed, central line and a line placed. 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   8/8: POD1 s/p angio with R vert takedown, extubated, desatting on aerosol mask, required extensive suctioning, 3% nebs, chest PT, started on low dose precedex drip for restlessness/agitation, ABG stable. NGT placed. TF started with goal 20 pending nutrition recs. 3% stopped for Na 150. Ceribell EEG negative and d/c'ed.   8/9 Overnight, new Right pronator drift and right gaze preference that can't cross midline, Repeat CTH demonstrated stable vents, CTA head and neck unremarkable for spasm, hypoattenuation of right cerebellum edema vs. infarct.  8/10: POD3 R vert takedown, EVD open at [id number]. ASHA overnight, neuro stable. CTH with increased hydro, CTA head/neck with mild basilar spasm, but concern for PE. 1/2 am D50 for hypoglycemia. 1L bolus then 100 cc/hr, PM rounds for net negative fluid balance and mild vasospasm on CTA.   8/11: POD4 R vert takedown. EVD at 5cm H2O, ASHA overnight. neuro stable.   Febrile 104. depakote increased to q6. NCHCT stable. EVD lowered to 0. Lasix 20 given for dieuresis, UOP over 2 liters. Sedation given for a-line placement, BP drop needing levo.  8/12: POD5 R vert takedown. EVD at 0cm H2O. ASHA overnight, neuro stable. Precedex being weaned off. Pending IVCF with vascular today.  8/13: POD6 R vert takedown. EVD open at 0cmH2O. ASHA overnight. Neuro exam stable. Mottled skin on the left lower extremity improving. Started on Bromocriptine 15mg Q8.   8/14: POD7. EVD open at 0. 1L bolus given for euvolemia o/n. neuro stable. CTH stable. ENT scoped vocal cords, +R voacl cord paresis, NTD. started on zosyn empirically.   8/15: POD8. EVD open at 0. ASHA o/n, neuro stable. Pt developed increased work of breathing, unable to clear her secretions, received racemic epi and multiple nebulizer treatments, still with stridor. Patient re-intubated at bedside, on full vent support.   8/16: CTH/CTA head/neck/CT chest performed o/n for worsened exam, R gaze preference, sluggish pupils. +worsened uncal herniation, hydro, vasospasm in b/l PCAs, L vert, basilar arteries. preop angio today, restarted on 3% for Na goal 145-150. Angio for vasospasm with IA verapamil.  8/17: POD10. Overnight Pt remains on levophed drip for SBP goal 180-220. Also remains on propofol drip. EVD open at -5cmH2O. ICPs WNL. Febrile 101F and pancultured. CTH today shows slightly smaller ventricles. Angio for vasospasm with IA verapamil.  8/18: POD11 R vert takedown. POD1 angio IA verpamil. Overnight, Pt noted to have downward gaze to the right and not following commands. Taken for stat head CT which is stable. Pupils also noted to be aniscoric left pupil 4mm and brisk and right 2mm brisk. Mannitol 50g given. Ceribell eeg placed for concern of subclinical seizures, so far appears negative for seizures. 1L NS o/n and 1.5L NS bolus in AM for euvolemia. vanc/zosyn stopped. 250cc 3% bolus and 250cc albumin given in AM. Brief seizure to L frontal lobe this AM on EEG, given 2g valproic acid and dose increased to 1g q8. Vimpat 100mg BID started.  Angio with interval improvment in vasospasm, IA verapamil given. In afternoon patient self-extubated, placed on NRB with mucomyst, 3% inhalation and duonebs. 3% at 50 d/c'd.  8/19: POD 12. Remains on VEEG. Axillary A line placed. Salt tabs d/c'd, florinef decreased to 0.1mg, EVD @ -5cm H2O, now draining 20cc/hr. 250 albumin and 500 NS boluses on dayshift, 1 L LR bolus  8/20: POD 13. ASHA. on VEEG, no seizures noted. Pending VPA level. 500 NS, 250 albumin. Febrile, pancultured. EEG d/c'ed.   8/21: BD14, POD14. ASHA overnight, EVD @ -5. s/p 1L bolus for euvolemia  8/22: BD #15: continued on levo, hypercoagulabitly profile pending, EVD @ -5, euvolemia, extubated, amantadine added, free water started for hypernatremia   8/23: BD 17, tmax 101F o/n. Gen Sx consulted for PEG - not a candidate at this time, pancultured for fever, 1L LR given for euvolemia. EVD at -5  8/24: BD18, ASHA o/n, neuro stable, EVD at -5, VPA level therapeutic, SBP goal 160-200, L IJ CVC attempted placement with carotid puncture, no pseudoaneurysm on US. MSSA growing in sputum. Ceftriaxone d/c'd and Ancef 2gq8 started. ID consulted. Recieved 1.5L LR and 500cc albumin.   8/25: POD5 last angio, BD19 ASHA o/n, neuro stable, EVD at -5  8/26: BD 20. ASHA o/n. EVD @ -5   8/27: BD 21, POD 20 Right vert takedown. ASHA overnight. Exam stable. EVD remains open at -5 and to be clamped at 0600 in preparation for right VPS placement today. Possible PEG placement Monday 8/30 8/28: BD22, POD22 Right vert takedown, POD1 R VPS, neuro exam stable. Tube feeds restarted, more lethargic this AM, improved during day, continue to monitor for hydrocephalus. AXR with dilated bowel, started on reglan and magnesium. Liquid BM, TFs held and rectal tube placed.   8/29: BD23, POD23 right vert takedown, POD2 R VPS, ASHA overnight, neuro stable. TFs decreased for colonic distention seen on XR, GI notified, nothing to do, TFs resumed at 20/hr and will continue will PEG placement tomorrow. CTH today demonstrated interval decrease in size of ventricles. Midodrine added to aid in weening levophed.   8/30: BD24, POD24, pending PEG placement this morning with Dr. Milner. Midodrine increased to 10mg q8hrs today to wean off of levo. 1u PRBC for Hb 7.5. FOB negative. 20mg lasix given. Repeat dopplers completed, unchanged from previous. Started on IV iron per hematology recs.  8/31: BD25, POD25, unsuccessful PEG placement by GI, trend Hgb w/ AM labs, slowly weening levophed. 5mg vitamin K given for elevated INR.  9/1: BD26, POD26. J tube w/ gen surg today. 5mg vitK IV given o/n for elevated INR. off levophed. amantadine decreased 100 BID and ritalin d/c'd.   9/2: BD27, POD27. s/p J tube w/ gen surg. still receiving meds/TF via J tube.  neuro stable  9/4: BD 28, POD28, s/p J tube w/ gen surg. Neuro stable CTH performed, changed shunt to Certas @ 3 from 4.   9/5: BD 29, POD29, s/p J tube w/ gen surg. VPS certas from 4 to 3 now. Required 1mg IV haldol overnight for agitation/delirium, patient not responding to sitter and attempting to get out of bed numerous times. Cont Eliquis 5 BID for b/l LE DVTs.  	  9/6: VPS Certas @3. BD30. ASHA overnight, neuro stable.   9/7: Certas@3, BD31. ASHA overnight, neuro stable.  9/8: Certas@3 BD 32. ASHA overnight, neuro stable.  9/9: Certas@3 BD 33, ASHA overnight, neuro stable  9/10. Certas@3 BD 34, ASHA overnight, neuro stable, pulled out PICC line, tolerating tube feeds via J tube pending rehab   9/11: certas@3, BD35. 2mg haldol given overnight for agitation. neuro stable. on TF via J tube w/ puree thin liquid diet  9/12: BD36. 1mg IV ativan o/n for agitation. neuro stable. 1mg PO ativan added for bedtime.  9/13: BD37, 1mg ativan PO + 0.5 iv for agitation, neuro stable , pending discharge  9/14: BD 38, seroquel PO for agitation, neuro stable pending discharge   9/15: BD39. ASHA o/n neuro stable. pending rehab. qtc 452  9/16: BD 40. ASHA. no agitation overnight. Exam stable  9/17: BD 41. ASHA. No agitation.   9/18: ASHA o/n. No agitation. Surveillance dopplers.   9/19: ASHA overnight, no agitation. Pending dispo    Vital Signs Last 24 Hrs  T(C): 36.6 (18 Sep 2021 22:12), Max: 37.3 (18 Sep 2021 09:30)  T(F): 97.9 (18 Sep 2021 22:12), Max: 99.1 (18 Sep 2021 09:30)  HR: 94 (18 Sep 2021 20:40) (92 - 100)  BP: 120/60 (18 Sep 2021 20:40) (113/55 - 146/66)  BP(mean): 86 (18 Sep 2021 20:40) (77 - 93)  RR: 18 (18 Sep 2021 20:40) (17 - 18)  SpO2: 95% (18 Sep 2021 20:40) (95% - 95%)    I&O's Summary    17 Sep 2021 07:01  -  18 Sep 2021 07:00  --------------------------------------------------------  IN: 1966 mL / OUT: 950 mL / NET: 1016 mL    18 Sep 2021 07:01  -  19 Sep 2021 00:01  --------------------------------------------------------  IN: 872 mL / OUT: 900 mL / NET: -28 mL        PHYSICAL EXAM:  GEN: laying in bed, appears well, NAD  NEURO: AOx2-3. FC, OE spont, hypophonic, face symmetric. CNII-XII intact. PERRL, EOMI. No pronator drift. MAEx4. 5/5 strength throughout. Sensation intact throughout.  CV: RRR +S1/S2  PULM: CTAB  GI: Abd soft, NT/ND  EXT: ext warm, dry, nontender  WOUND: crani and abd incisions healing; c/d/i.    TUBES/LINES:  [] Richey  [] Trach  [] NGT  [x] PEJ  [] Wound Drains  [] Others    DIET:  [] NPO  [x] Mechanical  [x] Tube feeds      LABS:                          11.1   6.41  )-----------( 363      ( 18 Sep 2021 11:05 )             37.3     09-18    142  |  106  |  10  ----------------------------<  130<H>  3.4<L>   |  25  |  0.59    Ca    9.5      18 Sep 2021 11:05  Phos  3.7     09-18  Mg     1.8     09-18              CAPILLARY BLOOD GLUCOSE          Drug Levels: [] N/A    CSF Analysis: [] N/A      Allergies    apple (Angioedema)  No Known Drug Allergies  strawberry (Angioedema)    Intolerances    lactose (Stomach Upset)    MEDICATIONS:  Antibiotics:    Neuro:  acetaminophen   Tablet .. 650 milliGRAM(s) Oral every 6 hours PRN  bromocriptine Capsule 5 milliGRAM(s) Oral every 8 hours  lacosamide Solution 100 milliGRAM(s) Oral two times a day  melatonin 3 milliGRAM(s) Oral at bedtime PRN  methocarbamol 500 milliGRAM(s) Oral three times a day PRN  ondansetron Injectable 4 milliGRAM(s) IV Push every 6 hours PRN  QUEtiapine 87.5 milliGRAM(s) Oral every 24 hours  QUEtiapine 50 milliGRAM(s) Oral every 24 hours    Anticoagulation:  aspirin  chewable 81 milliGRAM(s) Enteral Tube daily  enoxaparin Injectable 100 milliGRAM(s) SubCutaneous every 12 hours    OTHER:  acetylcysteine 20%  Inhalation 4 milliLiter(s) Inhalation every 12 hours  albuterol/ipratropium for Nebulization 3 milliLiter(s) Nebulizer every 6 hours  BACItracin   Ointment 1 Application(s) Topical two times a day  bisacodyl Suppository 10 milliGRAM(s) Rectal daily PRN  chlorhexidine 2% Cloths 1 Application(s) Topical <User Schedule>  hydrocortisone 1% Cream 1 Application(s) Topical every 12 hours  pantoprazole   Suspension 40 milliGRAM(s) Oral daily  polyethylene glycol 3350 17 Gram(s) Oral daily  senna 2 Tablet(s) Oral at bedtime    IVF:  ergocalciferol 78293 Unit(s) Oral <User Schedule>  multivitamin/minerals/iron Oral Solution (CENTRUM) 15 milliLiter(s) Oral daily     CULTURES: x    RADIOLOGY & ADDITIONAL TESTS: x      ASSESSMENT:  46y/o F with h/o recent gastric sleeve (08/04/21 [facility], Dr. Crisostomo ), fibromyalgia, thyroid dysfunction, PTSD, depression, anxiety.  Presented with seizures at home - brought to [place], with 1 more episode of seizure en route.  Intubated, CT showed SAH with IV extension, casted IV, hydrocephalus, given mannitol, levetiracetam 1g,  6mg ativan. Started on Cardene drip for BP goal < 140 and transferred to [facility] NICU for further management. HH5 MF4, BD 1 = 8/7. Now s/p cerebral angiogram for R vertebral artery takedown for R vertebral dissecting aneurysm (8/7), and R frontal EVD placement (8/7), now s/p IVC filter placement (8/12,) s/p angio IA verapamil 8/16, 8/17, 8/18, 8/20. Now s/p right VPS certas at 3 (8/27). S/p J tube placement. Pending rehab placement.     Plan   NEURO:   - neuro checks q4h/vitals q4   - Vimpat 100mg bid   - Bromocriptine 5mg Q8  - CTH 9/7 stable  - Seroquel 50 mg in AM / 87.5 in PM for agitation   - ASA 81 on 9/7 s/p R vert takedown     PULM:    - Secretions improved. standing duonebs, mucomyst  - chest PT   - hx PE, now on full AC with IVCF in place    CARDIO:    - -150  - TTE 8/12 WNL   - Daily EKG for QTc    GI:    - TF via J tube w/ puree thin liquid diet  - PPI per bariatric surgeon  - bowel regimen   - On Vit D and multivitamin s/p bariatric surgery    RENAL:   - normonatremia goal  - straight cath prn - retaining     HEM/ONC:   - ASA 81   - VTE Prophylaxis: SCDs,  BID (consider pradaxa when taking PO)   - carotid doppler 8/24: neg for pseudoaneurysm, CTA shows soft tissue small hematoma, non-occlusive L IJ and L basillic vein thrombus   - hypercoagulable w/u: - prothrombin gene mutation - heterozygous   - 9/6 dopplers: new L posterior tibial DVT: Eliquis d/c'ed switched to  BID  - Repeat weekly dopplers at rehab     ID:   - Afebrile   - COVID negative 9/12    ENDO:    - glucose goal 140 -180    DISPO: acute rehab / full code  Assessment and plan discussed with Dr. Lomax and hospitalist    Assessment:  Present when checked    []  GCS  E   V  M     Heart Failure: []Acute, [] acute on chronic , []chronic  Heart Failure:  [] Diastolic (HFpEF), [] Systolic (HFrEF), []Combined (HFpEF and HFrEF), [] RHF, [] Pulm HTN, [] Other    [] TRUDI, [] ATN, [] AIN, [] other  [] CKD1, [] CKD2, [] CKD 3, [] CKD 4, [] CKD 5, []ESRD    Encephalopathy: [] Metabolic, [] Hepatic, [] toxic, [] Neurological, [] Other    Abnormal Nurtitional Status: [] malnurtition (see nutrition note), [ ]underweight: BMI < 19, [] morbid obesity: BMI >40, [] Cachexia    [] Sepsis  [] hypovolemic shock,[] cardiogenic shock, [] hemorrhagic shock, [] neuogenic shock  [] Acute Respiratory Failure  []Cerebral edema, [] Brain compression/ herniation,   [] Functional quadriplegia  [] Acute blood loss anemia

## 2021-09-20 NOTE — PROGRESS NOTE ADULT - PROBLEM SELECTOR PLAN 2
recommend repeat venous doppler next week to evaluate any propagation fo the DVT  as above
recommend repeat venous doppler next week to evaluate any propagation fo the DVT
recommend repeat venous doppler next week to evaluate any propagation fo the DVT
recommend repeat venous doppler next week to evaluate any propagation fo the DVT  as above
recommend repeat venous doppler next week to evaluate any propagation fo the DVT  as above
will discuss with pt the finding , when she is less agitated
As above
As above
Pt w/ PEG tube  -Unable to feed herself    Passed barium swallow      Diet advanced although will still require tube feeds at this time
Pt w/ PEG tube  -Unable to feed herself    Passed barium swallow    Diet advanced although will still require tube feeds for nutrition
Pt w/ PEG tube  -Unable to feed herself    Passed barium swallow today     Diet advanced although will still require tube feeds at this time
Pt w/ PEG tube  -Unable to feed herself    Passed barium swallow today     Diet advanced although will still require tube feeds for nutrition
as above
recommend repeat venous doppler next week to evaluate any propagation fo the DVT  as above
will discuss with patient when her mental condition improves
as above
recommend repeat venous doppler next week to evaluate any propagation fo the DVT  as above
As above
recommend repeat venous doppler next week to evaluate any propagation fo the DVT  as above
Pt w/ PEG tube  -Unable to feed herself    Passed barium swallow today     Diet advanced although will still require tube feeds for nutrition
recommend repeat venous doppler next week to evaluate any propagation fo the DVT  as above
as above
Pt w/ PEG tube  -Unable to feed herself    Passed barium swallow today     Diet advanced although will still require tube feeds for nutrition
stable
As above
Pt w/ PEG tube  -Unable to feed herself    Passed barium swallow today     Diet advanced although will still require tube feeds at this time
Pt w/ PEG tube  -Unable to feed herself    Passed barium swallow   Diet advanced although will still require tube feeds for nutrition
Pt w/ PEG tube  -Unable to feed herself    Passed barium swallow today     Diet advanced although will still require tube feeds for nutrition
As above

## 2021-09-20 NOTE — PROGRESS NOTE ADULT - ASSESSMENT
46y/o F with h/o recent gastric sleeve (08/04/21 [facility], Dr. Crisostomo ), fibromyalgia, thyroid dysfunction, PTSD, depression, anxiety.  Presented with seizures at home - brought to [place], with 1 more episode of seizure en route.  Intubated, CT showed SAH with IV extension, casted IV, hydrocephalus, given mannitol, levetiracetam 1g,  6mg ativan. Started on Cardene drip for BP goal < 140 and transferred to [facility] NICU for further management. HH5 MF4, BD 1 = 8/7. Now s/p cerebral angiogram for R vertebral artery takedown for R vertebral dissecting aneurysm (8/7), and R frontal EVD placement (8/7), now s/p IVC filter placement (8/12,) s/p angio IA verapamil 8/16, 8/17, 8/18, 8/20. Now s/p right VPS certas at 4 (8/27).

## 2021-09-20 NOTE — PROGRESS NOTE ADULT - SUBJECTIVE AND OBJECTIVE BOX
HPI:  44y/o F with h/o recent gastric sleeve (08/04/21 [facility], Dr. Crisostomo ), fibromyalgia, thyroid dysfunction, PTSD, depression, anxiety.  Presented with seizures at home - brought to [place], with 1 more episode of seizure en route.  Intubated, CT showed SAH with IV extension, casted IV, hydrocephalus, given mannitol, levetiracetam 1g,  6mg ativan. Started on Cardene drip for BP goal < 140 and transferred to [facility] NICU for further management.     HH5 MF4   NIHSS 26 on arrival  BD 1 = 8/7 (07 Aug 2021 08:08)    Hospital course:   8/7: Tx from [place] for SAH. Intubated. R frontal EVD placed, central line and a line placed. 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   8/8: POD1 s/p angio with R vert takedown, extubated, desatting on aerosol mask, required extensive suctioning, 3% nebs, chest PT, started on low dose precedex drip for restlessness/agitation, ABG stable. NGT placed. TF started with goal 20 pending nutrition recs. 3% stopped for Na 150. Ceribell EEG negative and d/c'ed.   8/9 Overnight, new Right pronator drift and right gaze preference that can't cross midline, Repeat CTH demonstrated stable vents, CTA head and neck unremarkable for spasm, hypoattenuation of right cerebellum edema vs. infarct.  8/10: POD3 R vert takedown, EVD open at [id number]. SOTERO overnight, neuro stable. CTH with increased hydro, CTA head/neck with mild basilar spasm, but concern for PE. 1/2 am D50 for hypoglycemia. 1L bolus then 100 cc/hr, PM rounds for net negative fluid balance and mild vasospasm on CTA.   8/11: POD4 R vert takedown. EVD at 5cm H2O, SOTERO overnight. neuro stable.   Febrile 104. depakote increased to q6. NCHCT stable. EVD lowered to 0. Lasix 20 given for dieuresis, UOP over 2 liters. Sedation given for a-line placement, BP drop needing levo.  8/12: POD5 R vert takedown. EVD at 0cm H2O. SOTERO overnight, neuro stable. Precedex being weaned off. Pending IVCF with vascular today.  8/13: POD6 R vert takedown. EVD open at 0cmH2O. SOTERO overnight. Neuro exam stable. Mottled skin on the left lower extremity improving. Started on Bromocriptine 15mg Q8.   8/14: POD7. EVD open at 0. 1L bolus given for euvolemia o/n. neuro stable. CTH stable. ENT scoped vocal cords, +R voacl cord paresis, NTD. started on zosyn empirically.   8/15: POD8. EVD open at 0. SOTERO o/n, neuro stable. Pt developed increased work of breathing, unable to clear her secretions, received racemic epi and multiple nebulizer treatments, still with stridor. Patient re-intubated at bedside, on full vent support.   8/16: CTH/CTA head/neck/CT chest performed o/n for worsened exam, R gaze preference, sluggish pupils. +worsened uncal herniation, hydro, vasospasm in b/l PCAs, L vert, basilar arteries. preop angio today, restarted on 3% for Na goal 145-150. Angio for vasospasm with IA verapamil.  8/17: POD10. Overnight Pt remains on levophed drip for SBP goal 180-220. Also remains on propofol drip. EVD open at -5cmH2O. ICPs WNL. Febrile 101F and pancultured. CTH today shows slightly smaller ventricles. Angio for vasospasm with IA verapamil.  8/18: POD11 R vert takedown. POD1 angio IA verpamil. Overnight, Pt noted to have downward gaze to the right and not following commands. Taken for stat head CT which is stable. Pupils also noted to be aniscoric left pupil 4mm and brisk and right 2mm brisk. Mannitol 50g given. Ceribell eeg placed for concern of subclinical seizures, so far appears negative for seizures. 1L NS o/n and 1.5L NS bolus in AM for euvolemia. vanc/zosyn stopped. 250cc 3% bolus and 250cc albumin given in AM. Brief seizure to L frontal lobe this AM on EEG, given 2g valproic acid and dose increased to 1g q8. Vimpat 100mg BID started.  Angio with interval improvment in vasospasm, IA verapamil given. In afternoon patient self-extubated, placed on NRB with mucomyst, 3% inhalation and duonebs. 3% at 50 d/c'd.  8/19: POD 12. Remains on VEEG. Axillary A line placed. Salt tabs d/c'd, florinef decreased to 0.1mg, EVD @ -5cm H2O, now draining 20cc/hr. 250 albumin and 500 NS boluses on dayshift, 1 L LR bolus  8/20: POD 13. SOTERO. on VEEG, no seizures noted. Pending VPA level. 500 NS, 250 albumin. Febrile, pancultured. EEG d/c'ed.   8/21: BD14, POD14. SOTERO overnight, EVD @ -5. s/p 1L bolus for euvolemia  8/22: BD #15: continued on levo, hypercoagulabitly profile pending, EVD @ -5, euvolemia, extubated, amantadine added, free water started for hypernatremia   8/23: BD 17, tmax 101F o/n. Gen Sx consulted for PEG - not a candidate at this time, pancultured for fever, 1L LR given for euvolemia. EVD at -5  8/24: BD18, SOTERO o/n, neuro stable, EVD at -5, VPA level therapeutic, SBP goal 160-200, L IJ CVC attempted placement with carotid puncture, no pseudoaneurysm on US. MSSA growing in sputum. Ceftriaxone d/c'd and Ancef 2gq8 started. ID consulted. Recieved 1.5L LR and 500cc albumin.   8/25: POD5 last angio, BD19 SOTERO o/n, neuro stable, EVD at -5  8/26: BD 20. SOTERO o/n. EVD @ -5   8/27: BD 21, POD 20 Right vert takedown. SOTERO overnight. Exam stable. EVD remains open at -5 and to be clamped at 0600 in preparation for right VPS placement today. Possible PEG placement Monday 8/30 8/28: BD22, POD22 Right vert takedown, POD1 R VPS, neuro exam stable. Tube feeds restarted, more lethargic this AM, improved during day, continue to monitor for hydrocephalus. AXR with dilated bowel, started on reglan and magnesium. Liquid BM, TFs held and rectal tube placed.   8/29: BD23, POD23 right vert takedown, POD2 R VPS, SOTERO overnight, neuro stable. TFs decreased for colonic distention seen on XR, GI notified, nothing to do, TFs resumed at 20/hr and will continue will PEG placement tomorrow. CTH today demonstrated interval decrease in size of ventricles. Midodrine added to aid in weening levophed.   8/30: BD24, POD24, pending PEG placement this morning with Dr. Milner. Midodrine increased to 10mg q8hrs today to wean off of levo. 1u PRBC for Hb 7.5. FOB negative. 20mg lasix given. Repeat dopplers completed, unchanged from previous. Started on IV iron per hematology recs.  8/31: BD25, POD25, unsuccessful PEG placement by GI, trend Hgb w/ AM labs, slowly weening levophed. 5mg vitamin K given for elevated INR.  9/1: BD26, POD26. J tube w/ gen surg today. 5mg vitK IV given o/n for elevated INR. off levophed. amantadine decreased 100 BID and ritalin d/c'd.   9/2: BD27, POD27. s/p J tube w/ gen surg. still receiving meds/TF via J tube.  neuro stable  9/4: BD 28, POD28, s/p J tube w/ gen surg. Neuro stable CTH performed, changed shunt to Certas @ 3 from 4.   9/5: BD 29, POD29, s/p J tube w/ gen surg. VPS certas from 4 to 3 now. Required 1mg IV haldol overnight for agitation/delirium, patient not responding to sitter and attempting to get out of bed numerous times. Cont Eliquis 5 BID for b/l LE DVTs.  	  9/6: VPS Certas @3. BD30. STOERO overnight, neuro stable.   9/7: Certas@3, BD31. SOTERO overnight, neuro stable.  9/8: Certas@3 BD 32. SOTERO overnight, neuro stable.  9/9: Certas@3 BD 33, SOTERO overnight, neuro stable  9/10. Certas@3 BD 34, SOTERO overnight, neuro stable, pulled out PICC line, tolerating tube feeds via J tube pending rehab   9/11: certas@3, BD35. 2mg haldol given overnight for agitation. neuro stable. on TF via J tube w/ puree thin liquid diet  9/12: BD36. 1mg IV ativan o/n for agitation. neuro stable. 1mg PO ativan added for bedtime.  9/13: BD37, 1mg ativan PO + 0.5 iv for agitation, neuro stable , pending discharge  9/14: BD 38, seroquel PO for agitation, neuro stable pending discharge   9/15: BD39. SOTERO o/n neuro stable. pending rehab. qtc 452  9/16: BD 40. SOTERO. no agitation overnight. Exam stable  9/17: BD 41. SOTERO. No agitation.   9/18: SOTERO o/n. No agitation. Surveillance dopplers.   9/19: SOTERO overnight, no agitation. Pending dispo      OVERNIGHT EVENTS: sotero overnight   Vital Signs Last 24 Hrs  T(C): 37.2 (19 Sep 2021 22:00), Max: 37.2 (19 Sep 2021 22:00)  T(F): 99 (19 Sep 2021 22:00), Max: 99 (19 Sep 2021 22:00)  HR: 108 (19 Sep 2021 20:36) (88 - 108)  BP: 113/65 (19 Sep 2021 20:36) (111/56 - 135/61)  BP(mean): 83 (19 Sep 2021 20:36) (75 - 88)  RR: 18 (19 Sep 2021 20:36) (16 - 18)  SpO2: 95% (19 Sep 2021 20:36) (95% - 95%)    I&O's Summary    18 Sep 2021 07:01  -  19 Sep 2021 07:00  --------------------------------------------------------  IN: 1424 mL / OUT: 1200 mL / NET: 224 mL    19 Sep 2021 07:01  -  20 Sep 2021 00:04  --------------------------------------------------------  IN: 506 mL / OUT: 350 mL / NET: 156 mL        PHYSICAL EXAM:  GEN: laying in bed, appears well, NAD  NEURO: AOx2-3. FC, OE spont, hypophonic, face symmetric. CNII-XII intact. PERRL, EOMI. No pronator drift. MAEx4. 5/5 strength throughout. Sensation intact throughout.  CV: RRR +S1/S2  PULM: CTAB  GI: Abd soft, NT/ND  EXT: ext warm, dry, nontender  WOUND: crani and abd incisions healing; c/d/i.    LABS:                        10.3   7.04  )-----------( 334      ( 19 Sep 2021 07:32 )             33.9     09-19    143  |  106  |  10  ----------------------------<  107<H>  3.4<L>   |  27  |  0.57    Ca    9.6      19 Sep 2021 07:32  Phos  4.1     09-19  Mg     2.0     09-19              CAPILLARY BLOOD GLUCOSE      POCT Blood Glucose.: 111 mg/dL (19 Sep 2021 16:38)      Drug Levels: [] N/A    CSF Analysis: [] N/A      Allergies    apple (Angioedema)  No Known Drug Allergies  strawberry (Angioedema)    Intolerances    lactose (Stomach Upset)    MEDICATIONS:  Antibiotics:    Neuro:  acetaminophen   Tablet .. 650 milliGRAM(s) Oral every 6 hours PRN  bromocriptine Capsule 5 milliGRAM(s) Oral every 8 hours  lacosamide Solution 100 milliGRAM(s) Oral two times a day  melatonin 3 milliGRAM(s) Oral at bedtime PRN  methocarbamol 500 milliGRAM(s) Oral three times a day PRN  ondansetron Injectable 4 milliGRAM(s) IV Push every 6 hours PRN  QUEtiapine 50 milliGRAM(s) Oral every 24 hours  QUEtiapine 87.5 milliGRAM(s) Oral every 24 hours    Anticoagulation:  aspirin  chewable 81 milliGRAM(s) Enteral Tube daily  enoxaparin Injectable 100 milliGRAM(s) SubCutaneous every 12 hours    OTHER:  acetylcysteine 20%  Inhalation 4 milliLiter(s) Inhalation every 12 hours  albuterol/ipratropium for Nebulization 3 milliLiter(s) Nebulizer every 6 hours  AQUAPHOR (petrolatum Ointment) 1 Application(s) Topical two times a day  BACItracin   Ointment 1 Application(s) Topical two times a day  bisacodyl Suppository 10 milliGRAM(s) Rectal daily PRN  chlorhexidine 2% Cloths 1 Application(s) Topical <User Schedule>  hydrocortisone 1% Cream 1 Application(s) Topical every 12 hours  pantoprazole   Suspension 40 milliGRAM(s) Oral daily  polyethylene glycol 3350 17 Gram(s) Oral daily  senna 2 Tablet(s) Oral at bedtime    IVF:  ergocalciferol 22914 Unit(s) Oral <User Schedule>  multivitamin/minerals/iron Oral Solution (CENTRUM) 15 milliLiter(s) Oral daily    CULTURES:    RADIOLOGY & ADDITIONAL TESTS:      44y/o F with h/o recent gastric sleeve (08/04/21 [facility], Dr. Crisostomo ), fibromyalgia, thyroid dysfunction, PTSD, depression, anxiety.  Presented with seizures at home - brought to [place], with 1 more episode of seizure en route.  Intubated, CT showed SAH with IV extension, casted IV, hydrocephalus, given mannitol, levetiracetam 1g,  6mg ativan. Started on Cardene drip for BP goal < 140 and transferred to [facility] NICU for further management. HH5 MF4, BD 1 = 8/7. Now s/p cerebral angiogram for R vertebral artery takedown for R vertebral dissecting aneurysm (8/7), and R frontal EVD placement (8/7), now s/p IVC filter placement (8/12,) s/p angio IA verapamil 8/16, 8/17, 8/18, 8/20. Now s/p right VPS certas at 3 (8/27). S/p J tube placement. Pending rehab placement.     Plan   NEURO:   - neuro checks q4h/vitals q4   - Vimpat 100mg bid   - Bromocriptine 5mg Q8  - CTH 9/7 stable  - Seroquel 50 mg in AM / 87.5 in PM for agitation   - ASA 81 on 9/7 s/p R vert takedown     PULM:    - Secretions improved. standing duonebs, mucomyst  - chest PT   - hx PE, now on full AC with IVCF in place    CARDIO:    - -150  - TTE 8/12 WNL   - Daily EKG for QTc    GI:    - TF via J tube w/ puree thin liquid diet  - PPI per bariatric surgeon  - bowel regimen   - On Vit D and multivitamin s/p bariatric surgery    RENAL:   - normonatremia goal  - straight cath prn - retaining     HEM/ONC:   - ASA 81   - VTE Prophylaxis: SCDs,  BID (consider pradaxa when taking PO)   - carotid doppler 8/24: neg for pseudoaneurysm, CTA shows soft tissue small hematoma, non-occlusive L IJ and L basillic vein thrombus   - hypercoagulable w/u: - prothrombin gene mutation - heterozygous   - 9/6 dopplers: new L posterior tibial DVT: Eliquis d/c'ed switched to  BID  - Repeat weekly dopplers at rehab     ID:   - Afebrile   - COVID negative 9/12    ENDO:    - glucose goal 140 -180    DISPO: acute rehab / full code  Assessment and plan discussed with Dr. Lomax and hospitalist

## 2021-09-20 NOTE — PROGRESS NOTE ADULT - PROBLEM SELECTOR PROBLEM 6
Anemia due to acute blood loss
Heterozygous for prothrombin [id number] mutation
Heterozygous for prothrombin [id number] mutation
Anemia due to acute blood loss
Anemia due to acute blood loss
Heterozygous for prothrombin [id number] mutation
Heterozygous for prothrombin [id number] mutation
Heterozygous for prothrombin [id number] mutation
Heterozygous for prothrombin [id number] mutation
Anemia due to acute blood loss
Heterozygous for prothrombin [id number] mutation
Anemia due to acute blood loss
Heterozygous for prothrombin [id number] mutation
Heterozygous for prothrombin [id number] mutation

## 2021-09-20 NOTE — PROGRESS NOTE ADULT - PROBLEM SELECTOR PLAN 5
As above  -Heme following
As above  -S/p IVF filter placement  on lovenox therapeutic dose
As above  -Heme following
As above  -S/p IVF filter placement    -Pt now has new DVT on repeat doppler despite being on NoAC  -D/w Heme - pt switched to Lovenox 100 mg BID
As above  -Heme following
As above  -S/p IVF filter placement    -Pt now has new DVT on repeat doppler despite being on NoAC  -D/w Heme - pt switched to Lovenox 100 mg BID
As above  -Heme following
As above  -S/p IVF filter placement    -Pt now has new DVT on repeat doppler despite being on NoAC  -D/w Heme - pt switched to Lovenox 100 mg BID
As above  -Heme following

## 2021-09-20 NOTE — PROGRESS NOTE ADULT - REASON FOR ADMISSION
SAH

## 2021-09-20 NOTE — PROGRESS NOTE ADULT - PROBLEM SELECTOR PROBLEM 8
Delirium
Thrombocytopenic
Delirium
Thrombocytopenic
Delirium
Delirium

## 2021-09-20 NOTE — PROGRESS NOTE ADULT - PROBLEM SELECTOR PROBLEM 4
Anemia due to acute blood loss
DVT, lower extremity
Multiple subsegmental pulmonary emboli without acute cor pulmonale
Multiple subsegmental pulmonary emboli without acute cor pulmonale
DVT, lower extremity
Multiple subsegmental pulmonary emboli without acute cor pulmonale
DVT, lower extremity
Multiple subsegmental pulmonary emboli without acute cor pulmonale
Anemia due to acute blood loss
Multiple subsegmental pulmonary emboli without acute cor pulmonale
Multiple subsegmental pulmonary emboli without acute cor pulmonale

## 2021-09-20 NOTE — PROGRESS NOTE ADULT - PROBLEM SELECTOR PROBLEM 1
Multiple subsegmental pulmonary emboli without acute cor pulmonale
SAH (subarachnoid hemorrhage)
Multiple subsegmental pulmonary emboli without acute cor pulmonale
SAH (subarachnoid hemorrhage)
DVT, lower extremity
Multiple subsegmental pulmonary emboli without acute cor pulmonale
Multiple subsegmental pulmonary emboli without acute cor pulmonale
SAH (subarachnoid hemorrhage)
Multiple subsegmental pulmonary emboli without acute cor pulmonale
SAH (subarachnoid hemorrhage)
Multiple subsegmental pulmonary emboli without acute cor pulmonale
SAH (subarachnoid hemorrhage)
Multiple subsegmental pulmonary emboli without acute cor pulmonale
DVT, lower extremity
SAH (subarachnoid hemorrhage)
Multiple subsegmental pulmonary emboli without acute cor pulmonale
Multiple subsegmental pulmonary emboli without acute cor pulmonale
SAH (subarachnoid hemorrhage)

## 2021-09-20 NOTE — PROGRESS NOTE ADULT - PROBLEM SELECTOR PROBLEM 7
Anemia due to acute blood loss
Anemia due to acute blood loss
Delirium
Delirium
Anemia due to acute blood loss
Delirium
Anemia due to acute blood loss
Anemia due to acute blood loss
Delirium
Anemia due to acute blood loss
Delirium
Anemia due to acute blood loss

## 2021-09-20 NOTE — PROGRESS NOTE ADULT - PROBLEM SELECTOR PLAN 1
Management as per primary team  -S/p OR    -C/w Vimpat    -CTH 9/4  - stable
On lovenox 6o bid, titrated with anti Xa activity
not candidate for anticoagulation at this point.  She had the  IVC filter.  The venous Doppler revealed new intramuscular DVT.  The patient is on Lovenox for DVT prophylaxis.  Recommend repeat the venous Doppler next week. The case with neurosurgery critical care medicine. Recent venous Doppler revealed new DVT is most likely related to the inferior vena cava filter.  The patient on full dose of Lovenox and will repeat venous Doppler in 1 week
not candidate for anticoagulation at this point.  She ihad the  IVC filter.  No evidence of respiratory distress and oxygen saturation is normal on RA at rest
not candidate for anticoagulation at this point.  She ihad the  IVC filter.  The venous Doppler revealed new intramuscular DVT.  The patient is on Lovenox for DVT prophylaxis.  Recommend repeat the venous Doppler next week
not candidate for anticoagulation at this point.  She is scheduled for IVC filter.  No evidence of respiratory distress and oxygen saturation is normal on RA at rest
Continue SQ Lovenox, repeat anti Xa level 4 hours after SQ lovenox dose
Management as per primary team  -S/p OR    -C/w Vimpat    -CTH 9/4 as above - stable
Management as per primary team  -S/p OR    -C/w Vimpat    -CTH 9/7 as above - stable
not candidate for anticoagulation at this point.  She had the  IVC filter.  The venous Doppler revealed new intramuscular DVT.  The patient is on Lovenox for DVT prophylaxis.  Recommend repeat the venous Doppler next week. The case with neurosurgery critical care medicine. All in full anticoagulation as per neurosurgery for now
not candidate for anticoagulation at this point.  She had the  IVC filter.  The venous Doppler revealed new intramuscular DVT.  The patient is on Lovenox for DVT prophylaxis.  Recommend repeat the venous Doppler next week. The case with neurosurgery critical care medicine. Recent venous Doppler revealed new DVT is most likely related to the inferior vena cava filter.  The patient on full dose of Lovenox and will repeat venous Doppler in 1 week
not candidate for anticoagulation at this point.  She had the  IVC filter.  The venous Doppler revealed new intramuscular DVT.  The patient is on Lovenox for DVT prophylaxis.  Recommend repeat the venous Doppler next week. The case with neurosurgery critical care medicine. Recent venous Doppler revealed new DVT is most likely related to the inferior vena cava filter.  The patient on full dose of Lovenox and will repeat venous Doppler in 1 week.  Will discuss with hem if patient will need to be on an oral agent and which one.  She is more alert
not candidate for anticoagulation at this point.  She had the  IVC filter.  The venous Doppler revealed new intramuscular DVT.  The patient is on Lovenox for DVT prophylaxis.  Recommend repeat the venous Doppler next week. The case with neurosurgery critical care medicine. All in full anticoagulation as per neurosurgery for now
not candidate for anticoagulation at this point.  She had the  IVC filter.  The venous Doppler revealed new intramuscular DVT.  The patient is on Lovenox for DVT prophylaxis.  Recommend repeat the venous Doppler next week. The case with neurosurgery critical care medicine. Recent venous Doppler revealed new DVT is most likely related to the inferior vena cava filter.  The patient on full dose of Lovenox and will repeat venous Doppler in 1 week
not candidate for anticoagulation at this point.  She had the  IVC filter.  The venous Doppler revealed new intramuscular DVT.  The patient is on Lovenox for DVT prophylaxis.  Recommend repeat the venous Doppler next week. The case with neurosurgery critical care medicine. All in full anticoagulation as per neurosurgery for now
On Lovenox , increased dose to 60 mg BID
not candidate for anticoagulation at this point.  She had the  IVC filter.  The venous Doppler revealed new intramuscular DVT.  The patient is on Lovenox for DVT prophylaxis.  Recommend repeat the venous Doppler next week. The case with neurosurgery critical care medicine. All in full anticoagulation as per neurosurgery for now
DC Eliquis ,restart Lovenox
not candidate for anticoagulation at this point.  She had the  IVC filter.  The venous Doppler revealed new intramuscular DVT.  The patient is on Lovenox for DVT prophylaxis.  Recommend repeat the venous Doppler next week. The case with neurosurgery critical care medicine. Recent venous Doppler revealed new DVT is most likely related to the inferior vena cava filter.  The patient on full dose of Lovenox and will repeat venous Doppler in 1 week
stable now, since pt failed Eliquis will start on Pradaxa 150 mg q12h
Management as per primary team  -S/p OR    -C/w Vimpat    -CTH 9/4 as above - stable
not candidate for anticoagulation at this point.  She had the  IVC filter.  The venous Doppler revealed new intramuscular DVT.  The patient is on Lovenox for DVT prophylaxis.  Recommend repeat the venous Doppler next week. The case with neurosurgery critical care medicine. All in full anticoagulation as per neurosurgery for now
Management as per primary team  -S/p OR    -C/w Vimpat    -CTH 9/4 as above - stable
stable , continue Lovenox
Management as per primary team  -S/p OR    -C/w Vimpat    -CTH 9/4 as above - stable

## 2021-09-20 NOTE — PROGRESS NOTE ADULT - PROBLEM SELECTOR PLAN 6
As above  -Heme following
Stable, no overt bleed
As above  -Heme following
Stable, no overt bleed
Stable, no overt bleed
As above  -Heme following
Stable, no overt bleed
As above  -Heme following
As above  -Heme following
Stable, no overt bleed
As above  -Heme following
As above  -Heme following

## 2021-09-20 NOTE — PROGRESS NOTE ADULT - PROBLEM SELECTOR PROBLEM 3
Abnormality of lung on CXR
Abnormality of lung on CXR
Heterozygous for prothrombin [id number] mutation
Heterozygous for prothrombin [id number] mutation
Abnormality of lung on CXR
Multiple subsegmental pulmonary emboli without acute cor pulmonale
Abnormality of lung on CXR
Aneurysm
Abnormality of lung on CXR
Aneurysm
Multiple subsegmental pulmonary emboli without acute cor pulmonale
Multiple subsegmental pulmonary emboli without acute cor pulmonale
Abnormality of lung on CXR
Anemia due to acute blood loss
Aneurysm
Multiple subsegmental pulmonary emboli without acute cor pulmonale
Aneurysm
Multiple subsegmental pulmonary emboli without acute cor pulmonale
Heterozygous for prothrombin [id number] mutation
Aneurysm

## 2021-09-20 NOTE — PROGRESS NOTE ADULT - PROBLEM SELECTOR PROBLEM 5
DVT, lower extremity
Heterozygous for prothrombin [id number] mutation
Heterozygous for prothrombin [id number] mutation
DVT, lower extremity
Heterozygous for prothrombin [id number] mutation
Heterozygous for prothrombin [id number] mutation
Heterozygous for prothrombin [id number] mutation
DVT, lower extremity

## 2021-09-20 NOTE — PROGRESS NOTE ADULT - PROBLEM SELECTOR PLAN 8
RESOLVED  Increase bedtime Seroquel to 87.5 mg, c/w Seroquel 50 mg PO qd    Check EKG for QtC
stable c/w Seroquel 50 mg PO qd
New  NTD at this time but will continue to monitor  -No evidence of overt bleed at this time
RESOLVED  Stable on Seroquel qHS 87.5 mg, c/w Seroquel 50 mg PO qd    Check EKG for QtC
Would d/c Ativan  Increase bedtime Seroquel to 87.5 mg, c/w Seroquel 50 mg PO qd    Check EKG for QtC
Would d/c Ativan  Increase bedtime Seroquel to 87.5 mg, c/w Seroquel 50 mg PO qd    Check EKG for QtC
RESOLVED  Increase bedtime Seroquel to 87.5 mg, c/w Seroquel 50 mg PO qd    Check EKG for QtC
Patient requiring multiple pushes of ativan overnight due to agitation.   Continue Seroquel 50 mg in the AM, Seroquel 75 mg PO per nocte.  Start ativan 1mg via peg tube per nocte.    Check EKG for QtC
Start Seroquel 75 mg PO BID as pt still delirious    Check EKG for QtC
RESOLVED  Increase bedtime Seroquel to 87.5 mg, c/w Seroquel 50 mg PO qd    Check EKG for QtC
New  NTD at this time but will continue to monitor  -No evidence of overt bleed at this time

## 2021-09-20 NOTE — PROGRESS NOTE ADULT - PROBLEM SELECTOR PROBLEM 2
DVT, lower extremity
Aneurysm
DVT, lower extremity
Aneurysm
DVT, lower extremity
Functional quadriplegia
DVT, lower extremity
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
Functional quadriplegia
Aneurysm
DVT, lower extremity
DVT, lower extremity
Functional quadriplegia
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
DVT, lower extremity
DVT, lower extremity
Functional quadriplegia
Functional quadriplegia
Aneurysm
Functional quadriplegia
Functional quadriplegia
Aneurysm

## 2021-09-20 NOTE — PROGRESS NOTE ADULT - THIS PATIENT HAS THE FOLLOWING CONDITION(S)/DIAGNOSES ON THIS ADMISSION:
Cerebral Edema/Brain Compression / Herniation
Cerebral Edema/Brain Compression / Herniation
None
Cerebral Edema
Cerebral Edema/Brain Compression / Herniation
None
Cerebral Edema
Cerebral Edema/Brain Compression / Herniation
None
Cerebral Edema/Brain Compression / Herniation
None
Cerebral Edema/Brain Compression / Herniation

## 2021-09-20 NOTE — PROGRESS NOTE ADULT - PROBLEM SELECTOR PLAN 3
Exam is consistent bilateral rales.  The chest x-ray is not consistent with fluid overload neither the clinical picture.  The abnormality most likely related to secretion.  The patient has a weak cough.  The patient required nasotracheal suctioning for moderate amount of secretion.  The patient is off antibiotic.  The patient afebrile with normal white count.  I doubt the patient has underlying pneumonia.  Will discuss with neurosurgery whether the patient is candidate for bronchoscopy now but the current condition will recur at this point continue bronchodilator and mucolytic's.  Aggressive pulmonary toilet.
Exam is consistent bilateral rales.  The chest x-ray is not consistent with fluid overload neither the clinical picture.  The abnormality most likely related to secretion.  The patient has a weak cough.  The patient required nasotracheal suctioning for moderate amount of secretion.  The patient is off antibiotic.  The patient afebrile with normal white count.  I doubt the patient has underlying pneumonia.  Will discuss with neurosurgery whether the patient is candidate for bronchoscopy now but the current condition will recur at this point continue bronchodilator and mucolytic's.  Aggressive pulmonary toilet..  The oxygen saturation improved.  The chest exam improved compared to yesterday.  She is more alert and exam improved with clearing of the chest and less requirement for pulmonary toilet
Exam is consistent bilateral rales.  The chest x-ray is not consistent with fluid overload neither the clinical picture.  The abnormality most likely related to secretion.  The patient has a weak cough.  The patient required nasotracheal suctioning for moderate amount of secretion.  The patient is off antibiotic.  The patient afebrile with normal white count.  I doubt the patient has underlying pneumonia.  Will discuss with neurosurgery whether the patient is candidate for bronchoscopy now but the current condition will recur at this point continue bronchodilator and mucolytic's.  Aggressive pulmonary toilet..  The oxygen saturation improved.  The chest exam improved compared to yesterday.  She is more alert and exam improved with clearing of the chest and less requirement for pulmonary toilet.  She is doing better and has a week cough
As above
Exam is consistent bilateral rales.  The chest x-ray is not consistent with fluid overload neither the clinical picture.  The abnormality most likely related to secretion.  The patient has a weak cough.  The patient required nasotracheal suctioning for moderate amount of secretion.  The patient is off antibiotic.  The patient afebrile with normal white count.  I doubt the patient has underlying pneumonia.  Will discuss with neurosurgery whether the patient is candidate for bronchoscopy now but the current condition will recur at this point continue bronchodilator and mucolytic's.  Aggressive pulmonary toilet..  The oxygen saturation improved.  The chest exam improved compared to yesterday.  The patient is getting aggressive pulmonary toilet is on bronchodilators and mucolytic.  Discussed the case with neurosurgery at this point no indication for bronchoscopy will follow clinically
Patient improved since being new.  Patient does not require frequent pulmonary toilet.  Discussed with neurosurgery as the mental status cloudy and may be related to the Seroquel and will follow clinically.  The baseline oxygen saturation is normal on room air
Pt on full dose A/C - Eliquis
Patient improved since being new.  Patient does not require frequent pulmonary toilet.  Discussed with neurosurgery as the mental status cloudy and may be related to the Seroquel and will follow clinically.  The baseline oxygen saturation is normal on room air
The chest x-ray with the patient is consistent with bilateral infiltrate.  The white count increased.  The LP was performed cultures are negative to date.  The patient was started on ceftriaxone.  The patient was on bronchodilators and mucolytic.  The patient is high risk for hospital-acquired pneumonia.  Continue antibiotic and monitor respiratory status. I discussed the case with critical care medicine. The patient still risk for aspiration. She is on Ancef at this point. Chest x-ray revealed consolidation in the right lower lobe and could be also atelectasis
Pt on full dose A/C - Lovenox
The chest x-ray with the patient is consistent with bilateral infiltrate.  The white count increased.  The LP was performed cultures are negative to date.  The patient was started on ceftriaxone.  The patient was on bronchodilators and mucolytic.  The patient is high risk for hospital-acquired pneumonia.  Continue antibiotic and monitor respiratory status
Patient improved since being new.  Patient does not require frequent pulmonary toilet.  Discussed with neurosurgery as the mental status cloudy and may be related to the Seroquel and will follow clinically.  The baseline oxygen saturation is normal on room air
discussed with team
As above
on  AC
stable
Exam is consistent bilateral rales.  The chest x-ray is not consistent with fluid overload neither the clinical picture.  The abnormality most likely related to secretion.  The patient has a weak cough.  The patient required nasotracheal suctioning for moderate amount of secretion.  The patient is off antibiotic.  The patient afebrile with normal white count.  I doubt the patient has underlying pneumonia.  Will discuss with neurosurgery whether the patient is candidate for bronchoscopy now but the current condition will recur at this point continue bronchodilator and mucolytic's.  Aggressive pulmonary toilet..  The oxygen saturation improved.  The chest exam improved compared to yesterday.  She is more alert and exam improved with clearing of the chest and less requirement for pulmonary toilet.  She is doing better and has a week cough
Pt is at increased risk for thrombosis...however since she is heterozygous she will NOT need LL AC
Exam is consistent bilateral rales.  The chest x-ray is not consistent with fluid overload neither the clinical picture.  The abnormality most likely related to secretion.  The patient has a weak cough.  The patient required nasotracheal suctioning for moderate amount of secretion.  The patient is off antibiotic.  The patient afebrile with normal white count.  I doubt the patient has underlying pneumonia.  Will discuss with neurosurgery whether the patient is candidate for bronchoscopy now but the current condition will recur at this point continue bronchodilator and mucolytic's.  Aggressive pulmonary toilet..  The oxygen saturation improved.  The chest exam improved compared to yesterday.  She is more alert and exam improved with clearing of the chest and less requirement for pulmonary toilet.  She is doing better and has a week cough
As above
Pt on full dose A/C - Eliquis
Pt on full dose A/C - Eliquis
As above
Pt on full dose A/C - Eliquis
As above

## 2021-09-20 NOTE — PROGRESS NOTE ADULT - SUBJECTIVE AND OBJECTIVE BOX
Patient is a 45y old  Female who presents with a chief complaint of SAH (04 Sep 2021 07:05)      HPI:  44y/o F with h/o recent gastric sleeve (08/04/21 [facility], Dr. Crisostomo ), fibromyalgia, thyroid dysfunction, PTSD, depression, anxiety.  Presented with seizures at home - brought to [place], with 1 more episode of seizure en route.  Intubated, CT showed SAH with IV extension, casted IV, hydrocephalus, given mannitol, levetiracetam 1g,  6mg ativan. Started on Cardene drip for BP goal < 140 and transferred to [facility] NICU for further management.     HH5 MF4   NIHSS 26 on arrival  BD 1 = 8/7 (07 Aug 2021 08:08)    O/N and interval events: None    Subjective:   Pt denies pain. Reports hunger. Denies SOB. ROS is otherwise negative.     Allergies    apple (Angioedema)  No Known Drug Allergies  strawberry (Angioedema)    Intolerances    lactose (Stomach Upset)        MEDICATIONS  (STANDING):  acetylcysteine 20%  Inhalation 4 milliLiter(s) Inhalation every 12 hours  albuterol/ipratropium for Nebulization 3 milliLiter(s) Nebulizer every 6 hours  AQUAPHOR (petrolatum Ointment) 1 Application(s) Topical two times a day  aspirin  chewable 81 milliGRAM(s) Enteral Tube daily  BACItracin   Ointment 1 Application(s) Topical two times a day  bromocriptine Capsule 5 milliGRAM(s) Oral every 8 hours  chlorhexidine 2% Cloths 1 Application(s) Topical <User Schedule>  enoxaparin Injectable 100 milliGRAM(s) SubCutaneous every 12 hours  ergocalciferol 45256 Unit(s) Oral <User Schedule>  hydrocortisone 1% Cream 1 Application(s) Topical every 12 hours  lacosamide Solution 100 milliGRAM(s) Oral two times a day  multivitamin/minerals/iron Oral Solution (CENTRUM) 15 milliLiter(s) Oral daily  pantoprazole   Suspension 40 milliGRAM(s) Oral daily  polyethylene glycol 3350 17 Gram(s) Oral daily  QUEtiapine 50 milliGRAM(s) Oral every 24 hours  QUEtiapine 87.5 milliGRAM(s) Oral every 24 hours  senna 2 Tablet(s) Oral at bedtime    MEDICATIONS  (PRN):  acetaminophen   Tablet .. 650 milliGRAM(s) Oral every 6 hours PRN Temp greater or equal to 38C (100.4F), Mild Pain (1 - 3)  bisacodyl Suppository 10 milliGRAM(s) Rectal daily PRN Constipation  melatonin 3 milliGRAM(s) Oral at bedtime PRN Sleep  methocarbamol 500 milliGRAM(s) Oral three times a day PRN Muscle Spasm  ondansetron Injectable 4 milliGRAM(s) IV Push every 6 hours PRN Nausea and/or Vomiting  sodium chloride 0.9% lock flush 10 milliLiter(s) IV Push every 1 hour PRN Pre/post blood products, medications, blood draw, and to maintain line patency        Drug Dosing Weight  Height (cm): 162.6 (01 Sep 2021 11:40)  Weight (kg): 98.2 (01 Sep 2021 11:40)  BMI (kg/m2): 37.1 (01 Sep 2021 11:40)  BSA (m2): 2.02 (01 Sep 2021 11:40)    PAST MEDICAL & SURGICAL HISTORY:      FAMILY HISTORY:  No pertinent family history in first degree relatives        SOCIAL HISTORY: no smoking reported      Vital Signs Last 24 Hrs  T(C): 37 (20 Sep 2021 04:58), Max: 37.2 (19 Sep 2021 22:00)  T(F): 98.6 (20 Sep 2021 04:58), Max: 99 (19 Sep 2021 22:00)  HR: 100 (20 Sep 2021 03:24) (100 - 108)  BP: 114/62 (20 Sep 2021 03:24) (113/65 - 135/61)  BP(mean): 82 (20 Sep 2021 03:24) (80 - 88)  RR: 18 (20 Sep 2021 03:24) (18 - 18)  SpO2: 95% (20 Sep 2021 03:24) (95% - 95%)      PHYSICAL EXAM:      Constitutional: NAD  Eyes: PERRLA  ENMT: thrush  Neck: supple  Back: midline  Respiratory: CTA b/l  Cardiovascular: rrr, s1s2, no m/r/g  Gastrointestinal: soft, NTND, + J tube  Extremities: wwp  Vascular: + 2 pules radial  Neurological: AAO x 2, minimally following commands  Skin: no rash  Lymph Nodes: no LAD  Musculoskeletal: no joint swelling  Psychiatric: flat affect        LABS:                          11.3   7.60  )-----------( 381      ( 20 Sep 2021 07:01 )             37.2   09-20    143  |  105  |  11  ----------------------------<  104<H>  4.0   |  26  |  0.55    Ca    9.8      20 Sep 2021 07:01  Phos  4.0     09-20  Mg     2.0     09-20      EKG:    ECHO, US:    < from: TTE Limited Echo w/o Cont (08.12.21 @ 15:22) >  CONCLUSIONS:     1. Limited study obtained for evaluation of right ventricular function.   2. Probably normal left ventricular systolic function.   3. Normal right ventricular size and systolic function.   4. There was insufficient tricuspid regurgitation detected from which to calculate pulmonary artery systolic pressure.   5. No pericardial effusion.    < end of copied text >      RADIOLOGY:  < from: CT Head No Cont (08.29.21 @ 17:45) >  IMPRESSION:    With ventricular shunt in place, there is now decreased ventricular size since 08/27/2021.    < end of copied text >    < from: CT Head No Cont (09.04.21 @ 11:29) >    IMPRESSION:    No acute intracranial hemorrhage or mass effect compared to prior imaging from 08/29/2021, now with the patient placed on anticoagulation therapy.    Stable ventricular size with  shunt in place, mild hydrocephalus persists.    A couple small and subacute appearing infarcts are now visible in the right cerebellum.    --- End of Report ---    < end of copied text >

## 2021-09-20 NOTE — PROGRESS NOTE ADULT - PROBLEM SELECTOR PLAN 9
New  NTD at this time but will continue to monitor  -No evidence of overt bleed at this time
New  NTD at this time but will continue to monitor  -No evidence of overt bleed at this time
Resolved  NTD at this time but will continue to monitor  -No evidence of overt bleed at this time
Resolved
Resolved  NTD at this time but will continue to monitor  -No evidence of overt bleed at this time

## 2021-09-20 NOTE — PROGRESS NOTE ADULT - PROBLEM SELECTOR PLAN 7
Stable, no overt bleed
Start Seroquel 25 mg PO qAM  Seroquel 50 mg PO qHS    Check EKG for QtC
Stable, no overt bleed
Start Seroquel 75 mg PO BID as pt still delirious    Check EKG for QtC
Stable, no overt bleed
Start Seroquel 75 mg PO BID as pt still delirious    Check EKG for QtC
Stable, no overt bleed
Start Seroquel 25 mg PO qAM  Seroquel 50 mg PO qHS
Start Seroquel 75 mg PO BID as pt still delirious    Check EKG for QtC
Stable, no overt bleed

## 2021-09-20 NOTE — PROGRESS NOTE ADULT - TIME BILLING
Patient seen and examined with house-staff during bedside rounds.  Resident note read, including vitals, physical findings, laboratory data, and radiological reports.   Revisions included below.  Direct personal management at bed side and extensive interpretation of the data.  Plan was outlined and discussed in details with the housestaff.  Decision making of high complexity  Action taken for acute disease activity to reflect the level of care provided:  - medication reconciliation  - review laboratory data  Fever and I doubt related to pulmonary source.  CXR had basal atelectasis
Patient seen and examined with house-staff during bedside rounds.  Resident note read, including vitals, physical findings, laboratory data, and radiological reports.   Revisions included below.  Direct personal management at bed side and extensive interpretation of the data.  Plan was outlined and discussed in details with the housestaff.  Decision making of high complexity  Action taken for acute disease activity to reflect the level of care provided:  - medication reconciliation  - review laboratory data
discussed with PRICILLA Leigh
Medical management
Patient seen and examined with house-staff during bedside rounds.  Resident note read, including vitals, physical findings, laboratory data, and radiological reports.   Revisions included below.  Direct personal management at bed side and extensive interpretation of the data.  Plan was outlined and discussed in details with the housestaff.  Decision making of high complexity  Action taken for acute disease activity to reflect the level of care provided:  - medication reconciliation  - review laboratory data  Fever and I doubt related to pulmonary source.  CXR had basal atelectasis
discussed with NS team
management of PNA, fever
Medical management
Patient seen and examined with house-staff during bedside rounds.  Resident note read, including vitals, physical findings, laboratory data, and radiological reports.   Revisions included below.  Direct personal management at bed side and extensive interpretation of the data.  Plan was outlined and discussed in details with the housestaff.  Decision making of high complexity  Action taken for acute disease activity to reflect the level of care provided:  - medication reconciliation  - review laboratory data  Fever and I doubt related to pulmonary source.  CXR had basal atelectasis
Patient seen and examined with house-staff during bedside rounds.  Resident note read, including vitals, physical findings, laboratory data, and radiological reports.   Revisions included below.  Direct personal management at bed side and extensive interpretation of the data.  Plan was outlined and discussed in details with the housestaff.  Decision making of high complexity  Action taken for acute disease activity to reflect the level of care provided:  - medication reconciliation  - review laboratory data  MS improved
management of MSSA PNA
Discussed with pt and NS team
Patient seen and examined with house-staff during bedside rounds.  Resident note read, including vitals, physical findings, laboratory data, and radiological reports.   Revisions included below.  Direct personal management at bed side and extensive interpretation of the data.  Plan was outlined and discussed in details with the housestaff.  Decision making of high complexity  Action taken for acute disease activity to reflect the level of care provided:  - medication reconciliation  - review laboratory data  Fever and I doubt related to pulmonary source.  CXR had basal atelectasis
Medical management
Patient seen and examined with house-staff during bedside rounds.  Resident note read, including vitals, physical findings, laboratory data, and radiological reports.   Revisions included below.  Direct personal management at bed side and extensive interpretation of the data.  Plan was outlined and discussed in details with the housestaff.  Decision making of high complexity  Action taken for acute disease activity to reflect the level of care provided:  - medication reconciliation  - review laboratory data  Fever and I doubt related to pulmonary source.  CXR had basal atelectasis
discussed with NS team
Medical management
Patient seen and examined with house-staff during bedside rounds.  Resident note read, including vitals, physical findings, laboratory data, and radiological reports.   Revisions included below.  Direct personal management at bed side and extensive interpretation of the data.  Plan was outlined and discussed in details with the housestaff.  Decision making of high complexity  Action taken for acute disease activity to reflect the level of care provided:  - medication reconciliation  - review laboratory data  Fever and I doubt related to pulmonary source.  CXR had basal atelectasis
discussed with NS team
Patient seen and examined with house-staff during bedside rounds.  Resident note read, including vitals, physical findings, laboratory data, and radiological reports.   Revisions included below.  Direct personal management at bed side and extensive interpretation of the data.  Plan was outlined and discussed in details with the housestaff.  Decision making of high complexity  Action taken for acute disease activity to reflect the level of care provided:  - medication reconciliation  - review laboratory data
Discussed with NS team
Medical management
medical management of delirium and coagulopathy

## 2021-09-20 NOTE — PROGRESS NOTE ADULT - PROBLEM SELECTOR PROBLEM 9
Thrombocytopenic

## 2021-10-05 PROBLEM — Z00.00 ENCOUNTER FOR PREVENTIVE HEALTH EXAMINATION: Status: ACTIVE | Noted: 2021-01-01

## 2021-10-20 NOTE — REASON FOR VISIT
[de-identified] : Right  shunt placement  [de-identified] : 8/27/21 [de-identified] : Reports she is doing well.\par Denies any signs of postop wound infection which could include but is not limited to redness/swelling/purulent drainage\par Denies CP/SOB/unilateral leg edema. Denies headaches, nausea, vomiting, dizziness, weakness. \par He is slowly introducing preop activities\par

## 2021-10-20 NOTE — HISTORY OF PRESENT ILLNESS
[FreeTextEntry1] : 45 year old woman with history of recent gastric sleeve (8/4/21 by Dr. Crisostomo at [facility]), fibromyalgia, thyroid dysfunction, PTSD, depression and anxiety who presented to [facility] ED on 8/7/21 after seizure at home. She was initially brought to [facility] and found to have SAH with IVH extension. 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 Lomax. \par \par She was discharged to [facility] acute rehab on 9/20/21 and returns today for follow up.

## 2021-11-10 ENCOUNTER — APPOINTMENT (OUTPATIENT)
Dept: SURGERY | Facility: CLINIC | Age: 46
End: 2021-11-10

## 2021-12-27 NOTE — PROGRESS NOTE ADULT - SUBJECTIVE AND OBJECTIVE BOX
Pt read my chart message 12/27/21 at 11:36am   Interval Events: Reviewed  Patient seen and examined at bedside.    Patient is a 45y old  Female who presents with a chief complaint of SAH (13 Aug 2021 09:20)  she is confused but not in distresss    PAST MEDICAL & SURGICAL HISTORY:      MEDICATIONS:  Pulmonary:    Antimicrobials:    Anticoagulants:  aspirin  chewable 81 milliGRAM(s) Oral daily  enoxaparin Injectable 40 milliGRAM(s) SubCutaneous every 12 hours    Cardiac:  hydrALAZINE Injectable 10 milliGRAM(s) IV Push every 3 hours PRN  labetalol Injectable 10 milliGRAM(s) IV Push every 2 hours PRN  niMODipine 60 milliGRAM(s) Oral every 4 hours      Allergies    Allergy Status Unknown    Intolerances        Vital Signs Last 24 Hrs  T(C): 38.6 (13 Aug 2021 17:00), Max: 38.6 (13 Aug 2021 14:00)  T(F): 101.5 (13 Aug 2021 17:00), Max: 101.5 (13 Aug 2021 14:00)  HR: 117 (13 Aug 2021 19:00) (100 - 122)  BP: 179/89 (13 Aug 2021 14:00) (119/65 - 179/89)  BP(mean): 125 (13 Aug 2021 14:00) (84 - 125)  RR: 28 (13 Aug 2021 19:00) (16 - 29)  SpO2: 99% (13 Aug 2021 19:00) (96% - 100%)     @ : @ 07:00  --------------------------------------------------------  IN: 3764 mL / OUT: 2287 mL / NET: 1477 mL     @ 07: @ 19:20  --------------------------------------------------------  IN: 1569 mL / OUT: 738 mL / NET: 831 mL          Review of Systems:   •	General: negative  •	Skin/Breast: negative  •	Ophthalmologic: negative  •	ENMT: negative  •	Respiratory and Thorax: negative  •	Cardiovascular: negative  •	Gastrointestinal: negative  •	Genitourinary: negative  •	Musculoskeletal: negative  •	Neurological: negative  •	Psychiatric: negative  •	Hematology/Lymphatics: negative  •	Endocrine: negative  •	Allergic/Immunologic: negative    Physical Exam:   • Constitutional:	refer to the dietion /Nutritionist note  • Eyes:	EOMI; PERRL; no drainage or redness  • ENMT:	No oral lesions; no gross abnormalities  • Neck	No bruits; no thyromegaly or nodules  • Breasts:	not examined  • Back:	No deformity or limitation of movement  • Respiratory:	Breath Sounds equal & clear to percussion & auscultation, no accessory muscle use  • Cardiovascular:	Regular rate & rhythm, normal S1, S2; no murmurs, gallops or rubs; no S3, S4  • Gastrointestinal:	Soft, non-tender, no hepatosplenomegaly, normal bowel sounds  • Genitourinary:	not examined  • Rectal: not examined  • Extremities:	No cyanosis, clubbing or edema  • Vascular:	Equal and normal pulses (carotid, femoral, dorsalis pedis)  • Neurologica:l	not examined  • Skin:	No lesions; no rash  • Lymph Nodes:	No lymphadedenopathy  • Musculoskeletal:	No joint pain, swelling or deformity; no limitation of movement        LABS:      CBC Full  -  ( 13 Aug 2021 05:12 )  WBC Count : 13.26 K/uL  RBC Count : 4.72 M/uL  Hemoglobin : 11.6 g/dL  Hematocrit : 38.0 %  Platelet Count - Automated : 270 K/uL  Mean Cell Volume : 80.5 fl  Mean Cell Hemoglobin : 24.6 pg  Mean Cell Hemoglobin Concentration : 30.5 gm/dL  Auto Neutrophil # : x  Auto Lymphocyte # : x  Auto Monocyte # : x  Auto Eosinophil # : x  Auto Basophil # : x  Auto Neutrophil % : x  Auto Lymphocyte % : x  Auto Monocyte % : x  Auto Eosinophil % : x  Auto Basophil % : x        149<H>  |  115<H>  |  21  ----------------------------<  154<H>  3.3<L>   |  23  |  0.60    Ca    8.9      13 Aug 2021 05:12  Phos  2.6       Mg     2.4         TPro  6.3  /  Alb  3.7  /  TBili  0.3  /  DBili  0.2  /  AST  11  /  ALT  16  /  AlkPhos  57  08-13    PT/INR - ( 12 Aug 2021 17:28 )   PT: 16.4 sec;   INR: 1.39          PTT - ( 12 Aug 2021 17:28 )  PTT:27.2 sec      Urinalysis Basic - ( 12 Aug 2021 22:16 )    Color: Yellow / Appearance: SL Cloudy / S.020 / pH: x  Gluc: x / Ketone: 40 mg/dL  / Bili: Negative / Urobili: 0.2 E.U./dL   Blood: x / Protein: NEGATIVE mg/dL / Nitrite: NEGATIVE   Leuk Esterase: NEGATIVE / RBC: > 10 /HPF / WBC < 5 /HPF   Sq Epi: x / Non Sq Epi: 5-10 /HPF / Bacteria: Present /HPF      < from: Xray Chest 1 View-PORTABLE IMMEDIATE (Xray Chest 1 View-PORTABLE IMMEDIATE .) (21 @ 09:50) >    EXAM:  XR CHEST PORTABLE IMMED 1V                          PROCEDURE DATE:  2021          INTERPRETATION:  Clinical History: Pneumonia    Frontal examination of the chest demonstrates the heart to be within normal limits in transverse diameter. No acute infiltrates. No interval change position remaining support devices in comparison to prior examination of the chest 21.    IMPRESSION: No acute infiltrates    < end of copied text >              RADIOLOGY & ADDITIONAL STUDIES (The following images were personally reviewed):

## 2022-01-11 NOTE — PROVIDER CONTACT NOTE (OTHER) - ACTION/TREATMENT ORDERED:
Pt assessed by MARCO Blank. Continue to monitor. detailed exam Bilobed Flap Text: The defect edges were debeveled with a #15 scalpel blade.  Given the location of the defect and the proximity to free margins a bilobe flap was deemed most appropriate.  Using a sterile surgical marker, an appropriate bilobe flap drawn around the defect.    The area thus outlined was incised deep to adipose tissue with a #15 scalpel blade.  The skin margins were undermined to an appropriate distance in all directions utilizing iris scissors.

## 2025-04-08 NOTE — PROCEDURAL SAFETY CHECKLIST WITH OR WITHOUT SEDATION - NSTEAMNURSEFT_GEN_ALL_CORE
Problem: At Risk for Falls  Goal: Patient does not fall  Outcome: Monitoring/Evaluating progress  Goal: Patient takes action to control fall-related risks  Outcome: Monitoring/Evaluating progress     Problem: At Risk for Injury Due to Fall  Goal: Patient does not fall  Outcome: Monitoring/Evaluating progress  Goal: Takes action to control condition specific risks  Outcome: Monitoring/Evaluating progress  Goal: Verbalizes understanding of fall-related injury personal risks  Description: Document education using the patient education activity  Outcome: Monitoring/Evaluating progress     Problem: Impaired Physical Mobility  Goal: Functional status is maintained or returned to baseline during hospitalization  Outcome: Monitoring/Evaluating progress  Goal: Tolerates activity for discharge setting with no abnormal symptoms  Outcome: Monitoring/Evaluating progress     Problem: Self Care Deficit  Goal: # Functional status is maintained or returned to baseline - REHAB ONLY  Outcome: Monitoring/Evaluating progress  Goal: Functional status is maintained or returned to baseline  Outcome: Monitoring/Evaluating progress  Goal: # Tolerates activity for d/c setting with no clinical problems  Outcome: Monitoring/Evaluating progress     Problem: Skin Integrity Alteration  Goal: Skin remains intact with no new/deterioration of wound or pressure injury  Outcome: Monitoring/Evaluating progress  Goal: Participates in wound care activities  Outcome: Monitoring/Evaluating progress     
Kehinde, RN
Kehinde, RN
Kehinde

## 2025-06-09 NOTE — PROVIDER CONTACT NOTE (CRITICAL VALUE NOTIFICATION) - DATE AND TIME:
Tried calling patient to review tumor board recommendations from today.  No answer, left message on machine to call me back.    Ultimately the recommendation will be obtaining a repeat scan 3 months from her most recent scan.  Last scan 4/22. Recommend CT chest mid-July and appt with Dr. You after this is complete. I think he can cancel her appt for 7/1 and PFTs. We will wait till we talk to her to make these changes. I will put in order for CT now though.    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    Amylyn Mortimer, PA-C  Thoracic Surgery        24-Aug-2021 18:00

## 2025-06-26 NOTE — DISCHARGE NOTE PROVIDER - CARE PROVIDER_API CALL
Eran Lomax)  Neurosurgery  [address], [address] [place] [address]  Phone: [phone]  Fax: [phone]  Follow Up Time:     Honorio Sanchez)  Vascular Surgery  [address], [address] [place] [address]  Phone: [phone]  Fax: [phone]  Follow Up Time:     Chantel Glass)  Critical Care Medicine; Pulmonary Disease  [address], [address] [place] [address]  Phone: [phone]  Fax: [phone]  Follow Up Time:    Earn Lomax)  Neurosurgery  [address], [address] [place] [address]  Phone: [phone]  Fax: [phone]  Follow Up Time:     Honorio Sanchez)  Vascular Surgery  [address] [place] [address]  Phone: [phone]  Fax: [phone]  Follow Up Time:     Chantel Glass)  Critical Care Medicine; Pulmonary Disease  [address], [address] [place] [id number]  Phone: [phone]  Fax: [phone]  Follow Up Time:     Angel Hinton)  Surgery  [address] [place] [address]  Phone: [phone]  Fax: [phone]  Follow Up Time:     Heather Montiel)  Internal Medicine  [address] [place] [address]  Phone: [phone]  Fax: [phone]  Follow Up Time:    Oral Medication (indication, name, dose, time)